# Patient Record
Sex: FEMALE | Race: WHITE | NOT HISPANIC OR LATINO | Employment: OTHER | URBAN - METROPOLITAN AREA
[De-identification: names, ages, dates, MRNs, and addresses within clinical notes are randomized per-mention and may not be internally consistent; named-entity substitution may affect disease eponyms.]

---

## 2023-03-01 ENCOUNTER — HOSPITAL ENCOUNTER (INPATIENT)
Facility: HOSPITAL | Age: 73
LOS: 5 days | Discharge: HOME | End: 2023-03-06
Attending: EMERGENCY MEDICINE | Admitting: INTERNAL MEDICINE

## 2023-03-01 ENCOUNTER — APPOINTMENT (EMERGENCY)
Dept: RADIOLOGY | Facility: HOSPITAL | Age: 73
End: 2023-03-01

## 2023-03-01 ENCOUNTER — APPOINTMENT (INPATIENT)
Dept: RADIOLOGY | Facility: HOSPITAL | Age: 73
End: 2023-03-01

## 2023-03-01 DIAGNOSIS — F41.9 ANXIETY: Chronic | ICD-10-CM

## 2023-03-01 DIAGNOSIS — I20.8 ANGINA AT REST (HCC): ICD-10-CM

## 2023-03-01 DIAGNOSIS — I10 PRIMARY HYPERTENSION: Chronic | ICD-10-CM

## 2023-03-01 DIAGNOSIS — J96.92 HYPERCAPNIC RESPIRATORY FAILURE (HCC): Primary | ICD-10-CM

## 2023-03-01 DIAGNOSIS — J96.02 ACUTE RESPIRATORY FAILURE WITH HYPERCAPNIA (HCC): ICD-10-CM

## 2023-03-01 DIAGNOSIS — F11.21 OPIOID DEPENDENCE IN REMISSION (HCC): Chronic | ICD-10-CM

## 2023-03-01 DIAGNOSIS — Z72.0 NICOTINE ABUSE: ICD-10-CM

## 2023-03-01 DIAGNOSIS — J96.01 ACUTE RESPIRATORY FAILURE WITH HYPOXIA AND HYPERCAPNIA (HCC): ICD-10-CM

## 2023-03-01 DIAGNOSIS — G89.29 CHRONIC PAIN: ICD-10-CM

## 2023-03-01 DIAGNOSIS — R91.8 PULMONARY NODULES: ICD-10-CM

## 2023-03-01 DIAGNOSIS — F19.10 DRUG ABUSE (HCC): ICD-10-CM

## 2023-03-01 DIAGNOSIS — J96.02 ACUTE RESPIRATORY FAILURE WITH HYPOXIA AND HYPERCAPNIA (HCC): ICD-10-CM

## 2023-03-01 DIAGNOSIS — J43.2 CENTRILOBULAR EMPHYSEMA (HCC): Chronic | ICD-10-CM

## 2023-03-01 PROBLEM — E03.9 HYPOTHYROID: Status: ACTIVE | Noted: 2023-03-01

## 2023-03-01 PROBLEM — Z95.1 HX OF CABG: Chronic | Status: ACTIVE | Noted: 2023-03-01

## 2023-03-01 PROBLEM — G93.40 ENCEPHALOPATHY ACUTE: Status: ACTIVE | Noted: 2023-03-01

## 2023-03-01 PROBLEM — F11.20 OPIOID DEPENDENCE (HCC): Status: ACTIVE | Noted: 2023-03-01

## 2023-03-01 PROBLEM — Z95.1 HX OF CABG: Status: ACTIVE | Noted: 2023-03-01

## 2023-03-01 PROBLEM — N17.9 ACUTE KIDNEY INJURY (HCC): Status: ACTIVE | Noted: 2023-03-01

## 2023-03-01 PROBLEM — E03.9 HYPOTHYROID: Chronic | Status: ACTIVE | Noted: 2023-03-01

## 2023-03-01 PROBLEM — F11.20 OPIOID DEPENDENCE (HCC): Chronic | Status: ACTIVE | Noted: 2023-03-01

## 2023-03-01 PROBLEM — I25.10 CAD (CORONARY ARTERY DISEASE): Status: ACTIVE | Noted: 2023-03-01

## 2023-03-01 PROBLEM — J44.1 CHRONIC OBSTRUCTIVE PULMONARY DISEASE WITH ACUTE EXACERBATION (HCC): Status: ACTIVE | Noted: 2023-03-01

## 2023-03-01 PROBLEM — I25.10 CAD (CORONARY ARTERY DISEASE): Chronic | Status: ACTIVE | Noted: 2023-03-01

## 2023-03-01 LAB
2HR DELTA HS TROPONIN: <0 NG/L
4HR DELTA HS TROPONIN: 0 NG/L
ALBUMIN SERPL BCP-MCNC: 4.5 G/DL (ref 3.5–5)
ALP SERPL-CCNC: 39 U/L (ref 34–104)
ALT SERPL W P-5'-P-CCNC: 14 U/L (ref 7–52)
ANION GAP SERPL CALCULATED.3IONS-SCNC: 7 MMOL/L (ref 4–13)
APTT PPP: 45 SECONDS (ref 23–37)
ARTERIAL PATENCY WRIST A: YES
AST SERPL W P-5'-P-CCNC: 23 U/L (ref 13–39)
BASE EXCESS BLDA CALC-SCNC: -1.6 MMOL/L
BASOPHILS # BLD AUTO: 0.05 THOUSANDS/ÂΜL (ref 0–0.1)
BASOPHILS NFR BLD AUTO: 1 % (ref 0–1)
BILIRUB SERPL-MCNC: 0.25 MG/DL (ref 0.2–1)
BNP SERPL-MCNC: 30 PG/ML (ref 0–100)
BODY TEMPERATURE: 97.4 DEGREES FEHRENHEIT
BUN SERPL-MCNC: 25 MG/DL (ref 5–25)
CALCIUM SERPL-MCNC: 9.1 MG/DL (ref 8.4–10.2)
CARDIAC TROPONIN I PNL SERPL HS: 2 NG/L
CARDIAC TROPONIN I PNL SERPL HS: 2 NG/L
CARDIAC TROPONIN I PNL SERPL HS: <2 NG/L
CHLORIDE SERPL-SCNC: 103 MMOL/L (ref 96–108)
CO2 SERPL-SCNC: 27 MMOL/L (ref 21–32)
CREAT SERPL-MCNC: 1.36 MG/DL (ref 0.6–1.3)
EOSINOPHIL # BLD AUTO: 0.47 THOUSAND/ÂΜL (ref 0–0.61)
EOSINOPHIL NFR BLD AUTO: 5 % (ref 0–6)
ERYTHROCYTE [DISTWIDTH] IN BLOOD BY AUTOMATED COUNT: 15.2 % (ref 11.6–15.1)
FLUAV RNA RESP QL NAA+PROBE: NEGATIVE
FLUBV RNA RESP QL NAA+PROBE: NEGATIVE
GFR SERPL CREATININE-BSD FRML MDRD: 39 ML/MIN/1.73SQ M
GLUCOSE SERPL-MCNC: 112 MG/DL (ref 65–140)
HCO3 BLDA-SCNC: 25.8 MMOL/L (ref 22–28)
HCT VFR BLD AUTO: 40.7 % (ref 34.8–46.1)
HGB BLD-MCNC: 12.7 G/DL (ref 11.5–15.4)
IMM GRANULOCYTES # BLD AUTO: 0.1 THOUSAND/UL (ref 0–0.2)
IMM GRANULOCYTES NFR BLD AUTO: 1 % (ref 0–2)
INR PPP: 0.98 (ref 0.84–1.19)
LYMPHOCYTES # BLD AUTO: 2.27 THOUSANDS/ÂΜL (ref 0.6–4.47)
LYMPHOCYTES NFR BLD AUTO: 26 % (ref 14–44)
MAGNESIUM SERPL-MCNC: 2.2 MG/DL (ref 1.9–2.7)
MCH RBC QN AUTO: 30 PG (ref 26.8–34.3)
MCHC RBC AUTO-ENTMCNC: 31.2 G/DL (ref 31.4–37.4)
MCV RBC AUTO: 96 FL (ref 82–98)
MONOCYTES # BLD AUTO: 0.82 THOUSAND/ÂΜL (ref 0.17–1.22)
MONOCYTES NFR BLD AUTO: 10 % (ref 4–12)
NASAL CANNULA: 3
NEUTROPHILS # BLD AUTO: 4.94 THOUSANDS/ÂΜL (ref 1.85–7.62)
NEUTS SEG NFR BLD AUTO: 57 % (ref 43–75)
NRBC BLD AUTO-RTO: 0 /100 WBCS
O2 CT BLDA-SCNC: 14.2 ML/DL (ref 16–23)
OXYHGB MFR BLDA: 79.3 % (ref 94–97)
PCO2 BLDA: 55.3 MM HG (ref 36–44)
PCO2 TEMP ADJ BLDA: 53.6 MM HG (ref 36–44)
PH BLD: 7.3 [PH] (ref 7.35–7.45)
PH BLDA: 7.29 [PH] (ref 7.35–7.45)
PLATELET # BLD AUTO: 281 THOUSANDS/UL (ref 149–390)
PMV BLD AUTO: 9.3 FL (ref 8.9–12.7)
PO2 BLD: 47.9 MM HG (ref 75–129)
PO2 BLDA: 50.3 MM HG (ref 75–129)
POTASSIUM SERPL-SCNC: 4.3 MMOL/L (ref 3.5–5.3)
PROCALCITONIN SERPL-MCNC: <0.05 NG/ML
PROT SERPL-MCNC: 8.4 G/DL (ref 6.4–8.4)
PROTHROMBIN TIME: 13.1 SECONDS (ref 11.6–14.5)
RBC # BLD AUTO: 4.23 MILLION/UL (ref 3.81–5.12)
RSV RNA RESP QL NAA+PROBE: NEGATIVE
SARS-COV-2 RNA RESP QL NAA+PROBE: NEGATIVE
SODIUM SERPL-SCNC: 137 MMOL/L (ref 135–147)
SPECIMEN SOURCE: ABNORMAL
WBC # BLD AUTO: 8.65 THOUSAND/UL (ref 4.31–10.16)

## 2023-03-01 PROCEDURE — 5A09357 ASSISTANCE WITH RESPIRATORY VENTILATION, LESS THAN 24 CONSECUTIVE HOURS, CONTINUOUS POSITIVE AIRWAY PRESSURE: ICD-10-PCS | Performed by: INTERNAL MEDICINE

## 2023-03-01 RX ORDER — TRAZODONE HYDROCHLORIDE 50 MG/1
50 TABLET ORAL
Status: DISCONTINUED | OUTPATIENT
Start: 2023-03-01 | End: 2023-03-06 | Stop reason: HOSPADM

## 2023-03-01 RX ORDER — DULOXETIN HYDROCHLORIDE 60 MG/1
60 CAPSULE, DELAYED RELEASE ORAL DAILY
Status: DISCONTINUED | OUTPATIENT
Start: 2023-03-02 | End: 2023-03-06 | Stop reason: HOSPADM

## 2023-03-01 RX ORDER — ALPRAZOLAM 0.5 MG/1
0.5 TABLET ORAL DAILY PRN
Status: DISCONTINUED | OUTPATIENT
Start: 2023-03-01 | End: 2023-03-02

## 2023-03-01 RX ORDER — ALPRAZOLAM 0.5 MG/1
TABLET ORAL 3 TIMES DAILY PRN
COMMUNITY

## 2023-03-01 RX ORDER — TRAMADOL HYDROCHLORIDE 50 MG/1
50 TABLET ORAL EVERY 6 HOURS PRN
COMMUNITY

## 2023-03-01 RX ORDER — METHYLPREDNISOLONE SODIUM SUCCINATE 125 MG/2ML
125 INJECTION, POWDER, LYOPHILIZED, FOR SOLUTION INTRAMUSCULAR; INTRAVENOUS ONCE
Status: COMPLETED | OUTPATIENT
Start: 2023-03-01 | End: 2023-03-01

## 2023-03-01 RX ORDER — LEVOTHYROXINE SODIUM 0.1 MG/1
100 TABLET ORAL DAILY
Status: DISCONTINUED | OUTPATIENT
Start: 2023-03-02 | End: 2023-03-06 | Stop reason: HOSPADM

## 2023-03-01 RX ORDER — CLOPIDOGREL BISULFATE 75 MG/1
75 TABLET ORAL DAILY
COMMUNITY

## 2023-03-01 RX ORDER — GUAIFENESIN 600 MG/1
600 TABLET, EXTENDED RELEASE ORAL 2 TIMES DAILY
Status: DISCONTINUED | OUTPATIENT
Start: 2023-03-01 | End: 2023-03-06 | Stop reason: HOSPADM

## 2023-03-01 RX ORDER — BUPRENORPHINE AND NALOXONE 8; 2 MG/1; MG/1
8 FILM, SOLUBLE BUCCAL; SUBLINGUAL 2 TIMES DAILY
Status: DISCONTINUED | OUTPATIENT
Start: 2023-03-01 | End: 2023-03-02

## 2023-03-01 RX ORDER — VENLAFAXINE HYDROCHLORIDE 150 MG/1
225 CAPSULE, EXTENDED RELEASE ORAL DAILY
COMMUNITY

## 2023-03-01 RX ORDER — METHYLPREDNISOLONE SODIUM SUCCINATE 40 MG/ML
40 INJECTION, POWDER, LYOPHILIZED, FOR SOLUTION INTRAMUSCULAR; INTRAVENOUS EVERY 8 HOURS
Status: DISCONTINUED | OUTPATIENT
Start: 2023-03-02 | End: 2023-03-03

## 2023-03-01 RX ORDER — CELECOXIB 200 MG/1
200 CAPSULE ORAL DAILY
COMMUNITY

## 2023-03-01 RX ORDER — ENOXAPARIN SODIUM 100 MG/ML
40 INJECTION SUBCUTANEOUS DAILY
Status: DISCONTINUED | OUTPATIENT
Start: 2023-03-02 | End: 2023-03-06 | Stop reason: HOSPADM

## 2023-03-01 RX ORDER — ACETAMINOPHEN 325 MG/1
650 TABLET ORAL EVERY 6 HOURS PRN
Status: DISCONTINUED | OUTPATIENT
Start: 2023-03-01 | End: 2023-03-06 | Stop reason: HOSPADM

## 2023-03-01 RX ORDER — BUPRENORPHINE HYDROCHLORIDE AND NALOXONE HYDROCHLORIDE DIHYDRATE 8; 2 MG/1; MG/1
1 TABLET SUBLINGUAL 2 TIMES DAILY
COMMUNITY

## 2023-03-01 RX ORDER — GABAPENTIN 300 MG/1
300 CAPSULE ORAL 3 TIMES DAILY
Status: DISCONTINUED | OUTPATIENT
Start: 2023-03-01 | End: 2023-03-01

## 2023-03-01 RX ORDER — LOSARTAN POTASSIUM 25 MG/1
25 TABLET ORAL DAILY
COMMUNITY
End: 2023-03-06

## 2023-03-01 RX ORDER — GABAPENTIN 300 MG/1
300 CAPSULE ORAL 3 TIMES DAILY
Status: ON HOLD | COMMUNITY
End: 2023-03-06 | Stop reason: SDUPTHER

## 2023-03-01 RX ORDER — CHLORHEXIDINE GLUCONATE 0.12 MG/ML
15 RINSE ORAL EVERY 12 HOURS SCHEDULED
Status: DISCONTINUED | OUTPATIENT
Start: 2023-03-01 | End: 2023-03-02

## 2023-03-01 RX ORDER — OMEPRAZOLE 40 MG/1
40 CAPSULE, DELAYED RELEASE ORAL DAILY
COMMUNITY

## 2023-03-01 RX ORDER — METOPROLOL SUCCINATE 25 MG/1
25 TABLET, EXTENDED RELEASE ORAL DAILY
Status: DISCONTINUED | OUTPATIENT
Start: 2023-03-02 | End: 2023-03-06 | Stop reason: HOSPADM

## 2023-03-01 RX ORDER — PANTOPRAZOLE SODIUM 40 MG/1
40 TABLET, DELAYED RELEASE ORAL
Status: DISCONTINUED | OUTPATIENT
Start: 2023-03-02 | End: 2023-03-06 | Stop reason: HOSPADM

## 2023-03-01 RX ORDER — DULOXETIN HYDROCHLORIDE 60 MG/1
60 CAPSULE, DELAYED RELEASE ORAL DAILY
COMMUNITY
End: 2023-03-06

## 2023-03-01 RX ORDER — NICOTINE 21 MG/24HR
1 PATCH, TRANSDERMAL 24 HOURS TRANSDERMAL DAILY
Status: DISCONTINUED | OUTPATIENT
Start: 2023-03-02 | End: 2023-03-06 | Stop reason: HOSPADM

## 2023-03-01 RX ORDER — LEVOTHYROXINE SODIUM 0.1 MG/1
100 TABLET ORAL DAILY
COMMUNITY
End: 2023-03-10

## 2023-03-01 RX ORDER — ATORVASTATIN CALCIUM 40 MG/1
40 TABLET, FILM COATED ORAL DAILY
COMMUNITY

## 2023-03-01 RX ORDER — IPRATROPIUM BROMIDE AND ALBUTEROL SULFATE 2.5; .5 MG/3ML; MG/3ML
3 SOLUTION RESPIRATORY (INHALATION)
Status: DISCONTINUED | OUTPATIENT
Start: 2023-03-01 | End: 2023-03-06 | Stop reason: HOSPADM

## 2023-03-01 RX ORDER — LOSARTAN POTASSIUM 25 MG/1
25 TABLET ORAL DAILY
Status: DISCONTINUED | OUTPATIENT
Start: 2023-03-02 | End: 2023-03-05

## 2023-03-01 RX ORDER — ATORVASTATIN CALCIUM 40 MG/1
40 TABLET, FILM COATED ORAL
Status: DISCONTINUED | OUTPATIENT
Start: 2023-03-02 | End: 2023-03-06 | Stop reason: HOSPADM

## 2023-03-01 RX ORDER — METOPROLOL SUCCINATE 25 MG/1
25 TABLET, EXTENDED RELEASE ORAL DAILY
COMMUNITY

## 2023-03-01 RX ORDER — CLOPIDOGREL BISULFATE 75 MG/1
75 TABLET ORAL DAILY
Status: DISCONTINUED | OUTPATIENT
Start: 2023-03-02 | End: 2023-03-06 | Stop reason: HOSPADM

## 2023-03-01 RX ADMIN — GUAIFENESIN 600 MG: 600 TABLET, EXTENDED RELEASE ORAL at 20:21

## 2023-03-01 RX ADMIN — TRAZODONE HYDROCHLORIDE 50 MG: 50 TABLET ORAL at 22:07

## 2023-03-01 RX ADMIN — CHLORHEXIDINE GLUCONATE 15 ML: 1.2 SOLUTION ORAL at 21:45

## 2023-03-01 RX ADMIN — METHYLPREDNISOLONE SODIUM SUCCINATE 125 MG: 125 INJECTION, POWDER, FOR SOLUTION INTRAMUSCULAR; INTRAVENOUS at 16:29

## 2023-03-01 RX ADMIN — IPRATROPIUM BROMIDE 1 MG: 0.5 SOLUTION RESPIRATORY (INHALATION) at 16:07

## 2023-03-01 RX ADMIN — ALBUTEROL SULFATE 10 MG: 2.5 SOLUTION RESPIRATORY (INHALATION) at 16:06

## 2023-03-01 RX ADMIN — METHYLPREDNISOLONE SODIUM SUCCINATE 40 MG: 40 INJECTION, POWDER, FOR SOLUTION INTRAMUSCULAR; INTRAVENOUS at 23:35

## 2023-03-01 RX ADMIN — IPRATROPIUM BROMIDE AND ALBUTEROL SULFATE 3 ML: 2.5; .5 SOLUTION RESPIRATORY (INHALATION) at 22:46

## 2023-03-01 RX ADMIN — ALPRAZOLAM 0.5 MG: 0.5 TABLET ORAL at 22:07

## 2023-03-01 NOTE — ED NOTES
Placed oxygen on pt in wheelchair on 2 liters per cannula   Oxygen up to 96% and breathing easier per pt     Aristides Krause RN  03/01/23 1938

## 2023-03-01 NOTE — ED PROVIDER NOTES
History  Chief Complaint   Patient presents with   • Shortness of Breath     Shortness of breath for some time  States worse over past couple of weeks  No pain hx of copd     On plavix compliant, no leg pain or swelling, no head trauma or focal neurologic complaints  Somnolent  History provided by:  Patient   used: No    Shortness of Breath  Severity:  Moderate  Onset quality:  Gradual  Timing:  Constant  Chronicity:  New  Relieved by:  Nothing  Worsened by:  Nothing  Ineffective treatments:  None tried  Associated symptoms: cough and wheezing    Associated symptoms: no chest pain, no fever, no neck pain, no PND and no vomiting    Risk factors comment:  History of COPD  Prior to Admission Medications   Prescriptions Last Dose Informant Patient Reported? Taking?    ALPRAZolam (XANAX) 0 5 mg tablet   Yes Yes   Sig: Take by mouth 3 (three) times a day as needed for anxiety   DULoxetine (CYMBALTA) 60 mg delayed release capsule   Yes Yes   Sig: Take 60 mg by mouth daily   atorvastatin (LIPITOR) 40 mg tablet   Yes Yes   Sig: Take 40 mg by mouth daily   buprenorphine-naloxone (SUBOXONE) 8-2 mg per SL tablet   Yes Yes   Sig: Place 1 tablet under the tongue 2 (two) times a day   celecoxib (CeleBREX) 200 mg capsule   Yes Yes   Sig: Take 200 mg by mouth daily   clopidogrel (PLAVIX) 75 mg tablet   Yes Yes   Sig: Take 75 mg by mouth daily   gabapentin (NEURONTIN) 300 mg capsule   Yes Yes   Sig: Take 300 mg by mouth 3 (three) times a day   levothyroxine 100 mcg tablet   Yes Yes   Sig: Take 100 mcg by mouth daily   losartan (COZAAR) 25 mg tablet   Yes Yes   Sig: Take 25 mg by mouth daily   metoprolol succinate (TOPROL-XL) 25 mg 24 hr tablet   Yes Yes   Sig: Take 25 mg by mouth daily   omeprazole (PriLOSEC) 40 MG capsule   Yes Yes   Sig: Take 40 mg by mouth daily   traMADol (ULTRAM) 50 mg tablet   Yes Yes   Sig: Take 50 mg by mouth every 6 (six) hours as needed for moderate pain   venlafaxine (EFFEXOR-XR) 150 mg 24 hr capsule   Yes Yes   Sig: Take 225 mg by mouth daily      Facility-Administered Medications: None       Past Medical History:   Diagnosis Date   • COPD (chronic obstructive pulmonary disease) ([facility] [address] )    • Disease of thyroid gland    • Hypertension        Past Surgical History:   Procedure Laterality Date   • CARDIAC SURGERY     • HIP SURGERY     • ORTHOPEDIC SURGERY     • 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)         History reviewed  No pertinent family history  I have reviewed and agree with the history as documented  E-Cigarette/Vaping   • E-Cigarette Use Never User      E-Cigarette/Vaping Substances     Social History     Tobacco Use   • Smoking status: Every Day     Packs/day: 0 50     Types: Cigarettes   • Smokeless tobacco: Never   Vaping Use   • Vaping Use: Never used   Substance Use Topics   • Alcohol use: Not Currently   • Drug use: Yes     Types: Marijuana       Review of Systems   Constitutional: Negative  Negative for fever  HENT: Negative  Eyes: Negative  Respiratory: Positive for cough, shortness of breath and wheezing  Cardiovascular: Negative  Negative for chest pain and PND  Gastrointestinal: Negative  Negative for vomiting  Endocrine: Negative  Genitourinary: Negative  Musculoskeletal: Negative  Negative for neck pain  Skin: Negative  Allergic/Immunologic: Negative  Neurological: Negative  Hematological: Negative  Psychiatric/Behavioral: Positive for decreased concentration  All other systems reviewed and are negative  Physical Exam  Physical Exam  Constitutional:       Appearance: Normal appearance  She is well-developed  HENT:      Head: Normocephalic and atraumatic  Nose: Nose normal       Mouth/Throat:      Mouth: Mucous membranes are moist    Eyes:      Extraocular Movements: Extraocular movements intact  Pupils: Pupils are equal, round, and reactive to light  Cardiovascular:      Rate and Rhythm: Normal rate and regular rhythm  Pulmonary:      Effort: Pulmonary effort is normal  Tachypnea present  Breath sounds: Decreased breath sounds present  No wheezing, rhonchi or rales  Abdominal:      General: Abdomen is flat  Bowel sounds are normal       Palpations: Abdomen is soft  Musculoskeletal:         General: Normal range of motion  Cervical back: Normal range of motion and neck supple  Right lower leg: No tenderness  No edema  Left lower leg: No tenderness  No edema  Skin:     General: Skin is warm  Capillary Refill: Capillary refill takes less than 2 seconds  Neurological:      General: No focal deficit present  Mental Status: She is alert and oriented to person, place, and time  Mental status is at baseline  Cranial Nerves: No cranial nerve deficit  Motor: No weakness  Psychiatric:         Mood and Affect: Mood normal          Thought Content:  Thought content normal          Vital Signs  ED Triage Vitals [03/01/23 1351]   Temperature Pulse Respirations Blood Pressure SpO2   (!) 97 4 °F (36 3 °C) 60 (!) 24 123/72 91 %      Temp Source Heart Rate Source Patient Position - Orthostatic VS BP Location FiO2 (%)   Tympanic Monitor Sitting Right arm --      Pain Score       No Pain           Vitals:    03/01/23 1351 03/01/23 1632 03/01/23 1900   BP: 123/72 163/72    Pulse: 60 61 55   Patient Position - Orthostatic VS: Sitting           Visual Acuity      ED Medications  Medications   atorvastatin (LIPITOR) tablet 40 mg (has no administration in time range)   buprenorphine-naloxone (Suboxone) film 8 mg (has no administration in time range)   clopidogrel (PLAVIX) tablet 75 mg (has no administration in time range)   DULoxetine (CYMBALTA) delayed release capsule 60 mg (has no administration in time range)   gabapentin (NEURONTIN) capsule 300 mg (has no administration in time range)   levothyroxine tablet 100 mcg (has no administration in time range)   losartan (COZAAR) tablet 25 mg (has no administration in time range)   metoprolol succinate (TOPROL-XL) 24 hr tablet 25 mg (has no administration in time range)   pantoprazole (PROTONIX) EC tablet 40 mg (has no administration in time range)   venlafaxine (EFFEXOR-XR) 24 hr capsule 225 mg (has no administration in time range)   chlorhexidine (PERIDEX) 0 12 % oral rinse 15 mL (has no administration in time range)   acetaminophen (TYLENOL) tablet 650 mg (has no administration in time range)   nicotine (NICODERM CQ) 14 mg/24hr TD 24 hr patch 1 patch (has no administration in time range)   guaiFENesin (MUCINEX) 12 hr tablet 600 mg (has no administration in time range)   methylPREDNISolone sodium succinate (Solu-MEDROL) injection 40 mg (has no administration in time range)   enoxaparin (LOVENOX) subcutaneous injection 40 mg (has no administration in time range)   albuterol CONTINUOUS nebulizing solution (canister) 10 mg (10 mg Nebulization Given 3/1/23 1606)   methylPREDNISolone sodium succinate (Solu-MEDROL) injection 125 mg (125 mg Intravenous Given 3/1/23 1629)   ipratropium (ATROVENT) 0 02 % inhalation solution 1 mg (1 mg Nebulization Given 3/1/23 1607)       Diagnostic Studies  Results Reviewed     Procedure Component Value Units Date/Time    Sputum culture and Gram stain [[id number]]     Lab Status: No result Specimen: Expectorated Sputum     Legionella antigen, urine [[id number]]     Lab Status: No result Specimen: Urine, Clean Catch     Strep Pneumoniae, Urine [[id number]]     Lab Status: No result Specimen: Urine, Clean Catch     Procalcitonin [[id number]]     Lab Status: No result Specimen: Blood     HS Troponin I 4hr [[id number]]     Lab Status: No result Specimen: Blood     FLU/RSV/COVID - if FLU/RSV clinically relevant [[id number]]  (Normal) Collected: 03/01/23 1611    Lab Status: Final result Specimen: Nares from Nose Updated: 03/01/23 1701     SARS-CoV-2 Negative     INFLUENZA A PCR Negative     INFLUENZA B PCR Negative     RSV PCR Negative    Narrative:      FOR PEDIATRIC PATIENTS - copy/paste COVID Guidelines URL to browser: [email or web address]    SARS-CoV-2 assay is a Nucleic Acid Amplification assay intended for the  qualitative detection of nucleic acid from SARS-CoV-2 in nasopharyngeal  swabs  Results are for the presumptive identification of SARS-CoV-2 RNA  Positive results are indicative of infection with SARS-CoV-2, the virus  causing COVID-19, but do not rule out bacterial infection or co-infection  with other viruses  Laboratories within the [place] and its  territories are required to report all positive results to the appropriate  public health authorities  Negative results do not preclude SARS-CoV-2  infection and should not be used as the sole basis for treatment or other  patient management decisions  Negative results must be combined with  clinical observations, patient history, and epidemiological information  This test has not been FDA cleared or approved  This test has been authorized by FDA under an Emergency Use Authorization  (EUA)  This test is only authorized for the duration of time the  declaration that circumstances exist justifying the authorization of the  emergency use of an in vitro diagnostic tests for detection of SARS-CoV-2  virus and/or diagnosis of COVID-19 infection under section 564(b)(1) of  the Act, 21 U  S C  058TBN-9(X)(4), unless the authorization is terminated  or revoked sooner  The test has been validated but independent review by FDA  and CLIA is pending  Test performed using [employer] GeneXpert: This RT-PCR assay targets N2,  a region unique to SARS-CoV-2  A conserved region in the E-gene was chosen  for pan-Sarbecovirus detection which includes SARS-CoV-2  According to CMS-2020-01-R, this platform meets the definition of high-[employer] technology      B-Type Natriuretic Peptide(BNP) [[id number]]  (Normal) Collected: 03/01/23 1611    Lab Status: Final result Specimen: Blood from Line, Venous Updated: 03/01/23 1650     BNP 30 pg/mL     HS Troponin I 2hr [[id number]]     Lab Status: No result Specimen: Blood     HS Troponin 0hr (reflex protocol) [[id number]]  (Normal) Collected: 03/01/23 1611    Lab Status: Final result Specimen: Blood from Line, Venous Updated: 03/01/23 1648     hs TnI 0hr 2 ng/L     Comprehensive metabolic panel [[id number]]  (Abnormal) Collected: 03/01/23 1611    Lab Status: Final result Specimen: Blood from Line, Venous Updated: 03/01/23 1640     Sodium 137 mmol/L      Potassium 4 3 mmol/L      Chloride 103 mmol/L      CO2 27 mmol/L      ANION GAP 7 mmol/L      BUN 25 mg/dL      Creatinine 1 36 mg/dL      Glucose 112 mg/dL      Calcium 9 1 mg/dL      AST 23 U/L      ALT 14 U/L      Alkaline Phosphatase 39 U/L      Total Protein 8 4 g/dL      Albumin 4 5 g/dL      Total Bilirubin 0 25 mg/dL      eGFR 39 ml/min/1 73sq m     Narrative:      [place] guidelines for Chronic Kidney Disease (CKD):   •  Stage 1 with normal or high GFR (GFR > 90 mL/min/1 73 square meters)  •  Stage 2 Mild CKD (GFR = 60-89 mL/min/1 73 square meters)  •  Stage 3A Moderate CKD (GFR = 45-59 mL/min/1 73 square meters)  •  Stage 3B Moderate CKD (GFR = 30-44 mL/min/1 73 square meters)  •  Stage 4 Severe CKD (GFR = 15-29 mL/min/1 73 square meters)  •  Stage 5 End Stage CKD (GFR <15 mL/min/1 73 square meters)  Note: GFR calculation is accurate only with a steady state creatinine    Magnesium [[id number]]  (Normal) Collected: 03/01/23 1611    Lab Status: Final result Specimen: Blood from Line, Venous Updated: 03/01/23 1640     Magnesium 2 2 mg/dL     Protime-INR [[id number]]  (Normal) Collected: 03/01/23 1611    Lab Status: Final result Specimen: Blood from Line, Venous Updated: 03/01/23 1638     Protime 13 1 seconds      INR 0 98    APTT [[id number]]  (Abnormal) Collected: 03/01/23 1611    Lab Status: Final result Specimen: Blood from Line, Venous Updated: 03/01/23 1638     PTT 45 seconds     Blood gas, arterial [[id number]]  (Abnormal) Collected: 03/01/23 1619    Lab Status: Final result Specimen: Blood, Arterial from Radial, Left Updated: 03/01/23 1637     pH, Arterial 7 287     PH ART TC 7 297     pCO2, Arterial 55 3 mm Hg      PCO2 (TC) Arterial 53 6 mm Hg      pO2, Arterial 50 3 mm Hg      PO2 (TC) Arterial 47 9 mm Hg      HCO3, Arterial 25 8 mmol/L      Base Excess, Arterial -1 6 mmol/L      O2 Content, Arterial 14 2 mL/dL      O2 HGB,Arterial  79 3 %      SOURCE Radial, Left     CRIS TEST Yes     Temperature 97 4 Degrees Fehrenheit      Nasal Cannula 3    CBC and differential [[id number]]  (Abnormal) Collected: 03/01/23 1611    Lab Status: Final result Specimen: Blood from Line, Venous Updated: 03/01/23 1619     WBC 8 65 Thousand/uL      RBC 4 23 Million/uL      Hemoglobin 12 7 g/dL      Hematocrit 40 7 %      MCV 96 fL      MCH 30 0 pg      MCHC 31 2 g/dL      RDW 15 2 %      MPV 9 3 fL      Platelets 114 Thousands/uL      nRBC 0 /100 WBCs      Neutrophils Relative 57 %      Immat GRANS % 1 %      Lymphocytes Relative 26 %      Monocytes Relative 10 %      Eosinophils Relative 5 %      Basophils Relative 1 %      Neutrophils Absolute 4 94 Thousands/µL      Immature Grans Absolute 0 10 Thousand/uL      Lymphocytes Absolute 2 27 Thousands/µL      Monocytes Absolute 0 82 Thousand/µL      Eosinophils Absolute 0 47 Thousand/µL      Basophils Absolute 0 05 Thousands/µL                  XR chest 1 view portable    (Results Pending)   CT head without contrast    (Results Pending)   CT chest without contrast    (Results Pending)              Procedures  ECG 12 Lead Documentation Only    Date/Time: 3/1/2023 5:19 PM  Performed by: Diana Sheridan DO  Authorized by: Diana Sheridan DO     ECG reviewed by me, the ED Provider: yes    Patient location:  ED  Previous ECG:     Previous ECG: Unavailable    Comparison to cardiac monitor: Yes    Interpretation:     Interpretation: non-specific    Rate:     ECG rate assessment: normal    Rhythm:     Rhythm: sinus rhythm    Ectopy:     Ectopy: none    QRS:     QRS axis:  Normal  Conduction:     Conduction: normal    ST segments:     ST segments:  Non-specific  T waves:     T waves: non-specific      CriticalCare Time  Performed by: Dinora Holbrook DO  Authorized by: Dinora Holbrook DO     Critical care provider statement:     Critical care time (minutes):  45    Critical care was necessary to treat or prevent imminent or life-threatening deterioration of the following conditions:  Respiratory failure    Critical care was time spent personally by me on the following activities:  Blood draw for specimens, obtaining history from patient or surrogate, development of treatment plan with patient or surrogate, discussions with consultants, evaluation of patient's response to treatment, examination of patient, interpretation of cardiac output measurements, ordering and performing treatments and interventions, ordering and review of laboratory studies, ordering and review of radiographic studies, re-evaluation of patient's condition and review of old charts    I assumed direction of critical care for this patient from another provider in my specialty: no               ED Course                                             Medical Decision Making  Obtained ABG labs chest x-ray  Patient is in hypercapnic respiratory failure  She improved with her mental status with BiPAP  ICU consulted admit for further evaluation management  Hypercapnic respiratory failure ([facility] [address] ): acute illness or injury  Amount and/or Complexity of Data Reviewed  Independent Historian: caregiver  Labs: ordered  Decision-making details documented in ED Course  Radiology: ordered  Decision-making details documented in ED Course  ECG/medicine tests: ordered   Decision-making details documented in ED Course  Risk  Prescription drug management  Decision regarding hospitalization  Disposition  Final diagnoses:   Hypercapnic respiratory failure ([address] )     Time reflects when diagnosis was documented in both MDM as applicable and the Disposition within this note     Time User Action Codes Description Comment    3/1/2023  5:16 PM Radha Steele Add [J96 [id number]] Hypercapnic respiratory failure [facility])       ED Disposition     ED Disposition   Admit    Condition   Stable    Date/Time   Wed Mar 1, 2023  5:16 PM    Comment               Follow-up Information    None         Patient's Medications   Discharge Prescriptions    No medications on file       No discharge procedures on file      PDMP Review     None          ED Provider  Electronically Signed by           Stephanie Wolf DO  03/01/23 1932

## 2023-03-02 PROBLEM — F14.10 COCAINE ABUSE (HCC): Status: ACTIVE | Noted: 2023-03-02

## 2023-03-02 PROBLEM — I63.9 CEREBRAL INFARCT (HCC): Status: ACTIVE | Noted: 2023-03-02

## 2023-03-02 PROBLEM — R91.8 PULMONARY NODULES: Status: ACTIVE | Noted: 2023-03-02

## 2023-03-02 LAB
ANION GAP SERPL CALCULATED.3IONS-SCNC: 7 MMOL/L (ref 4–13)
ATRIAL RATE: 61 BPM
BASE EX.OXY STD BLDV CALC-SCNC: 95.8 % (ref 60–80)
BASE EXCESS BLDV CALC-SCNC: -1.4 MMOL/L
BASOPHILS # BLD AUTO: 0.01 THOUSANDS/ÂΜL (ref 0–0.1)
BASOPHILS NFR BLD AUTO: 0 % (ref 0–1)
BODY TEMPERATURE: 97.8 DEGREES FEHRENHEIT
BUN SERPL-MCNC: 26 MG/DL (ref 5–25)
CALCIUM SERPL-MCNC: 9.2 MG/DL (ref 8.4–10.2)
CHLORIDE SERPL-SCNC: 105 MMOL/L (ref 96–108)
CO2 SERPL-SCNC: 27 MMOL/L (ref 21–32)
CREAT SERPL-MCNC: 1.21 MG/DL (ref 0.6–1.3)
EOSINOPHIL # BLD AUTO: 0 THOUSAND/ÂΜL (ref 0–0.61)
EOSINOPHIL NFR BLD AUTO: 0 % (ref 0–6)
ERYTHROCYTE [DISTWIDTH] IN BLOOD BY AUTOMATED COUNT: 14.6 % (ref 11.6–15.1)
GFR SERPL CREATININE-BSD FRML MDRD: 45 ML/MIN/1.73SQ M
GLUCOSE SERPL-MCNC: 159 MG/DL (ref 65–140)
HCO3 BLDV-SCNC: 20.2 MMOL/L (ref 24–30)
HCT VFR BLD AUTO: 41.1 % (ref 34.8–46.1)
HGB BLD-MCNC: 13 G/DL (ref 11.5–15.4)
IMM GRANULOCYTES # BLD AUTO: 0.13 THOUSAND/UL (ref 0–0.2)
IMM GRANULOCYTES NFR BLD AUTO: 1 % (ref 0–2)
IPAP: 12
L PNEUMO1 AG UR QL IA.RAPID: NEGATIVE
LYMPHOCYTES # BLD AUTO: 0.93 THOUSANDS/ÂΜL (ref 0.6–4.47)
LYMPHOCYTES NFR BLD AUTO: 10 % (ref 14–44)
MAGNESIUM SERPL-MCNC: 2.2 MG/DL (ref 1.9–2.7)
MCH RBC QN AUTO: 30 PG (ref 26.8–34.3)
MCHC RBC AUTO-ENTMCNC: 31.6 G/DL (ref 31.4–37.4)
MCV RBC AUTO: 95 FL (ref 82–98)
MONOCYTES # BLD AUTO: 0.21 THOUSAND/ÂΜL (ref 0.17–1.22)
MONOCYTES NFR BLD AUTO: 2 % (ref 4–12)
NEUTROPHILS # BLD AUTO: 8.19 THOUSANDS/ÂΜL (ref 1.85–7.62)
NEUTS SEG NFR BLD AUTO: 87 % (ref 43–75)
NON VENT- BIPAP: ABNORMAL
NRBC BLD AUTO-RTO: 0 /100 WBCS
O2 CT BLDV-SCNC: 19.4 ML/DL
P AXIS: 58 DEGREES
PCO2 BLDV: 26.3 MM HG (ref 42–50)
PEEP MAX SETTING VENT: 5 CM[H2O]
PH BLDV: 7.5 [PH] (ref 7.3–7.4)
PHOSPHATE SERPL-MCNC: 3 MG/DL (ref 2.3–4.1)
PLATELET # BLD AUTO: 298 THOUSANDS/UL (ref 149–390)
PMV BLD AUTO: 9 FL (ref 8.9–12.7)
PO2 BLDV: 198.3 MM HG (ref 35–45)
POTASSIUM SERPL-SCNC: 4.5 MMOL/L (ref 3.5–5.3)
PR INTERVAL: 166 MS
PROCALCITONIN SERPL-MCNC: <0.05 NG/ML
QRS AXIS: 73 DEGREES
QRSD INTERVAL: 92 MS
QT INTERVAL: 448 MS
QTC INTERVAL: 450 MS
RBC # BLD AUTO: 4.34 MILLION/UL (ref 3.81–5.12)
S PNEUM AG UR QL: NEGATIVE
SODIUM SERPL-SCNC: 139 MMOL/L (ref 135–147)
T WAVE AXIS: 69 DEGREES
VENT BIPAP FIO2: 35 %
VENTRICULAR RATE: 61 BPM
WBC # BLD AUTO: 9.47 THOUSAND/UL (ref 4.31–10.16)

## 2023-03-02 RX ORDER — BUPRENORPHINE AND NALOXONE 8; 2 MG/1; MG/1
8 FILM, SOLUBLE BUCCAL; SUBLINGUAL DAILY
Status: DISCONTINUED | OUTPATIENT
Start: 2023-03-03 | End: 2023-03-03

## 2023-03-02 RX ORDER — BUPRENORPHINE AND NALOXONE 2; .5 MG/1; MG/1
4 FILM, SOLUBLE BUCCAL; SUBLINGUAL
Status: DISCONTINUED | OUTPATIENT
Start: 2023-03-02 | End: 2023-03-03

## 2023-03-02 RX ORDER — ALPRAZOLAM 0.5 MG/1
0.5 TABLET ORAL 2 TIMES DAILY PRN
Status: DISCONTINUED | OUTPATIENT
Start: 2023-03-02 | End: 2023-03-06 | Stop reason: HOSPADM

## 2023-03-02 RX ADMIN — ATORVASTATIN CALCIUM 40 MG: 40 TABLET, FILM COATED ORAL at 18:14

## 2023-03-02 RX ADMIN — GUAIFENESIN 600 MG: 600 TABLET, EXTENDED RELEASE ORAL at 18:14

## 2023-03-02 RX ADMIN — GUAIFENESIN 600 MG: 600 TABLET, EXTENDED RELEASE ORAL at 08:21

## 2023-03-02 RX ADMIN — CLOPIDOGREL BISULFATE 75 MG: 75 TABLET ORAL at 08:21

## 2023-03-02 RX ADMIN — ALPRAZOLAM 0.5 MG: 0.5 TABLET ORAL at 15:07

## 2023-03-02 RX ADMIN — IPRATROPIUM BROMIDE AND ALBUTEROL SULFATE 3 ML: 2.5; .5 SOLUTION RESPIRATORY (INHALATION) at 07:39

## 2023-03-02 RX ADMIN — PANTOPRAZOLE SODIUM 40 MG: 40 TABLET, DELAYED RELEASE ORAL at 05:38

## 2023-03-02 RX ADMIN — VENLAFAXINE HYDROCHLORIDE 225 MG: 75 CAPSULE, EXTENDED RELEASE ORAL at 08:30

## 2023-03-02 RX ADMIN — DULOXETINE HYDROCHLORIDE 60 MG: 60 CAPSULE, DELAYED RELEASE ORAL at 08:21

## 2023-03-02 RX ADMIN — ALPRAZOLAM 0.5 MG: 0.5 TABLET ORAL at 21:17

## 2023-03-02 RX ADMIN — ACETAMINOPHEN 650 MG: 325 TABLET, FILM COATED ORAL at 10:45

## 2023-03-02 RX ADMIN — NICOTINE 1 PATCH: 14 PATCH, EXTENDED RELEASE TRANSDERMAL at 08:21

## 2023-03-02 RX ADMIN — LEVOTHYROXINE SODIUM 100 MCG: 100 TABLET ORAL at 08:21

## 2023-03-02 RX ADMIN — TRAZODONE HYDROCHLORIDE 50 MG: 50 TABLET ORAL at 21:17

## 2023-03-02 RX ADMIN — CHLORHEXIDINE GLUCONATE 15 ML: 1.2 SOLUTION ORAL at 08:21

## 2023-03-02 RX ADMIN — IPRATROPIUM BROMIDE AND ALBUTEROL SULFATE 3 ML: 2.5; .5 SOLUTION RESPIRATORY (INHALATION) at 01:04

## 2023-03-02 RX ADMIN — BUPRENORPHINE AND NALOXONE 4 MG: 2; .5 FILM BUCCAL; SUBLINGUAL at 18:43

## 2023-03-02 RX ADMIN — LOSARTAN POTASSIUM 25 MG: 25 TABLET, FILM COATED ORAL at 08:26

## 2023-03-02 RX ADMIN — IPRATROPIUM BROMIDE AND ALBUTEROL SULFATE 3 ML: 2.5; .5 SOLUTION RESPIRATORY (INHALATION) at 19:20

## 2023-03-02 RX ADMIN — ENOXAPARIN SODIUM 40 MG: 40 INJECTION SUBCUTANEOUS at 08:21

## 2023-03-02 RX ADMIN — IPRATROPIUM BROMIDE AND ALBUTEROL SULFATE 3 ML: 2.5; .5 SOLUTION RESPIRATORY (INHALATION) at 13:53

## 2023-03-02 RX ADMIN — METHYLPREDNISOLONE SODIUM SUCCINATE 40 MG: 40 INJECTION, POWDER, FOR SOLUTION INTRAMUSCULAR; INTRAVENOUS at 18:14

## 2023-03-02 RX ADMIN — METOPROLOL SUCCINATE 25 MG: 25 TABLET, EXTENDED RELEASE ORAL at 08:21

## 2023-03-02 RX ADMIN — PNEUMOCOCCAL 20-VALENT CONJUGATE VACCINE 0.5 ML
2.2; 2.2; 2.2; 2.2; 2.2; 2.2; 2.2; 2.2; 2.2; 2.2; 2.2; 2.2; 2.2; 2.2; 2.2; 2.2; 4.4; 2.2; 2.2; 2.2 INJECTION, SUSPENSION INTRAMUSCULAR at 12:41

## 2023-03-02 RX ADMIN — BUPRENORPHINE AND NALOXONE 8 MG: 8; 2 FILM BUCCAL; SUBLINGUAL at 08:21

## 2023-03-02 RX ADMIN — METHYLPREDNISOLONE SODIUM SUCCINATE 40 MG: 40 INJECTION, POWDER, FOR SOLUTION INTRAMUSCULAR; INTRAVENOUS at 08:19

## 2023-03-02 NOTE — ASSESSMENT & PLAN NOTE
· History of centrilobular emphysema, now with worsening SOB over the past few days  · No increase in sputum production, baseline white  · No fevers or WBC  · Suspect COPD exacerbation, low suspicion for infectious etiology- procal neg x1  · Scheduled duonebs and mucinex  · S/p solumedrol 125mg IV x1 in ED   Will continue with 40mg q8h  · Hold antibiotics at this time, remainder of workup as above  · Wean FiO2 for goal SpO2 >90%  · Will need to f/u with Pulmonology on discharge

## 2023-03-02 NOTE — ASSESSMENT & PLAN NOTE
· Increased lethargy and confusion today per daughter  Likely 2/2 hypoxia and hypercapnia  · ABG 7 28/55/50/25  · Placed on BiPap in ED, mentation improved  · CT head pending  · Will continue scheduled Suboxone and PRN xanax as prescribed  Avoid additional narcotics and benzo's     · CAM ICU  · Delirium precautions, sleep hygiene  · Frequent neuro checks  · Fall precautions

## 2023-03-02 NOTE — ASSESSMENT & PLAN NOTE
· CT head 3/1: Age-indeterminate infarct in the left frontal lobe for which further evaluation with MRI can be obtained  · Initially presented with encephalopathy suspected 2/2 hypercarbia and hypoxia   Now A&O with non-focal exam  · Frequent neuro checks

## 2023-03-02 NOTE — ASSESSMENT & PLAN NOTE
· Hx of lumbosacral stenosis and chronic pain  · Previously on Tramadol   PDMP reviewed and will continue Suboxone 8-2 BID as prescribed

## 2023-03-02 NOTE — ASSESSMENT & PLAN NOTE
· CT Chest 3/1: Increased density within the trachea for which further evaluation with pulmonary and bronchoscopy is recommended  Multiple pulmonary nodules measuring up to 2 to 3 mm  Based on current Fleischner Society 2017 Guidelines on incidental pulmonary nodule, no routine follow-up is needed if the patient is low risk  If the patient is high risk, optional follow-up chest  CT at 12 months can be considered      · Per Care Everywhere, CT angio 12/16/21:  Stable 5 mm right upper lobe nodule for which imaging follow-up per Fleischner guidelines is recommended  · F/u OP with serial imaging as appropriate

## 2023-03-02 NOTE — PROGRESS NOTES
Dilip U  67   Progress Note Mehdi Sarah 1950, 71 y o  female MRN: [id number]  Unit/Bed#: ICU 06 Encounter: [id number]  Primary Care Provider: Gary Viveros   Date and time admitted to hospital: 3/1/2023  3:46 PM    * Acute respiratory failure with hypoxia and hypercapnia [facility])  Assessment & Plan  Presented with worsening SOB over past few days, now with confusion/lethargy  ABG in ED: 7 28/55/50/28  Placed on BiPap in ED and received hour long neb treatment plus 125mg solumedrol  Has Hx of COPD/centrilobar emphysema, now likely with acute exacerbation  · Able to wean off BiPap to 3L NC in ED after above measures  Does not wear O2 at home, also without formal GELA diagnosis  · CXR without focal opacification  · Unlikely infectious origin  No fevers, no change in sputum  · Of note, patient is daily 1/2 PPD smoker on average    Plan  · Wean FiO2 for SpO2 >90%  · Continuous pulse oximetry  · CT Chest 3/1: Increased density within the trachea for which further evaluation with pulmonary and bronchoscopy is recommended  Multiple pulmonary nodules measuring up to 2 to 3 mm  Based on current Fleischner Society 2017 Guidelines on incidental pulmonary nodule, no routine follow-up is needed if the patient is low risk  If the patient is high risk, optional follow-up chest  CT at 12 months can be considered  · Procal <0 05  · Urinary antigens pending  · Sputum culture ordered for completeness  · Remainder of treatment as per COPD      Chronic obstructive pulmonary disease with acute exacerbation ([facility] [address] )  Assessment & Plan  · History of centrilobular emphysema, now with worsening SOB over the past few days  · No increase in sputum production, baseline white  · No fevers or WBC  · Suspect COPD exacerbation, low suspicion for infectious etiology- procal neg x1  · Scheduled duonebs and mucinex  · S/p solumedrol 125mg IV x1 in ED   Will continue with 40mg q8h  · Hold antibiotics at this time, remainder of workup as above  · Wean FiO2 for goal SpO2 >90%  · Will need to f/u with Pulmonology on discharge    Acute kidney injury [facility])  Assessment & Plan  · Baseline creat appears 0 8-1 1 with most recent results via Care Everywhere @2021  · Creat 1 36 on admission  · Avoid nephrotoxins  · Close I&O monitoring  · Trend renal indices    Encephalopathy acute  Assessment & Plan  · Increased lethargy and confusion 3/1 per daughter  Likely 2/2 hypoxia and hypercapnia  · ABG 7 28/55/50/25  · Placed on BiPap in ED, mentation improved  · CT head 3/1: "Age-indeterminate infarct in the left frontal lobe for which further evaluation with MRI can be obtained " Exam non-focal, A&O   · Continue scheduled Suboxone and PRN xanax as prescribed  Avoid additional narcotics and benzo's  · Patient now reporting she takes Suboxone 12mg once daily in AM rather than 8mg BID as she is "weaning myself down to a goal of 10"  Will discuss dosing schedule on rounds  · CAM ICU  · Delirium precautions, sleep hygiene  · Frequent neuro checks  · Fall precautions      Cerebral infarct [facility])  Assessment & Plan  · CT head 3/1: Age-indeterminate infarct in the left frontal lobe for which further evaluation with MRI can be obtained  · Initially presented with encephalopathy suspected 2/2 hypercarbia and hypoxia  Now A&O with non-focal exam  · Frequent neuro checks       Centrilobular emphysema ([facility] [address] )  Assessment & Plan  · Hx of Centrilobar emphysema  · OP meds include only PRN albuterol  Does not follow with Pulmonologist  · Plan as outlined  · Smoking cessation  · F/u with Pulmonology on d/c    Opioid dependence ([facility] [address] )  Assessment & Plan  · Hx of lumbosacral stenosis and chronic pain  · Previously on Tramadol  PDMP reviewed and will continue Suboxone 8-2 BID as prescribed  · On further discussion with patient, she's been weaning her suboxone down on her own  Reports taking 12mg daily in AM rather than 8mg BID   Will discuss dosing schedule today  CAD (coronary artery disease)  Assessment & Plan  · Hx of CAD, s/p CABG x1 vessel 2016  · Continue home Plavix  · Admission EKG NSR with non-specific ST changes  · HS troponin 2  No chest pain  · Continuous cardiac monitoring      Hx of CABG x1  Assessment & Plan  · Hx CABG in 2016  · Continue home plavix    Anxiety  Assessment & Plan  · Hx of anxiety  · Continue home xanax, duloxetine, venlafaxine    Hypertension  Assessment & Plan  · Continue home losartan and metoprolol  · VS per unit protocol    Hypothyroid  Assessment & Plan  · Continue home synthroid    Pulmonary nodules  Assessment & Plan  · CT Chest 3/1: Increased density within the trachea for which further evaluation with pulmonary and bronchoscopy is recommended  Multiple pulmonary nodules measuring up to 2 to 3 mm  Based on current Fleischner Society 2017 Guidelines on incidental pulmonary nodule, no routine follow-up is needed if the patient is low risk  If the patient is high risk, optional follow-up chest  CT at 12 months can be considered  · Per Care Everywhere, CT angio 12/16/21:  Stable 5 mm right upper lobe nodule for which imaging follow-up per Fleischner guidelines is recommended  · F/u OP with serial imaging as appropriate        Nicotine abuse  Assessment & Plan  · Currently smoking approximately 1/2PPD since age 13  · Smoking cessation as appropriate  · Nicotine patch QD    Cocaine abuse ([facility] [address] )  Assessment & Plan  · Patient reported to staff overnight that she does use cocaine occasionally  Her  is aware of her use but the patient requests this information be kept confidential and her daughter not be notified  · Monitor for S&S of withdrawal  Remains A&O        ----------------------------------------------------------------------------------------  HPI/24hr events: Admitted for lethargy and SOB yesterday  Required BiPap in ED and mentation improved   Tolerated 3L NC last evening until bedtime when HS BiPap was placed  AM labs pending at time of note  Will check VBG on current BiPap settings  Patient appropriate for transfer out of the ICU today?: Patient does not meet criteria for ICU Follow-up Clinic; referral has not been made  Disposition: Transfer to Med-Surg   Code Status: Level 1 - Full Code  ---------------------------------------------------------------------------------------  SUBJECTIVE  "I feel alright"     Review of Systems   Unable to perform ROS: Other (BiPap mask in place, limiting ROS)     Review of systems was reviewed and negative unless stated above in HPI/24-hour events   ---------------------------------------------------------------------------------------  OBJECTIVE    Vitals   Vitals:    23 2318 23 0027 23 0100 23 0200   BP:  145/78 119/69 111/64   BP Location:       Pulse:  60 58 61   Resp:  19 15 12   Temp: (!) 97 4 °F (36 3 °C)      TempSrc: Temporal      SpO2:  97% 96% 93%   Weight:       Height:         Temp (24hrs), Av 5 °F (36 4 °C), Min:97 4 °F (36 3 °C), Max:97 8 °F (36 6 °C)  Current: Temperature: (!) 97 4 °F (36 3 °C)          Respiratory:  SpO2: SpO2: 93 %, SpO2 Activity: SpO2 Activity: At Rest, SpO2 Device: O2 Device: BiPAP  Nasal Cannula O2 Flow Rate (L/min): 3 L/min    Invasive/non-invasive ventilation settings   Respiratory    Lab Data (Last 4 hours)    None         O2/Vent Data (Last 4 hours)    None                Physical Exam  Vitals and nursing note reviewed  Constitutional:       General: She is not in acute distress  Appearance: She is obese  Interventions: Face mask in place  Comments: BiPap mask in place   HENT:      Head: Normocephalic  Nose: Nose normal       Mouth/Throat:      Mouth: Mucous membranes are dry  Eyes:      General: Lids are normal    Cardiovascular:      Rate and Rhythm: Normal rate and regular rhythm  Pulses:           Radial pulses are 2+ on the right side and 2+ on the left side  Dorsalis pedis pulses are 1+ on the right side and 1+ on the left side  Heart sounds: Normal heart sounds  Pulmonary:      Effort: Pulmonary effort is normal       Breath sounds: No wheezing (No further wheezing)  Abdominal:      General: Bowel sounds are normal  There is no distension  Palpations: Abdomen is soft  Tenderness: There is no abdominal tenderness  Musculoskeletal:      Cervical back: Neck supple  Right lower leg: No edema  Left lower leg: No edema  Skin:     General: Skin is warm and dry  Neurological:      General: No focal deficit present  Mental Status: She is oriented to person, place, and time and easily aroused  GCS: GCS eye subscore is 4  GCS verbal subscore is 5  GCS motor subscore is 6  Motor: Motor function is intact     Psychiatric:         Attention and Perception: Attention normal          Mood and Affect: Mood normal                Laboratory and Diagnostics:  Results from last 7 days   Lab Units 03/01/23  1611   WBC Thousand/uL 8 65   HEMOGLOBIN g/dL 12 7   HEMATOCRIT % 40 7   PLATELETS Thousands/uL 281   NEUTROS PCT % 57   MONOS PCT % 10     Results from last 7 days   Lab Units 03/01/23  1611   SODIUM mmol/L 137   POTASSIUM mmol/L 4 3   CHLORIDE mmol/L 103   CO2 mmol/L 27   ANION GAP mmol/L 7   BUN mg/dL 25   CREATININE mg/dL 1 36*   CALCIUM mg/dL 9 1   GLUCOSE RANDOM mg/dL 112   ALT U/L 14   AST U/L 23   ALK PHOS U/L 39   ALBUMIN g/dL 4 5   TOTAL BILIRUBIN mg/dL 0 25     Results from last 7 days   Lab Units 03/01/23  1611   MAGNESIUM mg/dL 2 2      Results from last 7 days   Lab Units 03/01/23  1611   INR  0 98   PTT seconds 45*              ABG:  Results from last 7 days   Lab Units 03/01/23  1619   PH ART  7 287*   PCO2 ART mm Hg 55 3*   PO2 ART mm Hg 50 3*   HCO3 ART mmol/L 25 8   BASE EXC ART mmol/L -1 6   ABG SOURCE  Radial, Left     VBG:  Results from last 7 days   Lab Units 03/01/23  1619   ABG SOURCE  Radial, Left     Results from last 7 days   Lab Units 03/01/23  1611   PROCALCITONIN ng/ml <0 05       Micro        EKG: NSR on monitor, rate 55-60s  Imaging: I have personally reviewed pertinent reports  and I have personally reviewed pertinent films in PACS    3/1 CT chest: IMPRESSION:     Increased density within the trachea for which further evaluation with pulmonary and bronchoscopy is recommended      Multiple pulmonary nodules measuring up to 2 to 3 mm  Based on current Fleischner Society 2017 Guidelines on incidental pulmonary nodule, no routine follow-up is needed if the patient is low risk  If the patient is high risk, optional follow-up chest   CT at 12 months can be considered  3/1 CT head: IMPRESSION:     Age-indeterminate infarct in the left frontal lobe for which further evaluation with MRI can be obtained  Intake and Output  I/O       02/28 0701 03/01 0700 03/01 0701 03/02 0700    Urine (mL/kg/hr)  300    Total Output  300    Net  -300                Height and Weights   Height: 5' 4" (162 6 cm)  IBW (Ideal Body Weight): 54 7 kg  Body mass index is 33 kg/m²  Weight (last 2 days)     Date/Time Weight    03/01/23 2058 87 2 (192 24)    03/01/23 1904 87 2 (192 24)    03/01/23 1351 87 3 (192 46)            Nutrition       Diet Orders   (From admission, onward)             Start     Ordered    03/01/23 1904  Diet Cardiovascular; Cardiac  Diet effective now        References:    Nutrtion Support Algorithm Enteral vs  Parenteral   Question Answer Comment   Diet Type Cardiovascular    Cardiac Cardiac    RD to adjust diet per protocol?  Yes        03/01/23 1903                  Active Medications  Scheduled Meds:  Current Facility-Administered Medications   Medication Dose Route Frequency Provider Last Rate   • acetaminophen  650 mg Oral Q6H PRN JAZMINE Marie     • ALPRAZolam  0 5 mg Oral Daily PRN JAZMINE Marie     • atorvastatin  40 mg Oral Daily With [address]     • buprenorphine-naloxone  8 mg Sublingual BID Scott Boy, CRNP     • chlorhexidine  15 mL Mouth/Throat Q12H [facility] Scott Vogt, CRNP     • clopidogrel  75 mg Oral Daily Scott Vogt, CRNP     • DULoxetine  60 mg Oral Daily Scott Vogt, CRNP     • enoxaparin  40 mg Subcutaneous Daily Scott Vogt, CRNP     • guaiFENesin  600 mg Oral BID Scott Vogt, CRNP     • ipratropium-albuterol  3 mL Nebulization Q6H Scott Vogt, CRNP     • levothyroxine  100 mcg Oral Daily Scott Vogt, CRNP     • losartan  25 mg Oral Daily Scott Vogt, CRNP     • methylPREDNISolone sodium succinate  40 mg Intravenous Q8H Scott Vogt, CRNP     • metoprolol succinate  25 mg Oral Daily Scott Vogt, CRNP     • nicotine  1 patch Transdermal Daily Scott Vogt, CRNP     • pantoprazole  40 mg Oral Early Morning Marjose Vogt, CRNP     • pneumococcal 20-tsering conj vacc  0 5 mL Intramuscular Once Sirisha Vega, DO     • traZODone  50 mg Oral HS PRN Scott Vogt, CRNP     • venlafaxine  225 mg Oral Daily Scott Vogt, CRNP       Continuous Infusions:     PRN Meds:   acetaminophen, 650 mg, Q6H PRN  ALPRAZolam, 0 5 mg, Daily PRN  traZODone, 50 mg, HS PRN        Invasive Devices Review  Invasive Devices     Peripheral Intravenous Line  Duration           Peripheral IV 03/01/23 Distal;Dorsal (posterior); Right Forearm <1 day    Peripheral IV 03/01/23 Right Wrist <1 day                Rationale for remaining devices: PIVs for med administration  No other invasive devices  ---------------------------------------------------------------------------------------  Advance Directive and Living Will:      Power of :    POLST:    ---------------------------------------------------------------------------------------  Care Time Delivered:   No Critical Care time spent       [facility], CRNP      Portions of the record may have been created with voice recognition software    Occasional wrong word or "sound a like" substitutions may have occurred due to the inherent limitations of voice recognition software    Read the chart carefully and recognize, using context, where substitutions have occurred

## 2023-03-02 NOTE — ASSESSMENT & PLAN NOTE
Presented with worsening SOB over past few days, now with confusion/lethargy  ABG in ED: 7 28/55/50/28  Placed on BiPap in ED and received hour long neb treatment plus 125mg solumedrol  Has Hx of COPD/centrilobar emphysema, now likely with acute exacerbation  · Able to wean off BiPap to 3L NC in ED after above measures  Does not wear O2 at home, also without formal GELA diagnosis  · CXR without focal opacification  · Unlikely infectious origin  No fevers, no change in sputum  · Of note, patient is daily 1/2 PPD smoker on average    Plan  · Wean FiO2 for SpO2 >90%  · Continuous pulse oximetry  · CT Chest 3/1: Increased density within the trachea for which further evaluation with pulmonary and bronchoscopy is recommended  Multiple pulmonary nodules measuring up to 2 to 3 mm  Based on current Fleischner Society 2017 Guidelines on incidental pulmonary nodule, no routine follow-up is needed if the patient is low risk  If the patient is high risk, optional follow-up chest  CT at 12 months can be considered  · Procal <0 05  · Urinary antigens pending  · Sputum culture ordered for completeness    · Remainder of treatment as per COPD

## 2023-03-02 NOTE — ASSESSMENT & PLAN NOTE
· Patient reported to staff overnight that she does use cocaine occasionally   Her  is aware of her use but the patient requests this information be kept confidential and her daughter not be notified  · Monitor for S&S of withdrawal  Remains A&O

## 2023-03-02 NOTE — ASSESSMENT & PLAN NOTE
· History of centrilobular emphysema, now with worsening SOB over the past few days  · No increase in sputum production, baseline white  · No fevers or WOB  · Suspect COPD exacerbation, low suspicion for infectious etiology  · Scheduled duonebs  · S/p solumedrol 125mg IV x1 in ED   Will continue with 40mg q8h  · Hold antibiotics at this time, remainder of workup as above  · Wean FiO2 for goal SpO2 >90%  · Will need to f/u with pulmonology on discharge

## 2023-03-02 NOTE — ASSESSMENT & PLAN NOTE
· Currently smoking approximately 1/2PPD since age 13  · Smoking cessation as appropriate  · Nicotine patch QD

## 2023-03-02 NOTE — UTILIZATION REVIEW
Initial Clinical Review    Admission: Date/Time/Statement:   Admission Orders (From admission, onward)     Ordered        03/01/23 1716  INPATIENT ADMISSION  Once                      Orders Placed This Encounter   Procedures   • INPATIENT ADMISSION     Standing Status:   Standing     Number of Occurrences:   1     Order Specific Question:   Level of Care     Answer:   Level 1 Stepdown [13]     Order Specific Question:   Estimated length of stay     Answer:   More than 2 Midnights     Order Specific Question:   Certification     Answer:   I certify that inpatient services are medically necessary for this patient for a duration of greater than two midnights  See H&P and MD Progress Notes for additional information about the patient's course of treatment  ED Arrival Information     Expected   -    Arrival   3/1/2023 13:02    Acuity   Urgent            Means of arrival   Walk-In    Escorted by   Self    Service   Hospitalist    Admission type   Emergency            Arrival complaint   sob           Chief Complaint   Patient presents with   • Shortness of Breath     Shortness of breath for some time  States worse over past couple of weeks  No pain hx of copd       Initial Presentation: 71 y o  female , presented to the ED @ [address] from home via walk in  Admitted as Inpatient due to  Acute respiratory failure with hypoxia and hypercapnia  PMHx of COPD, centrilobular emphysema, CAD, CABG x1 2016, anxiety, HTN, HLD, and current daily smoker   Date: 03/01/2023    Presents with worsening SOB over the past few days and lethargy, confusion  O2 saturation was 91% on room air in the ED and she was somnolent  ABG showed mild hypercapnea: 7 28/55/50/25  Was placed on BiPap in ED with improvement in mentation  Also received 125mg IV solumedrol and a heart neb  CXR without any focal opacification  Able to wean off BiPap to 3L NC in ED after above measures  Does not wear O2 at home, also without formal GELA diagnosis  Continuous pulse ox  Follow up CT chest   Check procal, urinary antigens, sputum culture for completeness  Day 2: 03/02/2023  Wean O2 as able  Currently O2 @ 3L via NC  BiPAP HS FiO2 35%  Continue scheduled duoneds and mucinex  Continue IV solumedrol  Hold Abx for now  Monitor & trend renal indices  Avoid nephrotoxins  I&O  Neuro checks  Cardio-Pulmonary monitoring        ED Triage Vitals   Temperature Pulse Respirations Blood Pressure SpO2   03/01/23 1351 03/01/23 1351 03/01/23 1351 03/01/23 1351 03/01/23 1351   (!) 97 4 °F (36 3 °C) 60 (!) 24 123/72 91 %      Temp Source Heart Rate Source Patient Position - Orthostatic VS BP Location FiO2 (%)   03/01/23 1351 03/01/23 1351 03/01/23 1351 03/01/23 1351 03/01/23 2252   Tympanic Monitor Sitting Right arm 40      Pain Score       03/01/23 1351       No Pain          Wt Readings from Last 1 Encounters:   03/02/23 87 2 kg (192 lb 3 9 oz)     Additional Vital Signs:   Date/Time Temp Pulse Resp BP MAP (mmHg) SpO2 FiO2 (%) Calculated FIO2 (%) - Nasal Cannula Nasal Cannula O2 Flow Rate (L/min) O2 Device O2 Interface Device Patient Position - Orthostatic VS   03/02/23 1353 -- -- -- -- -- 94 % -- -- -- -- -- --   03/02/23 13:13:12 -- 65 -- 138/79 99 93 % -- -- -- -- -- --   03/02/23 1200 -- 68 17 160/74 106 93 % -- 32 3 L/min -- -- --   03/02/23 1141 98 °F (36 7 °C) -- -- -- -- -- -- -- -- -- -- --   03/02/23 1100 -- 63 12 140/69 99 93 % -- -- -- -- -- --   03/02/23 1000 -- 62 13 135/65 94 93 % -- -- -- -- -- --   03/02/23 0900 -- 66 20 137/65 90 92 % -- -- -- -- -- --   03/02/23 0753 97 7 °F (36 5 °C) 69 21 133/73 96 92 % -- 36 4 L/min -- -- Lying   03/02/23 0742 -- -- -- -- -- -- -- -- -- -- Face mask --   03/02/23 0741 -- -- -- -- -- 94 % -- -- -- -- -- --   03/02/23 0700 -- 62 14 150/76 107 96 % -- -- -- -- -- --   03/02/23 0600 -- 64 16 134/76 100 94 % -- -- -- -- -- --   03/02/23 0500 -- 66 12 114/72 87 95 % -- -- -- -- -- --   03/02/23 0428 -- -- -- -- -- -- 35 -- -- BiPAP Face mask --   03/02/23 0400 97 8 °F (36 6 °C) 66 15 141/80 104 95 % -- -- -- -- -- --   03/02/23 0300 -- 62 13 124/69 91 93 % -- -- -- -- -- --   03/02/23 0200 -- 61 12 111/64 83 93 % -- -- -- -- -- --   03/02/23 0104 -- -- -- -- -- -- 40 -- -- BiPAP -- --   03/02/23 0100 -- 58 15 119/69 89 96 % -- -- -- -- -- --   03/02/23 0027 -- 60 19 145/78 102 97 % -- -- -- -- -- --   03/01/23 2318 97 4 °F (36 3 °C) Abnormal  -- -- -- -- -- -- -- -- -- -- --   03/01/23 2252 -- -- -- -- -- -- 40 -- -- BiPAP Face mask --   03/01/23 2200 -- 63 25 Abnormal  121/74 91 94 % -- -- -- -- -- --   03/01/23 2100 97 8 °F (36 6 °C) 63 14 163/74 106 95 % -- -- -- -- -- --   03/01/23 2058 -- 64 11 Abnormal  163/74 106 95 % -- -- -- -- -- --   03/01/23 1900 -- 55 20 -- -- 99 % -- 32 3 L/min Nasal cannula -- --   03/01/23 1646 -- -- -- -- -- 98 % -- -- -- -- Face mask --   03/01/23 1632 -- 61 21 163/72 -- 98 % -- -- -- -- -- --   03/01/23 1607 -- -- -- -- -- 97 % -- -- -- -- -- --       Pertinent Labs/Diagnostic Test Results:   CT head without contrast   Final Result by Joyce Saba DO (03/01 2143)      Age-indeterminate infarct in the left frontal lobe for which further evaluation with MRI can be obtained  CT chest without contrast   Final Result by Joyce Saba DO (03/01 2158)      Increased density within the trachea for which further evaluation with pulmonary and bronchoscopy is recommended  Multiple pulmonary nodules measuring up to 2 to 3 mm  Based on current Fleischner Society 2017 Guidelines on incidental pulmonary nodule, no routine follow-up is needed if the patient is low risk  If the patient is high risk, optional follow-up chest    CT at 12 months can be considered  XR chest 1 view portable   Final Result by John Johnson MD (03/02 0049)      Small nodular opacity at left lung base, corresponding to an area of likely atelectasis on subsequent chest CT  No other airspace opacities  Results from last 7 days   Lab Units 03/01/23  1611   SARS-COV-2  Negative     Results from last 7 days   Lab Units 03/02/23  0457 03/01/23  1611   WBC Thousand/uL 9 47 8 65   HEMOGLOBIN g/dL 13 0 12 7   HEMATOCRIT % 41 1 40 7   PLATELETS Thousands/uL 298 281   NEUTROS ABS Thousands/µL 8 19* 4 94     Results from last 7 days   Lab Units 03/02/23  0457 03/01/23  1611   SODIUM mmol/L 139 137   POTASSIUM mmol/L 4 5 4 3   CHLORIDE mmol/L 105 103   CO2 mmol/L 27 27   ANION GAP mmol/L 7 7   BUN mg/dL 26* 25   CREATININE mg/dL 1 21 1 36*   EGFR ml/min/1 73sq m 45 39   CALCIUM mg/dL 9 2 9 1   MAGNESIUM mg/dL 2 2 2 2   PHOSPHORUS mg/dL 3 0  --      Results from last 7 days   Lab Units 03/01/23  1611   AST U/L 23   ALT U/L 14   ALK PHOS U/L 39   TOTAL PROTEIN g/dL 8 4   ALBUMIN g/dL 4 5   TOTAL BILIRUBIN mg/dL 0 25     Results from last 7 days   Lab Units 03/02/23  0457 03/01/23  1611   GLUCOSE RANDOM mg/dL 159* 112      Results from last 7 days   Lab Units 03/01/23  1619   PH ART  7 287*   PCO2 ART mm Hg 55 3*   PO2 ART mm Hg 50 3*   HCO3 ART mmol/L 25 8   BASE EXC ART mmol/L -1 6   O2 CONTENT ART mL/dL 14 2*   O2 HGB, ARTERIAL % 79 3*   ABG SOURCE  Radial, Left     Results from last 7 days   Lab Units 03/02/23  0457   PH JAKOB  7 503*   PCO2 JAKOB mm Hg 26 3*   PO2 JAKOB mm Hg 198 3*   HCO3 JAKOB mmol/L 20 2*   BASE EXC JAKOB mmol/L -1 4   O2 CONTENT JAKOB ml/dL 19 4   O2 HGB, VENOUS % 95 8*     Results from last 7 days   Lab Units 03/01/23  2119 03/01/23 2024 03/01/23  1611   HS TNI 0HR ng/L  --   --  2   HS TNI 2HR ng/L  --  <2  --    HSTNI D2 ng/L  --  <0  --    HS TNI 4HR ng/L 2  --   --    HSTNI D4 ng/L 0  --   --      Results from last 7 days   Lab Units 03/01/23  1611   PROTIME seconds 13 1   INR  0 98   PTT seconds 45*     Results from last 7 days   Lab Units 03/02/23  0457 03/01/23  1611   PROCALCITONIN ng/ml <0 05 <0 05     Results from last 7 days   Lab Units 03/01/23  1611   BNP pg/mL 30     Results from last 7 days Lab Units 03/01/23  2204 03/01/23  1611   STREP PNEUMONIAE ANTIGEN, URINE  Negative  --    LEGIONELLA URINARY ANTIGEN  Negative  --    INFLUENZA A PCR   --  Negative   INFLUENZA B PCR   --  Negative   RSV PCR   --  Negative     ED Treatment:   Medication Administration from 03/01/2023 1302 to 03/01/2023 2046       Date/Time Order Dose Route Action     03/01/2023 1606 EST albuterol CONTINUOUS nebulizing solution (canister) 10 mg 10 mg Nebulization Given     03/01/2023 1629 EST methylPREDNISolone sodium succinate (Solu-MEDROL) injection 125 mg 125 mg Intravenous Given     03/01/2023 1607 EST ipratropium (ATROVENT) 0 02 % inhalation solution 1 mg 1 mg Nebulization Given     03/01/2023 2021 EST guaiFENesin (MUCINEX) 12 hr tablet 600 mg 600 mg Oral Given        Past Medical History:   Diagnosis Date   • COPD (chronic obstructive pulmonary disease) ([facility] )    • Disease of thyroid gland    • Hypertension      Present on Admission:  • Chronic obstructive pulmonary disease with acute exacerbation (HCC)  • Acute respiratory failure with hypoxia and hypercapnia ([facility])  • Hypertension  • Hypothyroid  • CAD (coronary artery disease)  • Centrilobular emphysema ([facility])  • Anxiety  • Opioid dependence ([facility] )  • Encephalopathy acute  • Nicotine abuse  • Acute kidney injury ([facility] )  • Pulmonary nodules  • Cerebral infarct ([facility])  • Cocaine abuse ([facility] )      Admitting Diagnosis: Shortness of breath [R06 02]  Hypercapnic respiratory failure (HCC) [J96 92]  Age/Sex: 71 y o  female  Admission Orders:  Dysphagia Assessment > CV Diet   Daily weight / I&O  Neuro checks q4h  Facundo SCDs    Scheduled Medications:  atorvastatin, 40 mg, Oral, Daily With Dinner  buprenorphine-naloxone, 8 mg, Sublingual, BID  clopidogrel, 75 mg, Oral, Daily  DULoxetine, 60 mg, Oral, Daily  enoxaparin, 40 mg, Subcutaneous, Daily  guaiFENesin, 600 mg, Oral, BID  ipratropium-albuterol, 3 mL, Nebulization, Q6H  levothyroxine, 100 mcg, Oral, Daily  losartan, 25 mg, Oral, Daily  methylPREDNISolone sodium succinate, 40 mg, Intravenous, Q8H  metoprolol succinate, 25 mg, Oral, Daily  nicotine, 1 patch, Transdermal, Daily  pantoprazole, 40 mg, Oral, Early Morning  venlafaxine, 225 mg, Oral, Daily      Continuous IV Infusions:     PRN Meds:  acetaminophen, 650 mg, Oral, Q6H PRN  ALPRAZolam, 0 5 mg, Oral, Daily PRN  traZODone, 50 mg, Oral, HS PRN        IP CONSULT TO CASE MANAGEMENT      Network Utilization Review Department  ATTENTION: Please call with any questions or concerns to [phone] and carefully listen to the prompts so that you are directed to the right person  All voicemails are confidential   Emeka Olson all requests for admission clinical reviews, approved or denied determinations and any other requests to dedicated fax number below belonging to the campus where the patient is receiving treatment   List of dedicated fax numbers for the Facilities:  [address] DENIALS (Administrative/Medical Necessity) [phone]   [address] (Maternity/NICU/Pediatrics) [phone]   91 Harleen Rosales [phone]   [place] [address] [phone]   [address] [place] [address] Deepika Ferguson 28 [phone]   [address] Quirino Alvarado [place] [address] 815 [facility] [phone]

## 2023-03-02 NOTE — ASSESSMENT & PLAN NOTE
· Baseline creat appears 0 8-1 1 with most recent results via Care Everywhere @2021     · Creat 1 36 on admission  · Avoid nephrotoxins  · Close I&O monitoring  · Trend renal indices

## 2023-03-02 NOTE — PROGRESS NOTES
Patient transferred with all belongings  Belongings sent with daughter upstairs to room 410  Eleazar Sacks, nurses assistant transported patient via wheelchair  Report given to [facility]

## 2023-03-02 NOTE — PLAN OF CARE
Problem: MOBILITY - ADULT  Goal: Maintain or return to baseline ADL function  Description: INTERVENTIONS:  -  Assess patient's ability to carry out ADLs; assess patient's baseline for ADL function and identify physical deficits which impact ability to perform ADLs (bathing, care of mouth/teeth, toileting, grooming, dressing, etc )  - Assess/evaluate cause of self-care deficits   - Assess range of motion  - Assess patient's mobility; develop plan if impaired  - Assess patient's need for assistive devices and provide as appropriate  - Encourage maximum independence but intervene and supervise when necessary  - Involve family in performance of ADLs  - Assess for home care needs following discharge   - Consider OT consult to assist with ADL evaluation and planning for discharge  - Provide patient education as appropriate  Outcome: Progressing  Goal: Maintains/Returns to pre admission functional level  Description: INTERVENTIONS:  - Perform BMAT or MOVE assessment daily    - Set and communicate daily mobility goal to care team and patient/family/caregiver  - Collaborate with rehabilitation services on mobility goals if consulted  - Perform Range of Motion 2 times a day  - Reposition patient every 2 hours    - Out of bed to chair 1 times a day   - Out of bed for toileting  - Record patient progress and toleration of activity level   Outcome: Progressing

## 2023-03-02 NOTE — ASSESSMENT & PLAN NOTE
Presented with worsening SOB over past few days, now with confusion/lethargy  ABG in ED: 7 28/55/50/28  Placed on BiPap in ED and received hour long neb treatment plus 125mg solumedrol  Has Hx of COPD/centrilobar emphysema, now likely with acute exacerbation  · Able to wean off BiPap to 3L NC in ED after above measures  Does not wear O2 at home, also without formal GELA diagnosis  · CXR without focal opacification  · Unlikely infectious origin  No fevers, no change in sputum  · Of note, patient is daily 1/2 PPD smoker on average    Plan  · Wean FiO2 for SpO2 >90%  · Continuous pulse oximetry  · CT Chest pending  · Check procal, urinary antigens, sputum culture for completeness   Did not meet SIRS on arrival    · Remainder of treatment as per COPD

## 2023-03-02 NOTE — ASSESSMENT & PLAN NOTE
· Hx of CAD, s/p CABG x1 vessel 2016  · Continue home Plavix  · Admission EKG NSR with non-specific ST changes  · HS troponin 2  No chest pain    · Continuous cardiac monitoring

## 2023-03-02 NOTE — ASSESSMENT & PLAN NOTE
· Increased lethargy and confusion 3/1 per daughter  Likely 2/2 hypoxia and hypercapnia  · ABG 7 28/55/50/25  · Placed on BiPap in ED, mentation improved  · CT head 3/1: "Age-indeterminate infarct in the left frontal lobe for which further evaluation with MRI can be obtained " Exam non-focal, A&O   · Continue scheduled Suboxone and PRN xanax as prescribed  Avoid additional narcotics and benzo's  · Patient now reporting she takes Suboxone 12mg once daily in AM rather than 8mg BID as she is "weaning myself down to a goal of 10"   Will discuss dosing schedule on rounds  · CAM ICU  · Delirium precautions, sleep hygiene  · Frequent neuro checks  · Fall precautions

## 2023-03-02 NOTE — ASSESSMENT & PLAN NOTE
· Hx of lumbosacral stenosis and chronic pain  · Previously on Tramadol  PDMP reviewed and will continue Suboxone 8-2 BID as prescribed  · On further discussion with patient, she's been weaning her suboxone down on her own  Reports taking 12mg daily in AM rather than 8mg BID  Will discuss dosing schedule today

## 2023-03-02 NOTE — H&P
Rose Mary 46  H&P- [facility] 1950, 71 y o  female MRN: [id number]  Unit/Bed#: ED 05 Encounter: [id number]  Primary Care Provider: Ronnell Zhao   Date and time admitted to hospital: 3/1/2023  3:46 PM    * Acute respiratory failure with hypoxia and hypercapnia [facility])  Assessment & Plan  Presented with worsening SOB over past few days, now with confusion/lethargy  ABG in ED: 7 28/55/50/28  Placed on BiPap in ED and received hour long neb treatment plus 125mg solumedrol  Has Hx of COPD/centrilobar emphysema, now likely with acute exacerbation  · Able to wean off BiPap to 3L NC in ED after above measures  Does not wear O2 at home, also without formal GELA diagnosis  · CXR without focal opacification  · Unlikely infectious origin  No fevers, no change in sputum  · Of note, patient is daily 1/2 PPD smoker on average    Plan  · Wean FiO2 for SpO2 >90%  · Continuous pulse oximetry  · CT Chest pending  · Check procal, urinary antigens, sputum culture for completeness  Did not meet SIRS on arrival    · Remainder of treatment as per COPD      Chronic obstructive pulmonary disease with acute exacerbation (HCC)  Assessment & Plan  · History of centrilobular emphysema, now with worsening SOB over the past few days  · No increase in sputum production, baseline white  · No fevers or WOB  · Suspect COPD exacerbation, low suspicion for infectious etiology  · Scheduled duonebs  · S/p solumedrol 125mg IV x1 in ED  Will continue with 40mg q8h  · Hold antibiotics at this time, remainder of workup as above  · Wean FiO2 for goal SpO2 >90%  · Will need to f/u with pulmonology on discharge    Acute kidney injury [facility])  Assessment & Plan  · Baseline creat appears 0 8-1 1 with most recent results via Care Everywhere @2021     · Creat 1 36 on admission  · Avoid nephrotoxins  · Close I&O monitoring  · Trend renal indices    Encephalopathy acute  Assessment & Plan  · Increased lethargy and confusion today per daughter  Likely 2/2 hypoxia and hypercapnia  · ABG 7 28/55/50/25  · Placed on BiPap in ED, mentation improved  · CT head pending  · Will continue scheduled Suboxone and PRN xanax as prescribed  Avoid additional narcotics and benzo's  · CAM ICU  · Delirium precautions, sleep hygiene  · Frequent neuro checks  · Fall precautions      Centrilobular emphysema ([facility] [address] )  Assessment & Plan  · Hx of Centrilobar emphysema  · OP meds include only PRN albuterol  Does not follow with Pulmonologist  · Plan as outlined  · Smoking cessation  · F/u with Pulmonology on d/c    Opioid dependence ([facility] [address] )  Assessment & Plan  · Hx of lumbosacral stenosis and chronic pain  · Previously on Tramadol  PDMP reviewed and will continue Suboxone 8-2 BID as prescribed      CAD (coronary artery disease)  Assessment & Plan  · Hx of CAD, s/p CABG x1 vessel 2016  · Continue home Plavix  · Admission EKG NSR with non-specific ST changes  · HS troponin 2  No chest pain  · Continuous cardiac monitoring      Hx of CABG x1  Assessment & Plan  · Hx CABG in 2016  · Continue home plavix    Anxiety  Assessment & Plan  · Hx of anxiety  · Continue home xanax, duloxetine, venlafaxine    Hypertension  Assessment & Plan  · Continue home losartan and metoprolol  · VS per unit protocol    Hypothyroid  Assessment & Plan  · Continue home synthroid    Nicotine abuse  Assessment & Plan  · Currently smoking approximately 1/2PPD since age 13  · Smoking cessation as appropriate  · Nicotine patch QD      -------------------------------------------------------------------------------------------------------------  Chief Complaint: SOB, confusion    History of Present Illness   HX and PE limited by: Scarlett Rangel is a 71 y o  female with PMHx of COPD, centrilobular emphysema, CAD, CABG x1 2016, anxiety, HTN, HLD, and current daily smoker who presents with worsening SOB over the past few days and confusion   O2 saturation was 91% on room air in the ED and she was somnolent  ABG showed mild hypercapnea: 7 28/55/50/25  Was placed on BiPap in ED with improvement in mentation  Also received 125mg IV solumedrol and a heart neb  CXR without any focal opacification  Denies any CP, palpitations, fever, chills, abdominal or urinary complaints  Does endorse a chronic cough of white sputum; unchanged from baseline  Will be admitted to ICU for close monitoring  History obtained from child and the patient   -------------------------------------------------------------------------------------------------------------  Dispo: Admit to Stepdown Level 1    Code Status: Level 1 - Full Code  --------------------------------------------------------------------------------------------------------------  Review of Systems   Constitutional: Positive for fatigue  Negative for chills and fever  HENT: Negative for congestion  Respiratory: Positive for cough (productive for white/clear sputum), shortness of breath and wheezing  Cardiovascular: Negative for chest pain, palpitations and leg swelling  Gastrointestinal: Negative  Negative for abdominal distention  Genitourinary: Negative  Musculoskeletal: Negative  Neurological: Negative  All other systems reviewed and are negative  A 12-point, complete review of systems was reviewed and negative except as stated above     Physical Exam  Vitals and nursing note reviewed  Constitutional:       General: She is not in acute distress  Appearance: She is obese  Interventions: Nasal cannula in place  HENT:      Head: Normocephalic and atraumatic  Nose: Nose normal       Mouth/Throat:      Mouth: Mucous membranes are moist    Eyes:      General: Lids are normal       Pupils: Pupils are equal, round, and reactive to light  Cardiovascular:      Rate and Rhythm: Normal rate and regular rhythm  Pulses:           Radial pulses are 2+ on the right side and 2+ on the left side          Dorsalis pedis pulses are 1+ on the right side and 1+ on the left side  Heart sounds: Normal heart sounds  Pulmonary:      Effort: Pulmonary effort is normal  Tachypnea present  Breath sounds: Wheezing present  Rales: expiratory, diffuse  Abdominal:      General: Bowel sounds are normal  There is no distension  Palpations: Abdomen is soft  Tenderness: There is no abdominal tenderness  Genitourinary:     Comments: No urine visualized    Musculoskeletal:      Cervical back: Neck supple  Right lower leg: No edema  Left lower leg: No edema  Skin:     General: Skin is warm and dry  Capillary Refill: Capillary refill takes less than 2 seconds  Neurological:      General: No focal deficit present  Mental Status: She is alert and oriented to person, place, and time  GCS: GCS eye subscore is 4  GCS verbal subscore is 5  GCS motor subscore is 6  Psychiatric:         Attention and Perception: Attention normal          Mood and Affect: Mood normal          Speech: Speech normal          Behavior: Behavior is cooperative          --------------------------------------------------------------------------------------------------------------  Vitals:   Vitals:    03/01/23 1607 03/01/23 1632 03/01/23 1646 03/01/23 1900   BP:  163/72     BP Location:  Right arm     Pulse:  61  55   Resp:  21  20   Temp:       TempSrc:       SpO2: 97% 98% 98% 99%   Weight:         Temp  Min: 97 4 °F (36 3 °C)  Max: 97 4 °F (36 3 °C)        There is no height or weight on file to calculate BMI      Laboratory and Diagnostics:  Results from last 7 days   Lab Units 03/01/23  1611   WBC Thousand/uL 8 65   HEMOGLOBIN g/dL 12 7   HEMATOCRIT % 40 7   PLATELETS Thousands/uL 281   NEUTROS PCT % 57   MONOS PCT % 10     Results from last 7 days   Lab Units 03/01/23  1611   SODIUM mmol/L 137   POTASSIUM mmol/L 4 3   CHLORIDE mmol/L 103   CO2 mmol/L 27   ANION GAP mmol/L 7   BUN mg/dL 25   CREATININE mg/dL 1 36*   CALCIUM mg/dL 9 1   GLUCOSE RANDOM mg/dL 112   ALT U/L 14   AST U/L 23   ALK PHOS U/L 39   ALBUMIN g/dL 4 5   TOTAL BILIRUBIN mg/dL 0 25     Results from last 7 days   Lab Units 03/01/23  1611   MAGNESIUM mg/dL 2 2      Results from last 7 days   Lab Units 03/01/23  1611   INR  0 98   PTT seconds 45*              ABG:  Results from last 7 days   Lab Units 03/01/23  1619   PH ART  7 287*   PCO2 ART mm Hg 55 3*   PO2 ART mm Hg 50 3*   HCO3 ART mmol/L 25 8   BASE EXC ART mmol/L -1 6   ABG SOURCE  Radial, Left     VBG:  Results from last 7 days   Lab Units 03/01/23  1619   ABG SOURCE  Radial, Left           Micro:        EKG: NSR with non-specific ST changes  Rate 60s  Imaging: I have personally reviewed pertinent films in PACS     3/1: CXR with no focal consolidation on my read  M-DAQ [place] report pending    CT head and chest pending  Historical Information   Past Medical History:   Diagnosis Date   • COPD (chronic obstructive pulmonary disease) ([facility] [address] )    • Disease of thyroid gland    • Hypertension      Past Surgical History:   Procedure Laterality Date   • CARDIAC SURGERY     • HIP SURGERY     • ORTHOPEDIC SURGERY     • 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)       Social History   Social History     Substance and Sexual Activity   Alcohol Use Not Currently     Social History     Substance and Sexual Activity   Drug Use Yes   • Types: Marijuana     Social History     Tobacco Use   Smoking Status Every Day   • Packs/day: 0 50   • Types: Cigarettes   Smokeless Tobacco Never     Exercise History: N/A  Family History:   History reviewed  No pertinent family history    I have reviewed this patient's family history and commented on sigificant items within the HPI      Medications:  Current Facility-Administered Medications   Medication Dose Route Frequency   • acetaminophen (TYLENOL) tablet 650 mg  650 mg Oral Q6H PRN   • [START ON 3/2/2023] atorvastatin (LIPITOR) tablet 40 mg  40 mg Oral Daily   • buprenorphine-naloxone (Suboxone) film 8 mg  8 mg Sublingual BID   • chlorhexidine (PERIDEX) 0 12 % oral rinse 15 mL  15 mL Mouth/Throat Q12H [address]   • [START ON 3/2/2023] clopidogrel (PLAVIX) tablet 75 mg  75 mg Oral Daily   • [START ON 3/2/2023] DULoxetine (CYMBALTA) delayed release capsule 60 mg  60 mg Oral Daily   • [START ON 3/2/2023] enoxaparin (LOVENOX) subcutaneous injection 40 mg  40 mg Subcutaneous Daily   • guaiFENesin (MUCINEX) 12 hr tablet 600 mg  600 mg Oral BID   • [START ON 3/2/2023] levothyroxine tablet 100 mcg  100 mcg Oral Daily   • [START ON 3/2/2023] losartan (COZAAR) tablet 25 mg  25 mg Oral Daily   • [START ON 3/2/2023] methylPREDNISolone sodium succinate (Solu-MEDROL) injection 40 mg  40 mg Intravenous Q8H   • [START ON 3/2/2023] metoprolol succinate (TOPROL-XL) 24 hr tablet 25 mg  25 mg Oral Daily   • [START ON 3/2/2023] nicotine (NICODERM CQ) 14 mg/24hr TD 24 hr patch 1 patch  1 patch Transdermal Daily   • [START ON 3/2/2023] pantoprazole (PROTONIX) EC tablet 40 mg  40 mg Oral Early Morning   • [START ON 3/2/2023] venlafaxine (EFFEXOR-XR) 24 hr capsule 225 mg  225 mg Oral Daily     Home medications:  Prior to Admission Medications   Prescriptions Last Dose Informant Patient Reported? Taking?    ALPRAZolam (XANAX) 0 5 mg tablet   Yes Yes   Sig: Take by mouth 3 (three) times a day as needed for anxiety   DULoxetine (CYMBALTA) 60 mg delayed release capsule   Yes Yes   Sig: Take 60 mg by mouth daily   atorvastatin (LIPITOR) 40 mg tablet   Yes Yes   Sig: Take 40 mg by mouth daily   buprenorphine-naloxone (SUBOXONE) 8-2 mg per SL tablet   Yes Yes   Sig: Place 1 tablet under the tongue 2 (two) times a day   celecoxib (CeleBREX) 200 mg capsule   Yes Yes   Sig: Take 200 mg by mouth daily   clopidogrel (PLAVIX) 75 mg tablet   Yes Yes   Sig: Take 75 mg by mouth daily   gabapentin (NEURONTIN) 300 mg capsule   Yes Yes   Sig: Take 300 mg by mouth 3 (three) times a day   levothyroxine 100 mcg tablet   Yes Yes   Sig: Take 100 mcg by mouth daily   losartan (COZAAR) 25 mg tablet   Yes Yes   Sig: Take 25 mg by mouth daily   metoprolol succinate (TOPROL-XL) 25 mg 24 hr tablet   Yes Yes   Sig: Take 25 mg by mouth daily   omeprazole (PriLOSEC) 40 MG capsule   Yes Yes   Sig: Take 40 mg by mouth daily   traMADol (ULTRAM) 50 mg tablet   Yes Yes   Sig: Take 50 mg by mouth every 6 (six) hours as needed for moderate pain   venlafaxine (EFFEXOR-XR) 150 mg 24 hr capsule   Yes Yes   Sig: Take 225 mg by mouth daily      Facility-Administered Medications: None     Allergies:  No Known Allergies    ------------------------------------------------------------------------------------------------------------  Advance Directive and Living Will:      Power of :    POLST:    ------------------------------------------------------------------------------------------------------------  Anticipated Length of Stay is > 2 midnights    Care Time Delivered:   No Critical Care time spent       [facility] Hospital[facility]INE        Portions of the record may have been created with voice recognition software  Occasional wrong word or "sound a like" substitutions may have occurred due to the inherent limitations of voice recognition software    Read the chart carefully and recognize, using context, where substitutions have occurred

## 2023-03-02 NOTE — ASSESSMENT & PLAN NOTE
· Hx of Centrilobar emphysema  · OP meds include only PRN albuterol   Does not follow with Pulmonologist  · Plan as outlined  · Smoking cessation  · F/u with Pulmonology on d/c

## 2023-03-03 PROBLEM — N17.9 ACUTE KIDNEY INJURY (HCC): Status: RESOLVED | Noted: 2023-03-01 | Resolved: 2023-03-03

## 2023-03-03 PROBLEM — G93.40 ENCEPHALOPATHY ACUTE: Status: RESOLVED | Noted: 2023-03-01 | Resolved: 2023-03-03

## 2023-03-03 LAB
ANION GAP SERPL CALCULATED.3IONS-SCNC: 8 MMOL/L (ref 4–13)
BUN SERPL-MCNC: 23 MG/DL (ref 5–25)
CALCIUM SERPL-MCNC: 9.1 MG/DL (ref 8.4–10.2)
CHLORIDE SERPL-SCNC: 104 MMOL/L (ref 96–108)
CO2 SERPL-SCNC: 24 MMOL/L (ref 21–32)
CREAT SERPL-MCNC: 1.02 MG/DL (ref 0.6–1.3)
ERYTHROCYTE [DISTWIDTH] IN BLOOD BY AUTOMATED COUNT: 15 % (ref 11.6–15.1)
GFR SERPL CREATININE-BSD FRML MDRD: 55 ML/MIN/1.73SQ M
GLUCOSE SERPL-MCNC: 145 MG/DL (ref 65–140)
HCT VFR BLD AUTO: 40.3 % (ref 34.8–46.1)
HGB BLD-MCNC: 12.8 G/DL (ref 11.5–15.4)
MAGNESIUM SERPL-MCNC: 2.3 MG/DL (ref 1.9–2.7)
MCH RBC QN AUTO: 30.2 PG (ref 26.8–34.3)
MCHC RBC AUTO-ENTMCNC: 31.8 G/DL (ref 31.4–37.4)
MCV RBC AUTO: 95 FL (ref 82–98)
MRSA NOSE QL CULT: NORMAL
PHOSPHATE SERPL-MCNC: 3.4 MG/DL (ref 2.3–4.1)
PLATELET # BLD AUTO: 300 THOUSANDS/UL (ref 149–390)
PMV BLD AUTO: 9.3 FL (ref 8.9–12.7)
POTASSIUM SERPL-SCNC: 4.7 MMOL/L (ref 3.5–5.3)
RBC # BLD AUTO: 4.24 MILLION/UL (ref 3.81–5.12)
SODIUM SERPL-SCNC: 136 MMOL/L (ref 135–147)
WBC # BLD AUTO: 15.84 THOUSAND/UL (ref 4.31–10.16)

## 2023-03-03 RX ORDER — POLYETHYLENE GLYCOL 3350 17 G/17G
17 POWDER, FOR SOLUTION ORAL DAILY
Status: DISCONTINUED | OUTPATIENT
Start: 2023-03-03 | End: 2023-03-06 | Stop reason: HOSPADM

## 2023-03-03 RX ORDER — METHYLPREDNISOLONE SODIUM SUCCINATE 40 MG/ML
40 INJECTION, POWDER, LYOPHILIZED, FOR SOLUTION INTRAMUSCULAR; INTRAVENOUS EVERY 12 HOURS SCHEDULED
Status: DISCONTINUED | OUTPATIENT
Start: 2023-03-03 | End: 2023-03-06

## 2023-03-03 RX ORDER — DOCUSATE SODIUM 100 MG/1
100 CAPSULE, LIQUID FILLED ORAL 2 TIMES DAILY
Status: DISCONTINUED | OUTPATIENT
Start: 2023-03-03 | End: 2023-03-06 | Stop reason: HOSPADM

## 2023-03-03 RX ORDER — SENNOSIDES 8.6 MG
2 TABLET ORAL
Status: DISCONTINUED | OUTPATIENT
Start: 2023-03-03 | End: 2023-03-06 | Stop reason: HOSPADM

## 2023-03-03 RX ADMIN — LEVOTHYROXINE SODIUM 100 MCG: 100 TABLET ORAL at 08:02

## 2023-03-03 RX ADMIN — GUAIFENESIN 600 MG: 600 TABLET, EXTENDED RELEASE ORAL at 08:02

## 2023-03-03 RX ADMIN — METOPROLOL SUCCINATE 25 MG: 25 TABLET, EXTENDED RELEASE ORAL at 08:03

## 2023-03-03 RX ADMIN — SENNOSIDES 17.2 MG: 8.6 TABLET, FILM COATED ORAL at 21:35

## 2023-03-03 RX ADMIN — ALPRAZOLAM 0.5 MG: 0.5 TABLET ORAL at 21:10

## 2023-03-03 RX ADMIN — VENLAFAXINE HYDROCHLORIDE 225 MG: 75 CAPSULE, EXTENDED RELEASE ORAL at 08:02

## 2023-03-03 RX ADMIN — CLOPIDOGREL BISULFATE 75 MG: 75 TABLET ORAL at 08:03

## 2023-03-03 RX ADMIN — IPRATROPIUM BROMIDE AND ALBUTEROL SULFATE 3 ML: 2.5; .5 SOLUTION RESPIRATORY (INHALATION) at 02:26

## 2023-03-03 RX ADMIN — DULOXETINE HYDROCHLORIDE 60 MG: 60 CAPSULE, DELAYED RELEASE ORAL at 08:02

## 2023-03-03 RX ADMIN — LOSARTAN POTASSIUM 25 MG: 25 TABLET, FILM COATED ORAL at 08:02

## 2023-03-03 RX ADMIN — IPRATROPIUM BROMIDE AND ALBUTEROL SULFATE 3 ML: 2.5; .5 SOLUTION RESPIRATORY (INHALATION) at 20:22

## 2023-03-03 RX ADMIN — METHYLPREDNISOLONE SODIUM SUCCINATE 40 MG: 40 INJECTION, POWDER, FOR SOLUTION INTRAMUSCULAR; INTRAVENOUS at 09:18

## 2023-03-03 RX ADMIN — ENOXAPARIN SODIUM 40 MG: 40 INJECTION SUBCUTANEOUS at 08:03

## 2023-03-03 RX ADMIN — ATORVASTATIN CALCIUM 40 MG: 40 TABLET, FILM COATED ORAL at 17:14

## 2023-03-03 RX ADMIN — DOCUSATE SODIUM 100 MG: 100 CAPSULE, LIQUID FILLED ORAL at 21:35

## 2023-03-03 RX ADMIN — ALPRAZOLAM 0.5 MG: 0.5 TABLET ORAL at 13:40

## 2023-03-03 RX ADMIN — ACETAMINOPHEN 650 MG: 325 TABLET, FILM COATED ORAL at 13:40

## 2023-03-03 RX ADMIN — TRAZODONE HYDROCHLORIDE 50 MG: 50 TABLET ORAL at 21:10

## 2023-03-03 RX ADMIN — METHYLPREDNISOLONE SODIUM SUCCINATE 40 MG: 40 INJECTION, POWDER, FOR SOLUTION INTRAMUSCULAR; INTRAVENOUS at 21:09

## 2023-03-03 RX ADMIN — BUPRENORPHINE AND NALOXONE 8 MG: 8; 2 FILM BUCCAL; SUBLINGUAL at 08:02

## 2023-03-03 RX ADMIN — GUAIFENESIN 600 MG: 600 TABLET, EXTENDED RELEASE ORAL at 17:14

## 2023-03-03 RX ADMIN — IPRATROPIUM BROMIDE AND ALBUTEROL SULFATE 3 ML: 2.5; .5 SOLUTION RESPIRATORY (INHALATION) at 08:01

## 2023-03-03 RX ADMIN — BUPRENORPHINE AND NALOXONE 4 MG: 2; .5 FILM BUCCAL; SUBLINGUAL at 18:10

## 2023-03-03 RX ADMIN — IPRATROPIUM BROMIDE AND ALBUTEROL SULFATE 3 ML: 2.5; .5 SOLUTION RESPIRATORY (INHALATION) at 13:13

## 2023-03-03 RX ADMIN — METHYLPREDNISOLONE SODIUM SUCCINATE 40 MG: 40 INJECTION, POWDER, FOR SOLUTION INTRAMUSCULAR; INTRAVENOUS at 01:16

## 2023-03-03 RX ADMIN — NICOTINE 1 PATCH: 14 PATCH, EXTENDED RELEASE TRANSDERMAL at 08:05

## 2023-03-03 RX ADMIN — PANTOPRAZOLE SODIUM 40 MG: 40 TABLET, DELAYED RELEASE ORAL at 05:37

## 2023-03-03 NOTE — ASSESSMENT & PLAN NOTE
Hx of lumbosacral stenosis and chronic pain  • Previously on Tramadol  PDMP reviewed, continue Suboxone 8-2 BID as prescribed  • On further discussion with patient, she's been weaning her suboxone down on her own  Reports taking 12mg daily in AM rather than 8mg BID    Confirm dosing with patient

## 2023-03-03 NOTE — ASSESSMENT & PLAN NOTE
Improved,    • Continue to Wean FiO2 for SpO2 >90%  • Continuous pulse oximetry  • CT Chest 3/1: Increased density within the trachea for which further evaluation with pulmonary and bronchoscopy is recommended   Multiple pulmonary nodules measuring up to 2 to 3 mm   Based on current Fleischner Society 2017 Guidelines on incidental pulmonary nodule, no routine follow-up is needed if the patient is low risk   If the patient is high risk, optional follow-up chest  CT at 12 months can be considered     • Procal <0 05  • Sputum/Urine culture pending  • Remainder of treatment as per COPD

## 2023-03-03 NOTE — PROGRESS NOTES
Progress Note - Pulmonary   Dawn George 71 y o  female MRN: [id number]  Unit/Bed#: 06005 [facility] 410-01 Encounter: [id number]    Assessment/Plan:    1  Acute hypoxemic and hypercapnic respiratory failure likely related to below   · Continue to wean oxygen as tolerated  Titrate to maintain oxygen saturations greater than 88%  Upon my exam, patient was on RA with an O2 sat between 88-92%  She does not wear home O2  · Pulmonary toilet: cough and deep breathe, incentive spirometer, OOB to chair as tolerated  Flutter valve added   · Patient may require home O2 eval prior to discharge-- will decide closer to discharge    2  COPD with acute exacerbation  · History of centrilobular emphysema  · Decrease IV solu-medrol to every 12 hours, no wheezing on exam today   · Continue Duo-neb every 6 hours   · Is not on maintenance inhaler-- recommend d/c on LABA/LAMA like Anoro daily and albuterol HFA PRN  · Patient would like to follow up with [facility] pulmonary outpatient-- schedule hospital follow up closer to discharge     3  Pulmonary nodules  · Patient is appropriate for yearly lung cancer screenings   · CT chest from 3/1: Multiple pulmonary nodules measuring up to 2-3 mm      4  Nicotine abuse   · Continue to offer encouragement on smoking cessation   · NRT per primary team     5  Suspected GELA  · Continue BiPAP while inpatient   · Overnight pulse ox ordered for tonight     Chief Complaint:    "My right chest feels tender "    Subjective:    Patient seen and examined in the bed  No overnight events reported  Patient feels as if there is some phlegm stuck in her chest and she cannot get out  She was wearing 2 L of oxygen via nasal cannula however she took this off to see how she would do  During my exam her oxygen saturations ranged from 88% to 92% on room air  She is able to use her incentive spirometer and get up to 1500 mL  She is feeling improved since her admission      Objective:    Vitals: Blood pressure 123/61, pulse 71, temperature 97 9 °F (36 6 °C), resp  rate 18, height 5' 4" (1 626 m), weight 86 2 kg (190 lb), SpO2 90 %  RA,Body mass index is 32 61 kg/m²  Intake/Output Summary (Last 24 hours) at 3/3/2023 1503  Last data filed at 3/2/2023 2000  Gross per 24 hour   Intake --   Output 1 ml   Net -1 ml       Invasive Devices     Peripheral Intravenous Line  Duration           Peripheral IV 03/01/23 Distal;Dorsal (posterior); Right Forearm 1 day    Peripheral IV 03/01/23 Right Wrist 1 day                  Physical Exam  Vitals reviewed  Constitutional:       General: She is not in acute distress  Appearance: Normal appearance  She is not ill-appearing or toxic-appearing  HENT:      Head: Normocephalic and atraumatic  Nose: Nose normal       Mouth/Throat:      Pharynx: Oropharynx is clear  Eyes:      Conjunctiva/sclera: Conjunctivae normal    Cardiovascular:      Rate and Rhythm: Normal rate and regular rhythm  Heart sounds: Normal heart sounds  Pulmonary:      Effort: No tachypnea, accessory muscle usage, respiratory distress or retractions  Breath sounds: Normal air entry  No stridor or decreased air movement  Decreased breath sounds present  No wheezing, rhonchi or rales  Chest:      Chest wall: Tenderness (right sided, feels as if phlegm is stuck ) present  Abdominal:      Palpations: Abdomen is soft  Musculoskeletal:         General: Normal range of motion  Cervical back: Normal range of motion  Right lower leg: No edema  Left lower leg: No edema  Skin:     General: Skin is warm and dry  Neurological:      General: No focal deficit present  Mental Status: She is alert and oriented to person, place, and time  Psychiatric:         Mood and Affect: Mood normal          Behavior: Behavior normal          Labs: I have personally reviewed pertinent lab results  , CBC:   Lab Results   Component Value Date    WBC 15 84 (H) 03/03/2023    HGB 12 8 03/03/2023 HCT 40 3 03/03/2023    MCV 95 03/03/2023     03/03/2023    MCH 30 2 03/03/2023    MCHC 31 8 03/03/2023    RDW 15 0 03/03/2023    MPV 9 3 03/03/2023   , CMP:   Lab Results   Component Value Date    SODIUM 136 03/03/2023    K 4 7 03/03/2023     03/03/2023    CO2 24 03/03/2023    BUN 23 03/03/2023    CREATININE 1 02 03/03/2023    CALCIUM 9 1 03/03/2023    EGFR 55 03/03/2023       VBG 3/2: 7 503, pCO2 26 3, HCO3 20 2    Imaging and other studies: I have personally reviewed pertinent reports     and I have personally reviewed pertinent films in PACS

## 2023-03-03 NOTE — ASSESSMENT & PLAN NOTE
Hx of CAD, s/p CABG x1 vessel 2016    • Continue Plavix  • EKG NSR with non-specific ST changes  • HS troponin

## 2023-03-03 NOTE — UTILIZATION REVIEW
Continued Stay Review    Date: 3/3/23                          Current Patient Class: inpatient  Current Level of Care: med surg  HPI:72 y o  female initially admitted on 3/1/23     Assessment/Plan:   Acute respiratory failure with hypoxia & hypercapnia  Lungs with diminished breath sounds  O2 weaned to RA daytime, BIPAP qhs continues  IV steroids in progress       Vital Signs:   03/03/23 1313 -- -- -- -- -- 94 % -- -- -- None (Room air) -- --   03/03/23 0811 -- -- -- -- -- 96 % -- -- -- -- -- --   03/03/23 0801 -- 70 18 -- -- 90 % -- -- -- None (Room air) -- --   03/03/23 07:27:18 97 7 °F (36 5 °C) 66 18 145/74 98 91 % -- -- -- None (Room air) -- --   03/03/23 03:51:55 -- 59 -- 136/68 91 95 % -- -- -- -- -- --   03/03/23 0227 -- 58 14 -- -- 97 % 35 -- -- BiPAP Face mask --   03/03/23 0000 -- 64 -- -- -- 96 % -- -- -- -- -- --     Pertinent Labs/Diagnostic Results:   Results from last 7 days   Lab Units 03/01/23  1611   SARS-COV-2  Negative     Results from last 7 days   Lab Units 03/03/23  0450 03/02/23  0457 03/01/23  1611   WBC Thousand/uL 15 84* 9 47 8 65   HEMOGLOBIN g/dL 12 8 13 0 12 7   HEMATOCRIT % 40 3 41 1 40 7   PLATELETS Thousands/uL 300 298 281   NEUTROS ABS Thousands/µL  --  8 19* 4 94     Results from last 7 days   Lab Units 03/03/23  0450 03/02/23  0457 03/01/23  1611   SODIUM mmol/L 136 139 137   POTASSIUM mmol/L 4 7 4 5 4 3   CHLORIDE mmol/L 104 105 103   CO2 mmol/L 24 27 27   ANION GAP mmol/L 8 7 7   BUN mg/dL 23 26* 25   CREATININE mg/dL 1 02 1 21 1 36*   EGFR ml/min/1 73sq m 55 45 39   CALCIUM mg/dL 9 1 9 2 9 1   MAGNESIUM mg/dL 2 3 2 2 2 2   PHOSPHORUS mg/dL 3 4 3 0  --      Results from last 7 days   Lab Units 03/01/23  1611   AST U/L 23   ALT U/L 14   ALK PHOS U/L 39   TOTAL PROTEIN g/dL 8 4   ALBUMIN g/dL 4 5   TOTAL BILIRUBIN mg/dL 0 25     Results from last 7 days   Lab Units 03/03/23  0450 03/02/23  0457 03/01/23  1611   GLUCOSE RANDOM mg/dL 145* 159* 112     Results from last 7 days   Lab Units 03/01/23  1619   PH ART  7 287*   PCO2 ART mm Hg 55 3*   PO2 ART mm Hg 50 3*   HCO3 ART mmol/L 25 8   BASE EXC ART mmol/L -1 6   O2 CONTENT ART mL/dL 14 2*   O2 HGB, ARTERIAL % 79 3*   ABG SOURCE  Radial, Left     Results from last 7 days   Lab Units 03/02/23  0457   PH JAKOB  7 503*   PCO2 JAKOB mm Hg 26 3*   PO2 JAKOB mm Hg 198 3*   HCO3 JAKOB mmol/L 20 2*   BASE EXC JAKOB mmol/L -1 4   O2 CONTENT JAKOB ml/dL 19 4   O2 HGB, VENOUS % 95 8*     Results from last 7 days   Lab Units 03/01/23  2119 03/01/23 2024 03/01/23  1611   HS TNI 0HR ng/L  --   --  2   HS TNI 2HR ng/L  --  <2  --    HSTNI D2 ng/L  --  <0  --    HS TNI 4HR ng/L 2  --   --    HSTNI D4 ng/L 0  --   --        Results from last 7 days   Lab Units 03/01/23  1611   PROTIME seconds 13 1   INR  0 98   PTT seconds 45*       Results from last 7 days   Lab Units 03/02/23  0457 03/01/23  1611   PROCALCITONIN ng/ml <0 05 <0 05     Results from last 7 days   Lab Units 03/01/23  1611   BNP pg/mL 30       Results from last 7 days   Lab Units 03/01/23  2204 03/01/23  1611   STREP PNEUMONIAE ANTIGEN, URINE  Negative  --    LEGIONELLA URINARY ANTIGEN  Negative  --    INFLUENZA A PCR   --  Negative   INFLUENZA B PCR   --  Negative   RSV PCR   --  Negative       Medications:   Scheduled Medications:  atorvastatin, 40 mg, Oral, Daily With Dinner  buprenorphine-naloxone, 4 mg, Sublingual, HS  buprenorphine-naloxone, 8 mg, Sublingual, Daily  clopidogrel, 75 mg, Oral, Daily  DULoxetine, 60 mg, Oral, Daily  enoxaparin, 40 mg, Subcutaneous, Daily  guaiFENesin, 600 mg, Oral, BID  ipratropium-albuterol, 3 mL, Nebulization, Q6H  levothyroxine, 100 mcg, Oral, Daily  losartan, 25 mg, Oral, Daily  methylPREDNISolone sodium succinate, 40 mg, Intravenous, Q8H  metoprolol succinate, 25 mg, Oral, Daily  nicotine, 1 patch, Transdermal, Daily  pantoprazole, 40 mg, Oral, Early Morning  venlafaxine, 225 mg, Oral, Daily    PRN Meds:  acetaminophen, 650 mg, Oral, Q6H PRN  ALPRAZolam, 0 5 mg, Oral, BID PRN  traZODone, 50 mg, Oral, HS PRN    Discharge Plan: tbd    Network Utilization Review Department  ATTENTION: Please call with any questions or concerns to [phone] and carefully listen to the prompts so that you are directed to the right person  All voicemails are confidential   Vignesh Byers all requests for admission clinical reviews, approved or denied determinations and any other requests to dedicated fax number below belonging to the campus where the patient is receiving treatment   List of dedicated fax numbers for the Facilities:  [address] DENIALS (Administrative/Medical Necessity) [phone]   [address] (Maternity/NICU/Pediatrics) [phone]   7 Harleen Rosales [phone]   Pina Lyn  [phone]   [address] [place] [address] Deepika Ferguson 28 [phone]   [address] Quirino Alvarado [place] [address] 815 [facility] [phone]

## 2023-03-03 NOTE — ASSESSMENT & PLAN NOTE
• CT Chest 3/1: Increased density within the trachea for which further evaluation with pulmonary and bronchoscopy is recommended   Multiple pulmonary nodules measuring up to 2 to 3 mm   Based on current Fleischner Society 2017 Guidelines on incidental pulmonary nodule, no routine follow-up is needed if the patient is low risk   If the patient is high risk, optional follow-up chest  CT at 12 months can be considered     • Per Care Everywhere, CT angio 12/16/21:  Stable 5 mm right upper lobe nodule for which imaging follow-up per Fleischner guidelines is recommended  • F/u OP with serial imaging as appropriate

## 2023-03-03 NOTE — ASSESSMENT & PLAN NOTE
Back at baseline  •   • CT head 3/1: Age-indeterminate infarct in the left frontal lobe for which further evaluation with MRI can be obtained  • Initially presented with encephalopathy suspected 2/2 hypercarbia and hypoxia    • Frequent neuro checks

## 2023-03-03 NOTE — ASSESSMENT & PLAN NOTE
• Patient reported cocaine occasionally   Her  is aware of her use but the patient requests this information be kept confidential and her daughter not be notified  • Monitor for S&S of withdrawal  Remains A&O

## 2023-03-03 NOTE — CASE MANAGEMENT
Case Management Assessment & Discharge Planning Note    Patient name Tan Clement  Location [address] 410/[address]-* MRN [id number]  : 1950 Date 3/3/2023       Current Admission Date: 3/1/2023  Current Admission Diagnosis:Acute respiratory failure with hypoxia and hypercapnia [facility])   Patient Active Problem List    Diagnosis Date Noted   • Pulmonary nodules 2023   • Cerebral infarct ([facility] [address] ) 2023   • Cocaine abuse ([facility] [address] ) 2023   • Chronic obstructive pulmonary disease with acute exacerbation ([facility] [address] ) 2023   • Acute respiratory failure with hypoxia and hypercapnia ([facility] [address] ) 2023   • Hypertension 2023   • Hypothyroid 2023   • CAD (coronary artery disease) 2023   • Hx of CABG x1 2023   • Centrilobular emphysema ([facility] [address] ) 2023   • Anxiety 2023   • Opioid dependence ([facility] [address] ) 2023   • Encephalopathy acute 2023   • Nicotine abuse 2023   • Acute kidney injury ([facility] [address] ) 2023      LOS (days): 2  Geometric Mean LOS (GMLOS) (days): 3 50  Days to GMLOS:1 5     OBJECTIVE:    Risk of Unplanned Readmission Score: 12 67         Current admission status: Inpatient  Referral Reason: Other (Discharge planning)    Preferred Pharmacy:   PATIENT/FAMILY REPORTS NO PREFERRED PHARMACY  No address on file      Primary Care Provider: Miriam Thacker    Primary Insurance: [id number]  Secondary Insurance:     ASSESSMENT:  [address] 26 Proxies    There are no active Health Care Proxies on file          Readmission Root Cause  30 Day Readmission: No    Patient Information  Admitted from[de-identified] Home  Mental Status: Alert  During Assessment patient was accompanied by: Daughter  Assessment information provided by[de-identified] Patient  Primary Caregiver: Self  Support Systems: Spouse/significant other, Daughter, Family members  [place] of Residence: [address] [place] [address] do you live in?: stays with father in [place], permanent address is in [place] [address] entry access options   Select all that apply : Stairs  Number of steps to enter home : 1  Type of Current Residence: [place]  In the last 12 months, was there a time when you were not able to pay the mortgage or rent on time?: No  In the last 12 months, how many places have you lived?: 1  In the last 12 months, was there a time when you did not have a steady place to sleep or slept in a shelter (including now)?: No  Homeless/housing insecurity resource given?: N/A  Living Arrangements: Lives w/ Spouse/significant other, Lives w/ Parent(s)  Is patient a ?: No    Activities of Daily Living Prior to Admission  Functional Status: Independent  Completes ADLs independently?: Yes  Ambulates independently?: Yes  Does patient use assisted devices?: Yes  Assisted Devices (DME) used: [employer]  Does patient currently own DME?: Yes  What DME does the patient currently own?: Leim [employer]el  Does the patient have a history of Short-Term Rehab?: Yes  Does patient have a history of HHC?: Yes  Does patient currently have [facility]?: No    Patient Information Continued  Income Source: Pension/shelter  Does patient have prescription coverage?: Yes  Within the past 12 months, you worried that your food would run out before you got the money to buy more : Never true  Within the past 12 months, the food you bought just didn't last and you didn't have money to get more : Never true  Food insecurity resource given?: N/A  Does patient receive dialysis treatments?: No    Means of Transportation  Means of Transport to Appts[de-identified] Drives Self  In the past 12 months, has lack of transportation kept you from medical appointments or from getting medications?: No  In the past 12 months, has lack of transportation kept you from meetings, work, or from getting things needed for daily living?: No  Was application for public transport provided?: N/A      DISCHARGE DETAILS:    Discharge planning discussed with[de-identified] Patient  Freedom of Choice: Yes     Comments - Freedom of Choice: JULIO spoke with patient at bedside to introduce role of CM and review FOC  Patient's permanent address is in [address] but she has been staying with her 8 year old father in [place] (address is [address], [address] [address] [facility])  She is his primary caregiver and she expressed concern about her ability to continue this role given her own health issues  Her  and daughter are caring for her father while she is hospitalized  She stated that her father receives 10 hours of caregiving services from [facility] through the [facility] and she asked about having those hours increased  JULIO called the [facility] and was told that the process for increasing hours involves contacting his primary doctor at the [facility] and that family should call the [facility] at [phone] and use Option 2 to leave a message for the primary doctor  JULIO relayed this to patient and her daughter Austin Steele at bedside  JULIO also provided a list of private duty care agencies in case family decides to hire additional care for patient's father  JULIO will continue to follow  CM contacted family/caregiver?: Yes     Contacts  Patient Contacts: Maynor Dash (daughter)  Relationship to Patient[de-identified] Family  Contact Method: In Person  Reason/Outcome: Emergency Contact         IMM Given (Date):: 03/03/23  IMM Given to[de-identified] Patient (IMM reviewed with and signed by patient    Copy given to patient and copy placed in scan bin for chart )

## 2023-03-03 NOTE — ASSESSMENT & PLAN NOTE
Resolved,     • Increased lethargy and confusion 3/1 per daughter  Likely 2/2 hypoxia and hypercapnia  • ABG 7 28/55/50/25  • Placed on BiPap in ED, mentation improved  • CT head 3/1: "Age-indeterminate infarct in the left frontal lobe for which further evaluation with MRI can be obtained " Exam non-focal, A&O   • Continue scheduled Suboxone and PRN xanax as prescribed  Avoid additional narcotics and benzo's  • Patient now reporting she takes Suboxone 12mg once daily in AM rather than 8mg BID as she is "weaning myself down to a goal of 10"   Will discuss dosing schedule on rounds  • Frequent neuro checks/Fall precautions

## 2023-03-03 NOTE — PLAN OF CARE
Problem: MOBILITY - ADULT  Goal: Maintain or return to baseline ADL function  Description: INTERVENTIONS:  -  Assess patient's ability to carry out ADLs; assess patient's baseline for ADL function and identify physical deficits which impact ability to perform ADLs (bathing, care of mouth/teeth, toileting, grooming, dressing, etc )  - Assess/evaluate cause of self-care deficits   - Assess range of motion  - Assess patient's mobility; develop plan if impaired  - Assess patient's need for assistive devices and provide as appropriate  - Encourage maximum independence but intervene and supervise when necessary  - Involve family in performance of ADLs  - Assess for home care needs following discharge   - Consider OT consult to assist with ADL evaluation and planning for discharge  - Provide patient education as appropriate  Outcome: Progressing  Goal: Maintains/Returns to pre admission functional level  Description: INTERVENTIONS:  - Perform BMAT or MOVE assessment daily    - Set and communicate daily mobility goal to care team and patient/family/caregiver  - Collaborate with rehabilitation services on mobility goals if consulted  - Perform Range of Motion 3 times a day  - Reposition patient every 2 hours    - Dangle patient 3 times a day  - Stand patient 3 times a day  - Ambulate patient 3 times a day  - Out of bed to chair 3 times a day   - Out of bed for meals 3 times a day  - Out of bed for toileting  - Record patient progress and toleration of activity level   Outcome: Progressing     Problem: PAIN - ADULT  Goal: Verbalizes/displays adequate comfort level or baseline comfort level  Description: Interventions:  - Encourage patient to monitor pain and request assistance  - Assess pain using appropriate pain scale  - Administer analgesics based on type and severity of pain and evaluate response  - Implement non-pharmacological measures as appropriate and evaluate response  - Consider cultural and social influences on pain and pain management  - Notify physician/advanced practitioner if interventions unsuccessful or patient reports new pain  Outcome: Progressing     Problem: INFECTION - ADULT  Goal: Absence or prevention of progression during hospitalization  Description: INTERVENTIONS:  - Assess and monitor for signs and symptoms of infection  - Monitor lab/diagnostic results  - Monitor all insertion sites, i e  indwelling lines, tubes, and drains  - Monitor endotracheal if appropriate and nasal secretions for changes in amount and color  - [place] appropriate cooling/warming therapies per order  - Administer medications as ordered  - Instruct and encourage patient and family to use good hand hygiene technique  - Identify and instruct in appropriate isolation precautions for identified infection/condition  Outcome: Progressing     Problem: SAFETY ADULT  Goal: Maintain or return to baseline ADL function  Description: INTERVENTIONS:  -  Assess patient's ability to carry out ADLs; assess patient's baseline for ADL function and identify physical deficits which impact ability to perform ADLs (bathing, care of mouth/teeth, toileting, grooming, dressing, etc )  - Assess/evaluate cause of self-care deficits   - Assess range of motion  - Assess patient's mobility; develop plan if impaired  - Assess patient's need for assistive devices and provide as appropriate  - Encourage maximum independence but intervene and supervise when necessary  - Involve family in performance of ADLs  - Assess for home care needs following discharge   - Consider OT consult to assist with ADL evaluation and planning for discharge  - Provide patient education as appropriate  Outcome: Progressing    Goal: Patient will remain free of falls  Description: INTERVENTIONS:  - Educate patient/family on patient safety including physical limitations  - Instruct patient to call for assistance with activity   - Consult OT/PT to assist with strengthening/mobility   - Keep Call bell within reach  - Keep bed low and locked with side rails adjusted as appropriate  - Keep care items and personal belongings within reach  - Initiate and maintain comfort rounds  - Make Fall Risk Sign visible to staff  - Offer Toileting every 2 Hours, in advance of need  - Initiate/Maintain bed alarm  - Obtain necessary fall risk management equipment: yes  - Apply yellow socks and bracelet for high fall risk patients  - Consider moving patient to room near nurses station  Outcome: Progressing     Problem: DISCHARGE PLANNING  Goal: Discharge to home or other facility with appropriate resources  Description: INTERVENTIONS:  - Identify barriers to discharge w/patient and caregiver  - Arrange for needed discharge resources and transportation as appropriate  - Identify discharge learning needs (meds, wound care, etc )  - Arrange for interpretive services to assist at discharge as needed  - Refer to Case Management Department for coordinating discharge planning if the patient needs post-hospital services based on physician/advanced practitioner order or complex needs related to functional status, cognitive ability, or social support system  Outcome: Progressing     Problem: Knowledge Deficit  Goal: Patient/family/caregiver demonstrates understanding of disease process, treatment plan, medications, and discharge instructions  Description: Complete learning assessment and assess knowledge base  Interventions:  - Provide teaching at level of understanding  - Provide teaching via preferred learning methods  Outcome: Progressing     Problem: Nutrition/Hydration-ADULT  Goal: Nutrient/Hydration intake appropriate for improving, restoring or maintaining nutritional needs  Description: Monitor and assess patient's nutrition/hydration status for malnutrition  Collaborate with interdisciplinary team and initiate plan and interventions as ordered  Monitor patient's weight and dietary intake as ordered or per policy  Utilize nutrition screening tool and intervene as necessary  Determine patient's food preferences and provide high-protein, high-caloric foods as appropriate       INTERVENTIONS:  - Monitor oral intake, urinary output, labs, and treatment plans  - Assess nutrition and hydration status and recommend course of action  - Evaluate amount of meals eaten  - Assist patient with eating if necessary   - Allow adequate time for meals  - Recommend/ encourage appropriate diets, oral nutritional supplements, and vitamin/mineral supplements  - Order, calculate, and assess calorie counts as needed  - Recommend, monitor, and adjust tube feedings and TPN/PPN based on assessed needs  - Assess need for intravenous fluids  - Provide specific nutrition/hydration education as appropriate  - Include patient/family/caregiver in decisions related to nutrition  Outcome: Progressing     Problem: RESPIRATORY - ADULT  Goal: Achieves optimal ventilation and oxygenation  Description: INTERVENTIONS:  - Assess for changes in respiratory status  - Assess for changes in mentation and behavior  - Position to facilitate oxygenation and minimize respiratory effort  - Oxygen administered by appropriate delivery if ordered  - Initiate smoking cessation education as indicated  - Encourage broncho-pulmonary hygiene including cough, deep breathe, Incentive Spirometry  - Assess the need for suctioning and aspirate as needed  - Assess and instruct to report SOB or any respiratory difficulty  - Respiratory Therapy support as indicated  Outcome: Progressing

## 2023-03-03 NOTE — PROGRESS NOTES
Rose Mary 46  Progress Note Eliseo Franz 1950, 71 y o  female MRN: [id number]  Unit/Bed#: [address] [address] Encounter: [id number]  Primary Care Provider: Ronnell Zhao   Date and time admitted to hospital: 3/1/2023  3:46 PM    * Acute respiratory failure with hypoxia and hypercapnia (HCC)  Assessment & Plan  Improved,    • Continue to Wean FiO2 for SpO2 >90%  • Continuous pulse oximetry  • CT Chest 3/1: Increased density within the trachea for which further evaluation with pulmonary and bronchoscopy is recommended  Multiple pulmonary nodules measuring up to 2 to 3 mm   Based on current Fleischner Society 2017 Guidelines on incidental pulmonary nodule, no routine follow-up is needed if the patient is low risk   If the patient is high risk, optional follow-up chest  CT at 12 months can be considered     • Procal <0 05  • Sputum/Urine culture pending  • Remainder of treatment as per COPD    Cocaine abuse [facility])  Assessment & Plan  • Patient reported cocaine occasionally  Her  is aware of her use but the patient requests this information be kept confidential and her daughter not be notified  • Monitor for S&S of withdrawal  Remains A&O    Cerebral infarct [facility])  Assessment & Plan  Back at baseline  •   • CT head 3/1: Age-indeterminate infarct in the left frontal lobe for which further evaluation with MRI can be obtained  • Initially presented with encephalopathy suspected 2/2 hypercarbia and hypoxia  • Frequent neuro checks    Pulmonary nodules  Assessment & Plan  • CT Chest 3/1: Increased density within the trachea for which further evaluation with pulmonary and bronchoscopy is recommended  Multiple pulmonary nodules measuring up to 2 to 3 mm   Based on current Fleischner Society 2017 Guidelines on incidental pulmonary nodule, no routine follow-up is needed if the patient is low risk   If the patient is high risk, optional follow-up chest  CT at 12 months can be considered     • Per Care Everywhere, CT angio 12/16/21:  Stable 5 mm right upper lobe nodule for which imaging follow-up per Fleischner guidelines is recommended  • F/u OP with serial imaging as appropriate    Nicotine abuse  Assessment & Plan  Patch in place    Opioid dependence [facility])  Assessment & Plan  Hx of lumbosacral stenosis and chronic pain  • Previously on Tramadol  PDMP reviewed, continue Suboxone 8-2 BID as prescribed  • On further discussion with patient, she's been weaning her suboxone down on her own  Reports taking 12mg daily in AM rather than 8mg BID  Confirm dosing with patient    Anxiety  Assessment & Plan  Hx of anxiety    • Continue home xanax, duloxetine, venlafaxine    Centrilobular emphysema (HCC)  Assessment & Plan  Hx of Centrilobar emphysema    • OP meds include only PRN albuterol  • Solumedrol, Mucinex, Duonebs  • Smoking cessation  • F/u with Pulmonology on d/c    Hx of CABG x1  Assessment & Plan  • Hx CABG in 2016  • Continue home plavix    CAD (coronary artery disease)  Assessment & Plan  Hx of CAD, s/p CABG x1 vessel 2016    • Continue Plavix  • EKG NSR with non-specific ST changes  • HS troponin       Hypothyroid  Assessment & Plan  Continue home levothyroxine 100 mcg    Hypertension  Assessment & Plan  • Continue home losartan 25 mg and metoprolol 25 mg she had needs right now acute       Chronic obstructive pulmonary disease with acute exacerbation (HCC)  Assessment & Plan  • DuoNebs, Solu-Medrol, Mucinex  • Sputum culture pending    Acute kidney injury (HCC)-resolved as of 3/3/2023  Assessment & Plan  Resolved    · Back at Baseline Cr 0 8-1 1      Encephalopathy acute-resolved as of 3/3/2023  Assessment & Plan  Resolved,     • Increased lethargy and confusion 3/1 per daughter   Likely 2/2 hypoxia and hypercapnia  • ABG 7 28/55/50/25  • Placed on BiPap in ED, mentation improved  • CT head 3/1: "Age-indeterminate infarct in the left frontal lobe for which further evaluation with MRI can be obtained " Exam non-focal, A&O   • Continue scheduled Suboxone and PRN xanax as prescribed  Avoid additional narcotics and benzo's  • Patient now reporting she takes Suboxone 12mg once daily in AM rather than 8mg BID as she is "weaning myself down to a goal of 10"  Will discuss dosing schedule on rounds  • Frequent neuro checks/Fall precautions        VTE Pharmacologic Prophylaxis: VTE Score: 4 Moderate Risk (Score 3-4) - Pharmacological DVT Prophylaxis Ordered: enoxaparin (Lovenox)  Patient Centered Rounds: I performed bedside rounds with nursing staff today  Discussions with Specialists or Other Care Team Provider: ccu    Education and Discussions with Family / Patient: Patient declined call to   Total Time Spent on Date of Encounter in care of patient: 45 minutes This time was spent on one or more of the following: performing physical exam; counseling and coordination of care; obtaining or reviewing history; documenting in the medical record; reviewing/ordering tests, medications or procedures; communicating with other healthcare professionals and discussing with patient's family/caregivers  Current Length of Stay: 2 day(s)  Current Patient Status: Inpatient   Certification Statement: The patient will continue to require additional inpatient hospital stay due to Clinical course  Discharge Plan: Anticipate discharge in 24-48 hrs to discharge location to be determined pending rehab evaluations  Code Status: Level 1 - Full Code    Subjective:   Patient seen and examined at bedside, reported that she does get short of breath when she removes her supportive oxygen down to 88%  Patient reported purulent sputum that she continues to cough up  Patient reported that she did not know how bad it was to get COPD and now that she is aware she will stop smoking  Patient reported smoking since he was 13years old and currently smoking 5 to 6 cigarettes a day      Objective:     Vitals:   Temp (24hrs), Av °F (36 7 °C), Min:97 7 °F (36 5 °C), Max:98 3 °F (36 8 °C)    Temp:  [97 7 °F (36 5 °C)-98 3 °F (36 8 °C)] 97 9 °F (36 6 °C)  HR:  [58-71] 71  Resp:  [14-20] 18  BP: (123-145)/(61-86) 123/61  SpO2:  [90 %-97 %] 90 %  Body mass index is 32 61 kg/m²  Input and Output Summary (last 24 hours): Intake/Output Summary (Last 24 hours) at 3/3/2023 1852  Last data filed at 3/2/2023 2000  Gross per 24 hour   Intake --   Output 1 ml   Net -1 ml       Physical Exam:   Physical Exam  Vitals and nursing note reviewed  Constitutional:       General: She is not in acute distress  Appearance: She is well-developed  HENT:      Head: Normocephalic and atraumatic  Eyes:      Conjunctiva/sclera: Conjunctivae normal    Cardiovascular:      Rate and Rhythm: Normal rate and regular rhythm  Heart sounds: No murmur heard  No friction rub  No gallop  Pulmonary:      Effort: Pulmonary effort is normal  No respiratory distress  Breath sounds: No stridor  Examination of the right-lower field reveals decreased breath sounds  Examination of the left-lower field reveals decreased breath sounds  Decreased breath sounds present  No wheezing, rhonchi or rales  Abdominal:      Palpations: Abdomen is soft  Tenderness: There is no abdominal tenderness  There is no guarding or rebound  Musculoskeletal:         General: No swelling or tenderness  Cervical back: Neck supple  Right lower leg: No edema  Left lower leg: No edema  Skin:     General: Skin is warm and dry  Capillary Refill: Capillary refill takes less than 2 seconds  Findings: No bruising  Neurological:      Mental Status: She is alert and oriented to person, place, and time  Motor: No weakness     Psychiatric:         Mood and Affect: Mood normal          Behavior: Behavior normal           Additional Data:     Labs:  Results from last 7 days   Lab Units 23  0450 23  0457   WBC Thousand/uL 15 84* 9 47   HEMOGLOBIN g/dL 12 8 13 0   HEMATOCRIT % 40 3 41 1   PLATELETS Thousands/uL 300 298   NEUTROS PCT %  --  87*   LYMPHS PCT %  --  10*   MONOS PCT %  --  2*   EOS PCT %  --  0     Results from last 7 days   Lab Units 03/03/23  0450 03/02/23  0457 03/01/23  1611   SODIUM mmol/L 136   < > 137   POTASSIUM mmol/L 4 7   < > 4 3   CHLORIDE mmol/L 104   < > 103   CO2 mmol/L 24   < > 27   BUN mg/dL 23   < > 25   CREATININE mg/dL 1 02   < > 1 36*   ANION GAP mmol/L 8   < > 7   CALCIUM mg/dL 9 1   < > 9 1   ALBUMIN g/dL  --   --  4 5   TOTAL BILIRUBIN mg/dL  --   --  0 25   ALK PHOS U/L  --   --  39   ALT U/L  --   --  14   AST U/L  --   --  23   GLUCOSE RANDOM mg/dL 145*   < > 112    < > = values in this interval not displayed  Results from last 7 days   Lab Units 03/01/23  1611   INR  0 98             Results from last 7 days   Lab Units 03/02/23  0457 03/01/23  1611   PROCALCITONIN ng/ml <0 05 <0 05       Lines/Drains:  Invasive Devices     Peripheral Intravenous Line  Duration           Peripheral IV 03/01/23 Right Wrist 2 days    Peripheral IV 03/01/23 Distal;Dorsal (posterior); Right Forearm 1 day                      Imaging: Reviewed radiology reports from this admission including: chest CT scan    Recent Cultures (last 7 days):   Results from last 7 days   Lab Units 03/01/23  2204   LEGIONELLA URINARY ANTIGEN  Negative       Last 24 Hours Medication List:   Current Facility-Administered Medications   Medication Dose Route Frequency Provider Last Rate   • acetaminophen  650 mg Oral Q6H PRN Milas Ganser, CRNP     • ALPRAZolam  0 5 mg Oral BID PRN Coral CourserJAZMINE     • atorvastatin  40 mg Oral Daily With [employer]JAZMINE     • buprenorphine-naloxone  4 mg Sublingual HS Coral CourserJAZMINE     • buprenorphine-naloxone  8 mg Sublingual Daily Coral CourserJAZMINE     • clopidogrel  75 mg Oral Daily [place], [address]     • DULoxetine  60 mg Oral Daily Joie Adame JAZMINE Wilson     • enoxaparin  40 mg Subcutaneous Daily [place], [place]     • guaiFENesin  600 mg Oral BID [place], [place]     • ipratropium-albuterol  3 mL Nebulization Q6H [place], [place]     • levothyroxine  100 mcg Oral Daily [place], [place]     • losartan  25 mg Oral Daily [place], [place]     • methylPREDNISolone sodium succinate  40 mg Intravenous Q12H Via [employer]acey[employer]JAZMINE [place]     • metoprolol succinate  25 mg Oral Daily [place], [place]     • nicotine  1 patch Transdermal Daily [place], [place]     • pantoprazole  40 mg Oral Early Morning [place], [place]     • traZODone  50 mg Oral HS PRN [facility]     • venlafaxine  225 mg Oral Daily [place], [place]          Today, Patient Was Seen By: Kinjal Hope MD    **Please Note: This note may have been constructed using a voice recognition system  **

## 2023-03-03 NOTE — PLAN OF CARE
Problem: MOBILITY - ADULT  Goal: Maintain or return to baseline ADL function  Description: INTERVENTIONS:  -  Assess patient's ability to carry out ADLs; assess patient's baseline for ADL function and identify physical deficits which impact ability to perform ADLs (bathing, care of mouth/teeth, toileting, grooming, dressing, etc )  - Assess/evaluate cause of self-care deficits   - Assess range of motion  - Assess patient's mobility; develop plan if impaired  - Assess patient's need for assistive devices and provide as appropriate  - Encourage maximum independence but intervene and supervise when necessary  - Involve family in performance of ADLs  - Assess for home care needs following discharge   - Consider OT consult to assist with ADL evaluation and planning for discharge  - Provide patient education as appropriate  3/3/2023 1506 by Franc Marquez RN  Outcome: Progressing  3/3/2023 1505 by Franc Marquez RN  Outcome: Progressing  Goal: Maintains/Returns to pre admission functional level  Description: INTERVENTIONS:  - Perform BMAT or MOVE assessment daily    - Set and communicate daily mobility goal to care team and patient/family/caregiver  - Collaborate with rehabilitation services on mobility goals if consulted  - Perform Range of Motion 3 times a day  - Reposition patient every 2 hours    - Dangle patient 3 times a day  - Stand patient 3 times a day  - Ambulate patient 3 times a day  - Out of bed to chair 3 times a day   - Out of bed for meals 3 times a day  - Out of bed for toileting  - Record patient progress and toleration of activity level   3/3/2023 1506 by Franc Marquez RN  Outcome: Progressing  3/3/2023 1505 by Franc Marquez RN  Outcome: Progressing     Problem: PAIN - ADULT  Goal: Verbalizes/displays adequate comfort level or baseline comfort level  Description: Interventions:  - Encourage patient to monitor pain and request assistance  - Assess pain using appropriate pain scale  - Administer analgesics based on type and severity of pain and evaluate response  - Implement non-pharmacological measures as appropriate and evaluate response  - Consider cultural and social influences on pain and pain management  - Notify physician/advanced practitioner if interventions unsuccessful or patient reports new pain  3/3/2023 1506 by Madina Barone RN  Outcome: Progressing  3/3/2023 1505 by Madina Barone RN  Outcome: Progressing     Problem: INFECTION - ADULT  Goal: Absence or prevention of progression during hospitalization  Description: INTERVENTIONS:  - Assess and monitor for signs and symptoms of infection  - Monitor lab/diagnostic results  - Monitor all insertion sites, i e  indwelling lines, tubes, and drains  - Monitor endotracheal if appropriate and nasal secretions for changes in amount and color  - [place] appropriate cooling/warming therapies per order  - Administer medications as ordered  - Instruct and encourage patient and family to use good hand hygiene technique  - Identify and instruct in appropriate isolation precautions for identified infection/condition  3/3/2023 1506 by Madina Barone RN  Outcome: Progressing  3/3/2023 1505 by Madina Barone RN  Outcome: Progressing     Problem: SAFETY ADULT  Goal: Maintain or return to baseline ADL function  Description: INTERVENTIONS:  -  Assess patient's ability to carry out ADLs; assess patient's baseline for ADL function and identify physical deficits which impact ability to perform ADLs (bathing, care of mouth/teeth, toileting, grooming, dressing, etc )  - Assess/evaluate cause of self-care deficits   - Assess range of motion  - Assess patient's mobility; develop plan if impaired  - Assess patient's need for assistive devices and provide as appropriate  - Encourage maximum independence but intervene and supervise when necessary  - Involve family in performance of ADLs  - Assess for home care needs following discharge   - Consider OT consult to assist with ADL evaluation and planning for discharge  - Provide patient education as appropriate  3/3/2023 1506 by Ric Clark RN  Outcome: Progressing  3/3/2023 1505 by Ric Clark RN  Outcome: Progressing  Goal: Maintains/Returns to pre admission functional level  Description: INTERVENTIONS:  - Perform BMAT or MOVE assessment daily    - Set and communicate daily mobility goal to care team and patient/family/caregiver  - Collaborate with rehabilitation services on mobility goals if consulted  - Perform Range of Motion 3 times a day  - Reposition patient every 3 hours    - Dangle patient 3 times a day  - Stand patient 3 times a day  - Ambulate patient 3 times a day  - Out of bed to chair 3 times a day   - Out of bed for meals 3 times a day  - Out of bed for toileting  - Record patient progress and toleration of activity level   3/3/2023 1506 by Ric Clark RN  Outcome: Progressing  3/3/2023 1505 by Ric Clark RN  Outcome: Progressing  Goal: Patient will remain free of falls  Description: INTERVENTIONS:  - Educate patient/family on patient safety including physical limitations  - Instruct patient to call for assistance with activity   - Consult OT/PT to assist with strengthening/mobility   - Keep Call bell within reach  - Keep bed low and locked with side rails adjusted as appropriate  - Keep care items and personal belongings within reach  - Initiate and maintain comfort rounds  - Make Fall Risk Sign visible to staff  - Offer Toileting every 2 Hours, in advance of need  - Initiate/Maintain bed alarm  - Obtain necessary fall risk management equipment: slipper sock  - Apply yellow socks and bracelet for high fall risk patients  - Consider moving patient to room near nurses station  3/3/2023 1506 by Ric Clark RN  Outcome: Progressing  3/3/2023 1505 by Ric Clark RN  Outcome: Progressing     Problem: DISCHARGE PLANNING  Goal: Discharge to home or other facility with appropriate resources  Description: INTERVENTIONS:  - Identify barriers to discharge w/patient and caregiver  - Arrange for needed discharge resources and transportation as appropriate  - Identify discharge learning needs (meds, wound care, etc )  - Arrange for interpretive services to assist at discharge as needed  - Refer to Case Management Department for coordinating discharge planning if the patient needs post-hospital services based on physician/advanced practitioner order or complex needs related to functional status, cognitive ability, or social support system  3/3/2023 1506 by Josi Dean RN  Outcome: Progressing  3/3/2023 1505 by Josi Dean RN  Outcome: Progressing     Problem: Knowledge Deficit  Goal: Patient/family/caregiver demonstrates understanding of disease process, treatment plan, medications, and discharge instructions  Description: Complete learning assessment and assess knowledge base    Interventions:  - Provide teaching at level of understanding  - Provide teaching via preferred learning methods  3/3/2023 1506 by Josi Dean RN  Outcome: Progressing  3/3/2023 1505 by Josi Dean RN  Outcome: Progressing

## 2023-03-03 NOTE — PLAN OF CARE
Problem: PHYSICAL THERAPY ADULT  Goal: Performs mobility at highest level of function for planned discharge setting  See evaluation for individualized goals  Description: Treatment/Interventions: Therapeutic exercise, Endurance training, Elevations, LE strengthening/ROM, Gait training, Patient/family training  Equipment Recommended:  (pt has a walker, pt may need a home02 eval)       See flowsheet documentation for full assessment, interventions and recommendations  Note: Prognosis: Good  Problem List: Decreased range of motion, Decreased mobility, Decreased endurance, Impaired balance, Decreased strength, Pain  Assessment: Patient is an 71y o  year old female seen for Physical Therapy evaluation  Patient admitted with Acute respiratory failure with hypoxia and hypercapnia ([facility] [address] )  Comorbidities affecting patient's physical performance include: COPD, opoid dependence, emphysema, anxiety  Personal factors affecting patient at time of initial evaluation include: ambulating with assistive device and stairs to enter home  Prior to admission, patient was independent with functional mobility with walker and independent with ADLS  Please find objective findings from Physical Therapy assessment regarding body systems outlined above with impairments and limitations including decreased endurance, decreased activity tolerance, decreased functional mobility tolerance and SOB upon exertion  The Barthel Index was used as a functional outcome tool presenting with a score of Barthel Index Score: 75 today indicating moderate limitations of functional mobility and ADLS  Patient's clinical presentation is currently evolving as seen in patient's presentation of new onset of impairment of functional mobility and decreased endurance  Pt would benefit from continued Physical Therapy treatment to address deficits as defined above and maximize level of functional mobility   As demonstrated by objective findings, the assigned level of complexity for this evaluation is moderate  The patient's AM-PAC Basic Mobility Inpatient Short Form Raw Score is 22  A Raw score of greater than 16 suggests the patient may benefit from discharge to home  PT Discharge Recommendation: No rehabilitation needs    See flowsheet documentation for full assessment

## 2023-03-03 NOTE — ASSESSMENT & PLAN NOTE
Hx of Centrilobar emphysema    • OP meds include only PRN albuterol     • Solumedrol, Mucinex, Duonebs  • Smoking cessation  • F/u with Pulmonology on d/c

## 2023-03-04 LAB
ATRIAL RATE: 55 BPM
ATRIAL RATE: 62 BPM
DME PARACHUTE DELIVERY DATE REQUESTED: NORMAL
DME PARACHUTE DELIVERY NOTE: NORMAL
DME PARACHUTE ITEM DESCRIPTION: NORMAL
DME PARACHUTE ORDER STATUS: NORMAL
DME PARACHUTE SUPPLIER NAME: NORMAL
DME PARACHUTE SUPPLIER PHONE: NORMAL
P AXIS: 45 DEGREES
P AXIS: 66 DEGREES
PR INTERVAL: 164 MS
PR INTERVAL: 170 MS
QRS AXIS: 3 DEGREES
QRS AXIS: 82 DEGREES
QRSD INTERVAL: 90 MS
QRSD INTERVAL: 96 MS
QT INTERVAL: 452 MS
QT INTERVAL: 464 MS
QTC INTERVAL: 443 MS
QTC INTERVAL: 458 MS
T WAVE AXIS: 58 DEGREES
T WAVE AXIS: 65 DEGREES
VENTRICULAR RATE: 55 BPM
VENTRICULAR RATE: 62 BPM

## 2023-03-04 RX ORDER — LANOLIN ALCOHOL/MO/W.PET/CERES
3 CREAM (GRAM) TOPICAL
Status: DISCONTINUED | OUTPATIENT
Start: 2023-03-04 | End: 2023-03-06 | Stop reason: HOSPADM

## 2023-03-04 RX ADMIN — IPRATROPIUM BROMIDE AND ALBUTEROL SULFATE 3 ML: 2.5; .5 SOLUTION RESPIRATORY (INHALATION) at 07:30

## 2023-03-04 RX ADMIN — CLOPIDOGREL BISULFATE 75 MG: 75 TABLET ORAL at 08:43

## 2023-03-04 RX ADMIN — METHYLPREDNISOLONE SODIUM SUCCINATE 40 MG: 40 INJECTION, POWDER, FOR SOLUTION INTRAMUSCULAR; INTRAVENOUS at 21:07

## 2023-03-04 RX ADMIN — DOCUSATE SODIUM 100 MG: 100 CAPSULE, LIQUID FILLED ORAL at 18:10

## 2023-03-04 RX ADMIN — IPRATROPIUM BROMIDE AND ALBUTEROL SULFATE 3 ML: 2.5; .5 SOLUTION RESPIRATORY (INHALATION) at 01:27

## 2023-03-04 RX ADMIN — ENOXAPARIN SODIUM 40 MG: 40 INJECTION SUBCUTANEOUS at 08:43

## 2023-03-04 RX ADMIN — METOPROLOL SUCCINATE 25 MG: 25 TABLET, EXTENDED RELEASE ORAL at 08:43

## 2023-03-04 RX ADMIN — ACETAMINOPHEN 650 MG: 325 TABLET, FILM COATED ORAL at 13:22

## 2023-03-04 RX ADMIN — ALPRAZOLAM 0.5 MG: 0.5 TABLET ORAL at 21:12

## 2023-03-04 RX ADMIN — LEVOTHYROXINE SODIUM 100 MCG: 100 TABLET ORAL at 05:01

## 2023-03-04 RX ADMIN — MELATONIN TAB 3 MG 3 MG: 3 TAB at 21:29

## 2023-03-04 RX ADMIN — GUAIFENESIN 600 MG: 600 TABLET, EXTENDED RELEASE ORAL at 18:10

## 2023-03-04 RX ADMIN — METHYLPREDNISOLONE SODIUM SUCCINATE 40 MG: 40 INJECTION, POWDER, FOR SOLUTION INTRAMUSCULAR; INTRAVENOUS at 08:44

## 2023-03-04 RX ADMIN — TRAZODONE HYDROCHLORIDE 50 MG: 50 TABLET ORAL at 21:06

## 2023-03-04 RX ADMIN — ATORVASTATIN CALCIUM 40 MG: 40 TABLET, FILM COATED ORAL at 16:34

## 2023-03-04 RX ADMIN — PANTOPRAZOLE SODIUM 40 MG: 40 TABLET, DELAYED RELEASE ORAL at 05:01

## 2023-03-04 RX ADMIN — NICOTINE 1 PATCH: 14 PATCH, EXTENDED RELEASE TRANSDERMAL at 08:44

## 2023-03-04 RX ADMIN — IPRATROPIUM BROMIDE AND ALBUTEROL SULFATE 3 ML: 2.5; .5 SOLUTION RESPIRATORY (INHALATION) at 19:57

## 2023-03-04 RX ADMIN — IPRATROPIUM BROMIDE AND ALBUTEROL SULFATE 3 ML: 2.5; .5 SOLUTION RESPIRATORY (INHALATION) at 13:13

## 2023-03-04 RX ADMIN — DULOXETINE HYDROCHLORIDE 60 MG: 60 CAPSULE, DELAYED RELEASE ORAL at 08:43

## 2023-03-04 RX ADMIN — SENNOSIDES 17.2 MG: 8.6 TABLET, FILM COATED ORAL at 21:07

## 2023-03-04 RX ADMIN — LOSARTAN POTASSIUM 25 MG: 25 TABLET, FILM COATED ORAL at 08:43

## 2023-03-04 RX ADMIN — POLYETHYLENE GLYCOL 3350 17 G: 17 POWDER, FOR SOLUTION ORAL at 08:43

## 2023-03-04 RX ADMIN — DOCUSATE SODIUM 100 MG: 100 CAPSULE, LIQUID FILLED ORAL at 08:43

## 2023-03-04 RX ADMIN — ALPRAZOLAM 0.5 MG: 0.5 TABLET ORAL at 13:22

## 2023-03-04 RX ADMIN — GUAIFENESIN 600 MG: 600 TABLET, EXTENDED RELEASE ORAL at 08:43

## 2023-03-04 RX ADMIN — BUPRENORPHINE AND NALOXONE 10 MG: 8; 2 FILM BUCCAL; SUBLINGUAL at 08:42

## 2023-03-04 RX ADMIN — VENLAFAXINE HYDROCHLORIDE 225 MG: 75 CAPSULE, EXTENDED RELEASE ORAL at 08:43

## 2023-03-04 NOTE — PROGRESS NOTES
Progress Note - Pulmonary   Joseph Meyer 71 y o  female MRN: [id number]  Unit/Bed#: [address] [place] [address] Encounter: [id number]    Assessment:  Acute hypercapnic hypoxemic respiratory failure secondary to COPD exacerbation prior PFTs done elsewhere showed only mild COPD  Has some mild oxygen desaturation when she is on room air  Acute exacerbation of COPD improved  Multiple small 2 to 3 mm lung nodules bilateral likely benign  GELA  Sleep screen done last night does show indications of mild GELA  She had 7 obstructive apneas, 2 central apneas and 55 hypopneas for overall AHI of 11 7  History of opioid dependence  She does use Suboxone ttwice a day at home  History of ascending thoracic aortic aneurysm repair in September 2016 and had single bypass done of RCA at that time  Hypertension  Tobacco dependence  Sputum specimen collected this afternoon as complaining of some productive cough for small amount of green mucous      Plan:  I did discuss diagnosis and treatment of GELA with her  I recommend she have a sleep study done as outpatient to qualify her for CPAP or BiPAP therapy  She was agreeable to using this  Also recommend she try to lose weight  Continue methylprednisolone 40 mg IV every 12 hours  Nicotine 14 mg patch  Neb treatments with DuoNeb 4 times daily  Needing oxygen therapy 2 L/min nasal cannula  We will check arterial blood gas tomorrow to see with PCO2 level was negative patient has improved    Subjective:   Breathing is better  Complaining of cough productive for small amount of green mucous    Objective:     Vitals: Blood pressure 160/98, pulse 73, temperature 97 9 °F (36 6 °C), resp  rate 20, height 5' 4" (1 626 m), weight 86 1 kg (189 lb 14 8 oz), SpO2 91 %  ,Body mass index is 32 6 kg/m²  No intake or output data in the 24 hours ending 03/04/23 1548    Physical Exam: Physical Exam  Vitals reviewed  Constitutional:       General: She is not in acute distress       Appearance: She is well-developed  Comments: Patient did have oxygen off in room air O2 saturation fluctuated from 88 to 91%   HENT:      Head: Normocephalic  Right Ear: External ear normal       Left Ear: External ear normal       Nose: Nose normal       Mouth/Throat:      Mouth: Mucous membranes are moist       Pharynx: Oropharynx is clear  No oropharyngeal exudate  Eyes:      Conjunctiva/sclera: Conjunctivae normal       Pupils: Pupils are equal, round, and reactive to light  Neck:      Vascular: No JVD  Trachea: No tracheal deviation  Cardiovascular:      Rate and Rhythm: Normal rate and regular rhythm  Heart sounds: Normal heart sounds  Pulmonary:      Effort: Pulmonary effort is normal       Comments: Lung sounds are clear  No wheezes, crackles or rhonchi  Abdominal:      General: There is no distension  Palpations: Abdomen is soft  Tenderness: There is no abdominal tenderness  There is no guarding  Musculoskeletal:      Cervical back: Neck supple  Comments: No edema   Lymphadenopathy:      Cervical: No cervical adenopathy  Skin:     General: Skin is warm and dry  Findings: No rash  Neurological:      General: No focal deficit present  Mental Status: She is alert and oriented to person, place, and time  Psychiatric:         Behavior: Behavior normal          Thought Content: Thought content normal           Labs: I have personally reviewed pertinent lab results  , ABG:   Lab Results   Component Value Date    PHART 7 287 (L) 03/01/2023    CTE8WVI 55 3 (HH) 03/01/2023    PO2ART 50 3 (LL) 03/01/2023    HTR6JGK 25 8 03/01/2023    BEART -1 6 03/01/2023    SOURCE Radial, Left 03/01/2023   , BNP:   Lab Results   Component Value Date    BNP 30 03/01/2023   , CBC:   Lab Results   Component Value Date    WBC 15 84 (H) 03/03/2023    HGB 12 8 03/03/2023    HCT 40 3 03/03/2023    MCV 95 03/03/2023     03/03/2023    MCH 30 2 03/03/2023    MCHC 31 8 03/03/2023    RDW 15 0 03/03/2023    MPV 9 3 03/03/2023    NRBC 0 03/02/2023   , CMP:   Lab Results   Component Value Date    K 4 7 03/03/2023     03/03/2023    CO2 24 03/03/2023    BUN 23 03/03/2023    CREATININE 1 02 03/03/2023    CALCIUM 9 1 03/03/2023    AST 23 03/01/2023    ALT 14 03/01/2023    ALKPHOS 39 03/01/2023    EGFR 55 03/03/2023   , PT/INR:   Lab Results   Component Value Date    INR 0 98 03/01/2023   , Troponin: No results found for: TROPONIN    Imaging and other studies: I have personally reviewed pertinent reports     and I have personally reviewed pertinent films in PACS

## 2023-03-04 NOTE — RESPIRATORY THERAPY NOTE
Home Oxygen Qualifying Test     Patient name: Boston Sierra        : 1950   Date of Test:  2023  Diagnosis:    Home Oxygen Test:    Medicare Guidelines require item(s) 1-5 on all ambulatory patients or 1 and 2 on non-ambulatory patients  1  Baseline SPO2 on Room Air at rest 90 %   a  If <= 88% on Room Air add O2 via NC to obtain SpO2 >=88%  If LPM needed, document LPM  needed to reach =>88%    2  SPO2 during exertion on Room Air 86  %  a  During exertion monitor SPO2  If SPO2 increases >=89%, do not add supplemental oxygen    3  SPO2 on Oxygen at Rest  % at  LPM    4  SPO2 during exertion on Oxygen 92 % at 2 LPM    5  Test performed during exertion activity  [x]  Supplemental Home Oxygen is indicated  []  Client does not qualify for home oxygen      Respiratory Additional Notes- pt needs O2 on exertion    Shayna Allen, RT

## 2023-03-04 NOTE — ASSESSMENT & PLAN NOTE
• CT Chest 3/1: Increased density within the trachea for which further evaluation with pulmonary and bronchoscopy is recommended   Multiple pulmonary nodules measuring up to 2 to 3 mm   Based on current Fleischner Society 2017 Guidelines on incidental pulmonary nodule, no routine follow-up is needed if the patient is low risk   If the patient is high risk, optional follow-up chest  CT at 12 months order placed     • Per Care Everywhere, CT angio 12/16/21:  Stable 5 mm right upper lobe nodule for which imaging follow-up per Fleischner guidelines is recommended  • F/u OP pulmonologist

## 2023-03-04 NOTE — ASSESSMENT & PLAN NOTE
Hx of Centrilobar emphysema    • OP meds include only PRN albuterol     • Smoking cessation reinforced  • Pulmonology recs: Prednisone taper, f/u with OP pulmonology on d/c, smoking cessation anxiety producing

## 2023-03-04 NOTE — ASSESSMENT & PLAN NOTE
Hx of anxiety    • Continue home xanax, duloxetine, venlafaxine  • Follow-up outpatient, with psychiatrist and possible therapist

## 2023-03-04 NOTE — ASSESSMENT & PLAN NOTE
Clinical diagnosis, follow-up outpatient for Gold staging    • DuoNebs, Solu-Medrol, Mucinex  • Sputum culture-pending  • Smoking cessation  · Pulm recs:Solu-Medrol to prednisone 40 mg daily to start tomorrow and taper by 10 mg every 3 days as tolerated  start Spiriva with as needed albuterol MDI for discharge  OP pulmonary follow-up and PFTs

## 2023-03-04 NOTE — CASE MANAGEMENT
Case Management Discharge Planning Note    Patient name Antonio Yen  Location [address]-* MRN [id number]  : 1950 Date 3/4/2023       Current Admission Date: 3/1/2023  Current Admission Diagnosis:Acute respiratory failure with hypoxia and hypercapnia [facility])   Patient Active Problem List    Diagnosis Date Noted   • Pulmonary nodules 2023   • Cerebral infarct ([facility] [address] ) 2023   • Cocaine abuse ([facility] [address] ) 2023   • Chronic obstructive pulmonary disease with acute exacerbation ([facility] [address] ) 2023   • Acute respiratory failure with hypoxia and hypercapnia ([facility] [address] ) 2023   • Hypertension 2023   • Hypothyroid 2023   • CAD (coronary artery disease) 2023   • Hx of CABG x1 2023   • Centrilobular emphysema ([facility] [address] ) 2023   • Anxiety 2023   • Opioid dependence ([facility] [address] ) 2023   • Nicotine abuse 2023      LOS (days): 3  Geometric Mean LOS (GMLOS) (days): 3 50  Days to GMLOS:0 5     OBJECTIVE:  Risk of Unplanned Readmission Score: 13 15       Current admission status: Inpatient   Preferred Pharmacy:   PATIENT/FAMILY REPORTS NO PREFERRED PHARMACY  No address on file    Primary Care Provider: Rufino Arrieta    Primary Insurance: [address] [facility]  Secondary Insurance:     DISCHARGE DETAILS:     DME Referral Provided  Referral made for DME?: Yes  DME referral completed for the following items[de-identified] Portable Oxygen concentrator, Home Oxygen concentrator, Portable Oxygen tanks (Per RT note, patient qualifies for home O2  SW placed order in [facility] for [address] and uploaded qualifying test to Mad[facility] KIM will follow for approval )  DME Supplier Name[de-identified] [facility]    Other Referral/Resources/Interventions Provided:  Interventions: DME       Treatment Team Recommendation: Home  Discharge Destination Plan[de-identified] Home  Transport at Discharge : Family         IMM Given (Date):: 23

## 2023-03-04 NOTE — ASSESSMENT & PLAN NOTE
Patient reported use PTA,    Patient reported cocaine occasionally   Her  is aware of her use but the patient requests this information be kept confidential and her daughter not be notified  • Monitor for S&S of withdrawal    • UDS-positive on this admission  • Counseled extensively on cessation

## 2023-03-04 NOTE — ASSESSMENT & PLAN NOTE
Hx of lumbosacral stenosis and chronic pain  • Previously on Tramadol   PDMP reviewed, continue Suboxone 12 mg, not taking as BID prescribed  • Patient trying to wean down, needs to obtain outpatient provider due to Dr Maksim Kang retiring

## 2023-03-04 NOTE — RESPIRATORY THERAPY NOTE
Patient placed on Overnight Study on 3/3/23 at 2220 on room air  Patient was found with a saturation of 87% at 0140 and placed on 2L NC  Study completed at 0430AM 3/4/23    Apnea Link #[id number] used

## 2023-03-04 NOTE — PROGRESS NOTES
Rose Mary 46  Progress Note Aj Lopes 1950, 71 y o  female MRN: [id number]  Unit/Bed#: [address] [address] Encounter: [id number]  Primary Care Provider: Prasad Kirby   Date and time admitted to hospital: 3/1/2023  3:46 PM    * Acute respiratory failure with hypoxia and hypercapnia (HCC)  Assessment & Plan  Improved,    • Continue to Wean FiO2 for SpO2 >90%  • Continuous pulse oximetry  • CT Chest 3/1: Increased density within the trachea for which further evaluation with pulmonary and bronchoscopy is recommended  Multiple pulmonary nodules measuring up to 2 to 3 mm   Based on current Fleischner Society 2017 Guidelines on incidental pulmonary nodule, no routine follow-up is needed if the patient is low risk   If the patient is high risk, optional follow-up chest  CT at 12 months can be considered     • Procal <0 05  • Sputum/Urine culture pending  • Remainder of treatment as per COPD  • Sleep study overnight congruent with nocturnal hypoxia  • Follow-up outpatient pulmonology    Pulmonary nodules  Assessment & Plan  • CT Chest 3/1: Increased density within the trachea for which further evaluation with pulmonary and bronchoscopy is recommended  Multiple pulmonary nodules measuring up to 2 to 3 mm   Based on current Fleischner Society 2017 Guidelines on incidental pulmonary nodule, no routine follow-up is needed if the patient is low risk   If the patient is high risk, optional follow-up chest  CT at 12 months can be considered     • Per Care Everywhere, CT angio 12/16/21:  Stable 5 mm right upper lobe nodule for which imaging follow-up per Fleischner guidelines is recommended  • F/u OP with serial imaging as appropriate    Opioid dependence ([facility] [address] )  Assessment & Plan  Hx of lumbosacral stenosis and chronic pain  • Previously on Tramadol  PDMP reviewed, continue Suboxone 8-2 BID as prescribed  • Patient request to wean suboxone down on her own    To 12mg daily in AM rather than 8mg BID      Chronic obstructive pulmonary disease with acute exacerbation [facility])  Assessment & Plan  Clinical diagnosis, follow-up outpatient for Gold staging    • DuoNebs, Solu-Medrol, Mucinex  • Sputum culture-pending  • OP pulmonology follow-up  • Smoking cessation    Cocaine abuse [facility])  Assessment & Plan  • Patient reported cocaine occasionally  Her  is aware of her use but the patient requests this information be kept confidential and her daughter not be notified  • Monitor for S&S of withdrawal  Remains A&O    Cerebral infarct [facility])  Assessment & Plan  Back at baseline  •   • CT head 3/1: Age-indeterminate infarct in the left frontal lobe for which further evaluation with MRI can be obtained  • Initially presented with encephalopathy suspected 2/2 hypercarbia and hypoxia  • Frequent neuro checks    Nicotine abuse  Assessment & Plan  Patch in place, encourage cessation    Anxiety  Assessment & Plan  Hx of anxiety    • Continue home xanax, duloxetine, venlafaxine    Centrilobular emphysema ([address] )  Assessment & Plan  Hx of Centrilobar emphysema    • OP meds include only PRN albuterol  • Solumedrol, Mucinex, Duonebs  • Smoking cessation reinforced  • F/u with Pulmonology on d/c    Hx of CABG x1  Assessment & Plan  • Hx CABG in 2016  • Continue home plavix    CAD (coronary artery disease)  Assessment & Plan  Hx of CAD, s/p CABG x1 vessel 2016    • Continue Plavix  • EKG NSR with non-specific ST changes  • HS troponin       Hypothyroid  Assessment & Plan  Continue home levothyroxine 100 mcg    Hypertension  Assessment & Plan  • Continue home losartan 25 mg and metoprolol 25 mg she had needs right now acute           VTE Pharmacologic Prophylaxis: VTE Score: 4 Moderate Risk (Score 3-4) - Pharmacological DVT Prophylaxis Ordered: enoxaparin (Lovenox)  Patient Centered Rounds: I performed bedside rounds with nursing staff today    Discussions with Specialists or Other Care Team Provider: ccu    Education and Discussions with Family / Patient: Patient declined call to   Total Time Spent on Date of Encounter in care of patient: 45 minutes This time was spent on one or more of the following: performing physical exam; counseling and coordination of care; obtaining or reviewing history; documenting in the medical record; reviewing/ordering tests, medications or procedures; communicating with other healthcare professionals and discussing with patient's family/caregivers  Current Length of Stay: 3 day(s)  Current Patient Status: Inpatient   Certification Statement: The patient will continue to require additional inpatient hospital stay due to Clinical course  Discharge Plan: Anticipate discharge in 24-48 hrs to discharge location to be determined pending rehab evaluations  Code Status: Level 1 - Full Code    Subjective:   Patient seen and examined at bedside, patient seated upright in bed on room air 88%, stated she felt better, improvement in oxygenation, that the flutter valve is getting all her mucus out and that she is able to sit in the chair  Patient reported using respiratory support overnight mask but some SORIA  Patient reported a little bit of dizziness when she lay straight flat instead of bracing herself  Patient denied all symptoms and will get a home O2 eval     Objective:     Vitals:   Temp (24hrs), Av 9 °F (36 6 °C), Min:97 5 °F (36 4 °C), Max:98 1 °F (36 7 °C)    Temp:  [97 5 °F (36 4 °C)-98 1 °F (36 7 °C)] 97 8 °F (36 6 °C)  HR:  [64-73] 73  Resp:  [17-20] 20  BP: (123-164)/() 159/99  SpO2:  [87 %-92 %] 90 %  Body mass index is 32 6 kg/m²  Input and Output Summary (last 24 hours):   No intake or output data in the 24 hours ending 23 1350    Physical Exam:   Physical Exam  Vitals and nursing note reviewed  Constitutional:       General: She is not in acute distress  Appearance: She is well-developed  HENT:      Head: Normocephalic and atraumatic     Eyes: Conjunctiva/sclera: Conjunctivae normal    Cardiovascular:      Rate and Rhythm: Normal rate and regular rhythm  Heart sounds: No murmur heard  No friction rub  No gallop  Pulmonary:      Effort: Pulmonary effort is normal  No respiratory distress  Breath sounds: Normal breath sounds  No stridor  No wheezing, rhonchi or rales  Abdominal:      Palpations: Abdomen is soft  Tenderness: There is no abdominal tenderness  There is no guarding or rebound  Musculoskeletal:         General: No swelling or tenderness  Cervical back: Neck supple  Right lower leg: No edema  Left lower leg: No edema  Skin:     General: Skin is warm and dry  Capillary Refill: Capillary refill takes less than 2 seconds  Findings: No bruising  Neurological:      Mental Status: She is alert and oriented to person, place, and time  Motor: No weakness  Psychiatric:         Mood and Affect: Mood normal          Behavior: Behavior normal           Additional Data:     Labs:  Results from last 7 days   Lab Units 03/03/23 0450 03/02/23  0457   WBC Thousand/uL 15 84* 9 47   HEMOGLOBIN g/dL 12 8 13 0   HEMATOCRIT % 40 3 41 1   PLATELETS Thousands/uL 300 298   NEUTROS PCT %  --  87*   LYMPHS PCT %  --  10*   MONOS PCT %  --  2*   EOS PCT %  --  0     Results from last 7 days   Lab Units 03/03/23  0450 03/02/23  0457 03/01/23  1611   SODIUM mmol/L 136   < > 137   POTASSIUM mmol/L 4 7   < > 4 3   CHLORIDE mmol/L 104   < > 103   CO2 mmol/L 24   < > 27   BUN mg/dL 23   < > 25   CREATININE mg/dL 1 02   < > 1 36*   ANION GAP mmol/L 8   < > 7   CALCIUM mg/dL 9 1   < > 9 1   ALBUMIN g/dL  --   --  4 5   TOTAL BILIRUBIN mg/dL  --   --  0 25   ALK PHOS U/L  --   --  39   ALT U/L  --   --  14   AST U/L  --   --  23   GLUCOSE RANDOM mg/dL 145*   < > 112    < > = values in this interval not displayed       Results from last 7 days   Lab Units 03/01/23  1611   INR  0 98             Results from last 7 days   Lab Units 03/02/23  0457 03/01/23  1611   PROCALCITONIN ng/ml <0 05 <0 05       Lines/Drains:  Invasive Devices     Peripheral Intravenous Line  Duration           Peripheral IV 03/01/23 Distal;Dorsal (posterior); Right Forearm 2 days    Peripheral IV 03/01/23 Right Wrist 2 days                      Imaging: Reviewed radiology reports from this admission including: chest CT scan    Recent Cultures (last 7 days):   Results from last 7 days   Lab Units 03/01/23  2204   LEGIONELLA URINARY ANTIGEN  Negative       Last 24 Hours Medication List:   Current Facility-Administered Medications   Medication Dose Route Frequency Provider Last Rate   • acetaminophen  650 mg Oral Q6H PRN Isaac THAO PotterNP     • ALPRAZolam  0 5 mg Oral BID PRN JAZMINE Gaming     • atorvastatin  40 mg Oral Daily With TuniuJAZMINE     • buprenorphine-naloxone  10 mg Sublingual QAM Manoj Pineda MD     • clopidogrel  75 mg Oral Daily Isaac ArgueTHAONP     • docusate sodium  100 mg Oral BID JAZMINE Arias     • DULoxetine  60 mg Oral Daily Isaac JAZMINE Potter     • enoxaparin  40 mg Subcutaneous Daily Isaac Argue, [address]     • guaiFENesin  600 mg Oral BID Isaac JAZMINE Potter     • ipratropium-albuterol  3 mL Nebulization Q6H Isaac ArgueJAZMINE     • levothyroxine  100 mcg Oral Daily Isaac Argue, [address]     • losartan  25 mg Oral Daily Orange Argue, [address]     • methylPREDNISolone sodium succinate  40 mg Intravenous Q12H [address], JAZMINE     • metoprolol succinate  25 mg Oral Daily Orange Argue, [address]     • nicotine  1 patch Transdermal Daily Maribell Melendez [address], [address]     • pantoprazole  40 mg Oral Early Morning Isaac JAZMINE Potter     • polyethylene glycol  17 g Oral Daily JAZMINE Arias     • senna  2 tablet Oral HS JAZMINE Arias     • traZODone  50 mg Oral HS PRN Orange JAZMINE Potter     • venlafaxine  225 mg Oral Daily Isaac Argue, [address]          Today, Patient Was Seen By: Don Dutta MD    **Please Note: This note may have been constructed using a voice recognition system  **

## 2023-03-04 NOTE — ASSESSMENT & PLAN NOTE
Back at baseline  •   • CT head 3/1: Age-indeterminate infarct in the left frontal lobe for which further evaluation with MRI can be obtained  • Initially presented with encephalopathy suspected 2/2 hypercarbia and hypoxia    • MRI as outpatient  • Frequent neuro checks

## 2023-03-04 NOTE — ASSESSMENT & PLAN NOTE
Improved,    • Continue to Wean FiO2 for SpO2 >90%  • Continuous pulse oximetry  • CT Chest 3/1: Increased density within the trachea for which further evaluation with pulmonary and bronchoscopy is recommended   Multiple pulmonary nodules measuring up to 2 to 3 mm   Based on current Fleischner Society 2017 Guidelines on incidental pulmonary nodule, no routine follow-up is needed if the patient is low risk   If the patient is high risk, optional follow-up chest  CT at 12 months can be considered     • Procal <0 05  • Sputum/Urine culture pending  • Remainder of treatment as per COPD  • Sleep study overnight congruent with nocturnal hypoxia  • Follow-up outpatient pulmonology

## 2023-03-04 NOTE — ASSESSMENT & PLAN NOTE
Hx of CAD, s/p CABG x1 vessel 2016    • Continue Plavix and statin  • EKG NSR with non-specific ST changes  • HS troponin

## 2023-03-05 PROBLEM — I20.8 ANGINA AT REST (HCC): Status: ACTIVE | Noted: 2023-03-05

## 2023-03-05 PROBLEM — I20.89 ANGINA AT REST: Status: ACTIVE | Noted: 2023-03-05

## 2023-03-05 LAB
2HR DELTA HS TROPONIN: 0 NG/L
4HR DELTA HS TROPONIN: 0 NG/L
ALBUMIN SERPL BCP-MCNC: 4.2 G/DL (ref 3.5–5)
ALP SERPL-CCNC: 34 U/L (ref 34–104)
ALT SERPL W P-5'-P-CCNC: 19 U/L (ref 7–52)
AMPHETAMINES SERPL QL SCN: NEGATIVE
ANION GAP SERPL CALCULATED.3IONS-SCNC: 7 MMOL/L (ref 4–13)
ARTERIAL PATENCY WRIST A: YES
AST SERPL W P-5'-P-CCNC: 20 U/L (ref 13–39)
BARBITURATES UR QL: NEGATIVE
BASE EXCESS BLDA CALC-SCNC: -1.7 MMOL/L
BASOPHILS # BLD MANUAL: 0 THOUSAND/UL (ref 0–0.1)
BASOPHILS NFR MAR MANUAL: 0 % (ref 0–1)
BENZODIAZ UR QL: POSITIVE
BILIRUB SERPL-MCNC: 0.32 MG/DL (ref 0.2–1)
BODY TEMPERATURE: 98.5 DEGREES FEHRENHEIT
BUN SERPL-MCNC: 23 MG/DL (ref 5–25)
CALCIUM SERPL-MCNC: 9.7 MG/DL (ref 8.4–10.2)
CARDIAC TROPONIN I PNL SERPL HS: 3 NG/L
CHLORIDE SERPL-SCNC: 100 MMOL/L (ref 96–108)
CO2 SERPL-SCNC: 31 MMOL/L (ref 21–32)
COCAINE UR QL: POSITIVE
CREAT SERPL-MCNC: 0.92 MG/DL (ref 0.6–1.3)
EOSINOPHIL # BLD MANUAL: 0 THOUSAND/UL (ref 0–0.4)
EOSINOPHIL NFR BLD MANUAL: 0 % (ref 0–6)
ERYTHROCYTE [DISTWIDTH] IN BLOOD BY AUTOMATED COUNT: 15.3 % (ref 11.6–15.1)
GFR SERPL CREATININE-BSD FRML MDRD: 62 ML/MIN/1.73SQ M
GLUCOSE SERPL-MCNC: 111 MG/DL (ref 65–140)
HCO3 BLDA-SCNC: 23 MMOL/L (ref 22–28)
HCT VFR BLD AUTO: 41.7 % (ref 34.8–46.1)
HGB BLD-MCNC: 13.3 G/DL (ref 11.5–15.4)
LYMPHOCYTES # BLD AUTO: 15 % (ref 14–44)
LYMPHOCYTES # BLD AUTO: 2.21 THOUSAND/UL (ref 0.6–4.47)
MCH RBC QN AUTO: 30.2 PG (ref 26.8–34.3)
MCHC RBC AUTO-ENTMCNC: 31.9 G/DL (ref 31.4–37.4)
MCV RBC AUTO: 95 FL (ref 82–98)
METHADONE UR QL: NEGATIVE
MONOCYTES # BLD AUTO: 0.89 THOUSAND/UL (ref 0–1.22)
MONOCYTES NFR BLD: 6 % (ref 4–12)
MYELOCYTES NFR BLD MANUAL: 2 % (ref 0–1)
NASAL CANNULA: 2
NEUTROPHILS # BLD MANUAL: 10.77 THOUSAND/UL (ref 1.85–7.62)
NEUTS SEG NFR BLD AUTO: 73 % (ref 43–75)
O2 CT BLDA-SCNC: 17.3 ML/DL (ref 16–23)
OPIATES UR QL SCN: NEGATIVE
OXYCODONE+OXYMORPHONE UR QL SCN: NEGATIVE
OXYHGB MFR BLDA: 90.3 % (ref 94–97)
PCO2 BLDA: 39 MM HG (ref 36–44)
PCO2 TEMP ADJ BLDA: 38.8 MM HG (ref 36–44)
PCP UR QL: NEGATIVE
PH BLD: 7.39 [PH] (ref 7.35–7.45)
PH BLDA: 7.39 [PH] (ref 7.35–7.45)
PLATELET # BLD AUTO: 283 THOUSANDS/UL (ref 149–390)
PLATELET BLD QL SMEAR: ADEQUATE
PMV BLD AUTO: 9 FL (ref 8.9–12.7)
PO2 BLD: 64 MM HG (ref 75–129)
PO2 BLDA: 64.4 MM HG (ref 75–129)
POTASSIUM SERPL-SCNC: 4.7 MMOL/L (ref 3.5–5.3)
PROT SERPL-MCNC: 7.9 G/DL (ref 6.4–8.4)
RBC # BLD AUTO: 4.41 MILLION/UL (ref 3.81–5.12)
RBC MORPH BLD: NORMAL
SODIUM SERPL-SCNC: 138 MMOL/L (ref 135–147)
SPECIMEN SOURCE: ABNORMAL
THC UR QL: NEGATIVE
VARIANT LYMPHS # BLD AUTO: 4 %
WBC # BLD AUTO: 14.76 THOUSAND/UL (ref 4.31–10.16)

## 2023-03-05 RX ORDER — AMLODIPINE BESYLATE 5 MG/1
5 TABLET ORAL DAILY
Status: DISCONTINUED | OUTPATIENT
Start: 2023-03-05 | End: 2023-03-06 | Stop reason: HOSPADM

## 2023-03-05 RX ORDER — PANTOPRAZOLE SODIUM 40 MG/10ML
40 INJECTION, POWDER, LYOPHILIZED, FOR SOLUTION INTRAVENOUS ONCE
Status: COMPLETED | OUTPATIENT
Start: 2023-03-05 | End: 2023-03-05

## 2023-03-05 RX ORDER — HYDRALAZINE HYDROCHLORIDE 20 MG/ML
5 INJECTION INTRAMUSCULAR; INTRAVENOUS EVERY 6 HOURS PRN
Status: DISCONTINUED | OUTPATIENT
Start: 2023-03-05 | End: 2023-03-06 | Stop reason: HOSPADM

## 2023-03-05 RX ORDER — ONDANSETRON 2 MG/ML
4 INJECTION INTRAMUSCULAR; INTRAVENOUS EVERY 6 HOURS PRN
Status: DISCONTINUED | OUTPATIENT
Start: 2023-03-05 | End: 2023-03-06 | Stop reason: HOSPADM

## 2023-03-05 RX ORDER — METOPROLOL TARTRATE 5 MG/5ML
5 INJECTION INTRAVENOUS EVERY 6 HOURS PRN
Status: DISCONTINUED | OUTPATIENT
Start: 2023-03-05 | End: 2023-03-05

## 2023-03-05 RX ORDER — LOSARTAN POTASSIUM 50 MG/1
50 TABLET ORAL DAILY
Status: DISCONTINUED | OUTPATIENT
Start: 2023-03-06 | End: 2023-03-06 | Stop reason: HOSPADM

## 2023-03-05 RX ADMIN — METHYLPREDNISOLONE SODIUM SUCCINATE 40 MG: 40 INJECTION, POWDER, FOR SOLUTION INTRAMUSCULAR; INTRAVENOUS at 08:00

## 2023-03-05 RX ADMIN — SENNOSIDES 17.2 MG: 8.6 TABLET, FILM COATED ORAL at 21:03

## 2023-03-05 RX ADMIN — ALPRAZOLAM 0.5 MG: 0.5 TABLET ORAL at 13:06

## 2023-03-05 RX ADMIN — METOPROLOL TARTRATE 5 MG: 5 INJECTION INTRAVENOUS at 09:12

## 2023-03-05 RX ADMIN — ACETAMINOPHEN 650 MG: 325 TABLET, FILM COATED ORAL at 07:55

## 2023-03-05 RX ADMIN — ENOXAPARIN SODIUM 40 MG: 40 INJECTION SUBCUTANEOUS at 08:00

## 2023-03-05 RX ADMIN — ALPRAZOLAM 0.5 MG: 0.5 TABLET ORAL at 21:03

## 2023-03-05 RX ADMIN — DULOXETINE HYDROCHLORIDE 60 MG: 60 CAPSULE, DELAYED RELEASE ORAL at 08:00

## 2023-03-05 RX ADMIN — ACETAMINOPHEN 650 MG: 325 TABLET, FILM COATED ORAL at 18:08

## 2023-03-05 RX ADMIN — DOCUSATE SODIUM 100 MG: 100 CAPSULE, LIQUID FILLED ORAL at 08:00

## 2023-03-05 RX ADMIN — HYDRALAZINE HYDROCHLORIDE 5 MG: 20 INJECTION INTRAMUSCULAR; INTRAVENOUS at 18:09

## 2023-03-05 RX ADMIN — LOSARTAN POTASSIUM 25 MG: 25 TABLET, FILM COATED ORAL at 08:00

## 2023-03-05 RX ADMIN — METHYLPREDNISOLONE SODIUM SUCCINATE 40 MG: 40 INJECTION, POWDER, FOR SOLUTION INTRAMUSCULAR; INTRAVENOUS at 20:25

## 2023-03-05 RX ADMIN — TRAZODONE HYDROCHLORIDE 50 MG: 50 TABLET ORAL at 21:03

## 2023-03-05 RX ADMIN — IPRATROPIUM BROMIDE AND ALBUTEROL SULFATE 3 ML: 2.5; .5 SOLUTION RESPIRATORY (INHALATION) at 01:30

## 2023-03-05 RX ADMIN — NICOTINE 1 PATCH: 14 PATCH, EXTENDED RELEASE TRANSDERMAL at 08:00

## 2023-03-05 RX ADMIN — PANTOPRAZOLE SODIUM 40 MG: 40 TABLET, DELAYED RELEASE ORAL at 06:15

## 2023-03-05 RX ADMIN — POLYETHYLENE GLYCOL 3350 17 G: 17 POWDER, FOR SOLUTION ORAL at 08:01

## 2023-03-05 RX ADMIN — IPRATROPIUM BROMIDE AND ALBUTEROL SULFATE 3 ML: 2.5; .5 SOLUTION RESPIRATORY (INHALATION) at 08:28

## 2023-03-05 RX ADMIN — PANTOPRAZOLE SODIUM 40 MG: 40 INJECTION, POWDER, FOR SOLUTION INTRAVENOUS at 09:12

## 2023-03-05 RX ADMIN — LEVOTHYROXINE SODIUM 100 MCG: 100 TABLET ORAL at 06:15

## 2023-03-05 RX ADMIN — METOPROLOL SUCCINATE 25 MG: 25 TABLET, EXTENDED RELEASE ORAL at 08:00

## 2023-03-05 RX ADMIN — ONDANSETRON 4 MG: 2 INJECTION INTRAMUSCULAR; INTRAVENOUS at 20:25

## 2023-03-05 RX ADMIN — GUAIFENESIN 600 MG: 600 TABLET, EXTENDED RELEASE ORAL at 17:00

## 2023-03-05 RX ADMIN — VENLAFAXINE HYDROCHLORIDE 225 MG: 75 CAPSULE, EXTENDED RELEASE ORAL at 08:00

## 2023-03-05 RX ADMIN — AMLODIPINE BESYLATE 5 MG: 5 TABLET ORAL at 10:22

## 2023-03-05 RX ADMIN — ATORVASTATIN CALCIUM 40 MG: 40 TABLET, FILM COATED ORAL at 16:56

## 2023-03-05 RX ADMIN — CLOPIDOGREL BISULFATE 75 MG: 75 TABLET ORAL at 08:00

## 2023-03-05 RX ADMIN — IPRATROPIUM BROMIDE AND ALBUTEROL SULFATE 3 ML: 2.5; .5 SOLUTION RESPIRATORY (INHALATION) at 19:58

## 2023-03-05 RX ADMIN — DOCUSATE SODIUM 100 MG: 100 CAPSULE, LIQUID FILLED ORAL at 17:00

## 2023-03-05 RX ADMIN — IPRATROPIUM BROMIDE AND ALBUTEROL SULFATE 3 ML: 2.5; .5 SOLUTION RESPIRATORY (INHALATION) at 13:45

## 2023-03-05 RX ADMIN — MELATONIN TAB 3 MG 3 MG: 3 TAB at 21:03

## 2023-03-05 RX ADMIN — BUPRENORPHINE AND NALOXONE 10 MG: 8; 2 FILM BUCCAL; SUBLINGUAL at 08:00

## 2023-03-05 RX ADMIN — GUAIFENESIN 600 MG: 600 TABLET, EXTENDED RELEASE ORAL at 09:12

## 2023-03-05 NOTE — PLAN OF CARE
Problem: MOBILITY - ADULT  Goal: Maintain or return to baseline ADL function  Description: INTERVENTIONS:  -  Assess patient's ability to carry out ADLs; assess patient's baseline for ADL function and identify physical deficits which impact ability to perform ADLs (bathing, care of mouth/teeth, toileting, grooming, dressing, etc )  - Assess/evaluate cause of self-care deficits   - Assess range of motion  - Assess patient's mobility; develop plan if impaired  - Assess patient's need for assistive devices and provide as appropriate  - Encourage maximum independence but intervene and supervise when necessary  - Involve family in performance of ADLs  - Assess for home care needs following discharge   - Consider OT consult to assist with ADL evaluation and planning for discharge  - Provide patient education as appropriate  3/5/2023 1156 by Baldomero Harper RN  Outcome: Progressing  3/5/2023 1156 by Baldomero Harper RN  Outcome: Progressing  Goal: Maintains/Returns to pre admission functional level  Description: INTERVENTIONS:  - Perform BMAT or MOVE assessment daily    - Set and communicate daily mobility goal to care team and patient/family/caregiver  - Collaborate with rehabilitation services on mobility goals if consulted  - Perform Range of Motion 3 times a day  - Reposition patient every 2 hours    - Dangle patient 3 times a day  - Stand patient 3 times a day  - Ambulate patient 3 times a day  - Out of bed to chair 3 times a day   - Out of bed for meals 3 times a day  - Out of bed for toileting  - Record patient progress and toleration of activity level   3/5/2023 1156 by Baldomero Harper RN  Outcome: Progressing  3/5/2023 1156 by Baldomero Harper RN  Outcome: Progressing     Problem: PAIN - ADULT  Goal: Verbalizes/displays adequate comfort level or baseline comfort level  Description: Interventions:  - Encourage patient to monitor pain and request assistance  - Assess pain using appropriate pain scale  - Administer analgesics based on type and severity of pain and evaluate response  - Implement non-pharmacological measures as appropriate and evaluate response  - Consider cultural and social influences on pain and pain management  - Notify physician/advanced practitioner if interventions unsuccessful or patient reports new pain  3/5/2023 1156 by Gabriela Chaudhary RN  Outcome: Progressing  3/5/2023 1156 by Gabriela Chaudhary RN  Outcome: Progressing     Problem: INFECTION - ADULT  Goal: Absence or prevention of progression during hospitalization  Description: INTERVENTIONS:  - Assess and monitor for signs and symptoms of infection  - Monitor lab/diagnostic results  - Monitor all insertion sites, i e  indwelling lines, tubes, and drains  - Monitor endotracheal if appropriate and nasal secretions for changes in amount and color  - [place] appropriate cooling/warming therapies per order  - Administer medications as ordered  - Instruct and encourage patient and family to use good hand hygiene technique  - Identify and instruct in appropriate isolation precautions for identified infection/condition  3/5/2023 1156 by Gabriela Chaudhary RN  Outcome: Progressing  3/5/2023 1156 by Gabriela Chaudhary RN  Outcome: Progressing     Problem: SAFETY ADULT  Goal: Maintain or return to baseline ADL function  Description: INTERVENTIONS:  -  Assess patient's ability to carry out ADLs; assess patient's baseline for ADL function and identify physical deficits which impact ability to perform ADLs (bathing, care of mouth/teeth, toileting, grooming, dressing, etc )  - Assess/evaluate cause of self-care deficits   - Assess range of motion  - Assess patient's mobility; develop plan if impaired  - Assess patient's need for assistive devices and provide as appropriate  - Encourage maximum independence but intervene and supervise when necessary  - Involve family in performance of ADLs  - Assess for home care needs following discharge   - Consider OT consult to assist with ADL evaluation and planning for discharge  - Provide patient education as appropriate  3/5/2023 1156 by Madina Barone RN  Outcome: Progressing  3/5/2023 1156 by Madina Barone RN  Outcome: Progressing  Goal: Maintains/Returns to pre admission functional level  Description: INTERVENTIONS:  - Perform BMAT or MOVE assessment daily    - Set and communicate daily mobility goal to care team and patient/family/caregiver  - Collaborate with rehabilitation services on mobility goals if consulted  - Perform Range of Motion 3 times a day  - Reposition patient every 2 hours    - Dangle patient 3 times a day  - Stand patient 3 times a day  - Ambulate patient 3 times a day  - Out of bed to chair 3 times a day   - Out of bed for meals 3 times a day  - Out of bed for toileting  - Record patient progress and toleration of activity level   3/5/2023 1156 by Madina Barone RN  Outcome: Progressing  3/5/2023 1156 by Madina Barone RN  Outcome: Progressing  Goal: Patient will remain free of falls  Description: INTERVENTIONS:  - Educate patient/family on patient safety including physical limitations  - Instruct patient to call for assistance with activity   - Consult OT/PT to assist with strengthening/mobility   - Keep Call bell within reach  - Keep bed low and locked with side rails adjusted as appropriate  - Keep care items and personal belongings within reach  - Initiate and maintain comfort rounds  - Make Fall Risk Sign visible to staff  - Offer Toileting every 2 Hours, in advance of need  - Initiate/Maintain bed alarm  - Obtain necessary fall risk management equipment: slipper socks  - Apply yellow socks and bracelet for high fall risk patients  - Consider moving patient to room near nurses station  3/5/2023 1156 by Maidna Barone RN  Outcome: Progressing  3/5/2023 1156 by Madina Barone RN  Outcome: Progressing     Problem: DISCHARGE PLANNING  Goal: Discharge to home or other facility with appropriate resources  Description: INTERVENTIONS:  - Identify barriers to discharge w/patient and caregiver  - Arrange for needed discharge resources and transportation as appropriate  - Identify discharge learning needs (meds, wound care, etc )  - Arrange for interpretive services to assist at discharge as needed  - Refer to Case Management Department for coordinating discharge planning if the patient needs post-hospital services based on physician/advanced practitioner order or complex needs related to functional status, cognitive ability, or social support system  3/5/2023 1156 by Gabriela Chaudhary RN  Outcome: Progressing  3/5/2023 1156 by Gabriela Chaudhary RN  Outcome: Progressing     Problem: Knowledge Deficit  Goal: Patient/family/caregiver demonstrates understanding of disease process, treatment plan, medications, and discharge instructions  Description: Complete learning assessment and assess knowledge base  Interventions:  - Provide teaching at level of understanding  - Provide teaching via preferred learning methods  3/5/2023 1156 by Gabriela Chaudhary RN  Outcome: Progressing  3/5/2023 1156 by Gabriela Chaudhary RN  Outcome: Progressing     Problem: Nutrition/Hydration-ADULT  Goal: Nutrient/Hydration intake appropriate for improving, restoring or maintaining nutritional needs  Description: Monitor and assess patient's nutrition/hydration status for malnutrition  Collaborate with interdisciplinary team and initiate plan and interventions as ordered  Monitor patient's weight and dietary intake as ordered or per policy  Utilize nutrition screening tool and intervene as necessary  Determine patient's food preferences and provide high-protein, high-caloric foods as appropriate       INTERVENTIONS:  - Monitor oral intake, urinary output, labs, and treatment plans  - Assess nutrition and hydration status and recommend course of action  - Evaluate amount of meals eaten  - Assist patient with eating if necessary   - Allow adequate time for meals  - Recommend/ encourage appropriate diets, oral nutritional supplements, and vitamin/mineral supplements  - Order, calculate, and assess calorie counts as needed  - Recommend, monitor, and adjust tube feedings and TPN/PPN based on assessed needs  - Assess need for intravenous fluids  - Provide specific nutrition/hydration education as appropriate  - Include patient/family/caregiver in decisions related to nutrition  3/5/2023 1156 by Franc Marquez RN  Outcome: Progressing  3/5/2023 1156 by Franc Marquez RN  Outcome: Progressing     Problem: RESPIRATORY - ADULT  Goal: Achieves optimal ventilation and oxygenation  Description: INTERVENTIONS:  - Assess for changes in respiratory status  - Assess for changes in mentation and behavior  - Position to facilitate oxygenation and minimize respiratory effort  - Oxygen administered by appropriate delivery if ordered  - Initiate smoking cessation education as indicated  - Encourage broncho-pulmonary hygiene including cough, deep breathe, Incentive Spirometry  - Assess the need for suctioning and aspirate as needed  - Assess and instruct to report SOB or any respiratory difficulty  - Respiratory Therapy support as indicated  3/5/2023 1156 by Franc Marquez RN  Outcome: Progressing  3/5/2023 1156 by Franc Marquez RN  Outcome: Progressing

## 2023-03-05 NOTE — CONSULTS
Consultation - Cardiology   Angella Maldonado 71 y o  female MRN: [id number]  Unit/Bed#: [address] Encounter: [id number]    Assessment/Plan     Assessment:  1  Chest pain: Noncardiac  2  Hypertension  3  COPD  4  Coronary artery disease with history of CABG x1, SVG to the RCA  5  History of ascending  thoracic aortic aneurysm status postrepair in 2016  6  Acute kidney injury      Plan:  Patient has been admitted to the hospital service  1  High-sensitivity troponins x2 have been 3 and 3, twelve-lead EKG is negative for any acute changes  2   Patient noted to have extremely high blood pressure today, Norvasc 5 mg added for better blood pressure control  3   Lexiscan nuclear stress test performed last month by her primary cardiologist was a nonischemic study    4  No further cardiac testing    Patient appears to be stable from a cardiac standpoint  We will follow in the periphery during this hospitalization  History of Present Illness   Physician Requesting Consult: Radha Covarrubias MD  Reason for Consult / Principal Problem: Chest pain, uncontrolled hypertension      HPI: Angella Maldonado is a 71y o  year old female who was initially admitted on 3/1/2023 with complaints of shortness of breath, lethargy and confusion  She was admitted to the intensive care unit on BiPAP and after receiving a 1 hour-long nebjet treatment, she had great improvement in her breathing  Allyson Cushing She was downgraded to the floor on 3/2/2023  This morning patient noted midsternal chest heaviness without radiation  At that time blood pressure was 180/103  She states this is unusual for her     Twelve-lead EKG performed at that time demonstrated sinus rhythm without acute changes  First high-sensitivity troponin is 3  She was given as needed Lopressor 5 mg IV with improvement in her blood pressure and also now she notes her chest discomfort is almost resolved      Patient has a history of coronary artery disease and a sending aortic aneurysm patient underwent repair of thoracic aortic aneurysm and CABG x1 with SVG to the RCA in 2016  Fili Linda Other history is for hypertension, COPD, chronic kidney disease, chronic pain disorder for which she is opioid dependent, dyslipidemia and fall  Patient had left femoral fracture for which she has had intramedullary rodding  Patient follows with Dr Ty R[facility]os at [facility]  She did have a Lexiscan nuclear stress test on February 9, 2023 which demonstrated no evidence of myocardial ischemia or scar  Inpatient consult to Cardiology  Consult performed by: JAZMINE Irving  Consult ordered by: Dionicio Boston MD          Review of Systems   Constitutional: Negative  Negative for activity change, appetite change and fatigue  HENT: Negative  Negative for congestion, ear discharge, mouth sores, sinus pressure and tinnitus  Eyes: Negative  Negative for photophobia and visual disturbance  Respiratory: Positive for cough, chest tightness and shortness of breath  Cardiovascular: Positive for chest pain  Gastrointestinal: Negative  Negative for abdominal distention, constipation, diarrhea, nausea and vomiting  Endocrine: Negative  Negative for polydipsia, polyphagia and polyuria  Genitourinary: Negative  Negative for difficulty urinating  Musculoskeletal: Positive for gait problem  Ambulates with walker   Skin: Negative  Neurological: Negative for dizziness, syncope, weakness and light-headedness  Hematological: Negative  Psychiatric/Behavioral: Negative          Historical Information   Past Medical History:   Diagnosis Date   • COPD (chronic obstructive pulmonary disease) ([facility] [address] )    • Disease of thyroid gland    • Hypertension      Past Surgical History:   Procedure Laterality Date   • CARDIAC SURGERY     • HIP SURGERY     • ORTHOPEDIC SURGERY     • THORACIC AORTIC ANEURYSM REPAIR  09/2016    ascending thoracic aortic repair with RCA bypass with SVG x 1 Social History     Substance and Sexual Activity   Alcohol Use Not Currently     Social History     Substance and Sexual Activity   Drug Use Yes   • Types: Marijuana, Cocaine     E-Cigarette/Vaping   • E-Cigarette Use Never User      E-Cigarette/Vaping Substances     Social History     Tobacco Use   Smoking Status Every Day   • Packs/day: 0 50   • Types: Cigarettes   Smokeless Tobacco Never     Family History: History reviewed  No pertinent family history      Meds/Allergies   all current active meds have been reviewed, current meds:   Current Facility-Administered Medications   Medication Dose Route Frequency   • acetaminophen (TYLENOL) tablet 650 mg  650 mg Oral Q6H PRN   • ALPRAZolam (XANAX) tablet 0 5 mg  0 5 mg Oral BID PRN   • amLODIPine (NORVASC) tablet 5 mg  5 mg Oral Daily   • atorvastatin (LIPITOR) tablet 40 mg  40 mg Oral Daily With Dinner   • buprenorphine-naloxone (Suboxone) film 10 mg  10 mg Sublingual QAM   • clopidogrel (PLAVIX) tablet 75 mg  75 mg Oral Daily   • docusate sodium (COLACE) capsule 100 mg  100 mg Oral BID   • DULoxetine (CYMBALTA) delayed release capsule 60 mg  60 mg Oral Daily   • enoxaparin (LOVENOX) subcutaneous injection 40 mg  40 mg Subcutaneous Daily   • guaiFENesin (MUCINEX) 12 hr tablet 600 mg  600 mg Oral BID   • ipratropium-albuterol (DUO-NEB) 0 5-2 5 mg/3 mL inhalation solution 3 mL  3 mL Nebulization Q6H   • levothyroxine tablet 100 mcg  100 mcg Oral Daily   • losartan (COZAAR) tablet 25 mg  25 mg Oral Daily   • melatonin tablet 3 mg  3 mg Oral HS   • methylPREDNISolone sodium succinate (Solu-MEDROL) injection 40 mg  40 mg Intravenous Q12H TESSA   • metoprolol (LOPRESSOR) injection 5 mg  5 mg Intravenous Q6H PRN   • metoprolol succinate (TOPROL-XL) 24 hr tablet 25 mg  25 mg Oral Daily   • nicotine (NICODERM CQ) 14 mg/24hr TD 24 hr patch 1 patch  1 patch Transdermal Daily   • pantoprazole (PROTONIX) EC tablet 40 mg  40 mg Oral Early Morning   • polyethylene glycol (MIRALAX) packet 17 g  17 g Oral Daily   • senna (SENOKOT) tablet 17 2 mg  2 tablet Oral HS   • traZODone (DESYREL) tablet 50 mg  50 mg Oral HS PRN   • venlafaxine (EFFEXOR-XR) 24 hr capsule 225 mg  225 mg Oral Daily    and PTA meds:   Prior to Admission Medications   Prescriptions Last Dose Informant Patient Reported? Taking? ALPRAZolam (XANAX) 0 5 mg tablet 2/28/2023 at 1930  Yes Yes   Sig: Take by mouth 3 (three) times a day as needed for anxiety   DULoxetine (CYMBALTA) 60 mg delayed release capsule 3/1/2023 at 0900  Yes Yes   Sig: Take 60 mg by mouth daily   atorvastatin (LIPITOR) 40 mg tablet 3/1/2023 at 0900  Yes Yes   Sig: Take 40 mg by mouth daily   buprenorphine-naloxone (SUBOXONE) 8-2 mg per SL tablet 2/28/2023 at 1930  Yes Yes   Sig: Place 1 tablet under the tongue 2 (two) times a day   celecoxib (CeleBREX) 200 mg capsule 3/1/2023 at 0900  Yes Yes   Sig: Take 200 mg by mouth daily   clopidogrel (PLAVIX) 75 mg tablet 3/1/2023 at 0900  Yes Yes   Sig: Take 75 mg by mouth daily   gabapentin (NEURONTIN) 300 mg capsule Not Taking  Yes No   Sig: Take 300 mg by mouth 3 (three) times a day   Patient not taking: Reported on 3/1/2023   levothyroxine 100 mcg tablet 3/1/2023 at 0900  Yes Yes   Sig: Take 100 mcg by mouth daily   losartan (COZAAR) 25 mg tablet 3/1/2023 at 0900  Yes Yes   Sig: Take 25 mg by mouth daily   metoprolol succinate (TOPROL-XL) 25 mg 24 hr tablet 3/1/2023 at 0900  Yes Yes   Sig: Take 25 mg by mouth daily   omeprazole (PriLOSEC) 40 MG capsule 3/1/2023  Yes Yes   Sig: Take 40 mg by mouth daily   traMADol (ULTRAM) 50 mg tablet 3/1/2023 at 0900  Yes Yes   Sig: Take 50 mg by mouth every 6 (six) hours as needed for moderate pain   venlafaxine (EFFEXOR-XR) 150 mg 24 hr capsule 2/28/2023 at 1930  Yes Yes   Sig: Take 225 mg by mouth daily      Facility-Administered Medications: None     No Known Allergies    Objective   Vitals: Blood pressure 141/80, pulse 62, temperature 97 8 °F (36 6 °C), resp   rate 20, height 5' 4" (1 626 m), weight 85 1 kg (187 lb 9 6 oz), SpO2 91 %  Orthostatic Blood Pressures    Flowsheet Row Most Recent Value   Blood Pressure 141/80 filed at 03/05/2023 0940   Patient Position - Orthostatic VS Lying filed at 03/04/2023 2134            Intake/Output Summary (Last 24 hours) at 3/5/2023 0951  Last data filed at 3/5/2023 0626  Gross per 24 hour   Intake 1340 ml   Output 500 ml   Net 840 ml       Invasive Devices     Peripheral Intravenous Line  Duration           Peripheral IV 03/01/23 Distal;Dorsal (posterior); Right Forearm 3 days    Peripheral IV 03/01/23 Right Wrist 3 days                Physical Exam  Vitals and nursing note reviewed  Constitutional:       General: She is not in acute distress  Appearance: Normal appearance  She is obese  HENT:      Right Ear: External ear normal       Left Ear: External ear normal    Eyes:      General: No scleral icterus  Right eye: No discharge  Left eye: No discharge  Cardiovascular:      Rate and Rhythm: Normal rate and regular rhythm  Pulses: Normal pulses  Heart sounds: Normal heart sounds  No murmur heard  Pulmonary:      Effort: Pulmonary effort is normal  No accessory muscle usage or respiratory distress  Breath sounds: Examination of the right-lower field reveals decreased breath sounds  Examination of the left-lower field reveals decreased breath sounds  Decreased breath sounds present  Abdominal:      General: Bowel sounds are normal  There is no distension  Palpations: Abdomen is soft  Musculoskeletal:      Right lower leg: No edema  Left lower leg: No edema  Skin:     General: Skin is warm and dry  Capillary Refill: Capillary refill takes less than 2 seconds  Neurological:      General: No focal deficit present  Mental Status: She is alert and oriented to person, place, and time  Mental status is at baseline     Psychiatric:         Mood and Affect: Mood normal          Lab Results: I have personally reviewed pertinent lab results  CBC with diff:   Results from last 7 days   Lab Units 03/05/23  0830   WBC Thousand/uL 14 76*   RBC Million/uL 4 41   HEMOGLOBIN g/dL 13 3   HEMATOCRIT % 41 7   MCV fL 95   MCH pg 30 2   MCHC g/dL 31 9   RDW % 15 3*   MPV fL 9 0   PLATELETS Thousands/uL 283     CMP:   Results from last 7 days   Lab Units 03/05/23  0830   SODIUM mmol/L 138   CHLORIDE mmol/L 100   CO2 mmol/L 31   BUN mg/dL 23   CREATININE mg/dL 0 92   CALCIUM mg/dL 9 7   AST U/L 20   ALT U/L 19   ALK PHOS U/L 34   EGFR ml/min/1 73sq m 62     HS Troponin:   0   Lab Value Date/Time    HSTNI0 3 03/05/2023 0830    HSTNI2 <2 03/01/2023 2024    HSTNI4 2 03/01/2023 2119     BNP:   Results from last 7 days   Lab Units 03/05/23  0830   POTASSIUM mmol/L 4 7   CHLORIDE mmol/L 100   CO2 mmol/L 31   BUN mg/dL 23   CREATININE mg/dL 0 92   CALCIUM mg/dL 9 7   EGFR ml/min/1 73sq m 62     Coags:   Results from last 7 days   Lab Units 03/01/23  1611   PTT seconds 45*   INR  0 98     TSH:     Magnesium:   Results from last 7 days   Lab Units 03/03/23  0450   MAGNESIUM mg/dL 2 3     Lipid Profile:     Imaging: I have personally reviewed pertinent reports      EKG: Lead EKG performed this morning while patient was complaining of chest pain was sinus rhythm without any acute changes  VTE Prophylaxis: Sequential compression device Aldair Velez     Code Status: Level 1 - Full Code  Advance Directive and Living Will:      Power of :    POLST:      Rory  Cardiology

## 2023-03-05 NOTE — ASSESSMENT & PLAN NOTE
Significant cardiac history: CABG x1, CAD, polysubstance abuse    · EKG-NSR,   · trops pending,  · UDS-positive for cocaine  · Cardiology recs: recently nonischemic nuclear done in 2/2023 with EF 69% and was read as low risk scan   BP- Could be better controlled   had ascending aortic aneurysm

## 2023-03-05 NOTE — PLAN OF CARE
Problem: MOBILITY - ADULT  Goal: Maintains/Returns to pre admission functional level  Description: INTERVENTIONS:  - Perform BMAT or MOVE assessment daily    - Set and communicate daily mobility goal to care team and patient/family/caregiver  - Collaborate with rehabilitation services on mobility goals if consulted  - Perform Range of Motion 3 times a day  - Actively turns self     - Dangle patient  3 times a day  - Stand patient 3 times a day  - Ambulate patient 3 times a day  - Out of bed to chair 3 times a day   - Out of bed for meals 3 times a day  - Out of bed for toileting  - Record patient progress and toleration of activity level   Outcome: Progressing     Problem: PAIN - ADULT  Goal: Verbalizes/displays adequate comfort level or baseline comfort level  Description: Interventions:  - Encourage patient to monitor pain and request assistance  - Assess pain using appropriate pain scale  - Administer analgesics based on type and severity of pain and evaluate response  - Implement non-pharmacological measures as appropriate and evaluate response  - Consider cultural and social influences on pain and pain management  - Notify physician/advanced practitioner if interventions unsuccessful or patient reports new pain  Outcome: Progressing     Problem: INFECTION - ADULT  Goal: Absence or prevention of progression during hospitalization  Description: INTERVENTIONS:  - Assess and monitor for signs and symptoms of infection  - Monitor lab/diagnostic results  - Monitor all insertion sites, i e  indwelling lines, tubes, and drains  - Monitor endotracheal if appropriate and nasal secretions for changes in amount and color  - [place] appropriate cooling/warming therapies per order  - Administer medications as ordered  - Instruct and encourage patient and family to use good hand hygiene technique  - Identify and instruct in appropriate isolation precautions for identified infection/condition  Outcome: Progressing Problem: SAFETY ADULT  Goal: Maintains/Returns to pre admission functional level  Description: INTERVENTIONS:  - Perform BMAT or MOVE assessment daily    - Set and communicate daily mobility goal to care team and patient/family/caregiver  - Collaborate with rehabilitation services on mobility goals if consulted  - Perform Range of Motion 3 times a day  - Actively turns self     - Dangle patient 3 times a day  - Stand patient 3 times a day  - Ambulate patient 3 times a day  - Out of bed to chair 3 times a day   - Out of bed for meals 3 times a day  - Out of bed for toileting  - Record patient progress and toleration of activity level   Outcome: Progressing     Problem: SAFETY ADULT  Goal: Patient will remain free of falls  Description: INTERVENTIONS:  -  Assess patient's ability to carry out ADLs; assess patient's baseline for ADL function and identify physical deficits which impact ability to perform ADLs (bathing, care of mouth/teeth, toileting, grooming, dressing, etc )  - Assess/evaluate cause of self-care deficits   - Assess range of motion  - Assess patient's mobility; develop plan if impaired  - Assess patient's need for assistive devices and provide as appropriate  - Encourage maximum independence but intervene and supervise when necessary  - Involve family in performance of ADLs  - Assess for home care needs following discharge   - Consider OT consult to assist with ADL evaluation and planning for discharge  - Provide patient education as appropriate  Outcome: Progressing     Problem: DISCHARGE PLANNING  Goal: Discharge to home or other facility with appropriate resources  Description: INTERVENTIONS:  - Identify barriers to discharge w/patient and caregiver  - Arrange for needed discharge resources and transportation as appropriate  - Identify discharge learning needs (meds, wound care, etc )  - Arrange for interpretive services to assist at discharge as needed  - Refer to Case Management Department for coordinating discharge planning if the patient needs post-hospital services based on physician/advanced practitioner order or complex needs related to functional status, cognitive ability, or social support system  Outcome: Progressing     Problem: Knowledge Deficit  Goal: Patient/family/caregiver demonstrates understanding of disease process, treatment plan, medications, and discharge instructions  Description: Complete learning assessment and assess knowledge base  Interventions:  - Provide teaching at level of understanding  - Provide teaching via preferred learning methods  Outcome: Progressing     Problem: Nutrition/Hydration-ADULT  Goal: Nutrient/Hydration intake appropriate for improving, restoring or maintaining nutritional needs  Description: Monitor and assess patient's nutrition/hydration status for malnutrition  Collaborate with interdisciplinary team and initiate plan and interventions as ordered  Monitor patient's weight and dietary intake as ordered or per policy  Utilize nutrition screening tool and intervene as necessary  Determine patient's food preferences and provide high-protein, high-caloric foods as appropriate       INTERVENTIONS:  - Monitor oral intake, urinary output, labs, and treatment plans  - Assess nutrition and hydration status and recommend course of action  - Evaluate amount of meals eaten  - Assist patient with eating if necessary   - Allow adequate time for meals  - Recommend/ encourage appropriate diets, oral nutritional supplements, and vitamin/mineral supplements  - Order, calculate, and assess calorie counts as needed  - Recommend, monitor, and adjust tube feedings and TPN/PPN based on assessed needs  - Assess need for intravenous fluids  - Provide specific nutrition/hydration education as appropriate  - Include patient/family/caregiver in decisions related to nutrition  Outcome: Progressing     Problem: RESPIRATORY - ADULT  Goal: Achieves optimal ventilation and oxygenation  Description: INTERVENTIONS:  - Assess for changes in respiratory status  - Assess for changes in mentation and behavior  - Position to facilitate oxygenation and minimize respiratory effort  - Oxygen administered by appropriate delivery if ordered  - Initiate smoking cessation education as indicated  - Encourage broncho-pulmonary hygiene including cough, deep breathe, Incentive Spirometry  - Assess the need for suctioning and aspirate as needed  - Assess and instruct to report SOB or any respiratory difficulty  - Respiratory Therapy support as indicated  Outcome: Progressing

## 2023-03-05 NOTE — PROGRESS NOTES
Rose Mary 46  Progress Note Meño Salazar 1950, 71 y o  female MRN: [id number]  Unit/Bed#: [address] [address] Encounter: [id number]  Primary Care Provider: Catherine Ramires   Date and time admitted to hospital: 3/1/2023  3:46 PM    * Acute respiratory failure with hypoxia and hypercapnia (HCC)  Assessment & Plan  Improved,    • Continue to Wean FiO2 for SpO2 >90%  • Continuous pulse oximetry  • CT Chest 3/1: Increased density within the trachea for which further evaluation with pulmonary and bronchoscopy is recommended  Multiple pulmonary nodules measuring up to 2 to 3 mm   Based on current Fleischner Society 2017 Guidelines on incidental pulmonary nodule, no routine follow-up is needed if the patient is low risk   If the patient is high risk, optional follow-up chest  CT at 12 months can be considered     • Procal <0 05  • Sputum/Urine culture pending  • Remainder of treatment as per COPD  • Sleep study overnight congruent with nocturnal hypoxia  • Follow-up outpatient pulmonology    Pulmonary nodules  Assessment & Plan  • CT Chest 3/1: Increased density within the trachea for which further evaluation with pulmonary and bronchoscopy is recommended   Multiple pulmonary nodules measuring up to 2 to 3 mm   Based on current Fleischner Society 2017 Guidelines on incidental pulmonary nodule, no routine follow-up is needed if the patient is low risk   If the patient is high risk, optional follow-up chest  CT at 12 months can be considered     • Per Care Everywhere, CT angio 12/16/21:  Stable 5 mm right upper lobe nodule for which imaging follow-up per Fleischner guidelines is recommended  • F/u OP with serial imaging as appropriate    Angina at rest [facility])  Assessment & Plan  Significant cardiac history: CABG x1, CAD, polysubstance abuse    · EKG-NSR,   · trops pending,  · UDS-positive for cocaine  · cardiology following  · Doubled Protonix      Opioid dependence ([address] )  Assessment & Plan  Hx of lumbosacral stenosis and chronic pain  • Previously on Tramadol  PDMP reviewed, continue Suboxone 8-2 BID as prescribed  • Patient request to wean suboxone down on her own  To 12mg daily in AM rather than 8mg BID  Chronic obstructive pulmonary disease with acute exacerbation [facility])  Assessment & Plan  Clinical diagnosis, follow-up outpatient for Gold staging    • DuoNebs, Solu-Medrol, Mucinex  • Sputum culture-pending  • OP pulmonology follow-up  • Smoking cessation    Cocaine abuse [facility])  Assessment & Plan  Patient reported use PTA,    Patient reported cocaine occasionally  Her  is aware of her use but the patient requests this information be kept confidential and her daughter not be notified  • Monitor for S&S of withdrawal    • UDS-positive on this admission    Cerebral infarct [facility])  Assessment & Plan  Back at baseline  •   • CT head 3/1: Age-indeterminate infarct in the left frontal lobe for which further evaluation with MRI can be obtained  • Initially presented with encephalopathy suspected 2/2 hypercarbia and hypoxia  • Frequent neuro checks    Nicotine abuse  Assessment & Plan  Patch in place, encourage cessation    Anxiety  Assessment & Plan  Hx of anxiety    • Continue home xanax, duloxetine, venlafaxine    Centrilobular emphysema ([address] )  Assessment & Plan  Hx of Centrilobar emphysema    • OP meds include only PRN albuterol     • Solumedrol, Mucinex, Duonebs  • Smoking cessation reinforced  • F/u with Pulmonology on d/c    Hx of CABG x1  Assessment & Plan  • Hx CABG in 2016  • Continue home plavix    CAD (coronary artery disease)  Assessment & Plan  Hx of CAD, s/p CABG x1 vessel 2016    • Continue Plavix  • EKG NSR with non-specific ST changes  • HS troponin       Hypothyroid  Assessment & Plan  Continue home levothyroxine 100 mcg    Hypertension  Assessment & Plan  • Continue home losartan 25 mg and metoprolol 25 mg she had needs right now acute           VTE Pharmacologic Prophylaxis: VTE Score: 4 Moderate Risk (Score 3-4) - Pharmacological DVT Prophylaxis Ordered: enoxaparin (Lovenox)  Patient Centered Rounds: I performed bedside rounds with nursing staff today  Discussions with Specialists or Other Care Team Provider: ccu    Education and Discussions with Family / Patient: Patient declined call to   Total Time Spent on Date of Encounter in care of patient: 45 minutes This time was spent on one or more of the following: performing physical exam; counseling and coordination of care; obtaining or reviewing history; documenting in the medical record; reviewing/ordering tests, medications or procedures; communicating with other healthcare professionals and discussing with patient's family/caregivers  Current Length of Stay: 4 day(s)  Current Patient Status: Inpatient   Certification Statement: The patient will continue to require additional inpatient hospital stay due to Clinical course  Discharge Plan: Anticipate discharge in 24-48 hrs to discharge location to be determined pending rehab evaluations  Code Status: Level 1 - Full Code    Subjective:   Patient seen and examined at bedside with father present, patient reported chest pain unresolved, history of CABG followed by outpatient cardiology, does not take nitro, reported taking 80 mg of Protonix outpatient and only getting 40 mg now  Patient reported history of aneurysm via outpatient cardiologist   Patient confirmed use of cocaine PTA about 5 days ago, informed UDS, patient requested additional benzos and informed that cannot be given due to positive cocaine  Patient counseled extensively about illegal substance use, cocaine use with potential complications of sudden death, worsening arrhythmias and significance of prior aortic aneurysm      Objective:     Vitals:   Temp (24hrs), Av 9 °F (36 6 °C), Min:97 8 °F (36 6 °C), Max:98 2 °F (36 8 °C)    Temp:  [97 8 °F (36 6 °C)-98 2 °F (36 8 °C)] 98 2 °F (36 8 °C)  HR: [62-75] 72  Resp:  [17-22] 18  BP: (141-182)/() 182/105  SpO2:  [87 %-97 %] 91 %  Body mass index is 32 2 kg/m²  Input and Output Summary (last 24 hours): Intake/Output Summary (Last 24 hours) at 3/5/2023 1545  Last data filed at 3/5/2023 1438  Gross per 24 hour   Intake 1100 ml   Output 875 ml   Net 225 ml       Physical Exam:   Physical Exam  Vitals and nursing note reviewed  Constitutional:       General: She is not in acute distress  Appearance: She is well-developed  HENT:      Head: Normocephalic and atraumatic  Eyes:      Conjunctiva/sclera: Conjunctivae normal    Cardiovascular:      Rate and Rhythm: Normal rate and regular rhythm  Heart sounds: No murmur heard  No friction rub  No gallop  Pulmonary:      Effort: Pulmonary effort is normal  No respiratory distress  Breath sounds: Normal breath sounds  No stridor  No wheezing, rhonchi or rales  Abdominal:      Palpations: Abdomen is soft  Tenderness: There is no abdominal tenderness  There is no guarding or rebound  Musculoskeletal:         General: No swelling or tenderness  Cervical back: Neck supple  Right lower leg: No edema  Left lower leg: No edema  Skin:     General: Skin is warm and dry  Capillary Refill: Capillary refill takes less than 2 seconds  Findings: No bruising  Neurological:      Mental Status: She is alert and oriented to person, place, and time  Motor: No weakness     Psychiatric:         Mood and Affect: Mood normal          Behavior: Behavior normal           Additional Data:     Labs:  Results from last 7 days   Lab Units 03/05/23  0830 03/03/23  0450 03/02/23  0457   WBC Thousand/uL 14 76*   < > 9 47   HEMOGLOBIN g/dL 13 3   < > 13 0   HEMATOCRIT % 41 7   < > 41 1   PLATELETS Thousands/uL 283   < > 298   NEUTROS PCT %  --   --  87*   LYMPHS PCT %  --   --  10*   LYMPHO PCT % 15  --   --    MONOS PCT %  --   --  2*   MONO PCT % 6  --   --    EOS PCT % 0  -- 0    < > = values in this interval not displayed  Results from last 7 days   Lab Units 03/05/23  0830   SODIUM mmol/L 138   POTASSIUM mmol/L 4 7   CHLORIDE mmol/L 100   CO2 mmol/L 31   BUN mg/dL 23   CREATININE mg/dL 0 92   ANION GAP mmol/L 7   CALCIUM mg/dL 9 7   ALBUMIN g/dL 4 2   TOTAL BILIRUBIN mg/dL 0 32   ALK PHOS U/L 34   ALT U/L 19   AST U/L 20   GLUCOSE RANDOM mg/dL 111     Results from last 7 days   Lab Units 03/01/23  1611   INR  0 98             Results from last 7 days   Lab Units 03/02/23  0457 03/01/23  1611   PROCALCITONIN ng/ml <0 05 <0 05       Lines/Drains:  Invasive Devices     Peripheral Intravenous Line  Duration           Peripheral IV 03/01/23 Distal;Dorsal (posterior); Right Forearm 3 days    Peripheral IV 03/01/23 Right Wrist 3 days                      Imaging: Reviewed radiology reports from this admission including: chest CT scan    Recent Cultures (last 7 days):   Results from last 7 days   Lab Units 03/04/23  1535 03/01/23  2204   SPUTUM CULTURE  Culture too young- will reincubate  --    GRAM STAIN RESULT  3+ Polys*  1+ Gram positive cocci in pairs*  --    LEGIONELLA URINARY ANTIGEN   --  Negative       Last 24 Hours Medication List:   Current Facility-Administered Medications   Medication Dose Route Frequency Provider Last Rate   • acetaminophen  650 mg Oral Q6H PRN [facility], CRNP     • ALPRAZolam  0 5 mg Oral BID PRN Param Elmore, JAZMINE     • amLODIPine  5 mg Oral Daily JAZMINE Goldman     • atorvastatin  40 mg Oral Daily With LifestreamsJAZMINE     • buprenorphine-naloxone  10 mg Sublingual CORY Ellis MD     • clopidogrel  75 mg Oral Daily [facility], JAZMINE     • docusate sodium  100 mg Oral BID JAZMINE Arias     • DULoxetine  60 mg Oral Daily [facility], JAZMINE     • enoxaparin  40 mg Subcutaneous Daily [facility], [address]     • guaiFENesin  600 mg Oral BID [facility], THAONP     • hydrALAZINE  5 mg Intravenous Q6H PRN JAZMINE Richter     • ipratropium-albuterol  3 mL Nebulization Q6H JAZMINE Elizondo     • levothyroxine  100 mcg Oral Daily Eddi Pennington [place], [place]     • [START ON 3/6/2023] losartan  50 mg Oral Daily Tito Saldana MD     • melatonin  3 mg Oral HS JAZMINE Arias     • methylPREDNISolone sodium succinate  40 mg Intravenous Q12H [address] [place] JAZMINE Reed     • metoprolol succinate  25 mg Oral Daily Eveline Lindsay [place]     • nicotine  1 patch Transdermal Daily Eddi Pennington [place], [place]     • pantoprazole  40 mg Oral Early Morning JAZMINE Escamilla     • polyethylene glycol  17 g Oral Daily JAZMINE Arias     • senna  2 tablet Oral HS JAZMINE Arias     • traZODone  50 mg Oral HS PRN JAZMINE Escamilla     • venlafaxine  225 mg Oral Daily JAZMINE Escamilla          Today, Patient Was Seen By: Idalmis Rivas MD    **Please Note: This note may have been constructed using a voice recognition system  **

## 2023-03-06 VITALS
DIASTOLIC BLOOD PRESSURE: 93 MMHG | WEIGHT: 184.4 LBS | HEIGHT: 64 IN | RESPIRATION RATE: 18 BRPM | SYSTOLIC BLOOD PRESSURE: 147 MMHG | OXYGEN SATURATION: 87 % | BODY MASS INDEX: 31.48 KG/M2 | HEART RATE: 76 BPM | TEMPERATURE: 97.8 F

## 2023-03-06 PROBLEM — J96.02 ACUTE RESPIRATORY FAILURE WITH HYPOXIA AND HYPERCAPNIA (HCC): Status: RESOLVED | Noted: 2023-03-01 | Resolved: 2023-03-06

## 2023-03-06 PROBLEM — G89.4 CHRONIC PAIN SYNDROME: Status: ACTIVE | Noted: 2023-03-06

## 2023-03-06 PROBLEM — G89.29 CHRONIC PAIN: Status: ACTIVE | Noted: 2023-03-06

## 2023-03-06 PROBLEM — J96.01 ACUTE RESPIRATORY FAILURE WITH HYPOXIA AND HYPERCAPNIA (HCC): Status: RESOLVED | Noted: 2023-03-01 | Resolved: 2023-03-06

## 2023-03-06 LAB
ATRIAL RATE: 63 BPM
P AXIS: 65 DEGREES
PR INTERVAL: 150 MS
QRS AXIS: 24 DEGREES
QRSD INTERVAL: 94 MS
QT INTERVAL: 412 MS
QTC INTERVAL: 421 MS
T WAVE AXIS: 65 DEGREES
VENTRICULAR RATE: 63 BPM

## 2023-03-06 RX ORDER — NICOTINE 21 MG/24HR
1 PATCH, TRANSDERMAL 24 HOURS TRANSDERMAL DAILY
Qty: 28 PATCH | Refills: 1 | Status: SHIPPED | OUTPATIENT
Start: 2023-03-06 | End: 2023-03-10

## 2023-03-06 RX ORDER — PREDNISONE 20 MG/1
40 TABLET ORAL DAILY
Status: DISCONTINUED | OUTPATIENT
Start: 2023-03-07 | End: 2023-03-06 | Stop reason: HOSPADM

## 2023-03-06 RX ORDER — GUAIFENESIN 600 MG/1
600 TABLET, EXTENDED RELEASE ORAL 2 TIMES DAILY
Qty: 10 TABLET | Refills: 0 | Status: SHIPPED | OUTPATIENT
Start: 2023-03-06

## 2023-03-06 RX ORDER — AMLODIPINE BESYLATE 5 MG/1
5 TABLET ORAL DAILY
Qty: 30 TABLET | Refills: 1 | Status: SHIPPED | OUTPATIENT
Start: 2023-03-07

## 2023-03-06 RX ORDER — LOSARTAN POTASSIUM 50 MG/1
50 TABLET ORAL DAILY
Qty: 30 TABLET | Refills: 1 | Status: SHIPPED | OUTPATIENT
Start: 2023-03-07

## 2023-03-06 RX ORDER — PREDNISONE 20 MG/1
TABLET ORAL
Qty: 16 TABLET | Refills: 0 | Status: SHIPPED | OUTPATIENT
Start: 2023-03-07 | End: 2023-03-19

## 2023-03-06 RX ORDER — LANOLIN ALCOHOL/MO/W.PET/CERES
3 CREAM (GRAM) TOPICAL
Qty: 30 TABLET | Refills: 0 | Status: SHIPPED | OUTPATIENT
Start: 2023-03-06

## 2023-03-06 RX ORDER — BUPRENORPHINE AND NALOXONE 2; .5 MG/1; MG/1
2 FILM, SOLUBLE BUCCAL; SUBLINGUAL ONCE
Status: COMPLETED | OUTPATIENT
Start: 2023-03-06 | End: 2023-03-06

## 2023-03-06 RX ORDER — GABAPENTIN 300 MG/1
300 CAPSULE ORAL 3 TIMES DAILY
Qty: 90 CAPSULE | Refills: 1 | Status: SHIPPED | OUTPATIENT
Start: 2023-03-06

## 2023-03-06 RX ADMIN — BUPRENORPHINE AND NALOXONE 2 MG: 2; .5 FILM BUCCAL; SUBLINGUAL at 10:38

## 2023-03-06 RX ADMIN — BUPRENORPHINE AND NALOXONE 10 MG: 8; 2 FILM BUCCAL; SUBLINGUAL at 08:41

## 2023-03-06 RX ADMIN — LEVOTHYROXINE SODIUM 100 MCG: 100 TABLET ORAL at 06:19

## 2023-03-06 RX ADMIN — PANTOPRAZOLE SODIUM 40 MG: 40 TABLET, DELAYED RELEASE ORAL at 06:19

## 2023-03-06 RX ADMIN — IPRATROPIUM BROMIDE AND ALBUTEROL SULFATE 3 ML: 2.5; .5 SOLUTION RESPIRATORY (INHALATION) at 13:43

## 2023-03-06 RX ADMIN — NICOTINE 1 PATCH: 14 PATCH, EXTENDED RELEASE TRANSDERMAL at 08:41

## 2023-03-06 RX ADMIN — IPRATROPIUM BROMIDE AND ALBUTEROL SULFATE 3 ML: 2.5; .5 SOLUTION RESPIRATORY (INHALATION) at 07:34

## 2023-03-06 RX ADMIN — DULOXETINE HYDROCHLORIDE 60 MG: 60 CAPSULE, DELAYED RELEASE ORAL at 08:41

## 2023-03-06 RX ADMIN — LOSARTAN POTASSIUM 50 MG: 50 TABLET, FILM COATED ORAL at 08:40

## 2023-03-06 RX ADMIN — ACETAMINOPHEN 650 MG: 325 TABLET, FILM COATED ORAL at 11:19

## 2023-03-06 RX ADMIN — GUAIFENESIN 600 MG: 600 TABLET, EXTENDED RELEASE ORAL at 08:40

## 2023-03-06 RX ADMIN — DOCUSATE SODIUM 100 MG: 100 CAPSULE, LIQUID FILLED ORAL at 08:41

## 2023-03-06 RX ADMIN — ENOXAPARIN SODIUM 40 MG: 40 INJECTION SUBCUTANEOUS at 08:40

## 2023-03-06 RX ADMIN — METHYLPREDNISOLONE SODIUM SUCCINATE 40 MG: 40 INJECTION, POWDER, FOR SOLUTION INTRAMUSCULAR; INTRAVENOUS at 08:41

## 2023-03-06 RX ADMIN — METOPROLOL SUCCINATE 25 MG: 25 TABLET, EXTENDED RELEASE ORAL at 08:40

## 2023-03-06 RX ADMIN — AMLODIPINE BESYLATE 5 MG: 5 TABLET ORAL at 08:41

## 2023-03-06 RX ADMIN — POLYETHYLENE GLYCOL 3350 17 G: 17 POWDER, FOR SOLUTION ORAL at 08:40

## 2023-03-06 RX ADMIN — VENLAFAXINE HYDROCHLORIDE 225 MG: 75 CAPSULE, EXTENDED RELEASE ORAL at 08:40

## 2023-03-06 RX ADMIN — CLOPIDOGREL BISULFATE 75 MG: 75 TABLET ORAL at 08:40

## 2023-03-06 NOTE — PHYSICAL THERAPY NOTE
PHYSICAL THERAPY EVALUATION/TREATMENT     03/03/23 1410   Note Type   Note type Evaluation   Pain Assessment   Pain Assessment Tool 0-10   Pain Score 8  (PA aware)   Pain Location/Orientation Location: Chest   Restrictions/Precautions   Other Precautions Pain;O2   Home Living   Type of [address] One level  (1+1 DAISY)   Home Equipment   (rollator, 3 wheeled walker)   Prior Function   Level of [place] Independent with ADLs; Independent with functional mobility  (ambulates with walker due to chronic back pain)   Lives With Spouse  (80 year old father)   General   Additional Pertinent History Pt admitted with acute on chronic respiratory failure with hypoxia adn hypercapnia  Cognition   Overall Cognitive Status WFL   Orientation Level Oriented X4   Following Commands Follows one step commands without difficulty   Subjective   Subjective "feeling better since being in the hospital"   RLE Assessment   RLE Assessment WFL   LLE Assessment   LLE Assessment WFL   Bed Mobility   Supine to Sit 7  Independent   Sit to Supine 7  Independent   Transfers   Sit to Stand 7  Independent   Stand to Sit 7  Independent   Stand pivot 7  Independent   Ambulation/Elevation   Gait pattern   (flexed posture, steady)   Gait Assistance 5  Supervision   Assistive Device Rolling walker   Distance 100 feet on RA;  Sp02 87% after activity but quickly johanna to greater than 88% with a few minutes rest   Balance   Static Sitting Good   Dynamic Sitting Fair +   Static Standing Fair +   Dynamic Standing Fair +   Ambulatory Fair +   Activity Tolerance   Activity Tolerance Patient tolerated treatment well;Patient limited by fatigue   Assessment   Prognosis Good   Problem List Decreased range of motion;Decreased mobility; Decreased endurance; Impaired balance;Decreased strength;Pain   Assessment Patient is an 71y o  year old female seen for Physical Therapy evaluation   Patient admitted with Acute respiratory failure with hypoxia and hypercapnia ([facility] [address] )  Comorbidities affecting patient's physical performance include: COPD, opoid dependence, emphysema, anxiety  Personal factors affecting patient at time of initial evaluation include: ambulating with assistive device and stairs to enter home  Prior to admission, patient was independent with functional mobility with walker and independent with ADLS  Please find objective findings from Physical Therapy assessment regarding body systems outlined above with impairments and limitations including decreased endurance, decreased activity tolerance, decreased functional mobility tolerance and SOB upon exertion  The Barthel Index was used as a functional outcome tool presenting with a score of Barthel Index Score: 75 today indicating moderate limitations of functional mobility and ADLS  Patient's clinical presentation is currently evolving as seen in patient's presentation of new onset of impairment of functional mobility and decreased endurance  Pt would benefit from continued Physical Therapy treatment to address deficits as defined above and maximize level of functional mobility  As demonstrated by objective findings, the assigned level of complexity for this evaluation is moderate  The patient's AM-PAC Basic Mobility Inpatient Short Form Raw Score is 22  A Raw score of greater than 16 suggests the patient may benefit from discharge to home     Goals   Patient Goals "less SOB"   STG Expiration Date 03/10/23   Short Term Goal #1 Independent ambulation indoor level surfaces 100 feet with a rolling walker with a steady gait with minimal shortness of breath on room air   LTG Expiration Date 03/17/23   Long Term Goal #1 Independent ambulation after level surfaces 150 feet with a rolling walker with minimal shortness of breath, independent up-and-down 2 steps the patient can enter and exit her home, patient will be able to stand 10 minutes so she can prepare simple meal for her and her dad   Plan Treatment/Interventions Therapeutic exercise; Endurance training;Elevations;LE strengthening/ROM; Gait training;Patient/family training   PT Frequency 3-5x/wk   Recommendation   PT Discharge Recommendation No rehabilitation needs   Equipment Recommended   (pt has a walker, pt may need a home02 eval)   AM-PAC Basic Mobility Inpatient   Turning in Flat Bed Without Bedrails 4   Lying on Back to Sitting on Edge of Flat Bed Without Bedrails 4   Moving Bed to Chair 4   Standing Up From Chair Using Arms 4   Walk in Room 3   Climb 3-5 Stairs With Railing 3   Basic Mobility Inpatient Raw Score 22   Basic Mobility Standardized Score 47 4   Highest Level Of Mobility   JH-HLM Goal 7: Walk 25 feet or more   JH-HLM Achieved 7: Walk 25 feet or more   Barthel Index   Feeding 10   Bathing 0   Grooming Score 5   Dressing Score 10   Bladder Score 10   Bowels Score 10   Toilet Use Score 10   Transfers (Bed/Chair) Score 15   Mobility (Level Surface) Score 0   Stairs Score 5   Barthel Index Score 75   Additional Treatment Session   Start Time 1400   End Time 1410   Treatment Assessment S:  "I just want to take a nap" O:  Pt ambulated in her room with 2L02 and a rolling walker x 75 feet  Sp02 steady at 91% with activity  Educated pt in the benefits of wearing 02 to improve endurance and SOB when walking  A:  Pt demonstrates a steady gait with the walker  P:  Encourage pt to ambulate at tiana in her room, PT to follow to improve functional endurance  End of Consult   Patient Position at End of Consult All needs within reach; Supine   Licensure   [place] License Number  Sue Mahoney PT [id number]

## 2023-03-06 NOTE — PLAN OF CARE
Problem: MOBILITY - ADULT  Goal: Maintain or return to baseline ADL function  Description: INTERVENTIONS:  -  Assess patient's ability to carry out ADLs; assess patient's baseline for ADL function and identify physical deficits which impact ability to perform ADLs (bathing, care of mouth/teeth, toileting, grooming, dressing, etc )  - Assess/evaluate cause of self-care deficits   - Assess range of motion  - Assess patient's mobility; develop plan if impaired  - Assess patient's need for assistive devices and provide as appropriate  - Encourage maximum independence but intervene and supervise when necessary  - Involve family in performance of ADLs  - Assess for home care needs following discharge   - Consider OT consult to assist with ADL evaluation and planning for discharge  - Provide patient education as appropriate  Outcome: Progressing     Problem: INFECTION - ADULT  Goal: Absence or prevention of progression during hospitalization  Description: INTERVENTIONS:  - Assess and monitor for signs and symptoms of infection  - Monitor lab/diagnostic results  - Monitor all insertion sites, i e  indwelling lines, tubes, and drains  - Monitor endotracheal if appropriate and nasal secretions for changes in amount and color  - [place] appropriate cooling/warming therapies per order  - Administer medications as ordered  - Instruct and encourage patient and family to use good hand hygiene technique  - Identify and instruct in appropriate isolation precautions for identified infection/condition  Outcome: Progressing

## 2023-03-06 NOTE — CASE MANAGEMENT
Case Management Discharge Planning Note    Patient name Angella Maldonado  Location [address]-* MRN [id number]  : 1950 Date 3/6/2023       Current Admission Date: 3/1/2023  Current Admission Diagnosis:Chronic obstructive pulmonary disease with acute exacerbation [facility])   Patient Active Problem List    Diagnosis Date Noted   • Chronic pain syndrome 2023   • Angina at rest [facility]) 2023   • Pulmonary nodules 2023   • Cerebral infarct ([facility] ) 2023   • Cocaine abuse ([facility] ) 2023   • Chronic obstructive pulmonary disease with acute exacerbation ([facility] ) 2023   • Hypertension 2023   • Hypothyroid 2023   • CAD (coronary artery disease) 2023   • Hx of CABG x1 2023   • Centrilobular emphysema ([facility] ) 2023   • Anxiety 2023   • Opioid dependence ([facility] ) 2023   • Nicotine abuse 2023      LOS (days): 5  Geometric Mean LOS (GMLOS) (days): 4 30  Days to GMLOS:-0 7     OBJECTIVE:  Risk of Unplanned Readmission Score: 13 34       Current admission status: Inpatient   Preferred Pharmacy:   PATIENT/FAMILY REPORTS NO PREFERRED PHARMACY  No address on file      [facility] [address] [place] [address]  Phone: [phone] Fax: [phone]    Primary Care Provider: Antony Keyes    Primary Insurance: [id number]  Secondary Insurance:     DISCHARGE DETAILS:    Discharge planning discussed with[de-identified] Patient      Were Treatment Team discharge recommendations reviewed with patient/caregiver?: Yes  Did patient/caregiver verbalize understanding of patient care needs?: Yes  Were patient/caregiver advised of the risks associated with not following Treatment Team discharge recommendations?: Yes    Contacts  Patient Contacts: Shine Carlos (daughter)  Relationship to Patient[de-identified] Family  Contact Method: Phone  Phone Number: [phone]    [address] [place] [address]         Is the patient interested in [facility] at discharge?: No    DME Referral Provided  Referral made for DME?: Yes  DME referral completed for the following items[de-identified] Home Oxygen concentrator, Portable Oxygen concentrator, Portable Oxygen tanks  DME Supplier Name[de-identified] [employer] (Portable tank delivered to bedside on 3/5/23  SW instructed patient to call [employer] to arrange for home delivery of remaining O2 equipment  SW notified [employer] liaison [place] and [employer] representative via [place] of patient's planned discharge )    Other Referral/Resources/Interventions Provided:  Interventions: DME    Would you like to participate in our [address] [place] [address] service program?  : No - Declined    Treatment Team Recommendation: Home  Discharge Destination Plan[de-identified] Home  Transport at Discharge : Family         IMM Given (Date):: 03/06/23  IMM Given to[de-identified] Patient (IMM reviewed with and signed by patient    Copy given to patient and copy placed in scan bin for chart )

## 2023-03-06 NOTE — PROGRESS NOTES
Progress Note - Pulmonary   Trae Amezcua 71 y o  female MRN: [id number]  Unit/Bed#: [address] [place] [address] Encounter: [id number]    Assessment/Plan:    Acute hypoxic hypercapnic respiratory failure   Hypercapnia is resolved  Titrate FiO2 to maintain saturations greater than or equal to 89%  Activity as tolerated  O2 evaluation prior to discharge  Emphysema by CT/mild COPD with acute exacerbation   Transition Solu-Medrol to prednisone 40 mg daily to start tomorrow and taper by 10 mg every 3 days as tolerated  Would start Spiriva with as needed albuterol MDI for discharge  Would benefit from outpatient pulmonary follow-up and PFTs  Abnormal CT chest with pulmonary nodules and underlying nicotine dependence   Repeat CT of the chest in 1 year: March 2024  Recommend complete tobacco cessation  This is her plan at discharge  Obesity with underlying mild obstructive sleep apnea   Outpatient PSG and follow-up  Outpatient follow-up as per discharge instructions  Discussed with primary team     Chief Complaint:    "I feel the same "    Subjective: Viki Cr is sitting up in bed  She reports she feels the same as yesterday  She has some dyspnea, but overall improved from yesterday  No other specific complaints  Objective:    Vitals: Blood pressure (!) 166/103, pulse 72, temperature (!) 97 1 °F (36 2 °C), temperature source Temporal, resp  rate 16, height 5' 4" (1 626 m), weight 83 6 kg (184 lb 6 4 oz), SpO2 98 %  2L nasal cannula,Body mass index is 31 65 kg/m²  Intake/Output Summary (Last 24 hours) at 3/6/2023 1051  Last data filed at 3/5/2023 2305  Gross per 24 hour   Intake --   Output 1125 ml   Net -1125 ml       Invasive Devices     Peripheral Intravenous Line  Duration           Peripheral IV 03/01/23 Distal;Dorsal (posterior); Right Forearm 4 days    Peripheral IV 03/01/23 Right Wrist 4 days                Physical Exam:     Physical Exam  Vitals reviewed     Constitutional: General: She is not in acute distress  Appearance: She is well-developed  She is obese  She is not toxic-appearing or diaphoretic  Interventions: Nasal cannula in place  HENT:      Head: Normocephalic and atraumatic  Eyes:      General: No scleral icterus  Neck:      Trachea: No tracheal deviation  Cardiovascular:      Rate and Rhythm: Normal rate and regular rhythm  Heart sounds: S1 normal and S2 normal  No murmur heard  No friction rub  No gallop  Pulmonary:      Effort: Pulmonary effort is normal  No tachypnea, accessory muscle usage or respiratory distress  Breath sounds: Normal breath sounds  No stridor  No decreased breath sounds, wheezing, rhonchi or rales  Chest:      Chest wall: No tenderness  Abdominal:      General: Bowel sounds are normal  There is no distension  Palpations: Abdomen is soft  Tenderness: There is no abdominal tenderness  There is no guarding or rebound  Musculoskeletal:      Cervical back: Neck supple  Right lower leg: No edema  Left lower leg: No edema  Skin:     General: Skin is warm and dry  Findings: No rash  Neurological:      Mental Status: She is alert and oriented to person, place, and time  GCS: GCS eye subscore is 4  GCS verbal subscore is 5  GCS motor subscore is 6  Psychiatric:         Speech: Speech normal          Behavior: Behavior normal  Behavior is cooperative         Labs: AB 38/38 8/64    Imaging and other studies: None new

## 2023-03-06 NOTE — SPEECH THERAPY NOTE
Speech-Language Pathology Bedside Swallow Evaluation      Patient Name: Larry Orellana    GLFUT'N Date: 3/6/2023     Problem List  Principal Problem:    Acute respiratory failure with hypoxia and hypercapnia (HCC)  Active Problems:    Chronic obstructive pulmonary disease with acute exacerbation (HCC)    Hypertension    Hypothyroid    CAD (coronary artery disease)    Hx of CABG x1    Centrilobular emphysema (HCC)    Anxiety    Opioid dependence (HCC)    Nicotine abuse    Pulmonary nodules    Cerebral infarct (HCC)    Cocaine abuse ([facility] )    Angina at rest [facility])      Past Medical History  Past Medical History:   Diagnosis Date   • COPD (chronic obstructive pulmonary disease) ([facility] )    • Disease of thyroid gland    • Hypertension        Past Surgical History  Past Surgical History:   Procedure Laterality Date   • CARDIAC SURGERY     • HIP SURGERY     • ORTHOPEDIC SURGERY     • THORACIC AORTIC ANEURYSM REPAIR  09/2016    ascending thoracic aortic repair with RCA bypass with SVG x 1       Summary   Pt presented with c/o globus and sensation of food stasis (especially on the right) with occasions of secondary swallows noted with food but no overt s/s laryngeal penetration or aspiration  She has limited dentition and admits to occasions of reduce dtask attention which may also add to current sxs  CT of chest revealed "increased density within the trachea for which further evaluation with pulmonary and bronchoscopy is recommended "  I reviewed available diet/food texture levels and pt desires to continue with "regular" menu and to carefully select soft cooked and moist foods  Recommended Diet: regular diet and thin liquids (select soft cooked and moist foods )  Recommended Form of Meds: as desired   Aspiration precautions and swallowing strategies: Moisten food, chew well, swallow extra as you feel is needed    Other Recommendations: Continue frequent oral care, consider MBS/VFSS        Current Medical Status  [facility] Marcia Anthony is a 71 y o  female with PMHx of COPD, centrilobular emphysema, CAD, CABG x1 2016, anxiety, HTN, HLD, and current daily smoker who presents with worsening SOB over the past few days and confusion  O2 saturation was 91% on room air in the ED and she was somnolent  ABG showed mild hypercapnea: 7 28/55/50/25  Was placed on BiPap in ED with improvement in mentation  Also received 125mg IV solumedrol and a heart neb  CXR without any focal opacification  Does endorse a chronic cough of white sputum; unchanged from baseline  Was admitted to ICU for close monitoring-->transferred to  3/2  SLP Swallowing Evaluation ordered at this time (reported s/s of dysphagia and globus)    Current Precautions:  Fall, Delirium    Allergies:  No known food allergies    Past medical history:  Please see H&P for details    Special Studies of 3/1:  CXR: Small nodular opacity at left lung base, corresponding to an area of likely atelectasis on subsequent chest CT  No other airspace opacities  CT of chest: Increased density within the trachea for which further evaluation with pulmonary and bronchoscopy is recommended  Multiple pulmonary nodules measuring up to 2 to 3 mm  Based on current Fleischner Society 2017 Guidelines on incidental pulmonary nodule, no routine follow-up is needed if the patient is low risk  If the patient is high risk, optional follow-up chest CT at 12 months can be considered  CT of head: Age-indeterminate infarct in the left frontal lobe for which further evaluation with MRI can be obtained     Social/Education/Vocational Hx:  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 [place] (splitting time between her home, her honme in [facility] and family home in [place])  Dtr resides in [place]  Swallow Information   Current Risks for Dysphagia & Aspiration: h/o s/s of globus and dysphagia ("I had a test years ago and was told my flap (epiglottis) doesn't always work or move correctly  Current Symptoms/Concerns"feels like food and phlegm get stuck especially on the right side", "have a swallow a few times to get food down "    Current Diet: regular diet and thin liquids   Baseline Diet: regular diet and thin liquids      Baseline Assessment   Behavior/Cognition: alert  Speech/Language Status: able to participate in conversation  Patient Positioning: upright in bed  Pain Status/Interventions/Response to Interventions:  No report of or nonverbal indications of pain  Swallow Mechanism Exam  Facial: symmetrical  Labial: WFL  Lingual: WFL  Velum: symmetrical  Mandible: adequate ROM  Dentition: limited dentition  Vocal quality:clear/adequate  Respiratory Status: on RA        Consistencies Assessed and Performance   Materials administered included water, applesauce, blueberry muffin (had already consumed bowl of oatmeal prior to my arrival     Oral Stage:   Mastication was cautious/adequate with the materials administered today (pt with good task attention today but admits to multitasking while eating and may not always be paying such careful attention to chewing/eating process)  Bolus formation and transfer were functional with no significant oral residue noted  No overt s/s reduced oral control  Pharyngeal Stage:   Swallow Mechanics:  Swallowing initiation appeared prompt  Laryngeal rise was palpated and judged to be within functional limits  No coughing, throat clearing, change in vocal quality or respiratory status noted today  Secondary swallows noted with food on 1st 2 bites only  C/O sensation of "feels like something is sticking on the right" (no change in sensation with L and R head turn with intake)    Esophageal Concerns: on medications suggestive of h/o GERD    Strategies and Efficacy: she did chew well and produced 2* swallows with foods on occasion (?transient food stasis ? cleared with 2nd swallows    Summary and Recommendations (see above)    Results Reviewed with: patient and RN     Treatment Recommended: Will speak with MD re: possibility of additional evaluation (noted suggestion for Pulmonary Evaluation and possible bronchoscopy (report of increased density within the trachea)    Patient Stated Goal:  To have "lesser of a bother" re: swallowing    Dysphagia LTG  -Patient will demonstrate safe and effective oral intake (without overt s/s significant oral/pharyngeal dysphagia including s/s penetration or aspiration) for the highest appropriate diet level  Short Term Goals: CONSIDER  -Patient will comply with a Video/Modified Barium Swallow study for more complete assessment of swallowing anatomy/physiology/aspiration risk and to assess efficacy of treatment techniques    Thank you for this referral   Please do not hesitate to contact me with any questions or concerns      Ibis Prasad [facility]   Speech Pathologist  [place] License [id number]  [place] License [id number]  Available via [facility] Text

## 2023-03-06 NOTE — QUICK NOTE
Ready to quit: Not Answered  Counseling given: Not Answered    Smoking cessation counseling note:       The patient’s tobacco use quarter-full pack per day for 50 years    Advised to quit and impact of smoking-yes patient was advised extensively complication and risks of smoking, patient was given handout and family member sister was contacted to encourage support upon patient return post discharge    Assessed willingness to attempt to quit-patient reported being very willing to quit at this time    Providing methods and skills for cessation- nicotine patches refilled on discharge, support number given (The CDC mentions calling -668-QUIT-NOW ([phone])    Medication management of smoking session drugs-nicotine patch with refills    Setting quit date-patient actively quit while inpatient encounter    Follow-up arranged-PCP (no email) to be made aware for continuity follow-up for tobacco cessation counseling    Amount of time spent counseling patient- 10 minutes

## 2023-03-06 NOTE — PROGRESS NOTES
Progress Note - Pulmonary   Horn Phi 71 y o  female MRN: [id number]  Unit/Bed#: [address] [place] [address] Encounter: [id number]    Assessment:  Acute hypercapnic hypoxemic respiratory failure improved  Blood gas done today with patient on 2 L of oxygen showed pH of 7 39 PCO2 of 39 and PO2 64 mmHg  Acute exacerbation of COPD improved  Multiple small 2 to 3 mm lung nodules likely benign  These are bilateral   Mild GELA as evidenced by sleep screen and the other night  Overall AHI 11 7  History of ascending thoracic aortic aneurysm repair September 2016 and single bypass done of RCA at same time  Tobacco dependence  Tree of opioid dependence  Patient is on Suboxone      Plan:  Oximetry testing done yesterday shows patient does require supplemental oxygen with activity at 2 L/min  Patient has been made through [facility] for patient get portable battery-operated concentrator as well as stationary oxygen concentrator  Patient is agreeable to have sleep study done as outpatient to qualify for BiPAP machine  She did try that here and this did help her  Did have PFT done elsewhere a couple years ago which showed only mild airflow obstruction      Subjective:   Still with some shortness of breath  Cough productive for tiny amount of green mucus    Objective:     Vitals: Blood pressure 151/82, pulse 69, temperature 98 2 °F (36 8 °C), resp  rate 18, height 5' 4" (1 626 m), weight 85 1 kg (187 lb 9 6 oz), SpO2 93 %  ,Body mass index is 32 2 kg/m²  Intake/Output Summary (Last 24 hours) at 3/5/2023 2209  Last data filed at 3/5/2023 2101  Gross per 24 hour   Intake 120 ml   Output 1425 ml   Net -1305 ml       Physical Exam: Physical Exam  Vitals reviewed  Constitutional:       General: She is not in acute distress  Appearance: Normal appearance  She is well-developed  Comments: On 2 L/min nasal cannula oxygen O2 saturation 92%   HENT:      Head: Normocephalic        Right Ear: External ear normal       Left Ear: External ear normal       Nose: Nose normal       Mouth/Throat:      Mouth: Mucous membranes are moist       Pharynx: Oropharynx is clear  No oropharyngeal exudate  Eyes:      Conjunctiva/sclera: Conjunctivae normal       Pupils: Pupils are equal, round, and reactive to light  Neck:      Vascular: No JVD  Trachea: No tracheal deviation  Cardiovascular:      Rate and Rhythm: Normal rate and regular rhythm  Heart sounds: Normal heart sounds  Pulmonary:      Effort: Pulmonary effort is normal       Comments: Lung sounds are clear  Abdominal:      General: There is no distension  Palpations: Abdomen is soft  Tenderness: There is no abdominal tenderness  There is no guarding  Musculoskeletal:      Cervical back: Neck supple  Comments: No edema   Lymphadenopathy:      Cervical: No cervical adenopathy  Skin:     General: Skin is warm and dry  Findings: No rash  Neurological:      General: No focal deficit present  Mental Status: She is alert and oriented to person, place, and time  Psychiatric:         Behavior: Behavior normal          Thought Content: Thought content normal           Labs: I have personally reviewed pertinent lab results  , ABG:   Lab Results   Component Value Date    PHART 7 388 03/05/2023    KYC5BHC 39 0 03/05/2023    PO2ART 64 4 (L) 03/05/2023    LIA2PYE 23 0 03/05/2023    BEART -1 7 03/05/2023    SOURCE Radial, Right 03/05/2023   , BNP:   Lab Results   Component Value Date    BNP 30 03/01/2023   , CBC:   Lab Results   Component Value Date    WBC 14 76 (H) 03/05/2023    HGB 13 3 03/05/2023    HCT 41 7 03/05/2023    MCV 95 03/05/2023     03/05/2023    MCH 30 2 03/05/2023    MCHC 31 9 03/05/2023    RDW 15 3 (H) 03/05/2023    MPV 9 0 03/05/2023    NRBC 0 03/02/2023   , CMP:   Lab Results   Component Value Date    K 4 7 03/05/2023     03/05/2023    CO2 31 03/05/2023    BUN 23 03/05/2023    CREATININE 0 92 03/05/2023    CALCIUM 9 7 03/05/2023 AST 20 03/05/2023    ALT 19 03/05/2023    ALKPHOS 34 03/05/2023    EGFR 62 03/05/2023   , PT/INR:   Lab Results   Component Value Date    INR 0 98 03/01/2023   , Troponin: No results found for: TROPONIN    Imaging and other studies: I have personally reviewed pertinent reports     and I have personally reviewed pertinent films in PACS

## 2023-03-06 NOTE — DISCHARGE SUMMARY
Leonelmaría 46  Discharge- Mahendra Chanel 1950, 71 y o  female MRN: [id number]  Unit/Bed#: [address] [place] [address] Encounter: [id number]  Primary Care Provider: Catherine Ramires   Date and time admitted to hospital: 3/1/2023  3:46 PM    * Acute respiratory failure with hypoxia and hypercapnia (HCC)-resolved as of 3/6/2023  Assessment & Plan  Improved,    • Continue to Wean FiO2 for SpO2 >90%  • Continuous pulse oximetry  • CT Chest 3/1: Increased density within the trachea for which further evaluation with pulmonary and bronchoscopy is recommended  Multiple pulmonary nodules measuring up to 2 to 3 mm   Based on current Fleischner Society 2017 Guidelines on incidental pulmonary nodule, no routine follow-up is needed if the patient is low risk   If the patient is high risk, optional follow-up chest  CT at 12 months can be considered     • Procal <0 05  • Sputum/Urine culture pending  • Remainder of treatment as per COPD  • Sleep study overnight congruent with nocturnal hypoxia  • Follow-up outpatient pulmonology    Pulmonary nodules  Assessment & Plan  • CT Chest 3/1: Increased density within the trachea for which further evaluation with pulmonary and bronchoscopy is recommended   Multiple pulmonary nodules measuring up to 2 to 3 mm   Based on current Fleischner Society 2017 Guidelines on incidental pulmonary nodule, no routine follow-up is needed if the patient is low risk   If the patient is high risk, optional follow-up chest  CT at 12 months order placed     • Per Care Everywhere, CT angio 12/16/21:  Stable 5 mm right upper lobe nodule for which imaging follow-up per Fleischner guidelines is recommended  • F/u OP pulmonologist    Angina at rest [facility])  Assessment & Plan    Significant cardiac history: CABG x1, CAD, polysubstance abuse    · EKG-NSR,   · trops pending,  · UDS-positive for cocaine  · Cardiology recs: recently nonischemic nuclear done in 2/2023 with EF 69% and was read as low risk scan   BP- Could be better controlled   had ascending aortic aneurysm  Opioid dependence ([facility] )  Assessment & Plan  Hx of lumbosacral stenosis and chronic pain  • Previously on Tramadol  PDMP reviewed, continue Suboxone 12 mg, not taking as BID prescribed  • Patient trying to wean down, needs to obtain outpatient provider due to Dr Effie Ho retiring    Chronic obstructive pulmonary disease with acute exacerbation [facility])  Assessment & Plan  Clinical diagnosis, follow-up outpatient for Gold staging    • DuoNebs, Solu-Medrol, Mucinex  • Sputum culture-pending  • Smoking cessation  · Pulm recs:Solu-Medrol to prednisone 40 mg daily to start tomorrow and taper by 10 mg every 3 days as tolerated  start Spiriva with as needed albuterol MDI for discharge  OP pulmonary follow-up and PFTs  Cocaine abuse [facility])  Assessment & Plan  Patient reported use PTA,    Patient reported cocaine occasionally  Her  is aware of her use but the patient requests this information be kept confidential and her daughter not be notified  • Monitor for S&S of withdrawal    • UDS-positive on this admission  • Counseled extensively on cessation    Chronic pain syndrome  Assessment & Plan  Discharged on previously prescribed gabapentin low-dose, will hold Cymbalta, follow-up with PCP    Cerebral infarct [facility])  Assessment & Plan  Back at baseline  •   • CT head 3/1: Age-indeterminate infarct in the left frontal lobe for which further evaluation with MRI can be obtained  • Initially presented with encephalopathy suspected 2/2 hypercarbia and hypoxia    • MRI as outpatient  • Frequent neuro checks    Nicotine abuse  Assessment & Plan  Patch in place, significant encourage cessation, will discharge with patch low-dose    Anxiety  Assessment & Plan  Hx of anxiety    • Continue home xanax, duloxetine, venlafaxine  • Follow-up outpatient, with psychiatrist and possible therapist    Centrilobular emphysema ([facility] )  Assessment & Plan  Hx of Centrilobar emphysema    • OP meds include only PRN albuterol  • Smoking cessation reinforced  • Pulmonology recs: Prednisone taper, f/u with OP pulmonology on d/c, smoking cessation    Hx of CABG x1  Assessment & Plan  • Hx CABG in 2016  • Continue home plavix    CAD (coronary artery disease)  Assessment & Plan  Hx of CAD, s/p CABG x1 vessel 2016    • Continue Plavix and statin  • EKG NSR with non-specific ST changes  • HS troponin       Hypothyroid  Assessment & Plan  Continue home levothyroxine 100 mcg    Hypertension  Assessment & Plan  Cardiology recs: Adding amlodipine 5 mg and increase losartan to 50  Continue Toprol-XL 25 mg    Medical Problems     Resolved Problems  Date Reviewed: 3/6/2023          Resolved    * (Principal) Acute respiratory failure with hypoxia and hypercapnia ([address] ) 3/6/2023     Resolved by  Yohan Reardon MD    Encephalopathy acute 3/3/2023     Resolved by  Yohan Reardon MD    Acute kidney injury [facility]) 3/3/2023     Resolved by  Yohan Reardon MD        Discharging Physician / Practitioner: Yohan Reardon MD  PCP: Linda Zavaleta  Admission Date:   Admission Orders (From admission, onward)     Ordered        03/01/23 [address] [place] [address]  Once                      Discharge Date: 03/06/23    Consultations During Hospital Stay:  · Cardiology, pulmonology N/A    Procedures Performed:   · N/A    Significant Findings / Test Results:   · N/A    Incidental Findings:   · Yes  · I reviewed the above mentioned incidental findings with the patient and/or family and they expressed understanding  Test Results Pending at Discharge (will require follow up):   · N/A     Outpatient Tests Requested:  · N/A    Complications: No    Reason for Admission: Acute respiratory failure with hypoxia and hypercapnia    Hospital Course:    Rowan Yao is a 71 y o  female patient who originally presented to the hospital on 3/1/2023 due to acute respiratory failure with hypoxia and hypercapnia and encephalopathy  Patient encephalopathy likely secondary to acute respiratory failure; was evaluated by critical care, initiated on BiPAP and weaned off, currently on supportive O2  Patient has newly diagnosed COPD in exacerbation, follow-up pulmonology for Gold staging, discharged on prednisone taper  Patient has long history of tobacco dependence, cessation counseling completed, discharging with nicotine patch and follow-up with pulmonology  Patient had an episode of chest pain, has a history of CABG x1, CAD, ascending aortic aneurysm and hypertension; patient evaluated by cardiology with recommendations for med changes with the addition of Norvasc 5 mg  Patient to follow-up with prior outpatient cardiology  Patient also noted to have history of substance use and dependence  Patient counseled on cessation of substance abuse contributing to inpatient symptoms with a positive UDS, patient agreed  Patient also long-term opioid dependence  and chronic pain, patient tried to wean off of Suboxone, previous provider Dr Imani Rivas retiring and patient needs to establish care with new provider contacted patient's daughter to help facilitate  Patient has a history of depression and anxiety, will continue current newly prescribed Xanax and Effexor on discharge, follow-up with psychiatrist/therapist   All other chronic medical conditions managed with home meds or inpatient equivalent    Please see above list of diagnoses and related plan for additional information  Condition at Discharge: stable    Discharge Day Visit / Exam:   Subjective: Patient seen and examined at bedside, informed nursing that she wanted to increase her Suboxone, patient given dose adjustment, counseled about finding outpatient provider since Dr Imani Rivas previous providers retiring  Patient counseled about avoiding illegal substances contributing to her admitting problem, lifestyle changes    Patient agreed for compliance discussed discharge plan with patient's daughter and follow-up provider for opioid dependence  Vitals: Blood Pressure: (!) 166/103 (03/06/23 0742)  Pulse: 72 (03/06/23 0742)  Temperature: (!) 97 1 °F (36 2 °C) (03/06/23 0744)  Temp Source: Temporal (03/06/23 0744)  Respirations: 16 (03/06/23 0744)  Height: 5' 4" (162 6 cm) (03/01/23 2606)  Weight - Scale: 83 6 kg (184 lb 6 4 oz) (03/06/23 0600)  SpO2: 98 % (03/06/23 0744)   Repeat vital signs prior to DC    Exam:   Physical Exam  Vitals and nursing note reviewed  Constitutional:       General: She is not in acute distress  Appearance: She is well-developed  She is obese  HENT:      Head: Normocephalic and atraumatic  Eyes:      Conjunctiva/sclera: Conjunctivae normal    Cardiovascular:      Rate and Rhythm: Normal rate and regular rhythm  Heart sounds: No murmur heard  No friction rub  No gallop  Pulmonary:      Effort: Pulmonary effort is normal  No respiratory distress  Breath sounds: Normal breath sounds  No stridor  No wheezing, rhonchi or rales  Abdominal:      Palpations: Abdomen is soft  Tenderness: There is no abdominal tenderness  There is no guarding or rebound  Musculoskeletal:         General: No swelling or tenderness  Cervical back: Neck supple  Right lower leg: No edema  Left lower leg: No edema  Skin:     General: Skin is warm and dry  Capillary Refill: Capillary refill takes less than 2 seconds  Findings: No bruising  Neurological:      Mental Status: She is alert and oriented to person, place, and time  Motor: No weakness  Psychiatric:         Mood and Affect: Mood is anxious  Affect is tearful (Intermittent)  Affect is not labile  Speech: Speech normal          Behavior: Behavior normal  Behavior is not combative  Behavior is cooperative  Thought Content: Thought content does not include suicidal ideation            Discussion with Family: Updated contact person (daughter) via phone  Discharge instructions/Information to patient and family:   See after visit summary for information provided to patient and family  Provisions for Follow-Up Care:  See after visit summary for information related to follow-up care and any pertinent home health orders  Disposition:   Home    Planned Readmission: No     Discharge Statement:  I spent 45 minutes discharging the patient  This time was spent on the day of discharge  I had direct contact with the patient on the day of discharge  Greater than 50% of the total time was spent examining patient, answering all patient questions, arranging and discussing plan of care with patient as well as directly providing post-discharge instructions  Additional time then spent on discharge activities  Discharge Medications:  See after visit summary for reconciled discharge medications provided to patient and/or family        **Please Note: This note may have been constructed using a voice recognition system**

## 2023-03-06 NOTE — INCIDENTAL FINDINGS
The following findings require follow up:  Radiographic finding   Finding: CT head without contrast: Age-indeterminate infarct in the left frontal lobe for which further evaluation with MRI can be obtained       Follow up required: Yes   Follow up should be done within 2 week(s)    Please notify the following clinician to assist with the follow up:   PCP

## 2023-03-06 NOTE — NURSING NOTE
Pt discharged today  IV and Alverto removed  Discharge instructions provided, pt and daughter v/u  Pt left the unit in good health via wheelchair with PCA

## 2023-03-07 LAB
BACTERIA SPT RESP CULT: ABNORMAL
DME PARACHUTE DELIVERY DATE ACTUAL: NORMAL
DME PARACHUTE DELIVERY DATE REQUESTED: NORMAL
DME PARACHUTE DELIVERY NOTE: NORMAL
DME PARACHUTE ITEM DESCRIPTION: NORMAL
DME PARACHUTE ORDER STATUS: NORMAL
DME PARACHUTE SUPPLIER NAME: NORMAL
DME PARACHUTE SUPPLIER PHONE: NORMAL
GRAM STN SPEC: ABNORMAL
GRAM STN SPEC: ABNORMAL

## 2023-03-07 NOTE — UTILIZATION REVIEW
NOTIFICATION OF ADMISSION DISCHARGE   This is a Notification of Discharge from [address]  Please be advised that this patient has been discharge from our facility  Below you will find the admission and discharge date and time including the patient’s disposition  UTILIZATION REVIEW CONTACT:  Valentino Belts  Utilization   Network Utilization Review Department  Phone: [phone] x carefully listen to the prompts  All voicemails are confidential   Email: [email or web address]     ADMISSION INFORMATION  PRESENTATION DATE: 3/1/2023  3:46 PM  OBERVATION ADMISSION DATE:   INPATIENT ADMISSION DATE: 3/1/23  5:16 PM   DISCHARGE DATE: 3/6/2023  5:14 PM   DISPOSITION:Home/Self Care    IMPORTANT INFORMATION:  Send all requests for admission clinical reviews, approved or denied determinations and any other requests to dedicated fax number below belonging to the campus where the patient is receiving treatment   List of dedicated fax numbers:  [address] DENIALS (Administrative/Medical Necessity) [phone]   [address] (Maternity/NICU/Pediatrics) [phone]   [facility] [phone]   [facility] 87 [phone]   [address] [phone]   220 [facility] [phone] 90 [facility] [phone]   [address] [place], [place] [address] [address] [phone]   [facility]  [phone]   4056 [facility] [phone]   412 [facility] 850 [facility] [phone]

## 2023-03-08 DIAGNOSIS — J20.9 ACUTE BRONCHITIS, UNSPECIFIED ORGANISM: Primary | ICD-10-CM

## 2023-03-08 RX ORDER — AMOXICILLIN 875 MG/1
875 TABLET, COATED ORAL 2 TIMES DAILY
Qty: 14 TABLET | Refills: 0 | Status: SHIPPED | OUTPATIENT
Start: 2023-03-08 | End: 2023-03-15

## 2023-03-08 NOTE — PROGRESS NOTES
I spoke to patient  She is starting some cough for discolored mucus  Sputum culture collected when she was in the hospital 3 4 showed 2+ growth of haemophilus influenza which was beta-lactamase negative and 1+ both the beta-hemolytic strep group B  She also has some slight swelling of side of her tongue that she admitted  She does have some tooth problem she states  I will prescribe amoxicillin 875 mg twice daily for 7 days from bronchitis and this may also help with her dental problem  She has been using Trelegy sample only has 2 doses left  Told her she can stop by our office tomorrow and  couple samples of Trelegy and we will arrange for follow-up office visit

## 2023-03-10 ENCOUNTER — OFFICE VISIT (OUTPATIENT)
Dept: PULMONOLOGY | Facility: MEDICAL CENTER | Age: 73
End: 2023-03-10

## 2023-03-10 VITALS
BODY MASS INDEX: 31.76 KG/M2 | HEART RATE: 86 BPM | OXYGEN SATURATION: 95 % | WEIGHT: 186 LBS | RESPIRATION RATE: 16 BRPM | HEIGHT: 64 IN | TEMPERATURE: 97.3 F | SYSTOLIC BLOOD PRESSURE: 140 MMHG | DIASTOLIC BLOOD PRESSURE: 80 MMHG

## 2023-03-10 DIAGNOSIS — J43.2 CENTRILOBULAR EMPHYSEMA (HCC): Chronic | ICD-10-CM

## 2023-03-10 DIAGNOSIS — J20.1 ACUTE BRONCHITIS DUE TO HAEMOPHILUS INFLUENZAE: ICD-10-CM

## 2023-03-10 DIAGNOSIS — Z72.0 NICOTINE ABUSE: ICD-10-CM

## 2023-03-10 DIAGNOSIS — J44.1 CHRONIC OBSTRUCTIVE PULMONARY DISEASE WITH ACUTE EXACERBATION (HCC): Primary | ICD-10-CM

## 2023-03-10 DIAGNOSIS — G47.33 OSA (OBSTRUCTIVE SLEEP APNEA): ICD-10-CM

## 2023-03-10 DIAGNOSIS — R91.8 PULMONARY NODULES: ICD-10-CM

## 2023-03-10 RX ORDER — ALBUTEROL SULFATE 90 UG/1
2 AEROSOL, METERED RESPIRATORY (INHALATION) EVERY 6 HOURS PRN
Qty: 18 G | Refills: 5 | Status: SHIPPED | OUTPATIENT
Start: 2023-03-10

## 2023-03-10 RX ORDER — TRAZODONE HYDROCHLORIDE 50 MG/1
50 TABLET ORAL DAILY
COMMUNITY
Start: 2023-02-23

## 2023-03-10 RX ORDER — IPRATROPIUM BROMIDE AND ALBUTEROL SULFATE 2.5; .5 MG/3ML; MG/3ML
3 SOLUTION RESPIRATORY (INHALATION)
COMMUNITY
End: 2023-03-10

## 2023-03-10 RX ORDER — MONTELUKAST SODIUM 10 MG/1
10 TABLET ORAL
COMMUNITY
End: 2023-03-10

## 2023-03-10 RX ORDER — LOSARTAN POTASSIUM 25 MG/1
25 TABLET ORAL DAILY
COMMUNITY
End: 2023-03-10

## 2023-03-10 RX ORDER — AMLODIPINE BESYLATE 10 MG/1
10 TABLET ORAL DAILY
COMMUNITY
Start: 2023-01-09 | End: 2023-03-10

## 2023-03-10 RX ORDER — EZETIMIBE 10 MG/1
10 TABLET ORAL
COMMUNITY
End: 2023-03-10

## 2023-03-10 RX ORDER — PANTOPRAZOLE SODIUM 40 MG/1
TABLET, DELAYED RELEASE ORAL
COMMUNITY
Start: 2023-01-10

## 2023-03-10 RX ORDER — DEXTROMETHORPHAN HYDROBROMIDE AND PROMETHAZINE HYDROCHLORIDE 15; 6.25 MG/5ML; MG/5ML
SYRUP ORAL
COMMUNITY
Start: 2023-02-28

## 2023-03-10 RX ORDER — ALBUTEROL SULFATE 2.5 MG/3ML
SOLUTION RESPIRATORY (INHALATION)
COMMUNITY
Start: 2023-02-28

## 2023-03-10 RX ORDER — AZITHROMYCIN 250 MG/1
TABLET, FILM COATED ORAL
COMMUNITY
Start: 2023-02-28 | End: 2023-03-10

## 2023-03-10 RX ORDER — NITROFURANTOIN 25; 75 MG/1; MG/1
CAPSULE ORAL
COMMUNITY
Start: 2023-01-04 | End: 2023-03-10

## 2023-03-10 RX ORDER — FLUTICASONE FUROATE, UMECLIDINIUM BROMIDE AND VILANTEROL TRIFENATATE 100; 62.5; 25 UG/1; UG/1; UG/1
1 POWDER RESPIRATORY (INHALATION) DAILY
Qty: 60 BLISTER | Refills: 5 | Status: SHIPPED | OUTPATIENT
Start: 2023-03-10 | End: 2023-04-09

## 2023-03-10 RX ORDER — CYCLOBENZAPRINE HCL 10 MG
10 TABLET ORAL
COMMUNITY
Start: 2023-02-21

## 2023-03-10 RX ORDER — LEVOTHYROXINE SODIUM 0.12 MG/1
125 TABLET ORAL
COMMUNITY

## 2023-03-10 RX ORDER — ALBUTEROL SULFATE 90 UG/1
2 AEROSOL, METERED RESPIRATORY (INHALATION)
COMMUNITY
End: 2023-03-10 | Stop reason: SDUPTHER

## 2023-03-10 NOTE — ASSESSMENT & PLAN NOTE
She had an apnea link overnight pulse ox in the hospital which did show desaturation on room air, as well as predicted AHI of 11  She is agreeable to undergoing formal sleep study to evaluate  In the meantime, she will continue her supplemental oxygen at bedtime

## 2023-03-10 NOTE — PROGRESS NOTES
Pulmonary Follow-Up Note   Madelin Mcconnell 71 y o  female MRN: [id number]  3/10/2023      Assessment/Plan:    Problem List Items Addressed This Visit        Respiratory    Centrilobular emphysema ([facility] [address] ) (Chronic)     Had PFTs in the past, at least 5 years ago  Showed mild COPD reportedly  She has used Breo in the past, but transitioned to [employer] at discharge this week  She will continue with Trelegy and I sent a prescription to her pharmacy to evaluate cost   She will also continue with her albuterol via nebulizer and I gave her new nebulizer supplies  Finally, I have given her an albuterol rescue inhaler  She is aware of proper use and technique of all inhaled therapies  She will undergo complete PFTs to further delineate her underlying lung disease  Relevant Medications    promethazine-dextromethorphan (PHENERGAN-DM) 6 25-15 mg/5 mL oral syrup    albuterol (2 5 mg/3 mL) 0 083 % nebulizer solution    albuterol (PROVENTIL HFA,VENTOLIN HFA) 90 mcg/act inhaler    fluticasone-umeclidinium-vilanterol (Trelegy Ellipta) 100-62 5-25 mcg/actuation inhaler    Other Relevant Orders    Nebulizer Supplies    Chronic obstructive pulmonary disease with acute exacerbation ([facility] [place] [facility] ) - Primary     She continues to wean down on her prednisone taper as previously prescribed  Relevant Medications    promethazine-dextromethorphan (PHENERGAN-DM) 6 25-15 mg/5 mL oral syrup    albuterol (2 5 mg/3 mL) 0 083 % nebulizer solution    albuterol (PROVENTIL HFA,VENTOLIN HFA) 90 mcg/act inhaler    fluticasone-umeclidinium-vilanterol (Trelegy Ellipta) 100-62 5-25 mcg/actuation inhaler    Other Relevant Orders    Nebulizer Supplies    Complete PFT with post Bronchodilator and Six Minute walk    GELA (obstructive sleep apnea)     She had an apnea link overnight pulse ox in the hospital which did show desaturation on room air, as well as predicted AHI of 11  She is agreeable to undergoing formal sleep study to evaluate  In the meantime, she will continue her supplemental oxygen at bedtime  Relevant Orders    Diagnostic Sleep Study    Acute bronchitis due to Haemophilus influenzae     She is completing a course of amoxicillin  Relevant Medications    promethazine-dextromethorphan (PHENERGAN-DM) 6 25-15 mg/5 mL oral syrup    albuterol (2 5 mg/3 mL) 0 083 % nebulizer solution    albuterol (PROVENTIL HFA,VENTOLIN HFA) 90 mcg/act inhaler    fluticasone-umeclidinium-vilanterol (Trelegy Ellipta) 100-62 5-25 mcg/actuation inhaler       Other    Nicotine abuse     She has not smoked since she left the hospital and reports that she will continue with complete cessation due to the known risks of smoking  We discussed risks of continued smoking, as well as benefits of complete cessation  She is not using the nicotine patch and we discussed that she can use nicotine gum/lotions as needed for breakthrough symptoms  She was in agreement  We discussed tobacco cessation for 3 minutes  Pulmonary nodules     She would be due for repeat CT of the chest in 1 year  Order was placed  Relevant Orders    CT chest wo contrast       Return in about 3 months (around 6/10/2023), or if symptoms worsen or fail to improve  All of Montserrat's questions were answered prior to leaving the office today  She will follow-up with Dr Deanie Lennox in 2-3 months or sooner should the need arise  She is aware to call our office with any further questions or concerns  History of Present Illness   Reason for Visit: Hospital follow-up  Chief Complaint: "I am getting better "  HPI: Larry Orellana is a 71 y o  female who presents to the office today for follow-up after recent hospitalization for COPD exacerbation and hypoxia  Overall, she reports she is feeling better  Her sputum culture from her hospitalization ended up growing H  influenzae 1 day later and she was given a course of amoxicillin by Dr Deanie Lennox    She reports that her cough is further improving  She was also given samples of Trelegy Ellipta and is using them as prescribed  She feels they do help with her breathing  She continues on her prednisone taper and is weaning down as prescribed  She denies any chest pain or tightness  She denies any leg pain or swelling  Aside from the above, no other complaints  She is in the process of getting a new PCP  She has not smoked since she left the hospital     Review of Systems   All other systems reviewed and are negative  A full 12-point review of systems was completed and is negative except for those outlined in the HPI  Historical Information   Past Medical History:   Diagnosis Date   • Chronic pain 2023   • COPD (chronic obstructive pulmonary disease) (HCC)    • Disease of thyroid gland    • Hypertension      Past Surgical History:   Procedure Laterality Date   • CARDIAC SURGERY     • HIP SURGERY     • ORTHOPEDIC SURGERY     • THORACIC AORTIC ANEURYSM REPAIR  2016    ascending thoracic aortic repair with RCA bypass with SVG x 1     History reviewed  No pertinent family history    Social History   Social History     Substance and Sexual Activity   Alcohol Use Not Currently     Social History     Substance and Sexual Activity   Drug Use Yes   • Types: Marijuana, Cocaine     Social History     Tobacco Use   Smoking Status Former   • Packs/day: 1 00   • Years: 50 00   • Pack years: 50 00   • Types: Cigarettes   • Quit date: 3/1/2023   • Years since quittin 0   Smokeless Tobacco Never     E-Cigarette/Vaping   • E-Cigarette Use Never User      E-Cigarette/Vaping Substances       Meds/Allergies     Current Outpatient Medications:   •  albuterol (2 5 mg/3 mL) 0 083 % nebulizer solution, INHALE 1 VIAL EVERY 6 HOURS BY NEBULIZER AS NEEDED, Disp: , Rfl:   •  albuterol (PROVENTIL HFA,VENTOLIN HFA) 90 mcg/act inhaler, Inhale 2 puffs every 6 (six) hours as needed for wheezing, Disp: 18 g, Rfl: 5  •  amLODIPine (NORVASC) 5 mg tablet, Take 1 tablet (5 mg total) by mouth daily Do not start before March 7, 2023 , Disp: 30 tablet, Rfl: 1  •  amoxicillin (AMOXIL) 875 mg tablet, Take 1 tablet (875 mg total) by mouth 2 (two) times a day for 7 days, Disp: 14 tablet, Rfl: 0  •  atorvastatin (LIPITOR) 40 mg tablet, Take 40 mg by mouth daily, Disp: , Rfl:   •  buprenorphine-naloxone (SUBOXONE) 8-2 mg per SL tablet, Place 1 tablet under the tongue 2 (two) times a day, Disp: , Rfl:   •  clopidogrel (PLAVIX) 75 mg tablet, Take 75 mg by mouth daily, Disp: , Rfl:   •  cyclobenzaprine (FLEXERIL) 10 mg tablet, Take 10 mg by mouth daily at bedtime, Disp: , Rfl:   •  fluticasone-umeclidinium-vilanterol (Trelegy Ellipta) 100-62 5-25 mcg/actuation inhaler, Inhale 1 puff daily Rinse mouth after use , Disp: 60 blister, Rfl: 5  •  gabapentin (NEURONTIN) 300 mg capsule, Take 1 capsule (300 mg total) by mouth 3 (three) times a day, Disp: 90 capsule, Rfl: 1  •  guaiFENesin (MUCINEX) 600 mg 12 hr tablet, Take 1 tablet (600 mg total) by mouth 2 (two) times a day, Disp: 10 tablet, Rfl: 0  •  levothyroxine 125 mcg tablet, Take 125 mcg by mouth, Disp: , Rfl:   •  losartan (COZAAR) 50 mg tablet, Take 1 tablet (50 mg total) by mouth daily Do not start before March 7, 2023 , Disp: 30 tablet, Rfl: 1  •  melatonin 3 mg, Take 1 tablet (3 mg total) by mouth daily at bedtime, Disp: 30 tablet, Rfl: 0  •  metoprolol succinate (TOPROL-XL) 25 mg 24 hr tablet, Take 25 mg by mouth daily, Disp: , Rfl:   •  omeprazole (PriLOSEC) 40 MG capsule, Take 40 mg by mouth daily, Disp: , Rfl:   •  pantoprazole (PROTONIX) 40 mg tablet, ONE TABLET BY MOUTH EVERY DAY IN THE MORNING, Disp: , Rfl:   •  predniSONE 20 mg tablet, Take 2 tablets (40 mg total) by mouth daily for 3 days, THEN 1 5 tablets (30 mg total) daily for 3 days, THEN 1 tablet (20 mg total) daily for 3 days, THEN 0 5 tablets (10 mg total) daily for 3 days   Do not start before March 7, 2023 , Disp: 16 tablet, Rfl: 0  • promethazine-dextromethorphan (PHENERGAN-DM) 6 25-15 mg/5 mL oral syrup, TAKE 10 MILLILITER EVERY 8 HOURS ORALLY AS NEEDED, Disp: , Rfl:   •  traMADol (ULTRAM) 50 mg tablet, Take 50 mg by mouth every 6 (six) hours as needed for moderate pain, Disp: , Rfl:   •  traZODone (DESYREL) 50 mg tablet, Take 50 mg by mouth daily, Disp: , Rfl:   •  venlafaxine (EFFEXOR-XR) 150 mg 24 hr capsule, Take 225 mg by mouth daily, Disp: , Rfl:   •  ALPRAZolam (XANAX) 0 5 mg tablet, Take by mouth 3 (three) times a day as needed for anxiety (Patient not taking: Reported on 3/10/2023), Disp: , Rfl:   •  celecoxib (CeleBREX) 200 mg capsule, Take 200 mg by mouth daily (Patient not taking: Reported on 3/10/2023), Disp: , Rfl:   No Known Allergies    Vitals: Blood pressure 140/80, pulse 86, temperature (!) 97 3 °F (36 3 °C), temperature source Temporal, resp  rate 16, height 5' 4" (1 626 m), weight 84 4 kg (186 lb), SpO2 95 %  Body mass index is 31 93 kg/m²  Oxygen Therapy  SpO2: 95 %    Physical Exam:  Physical Exam  Vitals reviewed  Constitutional:       General: She is not in acute distress  Appearance: She is well-developed  She is not toxic-appearing or diaphoretic  Interventions: Nasal cannula in place  HENT:      Head: Normocephalic and atraumatic  Eyes:      General: No scleral icterus  Neck:      Trachea: No tracheal deviation  Cardiovascular:      Rate and Rhythm: Normal rate and regular rhythm  Heart sounds: S1 normal and S2 normal  No murmur heard  No friction rub  No gallop  Pulmonary:      Effort: Pulmonary effort is normal  No tachypnea, accessory muscle usage or respiratory distress  Breath sounds: Normal breath sounds  No stridor  No decreased breath sounds, wheezing, rhonchi or rales  Chest:      Chest wall: No tenderness  Abdominal:      General: Bowel sounds are normal  There is no distension  Palpations: Abdomen is soft  Tenderness: There is no abdominal tenderness  Musculoskeletal:      Cervical back: Neck supple  Right lower leg: No edema  Left lower leg: No edema  Skin:     General: Skin is warm and dry  Findings: No rash  Neurological:      Mental Status: She is alert and oriented to person, place, and time  GCS: GCS eye subscore is 4  GCS verbal subscore is 5  GCS motor subscore is 6  Psychiatric:         Speech: Speech normal          Behavior: Behavior normal  Behavior is cooperative  No new labs or diagnostic testing    Today's Testing:     Walk Test: Ben Guerrero ambulated in the office today  She had a resting room air saturation of 92% and ambulated throughout the office maintaining saturations 90% or greater  JAZMINE Villaseñor  [facility]        Portions of the record may have been created with voice recognition software  Occasional wrong word or "sound a like" substitutions may have occurred due to the inherent limitations of voice recognition software  Read the chart carefully and recognize, using context, where substitutions have occurred or contact the dictating provider

## 2023-03-10 NOTE — ASSESSMENT & PLAN NOTE
Had PFTs in the past, at least 5 years ago  Showed mild COPD reportedly  She has used Breo in the past, but transitioned to [employer] at discharge this week  She will continue with Trelegy and I sent a prescription to her pharmacy to evaluate cost   She will also continue with her albuterol via nebulizer and I gave her new nebulizer supplies  Finally, I have given her an albuterol rescue inhaler  She is aware of proper use and technique of all inhaled therapies  She will undergo complete PFTs to further delineate her underlying lung disease

## 2023-03-20 ENCOUNTER — TELEPHONE (OUTPATIENT)
Dept: SLEEP CENTER | Facility: CLINIC | Age: 73
End: 2023-03-20

## 2023-03-20 NOTE — TELEPHONE ENCOUNTER
----- Message from Williams Miner DO sent at 3/20/2023  8:44 AM EDT -----  approved  ----- Message -----  From: Harjeet Treviño  Sent: 3/15/2023   8:11 AM EDT  To: Sleep Medicine [place] Provider    This Diagnostic sleep study needs approval      If approved please sign and return to clerical pool  If denied please include reasons why  Also provide alternative testing if warranted  Please sign and return to clerical pool

## 2023-03-24 ENCOUNTER — HOSPITAL ENCOUNTER (OUTPATIENT)
Dept: PULMONOLOGY | Facility: HOSPITAL | Age: 73
End: 2023-03-24

## 2023-03-24 DIAGNOSIS — J44.1 CHRONIC OBSTRUCTIVE PULMONARY DISEASE WITH ACUTE EXACERBATION (HCC): ICD-10-CM

## 2023-03-24 RX ORDER — ALBUTEROL SULFATE 2.5 MG/3ML
2.5 SOLUTION RESPIRATORY (INHALATION) ONCE
Status: COMPLETED | OUTPATIENT
Start: 2023-03-24 | End: 2023-03-24

## 2023-03-24 RX ADMIN — ALBUTEROL SULFATE 2.5 MG: 2.5 SOLUTION RESPIRATORY (INHALATION) at 13:58

## 2023-03-28 ENCOUNTER — TELEPHONE (OUTPATIENT)
Dept: PULMONOLOGY | Facility: CLINIC | Age: 73
End: 2023-03-28

## 2023-03-28 NOTE — TELEPHONE ENCOUNTER
----- Message from Lift sent at 3/27/2023  7:35 AM EDT -----  Please let Ramonita Lavon know that her PFTs were consistent with moderate COPD  She should continue her inhalers as prescribed and follow-up as planned

## 2023-04-05 ENCOUNTER — OFFICE VISIT (OUTPATIENT)
Dept: PULMONOLOGY | Facility: MEDICAL CENTER | Age: 73
End: 2023-04-05

## 2023-04-05 VITALS
TEMPERATURE: 97.8 F | SYSTOLIC BLOOD PRESSURE: 138 MMHG | WEIGHT: 183 LBS | BODY MASS INDEX: 31.24 KG/M2 | DIASTOLIC BLOOD PRESSURE: 92 MMHG | RESPIRATION RATE: 12 BRPM | OXYGEN SATURATION: 95 % | HEART RATE: 93 BPM | HEIGHT: 64 IN

## 2023-04-05 DIAGNOSIS — J40 BRONCHITIS: Primary | ICD-10-CM

## 2023-04-05 DIAGNOSIS — F17.201 TOBACCO ABUSE, IN REMISSION: ICD-10-CM

## 2023-04-05 DIAGNOSIS — G47.33 OSA (OBSTRUCTIVE SLEEP APNEA): ICD-10-CM

## 2023-04-05 DIAGNOSIS — I20.8 ANGINA AT REST (HCC): ICD-10-CM

## 2023-04-05 DIAGNOSIS — J20.1 ACUTE BRONCHITIS DUE TO HAEMOPHILUS INFLUENZAE: ICD-10-CM

## 2023-04-05 DIAGNOSIS — J43.2 CENTRILOBULAR EMPHYSEMA (HCC): Chronic | ICD-10-CM

## 2023-04-05 RX ORDER — NICOTINE 21 MG/24HR
1 PATCH, TRANSDERMAL 24 HOURS TRANSDERMAL DAILY
COMMUNITY

## 2023-04-05 RX ORDER — DEXTROMETHORPHAN HYDROBROMIDE AND PROMETHAZINE HYDROCHLORIDE 15; 6.25 MG/5ML; MG/5ML
5 SYRUP ORAL 4 TIMES DAILY PRN
Qty: 240 ML | Refills: 0 | Status: SHIPPED | OUTPATIENT
Start: 2023-04-05

## 2023-04-05 RX ORDER — FLUTICASONE FUROATE, UMECLIDINIUM BROMIDE AND VILANTEROL TRIFENATATE 100; 62.5; 25 UG/1; UG/1; UG/1
1 POWDER RESPIRATORY (INHALATION) DAILY
Qty: 60 BLISTER | Refills: 5 | Status: SHIPPED | OUTPATIENT
Start: 2023-04-05 | End: 2023-05-05

## 2023-04-05 NOTE — PROGRESS NOTES
Pulmonary Follow Up Note   Hector Aguilar 71 y o  female MRN: [id number]  4/5/2023      Assessment/Plan:     Tobacco abuse, in remission  The patient has a 50-pack-year smoking history  She states that she quit smoking cigarettes on March 1, 2023 just prior to her admission to the hospital   She uses nicotine patch as needed but not all the time  I congratulated her on her achievement  She is committed to staying smoke-free at this time  Angina at rest [facility])  She has significant cardiac history including a CABG and coronary artery disease  She also has a history of substance abuse and was positive for cocaine on her last admission  Discussed that if she is having chest pain or pressure that she should be seen in the emergency department  I did discuss with patient signs and symptoms of a heart attack and how these can be different for women  She verbalized understanding  Acute bronchitis due to Haemophilus influenzae  The patient continues to have a residual cough however this has improved  She has no wheezes on exam today and her oxygen is 95% on room air  No steroids indicated at this time  Will refill her promethazine-dextromethorphan  GELA (obstructive sleep apnea)  Patient has a sleep study scheduled for July  She currently uses 2L of oxygen via NC at bedtime  Centrilobular emphysema (HCC)  Currently without exacerbation  I did refill her Trelegy at today's visit  Lungs are clear to auscultation with good air movement, no indication for steroids at this time  Visit orders:    Problem List Items Addressed This Visit        Respiratory    Centrilobular emphysema ([address] ) (Chronic)     Currently without exacerbation  I did refill her Trelegy at today's visit  Lungs are clear to auscultation with good air movement, no indication for steroids at this time           Relevant Medications    promethazine-dextromethorphan (PHENERGAN-DM) 6 25-15 mg/5 mL oral syrup fluticasone-umeclidinium-vilanterol (Trelegy Ellipta) 100-62 5-25 mcg/actuation inhaler    GELA (obstructive sleep apnea)     Patient has a sleep study scheduled for July  She currently uses 2L of oxygen via NC at bedtime  Acute bronchitis due to Haemophilus influenzae     The patient continues to have a residual cough however this has improved  She has no wheezes on exam today and her oxygen is 95% on room air  No steroids indicated at this time  Will refill her promethazine-dextromethorphan  Relevant Medications    promethazine-dextromethorphan (PHENERGAN-DM) 6 25-15 mg/5 mL oral syrup    fluticasone-umeclidinium-vilanterol (Trelegy Ellipta) 100-62 5-25 mcg/actuation inhaler       Cardiovascular and Mediastinum    Angina at rest [facility])     She has significant cardiac history including a CABG and coronary artery disease  She also has a history of substance abuse and was positive for cocaine on her last admission  Discussed that if she is having chest pain or pressure that she should be seen in the emergency department  I did discuss with patient signs and symptoms of a heart attack and how these can be different for women  She verbalized understanding  Other    Tobacco abuse, in remission     The patient has a 50-pack-year smoking history  She states that she quit smoking cigarettes on March 1, 2023 just prior to her admission to the hospital   She uses nicotine patch as needed but not all the time  I congratulated her on her achievement  She is committed to staying smoke-free at this time           Relevant Medications    nicotine (NICODERM CQ) 7 mg/24hr TD 24 hr patch    nicotine (NICODERM CQ) 14 mg/24hr TD 24 hr patch   Other Visit Diagnoses     Bronchitis    -  Primary    Relevant Medications    promethazine-dextromethorphan (PHENERGAN-DM) 6 25-15 mg/5 mL oral syrup    fluticasone-umeclidinium-vilanterol (Trelegy Ellipta) 100-62 5-25 mcg/actuation inhaler          Return in "about 4 months (around 8/5/2023), or if symptoms worsen or fail to improve  History of Present Illness   HPI:  Alaina Wilkes is a 71 y o  female who presents today for a sick visit  Patient states that over this past weekend, she developed pressure on her chest that \"felt like a truck hit my chest \"  She states that \"it did not feel heart related,\" she felt like this was all in her lungs  She also states that she would check her pulse ox and occasionally she was in the low 90s  She did put her oxygen on at 2 L and that seemed to help  She has been using her Trelegy as well as her nebulizer 3 times a day  This would cause her to cough up any phlegm  She is starting to feel better at today's visit and she is requesting more cough medication, and prednisone, as well as a refill on her Trelegy Zeyad Da Silva was hospitalized at [address] [place] [address] from 3/1/2023 through 3/6/2023 for acute respiratory failure with hypoxia and hypercapnia secondary to acute bronchitis due to haemophilus influenza  [place] She was also worked up for angina at rest at this time  She was noted to be positive for cocaine on this is much as low as well  She was encouraged to quit smoking and she was sent home on a prednisone taper starting at 40 mg as well as a course of amoxicillin  She had follow-up in the pulmonary office on March 10, 2023 and at that time, promethazine-dextromethorphan was prescribed for her cough  Also at that time, she was smoke-free since she left for the hospital     The patient continues to be smoke-free and uses a nicotine patch only if needed  At her last follow-up visit, it was suggested that she undergo a sleep study and she has a scheduled for July  She wears 2 L of oxygen at bedtime  Review of Systems   Constitutional: Negative for activity change, chills, fatigue and fever  HENT: Positive for congestion and postnasal drip      Respiratory: Positive for cough, chest tightness and " shortness of breath  Negative for wheezing  Cardiovascular: Negative for chest pain, palpitations and leg swelling  All other systems reviewed and are negative  Medical, Family and Social history reviewed and updated as appropriate    Historical Information   Past Medical History:   Diagnosis Date   • Chronic pain 2023   • COPD (chronic obstructive pulmonary disease) ([facility] [address] )    • Disease of thyroid gland    • Hypertension      Past Surgical History:   Procedure Laterality Date   • CARDIAC SURGERY     • HIP SURGERY     • ORTHOPEDIC SURGERY     • THORACIC AORTIC ANEURYSM REPAIR  2016    ascending thoracic aortic repair with RCA bypass with SVG x 1     History reviewed  No pertinent family history      Social History     Tobacco Use   Smoking Status Former   • Packs/day: 1 00   • Years: 50 00   • Pack years: 50 00   • Types: Cigarettes   • Quit date: 3/1/2023   • Years since quittin 0   Smokeless Tobacco Never         Meds/Allergies     Current Outpatient Medications:   •  albuterol (2 5 mg/3 mL) 0 083 % nebulizer solution, INHALE 1 VIAL EVERY 6 HOURS BY NEBULIZER AS NEEDED, Disp: , Rfl:   •  albuterol (PROVENTIL HFA,VENTOLIN HFA) 90 mcg/act inhaler, Inhale 2 puffs every 6 (six) hours as needed for wheezing, Disp: 18 g, Rfl: 5  •  amLODIPine (NORVASC) 5 mg tablet, Take 1 tablet (5 mg total) by mouth daily Do not start before 2023 , Disp: 30 tablet, Rfl: 1  •  atorvastatin (LIPITOR) 40 mg tablet, Take 40 mg by mouth daily, Disp: , Rfl:   •  buprenorphine-naloxone (SUBOXONE) 8-2 mg per SL tablet, Place 1 tablet under the tongue 2 (two) times a day, Disp: , Rfl:   •  clopidogrel (PLAVIX) 75 mg tablet, Take 75 mg by mouth daily, Disp: , Rfl:   •  cyclobenzaprine (FLEXERIL) 10 mg tablet, Take 10 mg by mouth daily at bedtime, Disp: , Rfl:   •  fluticasone-umeclidinium-vilanterol (Trelegy Ellipta) 100-62 5-25 mcg/actuation inhaler, Inhale 1 puff daily Rinse mouth after use , Disp: 60 blister, "Rfl: 5  •  gabapentin (NEURONTIN) 300 mg capsule, Take 1 capsule (300 mg total) by mouth 3 (three) times a day, Disp: 90 capsule, Rfl: 1  •  guaiFENesin (MUCINEX) 600 mg 12 hr tablet, Take 1 tablet (600 mg total) by mouth 2 (two) times a day, Disp: 10 tablet, Rfl: 0  •  levothyroxine 125 mcg tablet, Take 125 mcg by mouth, Disp: , Rfl:   •  losartan (COZAAR) 50 mg tablet, Take 1 tablet (50 mg total) by mouth daily Do not start before March 7, 2023 , Disp: 30 tablet, Rfl: 1  •  melatonin 3 mg, Take 1 tablet (3 mg total) by mouth daily at bedtime, Disp: 30 tablet, Rfl: 0  •  metoprolol succinate (TOPROL-XL) 25 mg 24 hr tablet, Take 25 mg by mouth daily, Disp: , Rfl:   •  nicotine (NICODERM CQ) 14 mg/24hr TD 24 hr patch, Place 1 patch on the skin daily, Disp: , Rfl:   •  nicotine (NICODERM CQ) 7 mg/24hr TD 24 hr patch, APPLY ONE PATCH TRANSDERMAL EVERY DAY, Disp: , Rfl:   •  omeprazole (PriLOSEC) 40 MG capsule, Take 40 mg by mouth daily, Disp: , Rfl:   •  pantoprazole (PROTONIX) 40 mg tablet, ONE TABLET BY MOUTH EVERY DAY IN THE MORNING, Disp: , Rfl:   •  promethazine-dextromethorphan (PHENERGAN-DM) 6 25-15 mg/5 mL oral syrup, Take 5 mL by mouth 4 (four) times a day as needed for cough, Disp: 240 mL, Rfl: 0  •  traMADol (ULTRAM) 50 mg tablet, Take 50 mg by mouth every 6 (six) hours as needed for moderate pain, Disp: , Rfl:   •  traZODone (DESYREL) 50 mg tablet, Take 50 mg by mouth daily, Disp: , Rfl:   •  venlafaxine (EFFEXOR-XR) 150 mg 24 hr capsule, Take 225 mg by mouth daily, Disp: , Rfl:   •  ALPRAZolam (XANAX) 0 5 mg tablet, Take by mouth 3 (three) times a day as needed for anxiety (Patient not taking: Reported on 3/10/2023), Disp: , Rfl:   •  celecoxib (CeleBREX) 200 mg capsule, Take 200 mg by mouth daily (Patient not taking: Reported on 3/10/2023), Disp: , Rfl:   No Known Allergies    Vitals: Blood pressure 138/92, pulse 93, temperature 97 8 °F (36 6 °C), temperature source Temporal, resp   rate 12, height 5' 4\" " (1 626 m), weight 83 kg (183 lb), SpO2 95 %  Body mass index is 31 41 kg/m²  Oxygen Therapy  SpO2: 95 %      Physical Exam  Vitals reviewed  Constitutional:       General: She is not in acute distress  Appearance: She is not ill-appearing or toxic-appearing  HENT:      Head: Normocephalic and atraumatic  Nose: Congestion present  Mouth/Throat:      Pharynx: Oropharynx is clear  Eyes:      Conjunctiva/sclera: Conjunctivae normal    Cardiovascular:      Rate and Rhythm: Normal rate and regular rhythm  Heart sounds: Normal heart sounds  Pulmonary:      Effort: Pulmonary effort is normal  No tachypnea, accessory muscle usage or respiratory distress  Breath sounds: Normal air entry  No stridor or decreased air movement  No decreased breath sounds, wheezing, rhonchi or rales  Chest:      Chest wall: No tenderness  Abdominal:      Palpations: Abdomen is soft  Musculoskeletal:         General: Normal range of motion  Cervical back: Normal range of motion  Skin:     General: Skin is warm and dry  Neurological:      General: No focal deficit present  Mental Status: She is alert and oriented to person, place, and time  Psychiatric:         Mood and Affect: Mood normal          Behavior: Behavior normal          Labs: I have personally reviewed pertinent lab results  Lab Results   Component Value Date    WBC 14 76 (H) 03/05/2023    HGB 13 3 03/05/2023    HCT 41 7 03/05/2023    MCV 95 03/05/2023     03/05/2023     Lab Results   Component Value Date    CALCIUM 9 7 03/05/2023    K 4 7 03/05/2023    CO2 31 03/05/2023     03/05/2023    BUN 23 03/05/2023    CREATININE 0 92 03/05/2023     No results found for: IGE  Lab Results   Component Value Date    ALT 19 03/05/2023    AST 20 03/05/2023    ALKPHOS 34 03/05/2023       Imaging and other studies: I have personally reviewed pertinent reports     and I have personally reviewed pertinent films in PACS     3/1/23 CT Chest: Increased density within the trachea  Multiple pulmonary nodules measuring up to 2 to 3 mm  3/1/23 CXR: Small nodular opacity at left lung base, corresponding to an area of likely atelectasis  Pulmonary function testing:  Performed 3/24/23 and personally interpreted  FEV1/FVC ratio 67%   FEV1 71% predicted  FVC 82% predicted  No significant response to bronchodilators   % predicted   % predicted  DLCO corrected for hemoglobin 79 % predicted  Moderate obstructive airflow defect  Normal lung volumes  Normal diffusion capacity  Other Studies: I have personally reviewed pertinent reports

## 2023-04-05 NOTE — ASSESSMENT & PLAN NOTE
Currently without exacerbation  I did refill her Trelegy at today's visit  Lungs are clear to auscultation with good air movement, no indication for steroids at this time

## 2023-04-05 NOTE — ASSESSMENT & PLAN NOTE
The patient continues to have a residual cough however this has improved  She has no wheezes on exam today and her oxygen is 95% on room air  No steroids indicated at this time  Will refill her promethazine-dextromethorphan

## 2023-04-05 NOTE — ASSESSMENT & PLAN NOTE
The patient has a 50-pack-year smoking history  She states that she quit smoking cigarettes on March 1, 2023 just prior to her admission to the hospital   She uses nicotine patch as needed but not all the time  I congratulated her on her achievement  She is committed to staying smoke-free at this time

## 2023-04-05 NOTE — ASSESSMENT & PLAN NOTE
She has significant cardiac history including a CABG and coronary artery disease  She also has a history of substance abuse and was positive for cocaine on her last admission  Discussed that if she is having chest pain or pressure that she should be seen in the emergency department  I did discuss with patient signs and symptoms of a heart attack and how these can be different for women  She verbalized understanding

## 2023-04-24 ENCOUNTER — HOSPITAL ENCOUNTER (INPATIENT)
Facility: HOSPITAL | Age: 73
LOS: 4 days | Discharge: HOME | End: 2023-04-28
Attending: EMERGENCY MEDICINE | Admitting: INTERNAL MEDICINE

## 2023-04-24 ENCOUNTER — APPOINTMENT (INPATIENT)
Dept: RADIOLOGY | Facility: HOSPITAL | Age: 73
End: 2023-04-24

## 2023-04-24 ENCOUNTER — APPOINTMENT (EMERGENCY)
Dept: RADIOLOGY | Facility: HOSPITAL | Age: 73
End: 2023-04-24

## 2023-04-24 DIAGNOSIS — R41.0 CONFUSION: ICD-10-CM

## 2023-04-24 DIAGNOSIS — J96.02 ACUTE HYPERCAPNIC RESPIRATORY FAILURE (HCC): Primary | ICD-10-CM

## 2023-04-24 DIAGNOSIS — J44.1 CHRONIC OBSTRUCTIVE PULMONARY DISEASE WITH ACUTE EXACERBATION (HCC): ICD-10-CM

## 2023-04-24 PROBLEM — J96.22 ACUTE ON CHRONIC RESPIRATORY FAILURE WITH HYPOXIA AND HYPERCAPNIA (HCC): Status: ACTIVE | Noted: 2023-04-24

## 2023-04-24 PROBLEM — J96.21 ACUTE ON CHRONIC RESPIRATORY FAILURE WITH HYPOXIA AND HYPERCAPNIA (HCC): Status: ACTIVE | Noted: 2023-04-24

## 2023-04-24 PROBLEM — F17.201 TOBACCO ABUSE, IN REMISSION: Chronic | Status: ACTIVE | Noted: 2023-04-05

## 2023-04-24 LAB
ALBUMIN SERPL BCP-MCNC: 3.9 G/DL (ref 3.5–5)
ALP SERPL-CCNC: 42 U/L (ref 34–104)
ALT SERPL W P-5'-P-CCNC: 43 U/L (ref 7–52)
AMMONIA PLAS-SCNC: 16 UMOL/L (ref 18–72)
AMPHETAMINES SERPL QL SCN: NEGATIVE
ANION GAP SERPL CALCULATED.3IONS-SCNC: 4 MMOL/L (ref 4–13)
ARTERIAL PATENCY WRIST A: YES
AST SERPL W P-5'-P-CCNC: 51 U/L (ref 13–39)
BARBITURATES UR QL: NEGATIVE
BASE EX.OXY STD BLDV CALC-SCNC: 49.6 % (ref 60–80)
BASE EX.OXY STD BLDV CALC-SCNC: 71.4 % (ref 60–80)
BASE EXCESS BLDA CALC-SCNC: -2.1 MMOL/L
BASE EXCESS BLDV CALC-SCNC: -0.3 MMOL/L
BASE EXCESS BLDV CALC-SCNC: 1.5 MMOL/L
BASOPHILS # BLD AUTO: 0.03 THOUSANDS/ΜL (ref 0–0.1)
BASOPHILS NFR BLD AUTO: 0 % (ref 0–1)
BENZODIAZ UR QL: POSITIVE
BILIRUB SERPL-MCNC: 0.39 MG/DL (ref 0.2–1)
BILIRUB UR QL STRIP: NEGATIVE
BNP SERPL-MCNC: 29 PG/ML (ref 0–100)
BODY TEMPERATURE: 97.7 DEGREES FEHRENHEIT
BUN SERPL-MCNC: 20 MG/DL (ref 5–25)
CALCIUM SERPL-MCNC: 8.8 MG/DL (ref 8.4–10.2)
CARDIAC TROPONIN I PNL SERPL HS: 3 NG/L
CHLORIDE SERPL-SCNC: 104 MMOL/L (ref 96–108)
CLARITY UR: NORMAL
CO2 SERPL-SCNC: 30 MMOL/L (ref 21–32)
COCAINE UR QL: POSITIVE
COLOR UR: NORMAL
CREAT SERPL-MCNC: 1.21 MG/DL (ref 0.6–1.3)
EOSINOPHIL # BLD AUTO: 0.36 THOUSAND/ΜL (ref 0–0.61)
EOSINOPHIL NFR BLD AUTO: 4 % (ref 0–6)
ERYTHROCYTE [DISTWIDTH] IN BLOOD BY AUTOMATED COUNT: 14.6 % (ref 11.6–15.1)
FLUAV RNA RESP QL NAA+PROBE: NEGATIVE
FLUBV RNA RESP QL NAA+PROBE: NEGATIVE
GFR SERPL CREATININE-BSD FRML MDRD: 45 ML/MIN/1.73SQ M
GLUCOSE SERPL-MCNC: 111 MG/DL (ref 65–140)
GLUCOSE UR STRIP-MCNC: NEGATIVE MG/DL
HCO3 BLDA-SCNC: 24 MMOL/L (ref 22–28)
HCO3 BLDV-SCNC: 27.5 MMOL/L (ref 24–30)
HCO3 BLDV-SCNC: 29.3 MMOL/L (ref 24–30)
HCT VFR BLD AUTO: 37.9 % (ref 34.8–46.1)
HGB BLD-MCNC: 11.7 G/DL (ref 11.5–15.4)
HGB UR QL STRIP.AUTO: NEGATIVE
IMM GRANULOCYTES # BLD AUTO: 0.03 THOUSAND/UL (ref 0–0.2)
IMM GRANULOCYTES NFR BLD AUTO: 0 % (ref 0–2)
IPAP: 15
KETONES UR STRIP-MCNC: NEGATIVE MG/DL
LEUKOCYTE ESTERASE UR QL STRIP: NEGATIVE
LYMPHOCYTES # BLD AUTO: 1.55 THOUSANDS/ΜL (ref 0.6–4.47)
LYMPHOCYTES NFR BLD AUTO: 17 % (ref 14–44)
MCH RBC QN AUTO: 30.8 PG (ref 26.8–34.3)
MCHC RBC AUTO-ENTMCNC: 30.9 G/DL (ref 31.4–37.4)
MCV RBC AUTO: 100 FL (ref 82–98)
METHADONE UR QL: NEGATIVE
MONOCYTES # BLD AUTO: 0.71 THOUSAND/ΜL (ref 0.17–1.22)
MONOCYTES NFR BLD AUTO: 8 % (ref 4–12)
NEUTROPHILS # BLD AUTO: 6.73 THOUSANDS/ΜL (ref 1.85–7.62)
NEUTS SEG NFR BLD AUTO: 71 % (ref 43–75)
NITRITE UR QL STRIP: NEGATIVE
NON VENT- BIPAP: ABNORMAL
NRBC BLD AUTO-RTO: 0 /100 WBCS
O2 CT BLDA-SCNC: 16 ML/DL (ref 16–23)
O2 CT BLDV-SCNC: 12.2 ML/DL
O2 CT BLDV-SCNC: 8.8 ML/DL
OPIATES UR QL SCN: NEGATIVE
OXYCODONE+OXYMORPHONE UR QL SCN: NEGATIVE
OXYHGB MFR BLDA: 95.3 % (ref 94–97)
PCO2 BLDA: 46.5 MM HG (ref 36–44)
PCO2 BLDV: 59.5 MM HG (ref 42–50)
PCO2 BLDV: 61.8 MM HG (ref 42–50)
PCO2 TEMP ADJ BLDA: 45.5 MM HG (ref 36–44)
PCP UR QL: NEGATIVE
PEEP MAX SETTING VENT: 5 CM[H2O]
PH BLD: 7.34 [PH] (ref 7.35–7.45)
PH BLDA: 7.33 [PH] (ref 7.35–7.45)
PH BLDV: 7.28 [PH] (ref 7.3–7.4)
PH BLDV: 7.29 [PH] (ref 7.3–7.4)
PH UR STRIP.AUTO: 6 [PH]
PLATELET # BLD AUTO: 232 THOUSANDS/UL (ref 149–390)
PMV BLD AUTO: 9.1 FL (ref 8.9–12.7)
PO2 BLD: 82.3 MM HG (ref 75–129)
PO2 BLDA: 85 MM HG (ref 75–129)
PO2 BLDV: 29.7 MM HG (ref 35–45)
PO2 BLDV: 40.6 MM HG (ref 35–45)
POTASSIUM SERPL-SCNC: 4.2 MMOL/L (ref 3.5–5.3)
PROCALCITONIN SERPL-MCNC: 0.06 NG/ML
PROT SERPL-MCNC: 7.1 G/DL (ref 6.4–8.4)
PROT UR STRIP-MCNC: NEGATIVE MG/DL
RBC # BLD AUTO: 3.8 MILLION/UL (ref 3.81–5.12)
RSV RNA RESP QL NAA+PROBE: NEGATIVE
SARS-COV-2 RNA RESP QL NAA+PROBE: NEGATIVE
SODIUM SERPL-SCNC: 138 MMOL/L (ref 135–147)
SP GR UR STRIP.AUTO: 1.02 (ref 1–1.03)
SPECIMEN SOURCE: ABNORMAL
THC UR QL: NEGATIVE
UROBILINOGEN UR QL STRIP.AUTO: 0.2 E.U./DL
VENT BIPAP FIO2: 40 %
WBC # BLD AUTO: 9.41 THOUSAND/UL (ref 4.31–10.16)

## 2023-04-24 RX ORDER — FLUTICASONE FUROATE AND VILANTEROL 100; 25 UG/1; UG/1
1 POWDER RESPIRATORY (INHALATION) DAILY
Status: DISCONTINUED | OUTPATIENT
Start: 2023-04-25 | End: 2023-04-28 | Stop reason: HOSPADM

## 2023-04-24 RX ORDER — CLOPIDOGREL BISULFATE 75 MG/1
75 TABLET ORAL DAILY
Status: DISCONTINUED | OUTPATIENT
Start: 2023-04-25 | End: 2023-04-28 | Stop reason: HOSPADM

## 2023-04-24 RX ORDER — BUPRENORPHINE AND NALOXONE 8; 2 MG/1; MG/1
8 FILM, SOLUBLE BUCCAL; SUBLINGUAL DAILY
Status: DISCONTINUED | OUTPATIENT
Start: 2023-04-25 | End: 2023-04-28 | Stop reason: HOSPADM

## 2023-04-24 RX ORDER — GABAPENTIN 300 MG/1
300 CAPSULE ORAL 3 TIMES DAILY
Status: DISCONTINUED | OUTPATIENT
Start: 2023-04-24 | End: 2023-04-24

## 2023-04-24 RX ORDER — LANOLIN ALCOHOL/MO/W.PET/CERES
3 CREAM (GRAM) TOPICAL
Status: DISCONTINUED | OUTPATIENT
Start: 2023-04-24 | End: 2023-04-28 | Stop reason: HOSPADM

## 2023-04-24 RX ORDER — METOPROLOL SUCCINATE 25 MG/1
25 TABLET, EXTENDED RELEASE ORAL DAILY
Status: DISCONTINUED | OUTPATIENT
Start: 2023-04-25 | End: 2023-04-28 | Stop reason: HOSPADM

## 2023-04-24 RX ORDER — SODIUM CHLORIDE FOR INHALATION 0.9 %
3 VIAL, NEBULIZER (ML) INHALATION
Status: DISCONTINUED | OUTPATIENT
Start: 2023-04-24 | End: 2023-04-25

## 2023-04-24 RX ORDER — ENOXAPARIN SODIUM 100 MG/ML
40 INJECTION SUBCUTANEOUS DAILY
Status: DISCONTINUED | OUTPATIENT
Start: 2023-04-25 | End: 2023-04-28 | Stop reason: HOSPADM

## 2023-04-24 RX ORDER — PANTOPRAZOLE SODIUM 40 MG/1
40 TABLET, DELAYED RELEASE ORAL
Status: DISCONTINUED | OUTPATIENT
Start: 2023-04-25 | End: 2023-04-28 | Stop reason: HOSPADM

## 2023-04-24 RX ORDER — GUAIFENESIN 600 MG/1
600 TABLET, EXTENDED RELEASE ORAL 2 TIMES DAILY
Status: DISCONTINUED | OUTPATIENT
Start: 2023-04-24 | End: 2023-04-28 | Stop reason: HOSPADM

## 2023-04-24 RX ORDER — LOSARTAN POTASSIUM 50 MG/1
50 TABLET ORAL DAILY
Status: DISCONTINUED | OUTPATIENT
Start: 2023-04-25 | End: 2023-04-28 | Stop reason: HOSPADM

## 2023-04-24 RX ORDER — AMLODIPINE BESYLATE 5 MG/1
5 TABLET ORAL DAILY
Status: DISCONTINUED | OUTPATIENT
Start: 2023-04-25 | End: 2023-04-28 | Stop reason: HOSPADM

## 2023-04-24 RX ORDER — ATORVASTATIN CALCIUM 40 MG/1
40 TABLET, FILM COATED ORAL
Status: DISCONTINUED | OUTPATIENT
Start: 2023-04-25 | End: 2023-04-28 | Stop reason: HOSPADM

## 2023-04-24 RX ORDER — POLYETHYLENE GLYCOL 3350 17 G/17G
17 POWDER, FOR SOLUTION ORAL DAILY
Status: DISCONTINUED | OUTPATIENT
Start: 2023-04-25 | End: 2023-04-28 | Stop reason: HOSPADM

## 2023-04-24 RX ORDER — AMOXICILLIN 250 MG
2 CAPSULE ORAL 2 TIMES DAILY
Status: DISCONTINUED | OUTPATIENT
Start: 2023-04-24 | End: 2023-04-28 | Stop reason: HOSPADM

## 2023-04-24 RX ORDER — LEVOTHYROXINE SODIUM 0.12 MG/1
125 TABLET ORAL
Status: DISCONTINUED | OUTPATIENT
Start: 2023-04-25 | End: 2023-04-28 | Stop reason: HOSPADM

## 2023-04-24 RX ORDER — LEVALBUTEROL INHALATION SOLUTION 1.25 MG/3ML
1.25 SOLUTION RESPIRATORY (INHALATION)
Status: DISCONTINUED | OUTPATIENT
Start: 2023-04-24 | End: 2023-04-26

## 2023-04-24 RX ORDER — SODIUM CHLORIDE FOR INHALATION 0.9 %
3 VIAL, NEBULIZER (ML) INHALATION ONCE
Status: COMPLETED | OUTPATIENT
Start: 2023-04-24 | End: 2023-04-24

## 2023-04-24 RX ORDER — METHYLPREDNISOLONE SODIUM SUCCINATE 125 MG/2ML
125 INJECTION, POWDER, LYOPHILIZED, FOR SOLUTION INTRAMUSCULAR; INTRAVENOUS ONCE
Status: COMPLETED | OUTPATIENT
Start: 2023-04-24 | End: 2023-04-24

## 2023-04-24 RX ADMIN — ISODIUM CHLORIDE 3 ML: 0.03 SOLUTION RESPIRATORY (INHALATION) at 15:42

## 2023-04-24 RX ADMIN — IPRATROPIUM BROMIDE 1 MG: 0.5 SOLUTION RESPIRATORY (INHALATION) at 15:42

## 2023-04-24 RX ADMIN — LEVALBUTEROL HYDROCHLORIDE 1.25 MG: 1.25 SOLUTION RESPIRATORY (INHALATION) at 20:59

## 2023-04-24 RX ADMIN — METHYLPREDNISOLONE SODIUM SUCCINATE 125 MG: 125 INJECTION, POWDER, FOR SOLUTION INTRAMUSCULAR; INTRAVENOUS at 15:31

## 2023-04-24 RX ADMIN — MELATONIN TAB 3 MG 3 MG: 3 TAB at 22:09

## 2023-04-24 RX ADMIN — SENNOSIDES AND DOCUSATE SODIUM 2 TABLET: 50; 8.6 TABLET ORAL at 22:09

## 2023-04-24 RX ADMIN — ISODIUM CHLORIDE 3 ML: 0.03 SOLUTION RESPIRATORY (INHALATION) at 20:59

## 2023-04-24 RX ADMIN — GUAIFENESIN 600 MG: 600 TABLET, EXTENDED RELEASE ORAL at 22:10

## 2023-04-24 RX ADMIN — ALBUTEROL SULFATE 10 MG: 2.5 SOLUTION RESPIRATORY (INHALATION) at 15:42

## 2023-04-24 NOTE — ED PROVIDER NOTES
History  Chief Complaint   Patient presents with    Shortness of Breath     Increase shortness of breath with low oxygen     HPI  Patient is a 41-year-old female history of COPD, chronic pain on tramadol presenting for evaluation of fatigue, hypoxia, confusion  Per patient's daughter, patient was a lot more active yesterday and was walking around shopping, felt very fatigued after all of this  Patient was difficult to wake up this morning and has been confused throughout the day today  Patient complains of minor shortness of breath, has a chronic cough which has not recently changed, denies fevers, chills, headache, neck pain, focal weakness or numbness  Per patient's daughter, patient is acting similarly to about a month and a half ago when she was admitted for COPD exacerbation with hypercapnia and required treatment with BiPAP  Prior to Admission Medications   Prescriptions Last Dose Informant Patient Reported? Taking? ALPRAZolam (XANAX) 0 5 mg tablet 4/23/2023  Yes Yes   Sig: Take by mouth 3 (three) times a day as needed for anxiety   albuterol (2 5 mg/3 mL) 0 083 % nebulizer solution 4/23/2023  Yes Yes   Sig: INHALE 1 VIAL EVERY 6 HOURS BY NEBULIZER AS NEEDED   albuterol (PROVENTIL HFA,VENTOLIN HFA) 90 mcg/act inhaler 4/23/2023  No Yes   Sig: Inhale 2 puffs every 6 (six) hours as needed for wheezing   amLODIPine (NORVASC) 5 mg tablet 4/24/2023  No Yes   Sig: Take 1 tablet (5 mg total) by mouth daily Do not start before March 7, 2023     atorvastatin (LIPITOR) 40 mg tablet 4/24/2023  Yes Yes   Sig: Take 40 mg by mouth daily   buprenorphine-naloxone (SUBOXONE) 8-2 mg per SL tablet 4/24/2023  Yes Yes   Sig: Place 1 tablet under the tongue 2 (two) times a day   celecoxib (CeleBREX) 200 mg capsule 4/22/2023  Yes Yes   Sig: Take 200 mg by mouth daily   clopidogrel (PLAVIX) 75 mg tablet 4/24/2023  Yes Yes   Sig: Take 75 mg by mouth daily   cyclobenzaprine (FLEXERIL) 10 mg tablet 4/24/2023  Yes Yes   Sig: Take 10 mg by mouth daily at bedtime   fluticasone-umeclidinium-vilanterol (Trelegy Ellipta) 100-62 5-25 mcg/actuation inhaler 4/23/2023  No Yes   Sig: Inhale 1 puff daily Rinse mouth after use    gabapentin (NEURONTIN) 300 mg capsule 4/24/2023  No Yes   Sig: Take 1 capsule (300 mg total) by mouth 3 (three) times a day   guaiFENesin (MUCINEX) 600 mg 12 hr tablet 4/23/2023  No Yes   Sig: Take 1 tablet (600 mg total) by mouth 2 (two) times a day   levothyroxine 125 mcg tablet 4/24/2023  Yes Yes   Sig: Take 125 mcg by mouth   losartan (COZAAR) 50 mg tablet 4/24/2023  No Yes   Sig: Take 1 tablet (50 mg total) by mouth daily Do not start before March 7, 2023     melatonin 3 mg 4/23/2023  No Yes   Sig: Take 1 tablet (3 mg total) by mouth daily at bedtime   metoprolol succinate (TOPROL-XL) 25 mg 24 hr tablet   Yes No   Sig: Take 25 mg by mouth daily   nicotine (NICODERM CQ) 14 mg/24hr TD 24 hr patch   Yes No   Sig: Place 1 patch on the skin daily   nicotine (NICODERM CQ) 7 mg/24hr TD 24 hr patch   Yes No   Sig: APPLY ONE PATCH TRANSDERMAL EVERY DAY   omeprazole (PriLOSEC) 40 MG capsule 4/23/2023  Yes Yes   Sig: Take 40 mg by mouth daily   pantoprazole (PROTONIX) 40 mg tablet 4/24/2023  Yes Yes   Sig: ONE TABLET BY MOUTH EVERY DAY IN THE MORNING   promethazine-dextromethorphan (PHENERGAN-DM) 6 25-15 mg/5 mL oral syrup Unknown  No No   Sig: Take 5 mL by mouth 4 (four) times a day as needed for cough   traMADol (ULTRAM) 50 mg tablet 4/24/2023  Yes Yes   Sig: Take 50 mg by mouth every 6 (six) hours as needed for moderate pain   traZODone (DESYREL) 50 mg tablet 4/23/2023  Yes Yes   Sig: Take 50 mg by mouth daily   venlafaxine (EFFEXOR-XR) 150 mg 24 hr capsule 4/23/2023  Yes Yes   Sig: Take 225 mg by mouth daily      Facility-Administered Medications: None       Past Medical History:   Diagnosis Date    Chronic pain 03/06/2023    COPD (chronic obstructive pulmonary disease) (HCC)     Disease of thyroid gland     Hypertension Past Surgical History:   Procedure Laterality Date    CARDIAC SURGERY      HIP SURGERY      ORTHOPEDIC SURGERY      THORACIC AORTIC ANEURYSM REPAIR  2016    ascending thoracic aortic repair with RCA bypass with SVG x 1       History reviewed  No pertinent family history  I have reviewed and agree with the history as documented  E-Cigarette/Vaping    E-Cigarette Use Never User      E-Cigarette/Vaping Substances     Social History     Tobacco Use    Smoking status: Former     Packs/day: 1 00     Years: 50 00     Pack years: 50 00     Types: Cigarettes     Quit date: 3/1/2023     Years since quittin 1    Smokeless tobacco: Never   Vaping Use    Vaping Use: Never used   Substance Use Topics    Alcohol use: Not Currently    Drug use: Not Currently     Types: Marijuana, Cocaine       Review of Systems   Constitutional: Negative for chills, fatigue and fever  Respiratory: Positive for cough and wheezing  Negative for shortness of breath  Gastrointestinal: Negative for diarrhea, nausea and vomiting  Genitourinary: Negative for dysuria and hematuria  Musculoskeletal: Negative for arthralgias and myalgias  Skin: Negative for color change, pallor, rash and wound  Neurological: Negative for weakness and numbness  Psychiatric/Behavioral: Negative for confusion  All other systems reviewed and are negative  Physical Exam  Physical Exam  Vitals and nursing note reviewed  Constitutional:       General: She is awake  She is not in acute distress  Appearance: Normal appearance  She is not ill-appearing, toxic-appearing or diaphoretic  Comments: Sleepy but arousable  Nondistressed   HENT:      Head: Normocephalic and atraumatic  Comments: Moist mucous membranes  Pupils 2 mm bilaterally, sluggish  No external signs of trauma  Right Ear: External ear normal       Left Ear: External ear normal    Eyes:      General:         Right eye: No discharge           Left eye: No discharge  Cardiovascular:      Comments: Regular rate and rhythm, no murmurs rubs or gallops  Extremities warm and well-perfused without mottling  Pulmonary:      Effort: No respiratory distress  Comments: Mildly increased work of breathing with respiratory rate in the high 20s  Globally diminished breath sounds  Abdominal:      General: There is no distension  Musculoskeletal:         General: No deformity  Cervical back: Normal range of motion  Skin:     Findings: No lesion or rash  Neurological:      Mental Status: She is oriented to person, place, and time  Mental status is at baseline     Psychiatric:         Mood and Affect: Mood and affect normal          Vital Signs  ED Triage Vitals [04/24/23 1500]   Temperature Pulse Respirations Blood Pressure SpO2   97 7 °F (36 5 °C) 77 (!) 28 107/57 (!) 85 %      Temp Source Heart Rate Source Patient Position - Orthostatic VS BP Location FiO2 (%)   Tympanic Monitor Sitting Left arm --      Pain Score       No Pain           Vitals:    04/24/23 2000 04/24/23 2015 04/24/23 2100 04/24/23 2200   BP: 110/59 114/63 107/65 95/54   Pulse: 68 66 69 72   Patient Position - Orthostatic VS:             Visual Acuity  Visual Acuity    Flowsheet Row Most Recent Value   L Pupil Size (mm) 3   R Pupil Size (mm) 3   L Pupil Shape Round   R Pupil Shape Round          ED Medications  Medications   amLODIPine (NORVASC) tablet 5 mg (has no administration in time range)   atorvastatin (LIPITOR) tablet 40 mg (has no administration in time range)   clopidogrel (PLAVIX) tablet 75 mg (has no administration in time range)   Fluticasone Furoate-Vilanterol 100-25 mcg/actuation 1 puff (has no administration in time range)     And   umeclidinium 62 5 mcg/actuation inhaler AEPB 1 puff (has no administration in time range)   guaiFENesin (MUCINEX) 12 hr tablet 600 mg (600 mg Oral Given 4/24/23 2210)   levothyroxine tablet 125 mcg (has no administration in time range)   losartan (COZAAR) tablet 50 mg (has no administration in time range)   melatonin tablet 3 mg (3 mg Oral Given 4/24/23 2209)   metoprolol succinate (TOPROL-XL) 24 hr tablet 25 mg (has no administration in time range)   nicotine (NICODERM CQ) 7 mg/24hr TD 24 hr patch 1 patch (has no administration in time range)   pantoprazole (PROTONIX) EC tablet 40 mg (has no administration in time range)   venlafaxine (EFFEXOR-XR) 24 hr capsule 225 mg (has no administration in time range)   senna-docusate sodium (SENOKOT S) 8 6-50 mg per tablet 2 tablet (2 tablets Oral Given 4/24/23 2209)   polyethylene glycol (MIRALAX) packet 17 g (has no administration in time range)   enoxaparin (LOVENOX) subcutaneous injection 40 mg (has no administration in time range)   levalbuterol (XOPENEX) inhalation solution 1 25 mg (1 25 mg Nebulization Given 4/24/23 2059)     And   sodium chloride 0 9 % inhalation solution 3 mL (3 mL Nebulization Given 4/24/23 2059)   buprenorphine-naloxone (Suboxone) film 8 mg (has no administration in time range)   albuterol inhalation solution 10 mg (10 mg Nebulization Given 4/24/23 1542)     And   ipratropium (ATROVENT) 0 02 % inhalation solution 1 mg (1 mg Nebulization Given 4/24/23 1542)     And   sodium chloride 0 9 % inhalation solution 3 mL (3 mL Nebulization Given 4/24/23 1542)   methylPREDNISolone sodium succinate (Solu-MEDROL) injection 125 mg (125 mg Intravenous Given 4/24/23 1531)       Diagnostic Studies  Results Reviewed     Procedure Component Value Units Date/Time    B-Type Natriuretic Peptide(BNP) [[id number]]  (Normal) Collected: 04/24/23 1523    Lab Status: Final result Specimen: Blood from Arm, Right Updated: 04/24/23 2054     BNP 29 pg/mL     Procalcitonin [[id number]]  (Normal) Collected: 04/24/23 1523    Lab Status: Final result Specimen: Blood from Arm, Right Updated: 04/24/23 2042     Procalcitonin 0 06 ng/ml     HS Troponin 0hr (reflex protocol) [[id number]]  (Normal) Collected: 04/24/23 1902    Lab Status: Final result Specimen: Blood from Arm, Right Updated: 04/24/23 2041     hs TnI 0hr 3 ng/L     Strep Pneumoniae, Urine [[id number]] Collected: 04/24/23 1839    Lab Status: In process Specimen: Urine, Clean Catch Updated: 04/24/23 2031    Blood culture [[id number]] Collected: 04/24/23 1955    Lab Status: In process Specimen: Blood from Arm, Left Updated: 04/24/23 2030    Blood culture [[id number]] Collected: 04/24/23 1950    Lab Status: In process Specimen: Blood from Arm, Right Updated: 04/24/23 1954    Legionella antigen, urine [[id number]] Collected: 04/24/23 1950    Lab Status: In process Specimen: Urine, Catheter Updated: 04/24/23 1954    Sputum culture and Gram stain [[id number]]     Lab Status: No result Specimen: Expectorated Sputum     Ammonia [[id number]]  (Abnormal) Collected: 04/24/23 1902    Lab Status: Final result Specimen: Blood from Arm, Right Updated: 04/24/23 1928     Ammonia 16 umol/L     Rapid drug screen, urine [[id number]]  (Abnormal) Collected: 04/24/23 1839    Lab Status: Final result Specimen: Urine, Catheter Updated: 04/24/23 1908     Amph/Meth UR Negative     Barbiturate Ur Negative     Benzodiazepine Urine Positive     Cocaine Urine Positive     Methadone Urine Negative     Opiate Urine Negative     PCP Ur Negative     THC Urine Negative     Oxycodone Urine Negative    Narrative:      Presumptive report  If requested, specimen will be sent to reference lab for confirmation  FOR MEDICAL PURPOSES ONLY  IF CONFIRMATION NEEDED PLEASE CONTACT THE LAB WITHIN 5 DAYS      Drug Screen Cutoff Levels:  AMPHETAMINE/METHAMPHETAMINES  1000 ng/mL  BARBITURATES     200 ng/mL  BENZODIAZEPINES     200 ng/mL  COCAINE      300 ng/mL  METHADONE      300 ng/mL  OPIATES      300 ng/mL  PHENCYCLIDINE     25 ng/mL  THC       50 ng/mL  OXYCODONE      100 ng/mL    UA (URINE) with reflex to Scope [[id number]] Collected: 04/24/23 1839    Lab Status: Final result Specimen: Urine, Straight Cath Updated: 04/24/23 1847 Color, UA [place]     Clarity, UA Slightly Cloudy     Specific Gravity, UA 1 025     pH, UA 6 0     Leukocytes, UA Negative     Nitrite, UA Negative     Protein, UA Negative mg/dl      Glucose, UA Negative mg/dl      Ketones, UA Negative mg/dl      Urobilinogen, UA 0 2 E U /dl      Bilirubin, UA Negative     Occult Blood, UA Negative    Blood gas, venous [[id number]]  (Abnormal) Collected: 04/24/23 1733    Lab Status: Final result Specimen: Blood from Arm, Right Updated: 04/24/23 1738     pH, Avinash 7 282     pCO2, Avinash 59 5 mm Hg      pO2, Avinash 29 7 mm Hg      HCO3, Avinash 27 5 mmol/L      Base Excess, Avinash -0 3 mmol/L      O2 Content, Avinash 8 8 ml/dL      O2 HGB, VENOUS 49 6 %     Blood gas, venous [[id number]]  (Abnormal) Collected: 04/24/23 1536    Lab Status: Final result Specimen: Blood from Arm, Right Updated: 04/24/23 1545     pH, Avinash 7 294     pCO2, Avinash 61 8 mm Hg      pO2, Avinash 40 6 mm Hg      HCO3, Avinash 29 3 mmol/L      Base Excess, Avinash 1 5 mmol/L      O2 Content, Avinash 12 2 ml/dL      O2 HGB, VENOUS 71 4 %     Comprehensive metabolic panel [[id number]]  (Abnormal) Collected: 04/24/23 1523    Lab Status: Final result Specimen: Blood from Arm, Right Updated: 04/24/23 1544     Sodium 138 mmol/L      Potassium 4 2 mmol/L      Chloride 104 mmol/L      CO2 30 mmol/L      ANION GAP 4 mmol/L      BUN 20 mg/dL      Creatinine 1 21 mg/dL      Glucose 111 mg/dL      Calcium 8 8 mg/dL      AST 51 U/L      ALT 43 U/L      Alkaline Phosphatase 42 U/L      Total Protein 7 1 g/dL      Albumin 3 9 g/dL      Total Bilirubin 0 39 mg/dL      eGFR 45 ml/min/1 73sq m     Narrative:      [facility] for Chronic Kid[facility] (CKD):     Stage 1 with normal or high GFR (GFR > 90 mL/min/1 73 square meters)    Stage 2 Mild CKD (GFR = 60-89 mL/min/1 73 square meters)    Stage 3A Moderate CKD (GFR = 45-59 mL/min/1 73 square meters)    Stage 3B Moderate CKD (GFR = 30-44 mL/min/1 73 square meters)    Stage 4 Severe CKD (GFR = 15-29 mL/min/1 73 square meters)    Stage 5 End Stage CKD (GFR <15 mL/min/1 73 square meters)  Note: GFR calculation is accurate only with a steady state creatinine    CBC and differential [[id number]]  (Abnormal) Collected: 04/24/23 1523    Lab Status: Final result Specimen: Blood from Arm, Right Updated: 04/24/23 1527     WBC 9 41 Thousand/uL      RBC 3 80 Million/uL      Hemoglobin 11 7 g/dL      Hematocrit 37 9 %       fL      MCH 30 8 pg      MCHC 30 9 g/dL      RDW 14 6 %      MPV 9 1 fL      Platelets 691 Thousands/uL      nRBC 0 /100 WBCs      Neutrophils Relative 71 %      Immat GRANS % 0 %      Lymphocytes Relative 17 %      Monocytes Relative 8 %      Eosinophils Relative 4 %      Basophils Relative 0 %      Neutrophils Absolute 6 73 Thousands/µL      Immature Grans Absolute 0 03 Thousand/uL      Lymphocytes Absolute 1 55 Thousands/µL      Monocytes Absolute 0 71 Thousand/µL      Eosinophils Absolute 0 36 Thousand/µL      Basophils Absolute 0 03 Thousands/µL                  CT head without contrast   Final Result by Eliazar Brady MD (04/24 2051)      No acute intracranial abnormality  Stable CT appearance of the brain compared to prior                 Workstation performed: [id number]         XR chest 1 view portable    (Results Pending)              Procedures  CriticalCare Time    Date/Time: 4/24/2023 10:22 PM  Performed by: Chapo Glaser MD  Authorized by: Chapo Glaser MD     Critical care provider statement:     Critical care time (minutes):  32    Critical care was necessary to treat or prevent imminent or life-threatening deterioration of the following conditions:  Respiratory failure and CNS failure or compromise    Critical care was time spent personally by me on the following activities:  Ordering and performing treatments and interventions, obtaining history from patient or surrogate, development of treatment plan with patient or surrogate, ordering and review of laboratory studies, ordering and review of radiographic studies, discussions with consultants, evaluation of patient's response to treatment, examination of patient and review of old charts             ED Course                               SBIRT 20yo+    Flowsheet Row Most Recent Value   Initial Alcohol Screen: US AUDIT-C     1  How often do you have a drink containing alcohol? 0 Filed at: 04/24/2023 1530   2  How many drinks containing alcohol do you have on a typical day you are drinking? 0 Filed at: 04/24/2023 1530   3b  FEMALE Any Age, or MALE 65+: How often do you have 4 or more drinks on one occassion? 0 Filed at: 04/24/2023 1530   Audit-C Score 0 Filed at: 04/24/2023 1530   ARNOLDO: How many times in the past year have you    Used an illegal drug or used a prescription medication for non-medical reasons? Never Filed at: 04/24/2023 1530                    Medical Decision Making  I obtained history from the patient and from the patient's daughter  I reviewed external medical documentation and note that patient was admitted in March 2023 for similar complaint, admitted to cocaine use and was noted to be improperly using Suboxone at that time  Differential diagnosis includes but is not limited to COPD exacerbation, hypercapnia, electrolyte derangement, polypharmacy, illicit drug use, intracranial hemorrhage  I ordered and reviewed labs including CBC, CMP, VBG  Patient with a CO2 of 61 8 on initial VBG  Patient placed on BiPAP, treated with heart neb however symptoms grossly unchanged and without significant improvement of CO2 on recheck  Rapid drug screen performed and positive for cocaine and benzodiazepines  I ordered and reviewed a CT head which was unremarkable  I discussed patient with the critical care team who accepted the patient  Amount and/or Complexity of Data Reviewed  Labs: ordered  Radiology: ordered  Risk  Prescription drug management    Decision regarding hospitalization  Disposition  Final diagnoses:   Confusion   Acute hypercapnic respiratory failure ([place] [address] )     Time reflects when diagnosis was documented in both MDM as applicable and the Disposition within this note     Time User Action Codes Description Comment    4/24/2023  6:57 PM Ree Glee Add [R41 0] Confusion     4/24/2023  6:57 PM Ree Glee Add [J44 9] COPD (chronic obstructive pulmonary disease) ([place] [address] )     4/24/2023  6:57 PM Ree Glee Modify [R41 0] Confusion     4/24/2023  6:57 PM Ree Glee Modify [J44 9] COPD (chronic obstructive pulmonary disease) ([place] [address] )     4/24/2023  6:57 PM Ree Glee Remove [J44 9] COPD (chronic obstructive pulmonary disease) ([place] [address] )     4/24/2023  6:57 PM Ree Glee Add [J96 02] Acute hypercapnic respiratory failure ([place] [address] )     4/24/2023  6:57 PM Ree Glee Modify [R41 0] Confusion     4/24/2023  6:57 PM Ree Glee Modify [J96 02] Acute hypercapnic respiratory failure [facility])       ED Disposition     ED Disposition   Admit    Condition   Stable    Date/Time   Mon Apr 24, 2023  6:57 PM    Comment   Case was discussed with critical care and the patient's admission status was agreed to be Admission Status: inpatient status to the service of Dr Indio Mi   Follow-up Information    None         Current Discharge Medication List      CONTINUE these medications which have NOT CHANGED    Details   albuterol (2 5 mg/3 mL) 0 083 % nebulizer solution INHALE 1 VIAL EVERY 6 HOURS BY NEBULIZER AS NEEDED      albuterol (PROVENTIL HFA,VENTOLIN HFA) 90 mcg/act inhaler Inhale 2 puffs every 6 (six) hours as needed for wheezing  Qty: 18 g, Refills: 5    Comments: Substitution to a formulary equivalent within the same pharmaceutical class is authorized  Associated Diagnoses: Chronic obstructive pulmonary disease with acute exacerbation ([place] [address] );  Centrilobular emphysema (HCC)      ALPRAZolam (XANAX) 0 5 mg tablet Take by mouth 3 (three) times a day as needed for anxiety      amLODIPine (NORVASC) 5 mg tablet Take 1 tablet (5 mg total) by mouth daily Do not start before March 7, 2023  Qty: 30 tablet, Refills: 1    Associated Diagnoses: Primary hypertension      atorvastatin (LIPITOR) 40 mg tablet Take 40 mg by mouth daily      buprenorphine-naloxone (SUBOXONE) 8-2 mg per SL tablet Place 1 tablet under the tongue 2 (two) times a day      celecoxib (CeleBREX) 200 mg capsule Take 200 mg by mouth daily      clopidogrel (PLAVIX) 75 mg tablet Take 75 mg by mouth daily      cyclobenzaprine (FLEXERIL) 10 mg tablet Take 10 mg by mouth daily at bedtime      fluticasone-umeclidinium-vilanterol (Trelegy Ellipta) 100-62 5-25 mcg/actuation inhaler Inhale 1 puff daily Rinse mouth after use  Qty: 60 blister, Refills: 5    Associated Diagnoses: Centrilobular emphysema (HCC)      gabapentin (NEURONTIN) 300 mg capsule Take 1 capsule (300 mg total) by mouth 3 (three) times a day  Qty: 90 capsule, Refills: 1    Associated Diagnoses: Chronic pain      guaiFENesin (MUCINEX) 600 mg 12 hr tablet Take 1 tablet (600 mg total) by mouth 2 (two) times a day  Qty: 10 tablet, Refills: 0    Associated Diagnoses: Acute respiratory failure with hypoxia and hypercapnia (HCC)      levothyroxine 125 mcg tablet Take 125 mcg by mouth      losartan (COZAAR) 50 mg tablet Take 1 tablet (50 mg total) by mouth daily Do not start before March 7, 2023    Qty: 30 tablet, Refills: 1    Associated Diagnoses: Primary hypertension      melatonin 3 mg Take 1 tablet (3 mg total) by mouth daily at bedtime  Qty: 30 tablet, Refills: 0    Associated Diagnoses: Anxiety      omeprazole (PriLOSEC) 40 MG capsule Take 40 mg by mouth daily      pantoprazole (PROTONIX) 40 mg tablet ONE TABLET BY MOUTH EVERY DAY IN THE MORNING      traMADol (ULTRAM) 50 mg tablet Take 50 mg by mouth every 6 (six) hours as needed for moderate pain      traZODone (DESYREL) 50 mg tablet Take 50 mg by mouth daily      venlafaxine (EFFEXOR-XR) 150 mg 24 hr capsule Take 225 mg by mouth daily      metoprolol succinate (TOPROL-XL) 25 mg 24 hr tablet Take 25 mg by mouth daily      nicotine (NICODERM CQ) 14 mg/24hr TD 24 hr patch Place 1 patch on the skin daily      nicotine (NICODERM CQ) 7 mg/24hr TD 24 hr patch APPLY ONE PATCH TRANSDERMAL EVERY DAY      promethazine-dextromethorphan (PHENERGAN-DM) 6 25-15 mg/5 mL oral syrup Take 5 mL by mouth 4 (four) times a day as needed for cough  Qty: 240 mL, Refills: 0    Associated Diagnoses: Bronchitis             No discharge procedures on file      PDMP Review       Value Time User    PDMP Reviewed  Yes 4/24/2023  7:31 PM Yue Marques          ED Provider  Electronically Signed by           Virgilio Granados MD  04/24/23 [phone]

## 2023-04-25 ENCOUNTER — APPOINTMENT (INPATIENT)
Dept: NON INVASIVE DIAGNOSTICS | Facility: HOSPITAL | Age: 73
End: 2023-04-25

## 2023-04-25 LAB
ALBUMIN SERPL BCP-MCNC: 3.7 G/DL (ref 3.5–5)
ALP SERPL-CCNC: 38 U/L (ref 34–104)
ALT SERPL W P-5'-P-CCNC: 33 U/L (ref 7–52)
ANION GAP SERPL CALCULATED.3IONS-SCNC: 6 MMOL/L (ref 4–13)
AORTIC ROOT: 3.7 CM
APICAL FOUR CHAMBER EJECTION FRACTION: 67 %
AST SERPL W P-5'-P-CCNC: 33 U/L (ref 13–39)
AV LVOT PEAK GRADIENT: 5 MMHG
AV PEAK GRADIENT: 15 MMHG
BASOPHILS # BLD AUTO: 0 THOUSANDS/ΜL (ref 0–0.1)
BASOPHILS NFR BLD AUTO: 0 % (ref 0–1)
BILIRUB SERPL-MCNC: 0.36 MG/DL (ref 0.2–1)
BUN SERPL-MCNC: 23 MG/DL (ref 5–25)
CALCIUM SERPL-MCNC: 8.7 MG/DL (ref 8.4–10.2)
CHLORIDE SERPL-SCNC: 106 MMOL/L (ref 96–108)
CO2 SERPL-SCNC: 26 MMOL/L (ref 21–32)
CREAT SERPL-MCNC: 1.13 MG/DL (ref 0.6–1.3)
E WAVE DECELERATION TIME: 333 MS
EOSINOPHIL # BLD AUTO: 0 THOUSAND/ΜL (ref 0–0.61)
EOSINOPHIL NFR BLD AUTO: 0 % (ref 0–6)
ERYTHROCYTE [DISTWIDTH] IN BLOOD BY AUTOMATED COUNT: 14.5 % (ref 11.6–15.1)
FRACTIONAL SHORTENING: 31 (ref 28–44)
GFR SERPL CREATININE-BSD FRML MDRD: 49 ML/MIN/1.73SQ M
GLUCOSE SERPL-MCNC: 203 MG/DL (ref 65–140)
HCT VFR BLD AUTO: 35.3 % (ref 34.8–46.1)
HGB BLD-MCNC: 10.9 G/DL (ref 11.5–15.4)
IMM GRANULOCYTES # BLD AUTO: 0.04 THOUSAND/UL (ref 0–0.2)
IMM GRANULOCYTES NFR BLD AUTO: 0 % (ref 0–2)
INTERVENTRICULAR SEPTUM IN DIASTOLE (PARASTERNAL SHORT AXIS VIEW): 1.1 CM
INTERVENTRICULAR SEPTUM: 1.1 CM (ref 0.6–1.1)
L PNEUMO1 AG UR QL IA.RAPID: NEGATIVE
LAAS-AP2: 25.4 CM2
LAAS-AP4: 22.8 CM2
LEFT ATRIUM SIZE: 4.2 CM
LEFT INTERNAL DIMENSION IN SYSTOLE: 3.1 CM (ref 2.1–4)
LEFT VENTRICULAR INTERNAL DIMENSION IN DIASTOLE: 4.5 CM (ref 3.5–6)
LEFT VENTRICULAR POSTERIOR WALL IN END DIASTOLE: 1.1 CM
LEFT VENTRICULAR STROKE VOLUME: 54 ML
LVSV (TEICH): 54 ML
LYMPHOCYTES # BLD AUTO: 0.74 THOUSANDS/ΜL (ref 0.6–4.47)
LYMPHOCYTES NFR BLD AUTO: 8 % (ref 14–44)
MAGNESIUM SERPL-MCNC: 2 MG/DL (ref 1.9–2.7)
MCH RBC QN AUTO: 30.8 PG (ref 26.8–34.3)
MCHC RBC AUTO-ENTMCNC: 30.9 G/DL (ref 31.4–37.4)
MCV RBC AUTO: 100 FL (ref 82–98)
MONOCYTES # BLD AUTO: 0.27 THOUSAND/ΜL (ref 0.17–1.22)
MONOCYTES NFR BLD AUTO: 3 % (ref 4–12)
MV E'TISSUE VEL-SEP: 5 CM/S
MV PEAK A VEL: 1.33 M/S
MV PEAK E VEL: 86 CM/S
MV STENOSIS PRESSURE HALF TIME: 97 MS
MV VALVE AREA P 1/2 METHOD: 2.27
NEUTROPHILS # BLD AUTO: 8.88 THOUSANDS/ΜL (ref 1.85–7.62)
NEUTS SEG NFR BLD AUTO: 89 % (ref 43–75)
NRBC BLD AUTO-RTO: 0 /100 WBCS
PHOSPHATE SERPL-MCNC: 2.8 MG/DL (ref 2.3–4.1)
PLATELET # BLD AUTO: 227 THOUSANDS/UL (ref 149–390)
PMV BLD AUTO: 9.6 FL (ref 8.9–12.7)
POTASSIUM SERPL-SCNC: 4.9 MMOL/L (ref 3.5–5.3)
PROCALCITONIN SERPL-MCNC: <0.05 NG/ML
PROT SERPL-MCNC: 6.6 G/DL (ref 6.4–8.4)
RBC # BLD AUTO: 3.54 MILLION/UL (ref 3.81–5.12)
RIGHT ATRIUM AREA SYSTOLE A4C: 10.5 CM2
RIGHT VENTRICLE ID DIMENSION: 3.3 CM
S PNEUM AG UR QL: NEGATIVE
SL CV LEFT ATRIUM LENGTH A2C: 6.3 CM
SL CV LV EF: 65
SL CV PED ECHO LEFT VENTRICLE DIASTOLIC VOLUME (MOD BIPLANE) 2D: 93 ML
SL CV PED ECHO LEFT VENTRICLE SYSTOLIC VOLUME (MOD BIPLANE) 2D: 39 ML
SODIUM SERPL-SCNC: 138 MMOL/L (ref 135–147)
TR MAX PG: 28 MMHG
TR PEAK VELOCITY: 2.6 M/S
TRICUSPID ANNULAR PLANE SYSTOLIC EXCURSION: 2.1 CM
TRICUSPID VALVE PEAK REGURGITATION VELOCITY: 2.64 M/S
WBC # BLD AUTO: 9.93 THOUSAND/UL (ref 4.31–10.16)

## 2023-04-25 RX ORDER — ALPRAZOLAM 0.5 MG/1
0.5 TABLET ORAL 3 TIMES DAILY PRN
Status: DISCONTINUED | OUTPATIENT
Start: 2023-04-25 | End: 2023-04-25

## 2023-04-25 RX ORDER — METHYLPREDNISOLONE SODIUM SUCCINATE 40 MG/ML
40 INJECTION, POWDER, LYOPHILIZED, FOR SOLUTION INTRAMUSCULAR; INTRAVENOUS DAILY
Status: DISCONTINUED | OUTPATIENT
Start: 2023-04-26 | End: 2023-04-26

## 2023-04-25 RX ORDER — ACETAMINOPHEN 325 MG/1
650 TABLET ORAL EVERY 6 HOURS PRN
Status: DISCONTINUED | OUTPATIENT
Start: 2023-04-25 | End: 2023-04-28 | Stop reason: HOSPADM

## 2023-04-25 RX ORDER — TRAMADOL HYDROCHLORIDE 50 MG/1
50 TABLET ORAL EVERY 6 HOURS PRN
Status: DISCONTINUED | OUTPATIENT
Start: 2023-04-25 | End: 2023-04-25

## 2023-04-25 RX ORDER — ALPRAZOLAM 0.5 MG/1
0.5 TABLET ORAL 2 TIMES DAILY PRN
Status: DISCONTINUED | OUTPATIENT
Start: 2023-04-25 | End: 2023-04-28 | Stop reason: HOSPADM

## 2023-04-25 RX ORDER — TRAZODONE HYDROCHLORIDE 50 MG/1
75 TABLET ORAL
Status: DISCONTINUED | OUTPATIENT
Start: 2023-04-25 | End: 2023-04-28 | Stop reason: HOSPADM

## 2023-04-25 RX ORDER — CYCLOBENZAPRINE HCL 10 MG
10 TABLET ORAL
Status: DISCONTINUED | OUTPATIENT
Start: 2023-04-25 | End: 2023-04-28 | Stop reason: HOSPADM

## 2023-04-25 RX ORDER — TRAMADOL HYDROCHLORIDE 50 MG/1
50 TABLET ORAL EVERY 8 HOURS PRN
Status: DISCONTINUED | OUTPATIENT
Start: 2023-04-25 | End: 2023-04-28 | Stop reason: HOSPADM

## 2023-04-25 RX ADMIN — METOPROLOL SUCCINATE 25 MG: 25 TABLET, EXTENDED RELEASE ORAL at 08:49

## 2023-04-25 RX ADMIN — ATORVASTATIN CALCIUM 40 MG: 40 TABLET, FILM COATED ORAL at 15:52

## 2023-04-25 RX ADMIN — ALPRAZOLAM 0.5 MG: 0.5 TABLET ORAL at 21:10

## 2023-04-25 RX ADMIN — AMLODIPINE BESYLATE 5 MG: 5 TABLET ORAL at 08:49

## 2023-04-25 RX ADMIN — ISODIUM CHLORIDE 3 ML: 0.03 SOLUTION RESPIRATORY (INHALATION) at 13:00

## 2023-04-25 RX ADMIN — ISODIUM CHLORIDE 3 ML: 0.03 SOLUTION RESPIRATORY (INHALATION) at 07:27

## 2023-04-25 RX ADMIN — ENOXAPARIN SODIUM 40 MG: 40 INJECTION SUBCUTANEOUS at 08:48

## 2023-04-25 RX ADMIN — MELATONIN TAB 3 MG 3 MG: 3 TAB at 21:06

## 2023-04-25 RX ADMIN — UMECLIDINIUM 1 PUFF: 62.5 AEROSOL, POWDER ORAL at 08:51

## 2023-04-25 RX ADMIN — VENLAFAXINE HYDROCHLORIDE 225 MG: 150 CAPSULE, EXTENDED RELEASE ORAL at 08:51

## 2023-04-25 RX ADMIN — ACETAMINOPHEN 650 MG: 325 TABLET, FILM COATED ORAL at 11:31

## 2023-04-25 RX ADMIN — FLUTICASONE FUROATE AND VILANTEROL TRIFENATATE 1 PUFF: 100; 25 POWDER RESPIRATORY (INHALATION) at 08:50

## 2023-04-25 RX ADMIN — LEVOTHYROXINE SODIUM 125 MCG: 125 TABLET ORAL at 06:09

## 2023-04-25 RX ADMIN — TRAZODONE HYDROCHLORIDE 75 MG: 50 TABLET ORAL at 21:06

## 2023-04-25 RX ADMIN — GUAIFENESIN 600 MG: 600 TABLET, EXTENDED RELEASE ORAL at 16:47

## 2023-04-25 RX ADMIN — PANTOPRAZOLE SODIUM 40 MG: 40 TABLET, DELAYED RELEASE ORAL at 06:09

## 2023-04-25 RX ADMIN — SENNOSIDES AND DOCUSATE SODIUM 2 TABLET: 50; 8.6 TABLET ORAL at 08:49

## 2023-04-25 RX ADMIN — LOSARTAN POTASSIUM 50 MG: 50 TABLET, FILM COATED ORAL at 08:49

## 2023-04-25 RX ADMIN — LEVALBUTEROL HYDROCHLORIDE 1.25 MG: 1.25 SOLUTION RESPIRATORY (INHALATION) at 07:27

## 2023-04-25 RX ADMIN — BUPRENORPHINE AND NALOXONE 8 MG: 8; 2 FILM BUCCAL; SUBLINGUAL at 08:48

## 2023-04-25 RX ADMIN — LEVALBUTEROL HYDROCHLORIDE 1.25 MG: 1.25 SOLUTION RESPIRATORY (INHALATION) at 13:00

## 2023-04-25 RX ADMIN — GUAIFENESIN 600 MG: 600 TABLET, EXTENDED RELEASE ORAL at 08:49

## 2023-04-25 RX ADMIN — SENNOSIDES AND DOCUSATE SODIUM 2 TABLET: 50; 8.6 TABLET ORAL at 16:45

## 2023-04-25 RX ADMIN — NICOTINE 1 PATCH: 7 PATCH, EXTENDED RELEASE TRANSDERMAL at 08:49

## 2023-04-25 RX ADMIN — CYCLOBENZAPRINE HYDROCHLORIDE 10 MG: 10 TABLET, FILM COATED ORAL at 21:06

## 2023-04-25 RX ADMIN — POLYETHYLENE GLYCOL 3350 17 G: 17 POWDER, FOR SOLUTION ORAL at 08:48

## 2023-04-25 RX ADMIN — LEVALBUTEROL HYDROCHLORIDE 1.25 MG: 1.25 SOLUTION RESPIRATORY (INHALATION) at 20:31

## 2023-04-25 RX ADMIN — CLOPIDOGREL BISULFATE 75 MG: 75 TABLET ORAL at 08:49

## 2023-04-25 NOTE — ASSESSMENT & PLAN NOTE
"· 50 pack year smoking history, centrilobular emphysema   Recent PFT on 3/24: \"FEV1/FVC Ratio: 67 %  Forced Vital Capacity: 2 33 L, 82 % predicted  FEV1: 1 56 L, 71 % predicted- Moderate obstructive airflow defect  No significant response to the administration to bronchodilator per ATS Standards  Normal Lung volumes  Normal diffusion capacity\"   Home regimen consists of Trelegy Ellipta and PRN albuterol  Also recently started on mucinex due to ongoing sputum production since having bronchitis in early March   No significant wheezing on exam  Did receive 1 dose of solumedrol 125mg in ED   Low suspicion for LRT infection- hold off on antibiotics  Additional workup as outlined above   Continue BiPap while still lethargic   ABG pending   Wean FiO2 for goal SpO2 >90%   "

## 2023-04-25 NOTE — QUICK NOTE
Patient seen and evaluated by Critical Care today and deemed to be appropriate for transfer to [facility]   Spoke to Dr Wild Brownlee from [address] [place] [address] to accept patient transfer  Major changes to treatment plan from the day of transfer include :   · Patient weaned to 2L NC     Critical care can be contacted via [employer] with any questions or concerns

## 2023-04-25 NOTE — ASSESSMENT & PLAN NOTE
· 50 pack year smoking history   Quit smoking as of her last admission in March, but does continue to use nicotine patches  · Continue 7mg nicotine patch in AM

## 2023-04-25 NOTE — ASSESSMENT & PLAN NOTE
· Hx of CAD s/p CABG x1  · Continue plavix, atorvastatin, Toprol XL  · EKG NSR without ST changes  No CP   Initial troponin 3

## 2023-04-25 NOTE — ASSESSMENT & PLAN NOTE
· Hx of opioid dependence and chronic pain     · UDS showing + for benzodiazepines and cocaine, negative for opiates  · On suboxone 8-2 daily per PDMP review- will continue in AM

## 2023-04-25 NOTE — ASSESSMENT & PLAN NOTE
· Hx of anxiety  Self-reported use of xanax 0 5mg nightly for sleep  · Holding all sedating medications at this time  Can consider re-introducing once mental status has fully returned to baseline

## 2023-04-25 NOTE — ASSESSMENT & PLAN NOTE
"· 50 pack year smoking history, centrilobular emphysema   Recent PFT on 3/24: \"FEV1/FVC Ratio: 67 %  Forced Vital Capacity: 2 33 L, 82 % predicted  FEV1: 1 56 L, 71 % predicted- Moderate obstructive airflow defect  No significant response to the administration to bronchodilator per ATS Standards  Normal Lung volumes  Normal diffusion capacity\"   Home regimen consists of Trelegy Ellipta and PRN albuterol  Also recently started on mucinex due to ongoing sputum production since having bronchitis in early March   No significant wheezing on exam  Did receive 1 dose of solumedrol 125mg in ED  Will continue at 40mg BID starting this AM    Low suspicion for LRT infection- hold off on antibiotics  Additional workup as outlined above   Continue BiPap HS   ABG improved last evening   Wean FiO2 for goal SpO2 >90%   "

## 2023-04-25 NOTE — ED NOTES
Pt transported to ICU 01 via stretcher  Verbal report given to Rodrigo Ramirez at bedside  NO belongings noted at this time        Vasile Dixon RN  04/24/23 2191

## 2023-04-25 NOTE — ASSESSMENT & PLAN NOTE
· Hx of opioid dependence and chronic pain     · UDS showing + for benzodiazepines and cocaine, negative for opiates  · On suboxone 8-2 daily per PDMP review- will continue this AM

## 2023-04-25 NOTE — ASSESSMENT & PLAN NOTE
In setting of hypercarbia, hypoxia, and possibly GELA  Poly-pharmacy also likely contributing  · OP medications include Trazodone, Xanax, flexeril, suboxone  Also self reports Tramadol (last prescribed 2022), however, per daughter, she is likely no longer taking this  · UDS + for benzodiazepines, cocaine  · Initial VB /- daughter states presentation is similar to last month when she also required bipap for hypercarbia  · Ammonia 16  · CT head without intracranial abnormality    Plan:  · Hold sedative medications  · Continue BiPap / 30%   Obtain ABG on transfer to ICU, then wean as appropriate  · Frequent neuro checks- mentation improving on BiPap and with improvement in oxygenation  · Avoid administration of mid altering substances  · Fall precautions, CAM ICU

## 2023-04-25 NOTE — CASE MANAGEMENT
Case Management Assessment & Discharge Planning Note    Patient name Nanci Randhawa  Location ICU ICU  MRN [id number]  : 1950 Date 2023       Current Admission Date: 2023  Current Admission Diagnosis:Acute on chronic respiratory failure with hypoxia and hypercapnia [facility])   Patient Active Problem List    Diagnosis Date Noted    Acute on chronic respiratory failure with hypoxia and hypercapnia ([facility] ) 2023    Tobacco abuse, in remission 2023    GELA (obstructive sleep apnea) 03/10/2023    Acute bronchitis due to Haemophilus influenzae 03/10/2023    Chronic pain syndrome 2023    Angina at rest [facility]) 2023    Pulmonary nodules 2023    Cerebral infarct ([facility] ) 2023    Cocaine abuse ([facility] ) 2023    Chronic obstructive pulmonary disease with acute exacerbation ([facility] ) 2023    Hypertension 2023    Hypothyroid 2023    CAD (coronary artery disease) 2023    Hx of CABG x1 2023    Centrilobular emphysema ([facility] ) 2023    Anxiety 2023    Opioid dependence ([facility] ) 2023    Acute encephalopathy 2023    Nicotine abuse 2023      LOS (days): 1  Geometric Mean LOS (GMLOS) (days): 3 50  Days to GMLOS:2 6     OBJECTIVE:    Risk of Unplanned Readmission Score: 16 72      Current admission status: Inpatient     Preferred Pharmacy:   [id number]  [address]  [address] [address]  Phone: [phone] Fax: [phone]    Primary Care Provider: Landon Riley    Primary Insurance: [address] [facility]  Secondary Insurance:     ASSESSMENT:  Candi Orosco Proxies    There are no active Health Care Proxies on file      Readmission Root Cause  30 Day Readmission: No    Patient Information  Admitted from[de-identified] Home  Mental Status: Alert  During Assessment patient was accompanied by: Not accompanied during assessment  Assessment information provided by[de-identified] Patient  Primary Caregiver: Self  Support Systems: Self, Daughter, Spouse/significant other  [place] of Residence: [address] [place], [place] [address]melissa Win do you live in?: [place], [place] (Pt states she has a residence in [place], but mostly lives with her father at his home in [place], [place])  Home entry access options   Select all that apply : Stairs  Number of steps to enter home : 1  Type of Current Residence: [place]  In the last 12 months, was there a time when you were not able to pay the mortgage or rent on time?: No  In the last 12 months, how many places have you lived?: 2  In the last 12 months, was there a time when you did not have a steady place to sleep or slept in a shelter (including now)?: No  Homeless/housing insecurity resource given?: N/A  Living Arrangements: Lives w/ Parent(s) (Living with father in [place], [place])    Activities of Daily Living Prior to Admission  Functional Status: Independent  Completes ADLs independently?: Yes  Ambulates independently?: Yes  Does patient use assisted devices?: Yes  Assisted Devices (DME) used: Home Oxygen concentrator, Portable Oxygen tanks, [address] [place] Name (respiratory supplies): [employer]  O2 Rate(s): 2L  Does patient currently own DME?: Yes  What DME does the patient currently own?: Portable Oxygen tanks, Home Oxygen concentrator, Nebulizer, Walker  Does the patient have a history of Short-Term Rehab?: Yes  Does patient have a history of HHC?: Yes  Does patient currently have [facility]?: No     Patient Information Continued  Income Source: Pension/[facility]  Does patient have prescription coverage?: Yes (Pt uses [employer] in [place], [place]; No reported issues with OOP costs/coverage)  Within the past 12 months, you worried that your food would run out before you got the money to buy more : Never true  Within the past 12 months, the food you bought just didn't last and you didn't have money to get more : Never true  Food insecurity resource given?: N/A  Does patient receive dialysis treatments?: No  Does patient have a history of substance abuse?: Yes  Historical substance use preference: Cocaine (Pt reports approx 1x monthly)  Is patient currently in treatment for substance abuse?: No  Patient declined treatment information  Means of Transportation  Means of Transport to Appts[de-identified] Drives Self  In the past 12 months, has lack of transportation kept you from medical appointments or from getting medications?: No  In the past 12 months, has lack of transportation kept you from meetings, work, or from getting things needed for daily living?: No  Was application for public transport provided?: N/A    DISCHARGE DETAILS:    Discharge planning discussed with[de-identified] Patient  Freedom of Choice: Yes  Comments - Freedom of Choice: SW met bedside with patient to introduce role, complete assessment, and discuss discharge planning needs  Patient reports that her plan for discharge is to go to her father's home in [place], [place]  Pt notes her  Desean Lomeli is currently caring for her father until her return  Patient states that she has no questions, concerns, or anticipated discharge needs that SW can assist with at this time   will transport her home and bring in portable oxygen concentrator to use       Were Treatment Team discharge recommendations reviewed with patient/caregiver?: Yes  Did patient/caregiver verbalize understanding of patient care needs?: Yes  Were patient/caregiver advised of the risks associated with not following Treatment Team discharge recommendations?: Yes    Contacts  Patient Contacts: Toya Tariq (daughter)  Relationship to Patient[de-identified] Family  Contact Method: Phone  Phone Number: [phone]  Reason/Outcome: Emergency [address]         Is the patient interested in [facility] at discharge?: No    DME Referral Provided  Referral made for DME?: No    Treatment Team Recommendation: Home  Discharge Destination Plan[de-identified] Home  Transport at Discharge : Family ( to bring POC from home for discharge)

## 2023-04-25 NOTE — PROGRESS NOTES
"[facility]  Progress Note  Name: Maribel Keen  MRN: [id number]  Unit/Bed#: ICU 01 I Date of Admission: 2023   Date of Service: 2023 I Hospital Day: 1    Assessment/Plan   * Acute on chronic respiratory failure with hypoxia and hypercapnia (HCC)  Assessment & Plan  Hx of COPD, centrilobular emphysema, and recently admitted for COPD exacerbation and H  Influenza infection 3/1-3/6  Presented to ED  due to worsening lethargy since prior evening, SOB, and hypoxia  SpO2 85% on RA  No c/o fever, chills, or increased sputum production  Suspect /2 multiple sedative medications vs  COPD exacerbation vs  Suspected GELA with sleep study pending in July    · Initial VB/- placed on BiPap with repeat VBG not much improved  · Poor inspiratory effort, decreased air movement in setting of somnolence  · Received hour long nebulizer and solumedrol 125mg in ED  · CXR without focal opacification on my read- low suspicion for lower respiratory infection     Plan:  · Urine antigens, sputum culture, and blood cultures pending  · Initial procalcitonin negative at 0 06  · Continuous pulse oximetry  · Monitor off antibiotics  · Hold home trazodone, flexeril, xanax  See encephalopathy as outlined  · Consideration for COPD exacerbation- see additional plan  · ABG last evening improved: 7 /  · Tolerated 2L NC then placed back on BiPap for sleep  · Would benefit from sleep study sooner than her scheduled July apt  · Wean FiO2 for goal SpO2 >90%        Chronic obstructive pulmonary disease with acute exacerbation (HCC)  Assessment & Plan  · 50 pack year smoking history, centrilobular emphysema   Recent PFT on 3/24: \"FEV1/FVC Ratio: 67 %  Forced Vital Capacity: 2 33 L, 82 % predicted  FEV1: 1 56 L, 71 % predicted- Moderate obstructive airflow defect  No significant response to the administration to bronchodilator per ATS Standards  Normal Lung volumes   Normal diffusion " "capacity\"   Home regimen consists of Trelegy Ellipta and PRN albuterol  Also recently started on mucinex due to ongoing sputum production since having bronchitis in early March   No significant wheezing on exam  Did receive 1 dose of solumedrol 125mg in ED  Will continue at 40mg BID starting this AM    Low suspicion for LRT infection- hold off on antibiotics  Additional workup as outlined above   Continue BiPap HS  ABG improved last evening   Wean FiO2 for goal SpO2 >90%     Acute encephalopathy  Assessment & Plan  In setting of hypercarbia, hypoxia, and possibly GELA  Poly-pharmacy also likely contributing  · OP medications include Trazodone, Xanax, flexeril, suboxone  Also self reports Tramadol (last prescribed 2022), however, per daughter, she is likely no longer taking this  · UDS + for benzodiazepines, cocaine  · Initial VB //- daughter states presentation is similar to last month when she also required bipap for hypercarbia  · Ammonia 16  · CT head without intracranial abnormality    Plan:  · Hold sedating medications  · Continue BiPap 12/5 30% HS  · Frequent neuro checks- mentation improved on BiPap  · Fall precautions, CAM ICU      Opioid dependence ([facility] )  Assessment & Plan  · Hx of opioid dependence and chronic pain  · UDS showing + for benzodiazepines and cocaine, negative for opiates  · On suboxone 8-2 daily per PDMP review- will continue this AM      CAD (coronary artery disease)  Assessment & Plan  · Hx of CAD s/p CABG x1  · Continue plavix, atorvastatin, Toprol XL  · EKG NSR without ST changes  No CP  Initial troponin 3    Hx of CABG x1  Assessment & Plan  · See CAD as outlined    Anxiety  Assessment & Plan  · Hx of anxiety  Self-reported use of xanax 0 5mg nightly for sleep  · Holding all sedating medications at this time  Can consider re-introducing today as mental status has returned to baseline      Hypertension  Assessment & Plan  · Continue home Amlodipine, Cozaar, Toprol " "XL  · VS per unit protocol    Tobacco abuse, in remission  Assessment & Plan  · 50 pack year smoking history  Quit smoking as of her last admission in March, but does continue to use nicotine patches  · 7mg nicotine patch ordered    Hypothyroid  Assessment & Plan  · Continue home levothyroxine    Cocaine abuse ([facility] )  Assessment & Plan  · Reports using cocaine \"about every month\"  Last used 4/21  · Not motivated to quit           ICU Core Measures     A: Assess, Prevent, and Manage Pain · Has pain been assessed? Yes  · Need for changes to pain regimen? No   B: Both SAT/SAT  · N/A   C: Choice of Sedation · RASS Goal: 0 Alert and Calm or N/A patient not on sedation  · Need for changes to sedation or analgesia regimen? NA   D: Delirium · CAM-ICU: Negative   E: Early Mobility  · Plan for early mobility? Yes   F: Family Engagement · Plan for family engagement today? Yes         Prophylaxis:  VTE VTE covered by:  enoxaparin, Subcutaneous       Stress Ulcer  covered bypantoprazole (PROTONIX) EC tablet 40 mg [[id number]]     Subjective    \"I feel better now  Why does this keep happening? \"    HPI/24hr events: Admitted last evening in setting of hypercarbic respiratory failure  Hypercarbia improved on BiPap and she tolerated nasal cannula last evening before resuming HS BiPap  No other acute changes overnight  Review of Systems   Constitutional: Positive for fatigue  Negative for chills and fever  HENT: Negative  Respiratory: Positive for cough (chronic)  Negative for shortness of breath and wheezing  Cardiovascular: Negative  Gastrointestinal: Positive for constipation  Genitourinary: Negative  Skin: Negative  Neurological: Negative for dizziness  Psychiatric/Behavioral: Negative             Objective                            Vitals I/O      Most Recent Min/Max in 24hrs   Temp (!) 97 3 °F (36 3 °C) Temp  Min: 97 3 °F (36 3 °C)  Max: 97 9 °F (36 6 °C)   Pulse 63 Pulse  Min: 63  Max: 80   Resp 17 Resp  " Min: 17  Max: 29   /56 BP  Min: 95/54  Max: 128/53   O2 Sat 97 % SpO2  Min: 85 %  Max: 98 %      Intake/Output Summary (Last 24 hours) at 4/25/2023 0553  Last data filed at 4/24/2023 1838  Gross per 24 hour   Intake --   Output 400 ml   Net -400 ml         Diet Cardiovascular; Cardiac     Invasive Monitoring Physical exam   N/A Physical Exam  Vitals and nursing note reviewed  Constitutional:       General: She is not in acute distress  Appearance: She is obese  She is ill-appearing  She is not toxic-appearing  Interventions: Face mask in place  HENT:      Head: Normocephalic and atraumatic  Nose: Nose normal       Mouth/Throat:      Mouth: Mucous membranes are moist    Eyes:      General: Lids are normal    Cardiovascular:      Rate and Rhythm: Normal rate and regular rhythm  Pulses:           Radial pulses are 2+ on the right side and 2+ on the left side  Dorsalis pedis pulses are 1+ on the right side and 1+ on the left side  Heart sounds: Normal heart sounds  Pulmonary:      Effort: Pulmonary effort is normal  No respiratory distress  Breath sounds: Decreased breath sounds present  No wheezing  Abdominal:      General: There is no distension  Palpations: Abdomen is soft  Tenderness: There is no abdominal tenderness  Musculoskeletal:      Right lower leg: No edema  Left lower leg: No edema  Skin:     General: Skin is warm and dry  Capillary Refill: Capillary refill takes less than 2 seconds  Neurological:      General: No focal deficit present  Mental Status: She is alert  Psychiatric:         Mood and Affect: Mood normal          Behavior: Behavior is cooperative  Diagnostic Studies    EKG: NSR on monitor, rate 60-70s  Imaging: No new imaging I have personally reviewed pertinent reports         Medications:  Scheduled PRN   amLODIPine, 5 mg, Daily  atorvastatin, 40 mg, Daily With Dinner  buprenorphine-naloxone, 8 mg, Daily  clopidogrel, 75 mg, Daily  enoxaparin, 40 mg, Daily  Fluticasone Furoate-Vilanterol, 1 puff, Daily   And  umeclidinium, 1 puff, Daily  guaiFENesin, 600 mg, BID  levalbuterol, 1 25 mg, TID   And  sodium chloride, 3 mL, TID  levothyroxine, 125 mcg, Early Morning  losartan, 50 mg, Daily  melatonin, 3 mg, HS  metoprolol succinate, 25 mg, Daily  nicotine, 1 patch, Daily  pantoprazole, 40 mg, Early Morning  polyethylene glycol, 17 g, Daily  senna-docusate sodium, 2 tablet, BID  venlafaxine, 225 mg, Daily          Continuous          Labs:    CBC    Recent Labs     04/24/23  1523 04/25/23  0516   WBC 9 41 9 93   HGB 11 7 10 9*   HCT 37 9 35 3    227     BMP    Recent Labs     04/24/23  1523 04/25/23  0516   SODIUM 138 138   K 4 2 4 9    106   CO2 30 26   AGAP 4 6   BUN 20 23   CREATININE 1 21 1 13   CALCIUM 8 8 8 7       Coags    No recent results     Additional Electrolytes  Recent Labs     04/25/23  0516   MG 2 0   PHOS 2 8          Blood Gas    Recent Labs     04/24/23  2112   PHART 7 331*   AGV1ULU 46 5*   PO2ART 85 0   QFM2PHQ 24 0   BEART -2 1   SOURCE Radial, Right     Recent Labs     04/24/23  1733 04/24/23  2112   PHVEN 7 282*  --    HFE6KTC 59 5*  --    PO2VEN 29 7*  --    WEY5ELY 27 5  --    BEVEN -0 3  --    SOURCE  --  Radial, Right    LFTs  Recent Labs     04/24/23  1523 04/25/23  0516   ALT 43 33   AST 51* 33   ALKPHOS 42 38   ALB 3 9 3 7   TBILI 0 39 0 36       Infectious  Recent Labs     04/24/23  1523   PROCALCITONI 0 06     Glucose  Recent Labs     04/24/23  1523 04/25/23  0516   GLUC 111 203*                 JAZMINE Thapa

## 2023-04-25 NOTE — UTILIZATION REVIEW
Initial Clinical Review    Admission: Date/Time/Statement:   Admission Orders (From admission, onward)     Ordered        04/24/23 1858  INPATIENT ADMISSION  Once                      Orders Placed This Encounter   Procedures    INPATIENT ADMISSION     Standing Status:   Standing     Number of Occurrences:   1     Order Specific Question:   Level of Care     Answer:   Level 1 Stepdown [13]     Order Specific Question:   Estimated length of stay     Answer:   More than 2 Midnights     Order Specific Question:   Certification     Answer:   I certify that inpatient services are medically necessary for this patient for a duration of greater than two midnights  See H&P and MD Progress Notes for additional information about the patient's course of treatment  ED Arrival Information     Expected   -    Arrival   4/24/2023 14:52    Acuity   Emergent            Means of arrival   Wheelchair    Escorted by   Family Member    Service   Critical Care/ICU    Admission type   Emergency            Arrival complaint   oxygen levels low            Chief Complaint   Patient presents with    Shortness of Breath     Increase shortness of breath with low oxygen       Initial Presentation: 71 y o  female presents to ED from home with altered mental status and lethargy since the evening prior to admission  Was  Out  With daughter all day, became  Fatigued    The morning of admission,  Difficult to arouse, sats 80  % despite  Home  O2  2-3  L NC  Tachypneic in ED  With sats  85  % RA, improved to  94  % on  FM  Labs  Show  Ammonia  16, initially hypercarbic   To  62  On VBG and placed on  BiPap  CXR  Unremarkable  Not much improvement   In VBG  After 2  Hours on  BiPap, continued to drift to sleep during exam  Has dizziness and shortness of breath with walking  Has confusion and fatigue for past day  Occasional cocaine use, last use  4/21  CT scan  Head  Unremarkable    PMH  Is  COPD, emphysema  On  2 L  NC, chronic pain, opiate use on suboxone, CAD, S/P  CABG, HTN and tobacco use  UDS  + cocaine and benzos  Admit  Ip,  ICU LOC,  with  Acute/chronic respiratory failure with hypoxia and hypercapnia,  COPD  With Acute Exacerbation,  Acute  Encephalopathy and plan is fall precautions, BiPap while still lethargic, monitor labs, blood/sputum  cultures,  hold antibiotics for now, S/P  Hour long nebulizer in ED and   IV  S/medrol X 1 in ED, neuro checks,   Continue home meds  Date:    4/25   Day 2:   Placed  On  BiPap for  Sleep  Continue to hold antibiotics  Continue neuro checks  Feels improved, still with fatigue  Does appear ill  Follow  Cultures  Continue current meds/treatment plan        ED Triage Vitals [04/24/23 1500]   Temperature Pulse Respirations Blood Pressure SpO2   97 7 °F (36 5 °C) 77 (!) 28 107/57 (!) 85 %      Temp Source Heart Rate Source Patient Position - Orthostatic VS BP Location FiO2 (%)   Tympanic Monitor Sitting Left arm --      Pain Score       No Pain          Wt Readings from Last 1 Encounters:   04/25/23 84 8 kg (187 lb)     Additional Vital Signs:   04/25/23 0900 -- 82 22 108/55 66 97 % -- -- -- -- --   04/25/23 0835 -- 75 -- 132/63 -- -- -- -- -- -- --   04/25/23 0830 -- 79 21 -- -- 96 % 28 2 L/min -- -- --   04/25/23 0800 97 8 °F (36 6 °C) 73 20 132/63 91 95 % 32 3 L/min Nasal cannula -- Lying   04/25/23 0748 97 7 °F (36 5 °C) -- -- -- -- -- -- -- -- -- --   04/25/23 0727 -- -- -- -- -- 97 % -- -- Nasal cannula --  --   O2 Interface Device: off of bipap at 04/25/23 0727   04/25/23 0600 -- 62 21 117/56 80 96 % -- -- -- -- --   04/25/23 0423 -- -- -- -- -- 97 % -- -- -- Face mask --   04/25/23 0400 97 5 °F (36 4 °C) 63 17 107/56 78 96 % -- -- -- -- --   04/25/23 0200 -- 68 22 99/56 75 95 % -- -- -- -- --   04/25/23 0115 -- -- -- -- -- 96 % -- -- -- Face mask --   04/25/23 0041 -- -- -- -- -- 92 % 36 4 L/min Nasal cannula -- --   04/25/23 0000 97 3 °F (36 3 °C) Abnormal  73 25 Abnormal  106/57 75 92 % -- -- -- -- --   04/24/23 2200 -- 72 29 Abnormal  95/54 69 93 % -- -- -- -- --   04/24/23 2102 -- -- -- -- -- 98 % -- -- -- -- --   04/24/23 2100 97 9 °F (36 6 °C) 69 25 Abnormal  107/65 81 98 % -- -- BiPAP Face mask --   04/24/23 2015 -- 66 -- 114/63 83 94 % -- -- -- -- --   04/24/23 2000 -- 68 -- 110/59 79 95 % -- -- -- -- --   04/24/23 1945 -- 66 -- 103/55 74 94 % -- -- BiPAP -- --   04/24/23 1915 -- 70 -- 106/63 78 98 % -- -- -- -- --   04/24/23 1815 -- 74 22 111/58 78 97 % -- -- BiPAP -- Lying   04/24/23 1745 -- 76 24 Abnormal  99/52 73 95 % -- -- BiPAP -- Lying   04/24/23 1730 -- 78 24 Abnormal  128/53 76 90 % -- -- BiPAP -- Lying   04/24/23 1645 -- 80 26 Abnormal  116/56 77 95 % -- -- BiPAP -- Lying   04/24/23 1543 -- -- -- -- -- 94 % -- -- -- Face mask --   04/24/23 1539 -- -- -- -- -- -- -- -- Other (comment)  -- --   O2 Device: 3 L/min at 04/24/23 1539   04/24/23 1500 97 7 °F (36 5 °C) 77 28 Abnormal  107/57 -- 85 % Abnormal  -- -- None (Room air)         Pertinent Labs/Diagnostic Test Results:   EKG  NSR     No  ST changes  CT head without contrast   Final Result by Yovany Dang MD (04/24 2051)      No acute intracranial abnormality  Stable CT appearance of the brain compared to prior  Workstation performed: [id number]         XR chest 1 view portable   Final Result by Leander Dewitt MD (04/25 1887)      Worsening atelectasis at the left lung base                    Workstation performed: [id number]           Results from last 7 days   Lab Units 04/24/23  2117   SARS-COV-2  Negative     Results from last 7 days   Lab Units 04/25/23  0516 04/24/23  1523   WBC Thousand/uL 9 93 9 41   HEMOGLOBIN g/dL 10 9* 11 7   HEMATOCRIT % 35 3 37 9   PLATELETS Thousands/uL 227 232   NEUTROS ABS Thousands/µL 8 88* 6 73         Results from last 7 days   Lab Units 04/25/23  0516 04/24/23  1523   SODIUM mmol/L 138 138   POTASSIUM mmol/L 4 9 4 2   CHLORIDE mmol/L 106 104   CO2 mmol/L 26 30   ANION GAP mmol/L 6 4   BUN mg/dL 23 20   CREATININE mg/dL 1 13 1 21   EGFR ml/min/1 73sq m 49 45   CALCIUM mg/dL 8 7 8 8   MAGNESIUM mg/dL 2 0  --    PHOSPHORUS mg/dL 2 8  --      Results from last 7 days   Lab Units 04/25/23  0516 04/24/23  1902 04/24/23  1523   AST U/L 33  --  51*   ALT U/L 33  --  43   ALK PHOS U/L 38  --  42   TOTAL PROTEIN g/dL 6 6  --  7 1   ALBUMIN g/dL 3 7  --  3 9   TOTAL BILIRUBIN mg/dL 0 36  --  0 39   AMMONIA umol/L  --  16*  --          Results from last 7 days   Lab Units 04/25/23  0516 04/24/23  1523   GLUCOSE RANDOM mg/dL 203* 111          Results from last 7 days   Lab Units 04/24/23  2112   PH ART  7 331*   PCO2 ART mm Hg 46 5*   PO2 ART mm Hg 85 0   HCO3 ART mmol/L 24 0   BASE EXC ART mmol/L -2 1   O2 CONTENT ART mL/dL 16 0   O2 HGB, ARTERIAL % 95 3   ABG SOURCE  Radial, Right     Results from last 7 days   Lab Units 04/24/23  1733 04/24/23  1536   PH JAKOB  7 282* 7 294*   PCO2 JAKOB mm Hg 59 5* 61 8*   PO2 JAKOB mm Hg 29 7* 40 6   HCO3 JAKOB mmol/L 27 5 29 3   BASE EXC JAKOB mmol/L -0 3 1 5   O2 CONTENT JAKOB ml/dL 8 8 12 2   O2 HGB, VENOUS % 49 6* 71 4             Results from last 7 days   Lab Units 04/24/23  1902   HS TNI 0HR ng/L 3                 Results from last 7 days   Lab Units 04/25/23  0516 04/24/23  1523   PROCALCITONIN ng/ml <0 05 0 06                 Results from last 7 days   Lab Units 04/24/23  1523   BNP pg/mL 29                             Results from last 7 days   Lab Units 04/24/23  1839   CLARITY UA  Slightly Cloudy   COLOR UA  [place]   SPEC GRAV UA  1 025   PH UA  6 0   GLUCOSE UA mg/dl Negative   KETONES UA mg/dl Negative   BLOOD UA  Negative   PROTEIN UA mg/dl Negative   NITRITE UA  Negative   BILIRUBIN UA  Negative   UROBILINOGEN UA E U /dl 0 2   LEUKOCYTES UA  Negative     Results from last 7 days   Lab Units 04/24/23  2117   INFLUENZA A PCR  Negative   INFLUENZA B PCR  Negative   RSV PCR  Negative         Results from last 7 days   Lab Units 04/24/23  9949   AMPH/METH  Negative BARBITURATE UR  Negative   BENZODIAZEPINE UR  Positive*   COCAINE UR  Positive*   METHADONE URINE  Negative   OPIATE UR  Negative   PCP UR  Negative   THC UR  Negative                 ED Treatment:   Medication Administration from 04/24/2023 1452 to 04/24/2023 2043       Date/Time Order Dose Route Action Comments     04/24/2023 1542 EDT albuterol inhalation solution 10 mg 10 mg Nebulization Given --     04/24/2023 1542 EDT ipratropium (ATROVENT) 0 02 % inhalation solution 1 mg 1 mg Nebulization Given --     04/24/2023 1542 EDT sodium chloride 0 9 % inhalation solution 3 mL 3 mL Nebulization Given --     04/24/2023 1531 EDT methylPREDNISolone sodium succinate (Solu-MEDROL) injection 125 mg 125 mg Intravenous Given --        Present on Admission:   Chronic obstructive pulmonary disease with acute exacerbation (HCC)   Acute on chronic respiratory failure with hypoxia and hypercapnia (HCC)   Acute encephalopathy   Anxiety   Opioid dependence ([facility] )   CAD (coronary artery disease)   Hypertension   Hypothyroid   Cocaine abuse ([facility] )   Tobacco abuse, in remission      Admitting Diagnosis: Confusion [R41 0]  SOB (shortness of breath) [R06 02]  Acute hypercapnic respiratory failure (HCC) [J96 02]  Age/Sex: 71 y o  female  Admission Orders:  Scheduled Medications:  amLODIPine, 5 mg, Oral, Daily  atorvastatin, 40 mg, Oral, Daily With Dinner  buprenorphine-naloxone, 8 mg, Sublingual, Daily  clopidogrel, 75 mg, Oral, Daily  enoxaparin, 40 mg, Subcutaneous, Daily  Fluticasone Furoate-Vilanterol, 1 puff, Inhalation, Daily   And  umeclidinium, 1 puff, Inhalation, Daily  guaiFENesin, 600 mg, Oral, BID  levalbuterol, 1 25 mg, Nebulization, TID   And  sodium chloride, 3 mL, Nebulization, TID  levothyroxine, 125 mcg, Oral, Early Morning  losartan, 50 mg, Oral, Daily  melatonin, 3 mg, Oral, HS  metoprolol succinate, 25 mg, Oral, Daily  nicotine, 1 patch, Transdermal, Daily  pantoprazole, 40 mg, Oral, Early Morning  polyethylene glycol, 17 g, Oral, Daily  senna-docusate sodium, 2 tablet, Oral, BID  venlafaxine, 225 mg, Oral, Daily      Continuous IV Infusions:     PRN Meds:       IP CONSULT TO CASE MANAGEMENT    Network Utilization Review Department  ATTENTION: Please call with any questions or concerns to [phone] and carefully listen to the prompts so that you are directed to the right person  All voicemails are confidential   Vladimir Johnson all requests for admission clinical reviews, approved or denied determinations and any other requests to dedicated fax number below belonging to the campus where the patient is receiving treatment   List of dedicated fax numbers for the Facilities:  [address] DENIALS (Administrative/Medical Necessity) [phone]   [address] (Maternity/NICU/Pediatrics) [phone]   [address] [place] [address]  [phone]   Mateusz Lisa [address] [facility] [place] [address] Geoff SinghSt[facility] 28 Jenn Arceo Alethea 1481 [address] [place] [address] [phone]

## 2023-04-25 NOTE — H&P
"[facility]  67   H&P  Name: Laurita Castorena 71 y o  female I MRN: [id number]  Unit/Bed#: ICU 01 I Date of Admission: 2023   Date of Service: 2023 I Hospital Day: 0      Assessment/Plan   * Acute on chronic respiratory failure with hypoxia and hypercapnia (HCC)  Assessment & Plan  Hx of COPD, centrilobular emphysema, and recently admitted for COPD exacerbation and H  Influenza infection 3/1-3/6  Presented to ED today due to worsening lethargy since yesterday evening, SOB, and hypoxia  SpO2 85% on RA  No c/o fever, chills, or increased sputum production  Suspect 2/2 multiple sedative medications vs  COPD exacerbation vs  GELA with outstanding sleep study    · Initial VB/- placed on BiPap with repeat VBG not much improved  · Poor inspiratory effort, decreased air movement in setting of somnolence  · Received hour long nebulizer and solumedrol 125mg in ED  · CXR without focal opacification on my read- low suspicion for lower respiratory infection     Plan:  · Check urine antigens, sputum culture for completeness  · Check blood cultures  · Check procalcitonin  · Continuous pulse oximetry  · Hold off on antibiotics at this time as patient does not appear to have acute infectious process  · Hold sedating medications, see encephalopathy as outlined  · Consideration for COPD exacerbation- see additional plan  · Continue BiPap until mentation improves  · Obtain ABG on transfer to ICU  · Wean FiO2 for goal SpO2 >90%        Chronic obstructive pulmonary disease with acute exacerbation (HCC)  Assessment & Plan  · 50 pack year smoking history, centrilobular emphysema   Recent PFT on 3/24: \"FEV1/FVC Ratio: 67 %  Forced Vital Capacity: 2 33 L, 82 % predicted  FEV1: 1 56 L, 71 % predicted- Moderate obstructive airflow defect  No significant response to the administration to bronchodilator per ATS Standards  Normal Lung volumes   Normal diffusion capacity\"   Home regimen consists of " Trelegy Ellipta and PRN albuterol  Also recently started on mucinex due to ongoing sputum production since having bronchitis in early March   No significant wheezing on exam  Did receive 1 dose of solumedrol 125mg in ED   Low suspicion for LRT infection- hold off on antibiotics  Additional workup as outlined above   Continue BiPap while still lethargic  ABG pending   Wean FiO2 for goal SpO2 >90%     Acute encephalopathy  Assessment & Plan  In setting of hypercarbia, hypoxia, and possibly GELA  Poly-pharmacy also likely contributing  · OP medications include Trazodone, Xanax, flexeril, suboxone  Also self reports Tramadol (last prescribed 2022), however, per daughter, she is likely no longer taking this  · UDS + for benzodiazepines, cocaine  · Initial VB //- daughter states presentation is similar to last month when she also required bipap for hypercarbia  · Ammonia 16  · CT head without intracranial abnormality    Plan:  · Hold sedative medications  · Continue BiPap 12/5 30%  Obtain ABG on transfer to ICU, then wean as appropriate  · Frequent neuro checks- mentation improving on BiPap and with improvement in oxygenation  · Avoid administration of mid altering substances  · Fall precautions, [facility] ICU      Opioid dependence ([facility] [address] )  Assessment & Plan  · Hx of opioid dependence and chronic pain  · UDS showing + for benzodiazepines and cocaine, negative for opiates  · On suboxone 8-2 daily per PDMP review- will continue in AM      CAD (coronary artery disease)  Assessment & Plan  · Hx of CAD s/p CABG x1  · Continue plavix, atorvastatin, Toprol XL  · EKG NSR without ST changes  No CP  Initial troponin 3    Hx of CABG x1  Assessment & Plan  · See CAD as outlined    Anxiety  Assessment & Plan  · Hx of anxiety  Self-reported use of xanax 0 5mg nightly for sleep  · Holding all sedating medications at this time   Can consider re-introducing once mental status has fully returned to "baseline  Hypertension  Assessment & Plan  · Continue home Amlodipine, Cozaar, Toprol XL  · VS per unit protocol    Tobacco abuse, in remission  Assessment & Plan  · 50 pack year smoking history  Quit smoking as of her last admission in March, but does continue to use nicotine patches  · Continue 7mg nicotine patch in AM    Hypothyroid  Assessment & Plan  · Continue home levothyroxine    Cocaine abuse ([address] )  Assessment & Plan  · Reports using cocaine \"about every month\"  Last used 4/21  · Not motivated to quit           History of Present Illness     HPI: Demario Avina is a 71 y o  with PMHx of COPD, centrilobular emphysema on 2L at bedtime, chronic pain and opiate use on suboxone, CAD s/p CABG x1, HTN, tobacco abuse in remission, and hypothyroid, who presents with lethargy, altered mental status which began last evening  Per her daughter, they were out shopping all day and the patient had become more fatigued last evening  This morning when she was checked on, she was difficult to arouse and had an O2 saturation of 80% despite use of her home 2-3L NC  She was brought to the ED by family and was tachypneic with an O2 saturation of 85% on room air  SpO2 quickly improved to 94% on face mask  Family denied recent illness, fever, or any additional complaints  CBC and BMP were unremarkable  AST and ALT were mildly elevated at 51 and 43 respectively  Ammonia was 16  Initially hypercarbic to 62 on VBG; was placed on BiPap  CXR did not show any significant focal consolidation on my read  Repeat VBG was not much improved after 2 hours on BiPap and she continued to drift to sleep during my examination  Limited ROS obtained and negative for recent illness, cough, chills, CP, palpitations, syncope  She endorses some dizziness and associated SOB when she gets up to walk  Also reports some confusion and fatigue over the past day   She denies taking any extra doses of her xanax, trazodone, or flexeril above what is " prescribed but does admit to occasionally using cocaine (monthly- last used 4/21)  CT head was obtained and was negative for intracranial abnormality  She will be admitted to [address] [place] [address] ICU for continued management  History obtained from child, chart review and unobtainable from patient due to mental status  Review of Systems   Constitutional: Positive for fatigue  Negative for chills and fever  HENT: Negative  Respiratory: Positive for cough and shortness of breath  Negative for chest tightness and wheezing  Cardiovascular: Negative for chest pain, palpitations and leg swelling  Gastrointestinal: Positive for constipation  Negative for abdominal distention, abdominal pain, nausea and vomiting  Genitourinary: Negative  Skin: Negative  Neurological: Positive for dizziness  Negative for syncope, speech difficulty, weakness, light-headedness, numbness and headaches  Psychiatric/Behavioral: Positive for confusion  The patient is nervous/anxious  All other systems reviewed and are negative        Historical Information   Past Medical History:  03/06/2023: Chronic pain  No date: COPD (chronic obstructive pulmonary disease) (HCC)  No date: Disease of thyroid gland  No date: Hypertension Past Surgical History:  No date: CARDIAC SURGERY  No date: HIP SURGERY  No date: ORTHOPEDIC SURGERY  09/2016: THORACIC AORTIC ANEURYSM REPAIR      Comment:  ascending thoracic aortic repair with RCA bypass with                SVG x 1   Current Outpatient Medications   Medication Instructions    albuterol (2 5 mg/3 mL) 0 083 % nebulizer solution INHALE 1 VIAL EVERY 6 HOURS BY NEBULIZER AS NEEDED    albuterol (PROVENTIL HFA,VENTOLIN HFA) 90 mcg/act inhaler 2 puffs, Inhalation, Every 6 hours PRN    ALPRAZolam (XANAX) 0 5 mg tablet Oral, 3 times daily PRN    amLODIPine (NORVASC) 5 mg, Oral, Daily    atorvastatin (LIPITOR) 40 mg, Oral, Daily    buprenorphine-naloxone (SUBOXONE) 8-2 mg per SL tablet 1 tablet, Sublingual, 2 times daily    celecoxib (CELEBREX) 200 mg, Oral, Daily    clopidogrel (PLAVIX) 75 mg, Oral, Daily    cyclobenzaprine (FLEXERIL) 10 mg, Oral, Daily at bedtime    fluticasone-umeclidinium-vilanterol (Trelegy Ellipta) 100-62 5-25 mcg/actuation inhaler 1 puff, Inhalation, Daily, Rinse mouth after use   gabapentin (NEURONTIN) 300 mg, Oral, 3 times daily    guaiFENesin (MUCINEX) 600 mg, Oral, 2 times daily    levothyroxine 125 mcg, Oral    losartan (COZAAR) 50 mg, Oral, Daily    melatonin 3 mg, Oral, Daily at bedtime    metoprolol succinate (TOPROL-XL) 25 mg, Oral, Daily    nicotine (NICODERM CQ) 14 mg/24hr TD 24 hr patch 1 patch, Transdermal, Daily    nicotine (NICODERM CQ) 7 mg/24hr TD 24 hr patch APPLY ONE PATCH TRANSDERMAL EVERY DAY    omeprazole (PRILOSEC) 40 mg, Oral, Daily    pantoprazole (PROTONIX) 40 mg tablet ONE TABLET BY MOUTH EVERY DAY IN THE MORNING    promethazine-dextromethorphan (PHENERGAN-DM) 6 25-15 mg/5 mL oral syrup 5 mL, Oral, 4 times daily PRN    traMADol (ULTRAM) 50 mg, Oral, Every 6 hours PRN    traZODone (DESYREL) 50 mg, Oral, Daily    venlafaxine (EFFEXOR-XR) 225 mg, Oral, Daily    No Known Allergies   Social History     Tobacco Use    Smoking status: Former     Packs/day: 1 00     Years: 50 00     Pack years: 50 00     Types: Cigarettes     Quit date: 3/1/2023     Years since quittin 1    Smokeless tobacco: Never   Vaping Use    Vaping Use: Never used   Substance Use Topics    Alcohol use: Not Currently    Drug use: Not Currently     Types: Marijuana, Cocaine    History reviewed  No pertinent family history         Objective                            Vitals I/O      Most Recent Min/Max in 24hrs   Temp 97 7 °F (36 5 °C) Temp  Min: 97 7 °F (36 5 °C)  Max: 97 7 °F (36 5 °C)   Pulse 69 Pulse  Min: 66  Max: 80   Resp (!) 25 Resp  Min: 22  Max: 28   /63 BP  Min: 99/52  Max: 128/53   O2 Sat 98 % SpO2  Min: 85 %  Max: 98 %      Intake/Output Summary (Last 24 hours) at 4/24/2023 2134  Last data filed at 4/24/2023 0273  Gross per 24 hour   Intake --   Output 400 ml   Net -400 ml         Diet Cardiovascular; Cardiac     Invasive Monitoring Physical exam   N/A Physical Exam  Vitals and nursing note reviewed  Constitutional:       General: She is not in acute distress  Appearance: She is ill-appearing  She is not toxic-appearing  Interventions: Face mask in place  Comments: Awakens for exam and maintains wakefulness for a few minutes, then drifts to sleep   HENT:      Head: Normocephalic and atraumatic  Nose: Nose normal       Mouth/Throat:      Mouth: Mucous membranes are moist    Eyes:      General: Lids are normal    Cardiovascular:      Rate and Rhythm: Normal rate and regular rhythm  Pulses:           Radial pulses are 1+ on the right side and 1+ on the left side  Dorsalis pedis pulses are 1+ on the right side and 1+ on the left side  Heart sounds: Normal heart sounds  Pulmonary:      Effort: Pulmonary effort is normal  No respiratory distress  Breath sounds: Decreased breath sounds present  No wheezing or rhonchi  Abdominal:      General: Bowel sounds are decreased  There is no distension  Palpations: Abdomen is soft  Tenderness: There is no abdominal tenderness  Genitourinary:     Comments: No urine visualized  Musculoskeletal:         General: Normal range of motion  Right lower leg: No edema  Left lower leg: No edema  Skin:     General: Skin is warm and dry  Neurological:      General: No focal deficit present  Mental Status: She is disoriented  GCS: GCS eye subscore is 3  GCS verbal subscore is 4  GCS motor subscore is 6  Motor: Motor function is intact  No weakness  Comments: Oriented to place, situation, year, and self  Disoriented to date/time  Psychiatric:         Attention and Perception: She is inattentive           Speech: Speech normal          Behavior: Behavior is slowed  Behavior is cooperative  Cognition and Memory: Cognition is impaired  Diagnostic Studies    EKG: NSR with no ST changes, rate 75  Imaging:    I have personally reviewed pertinent reports  and I have personally reviewed pertinent films in PACS     4/24 CXR: No focal consolidation on my read  Formal report pending    4/24 CT head: No intracranial abnormality     Medications:  Scheduled PRN   [START ON 4/25/2023] amLODIPine, 5 mg, Daily  [START ON 4/25/2023] atorvastatin, 40 mg, Daily With Dinner  [START ON 4/25/2023] buprenorphine-naloxone, 8 mg, Daily  [START ON 4/25/2023] clopidogrel, 75 mg, Daily  [START ON 4/25/2023] enoxaparin, 40 mg, Daily  [START ON 4/25/2023] Fluticasone Furoate-Vilanterol, 1 puff, Daily   And  [START ON 4/25/2023] umeclidinium, 1 puff, Daily  guaiFENesin, 600 mg, BID  levalbuterol, 1 25 mg, TID   And  sodium chloride, 3 mL, TID  [START ON 4/25/2023] levothyroxine, 125 mcg, Early Morning  [START ON 4/25/2023] losartan, 50 mg, Daily  melatonin, 3 mg, HS  [START ON 4/25/2023] metoprolol succinate, 25 mg, Daily  [START ON 4/25/2023] nicotine, 1 patch, Daily  [START ON 4/25/2023] pantoprazole, 40 mg, Early Morning  [START ON 4/25/2023] polyethylene glycol, 17 g, Daily  senna-docusate sodium, 2 tablet, BID  [START ON 4/25/2023] venlafaxine, 225 mg, Daily          Continuous          Labs:    CBC    Recent Labs     04/24/23  1523   WBC 9 41   HGB 11 7   HCT 37 9        BMP    Recent Labs     04/24/23  1523   SODIUM 138   K 4 2      CO2 30   AGAP 4   BUN 20   CREATININE 1 21   CALCIUM 8 8       Coags    No recent results     Additional Electrolytes  No recent results       Blood Gas    Recent Labs     04/24/23 2112   PHART 7 331*   ZQK2XGJ 46 5*   PO2ART 85 0   QUV2OLV 24 0   BEART -2 1   SOURCE Radial, Right     Recent Labs     04/24/23  1733 04/24/23 2112   PHVEN 7 282*  --    VNE4OSI 59 5*  --    PO2VEN 29 7*  --    LAE7BDH 27 5  --    BEVEN -0 3  -- SOURCE  --  Radial, Right    LFTs  Recent Labs     04/24/23  1523   ALT 43   AST 51*   ALKPHOS 42   ALB 3 9   TBILI 0 39       Infectious  Recent Labs     04/24/23  1523   PROCALCITONI 0 06     Glucose  Recent Labs     04/24/23  1523   GLUC 111             Critical Care Time Delivered: Upon my evaluation, this patient had a high probability of imminent or life-threatening deterioration due to acute respiratory failure with hypoxia and hypercarbia, acute encephalopathy, which required my direct attention, intervention, and personal management  I have personally provided 35 minutes of critical care time, exclusive of procedures, teaching, family meetings, and any prior time recorded by providers other than myself     Anticipated Length of Stay is > 2 midnights  Polo Pouch

## 2023-04-25 NOTE — ASSESSMENT & PLAN NOTE
Hx of COPD, centrilobular emphysema, and recently admitted for COPD exacerbation and H  Influenza infection 3/1-3/6  Presented to ED  due to worsening lethargy since prior evening, SOB, and hypoxia  SpO2 85% on RA  No c/o fever, chills, or increased sputum production  Suspect 2/2 multiple sedative medications vs  COPD exacerbation vs  Suspected GELA with sleep study pending in July    · Initial VB ///29- placed on BiPap with repeat VBG not much improved  · Poor inspiratory effort, decreased air movement in setting of somnolence  · Received hour long nebulizer and solumedrol 125mg in ED  · CXR without focal opacification on my read- low suspicion for lower respiratory infection     Plan:  · Urine antigens, sputum culture, and blood cultures pending  · Initial procalcitonin negative at 0 06  · Continuous pulse oximetry  · Monitor off antibiotics  · Hold home trazodone, flexeril, xanax  See encephalopathy as outlined  · Consideration for COPD exacerbation- see additional plan  · ABG last evening improved: 7 //24  · Tolerated 2L NC then placed back on BiPap for sleep  · Would benefit from sleep study sooner than her scheduled July apt     · Wean FiO2 for goal SpO2 >90%

## 2023-04-25 NOTE — ASSESSMENT & PLAN NOTE
In setting of hypercarbia, hypoxia, and possibly GELA  Poly-pharmacy also likely contributing  · OP medications include Trazodone, Xanax, flexeril, suboxone  Also self reports Tramadol (last prescribed 2022), however, per daughter, she is likely no longer taking this  · UDS + for benzodiazepines, cocaine    · Initial VB /- daughter states presentation is similar to last month when she also required bipap for hypercarbia  · Ammonia 16  · CT head without intracranial abnormality    Plan:  · Hold sedating medications  · Continue BiPap 12/5 30% HS  · Frequent neuro checks- mentation improved on BiPap  · Fall precautions, [facility] ICU

## 2023-04-25 NOTE — ASSESSMENT & PLAN NOTE
· Hx of anxiety  Self-reported use of xanax 0 5mg nightly for sleep  · Holding all sedating medications at this time  Can consider re-introducing today as mental status has returned to baseline

## 2023-04-25 NOTE — ASSESSMENT & PLAN NOTE
Hx of COPD, centrilobular emphysema, and recently admitted for COPD exacerbation and H  Influenza infection 3/1-3/6  Presented to ED today due to worsening lethargy since yesterday evening, SOB, and hypoxia  SpO2 85% on RA  No c/o fever, chills, or increased sputum production  Suspect 2/2 multiple sedative medications vs  COPD exacerbation vs  GELA with outstanding sleep study    · Initial VB /- placed on BiPap with repeat VBG not much improved  · Poor inspiratory effort, decreased air movement in setting of somnolence    · Received hour long nebulizer and solumedrol 125mg in ED  · CXR without focal opacification on my read- low suspicion for lower respiratory infection     Plan:  · Check urine antigens, sputum culture for completeness  · Check blood cultures  · Check procalcitonin  · Continuous pulse oximetry  · Hold off on antibiotics at this time as patient does not appear to have acute infectious process  · Hold sedating medications, see encephalopathy as outlined  · Consideration for COPD exacerbation- see additional plan  · Continue BiPap until mentation improves  · Obtain ABG on transfer to ICU  · Wean FiO2 for goal SpO2 >90%

## 2023-04-25 NOTE — ASSESSMENT & PLAN NOTE
· 50 pack year smoking history   Quit smoking as of her last admission in March, but does continue to use nicotine patches  · 7mg nicotine patch ordered

## 2023-04-26 LAB
ANION GAP SERPL CALCULATED.3IONS-SCNC: 7 MMOL/L (ref 4–13)
ATRIAL RATE: 75 BPM
BASOPHILS # BLD AUTO: 0.03 THOUSANDS/ΜL (ref 0–0.1)
BASOPHILS NFR BLD AUTO: 0 % (ref 0–1)
BUN SERPL-MCNC: 21 MG/DL (ref 5–25)
CA-I BLD-SCNC: 1.15 MMOL/L (ref 1.12–1.32)
CALCIUM SERPL-MCNC: 8.9 MG/DL (ref 8.4–10.2)
CHLORIDE SERPL-SCNC: 106 MMOL/L (ref 96–108)
CO2 SERPL-SCNC: 29 MMOL/L (ref 21–32)
CREAT SERPL-MCNC: 0.9 MG/DL (ref 0.6–1.3)
EOSINOPHIL # BLD AUTO: 0.25 THOUSAND/ΜL (ref 0–0.61)
EOSINOPHIL NFR BLD AUTO: 3 % (ref 0–6)
ERYTHROCYTE [DISTWIDTH] IN BLOOD BY AUTOMATED COUNT: 14.7 % (ref 11.6–15.1)
GFR SERPL CREATININE-BSD FRML MDRD: 64 ML/MIN/1.73SQ M
GLUCOSE SERPL-MCNC: 94 MG/DL (ref 65–140)
HCT VFR BLD AUTO: 36.7 % (ref 34.8–46.1)
HGB BLD-MCNC: 11.5 G/DL (ref 11.5–15.4)
IMM GRANULOCYTES # BLD AUTO: 0.02 THOUSAND/UL (ref 0–0.2)
IMM GRANULOCYTES NFR BLD AUTO: 0 % (ref 0–2)
LYMPHOCYTES # BLD AUTO: 2.02 THOUSANDS/ΜL (ref 0.6–4.47)
LYMPHOCYTES NFR BLD AUTO: 21 % (ref 14–44)
MAGNESIUM SERPL-MCNC: 1.9 MG/DL (ref 1.9–2.7)
MCH RBC QN AUTO: 30.9 PG (ref 26.8–34.3)
MCHC RBC AUTO-ENTMCNC: 31.3 G/DL (ref 31.4–37.4)
MCV RBC AUTO: 99 FL (ref 82–98)
MONOCYTES # BLD AUTO: 0.75 THOUSAND/ΜL (ref 0.17–1.22)
MONOCYTES NFR BLD AUTO: 8 % (ref 4–12)
NEUTROPHILS # BLD AUTO: 6.7 THOUSANDS/ΜL (ref 1.85–7.62)
NEUTS SEG NFR BLD AUTO: 68 % (ref 43–75)
NRBC BLD AUTO-RTO: 0 /100 WBCS
P AXIS: 39 DEGREES
PLATELET # BLD AUTO: 243 THOUSANDS/UL (ref 149–390)
PMV BLD AUTO: 9.5 FL (ref 8.9–12.7)
POTASSIUM SERPL-SCNC: 4.4 MMOL/L (ref 3.5–5.3)
PR INTERVAL: 158 MS
QRS AXIS: -3 DEGREES
QRSD INTERVAL: 92 MS
QT INTERVAL: 388 MS
QTC INTERVAL: 433 MS
RBC # BLD AUTO: 3.72 MILLION/UL (ref 3.81–5.12)
SODIUM SERPL-SCNC: 142 MMOL/L (ref 135–147)
T WAVE AXIS: 68 DEGREES
VENTRICULAR RATE: 75 BPM
WBC # BLD AUTO: 9.77 THOUSAND/UL (ref 4.31–10.16)

## 2023-04-26 RX ORDER — METHYLPREDNISOLONE SODIUM SUCCINATE 40 MG/ML
40 INJECTION, POWDER, LYOPHILIZED, FOR SOLUTION INTRAMUSCULAR; INTRAVENOUS EVERY 12 HOURS SCHEDULED
Status: DISCONTINUED | OUTPATIENT
Start: 2023-04-26 | End: 2023-04-28 | Stop reason: HOSPADM

## 2023-04-26 RX ORDER — LEVALBUTEROL INHALATION SOLUTION 1.25 MG/3ML
1.25 SOLUTION RESPIRATORY (INHALATION)
Status: DISCONTINUED | OUTPATIENT
Start: 2023-04-26 | End: 2023-04-28 | Stop reason: HOSPADM

## 2023-04-26 RX ADMIN — MELATONIN TAB 3 MG 3 MG: 3 TAB at 21:06

## 2023-04-26 RX ADMIN — ALPRAZOLAM 0.5 MG: 0.5 TABLET ORAL at 12:38

## 2023-04-26 RX ADMIN — ALPRAZOLAM 0.5 MG: 0.5 TABLET ORAL at 21:05

## 2023-04-26 RX ADMIN — SENNOSIDES AND DOCUSATE SODIUM 2 TABLET: 50; 8.6 TABLET ORAL at 08:59

## 2023-04-26 RX ADMIN — ATORVASTATIN CALCIUM 40 MG: 40 TABLET, FILM COATED ORAL at 17:26

## 2023-04-26 RX ADMIN — AMLODIPINE BESYLATE 5 MG: 5 TABLET ORAL at 08:59

## 2023-04-26 RX ADMIN — UMECLIDINIUM 1 PUFF: 62.5 AEROSOL, POWDER ORAL at 09:01

## 2023-04-26 RX ADMIN — LEVALBUTEROL HYDROCHLORIDE 1.25 MG: 1.25 SOLUTION RESPIRATORY (INHALATION) at 13:03

## 2023-04-26 RX ADMIN — CYCLOBENZAPRINE HYDROCHLORIDE 10 MG: 10 TABLET, FILM COATED ORAL at 21:06

## 2023-04-26 RX ADMIN — SENNOSIDES AND DOCUSATE SODIUM 2 TABLET: 50; 8.6 TABLET ORAL at 17:26

## 2023-04-26 RX ADMIN — METHYLPREDNISOLONE SODIUM SUCCINATE 40 MG: 40 INJECTION, POWDER, FOR SOLUTION INTRAMUSCULAR; INTRAVENOUS at 21:07

## 2023-04-26 RX ADMIN — POLYETHYLENE GLYCOL 3350 17 G: 17 POWDER, FOR SOLUTION ORAL at 08:58

## 2023-04-26 RX ADMIN — TRAZODONE HYDROCHLORIDE 75 MG: 50 TABLET ORAL at 21:06

## 2023-04-26 RX ADMIN — METHYLPREDNISOLONE SODIUM SUCCINATE 40 MG: 40 INJECTION, POWDER, FOR SOLUTION INTRAMUSCULAR; INTRAVENOUS at 08:57

## 2023-04-26 RX ADMIN — LEVALBUTEROL HYDROCHLORIDE 1.25 MG: 1.25 SOLUTION RESPIRATORY (INHALATION) at 07:09

## 2023-04-26 RX ADMIN — NICOTINE 1 PATCH: 7 PATCH, EXTENDED RELEASE TRANSDERMAL at 08:58

## 2023-04-26 RX ADMIN — METOPROLOL SUCCINATE 25 MG: 25 TABLET, EXTENDED RELEASE ORAL at 08:59

## 2023-04-26 RX ADMIN — BUPRENORPHINE AND NALOXONE 8 MG: 8; 2 FILM BUCCAL; SUBLINGUAL at 09:00

## 2023-04-26 RX ADMIN — FLUTICASONE FUROATE AND VILANTEROL TRIFENATATE 1 PUFF: 100; 25 POWDER RESPIRATORY (INHALATION) at 09:01

## 2023-04-26 RX ADMIN — VENLAFAXINE HYDROCHLORIDE 225 MG: 150 CAPSULE, EXTENDED RELEASE ORAL at 09:00

## 2023-04-26 RX ADMIN — LOSARTAN POTASSIUM 50 MG: 50 TABLET, FILM COATED ORAL at 08:59

## 2023-04-26 RX ADMIN — PANTOPRAZOLE SODIUM 40 MG: 40 TABLET, DELAYED RELEASE ORAL at 06:10

## 2023-04-26 RX ADMIN — ENOXAPARIN SODIUM 40 MG: 40 INJECTION SUBCUTANEOUS at 08:59

## 2023-04-26 RX ADMIN — GUAIFENESIN 600 MG: 600 TABLET, EXTENDED RELEASE ORAL at 09:00

## 2023-04-26 RX ADMIN — LEVOTHYROXINE SODIUM 125 MCG: 125 TABLET ORAL at 06:10

## 2023-04-26 RX ADMIN — GUAIFENESIN 600 MG: 600 TABLET, EXTENDED RELEASE ORAL at 17:26

## 2023-04-26 RX ADMIN — LEVALBUTEROL HYDROCHLORIDE 1.25 MG: 1.25 SOLUTION RESPIRATORY (INHALATION) at 20:02

## 2023-04-26 RX ADMIN — CLOPIDOGREL BISULFATE 75 MG: 75 TABLET ORAL at 08:59

## 2023-04-26 NOTE — ASSESSMENT & PLAN NOTE
Presented with lethargy, shortness of breath, and hypoxia found to be 85% on room air  Patient has a history of COPD and emphysema recently admitted for COPD exacerbation and H influenza  Patient takes multiple sedative medications  · Initially required BiPAP and management in the critical care apartment  · Chest x-ray revealed atelectasis but no pneumonia  · Urine antigens negative  · Procalcitonin negative  · No indication for antibiotics at this time  · Suspecting COPD exacerbation, continue Solu-Medrol 40 mg IV daily  · Recommend sleep study    Currently has one scheduled for July  · Continue supplemental oxygen for SPO2 sats greater than 90%

## 2023-04-26 NOTE — UTILIZATION REVIEW
"Continued Stay Review    Date: 04/26/2023                     Current Patient Class: Inpatient  Current Level of Care: Med/Surg    HPI:72 y o  female initially admitted on 04/24/2023     Assessment/Plan:  Acute on Chronic Respiratory failure with Hypoxia and Hypercapnia  Continue IV solumedrol  Current O2 @ 2L via NC, wean as able  Continue nebulizer QID  CV Diet  Up with Assistance  Daily weight / I&O  Neuro checks q8h    PT   DVT PPX:  Lovenox / Facundo SCDs    Vital Signs: /65 (BP Location: Left arm)   Pulse 82   Temp 98 °F (36 7 °C) (Temporal)   Resp 22   Ht 5' 3\" (1 6 m)   Wt 84 3 kg (185 lb 13 6 oz)   LMP  (LMP Unknown)   SpO2 90%   BMI 32 92 kg/m²     Pertinent Labs/Diagnostic Results:   Results from last 7 days   Lab Units 04/24/23 2117   SARS-COV-2  Negative     Results from last 7 days   Lab Units 04/26/23  0609 04/25/23  0516 04/24/23  1523   WBC Thousand/uL 9 77 9 93 9 41   HEMOGLOBIN g/dL 11 5 10 9* 11 7   HEMATOCRIT % 36 7 35 3 37 9   PLATELETS Thousands/uL 243 227 232   NEUTROS ABS Thousands/µL 6 70 8 88* 6 73     Results from last 7 days   Lab Units 04/26/23  0609 04/25/23  0516 04/24/23  1523   SODIUM mmol/L 142 138 138   POTASSIUM mmol/L 4 4 4 9 4 2   CHLORIDE mmol/L 106 106 104   CO2 mmol/L 29 26 30   ANION GAP mmol/L 7 6 4   BUN mg/dL 21 23 20   CREATININE mg/dL 0 90 1 13 1 21   EGFR ml/min/1 73sq m 64 49 45   CALCIUM mg/dL 8 9 8 7 8 8   CALCIUM, IONIZED mmol/L 1 15  --   --    MAGNESIUM mg/dL 1 9 2 0  --    PHOSPHORUS mg/dL  --  2 8  --      Results from last 7 days   Lab Units 04/25/23  0516 04/24/23  1902 04/24/23  1523   AST U/L 33  --  51*   ALT U/L 33  --  43   ALK PHOS U/L 38  --  42   TOTAL PROTEIN g/dL 6 6  --  7 1   ALBUMIN g/dL 3 7  --  3 9   TOTAL BILIRUBIN mg/dL 0 36  --  0 39   AMMONIA umol/L  --  16*  --      Results from last 7 days   Lab Units 04/26/23  0609 04/25/23  0516 04/24/23  1523   GLUCOSE RANDOM mg/dL 94 203* 111      Results from last 7 days   Lab Units " 04/24/23  2112   [place] [address] ART  7 331*   PCO2 ART mm Hg 46 5*   PO2 ART mm Hg 85 0   HCO3 ART mmol/L 24 0   BASE EXC ART mmol/L -2 1   O2 CONTENT ART mL/dL 16 0   O2 HGB, ARTERIAL % 95 3   ABG SOURCE  Radial, Right     Results from last 7 days   Lab Units 04/24/23  1733 04/24/23  1536   PH JAKOB  7 282* 7 294*   PCO2 JAKOB mm Hg 59 5* 61 8*   PO2 JAKOB mm Hg 29 7* 40 6   HCO3 JAKOB mmol/L 27 5 29 3   BASE EXC JAKOB mmol/L -0 3 1 5   O2 CONTENT JAKOB ml/dL 8 8 12 2   O2 HGB, VENOUS % 49 6* 71 4     Results from last 7 days   Lab Units 04/24/23  1902   HS TNI 0HR ng/L 3     Results from last 7 days   Lab Units 04/25/23  0516 04/24/23  1523   PROCALCITONIN ng/ml <0 05 0 06     Results from last 7 days   Lab Units 04/24/23  1523   BNP pg/mL 29     Results from last 7 days   Lab Units 04/24/23  1839   CLARITY UA  Slightly Cloudy   COLOR UA  [place]   SPEC GRAV UA  1 025   PH UA  6 0   GLUCOSE UA mg/dl Negative   KETONES UA mg/dl Negative   BLOOD UA  Negative   PROTEIN UA mg/dl Negative   NITRITE UA  Negative   BILIRUBIN UA  Negative   UROBILINOGEN UA E U /dl 0 2   LEUKOCYTES UA  Negative     Results from last 7 days   Lab Units 04/24/23 2117 04/24/23  1950 04/24/23  1839   STREP PNEUMONIAE ANTIGEN, URINE   --   --  Negative   LEGIONELLA URINARY ANTIGEN   --  Negative  --    INFLUENZA A PCR  Negative  --   --    INFLUENZA B PCR  Negative  --   --    RSV PCR  Negative  --   --      Results from last 7 days   Lab Units 04/24/23  1839   AMPH/METH  Negative   BARBITURATE UR  Negative   BENZODIAZEPINE UR  Positive*   COCAINE UR  Positive*   METHADONE URINE  Negative   OPIATE UR  Negative   PCP UR  Negative   THC UR  Negative     Results from last 7 days   Lab Units 04/24/23 1955 04/24/23  1950   BLOOD CULTURE  No Growth at 24 hrs  No Growth at 24 hrs       Medications:   Scheduled Medications:  amLODIPine, 5 mg, Oral, Daily  atorvastatin, 40 mg, Oral, Daily With Dinner  buprenorphine-naloxone, 8 mg, Sublingual, Daily  clopidogrel, 75 mg, Oral, Daily  cyclobenzaprine, 10 mg, Oral, HS  enoxaparin, 40 mg, Subcutaneous, Daily  Fluticasone Furoate-Vilanterol, 1 puff, Inhalation, Daily   And  umeclidinium, 1 puff, Inhalation, Daily  guaiFENesin, 600 mg, Oral, BID  levalbuterol, 1 25 mg, Nebulization, TID  levothyroxine, 125 mcg, Oral, Early Morning  losartan, 50 mg, Oral, Daily  melatonin, 3 mg, Oral, HS  methylPREDNISolone sodium succinate, 40 mg, Intravenous, Daily  metoprolol succinate, 25 mg, Oral, Daily  nicotine, 1 patch, Transdermal, Daily  pantoprazole, 40 mg, Oral, Early Morning  polyethylene glycol, 17 g, Oral, Daily  senna-docusate sodium, 2 tablet, Oral, BID  traZODone, 75 mg, Oral, HS  venlafaxine, 225 mg, Oral, Daily      Continuous IV Infusions:     PRN Meds:  acetaminophen, 650 mg, Oral, Q6H PRN  ALPRAZolam, 0 5 mg, Oral, BID PRN  traMADol, 50 mg, Oral, Q8H PRN        Discharge Plan: D    Network Utilization Review Department  ATTENTION: Please call with any questions or concerns to [phone] and carefully listen to the prompts so that you are directed to the right person  All voicemails are confidential   Terry Dong all requests for admission clinical reviews, approved or denied determinations and any other requests to dedicated fax number below belonging to the campus where the patient is receiving treatment   List of dedicated fax numbers for the Facilities:  [address] DENIALS (Administrative/Medical Necessity) [phone]   [address] (Maternity/NICU/Pediatrics) [phone]   [address] [phone]   [address]  [address] [facility] [place] [address] [place] [facility] [phone]     Shital Castillo 37 [address] [place] [id number] [phone]

## 2023-04-26 NOTE — PLAN OF CARE
Problem: RESPIRATORY - ADULT  Goal: Achieves optimal ventilation and oxygenation  Description: INTERVENTIONS:  - Assess for changes in respiratory status  - Assess for changes in mentation and behavior  - Position to facilitate oxygenation and minimize respiratory effort  - Oxygen administered by appropriate delivery if ordered  - Initiate smoking cessation education as indicated  - Encourage broncho-pulmonary hygiene including cough, deep breathe, Incentive Spirometry  - Assess the need for suctioning and aspirate as needed  - Assess and instruct to report SOB or any respiratory difficulty  - Respiratory Therapy support as indicated  Outcome: Progressing     Problem: MOBILITY - ADULT  Goal: Maintain or return to baseline ADL function  Description: INTERVENTIONS:  -  Assess patient's ability to carry out ADLs; assess patient's baseline for ADL function and identify physical deficits which impact ability to perform ADLs (bathing, care of mouth/teeth, toileting, grooming, dressing, etc )  - Assess/evaluate cause of self-care deficits   - Assess range of motion  - Assess patient's mobility; develop plan if impaired  - Assess patient's need for assistive devices and provide as appropriate  - Encourage maximum independence but intervene and supervise when necessary  - Involve family in performance of ADLs  - Assess for home care needs following discharge   - Consider OT consult to assist with ADL evaluation and planning for discharge  - Provide patient education as appropriate  Outcome: Adequate for Discharge  Goal: Maintains/Returns to pre admission functional level  Description: INTERVENTIONS:  - Perform BMAT or MOVE assessment daily    - Set and communicate daily mobility goal to care team and patient/family/caregiver     - Collaborate with rehabilitation services on mobility goals if consulted  - Ambulate patient 4 times a day  - Out of bed to chair 2 times a day   - Out of bed for meals 2 times a day  - Out of bed for toileting  - Record patient progress and toleration of activity level   Outcome: Adequate for Discharge     Problem: Potential for Falls  Goal: Patient will remain free of falls  Description: INTERVENTIONS:  - Educate patient/family on patient safety including physical limitations  - Instruct patient to call for assistance with activity   - Consult OT/PT to assist with strengthening/mobility   - Keep Call bell within reach  - Keep bed low and locked with side rails adjusted as appropriate  - Keep care items and personal belongings within reach  - Initiate and maintain comfort rounds  - Make Fall Risk Sign visible to staff  - Offer Toileting every 2 Hours, in advance of need  - Initiate/Maintain bed alarm  - Obtain necessary fall risk management equipment  - Apply yellow socks and bracelet for high fall risk patients  - Consider moving patient to room near nurses station  Outcome: Adequate for Discharge     Problem: PAIN - ADULT  Goal: Verbalizes/displays adequate comfort level or baseline comfort level  Description: Interventions:  - Encourage patient to monitor pain and request assistance  - Assess pain using appropriate pain scale  - Administer analgesics based on type and severity of pain and evaluate response  - Implement non-pharmacological measures as appropriate and evaluate response  - Consider cultural and social influences on pain and pain management  - Notify physician/advanced practitioner if interventions unsuccessful or patient reports new pain  Outcome: Adequate for Discharge     Problem: DISCHARGE PLANNING  Goal: Discharge to home or other facility with appropriate resources  Description: INTERVENTIONS:  - Identify barriers to discharge w/patient and caregiver  - Arrange for needed discharge resources and transportation as appropriate  - Identify discharge learning needs (meds, wound care, etc )  - Arrange for interpretive services to assist at discharge as needed  - Refer to Case Management Department for coordinating discharge planning if the patient needs post-hospital services based on physician/advanced practitioner order or complex needs related to functional status, cognitive ability, or social support system  Outcome: Adequate for Discharge     Problem: Knowledge Deficit  Goal: Patient/family/caregiver demonstrates understanding of disease process, treatment plan, medications, and discharge instructions  Description: Complete learning assessment and assess knowledge base    Interventions:  - Provide teaching at level of understanding  - Provide teaching via preferred learning methods  Outcome: Adequate for Discharge     Problem: METABOLIC, FLUID AND ELECTROLYTES - ADULT  Goal: Electrolytes maintained within normal limits  Description: INTERVENTIONS:  - Monitor labs and assess patient for signs and symptoms of electrolyte imbalances  - Administer electrolyte replacement as ordered  - Monitor response to electrolyte replacements, including repeat lab results as appropriate  - Instruct patient on fluid and nutrition as appropriate  Outcome: Adequate for Discharge     Problem: SKIN/TISSUE INTEGRITY - ADULT  Goal: Skin Integrity remains intact(Skin Breakdown Prevention)  Description: Assess:  -Perform Yunier assessment every shift  -Clean and moisturize skin prn  -Inspect skin when repositioning, toileting, and assisting with ADLS  -Assess extremities for adequate circulation and sensation     Bed Management:  -Have minimal linens on bed & keep smooth, unwrinkled  -Change linens as needed when moist or perspiring  -Avoid sitting or lying in one position for more than 2 hours while in bed  -Keep HOB at 30degrees     Toileting:  -Offer bedside commode  -Assess for incontinence   -Use incontinent care products after each incontinent episode     Activity:  -Mobilize patient 2 times a day  -Encourage activity and walks on unit  -Encourage or provide ROM exercises   -Turn and reposition patient every 2 Hours  -Use appropriate equipment to lift or move patient in bed  -Instruct/ Assist with weight shifting every 2 when out of bed in chair      Skin Care:  -Avoid use of baby powder, tape, friction and shearing, hot water or constrictive clothing  -Relieve pressure over bony prominences   -Do not massage red bony areas    Next Steps:  -Teach patient strategies to minimize risks    -Consider consults to  interdisciplinary teams   Outcome: Adequate for Discharge

## 2023-04-26 NOTE — ASSESSMENT & PLAN NOTE
Noted on urine drug screen  · Patient educated several times on cessation of recreational drug use  · Patient does not appear motivated to quit

## 2023-04-26 NOTE — PROGRESS NOTES
Dilip U  67   Progress Note  Name: Eusebio Severe  MRN: [id number]  Unit/Bed#: ICU 01 I Date of Admission: 4/24/2023   Date of Service: 4/26/2023 I Hospital Day: 2    Assessment/Plan   * Acute on chronic respiratory failure with hypoxia and hypercapnia (HCC)  Assessment & Plan  Presented with lethargy, shortness of breath, and hypoxia found to be 85% on room air  Patient has a history of COPD and emphysema recently admitted for COPD exacerbation and H influenza  Patient takes multiple sedative medications  · Initially required BiPAP and management in the critical care apartment  · Chest x-ray revealed atelectasis but no pneumonia  · Urine antigens negative  · Procalcitonin negative  · No indication for antibiotics at this time  · Suspecting COPD exacerbation, continue Solu-Medrol 40 mg IV daily  · Recommend sleep study    Currently has one scheduled for July  · Continue supplemental oxygen for SPO2 sats greater than 90%    Chronic obstructive pulmonary disease with acute exacerbation (HCC)  Assessment & Plan  50-pack-year smoking history with a history of emphysema  Recent PFTs revealed moderate obstructive airflow defect  · Continue home trilogy and as needed nebulizers  · Continue Mucinex  · Continue Solu-Medrol with transition to prednisone  · Hold off on antibiotics  · Supplemental oxygen    Acute encephalopathy  Assessment & Plan  In the setting of hypercarbia, hypoxia and polypharmacy  Urine drug screen positive for benzodiazepines and cocaine  Ammonia level 16  CT head unremarkable  · Continue neurochecks, fall precautions  · Educated on the importance of reducing polypharmacy    Tobacco abuse, in remission  Assessment & Plan  · Nicotine patch while hospitalized    Cocaine abuse ([facility] [address] )  Assessment & Plan  Noted on urine drug screen  · Patient educated several times on cessation of recreational drug use  · Patient does not appear motivated to quit    Opioid dependence [facility])  Assessment & Plan  History of opioid dependence and chronic pain  Urine drug screen positive for benzos and cocaine, negative for opiates  · Continue Suboxone    Hypothyroid  Assessment & Plan  · Continue Synthroid    Hypertension  Assessment & Plan  Blood pressure stable  · Continue Norvasc, Toprol XL and losartan         VTE Pharmacologic Prophylaxis: VTE Score: 6 Moderate Risk (Score 3-4) - Pharmacological DVT Prophylaxis Ordered: enoxaparin (Lovenox)  Patient Centered Rounds: I performed bedside rounds with nursing staff today  Discussions with Specialists or Other Care Team Provider: nursing, CM    Education and Discussions with Family / Patient: Patient declined call to   Total Time Spent on Date of Encounter in care of patient: 35 minutes This time was spent on one or more of the following: performing physical exam; counseling and coordination of care; obtaining or reviewing history; documenting in the medical record; reviewing/ordering tests, medications or procedures; communicating with other healthcare professionals and discussing with patient's family/caregivers  Current Length of Stay: 2 day(s)  Current Patient Status: Inpatient   Certification Statement: The patient will continue to require additional inpatient hospital stay due to COPD exacerbation, cough, shortness of breath  Discharge Plan: Anticipate discharge tomorrow to home  Code Status: Level 1 - Full Code    Subjective:   Patient seen and examined at bedside  Overall, patient feels much improved  She continues with some chest tightness, dyspnea, and moist cough  She does not feel at baseline yet  She is concerned for her ability to ambulate at baseline  She would like to be seen by physical therapy prior to discharge  She states she is eating and drinking well      Objective:     Vitals:   Temp (24hrs), Av 8 °F (36 6 °C), Min:97 °F (36 1 °C), Max:98 6 °F (37 °C)    Temp:  [97 °F (36 1 °C)-98 6 °F (37 °C)] 97 8 °F (36 6 °C)  HR:  [64-88] 78  Resp:  [20-22] 20  BP: (105-161)/(53-72) 128/68  SpO2:  [90 %-96 %] 91 %  Body mass index is 32 92 kg/m²  Input and Output Summary (last 24 hours): Intake/Output Summary (Last 24 hours) at 4/26/2023 1724  Last data filed at 4/26/2023 1201  Gross per 24 hour   Intake 490 ml   Output --   Net 490 ml       Physical Exam:   Physical Exam  Vitals and nursing note reviewed  Constitutional:       General: She is not in acute distress  Appearance: She is obese  She is ill-appearing  She is not toxic-appearing or diaphoretic  Comments: Pleasant female resting out of bed in chair on 1 5 L satting 95%   HENT:      Head: Normocephalic  Mouth/Throat:      Mouth: Mucous membranes are moist    Eyes:      Conjunctiva/sclera: Conjunctivae normal    Cardiovascular:      Rate and Rhythm: Normal rate  Pulmonary:      Effort: Pulmonary effort is normal  No tachypnea  Breath sounds: Examination of the right-upper field reveals wheezing  Examination of the left-upper field reveals wheezing  Examination of the right-lower field reveals wheezing  Examination of the left-lower field reveals wheezing  Wheezing present  No rhonchi or rales  Abdominal:      General: Bowel sounds are normal  There is no distension  Palpations: Abdomen is soft  Tenderness: There is no abdominal tenderness  Musculoskeletal:         General: Normal range of motion  Cervical back: Normal range of motion  Right lower leg: No edema  Left lower leg: No edema  Skin:     General: Skin is warm and dry  Capillary Refill: Capillary refill takes less than 2 seconds  Neurological:      Mental Status: She is alert and oriented to person, place, and time  Mental status is at baseline  Motor: Weakness present  Psychiatric:         Mood and Affect: Mood normal          Behavior: Behavior normal          Thought Content:  Thought content normal          Judgment: Judgment normal           Additional Data:     Labs:  Results from last 7 days   Lab Units 04/26/23  0609   WBC Thousand/uL 9 77   HEMOGLOBIN g/dL 11 5   HEMATOCRIT % 36 7   PLATELETS Thousands/uL 243   NEUTROS PCT % 68   LYMPHS PCT % 21   MONOS PCT % 8   EOS PCT % 3     Results from last 7 days   Lab Units 04/26/23  0609 04/25/23  0516   SODIUM mmol/L 142 138   POTASSIUM mmol/L 4 4 4 9   CHLORIDE mmol/L 106 106   CO2 mmol/L 29 26   BUN mg/dL 21 23   CREATININE mg/dL 0 90 1 13   ANION GAP mmol/L 7 6   CALCIUM mg/dL 8 9 8 7   ALBUMIN g/dL  --  3 7   TOTAL BILIRUBIN mg/dL  --  0 36   ALK PHOS U/L  --  38   ALT U/L  --  33   AST U/L  --  33   GLUCOSE RANDOM mg/dL 94 203*                 Results from last 7 days   Lab Units 04/25/23  0516 04/24/23  1523   PROCALCITONIN ng/ml <0 05 0 06       Lines/Drains:  Invasive Devices     Peripheral Intravenous Line  Duration           Peripheral IV 04/24/23 Proximal;Right;Ventral (anterior) Forearm 2 days                      Imaging: Reviewed radiology reports from this admission including: chest xray and CT head    Recent Cultures (last 7 days):   Results from last 7 days   Lab Units 04/24/23  1955 04/24/23  1950   BLOOD CULTURE  No Growth at 24 hrs  No Growth at 24 hrs     LEGIONELLA URINARY ANTIGEN   --  Negative       Last 24 Hours Medication List:   Current Facility-Administered Medications   Medication Dose Route Frequency Provider Last Rate    acetaminophen  650 mg Oral Q6H PRN JAZMINE Ventura      ALPRAZolam  0 5 mg Oral BID PRN JAZMINE Arias      amLODIPine  5 mg Oral Daily JAZMINE Ventura      atorvastatin  40 mg Oral Daily With [id number] JAZMINE Elizabeth      buprenorphine-naloxone  8 mg Sublingual Daily JAZMINE Ventura      clopidogrel  75 mg Oral Daily JAZMINE Ventura      cyclobenzaprine  10 mg Oral HS JAZMINE Arias      enoxaparin  40 mg Subcutaneous Daily Vivien Rivas, [address] Fluticasone Furoate-Vilanterol  1 puff Inhalation Daily Vivien Lynnell Crimes, CRNP      And    umeclidinium  1 puff Inhalation Daily Vivien Lynnell Crimes, CRNP      guaiFENesin  600 mg Oral BID Vivien Lynnell Crimes, CRNP      levalbuterol  1 25 mg Nebulization 4x Daily Venetta Creed, DO      levothyroxine  125 mcg Oral Early Morning Vivien Lynnell Crimes, CRNP      losartan  50 mg Oral Daily Vivien Lynnell Crimes, CRNP      melatonin  3 mg Oral HS Vivien Lynnell Crimes, CRNP      methylPREDNISolone sodium succinate  40 mg Intravenous Q12H [address] [facility], DO      metoprolol succinate  25 mg Oral Daily Vivien Lynnell Crimes, CRNP      nicotine  1 patch Transdermal Daily Tiffani Lynnell Crimes, CRNP      pantoprazole  40 mg Oral Early Morning Vivien Lynnell Crimes, CRNP      polyethylene glycol  17 g Oral Daily Vivien Lynnell Crimes, CRNP      senna-docusate sodium  2 tablet Oral BID Tiffani Lynnell Crimes, CRNP      traMADol  50 mg Oral Q8H PRN Stephen Harvey, CRNP      traZODone  75 mg Oral HS Flipiarti Harvey, CRNP      venlafaxine  225 mg Oral Daily Vivien Lynnell Crimes, CRNP          Today, Patient Was Seen By: JAZMINE Mendez    **Please Note: This note may have been constructed using a voice recognition system  **

## 2023-04-26 NOTE — DISCHARGE SUMMARY
Rose Mary 46  Discharge- [facility] 1950, 71 y o  female MRN: [id number]  Unit/Bed#: [address]01 Encounter: [id number]  Primary Care Provider: Evelin Taveras   Date and time admitted to hospital: 4/24/2023  3:03 PM      * Acute on chronic respiratory failure with hypoxia and hypercapnia [facility])  Assessment & Plan  Presented with lethargy, shortness of breath, and hypoxia found to be 85% on room air  Patient has a history of COPD and emphysema recently admitted for COPD exacerbation and H influenza  Patient takes multiple sedative medications  · Initially required BiPAP and management in the critical care apartment  · Chest x-ray revealed atelectasis but no pneumonia  · Urine antigens negative  · Procalcitonin negative  · No indication for antibiotics at this time  · Suspecting COPD exacerbation  · Increased Solu-Medrol to 40 mg IV every 12 hours on 4/26 for increased wheezing   · Continue trilogy and Xopenex 4 times daily  · Recommend sleep study  Currently has one scheduled for July Courage patient to move up her appointment as she would greatly benefit from CPAP  · Continue supplemental oxygen for SPO2 sats greater than 90%    Chronic obstructive pulmonary disease with acute exacerbation (HCC)  Assessment & Plan  50-pack-year smoking history with a history of emphysema  Recent PFTs revealed moderate obstructive airflow defect  · Continue home trelegy and Xopenex 4 times daily   · Continue Mucinex  · Discontinue Solu-Medrol  40 mg IV and start prednisone 40 mg daily with 10 mg taper every fourth day    · Hold off on antibiotics  · Supplemental oxygen    Acute encephalopathy  Assessment & Plan  In the setting of hypercarbia, hypoxia and polypharmacy  Urine drug screen positive for benzodiazepines and cocaine  Ammonia level 16  CT head unremarkable  · Continue neurochecks, fall precautions  · Educated on the importance of reducing polypharmacy    Tobacco abuse, in remission  Assessment & Plan  · Nicotine patch while hospitalized    Cocaine abuse ([facility] )  Assessment & Plan  Noted on urine drug screen  · Patient educated several times on cessation of recreational drug use  · Patient does not appear motivated to quit    Opioid dependence ([facility] )  Assessment & Plan  History of opioid dependence and chronic pain  Urine drug screen positive for benzos and cocaine, negative for opiates  · Continue Suboxone    Hypothyroid  Assessment & Plan  · Continue Synthroid    Hypertension  Assessment & Plan  Blood pressure stable  · Continue Norvasc, Toprol XL and losartan    Medical Problems     Resolved Problems  Date Reviewed: 4/26/2023   None       Discharging Physician / Practitioner: JAZMINE Chávez  PCP: Katerina Mesa  Admission Date:   Admission Orders (From admission, onward)     Ordered        04/24/23 1858  INPATIENT ADMISSION  Once                      Discharge Date: 4/28/23     Consultations During Hospital Stay:  · Critical care   · Pulmonology    Procedures Performed:   · CXR: Worsening atelectasis at the left lung base  · CT head: No acute intracranial abnormality  Significant Findings / Test Results:   · Hypercapnic hypoxemic respiratory failure due to polysubstance abuse  · COPD exacerbation    Incidental Findings:   · None    Test Results Pending at Discharge (will require follow up): · None     Outpatient Tests Requested:  · Continue outpatient sleep study as planned    Complications: None    Reason for Admission: Worsening lethargy, shortness of breath, hypoxia    Hospital Course: Chilango Castro is a 71 y o  female patient with a past medical history including COPD, emphysema on 2 L nasal cannula at bedtime, chronic pain, opioid use on Suboxone, CAD status post CABG x1, hypertension, tobacco abuse in remission, hypothyroidism who originally presented to the hospital on 4/24/2023 due to lethargy, shortness of breath, and hypoxia    Patient was noted to be "tachypneic with an O2 saturation of 85% on room air  SPO2 quickly improved with supplemental oxygen  She required brief BiPAP  Chest x-ray did not show any pneumonia  Patient was treated for a COPD exacerbation with IV steroids and bronchodilators  Patient was seen by critical care and pulmonology  Please see above list of diagnoses and related plan for additional information  Condition at Discharge: stable    Discharge Day Visit / Exam:     Subjective: Patient seen and examined at bedside  Reported improvement in breathing  Offered no complaints  Vitals: Blood Pressure: 139/82 (04/28/23 0718)  Pulse: 73 (04/28/23 0718)  Temperature: 98 1 °F (36 7 °C) (04/28/23 0718)  Temp Source: Oral (04/28/23 0718)  Respirations: 18 (04/27/23 2204)  Height: 5' 3\" (160 cm) (04/25/23 0835)  Weight - Scale: 85 kg (187 lb 6 3 oz) (04/27/23 0600)  SpO2: 97 % (04/28/23 1106)     Exam:   Physical Exam:   Vitals reviewed  Constitutional:       General: She is not in acute distress  Appearance: Normal appearance  She is well-developed  Comments: Room air O2 saturation 97- 99%  HENT:      Head: Normocephalic  Right Ear: External ear normal       Left Ear: External ear normal       Nose: Nose normal       Mouth/Throat:      Mouth: Mucous membranes are moist       Pharynx: Oropharynx is clear  No oropharyngeal exudate  Eyes:      Conjunctiva/sclera: Conjunctivae normal       Pupils: Pupils are equal, round, and reactive to light  Neck:      Vascular: No JVD  Trachea: No tracheal deviation  Cardiovascular:      Rate and Rhythm: Normal rate and regular rhythm  Heart sounds: Normal heart sounds  Pulmonary:      Effort: Pulmonary effort is normal       Comments: Lung sounds are clear  No wheezes or crackles  Abdominal:      General: There is no distension  Palpations: Abdomen is soft  Tenderness: There is no abdominal tenderness  There is no guarding     Musculoskeletal:      Cervical " back: Neck supple  Comments: No edema   Lymphadenopathy:      Cervical: No cervical adenopathy  Skin:     General: Skin is warm and dry  Findings: No rash  Neurological:      General: No focal deficit present  Mental Status: She is alert and oriented to person, place, and time  Psychiatric:         Behavior: Behavior normal          Thought Content: Thought content normal    Discussion with Family: Patient declined call to   Discharge instructions/Information to patient and family:   See after visit summary for information provided to patient and family  Provisions for Follow-Up Care:  See after visit summary for information related to follow-up care and any pertinent home health orders  Disposition:   Home    Planned Readmission: None     Discharge Statement:  I spent 35 minutes discharging the patient  This time was spent on the day of discharge  I had direct contact with the patient on the day of discharge  Greater than 50% of the total time was spent examining patient, answering all patient questions, arranging and discussing plan of care with patient as well as directly providing post-discharge instructions  Additional time then spent on discharge activities  Discharge Medications:  See after visit summary for reconciled discharge medications provided to patient and/or family        **Please Note: This note may have been constructed using a voice recognition system**

## 2023-04-26 NOTE — PROGRESS NOTES
"Progress Note - Pulmonary   Demario Avina 71 y o  female MRN: [id number]  Unit/Bed#: ICU 01 Encounter: [id number]    Assessment:  Acute hypercapnic hypoxemic respiratory failure suspect possibly due to polysubstance use  Patient may have taken extra pills  She think she may have taken extra dose of one of her medications  Acute exacerbation of COPD  She does have mild to moderate COPD with FEV1 of 1 56 L or 71% of predicted  Does have some increased shortness of breath today and some wheezing  History of acsending thoracic aortic aneurysm repair with single bypass of RCA at same time done September 2016  Chronic pain  Mild GELA suggested by hospital sleep screen done March 2023 showed overall AHI of 11 79  She is scheduled for sleep study in July of this year  Acute metabolic encephalopathy has resolved  I suspect this was due to polypharmacy and patient does think she took 1 or 2 extra pills of her medication but cannot member which one  Obesity    Plan:  As she has increased shortness of breath and wheezing will increase Solu-Medrol to 40 mg IV every 12 hours  Has not needed BiPAP since being in the ER  She is on Breo 100 mcg 1 puff with Incruse 1 puff dailyin  place of her Trelegy 100 micro-ohm 1 puff daily  Continue neb treatment with lev albuterol 1 25 mg but will increase frequency to 4 times a day  At home uses oxygen 2 L/min sometimes at bedtime and sometimes with activity  Subjective: Roxanna Moura states she feels more short of breath today  She admits that she may have got confused on her medication at home and may have taken an extra dose or 2 of one of her medications  Objective:     Vitals: Blood pressure 128/68, pulse 78, temperature 97 8 °F (36 6 °C), temperature source Temporal, resp  rate 20, height 5' 3\" (1 6 m), weight 84 3 kg (185 lb 13 6 oz), SpO2 91 %  ,Body mass index is 32 92 kg/m²        Intake/Output Summary (Last 24 hours) at 4/26/2023 1525  Last data filed at 4/26/2023 " 1201  Gross per 24 hour   Intake 915 ml   Output --   Net 915 ml       Physical Exam: Physical Exam  Vitals reviewed  Constitutional:       General: She is not in acute distress  Appearance: Normal appearance  She is well-developed  Comments: Room air O2 saturation 91%   HENT:      Head: Normocephalic  Right Ear: External ear normal       Left Ear: External ear normal       Nose: Nose normal       Mouth/Throat:      Mouth: Mucous membranes are moist       Pharynx: Oropharynx is clear  No oropharyngeal exudate  Eyes:      Conjunctiva/sclera: Conjunctivae normal       Pupils: Pupils are equal, round, and reactive to light  Neck:      Vascular: No JVD  Trachea: No tracheal deviation  Cardiovascular:      Rate and Rhythm: Normal rate and regular rhythm  Heart sounds: Normal heart sounds  Pulmonary:      Effort: Pulmonary effort is normal       Comments: Lung sounds reveal some expiratory wheezes in lower lobes  Abdominal:      General: There is no distension  Palpations: Abdomen is soft  Tenderness: There is no abdominal tenderness  There is no guarding  Musculoskeletal:      Cervical back: Neck supple  Comments: No edema, cyanosis or clubbing   Lymphadenopathy:      Cervical: No cervical adenopathy  Skin:     General: Skin is warm and dry  Findings: No rash  Neurological:      General: No focal deficit present  Mental Status: She is alert and oriented to person, place, and time  Psychiatric:         Behavior: Behavior normal          Thought Content: Thought content normal           Labs: I have personally reviewed pertinent lab results  , ABG:   Lab Results   Component Value Date    PHART 7 331 (L) 04/24/2023    VVT4YKZ 46 5 (H) 04/24/2023    PO2ART 85 0 04/24/2023    NGC9DFQ 24 0 04/24/2023    BEART -2 1 04/24/2023    SOURCE Radial, Right 04/24/2023   , BNP:   Lab Results   Component Value Date    BNP 29 04/24/2023   , CBC:   Lab Results   Component Value Date    WBC 9 77 04/26/2023    HGB 11 5 04/26/2023    HCT 36 7 04/26/2023    MCV 99 (H) 04/26/2023     04/26/2023    MCH 30 9 04/26/2023    MCHC 31 3 (L) 04/26/2023    RDW 14 7 04/26/2023    MPV 9 5 04/26/2023    NRBC 0 04/26/2023   , CMP:   Lab Results   Component Value Date    K 4 4 04/26/2023     04/26/2023    CO2 29 04/26/2023    BUN 21 04/26/2023    CREATININE 0 90 04/26/2023    CALCIUM 8 9 04/26/2023    AST 33 04/25/2023    ALT 33 04/25/2023    ALKPHOS 38 04/25/2023    EGFR 64 04/26/2023   , PT/INR:   Lab Results   Component Value Date    INR 0 98 03/01/2023   , Troponin: No results found for: TROPONIN    Imaging and other studies: I have personally reviewed pertinent reports     and I have personally reviewed pertinent films in PACS

## 2023-04-26 NOTE — ASSESSMENT & PLAN NOTE
50-pack-year smoking history with a history of emphysema  Recent PFTs revealed moderate obstructive airflow defect  · Continue home trilogy and as needed nebulizers  · Continue Mucinex  · Continue Solu-Medrol with transition to prednisone  · Hold off on antibiotics  · Supplemental oxygen

## 2023-04-26 NOTE — ASSESSMENT & PLAN NOTE
History of opioid dependence and chronic pain    Urine drug screen positive for benzos and cocaine, negative for opiates  · Continue Suboxone

## 2023-04-26 NOTE — ASSESSMENT & PLAN NOTE
In the setting of hypercarbia, hypoxia and polypharmacy  Urine drug screen positive for benzodiazepines and cocaine  Ammonia level 16  CT head unremarkable  · Continue neurochecks, fall precautions  · Educated on the importance of reducing polypharmacy

## 2023-04-27 LAB
BACTERIA SPT RESP CULT: ABNORMAL
GRAM STN SPEC: ABNORMAL

## 2023-04-27 RX ADMIN — METHYLPREDNISOLONE SODIUM SUCCINATE 40 MG: 40 INJECTION, POWDER, FOR SOLUTION INTRAMUSCULAR; INTRAVENOUS at 21:08

## 2023-04-27 RX ADMIN — LEVOTHYROXINE SODIUM 125 MCG: 125 TABLET ORAL at 05:52

## 2023-04-27 RX ADMIN — PANTOPRAZOLE SODIUM 40 MG: 40 TABLET, DELAYED RELEASE ORAL at 05:52

## 2023-04-27 RX ADMIN — METOPROLOL SUCCINATE 25 MG: 25 TABLET, EXTENDED RELEASE ORAL at 08:06

## 2023-04-27 RX ADMIN — ALPRAZOLAM 0.5 MG: 0.5 TABLET ORAL at 21:08

## 2023-04-27 RX ADMIN — FLUTICASONE FUROATE AND VILANTEROL TRIFENATATE 1 PUFF: 100; 25 POWDER RESPIRATORY (INHALATION) at 08:09

## 2023-04-27 RX ADMIN — ALPRAZOLAM 0.5 MG: 0.5 TABLET ORAL at 12:10

## 2023-04-27 RX ADMIN — LEVALBUTEROL HYDROCHLORIDE 1.25 MG: 1.25 SOLUTION RESPIRATORY (INHALATION) at 11:07

## 2023-04-27 RX ADMIN — SENNOSIDES AND DOCUSATE SODIUM 2 TABLET: 50; 8.6 TABLET ORAL at 08:06

## 2023-04-27 RX ADMIN — NICOTINE 1 PATCH: 7 PATCH, EXTENDED RELEASE TRANSDERMAL at 08:07

## 2023-04-27 RX ADMIN — METHYLPREDNISOLONE SODIUM SUCCINATE 40 MG: 40 INJECTION, POWDER, FOR SOLUTION INTRAMUSCULAR; INTRAVENOUS at 08:06

## 2023-04-27 RX ADMIN — CLOPIDOGREL BISULFATE 75 MG: 75 TABLET ORAL at 08:06

## 2023-04-27 RX ADMIN — AMLODIPINE BESYLATE 5 MG: 5 TABLET ORAL at 08:07

## 2023-04-27 RX ADMIN — GUAIFENESIN 600 MG: 600 TABLET, EXTENDED RELEASE ORAL at 16:45

## 2023-04-27 RX ADMIN — VENLAFAXINE HYDROCHLORIDE 225 MG: 150 CAPSULE, EXTENDED RELEASE ORAL at 08:12

## 2023-04-27 RX ADMIN — TRAZODONE HYDROCHLORIDE 75 MG: 50 TABLET ORAL at 21:08

## 2023-04-27 RX ADMIN — MELATONIN TAB 3 MG 3 MG: 3 TAB at 21:08

## 2023-04-27 RX ADMIN — ENOXAPARIN SODIUM 40 MG: 40 INJECTION SUBCUTANEOUS at 08:05

## 2023-04-27 RX ADMIN — LEVALBUTEROL HYDROCHLORIDE 1.25 MG: 1.25 SOLUTION RESPIRATORY (INHALATION) at 20:00

## 2023-04-27 RX ADMIN — SENNOSIDES AND DOCUSATE SODIUM 2 TABLET: 50; 8.6 TABLET ORAL at 16:45

## 2023-04-27 RX ADMIN — ACETAMINOPHEN 650 MG: 325 TABLET, FILM COATED ORAL at 09:07

## 2023-04-27 RX ADMIN — POLYETHYLENE GLYCOL 3350 17 G: 17 POWDER, FOR SOLUTION ORAL at 08:19

## 2023-04-27 RX ADMIN — LEVALBUTEROL HYDROCHLORIDE 1.25 MG: 1.25 SOLUTION RESPIRATORY (INHALATION) at 07:23

## 2023-04-27 RX ADMIN — LEVALBUTEROL HYDROCHLORIDE 1.25 MG: 1.25 SOLUTION RESPIRATORY (INHALATION) at 15:57

## 2023-04-27 RX ADMIN — CYCLOBENZAPRINE HYDROCHLORIDE 10 MG: 10 TABLET, FILM COATED ORAL at 21:08

## 2023-04-27 RX ADMIN — UMECLIDINIUM 1 PUFF: 62.5 AEROSOL, POWDER ORAL at 08:09

## 2023-04-27 RX ADMIN — BUPRENORPHINE AND NALOXONE 8 MG: 8; 2 FILM BUCCAL; SUBLINGUAL at 08:10

## 2023-04-27 RX ADMIN — LOSARTAN POTASSIUM 50 MG: 50 TABLET, FILM COATED ORAL at 08:06

## 2023-04-27 RX ADMIN — GUAIFENESIN 600 MG: 600 TABLET, EXTENDED RELEASE ORAL at 08:06

## 2023-04-27 RX ADMIN — ATORVASTATIN CALCIUM 40 MG: 40 TABLET, FILM COATED ORAL at 16:45

## 2023-04-27 NOTE — ASSESSMENT & PLAN NOTE
Presented with lethargy, shortness of breath, and hypoxia found to be 85% on room air  Patient has a history of COPD and emphysema recently admitted for COPD exacerbation and H influenza  Patient takes multiple sedative medications  · Initially required BiPAP and management in the critical care apartment  · Chest x-ray revealed atelectasis but no pneumonia  · Urine antigens negative  · Procalcitonin negative  · No indication for antibiotics at this time  · Suspecting COPD exacerbation  · Increased Solu-Medrol to 40 mg IV every 12 hours on 4/26 for increased wheezing   · Continue trilogy and Xopenex 4 times daily  · Recommend sleep study  Currently has one scheduled for July Courage patient to move up her appointment as she would greatly benefit from CPAP    · Continue supplemental oxygen for SPO2 sats greater than 90%

## 2023-04-27 NOTE — PROGRESS NOTES
[facility] [address]  Progress Note  Name: Andreina Sue  MRN: [id number]  Unit/Bed#: ICU 01 I Date of Admission: 4/24/2023   Date of Service: 4/27/2023 I Hospital Day: 3    Assessment/Plan   * Acute on chronic respiratory failure with hypoxia and hypercapnia (HCC)  Assessment & Plan  Presented with lethargy, shortness of breath, and hypoxia found to be 85% on room air  Patient has a history of COPD and emphysema recently admitted for COPD exacerbation and H influenza  Patient takes multiple sedative medications  · Initially required BiPAP and management in the critical care apartment  · Chest x-ray revealed atelectasis but no pneumonia  · Urine antigens negative  · Procalcitonin negative  · No indication for antibiotics at this time  · Suspecting COPD exacerbation  · Increased Solu-Medrol to 40 mg IV every 12 hours on 4/26 for increased wheezing   · Continue trilogy and Xopenex 4 times daily  · Recommend sleep study  Currently has one scheduled for July Courage patient to move up her appointment as she would greatly benefit from CPAP    · Continue supplemental oxygen for SPO2 sats greater than 90%    Chronic obstructive pulmonary disease with acute exacerbation (HCC)  Assessment & Plan  50-pack-year smoking history with a history of emphysema  Recent PFTs revealed moderate obstructive airflow defect  · Continue home trelegy and Xopenex 4 times daily   · Continue Mucinex  · Increased Solu-Medrol to 40 mg IV every 12 hours 4/26  · Hold off on antibiotics  · Supplemental oxygen    Acute encephalopathy  Assessment & Plan  In the setting of hypercarbia, hypoxia and polypharmacy  Urine drug screen positive for benzodiazepines and cocaine  Ammonia level 16  CT head unremarkable  · Continue neurochecks, fall precautions  · Educated on the importance of reducing polypharmacy    Tobacco abuse, in remission  Assessment & Plan  · Nicotine patch while hospitalized    Cocaine abuse ([facility] )  Assessment & Plan  Noted on urine drug screen  · Patient educated several times on cessation of recreational drug use  · Patient does not appear motivated to quit    Opioid dependence ([facility] )  Assessment & Plan  History of opioid dependence and chronic pain  Urine drug screen positive for benzos and cocaine, negative for opiates  · Continue Suboxone    Hypothyroid  Assessment & Plan  · Continue Synthroid    Hypertension  Assessment & Plan  Blood pressure stable  · Continue Norvasc, Toprol XL and losartan         VTE Pharmacologic Prophylaxis: VTE Score: 6 Moderate Risk (Score 3-4) - Pharmacological DVT Prophylaxis Ordered: enoxaparin (Lovenox)  Patient Centered Rounds: I performed bedside rounds with nursing staff today  Discussions with Specialists or Other Care Team Provider: nursing, CM    Education and Discussions with Family / Patient: Patient declined call to   Total Time Spent on Date of Encounter in care of patient: 35 minutes This time was spent on one or more of the following: performing physical exam; counseling and coordination of care; obtaining or reviewing history; documenting in the medical record; reviewing/ordering tests, medications or procedures; communicating with other healthcare professionals and discussing with patient's family/caregivers  Current Length of Stay: 3 day(s)  Current Patient Status: Inpatient   Certification Statement: The patient will continue to require additional inpatient hospital stay due to COPD exacerbation, cough, shortness of breath  Discharge Plan: Anticipate discharge tomorrow to home  Code Status: Level 1 - Full Code    Subjective:   Patient seen and examined at bedside  She is resting in bed, appears more fatigued today  States she is tired  Does not feel ready for discharge home today  Continues with some chest pressure, moist cough, dyspnea, and wheezing  She denies any headache, abdominal pain, nausea, vomiting, or diarrhea      Objective: Vitals:   Temp (24hrs), Av 2 °F (36 2 °C), Min:96 2 °F (35 7 °C), Max:97 8 °F (36 6 °C)    Temp:  [96 2 °F (35 7 °C)-97 8 °F (36 6 °C)] 97 8 °F (36 6 °C)  HR:  [65-95] 79  Resp:  [16-22] 20  BP: (122-146)/(69-81) 122/81  SpO2:  [91 %-98 %] 91 %  Body mass index is 33 19 kg/m²  Input and Output Summary (last 24 hours): Intake/Output Summary (Last 24 hours) at 2023 1656  Last data filed at 2023 1601  Gross per 24 hour   Intake 1680 ml   Output --   Net 1680 ml       Physical Exam:   Physical Exam  Vitals and nursing note reviewed  Constitutional:       General: She is not in acute distress  Appearance: She is ill-appearing  She is not toxic-appearing or diaphoretic  Comments: Pleasant female resting in bed on 1 5 L nasal cannula satting mid 90s   HENT:      Head: Normocephalic  Mouth/Throat:      Mouth: Mucous membranes are moist    Eyes:      Conjunctiva/sclera: Conjunctivae normal    Cardiovascular:      Rate and Rhythm: Normal rate  Pulmonary:      Effort: Pulmonary effort is normal       Breath sounds: Wheezing present  No rhonchi or rales  Abdominal:      General: Bowel sounds are normal  There is no distension  Palpations: Abdomen is soft  Tenderness: There is no abdominal tenderness  Musculoskeletal:         General: Normal range of motion  Cervical back: Normal range of motion  Right lower leg: No edema  Left lower leg: No edema  Skin:     General: Skin is warm and dry  Capillary Refill: Capillary refill takes less than 2 seconds  Neurological:      Mental Status: She is alert and oriented to person, place, and time  Mental status is at baseline  Motor: Weakness present  Psychiatric:         Mood and Affect: Mood normal          Behavior: Behavior normal          Thought Content:  Thought content normal          Judgment: Judgment normal           Additional Data:     Labs:  Results from last 7 days   Lab Units 04/26/23  0609   WBC Thousand/uL 9 77   HEMOGLOBIN g/dL 11 5   HEMATOCRIT % 36 7   PLATELETS Thousands/uL 243   NEUTROS PCT % 68   LYMPHS PCT % 21   MONOS PCT % 8   EOS PCT % 3     Results from last 7 days   Lab Units 04/26/23  0609 04/25/23  0516   SODIUM mmol/L 142 138   POTASSIUM mmol/L 4 4 4 9   CHLORIDE mmol/L 106 106   CO2 mmol/L 29 26   BUN mg/dL 21 23   CREATININE mg/dL 0 90 1 13   ANION GAP mmol/L 7 6   CALCIUM mg/dL 8 9 8 7   ALBUMIN g/dL  --  3 7   TOTAL BILIRUBIN mg/dL  --  0 36   ALK PHOS U/L  --  38   ALT U/L  --  33   AST U/L  --  33   GLUCOSE RANDOM mg/dL 94 203*                 Results from last 7 days   Lab Units 04/25/23  0516 04/24/23  1523   PROCALCITONIN ng/ml <0 05 0 06       Lines/Drains:  Invasive Devices     Peripheral Intravenous Line  Duration           Peripheral IV 04/24/23 Proximal;Right;Ventral (anterior) Forearm 3 days                      Imaging: Reviewed radiology reports from this admission including: chest xray and CT head    Recent Cultures (last 7 days):   Results from last 7 days   Lab Units 04/27/23  0337 04/24/23  1955 04/24/23  1950   BLOOD CULTURE   --  No Growth at 48 hrs  No Growth at 48 hrs  SPUTUM CULTURE  Test not performed  Suggest repeat specimen  --   --    GRAM STAIN RESULT  >10 squamous epithelial cells/lpf, indicating orophayngeal contamination  *  4+ Gram positive cocci in pairs and chains*  2+ Gram positive rods*  4+ Polys*  --   --    LEGIONELLA URINARY ANTIGEN   --   --  Negative       Last 24 Hours Medication List:   Current Facility-Administered Medications   Medication Dose Route Frequency Provider Last Rate    acetaminophen  650 mg Oral Q6H PRN JAZMINE Mazariegos      ALPRAZolam  0 5 mg Oral BID PRN JAZMINE Arias      amLODIPine  5 mg Oral Daily JAZMINE Mazariegos      atorvastatin  40 mg Oral Daily With [employer] Seth AtkinsJAZMINE      buprenorphine-naloxone  8 mg Sublingual Daily JAZMINE Mazariegos  clopidogrel  75 mg Oral Daily Vivien Pedraza, CRNP      cyclobenzaprine  10 mg Oral HS Stephen Harvey, CRNP      enoxaparin  40 mg Subcutaneous Daily Vivien Barryr, CRNP      Fluticasone Furoate-Vilanterol  1 puff Inhalation Daily Vivien Barryr, CRNP      And    umeclidinium  1 puff Inhalation Daily Vivien Barryr, CRNP      guaiFENesin  600 mg Oral BID Vivien Barryr, CRNP      levalbuterol  1 25 mg Nebulization 4x Daily [facility], DO      levothyroxine  125 mcg Oral Early Morning Vivien Barryr, CRNP      losartan  50 mg Oral Daily Vivien Barryr, CRNP      melatonin  3 mg Oral HS Vivien Barryr, CRNP      methylPREDNISolone sodium succinate  40 mg Intravenous Q12H [address] [facility], DO      metoprolol succinate  25 mg Oral Daily Vivien Barryr, CRNP      nicotine  1 patch Transdermal Daily Vivien Barryr, CRNP      pantoprazole  40 mg Oral Early Morning Vivien Barryr, CRNP      polyethylene glycol  17 g Oral Daily Vivien Pedraza, CRNP      senna-docusate sodium  2 tablet Oral BID Vivien Pedraza, CRNP      traMADol  50 mg Oral Q8H PRN Stephen Harvey, CRNP      traZODone  75 mg Oral HS Stephen Harvey, CRNP      venlafaxine  225 mg Oral Daily Vivien Pedraza, CRNP          Today, Patient Was Seen By: JAZMINE Valdez    **Please Note: This note may have been constructed using a voice recognition system  **

## 2023-04-27 NOTE — PLAN OF CARE
Problem: RESPIRATORY - ADULT  Goal: Achieves optimal ventilation and oxygenation  Description: INTERVENTIONS:  - Assess for changes in respiratory status  - Assess for changes in mentation and behavior  - Position to facilitate oxygenation and minimize respiratory effort  - Oxygen administered by appropriate delivery if ordered  - Initiate smoking cessation education as indicated  - Encourage broncho-pulmonary hygiene including cough, deep breathe, Incentive Spirometry  - Assess the need for suctioning and aspirate as needed  - Assess and instruct to report SOB or any respiratory difficulty  - Respiratory Therapy support as indicated  Outcome: Progressing     Problem: MOBILITY - ADULT  Goal: Maintain or return to baseline ADL function  Description: INTERVENTIONS:  -  Assess patient's ability to carry out ADLs; assess patient's baseline for ADL function and identify physical deficits which impact ability to perform ADLs (bathing, care of mouth/teeth, toileting, grooming, dressing, etc )  - Assess/evaluate cause of self-care deficits   - Assess range of motion  - Assess patient's mobility; develop plan if impaired  - Assess patient's need for assistive devices and provide as appropriate  - Encourage maximum independence but intervene and supervise when necessary  - Involve family in performance of ADLs  - Assess for home care needs following discharge   - Consider OT consult to assist with ADL evaluation and planning for discharge  - Provide patient education as appropriate  Outcome: Adequate for Discharge  Goal: Maintains/Returns to pre admission functional level  Description: INTERVENTIONS:  - Perform BMAT or MOVE assessment daily    - Set and communicate daily mobility goal to care team and patient/family/caregiver     - Collaborate with rehabilitation services on mobility goals if consulted  - Ambulate patient   - Out of bed to chair   - Out of bed for meals   - Out of bed for toileting  - Record patient progress and toleration of activity level   Outcome: Adequate for Discharge     Problem: Potential for Falls  Goal: Patient will remain free of falls  Description: INTERVENTIONS:  - Educate patient/family on patient safety including physical limitations  - Instruct patient to call for assistance with activity   - Consult OT/PT to assist with strengthening/mobility   - Keep Call bell within reach  - Keep bed low and locked with side rails adjusted as appropriate  - Keep care items and personal belongings within reach  - Initiate and maintain comfort rounds  - Make Fall Risk Sign visible to staff  - Offer Toileting  in advance of need  - Obtain necessary fall risk management equipment  - Apply yellow socks and bracelet for high fall risk patients  - Consider moving patient to room near nurses station  Outcome: Adequate for Discharge     Problem: PAIN - ADULT  Goal: Verbalizes/displays adequate comfort level or baseline comfort level  Description: Interventions:  - Encourage patient to monitor pain and request assistance  - Assess pain using appropriate pain scale  - Administer analgesics based on type and severity of pain and evaluate response  - Implement non-pharmacological measures as appropriate and evaluate response  - Consider cultural and social influences on pain and pain management  - Notify physician/advanced practitioner if interventions unsuccessful or patient reports new pain  Outcome: Adequate for Discharge     Problem: DISCHARGE PLANNING  Goal: Discharge to home or other facility with appropriate resources  Description: INTERVENTIONS:  - Identify barriers to discharge w/patient and caregiver  - Arrange for needed discharge resources and transportation as appropriate  - Identify discharge learning needs (meds, wound care, etc )  - Arrange for interpretive services to assist at discharge as needed  - Refer to Case Management Department for coordinating discharge planning if the patient needs post-hospital services based on physician/advanced practitioner order or complex needs related to functional status, cognitive ability, or social support system  Outcome: Adequate for Discharge     Problem: Knowledge Deficit  Goal: Patient/family/caregiver demonstrates understanding of disease process, treatment plan, medications, and discharge instructions  Description: Complete learning assessment and assess knowledge base    Interventions:  - Provide teaching at level of understanding  - Provide teaching via preferred learning methods  Outcome: Adequate for Discharge     Problem: METABOLIC, FLUID AND ELECTROLYTES - ADULT  Goal: Electrolytes maintained within normal limits  Description: INTERVENTIONS:  - Monitor labs and assess patient for signs and symptoms of electrolyte imbalances  - Administer electrolyte replacement as ordered  - Monitor response to electrolyte replacements, including repeat lab results as appropriate  - Instruct patient on fluid and nutrition as appropriate  Outcome: Adequate for Discharge     Problem: SKIN/TISSUE INTEGRITY - ADULT  Goal: Skin Integrity remains intact(Skin Breakdown Prevention)  Description: Assess:  -Perform Yunier assessment every shift  -Clean and moisturize skin every shift  -Inspect skin when repositioning, toileting, and assisting with ADLS  -Assess under medical devices   -Assess extremities for adequate circulation and sensation     Bed Management:  -Have minimal linens on bed & keep smooth, unwrinkled  -Change linens as needed when moist or perspiring  -Avoid sitting or lying in one position for more than 2 hours while in bed  -Keep HOB at 30 degrees     Toileting:  -Offer bedside commode      Activity:  -Mobilize patient   -Encourage activity and walks on unit  -Encourage or provide ROM exercises   -Use appropriate equipment to lift or move patient in bed  -Instruct/ Assist with weight shifting when out of bed in chair    Skin Care:  -Avoid use of baby powder, tape, friction and shearing, hot water or constrictive clothing  -Relieve pressure over bony prominences   -Do not massage red bony areas    Next Steps:  -Teach patient strategies to minimize risks  -Consider consults to  interdisciplinary teams  Outcome: Adequate for Discharge

## 2023-04-27 NOTE — PLAN OF CARE
Problem: PHYSICAL THERAPY ADULT  Goal: Performs mobility at highest level of function for planned discharge setting  See evaluation for individualized goals  Description: Treatment/Interventions: Therapeutic exercise, Endurance training, LE strengthening/ROM, Spoke to nursing, Elevations  Equipment Recommended:  (home 02 eval?)       See flowsheet documentation for full assessment, interventions and recommendations  Note: Prognosis: Good  Problem List: Decreased strength, Decreased endurance, Impaired balance, Decreased mobility, Obesity  Assessment: Patient is an 71y o  year old female seen for Physical Therapy evaluation  Patient admitted with Acute on chronic respiratory failure with hypoxia and hypercapnia ([facility] [address] )  Comorbidities affecting patient's physical performance include: COPD, anxiety, cocaine abuse, tobacco abuse, opioid dependence, htn, emphysema, CVA, chronic pain  Personal factors affecting patient at time of initial evaluation include: ambulating with assistive device, inability to navigate community distances, inability to perform dynamic tasks in community and inability to perform caregiver support/tasks  Prior to admission, patient was independent with functional mobility with or without a walker or cane and independent with ADLS  Please find objective findings from Physical Therapy assessment regarding body systems outlined above with impairments and limitations including weakness, impaired balance, decreased endurance, decreased activity tolerance, decreased functional mobility tolerance, fall risk and SOB upon exertion  The Barthel Index was used as a functional outcome tool presenting with a score of Barthel Index Score: 65 today indicating moderate limitations of functional mobility and ADLS    Patient's clinical presentation is currently unstable/unpredictable as seen in patient's presentation of vital sign response, increased fall risk, new onset of impairment of functional mobility, decreased endurance and new onset of weakness  Pt would benefit from continued Physical Therapy treatment to address deficits as defined above and maximize level of functional mobility  As demonstrated by objective findings, the assigned level of complexity for this evaluation is high  The patient's AM-PAC Basic Mobility Inpatient Short Form Raw Score is 21  A Raw score of greater than 16 suggests the patient may benefit from discharge to home  PT Discharge Recommendation: Home with outpatient rehabilitation (pulmonary rehab)    See flowsheet documentation for full assessment

## 2023-04-27 NOTE — PHYSICAL THERAPY NOTE
"   PT EVALUATION     04/27/23 0840   Note Type   Note type Evaluation   Pain Assessment   Pain Assessment Tool 0-10   Pain Score No Pain   Restrictions/Precautions   Other Precautions Fall Risk   Home Living   Type of [address] One level  (no DAISY)   Home Equipment Cane;Walker  (80)   Prior Function   Level of [place] Independent with ADLs; Independent with functional mobility   Lives With Spouse  (father)   Marek Murguia Help From [facility]   General   Additional Pertinent History Pt admitted with acute on chronic respiratory failure  Pt also found to have benzodiazepenes and cocaine in her drug screen  Pt was fatigued, confused and SOB upon admission  Pt reports confusion has resolved, but SOB persists  Cognition   Overall Cognitive Status WFL   Following Commands Follows all commands and directions without difficulty   Subjective   Subjective \"I get SOB when I walk\"   RLE Assessment   RLE Assessment WFL   LLE Assessment   LLE Assessment WFL   Bed Mobility   Supine to Sit 5  Supervision   Additional items Bedrails   Sit to Supine 5  Supervision   Transfers   Sit to Stand 5  Supervision   Stand to Sit 5  Supervision   Stand pivot 5  Supervision   Additional items   (with walker)   Ambulation/Elevation   Gait pattern   (flexed posture, wide ANALY)   Gait Assistance 5  Supervision   Assistive Device Rolling walker   Distance 80 feet  (+ SOB with activity, Sp02 95% on 2L02)   Balance   Static Sitting Good   Dynamic Sitting Fair +   Static Standing Fair   Dynamic Standing Fair   Activity Tolerance   Activity Tolerance Patient limited by fatigue   Nurse Made Aware Pt able to walk with a generally steady gait with a walker in room  Assessment   Prognosis Good   Problem List Decreased strength;Decreased endurance; Impaired balance;Decreased mobility;Obesity   Assessment Patient is an 71y o  year old female seen for Physical Therapy evaluation   Patient admitted with Acute on chronic respiratory failure with " "hypoxia and hypercapnia ([facility] [address] )  Comorbidities affecting patient's physical performance include: COPD, anxiety, cocaine abuse, tobacco abuse, opioid dependence, htn, emphysema, CVA, chronic pain  Personal factors affecting patient at time of initial evaluation include: ambulating with assistive device, inability to navigate community distances, inability to perform dynamic tasks in community and inability to perform caregiver support/tasks  Prior to admission, patient was independent with functional mobility with or without a walker or cane and independent with ADLS  Please find objective findings from Physical Therapy assessment regarding body systems outlined above with impairments and limitations including weakness, impaired balance, decreased endurance, decreased activity tolerance, decreased functional mobility tolerance, fall risk and SOB upon exertion  The Barthel Index was used as a functional outcome tool presenting with a score of Barthel Index Score: 65 today indicating moderate limitations of functional mobility and ADLS  Patient's clinical presentation is currently unstable/unpredictable as seen in patient's presentation of vital sign response, increased fall risk, new onset of impairment of functional mobility, decreased endurance and new onset of weakness  Pt would benefit from continued Physical Therapy treatment to address deficits as defined above and maximize level of functional mobility  As demonstrated by objective findings, the assigned level of complexity for this evaluation is high  The patient's AM-PAC Basic Mobility Inpatient Short Form Raw Score is 21  A Raw score of greater than 16 suggests the patient may benefit from discharge to home     Goals   Patient Goals \"walk without getting SOB\"  \" go on my vacation to [place]\"   [facility] Expiration Date 05/04/23   Short Term Goal #1 independent transfers, pt will ambulate with a walker with modified independence 100 feet without SOB   LTG " Expiration Date 05/11/23   Long Term Goal #1 independent ambulation with least restricitve device 250 feet with minimal SOB so pt can negotiate the grocery store   Plan   Treatment/Interventions Therapeutic exercise; Endurance training;LE strengthening/ROM;Spoke to nursing;Elevations   PT Frequency Other (Comment)  (5x/week)   Recommendation   PT Discharge Recommendation Home with outpatient rehabilitation  (pulmonary rehab)   Equipment Recommended   (home 02 eval?)   AM-PAC Basic Mobility Inpatient   Turning in Flat Bed Without Bedrails 3   Lying on Back to Sitting on Edge of Flat Bed Without Bedrails 3   Moving Bed to Chair 4   Standing Up From Chair Using Arms 4   Walk in Room 3   Climb 3-5 Stairs With Railing 4   Basic Mobility Inpatient Raw Score 21   Basic Mobility Standardized Score 45 55   Highest Level Of Mobility   JH-HLM Goal 6: Walk 10 steps or more   JH-HLM Achieved 7: Walk 25 feet or more   Barthel Index   Feeding 10   Bathing 0   Grooming Score 5   Dressing Score 5   Bladder Score 10   Bowels Score 10   Toilet Use Score 5   Transfers (Bed/Chair) Score 15   Mobility (Level Surface) Score 0   Stairs Score 5   Barthel Index Score 65   End of Consult   Patient Position at End of Consult All needs within reach; Bedside chair, 02 Ok to be removed so pt can walk into the bathroom to get washed up     Licensure   [place] License Number  [place] CHIRINOS [id number]

## 2023-04-28 VITALS
BODY MASS INDEX: 33.2 KG/M2 | HEART RATE: 73 BPM | RESPIRATION RATE: 18 BRPM | HEIGHT: 63 IN | DIASTOLIC BLOOD PRESSURE: 82 MMHG | OXYGEN SATURATION: 99 % | TEMPERATURE: 98.1 F | WEIGHT: 187.39 LBS | SYSTOLIC BLOOD PRESSURE: 139 MMHG

## 2023-04-28 LAB
ANION GAP SERPL CALCULATED.3IONS-SCNC: 8 MMOL/L (ref 4–13)
BUN SERPL-MCNC: 22 MG/DL (ref 5–25)
CALCIUM SERPL-MCNC: 9.6 MG/DL (ref 8.4–10.2)
CHLORIDE SERPL-SCNC: 104 MMOL/L (ref 96–108)
CO2 SERPL-SCNC: 27 MMOL/L (ref 21–32)
CREAT SERPL-MCNC: 1.02 MG/DL (ref 0.6–1.3)
ERYTHROCYTE [DISTWIDTH] IN BLOOD BY AUTOMATED COUNT: 14.5 % (ref 11.6–15.1)
GFR SERPL CREATININE-BSD FRML MDRD: 55 ML/MIN/1.73SQ M
GLUCOSE SERPL-MCNC: 150 MG/DL (ref 65–140)
HCT VFR BLD AUTO: 37.8 % (ref 34.8–46.1)
HGB BLD-MCNC: 11.9 G/DL (ref 11.5–15.4)
MAGNESIUM SERPL-MCNC: 2 MG/DL (ref 1.9–2.7)
MCH RBC QN AUTO: 30.9 PG (ref 26.8–34.3)
MCHC RBC AUTO-ENTMCNC: 31.5 G/DL (ref 31.4–37.4)
MCV RBC AUTO: 98 FL (ref 82–98)
MRSA NOSE QL CULT: ABNORMAL
MRSA NOSE QL CULT: ABNORMAL
PHOSPHATE SERPL-MCNC: 3.7 MG/DL (ref 2.3–4.1)
PLATELET # BLD AUTO: 262 THOUSANDS/UL (ref 149–390)
PMV BLD AUTO: 9.6 FL (ref 8.9–12.7)
POTASSIUM SERPL-SCNC: 4.7 MMOL/L (ref 3.5–5.3)
RBC # BLD AUTO: 3.85 MILLION/UL (ref 3.81–5.12)
SODIUM SERPL-SCNC: 139 MMOL/L (ref 135–147)
WBC # BLD AUTO: 12.95 THOUSAND/UL (ref 4.31–10.16)

## 2023-04-28 RX ORDER — PREDNISONE 10 MG/1
TABLET ORAL
Qty: 40 TABLET | Refills: 0 | Status: SHIPPED | OUTPATIENT
Start: 2023-04-28 | End: 2023-05-14

## 2023-04-28 RX ADMIN — UMECLIDINIUM 1 PUFF: 62.5 AEROSOL, POWDER ORAL at 09:22

## 2023-04-28 RX ADMIN — LEVALBUTEROL HYDROCHLORIDE 1.25 MG: 1.25 SOLUTION RESPIRATORY (INHALATION) at 07:27

## 2023-04-28 RX ADMIN — CLOPIDOGREL BISULFATE 75 MG: 75 TABLET ORAL at 08:24

## 2023-04-28 RX ADMIN — LOSARTAN POTASSIUM 50 MG: 50 TABLET, FILM COATED ORAL at 08:24

## 2023-04-28 RX ADMIN — VENLAFAXINE HYDROCHLORIDE 225 MG: 150 CAPSULE, EXTENDED RELEASE ORAL at 08:24

## 2023-04-28 RX ADMIN — GUAIFENESIN 600 MG: 600 TABLET, EXTENDED RELEASE ORAL at 17:00

## 2023-04-28 RX ADMIN — ALPRAZOLAM 0.5 MG: 0.5 TABLET ORAL at 13:21

## 2023-04-28 RX ADMIN — FLUTICASONE FUROATE AND VILANTEROL TRIFENATATE 1 PUFF: 100; 25 POWDER RESPIRATORY (INHALATION) at 09:22

## 2023-04-28 RX ADMIN — LEVALBUTEROL HYDROCHLORIDE 1.25 MG: 1.25 SOLUTION RESPIRATORY (INHALATION) at 11:03

## 2023-04-28 RX ADMIN — LEVOTHYROXINE SODIUM 125 MCG: 125 TABLET ORAL at 05:21

## 2023-04-28 RX ADMIN — AMLODIPINE BESYLATE 5 MG: 5 TABLET ORAL at 08:24

## 2023-04-28 RX ADMIN — METOPROLOL SUCCINATE 25 MG: 25 TABLET, EXTENDED RELEASE ORAL at 08:24

## 2023-04-28 RX ADMIN — ATORVASTATIN CALCIUM 40 MG: 40 TABLET, FILM COATED ORAL at 16:57

## 2023-04-28 RX ADMIN — GUAIFENESIN 600 MG: 600 TABLET, EXTENDED RELEASE ORAL at 08:24

## 2023-04-28 RX ADMIN — LEVALBUTEROL HYDROCHLORIDE 1.25 MG: 1.25 SOLUTION RESPIRATORY (INHALATION) at 15:24

## 2023-04-28 RX ADMIN — ENOXAPARIN SODIUM 40 MG: 40 INJECTION SUBCUTANEOUS at 08:23

## 2023-04-28 RX ADMIN — NICOTINE 1 PATCH: 7 PATCH, EXTENDED RELEASE TRANSDERMAL at 08:25

## 2023-04-28 RX ADMIN — PANTOPRAZOLE SODIUM 40 MG: 40 TABLET, DELAYED RELEASE ORAL at 05:21

## 2023-04-28 RX ADMIN — METHYLPREDNISOLONE SODIUM SUCCINATE 40 MG: 40 INJECTION, POWDER, FOR SOLUTION INTRAMUSCULAR; INTRAVENOUS at 08:23

## 2023-04-28 RX ADMIN — BUPRENORPHINE AND NALOXONE 8 MG: 8; 2 FILM BUCCAL; SUBLINGUAL at 08:25

## 2023-04-28 NOTE — PLAN OF CARE
Problem: MOBILITY - ADULT  Goal: Maintain or return to baseline ADL function  Description: INTERVENTIONS:  -  Assess patient's ability to carry out ADLs; assess patient's baseline for ADL function and identify physical deficits which impact ability to perform ADLs (bathing, care of mouth/teeth, toileting, grooming, dressing, etc )  - Assess/evaluate cause of self-care deficits   - Assess range of motion  - Assess patient's mobility; develop plan if impaired  - Assess patient's need for assistive devices and provide as appropriate  - Encourage maximum independence but intervene and supervise when necessary  - Involve family in performance of ADLs  - Assess for home care needs following discharge   - Consider OT consult to assist with ADL evaluation and planning for discharge  - Provide patient education as appropriate  Outcome: Progressing  Goal: Maintains/Returns to pre admission functional level  Description: INTERVENTIONS:  - Perform BMAT or MOVE assessment daily    - Set and communicate daily mobility goal to care team and patient/family/caregiver  - Collaborate with rehabilitation services on mobility goals if consulted  - Perform Range of Motion 4 times a day  - Reposition patient every 2 hours    - Dangle patient 3 times a day  - Stand patient 3 times a day  - Ambulate patient 3 times a day  - Out of bed to chair 3 times a day   - Out of bed for meals 3 times a day  - Out of bed for toileting  - Record patient progress and toleration of activity level   Outcome: Progressing     Problem: Potential for Falls  Goal: Patient will remain free of falls  Description: INTERVENTIONS:  - Educate patient/family on patient safety including physical limitations  - Instruct patient to call for assistance with activity   - Consult OT/PT to assist with strengthening/mobility   - Keep Call bell within reach  - Keep bed low and locked with side rails adjusted as appropriate  - Keep care items and personal belongings within reach  - Initiate and maintain comfort rounds  - Make Fall Risk Sign visible to staff  - Offer Toileting every 2 Hours, in advance of need  - Initiate/Maintain bed alarm  - Obtain necessary fall risk management equipment: yellow socks  - Apply yellow socks and bracelet for high fall risk patients  - Consider moving patient to room near nurses station  Outcome: Progressing     Problem: PAIN - ADULT  Goal: Verbalizes/displays adequate comfort level or baseline comfort level  Description: Interventions:  - Encourage patient to monitor pain and request assistance  - Assess pain using appropriate pain scale  - Administer analgesics based on type and severity of pain and evaluate response  - Implement non-pharmacological measures as appropriate and evaluate response  - Consider cultural and social influences on pain and pain management  - Notify physician/advanced practitioner if interventions unsuccessful or patient reports new pain  Outcome: Progressing     Problem: DISCHARGE PLANNING  Goal: Discharge to home or other facility with appropriate resources  Description: INTERVENTIONS:  - Identify barriers to discharge w/patient and caregiver  - Arrange for needed discharge resources and transportation as appropriate  - Identify discharge learning needs (meds, wound care, etc )  - Arrange for interpretive services to assist at discharge as needed  - Refer to Case Management Department for coordinating discharge planning if the patient needs post-hospital services based on physician/advanced practitioner order or complex needs related to functional status, cognitive ability, or social support system  Outcome: Progressing     Problem: Knowledge Deficit  Goal: Patient/family/caregiver demonstrates understanding of disease process, treatment plan, medications, and discharge instructions  Description: Complete learning assessment and assess knowledge base    Interventions:  - Provide teaching at level of understanding  - Provide teaching via preferred learning methods  Outcome: Progressing     Problem: RESPIRATORY - ADULT  Goal: Achieves optimal ventilation and oxygenation  Description: INTERVENTIONS:  - Assess for changes in respiratory status  - Assess for changes in mentation and behavior  - Position to facilitate oxygenation and minimize respiratory effort  - Oxygen administered by appropriate delivery if ordered  - Initiate smoking cessation education as indicated  - Encourage broncho-pulmonary hygiene including cough, deep breathe, Incentive Spirometry  - Assess the need for suctioning and aspirate as needed  - Assess and instruct to report SOB or any respiratory difficulty  - Respiratory Therapy support as indicated  Outcome: Progressing     Problem: METABOLIC, FLUID AND ELECTROLYTES - ADULT  Goal: Electrolytes maintained within normal limits  Description: INTERVENTIONS:  - Monitor labs and assess patient for signs and symptoms of electrolyte imbalances  - Administer electrolyte replacement as ordered  - Monitor response to electrolyte replacements, including repeat lab results as appropriate  - Instruct patient on fluid and nutrition as appropriate  Outcome: Progressing     Problem: SKIN/TISSUE INTEGRITY - ADULT  Goal: Skin Integrity remains intact(Skin Breakdown Prevention)  Description: Assess:  -Perform Yunier assessment every shift   -Clean and moisturize skin   -Inspect skin when repositioning, toileting, and assisting with ADLS  -Assess under medical devices  -Assess extremities for adequate circulation and sensation     Bed Management:  -Have minimal linens on bed & keep smooth, unwrinkled  -Change linens as needed when moist or perspiring  -Avoid sitting or lying in one position for more than 2 hours while in bed  -Keep HOB at 45 degrees     Toileting:  -Offer bedside commode  -Assess for incontinence every 2 hours  -Use incontinent care products after each incontinent episode     Activity:  -Mobilize patient 4 times a day  -Encourage activity and walks on unit  -Encourage or provide ROM exercises   -Turn and reposition patient every 2 Hours  -Use appropriate equipment to lift or move patient in bed  -Instruct/ Assist with weight shifting every 30 minutes when out of bed in chair  -Consider limitation of chair time 2 hour intervals    Skin Care:  -Avoid use of baby powder, tape, friction and shearing, hot water or constrictive clothing  -Relieve pressure over bony prominences   -Do not massage red bony areas    Next Steps:  -Teach patient strategies to minimize risks    -Consider consults to  interdisciplinary teams   Outcome: Progressing

## 2023-04-28 NOTE — UTILIZATION REVIEW
Continued Stay Review    Date:     FOR  4/27                          Current Patient Class:   INpatient  Current Level of Care:   ICU    HPI:72 y o  female initially admitted on   4/24/23   With Acute/chronic respiratory failure    Assessment/Plan:   4/27/23   Remains on  IV  S/medrol  Breathing improved  More fatigued  Not feeling ready to discharge  On O2  1 5 L  NC, baseline    2L  NC  PRN  Refused  BiPap at  [facility]  Needs  PT/OT  Encephalopathy resolved  Continue current meds      Vital Signs:   98 7 °F (37 1 °C) 86 18 119/71 87 97 % -- -- None (Room air) -- --    04/27/23 20:45:23 98 5 °F (36 9 °C) 79 18 144/75 98 91 % -- -- -- -- --   04/27/23 2003 -- -- -- -- -- 97 % -- -- None (Room air) -- --   04/27/23 2000 97 4 °F (36 3 °C) Abnormal  77 20 136/77 101 93 % -- -- None (Room air) -- Lying   04/27/23 1651 97 8 °F (36 6 °C) 79 20 122/81 96 91 % -- -- None (Room air) -- Sitting   04/27/23 1557 -- -- -- -- -- 95 % -- -- None (Room air) -- --   04/27/23 1109 -- -- -- -- -- 94 % -- -- -- -- --   04/27/23 0815 -- 95 22 146/70 100 93 % -- -- None (Room air) -- Sitting   04/27/23 0806 -- -- -- 146/70 -- -- -- -- -- -- --   04/27/23 0723 96 2 °F (35 7 °C) Abnormal  -- -- -- -- 97 % 26 1 5 L/min Nasal cannula -- --   04/27/23 0600 97 6 °F (36 4 °C) 65 16 145/75 104 93 % -- -- -- -- --         Pertinent Labs/Diagnostic Results:   Results from last 7 days   Lab Units 04/24/23 2117   SARS-COV-2  Negative     Results from last 7 days   Lab Units 04/28/23  0423 04/26/23  0609 04/25/23  0516 04/24/23  1523   WBC Thousand/uL 12 95* 9 77 9 93 9 41   HEMOGLOBIN g/dL 11 9 11 5 10 9* 11 7   HEMATOCRIT % 37 8 36 7 35 3 37 9   PLATELETS Thousands/uL 262 243 227 232   NEUTROS ABS Thousands/µL  --  6 70 8 88* 6 73         Results from last 7 days   Lab Units 04/28/23  0423 04/26/23  0609 04/25/23  0516 04/24/23  1523   SODIUM mmol/L 139 142 138 138   POTASSIUM mmol/L 4 7 4 4 4 9 4 2   CHLORIDE mmol/L 104 106 106 104   CO2 mmol/L 27 29 26 30   ANION GAP mmol/L 8 7 6 4   BUN mg/dL 22 21 23 20   CREATININE mg/dL 1 02 0 90 1 13 1 21   EGFR ml/min/1 73sq m 55 64 49 45   CALCIUM mg/dL 9 6 8 9 8 7 8 8   CALCIUM, IONIZED mmol/L  --  1 15  --   --    MAGNESIUM mg/dL 2 0 1 9 2 0  --    PHOSPHORUS mg/dL 3 7  --  2 8  --      Results from last 7 days   Lab Units 04/25/23  0516 04/24/23  1902 04/24/23  1523   AST U/L 33  --  51*   ALT U/L 33  --  43   ALK PHOS U/L 38  --  42   TOTAL PROTEIN g/dL 6 6  --  7 1   ALBUMIN g/dL 3 7  --  3 9   TOTAL BILIRUBIN mg/dL 0 36  --  0 39   AMMONIA umol/L  --  16*  --          Results from last 7 days   Lab Units 04/28/23  0423 04/26/23  0609 04/25/23  0516 04/24/23  1523   GLUCOSE RANDOM mg/dL 150* 94 203* 111                                       Results from last 7 days   Lab Units 04/27/23  0337 04/24/23  1955 04/24/23  1950   BLOOD CULTURE   --  No Growth at 48 hrs  No Growth at 48 hrs  SPUTUM CULTURE  Test not performed  Suggest repeat specimen  --   --    GRAM STAIN RESULT  >10 squamous epithelial cells/lpf, indicating orophayngeal contamination  *  4+ Gram positive cocci in pairs and chains*  2+ Gram positive rods*  4+ Polys*  --   --                  Medications:   Scheduled Medications:  amLODIPine, 5 mg, Oral, Daily  atorvastatin, 40 mg, Oral, Daily With Dinner  buprenorphine-naloxone, 8 mg, Sublingual, Daily  clopidogrel, 75 mg, Oral, Daily  cyclobenzaprine, 10 mg, Oral, HS  enoxaparin, 40 mg, Subcutaneous, Daily  Fluticasone Furoate-Vilanterol, 1 puff, Inhalation, Daily   And  umeclidinium, 1 puff, Inhalation, Daily  guaiFENesin, 600 mg, Oral, BID  levalbuterol, 1 25 mg, Nebulization, 4x Daily  levothyroxine, 125 mcg, Oral, Early Morning  losartan, 50 mg, Oral, Daily  melatonin, 3 mg, Oral, HS  methylPREDNISolone sodium succinate, 40 mg, Intravenous, Q12H TESSA  metoprolol succinate, 25 mg, Oral, Daily  nicotine, 1 patch, Transdermal, Daily  pantoprazole, 40 mg, Oral, Early Morning  polyethylene glycol, 17 g, Oral, Daily  senna-docusate sodium, 2 tablet, Oral, BID  traZODone, 75 mg, Oral, HS  venlafaxine, 225 mg, Oral, Daily      Continuous IV Infusions:     PRN Meds:  acetaminophen, 650 mg, Oral, Q6H PRN  ALPRAZolam, 0 5 mg, Oral, BID PRN  traMADol, 50 mg, Oral, Q8H PRN        Discharge Plan:   TBD    Network Utilization Review Department  ATTENTION: Please call with any questions or concerns to [phone] and carefully listen to the prompts so that you are directed to the right person  All voicemails are confidential   [facility] all requests for admission clinical reviews, approved or denied determinations and any other requests to dedicated fax number below belonging to the campus where the patient is receiving treatment   List of dedicated fax numbers for the Facilities:  [address] DENIALS (Administrative/Medical Necessity) [phone]   [address] (Maternity/NICU/Pediatrics) [phone]   [address] [place] [address]  [phone]   [address] [facility] [place] [address] Delroy Haynese [facility] [address] [facility] [facility] [phone]

## 2023-04-28 NOTE — CASE MANAGEMENT
Case Management Discharge Planning Note    Patient name John Solorio  Location [address] MRN [id number]  : 1950 Date 2023       Current Admission Date: 2023  Current Admission Diagnosis:Acute on chronic respiratory failure with hypoxia and hypercapnia [facility])   Patient Active Problem List    Diagnosis Date Noted    Acute on chronic respiratory failure with hypoxia and hypercapnia ([facility] ) 2023    Tobacco abuse, in remission 2023    GELA (obstructive sleep apnea) 03/10/2023    Acute bronchitis due to Haemophilus influenzae 03/10/2023    Chronic pain syndrome 2023    Angina at rest [facility]) 2023    Pulmonary nodules 2023    Cerebral infarct ([facility] ) 2023    Cocaine abuse ([facility] ) 2023    [facility] pulmonary disease with [facility] exacerbation [facility] 2023    Hypertension 2023    Hypothyroid 2023    CAD (coronary artery disease) 2023    Hx of CABG x1 2023    Centrilobular emphysema ([facility] ) 2023    Anxiety 2023    Opioid dependence ([facility] ) 2023    Acute encephalopathy 2023    Nicotine abuse 2023      LOS (days): 4  Geometric Mean LOS (GMLOS) (days): 3 50  Days to GMLOS:-0 1     OBJECTIVE:  Risk of Unplanned Readmission Score: 17 72       Current admission status: Inpatient   Preferred Pharmacy:   [address] [place] [address] [address]  [address] [facility] [address]  Phone: [phone] Fax: [phone]    Primary Care Provider: Joel Yoon    Primary Insurance: [facility]  Secondary Insurance:     DISCHARGE DETAILS:     Treatment Team Recommendation: Home  Discharge Destination Plan[de-identified] Home  Transport at Discharge : Family         IMM Given (Date):: 23  IMM Given to[de-identified] Patient (IMM reviewed with and signed by patient    Patient refused copy (has previous copy) and copy was placed in scan bin for chart )

## 2023-04-28 NOTE — PLAN OF CARE
Problem: MOBILITY - ADULT  Goal: Maintains/Returns to pre admission functional level  Description: INTERVENTIONS:  - Perform BMAT or MOVE assessment daily    - Set and communicate daily mobility goal to care team and patient/family/caregiver     - Collaborate with rehabilitation services on mobility goals if consulted  - Out of bed for toileting  - Record patient progress and toleration of activity level   Outcome: Progressing     Problem: Potential for Falls  Goal: Patient will remain free of falls  Description: INTERVENTIONS:  - Educate patient/family on patient safety including physical limitations  - Instruct patient to call for assistance with activity   - Consult OT/PT to assist with strengthening/mobility   - Keep Call bell within reach  - Keep bed low and locked with side rails adjusted as appropriate  - Keep care items and personal belongings within reach  - Initiate and maintain comfort rounds  Problem: PAIN - ADULT  Goal: Verbalizes/displays adequate comfort level or baseline comfort level  Description: Interventions:  - Encourage patient to monitor pain and request assistance  - Assess pain using appropriate pain scale  - Administer analgesics based on type and severity of pain and evaluate response  - Implement non-pharmacological measures as appropriate and evaluate response  - Consider cultural and social influences on pain and pain management  - Notify physician/advanced practitioner if interventions unsuccessful or patient reports new pain  Outcome: Progressing     Problem: DISCHARGE PLANNING  Goal: Discharge to home or other facility with appropriate resources  Description: INTERVENTIONS:  - Identify barriers to discharge w/patient and caregiver  - Arrange for needed discharge resources and transportation as appropriate  - Identify discharge learning needs (meds, wound care, etc )  - Arrange for interpretive services to assist at discharge as needed  - Refer to Case Management Department for coordinating discharge planning if the patient needs post-hospital services based on physician/advanced practitioner order or complex needs related to functional status, cognitive ability, or social support system  Outcome: Progressing     Problem: Knowledge Deficit  Goal: Patient/family/caregiver demonstrates understanding of disease process, treatment plan, medications, and discharge instructions  Description: Complete learning assessment and assess knowledge base    Interventions:  - Provide teaching at level of understanding  - Provide teaching via preferred learning methods  Outcome: Progressing     Problem: RESPIRATORY - ADULT  Goal: Achieves optimal ventilation and oxygenation  Description: INTERVENTIONS:  - Assess for changes in respiratory status  - Assess for changes in mentation and behavior  - Position to facilitate oxygenation and minimize respiratory effort  - Oxygen administered by appropriate delivery if ordered  - Initiate smoking cessation education as indicated  - Encourage broncho-pulmonary hygiene including cough, deep breathe, Incentive Spirometry  - Assess the need for suctioning and aspirate as needed  - Assess and instruct to report SOB or any respiratory difficulty  - Respiratory Therapy support as indicated  Outcome: Progressing     Problem: METABOLIC, FLUID AND ELECTROLYTES - ADULT  Goal: Electrolytes maintained within normal limits  Description: INTERVENTIONS:  - Monitor labs and assess patient for signs and symptoms of electrolyte imbalances  - Administer electrolyte replacement as ordered  - Monitor response to electrolyte replacements, including repeat lab results as appropriate  - Instruct patient on fluid and nutrition as appropriate  Outcome: Progressing

## 2023-04-28 NOTE — PROGRESS NOTES
"Progress Note - Pulmonary   Kielon Tc 71 y o  female MRN: [id number]  Unit/Bed#: [address] [place] [address] Encounter: [id number]    Assessment:  Acute exacerbation of mild to moderate COPD improving  FEV1 is 1 56 L or 71% predicted  Due to hypercapnic hypoxemic respite failure suspect due to polysubstance use  Patient at baseline is not hypercapnic  Does have oxygen at home which he uses 2 L/min as needed  History of acsending thoracic aortic aneurysm repair and single bypass of right coronary artery at same time on September 2016  Chronic pain  Mild GELA as suggested by sleep screen 2023 with overall AHI of 11 8  She is scheduled for outpatient sleep study in July  Metabolic encephalopathy resolved  This is probably due to her inadvertently taking 1 or 2 extra pills of her home medication  Obesity    Plan:  If patient remains stable we can consider changing to prednisone 40 mg daily with 10 mg taper every fourth day  Continue Cymetra 40 mg IV every 12 hours for today  When she goes home she will resume her Trelegy 100 mcg 1 puff daily and oxygen 2 L/min at bedtime and as needed with activity  I did talk to her about being careful with her medication at home    Subjective:   Breathing is better today    Objective:     Vitals: Blood pressure 144/75, pulse 79, temperature 98 5 °F (36 9 °C), resp  rate 18, height 5' 3\" (1 6 m), weight 85 kg (187 lb 6 3 oz), SpO2 91 %  ,Body mass index is 33 19 kg/m²  Intake/Output Summary (Last 24 hours) at 4/27/2023 2101  Last data filed at 4/27/2023 1601  Gross per 24 hour   Intake 1200 ml   Output --   Net 1200 ml       Physical Exam: Physical Exam  Vitals reviewed  Constitutional:       General: She is not in acute distress  Appearance: Normal appearance  She is well-developed  Comments: Room air O2 saturation 93%   HENT:      Head: Normocephalic        Right Ear: External ear normal       Left Ear: External ear normal       Nose: Nose normal       Mouth/Throat: " Mouth: Mucous membranes are moist       Pharynx: Oropharynx is clear  No oropharyngeal exudate  Eyes:      Conjunctiva/sclera: Conjunctivae normal       Pupils: Pupils are equal, round, and reactive to light  Neck:      Vascular: No JVD  Trachea: No tracheal deviation  Cardiovascular:      Rate and Rhythm: Normal rate and regular rhythm  Heart sounds: Normal heart sounds  Pulmonary:      Effort: Pulmonary effort is normal       Comments: Lung sounds are clear  No wheezes or crackles  Abdominal:      General: There is no distension  Palpations: Abdomen is soft  Tenderness: There is no abdominal tenderness  There is no guarding  Musculoskeletal:      Cervical back: Neck supple  Comments: No edema   Lymphadenopathy:      Cervical: No cervical adenopathy  Skin:     General: Skin is warm and dry  Findings: No rash  Neurological:      General: No focal deficit present  Mental Status: She is alert and oriented to person, place, and time  Psychiatric:         Behavior: Behavior normal          Thought Content: Thought content normal           Labs: I have personally reviewed pertinent lab results  , ABG:   Lab Results   Component Value Date    PHART 7 331 (L) 04/24/2023    GLG3HAI 46 5 (H) 04/24/2023    PO2ART 85 0 04/24/2023    VOS1OXO 24 0 04/24/2023    BEART -2 1 04/24/2023    SOURCE Radial, Right 04/24/2023   , BNP:   Lab Results   Component Value Date    BNP 29 04/24/2023   , CBC:   Lab Results   Component Value Date    WBC 9 77 04/26/2023    HGB 11 5 04/26/2023    HCT 36 7 04/26/2023    MCV 99 (H) 04/26/2023     04/26/2023    MCH 30 9 04/26/2023    MCHC 31 3 (L) 04/26/2023    RDW 14 7 04/26/2023    MPV 9 5 04/26/2023    NRBC 0 04/26/2023   , CMP:   Lab Results   Component Value Date    K 4 4 04/26/2023     04/26/2023    CO2 29 04/26/2023    BUN 21 04/26/2023    CREATININE 0 90 04/26/2023    CALCIUM 8 9 04/26/2023    AST 33 04/25/2023    ALT 33 04/25/2023    ALKPHOS 38 04/25/2023    EGFR 64 04/26/2023   , PT/INR:   Lab Results   Component Value Date    INR 0 98 03/01/2023   , Troponin: No results found for: TROPONIN    Imaging and other studies: I have personally reviewed pertinent reports     and I have personally reviewed pertinent films in PACS

## 2023-04-30 LAB
BACTERIA BLD CULT: NORMAL
BACTERIA BLD CULT: NORMAL

## 2023-05-01 LAB
BACTERIA SPT RESP CULT: ABNORMAL
GRAM STN SPEC: ABNORMAL

## 2023-05-09 PROBLEM — J20.1 ACUTE BRONCHITIS DUE TO HAEMOPHILUS INFLUENZAE: Status: RESOLVED | Noted: 2023-03-10 | Resolved: 2023-05-09

## 2023-05-19 ENCOUNTER — OFFICE VISIT (OUTPATIENT)
Dept: PULMONOLOGY | Facility: MEDICAL CENTER | Age: 73
End: 2023-05-19

## 2023-05-19 VITALS
OXYGEN SATURATION: 94 % | SYSTOLIC BLOOD PRESSURE: 128 MMHG | WEIGHT: 182 LBS | DIASTOLIC BLOOD PRESSURE: 84 MMHG | TEMPERATURE: 98.7 F | HEART RATE: 88 BPM | RESPIRATION RATE: 16 BRPM | HEIGHT: 63 IN | BODY MASS INDEX: 32.25 KG/M2

## 2023-05-19 DIAGNOSIS — F17.211 CIGARETTE NICOTINE DEPENDENCE IN REMISSION: ICD-10-CM

## 2023-05-19 DIAGNOSIS — G47.33 OSA (OBSTRUCTIVE SLEEP APNEA): ICD-10-CM

## 2023-05-19 DIAGNOSIS — E66.9 OBESITY (BMI 30-39.9): ICD-10-CM

## 2023-05-19 DIAGNOSIS — R91.8 PULMONARY NODULES: ICD-10-CM

## 2023-05-19 DIAGNOSIS — J96.12 CHRONIC RESPIRATORY FAILURE WITH HYPOXIA AND HYPERCAPNIA (HCC): ICD-10-CM

## 2023-05-19 DIAGNOSIS — J43.2 CENTRILOBULAR EMPHYSEMA (HCC): Primary | Chronic | ICD-10-CM

## 2023-05-19 DIAGNOSIS — J43.2 CENTRILOBULAR EMPHYSEMA (HCC): Chronic | ICD-10-CM

## 2023-05-19 DIAGNOSIS — J96.11 CHRONIC RESPIRATORY FAILURE WITH HYPOXIA AND HYPERCAPNIA (HCC): ICD-10-CM

## 2023-05-19 PROBLEM — F17.201 TOBACCO ABUSE, IN REMISSION: Chronic | Status: RESOLVED | Noted: 2023-04-05 | Resolved: 2023-05-19

## 2023-05-19 PROBLEM — G93.40 ACUTE ENCEPHALOPATHY: Status: RESOLVED | Noted: 2023-03-01 | Resolved: 2023-05-19

## 2023-05-19 PROBLEM — J44.1 CHRONIC OBSTRUCTIVE PULMONARY DISEASE WITH ACUTE EXACERBATION (HCC): Status: RESOLVED | Noted: 2023-03-01 | Resolved: 2023-05-19

## 2023-05-19 RX ORDER — FLUTICASONE FUROATE, UMECLIDINIUM BROMIDE AND VILANTEROL TRIFENATATE 100; 62.5; 25 UG/1; UG/1; UG/1
1 POWDER RESPIRATORY (INHALATION) DAILY
Qty: 60 BLISTER | Refills: 5 | Status: SHIPPED | OUTPATIENT
Start: 2023-05-19

## 2023-05-19 RX ORDER — PREDNISONE 20 MG/1
40 TABLET ORAL DAILY
Qty: 10 TABLET | Refills: 0 | Status: SHIPPED | OUTPATIENT
Start: 2023-05-19

## 2023-05-19 RX ORDER — LEVOTHYROXINE SODIUM 0.1 MG/1
100 TABLET ORAL DAILY
COMMUNITY
Start: 2023-05-11

## 2023-05-19 RX ORDER — LOSARTAN POTASSIUM 25 MG/1
25 TABLET ORAL DAILY
COMMUNITY
Start: 2023-05-11

## 2023-05-19 NOTE — ASSESSMENT & PLAN NOTE
She reports to me that she quit smoking in March  She will continue with complete cessation  She is also abstaining from cocaine  She is aware of the risks of continued smoking and substance use  We discussed tobacco cessation for 3 minutes today  Repeat imaging as listed

## 2023-05-19 NOTE — ASSESSMENT & PLAN NOTE
Continue with Trelegy and albuterol as needed  She is aware of proper use and technique  She still has some activity intolerance and is agreeable to outpatient pulmonary rehab referral   This was placed today  She will continue to increase activity as tolerated  Of note, I did prescribe her prednisone to have on hand for her upcoming vacation

## 2023-05-19 NOTE — ASSESSMENT & PLAN NOTE
She will continue with 2 L/min of supplemental oxygen as needed and at bedtime  Ovidio Altamirano MA is investigating portability for her for her vacation

## 2023-05-19 NOTE — ASSESSMENT & PLAN NOTE
There was concern for atelectasis at her left lung base  Given her continued dyspnea and abnormality on most recent chest x-ray, will do a 3-month CT of the chest next month  Discussed with Dr Kaylin Adams

## 2023-05-19 NOTE — PROGRESS NOTES
Pulmonary Follow-Up Note   Lachelle Garcia 71 y o  female MRN: [id number]  5/19/2023      Assessment/Plan:    Problem List Items Addressed This Visit        Respiratory    Centrilobular emphysema ([address] ) - Primary (Chronic)     Continue with Trelegy and albuterol as needed  She is aware of proper use and technique  She still has some activity intolerance and is agreeable to outpatient pulmonary rehab referral   This was placed today  She will continue to increase activity as tolerated  Of note, I did prescribe her prednisone to have on hand for her upcoming vacation  Relevant Medications    predniSONE 20 mg tablet    Other Relevant Orders    Ambulatory Referral to Pulmonary Rehabilitation    Pulmonary nodules     There was concern for atelectasis at her left lung base  Given her continued dyspnea and abnormality on most recent chest x-ray, will do a 3-month CT of the chest next month  Discussed with Dr Chintan Lorenzana  Relevant Orders    CT chest wo contrast    GELA (obstructive sleep apnea)     Sleep study is pending for July  She will continue with her supplemental oxygen as listed  Chronic respiratory failure with hypoxia and hypercapnia (HCC)     She will continue with 2 L/min of supplemental oxygen as needed and at bedtime  Debbie Castro MA is investigating portability for her for her vacation  Other    Cigarette nicotine dependence in remission     She reports to me that she quit smoking in March  She will continue with complete cessation  She is also abstaining from cocaine  She is aware of the risks of continued smoking and substance use  We discussed tobacco cessation for 3 minutes today  Repeat imaging as listed  Obesity (BMI 30-39  9)     Lifestyle modifications and weight loss as able  She will be undergoing pulmonary rehab to hopefully improve her exercise tolerance  Education provided at this visit:   Need for Vaccination: Up-to-date with PPV 20  "Recommend yearly flu vaccine  Pulmonary Rehab: As above   Smoking Cessation: As above   Lung Cancer Screening: As above   Inhaler Use: Reiterated proper use and technique  Return in about 3 months (around 8/19/2023), or if symptoms worsen or fail to improve  All of Montserrat's questions were answered prior to leaving the office today  She will follow-up with Dr Lidia Ryan in three months or sooner should the need arise  She is aware to call our office with any further questions or concerns  History of Present Illness   Reason for Visit: Follow-up  Chief Complaint: \"I get SOB quick  \"  HPI: Tomasa Soto is a 71 y o  female who presents to the office today for follow-up after recent hospitalization for COPD exacerbation  Overall, she reports she is doing better  She is using her Trelegy 1 puff once daily and uses albuterol up to 4 times daily  She reports the albuterol helps with mucus expectoration  She still gets short of breath fairly easily with minimal exertion  It is remarkably improved from her hospitalization however  She denies any other complaints at present  She is hoping to get more portable oxygen to be able to take on her vacation that is coming up  She would also like to have prednisone on hand  Aside from the above, no other complaints  Review of Systems   All other systems reviewed and are negative  A full 12-point review of systems was completed and is negative except for those outlined in the HPI  Historical Information   Past Medical History:   Diagnosis Date   • Chronic pain 03/06/2023   • COPD (chronic obstructive pulmonary disease) (HCC)    • Disease of thyroid gland    • Hypertension      Past Surgical History:   Procedure Laterality Date   • CARDIAC SURGERY     • HIP SURGERY     • ORTHOPEDIC SURGERY     • THORACIC AORTIC ANEURYSM REPAIR  09/2016    ascending thoracic aortic repair with RCA bypass with SVG x 1     History reviewed   No pertinent family " history    Social History   Social History     Substance and Sexual Activity   Alcohol Use Not Currently     Social History     Substance and Sexual Activity   Drug Use Not Currently   • Types: Marijuana, Cocaine     Social History     Tobacco Use   Smoking Status Former   • Packs/day: 1 00   • Years: 50 00   • Pack years: 50 00   • Types: Cigarettes   • Quit date: 3/1/2023   • Years since quittin 2   Smokeless Tobacco Never     E-Cigarette/Vaping   • E-Cigarette Use Never User      E-Cigarette/Vaping Substances       Meds/Allergies     Current Outpatient Medications:   •  albuterol (2 5 mg/3 mL) 0 083 % nebulizer solution, INHALE 1 VIAL EVERY 6 HOURS BY NEBULIZER AS NEEDED, Disp: , Rfl:   •  albuterol (PROVENTIL HFA,VENTOLIN HFA) 90 mcg/act inhaler, Inhale 2 puffs every 6 (six) hours as needed for wheezing, Disp: 18 g, Rfl: 5  •  ALPRAZolam (XANAX) 0 5 mg tablet, Take by mouth 3 (three) times a day as needed for anxiety, Disp: , Rfl:   •  amLODIPine (NORVASC) 5 mg tablet, Take 1 tablet (5 mg total) by mouth daily Do not start before 2023 , Disp: 30 tablet, Rfl: 1  •  atorvastatin (LIPITOR) 40 mg tablet, Take 40 mg by mouth daily, Disp: , Rfl:   •  buprenorphine-naloxone (SUBOXONE) 8-2 mg per SL tablet, Place 1 tablet under the tongue 2 (two) times a day, Disp: , Rfl:   •  celecoxib (CeleBREX) 200 mg capsule, Take 200 mg by mouth daily, Disp: , Rfl:   •  clopidogrel (PLAVIX) 75 mg tablet, Take 75 mg by mouth daily, Disp: , Rfl:   •  cyclobenzaprine (FLEXERIL) 10 mg tablet, Take 10 mg by mouth daily at bedtime, Disp: , Rfl:   •  gabapentin (NEURONTIN) 300 mg capsule, Take 1 capsule (300 mg total) by mouth 3 (three) times a day, Disp: 90 capsule, Rfl: 1  •  guaiFENesin (MUCINEX) 600 mg 12 hr tablet, Take 1 tablet (600 mg total) by mouth 2 (two) times a day, Disp: 10 tablet, Rfl: 0  •  levothyroxine 100 mcg tablet, Take 100 mcg by mouth daily, Disp: , Rfl:   •  levothyroxine 125 mcg tablet, Take 125 mcg by "mouth, Disp: , Rfl:   •  losartan (COZAAR) 25 mg tablet, Take 25 mg by mouth daily, Disp: , Rfl:   •  losartan (COZAAR) 50 mg tablet, Take 1 tablet (50 mg total) by mouth daily Do not start before March 7, 2023 , Disp: 30 tablet, Rfl: 1  •  melatonin 3 mg, Take 1 tablet (3 mg total) by mouth daily at bedtime, Disp: 30 tablet, Rfl: 0  •  metFORMIN (GLUCOPHAGE) 500 mg tablet, TAKE 1 TABLET 2 TIMES A DAY ORALLY, Disp: , Rfl:   •  metoprolol succinate (TOPROL-XL) 25 mg 24 hr tablet, Take 25 mg by mouth daily, Disp: , Rfl:   •  nicotine (NICODERM CQ) 14 mg/24hr TD 24 hr patch, Place 1 patch on the skin daily, Disp: , Rfl:   •  nicotine (NICODERM CQ) 7 mg/24hr TD 24 hr patch, APPLY ONE PATCH TRANSDERMAL EVERY DAY, Disp: , Rfl:   •  omeprazole (PriLOSEC) 40 MG capsule, Take 40 mg by mouth daily, Disp: , Rfl:   •  pantoprazole (PROTONIX) 40 mg tablet, ONE TABLET BY MOUTH EVERY DAY IN THE MORNING, Disp: , Rfl:   •  predniSONE 20 mg tablet, Take 2 tablets (40 mg total) by mouth daily, Disp: 10 tablet, Rfl: 0  •  promethazine-dextromethorphan (PHENERGAN-DM) 6 25-15 mg/5 mL oral syrup, Take 5 mL by mouth 4 (four) times a day as needed for cough, Disp: 240 mL, Rfl: 0  •  traMADol (ULTRAM) 50 mg tablet, Take 50 mg by mouth every 6 (six) hours as needed for moderate pain, Disp: , Rfl:   •  traZODone (DESYREL) 50 mg tablet, Take 50 mg by mouth daily, Disp: , Rfl:   •  venlafaxine (EFFEXOR-XR) 150 mg 24 hr capsule, Take 225 mg by mouth daily, Disp: , Rfl:   •  fluticasone-umeclidinium-vilanterol (Trelegy Ellipta) 100-62 5-25 mcg/actuation inhaler, Inhale 1 puff daily Rinse mouth after use , Disp: 60 blister, Rfl: 5  No Known Allergies    Vitals: Blood pressure 128/84, pulse 88, temperature 98 7 °F (37 1 °C), temperature source Temporal, resp  rate 16, height 5' 3\" (1 6 m), weight 82 6 kg (182 lb), SpO2 94 %  Body mass index is 32 24 kg/m²  Oxygen Therapy  SpO2: 94 %    Physical Exam:  Physical Exam  Vitals reviewed   " "  Constitutional:       General: She is not in acute distress  Appearance: She is well-developed  She is obese  She is not toxic-appearing or diaphoretic  HENT:      Head: Normocephalic and atraumatic  Eyes:      General: No scleral icterus  Neck:      Trachea: No tracheal deviation  Cardiovascular:      Rate and Rhythm: Normal rate and regular rhythm  Heart sounds: S1 normal and S2 normal  No murmur heard  No friction rub  No gallop  Pulmonary:      Effort: Pulmonary effort is normal  No tachypnea, accessory muscle usage or respiratory distress  Breath sounds: Normal breath sounds  No stridor  No decreased breath sounds, wheezing, rhonchi or rales  Chest:      Chest wall: No tenderness  Abdominal:      General: Bowel sounds are normal  There is no distension  Palpations: Abdomen is soft  Tenderness: There is no abdominal tenderness  Musculoskeletal:      Cervical back: Neck supple  Right lower leg: No edema  Left lower leg: No edema  Skin:     General: Skin is warm and dry  Findings: No rash  Neurological:      Mental Status: She is alert and oriented to person, place, and time  GCS: GCS eye subscore is 4  GCS verbal subscore is 5  GCS motor subscore is 6  Psychiatric:         Speech: Speech normal          Behavior: Behavior normal  Behavior is cooperative  Imaging and other studies: I have personally reviewed pertinent reports   , I have personally reviewed pertinent films in PACS and Most recent chest x-ray shows possible increased left basilar opacity  There was atelectasis on a CT of the chest in March  Pulmonary Results (PFTs, PSG): I have personally reviewed pertinent reports  PFTs last month consistent with moderate obstruction  JAZMINE Varela  [facility]        Portions of the record may have been created with voice recognition software    Occasional wrong word or \"sound a like\" " substitutions may have occurred due to the inherent limitations of voice recognition software  Read the chart carefully and recognize, using context, where substitutions have occurred or contact the dictating provider

## 2023-05-19 NOTE — TELEPHONE ENCOUNTER
Patient is calling, she asked the nurse when she was in the office is she could get a refill on her Trelegy but she said the pharmacy didn't have it when she left the appointment  She is hoping we can send a script to her [address] [facility]   Please advise

## 2023-05-19 NOTE — ASSESSMENT & PLAN NOTE
Lifestyle modifications and weight loss as able  She will be undergoing pulmonary rehab to hopefully improve her exercise tolerance
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
## 2023-06-14 ENCOUNTER — TELEPHONE (OUTPATIENT)
Dept: CARDIAC REHAB | Facility: CLINIC | Age: 73
End: 2023-06-14

## 2023-06-16 ENCOUNTER — TELEPHONE (OUTPATIENT)
Dept: PULMONOLOGY | Facility: CLINIC | Age: 73
End: 2023-06-16

## 2023-06-16 NOTE — TELEPHONE ENCOUNTER
Patient calling saying her nasal canula keeps falling out at night while she is sleeping and it is making her O2 levels drop to 89-90%  She is asking what she should do or if there is anything else she can be using to keep the O2 in her nose  Please advise

## 2023-06-19 ENCOUNTER — HOSPITAL ENCOUNTER (OUTPATIENT)
Dept: RADIOLOGY | Facility: HOSPITAL | Age: 73
Discharge: HOME | End: 2023-06-19
Payer: COMMERCIAL

## 2023-06-19 DIAGNOSIS — R91.8 PULMONARY NODULES: ICD-10-CM

## 2023-06-19 PROCEDURE — 71250 CT THORAX DX C-: CPT

## 2023-06-19 PROCEDURE — G1004 CDSM NDSC: HCPCS

## 2023-06-21 NOTE — TELEPHONE ENCOUNTER
Called and s/w pt  She stated her nasal cannula falls off even when she tightens it  She is now using tape to hold in her nostrils  Called [employer] and they do not have anything else she can use as she already has padding for behind ears   Pt states she will figure it out

## 2023-06-23 ENCOUNTER — TELEPHONE (OUTPATIENT)
Dept: PULMONOLOGY | Facility: MEDICAL CENTER | Age: 73
End: 2023-06-23

## 2023-06-23 DIAGNOSIS — J44.1 COPD EXACERBATION (HCC): Primary | ICD-10-CM

## 2023-06-23 DIAGNOSIS — J43.2 CENTRILOBULAR EMPHYSEMA (HCC): Chronic | ICD-10-CM

## 2023-06-23 RX ORDER — FLUTICASONE FUROATE, UMECLIDINIUM BROMIDE AND VILANTEROL TRIFENATATE 100; 62.5; 25 UG/1; UG/1; UG/1
1 POWDER RESPIRATORY (INHALATION) DAILY
Qty: 60 BLISTER | Refills: 5 | Status: SHIPPED | OUTPATIENT
Start: 2023-06-23

## 2023-06-23 RX ORDER — PREDNISONE 10 MG/1
TABLET ORAL
Qty: 50 TABLET | Refills: 0 | Status: SHIPPED | OUTPATIENT
Start: 2023-06-23

## 2023-06-23 NOTE — TELEPHONE ENCOUNTER
I spoke with patient  She does feel like she has some chest tightness and wheezing  O2 saturation even at rest or around 88 to 90%  Sometimes 87%  Slightly more short of breath  She did reschedule her first pulmonary rehab visit until July  She is taking Trelegy 100 micro-ohm 1 puff daily  I told she use her rescue inhaler albuterol as needed and I will also prescribe a tapering dose of prednisone  She states prednisone does help her breathing when she is on it  We will start with 40 mg dose with 10 mg taper every fifth day  She does not improve she will call our office

## 2023-06-23 NOTE — TELEPHONE ENCOUNTER
Patient state's just talked to Dr Layla Diez, he prescribed her a prednisone taper and refilled her Trelegy  She will start pulmonary rehab in July

## 2023-06-23 NOTE — TELEPHONE ENCOUNTER
Pt has called in stating her O2 levels are reading at about 88-89 while sitting  Pt states she is scheduled for her pulm rehab today and is wondering if she should still go in   Please advise

## 2023-06-26 ENCOUNTER — TELEPHONE (OUTPATIENT)
Age: 73
End: 2023-06-26

## 2023-06-26 NOTE — TELEPHONE ENCOUNTER
Called patient to reschedule appointment  Patient stating she was given results from CT Scan earlier  and  that she has some questions  Please call patient back    [phone]

## 2023-06-26 NOTE — TELEPHONE ENCOUNTER
----- Message from Iagn[employer] sent at 6/26/2023  7:28 AM EDT -----  Please let Riki nunez know that her nodule on her CT chest is stable  She should follow-up in August as previously planned  I do not see that an appointment is scheduled

## 2023-06-27 NOTE — TELEPHONE ENCOUNTER
I called and spoke to Riki pasdon regarding her concerns  I reviewed her CT scan with her and that she has a stable nodule, as well as mild emphysema  She continue to be smoke free, although this has been hard for her  She also wanted to know if her aneurysm was back, which per radiology report, there is no aneurysm  She was happy with the information I provided and verbalized understanding

## 2023-06-27 NOTE — TELEPHONE ENCOUNTER
Called pt back, she has additional questions in regards to her results and copd   She states she never even knew there was a nodule and would like to speak to provider directly regarding results

## 2023-07-14 ENCOUNTER — HOSPITAL ENCOUNTER (OUTPATIENT)
Facility: HOSPITAL | Age: 73
Setting detail: OBSERVATION
Discharge: HOME | End: 2023-07-15
Attending: EMERGENCY MEDICINE | Admitting: FAMILY MEDICINE
Payer: COMMERCIAL

## 2023-07-14 ENCOUNTER — APPOINTMENT (EMERGENCY)
Dept: RADIOLOGY | Facility: HOSPITAL | Age: 73
End: 2023-07-14
Payer: COMMERCIAL

## 2023-07-14 DIAGNOSIS — R74.8 ELEVATED LIPASE: ICD-10-CM

## 2023-07-14 DIAGNOSIS — K83.8 COMMON BILE DUCT DILATATION: ICD-10-CM

## 2023-07-14 DIAGNOSIS — N39.0 UTI (URINARY TRACT INFECTION): ICD-10-CM

## 2023-07-14 DIAGNOSIS — R10.9 INTRACTABLE ABDOMINAL PAIN: Primary | ICD-10-CM

## 2023-07-14 PROBLEM — E11.9 TYPE 2 DIABETES MELLITUS, WITHOUT LONG-TERM CURRENT USE OF INSULIN (HCC): Status: ACTIVE | Noted: 2023-07-14

## 2023-07-14 LAB
2HR DELTA HS TROPONIN: 1 NG/L
ALBUMIN SERPL BCP-MCNC: 4.5 G/DL (ref 3.5–5)
ALP SERPL-CCNC: 33 U/L (ref 34–104)
ALT SERPL W P-5'-P-CCNC: 17 U/L (ref 7–52)
ANION GAP SERPL CALCULATED.3IONS-SCNC: 11 MMOL/L
AST SERPL W P-5'-P-CCNC: 22 U/L (ref 13–39)
ATRIAL RATE: 81 BPM
BACTERIA UR QL AUTO: ABNORMAL /HPF
BASOPHILS # BLD AUTO: 0.03 THOUSANDS/ÂΜL (ref 0–0.1)
BASOPHILS NFR BLD AUTO: 0 % (ref 0–1)
BILIRUB SERPL-MCNC: 0.49 MG/DL (ref 0.2–1)
BILIRUB UR QL STRIP: NEGATIVE
BUN SERPL-MCNC: 11 MG/DL (ref 5–25)
CALCIUM SERPL-MCNC: 9.9 MG/DL (ref 8.4–10.2)
CARDIAC TROPONIN I PNL SERPL HS: 4 NG/L
CARDIAC TROPONIN I PNL SERPL HS: 5 NG/L
CHLORIDE SERPL-SCNC: 100 MMOL/L (ref 96–108)
CLARITY UR: CLEAR
CO2 SERPL-SCNC: 25 MMOL/L (ref 21–32)
COLOR UR: YELLOW
CREAT SERPL-MCNC: 0.8 MG/DL (ref 0.6–1.3)
EOSINOPHIL # BLD AUTO: 0.12 THOUSAND/ÂΜL (ref 0–0.61)
EOSINOPHIL NFR BLD AUTO: 1 % (ref 0–6)
ERYTHROCYTE [DISTWIDTH] IN BLOOD BY AUTOMATED COUNT: 13.5 % (ref 11.6–15.1)
GFR SERPL CREATININE-BSD FRML MDRD: 74 ML/MIN/1.73SQ M
GLUCOSE SERPL-MCNC: 108 MG/DL (ref 65–140)
GLUCOSE SERPL-MCNC: 116 MG/DL (ref 65–140)
GLUCOSE UR STRIP-MCNC: NEGATIVE MG/DL
HCT VFR BLD AUTO: 45.6 % (ref 34.8–46.1)
HGB BLD-MCNC: 14.5 G/DL (ref 11.5–15.4)
HGB UR QL STRIP.AUTO: NEGATIVE
HYALINE CASTS #/AREA URNS LPF: ABNORMAL /LPF
IMM GRANULOCYTES # BLD AUTO: 0.04 THOUSAND/UL (ref 0–0.2)
IMM GRANULOCYTES NFR BLD AUTO: 0 % (ref 0–2)
KETONES UR STRIP-MCNC: ABNORMAL MG/DL
LEUKOCYTE ESTERASE UR QL STRIP: ABNORMAL
LIPASE SERPL-CCNC: 166 U/L (ref 11–82)
LYMPHOCYTES # BLD AUTO: 1.73 THOUSANDS/ÂΜL (ref 0.6–4.47)
LYMPHOCYTES NFR BLD AUTO: 15 % (ref 14–44)
MAGNESIUM SERPL-MCNC: 1.9 MG/DL (ref 1.9–2.7)
MCH RBC QN AUTO: 29.7 PG (ref 26.8–34.3)
MCHC RBC AUTO-ENTMCNC: 31.8 G/DL (ref 31.4–37.4)
MCV RBC AUTO: 93 FL (ref 82–98)
MONOCYTES # BLD AUTO: 0.72 THOUSAND/ÂΜL (ref 0.17–1.22)
MONOCYTES NFR BLD AUTO: 6 % (ref 4–12)
NEUTROPHILS # BLD AUTO: 9.13 THOUSANDS/ÂΜL (ref 1.85–7.62)
NEUTS SEG NFR BLD AUTO: 78 % (ref 43–75)
NITRITE UR QL STRIP: POSITIVE
NON-SQ EPI CELLS URNS QL MICRO: ABNORMAL /HPF
NRBC BLD AUTO-RTO: 0 /100 WBCS
P AXIS: 64 DEGREES
PH UR STRIP.AUTO: 5.5 [PH]
PLATELET # BLD AUTO: 267 THOUSANDS/UL (ref 149–390)
PMV BLD AUTO: 9.6 FL (ref 8.9–12.7)
POTASSIUM SERPL-SCNC: 4.2 MMOL/L (ref 3.5–5.3)
PR INTERVAL: 144 MS
PROT SERPL-MCNC: 7.8 G/DL (ref 6.4–8.4)
PROT UR STRIP-MCNC: ABNORMAL MG/DL
QRS AXIS: 2 DEGREES
QRSD INTERVAL: 86 MS
QT INTERVAL: 390 MS
QTC INTERVAL: 453 MS
RBC # BLD AUTO: 4.88 MILLION/UL (ref 3.81–5.12)
RBC #/AREA URNS AUTO: ABNORMAL /HPF
SODIUM SERPL-SCNC: 136 MMOL/L (ref 135–147)
SP GR UR STRIP.AUTO: 1.02 (ref 1–1.03)
T WAVE AXIS: 83 DEGREES
UROBILINOGEN UR QL STRIP.AUTO: 0.2 E.U./DL
VENTRICULAR RATE: 81 BPM
WBC # BLD AUTO: 11.77 THOUSAND/UL (ref 4.31–10.16)
WBC #/AREA URNS AUTO: ABNORMAL /HPF

## 2023-07-14 PROCEDURE — 87086 URINE CULTURE/COLONY COUNT: CPT | Performed by: EMERGENCY MEDICINE

## 2023-07-14 PROCEDURE — 99285 EMERGENCY DEPT VISIT HI MDM: CPT

## 2023-07-14 PROCEDURE — 84484 ASSAY OF TROPONIN QUANT: CPT | Performed by: INTERNAL MEDICINE

## 2023-07-14 PROCEDURE — 96375 TX/PRO/DX INJ NEW DRUG ADDON: CPT

## 2023-07-14 PROCEDURE — 99244 OFF/OP CNSLTJ NEW/EST MOD 40: CPT | Performed by: INTERNAL MEDICINE

## 2023-07-14 PROCEDURE — 87186 SC STD MICRODIL/AGAR DIL: CPT | Performed by: EMERGENCY MEDICINE

## 2023-07-14 PROCEDURE — C9113 INJ PANTOPRAZOLE SODIUM, VIA: HCPCS | Performed by: INTERNAL MEDICINE

## 2023-07-14 PROCEDURE — 81001 URINALYSIS AUTO W/SCOPE: CPT | Performed by: EMERGENCY MEDICINE

## 2023-07-14 PROCEDURE — 96365 THER/PROPH/DIAG IV INF INIT: CPT

## 2023-07-14 PROCEDURE — 93005 ELECTROCARDIOGRAM TRACING: CPT

## 2023-07-14 PROCEDURE — 99204 OFFICE O/P NEW MOD 45 MIN: CPT | Performed by: INTERNAL MEDICINE

## 2023-07-14 PROCEDURE — 85025 COMPLETE CBC W/AUTO DIFF WBC: CPT | Performed by: EMERGENCY MEDICINE

## 2023-07-14 PROCEDURE — 84484 ASSAY OF TROPONIN QUANT: CPT | Performed by: EMERGENCY MEDICINE

## 2023-07-14 PROCEDURE — 83690 ASSAY OF LIPASE: CPT | Performed by: EMERGENCY MEDICINE

## 2023-07-14 PROCEDURE — 36415 COLL VENOUS BLD VENIPUNCTURE: CPT | Performed by: EMERGENCY MEDICINE

## 2023-07-14 PROCEDURE — 83735 ASSAY OF MAGNESIUM: CPT | Performed by: EMERGENCY MEDICINE

## 2023-07-14 PROCEDURE — 80053 COMPREHEN METABOLIC PANEL: CPT | Performed by: EMERGENCY MEDICINE

## 2023-07-14 PROCEDURE — 82948 REAGENT STRIP/BLOOD GLUCOSE: CPT

## 2023-07-14 PROCEDURE — 96361 HYDRATE IV INFUSION ADD-ON: CPT

## 2023-07-14 PROCEDURE — 74177 CT ABD & PELVIS W/CONTRAST: CPT

## 2023-07-14 PROCEDURE — 96376 TX/PRO/DX INJ SAME DRUG ADON: CPT

## 2023-07-14 PROCEDURE — 87077 CULTURE AEROBIC IDENTIFY: CPT | Performed by: EMERGENCY MEDICINE

## 2023-07-14 PROCEDURE — 99223 1ST HOSP IP/OBS HIGH 75: CPT | Performed by: INTERNAL MEDICINE

## 2023-07-14 PROCEDURE — G1004 CDSM NDSC: HCPCS

## 2023-07-14 PROCEDURE — 93010 ELECTROCARDIOGRAM REPORT: CPT | Performed by: INTERNAL MEDICINE

## 2023-07-14 RX ORDER — LIDOCAINE 50 MG/G
1 PATCH TOPICAL DAILY
Status: DISCONTINUED | OUTPATIENT
Start: 2023-07-15 | End: 2023-07-15 | Stop reason: HOSPADM

## 2023-07-14 RX ORDER — ENOXAPARIN SODIUM 100 MG/ML
40 INJECTION SUBCUTANEOUS DAILY
Status: DISCONTINUED | OUTPATIENT
Start: 2023-07-15 | End: 2023-07-15 | Stop reason: HOSPADM

## 2023-07-14 RX ORDER — HYDROMORPHONE HCL/PF 1 MG/ML
0.5 SYRINGE (ML) INJECTION ONCE
Status: COMPLETED | OUTPATIENT
Start: 2023-07-14 | End: 2023-07-14

## 2023-07-14 RX ORDER — ALBUTEROL SULFATE 2.5 MG/3ML
2.5 SOLUTION RESPIRATORY (INHALATION)
Status: DISCONTINUED | OUTPATIENT
Start: 2023-07-14 | End: 2023-07-15

## 2023-07-14 RX ORDER — LOSARTAN POTASSIUM 50 MG/1
50 TABLET ORAL DAILY
Status: DISCONTINUED | OUTPATIENT
Start: 2023-07-15 | End: 2023-07-15 | Stop reason: HOSPADM

## 2023-07-14 RX ORDER — ONDANSETRON 2 MG/ML
4 INJECTION INTRAMUSCULAR; INTRAVENOUS ONCE
Status: COMPLETED | OUTPATIENT
Start: 2023-07-14 | End: 2023-07-14

## 2023-07-14 RX ORDER — ACETAMINOPHEN 325 MG/1
650 TABLET ORAL EVERY 6 HOURS PRN
Status: DISCONTINUED | OUTPATIENT
Start: 2023-07-14 | End: 2023-07-15 | Stop reason: HOSPADM

## 2023-07-14 RX ORDER — METOCLOPRAMIDE HYDROCHLORIDE 5 MG/ML
10 INJECTION INTRAMUSCULAR; INTRAVENOUS ONCE
Status: COMPLETED | OUTPATIENT
Start: 2023-07-14 | End: 2023-07-14

## 2023-07-14 RX ORDER — TRAZODONE HYDROCHLORIDE 50 MG/1
75 TABLET ORAL
Status: DISCONTINUED | OUTPATIENT
Start: 2023-07-14 | End: 2023-07-15 | Stop reason: HOSPADM

## 2023-07-14 RX ORDER — TRAZODONE HYDROCHLORIDE 50 MG/1
75 TABLET ORAL
Status: DISCONTINUED | OUTPATIENT
Start: 2023-07-14 | End: 2023-07-14

## 2023-07-14 RX ORDER — SODIUM CHLORIDE 9 MG/ML
75 INJECTION, SOLUTION INTRAVENOUS CONTINUOUS
Status: DISCONTINUED | OUTPATIENT
Start: 2023-07-14 | End: 2023-07-15 | Stop reason: HOSPADM

## 2023-07-14 RX ORDER — METOPROLOL SUCCINATE 25 MG/1
25 TABLET, EXTENDED RELEASE ORAL DAILY
Status: DISCONTINUED | OUTPATIENT
Start: 2023-07-15 | End: 2023-07-15 | Stop reason: HOSPADM

## 2023-07-14 RX ORDER — ALPRAZOLAM 0.5 MG/1
0.5 TABLET ORAL ONCE
Status: COMPLETED | OUTPATIENT
Start: 2023-07-14 | End: 2023-07-14

## 2023-07-14 RX ORDER — INSULIN LISPRO 100 [IU]/ML
1-5 INJECTION, SOLUTION INTRAVENOUS; SUBCUTANEOUS
Status: DISCONTINUED | OUTPATIENT
Start: 2023-07-14 | End: 2023-07-15 | Stop reason: HOSPADM

## 2023-07-14 RX ORDER — HYDROMORPHONE HCL IN WATER/PF 6 MG/30 ML
0.2 PATIENT CONTROLLED ANALGESIA SYRINGE INTRAVENOUS EVERY 4 HOURS PRN
Status: DISCONTINUED | OUTPATIENT
Start: 2023-07-14 | End: 2023-07-15 | Stop reason: HOSPADM

## 2023-07-14 RX ORDER — CEFTRIAXONE 1 G/50ML
1000 INJECTION, SOLUTION INTRAVENOUS ONCE
Status: COMPLETED | OUTPATIENT
Start: 2023-07-14 | End: 2023-07-14

## 2023-07-14 RX ORDER — GUAIFENESIN 600 MG/1
600 TABLET, EXTENDED RELEASE ORAL 2 TIMES DAILY
Status: DISCONTINUED | OUTPATIENT
Start: 2023-07-14 | End: 2023-07-15 | Stop reason: HOSPADM

## 2023-07-14 RX ORDER — HYDROMORPHONE HCL/PF 1 MG/ML
1 SYRINGE (ML) INJECTION ONCE
Status: COMPLETED | OUTPATIENT
Start: 2023-07-14 | End: 2023-07-14

## 2023-07-14 RX ORDER — ATORVASTATIN CALCIUM 40 MG/1
40 TABLET, FILM COATED ORAL
Status: DISCONTINUED | OUTPATIENT
Start: 2023-07-15 | End: 2023-07-15 | Stop reason: HOSPADM

## 2023-07-14 RX ORDER — LEVOTHYROXINE SODIUM 0.12 MG/1
125 TABLET ORAL
Status: DISCONTINUED | OUTPATIENT
Start: 2023-07-15 | End: 2023-07-15 | Stop reason: HOSPADM

## 2023-07-14 RX ORDER — CYCLOBENZAPRINE HCL 10 MG
10 TABLET ORAL
Status: DISCONTINUED | OUTPATIENT
Start: 2023-07-14 | End: 2023-07-15 | Stop reason: HOSPADM

## 2023-07-14 RX ORDER — FLUTICASONE FUROATE AND VILANTEROL 100; 25 UG/1; UG/1
1 POWDER RESPIRATORY (INHALATION) DAILY
Status: DISCONTINUED | OUTPATIENT
Start: 2023-07-15 | End: 2023-07-15 | Stop reason: HOSPADM

## 2023-07-14 RX ORDER — TRAZODONE HYDROCHLORIDE 50 MG/1
75 TABLET ORAL
COMMUNITY

## 2023-07-14 RX ORDER — PANTOPRAZOLE SODIUM 40 MG/10ML
40 INJECTION, POWDER, LYOPHILIZED, FOR SOLUTION INTRAVENOUS EVERY 12 HOURS SCHEDULED
Status: DISCONTINUED | OUTPATIENT
Start: 2023-07-14 | End: 2023-07-15 | Stop reason: HOSPADM

## 2023-07-14 RX ORDER — ALPRAZOLAM 0.5 MG/1
0.5 TABLET ORAL 2 TIMES DAILY PRN
Status: DISCONTINUED | OUTPATIENT
Start: 2023-07-14 | End: 2023-07-14

## 2023-07-14 RX ORDER — CEFTRIAXONE 1 G/50ML
1000 INJECTION, SOLUTION INTRAVENOUS EVERY 24 HOURS
Status: DISCONTINUED | OUTPATIENT
Start: 2023-07-15 | End: 2023-07-15 | Stop reason: HOSPADM

## 2023-07-14 RX ORDER — CLOPIDOGREL BISULFATE 75 MG/1
75 TABLET ORAL DAILY
Status: DISCONTINUED | OUTPATIENT
Start: 2023-07-15 | End: 2023-07-15 | Stop reason: HOSPADM

## 2023-07-14 RX ORDER — ONDANSETRON 2 MG/ML
4 INJECTION INTRAMUSCULAR; INTRAVENOUS EVERY 6 HOURS PRN
Status: DISCONTINUED | OUTPATIENT
Start: 2023-07-14 | End: 2023-07-15 | Stop reason: HOSPADM

## 2023-07-14 RX ORDER — AMLODIPINE BESYLATE 5 MG/1
5 TABLET ORAL DAILY
Status: DISCONTINUED | OUTPATIENT
Start: 2023-07-15 | End: 2023-07-15 | Stop reason: HOSPADM

## 2023-07-14 RX ORDER — INSULIN LISPRO 100 [IU]/ML
1-5 INJECTION, SOLUTION INTRAVENOUS; SUBCUTANEOUS
Status: DISCONTINUED | OUTPATIENT
Start: 2023-07-15 | End: 2023-07-15 | Stop reason: HOSPADM

## 2023-07-14 RX ADMIN — ALPRAZOLAM 0.5 MG: 0.5 TABLET ORAL at 22:13

## 2023-07-14 RX ADMIN — METOCLOPRAMIDE 10 MG: 5 INJECTION, SOLUTION INTRAMUSCULAR; INTRAVENOUS at 10:39

## 2023-07-14 RX ADMIN — CYCLOBENZAPRINE HYDROCHLORIDE 10 MG: 10 TABLET, FILM COATED ORAL at 22:13

## 2023-07-14 RX ADMIN — PANTOPRAZOLE SODIUM 40 MG: 40 INJECTION, POWDER, FOR SOLUTION INTRAVENOUS at 20:12

## 2023-07-14 RX ADMIN — HYDROMORPHONE HYDROCHLORIDE 1 MG: 1 INJECTION, SOLUTION INTRAMUSCULAR; INTRAVENOUS; SUBCUTANEOUS at 15:48

## 2023-07-14 RX ADMIN — HYDROMORPHONE HYDROCHLORIDE 0.5 MG: 1 INJECTION, SOLUTION INTRAMUSCULAR; INTRAVENOUS; SUBCUTANEOUS at 12:35

## 2023-07-14 RX ADMIN — CEFTRIAXONE 1000 MG: 1 INJECTION, SOLUTION INTRAVENOUS at 15:50

## 2023-07-14 RX ADMIN — SODIUM CHLORIDE 75 ML/HR: 0.9 INJECTION, SOLUTION INTRAVENOUS at 20:12

## 2023-07-14 RX ADMIN — GUAIFENESIN 600 MG: 600 TABLET, EXTENDED RELEASE ORAL at 20:12

## 2023-07-14 RX ADMIN — TRAZODONE HYDROCHLORIDE 75 MG: 50 TABLET ORAL at 22:13

## 2023-07-14 RX ADMIN — ONDANSETRON 4 MG: 2 INJECTION INTRAMUSCULAR; INTRAVENOUS at 12:25

## 2023-07-14 RX ADMIN — ONDANSETRON 4 MG: 2 INJECTION INTRAMUSCULAR; INTRAVENOUS at 18:25

## 2023-07-14 RX ADMIN — SODIUM CHLORIDE 1000 ML: 0.9 INJECTION, SOLUTION INTRAVENOUS at 10:34

## 2023-07-14 RX ADMIN — IOHEXOL 100 ML: 350 INJECTION, SOLUTION INTRAVENOUS at 13:30

## 2023-07-14 RX ADMIN — ONDANSETRON 4 MG: 2 INJECTION INTRAMUSCULAR; INTRAVENOUS at 15:46

## 2023-07-14 NOTE — CONSULTS
Consultation - [facility]   Nga Lund 71 y.o. female MRN: [id number]  Unit/Bed#: ED 05 Encounter: [id number]        Inpatient consult to gastroenterology  Consult performed by: JAZMINE Weiner  Consult ordered by: Soo Fernando DO          Reason for Consult / Principal Problem:     Abdominal pain, nausea/vomiting, ductal dilation      ASSESSMENT AND PLAN:      #1 Abdominal pain, nausea/vomiting: Likely secondary to metformin as symptoms started Monday after patient accidentally started doubling her metformin dose on Sunday. She does have a history of GERD but reports symptoms are generally well controlled on pantoprazole 40 in the morning and Prilosec 40mg at night. Lipase is minimally elevated and nonspecific. No evidence of pancreatitis on CT. CT noted intra/extrahepatic biliary dilation however LFTs are normal.  She is status post cholecystectomy. She also has underlying UTI which could contribute to nausea/vomiting abdominal pain as well.    -Hold metformin and consider alternative due to patient intolerance    -Start clear liquid diet and advance as tolerated    -Continue supportive care with IV hydration, antiemetics, pain medication as needed    -If symptoms improve, then can follow-up with GI outpatient for EGD. If symptoms persist then can consider EGD early next week. #2 Dilated CBD, intrahepatic ducts pancreatic duct  Incidentally seen on CT with no evidence of choledocholithiasis or mass. LFTs are normal.  CBD, intrahepatic ductal dilatation can be seen following cholecystectomy. Will need further imaging with MRI to exclude any underlying masses. Patient denies any unintended weight loss or family history of pancreatic cancer.    -Trend LFTs  -Recommend outpatient MRI abdomen with and without contrast and MRCP for further evaluation.   Patient is claustrophobic so would require premed  -May need outpatient EUS pending course if unable to tolerate MRI    #3 history of colon polyps  Patient reports last colonoscopy was 5 years ago and she had polyps removed at that time. She is now due for surveillance    -Outpatient colonoscopy    Thank you for the consultation. Patient seen and examined with Dr. Viviane Aleman  ______________________________________________________________________    HPI:  Julián Umaña is a 71 y.o. female with history of COPD, hypothyroidism, HTN, diabetes who presented to the ER due to nausea/vomiting and epigastric pain which started on Monday after she was accidentally doubling her metformin dose on Sunday. She denies any fever/chills, sick contacts. She did have diarrhea but that resolved with the use of Imodium. In the ER she was afebrile, hypertensive. Labs notable for mildly elevated lipase 166, normal LFTs, WBC 11.77.  UA positive for nitrates and leukocytes. CT abdomen pelvis showed no acute abnormality in the abdomen or pelvis. Dilated common bile duct, intrahepatic ducts, and pancreatic duct without calcified choledocholithiasis or mass seen on single phase study. Patient reports she still has stabbing twisting epigastric pain despite pain medication given in the ER. She has a history of GERD maintained on pantoprazole in the morning and Prilosec at night which controls her symptoms. She reports history of a  "bad stomach" and belching. She had an EGD several years ago but does not recall what was found. Her last colonoscopy was approximately 5 years ago and polyps were removed. REVIEW OF SYSTEMS:    CONSTITUTIONAL: Denies any fever, chills, rigors, and weight loss. HEENT: No earache or tinnitus. Denies hearing loss or visual disturbances. CARDIOVASCULAR: No chest pain or palpitations. RESPIRATORY: Denies any cough, hemoptysis, shortness of breath or dyspnea on exertion. GASTROINTESTINAL: As noted in the History of Present Illness. GENITOURINARY: No problems with urination.  Denies any hematuria or dysuria. NEUROLOGIC: No dizziness or vertigo, denies headaches. MUSCULOSKELETAL: Denies any muscle or joint pain. SKIN: Denies skin rashes or itching. ENDOCRINE: Denies excessive thirst. Denies intolerance to heat or cold. PSYCHOSOCIAL: Denies depression or anxiety. Denies any recent memory loss. Historical Information   Past Medical History:   Diagnosis Date   • Chronic pain 2023   • COPD (chronic obstructive pulmonary disease) (HCC)    • Disease of thyroid gland    • Hypertension      Past Surgical History:   Procedure Laterality Date   • CARDIAC SURGERY     • HIP SURGERY     • ORTHOPEDIC SURGERY     • THORACIC AORTIC ANEURYSM REPAIR  2016    ascending thoracic aortic repair with RCA bypass with SVG x 1     Social History   Social History     Substance and Sexual Activity   Alcohol Use Not Currently     Social History     Substance and Sexual Activity   Drug Use Not Currently   • Types: Marijuana, Cocaine     Social History     Tobacco Use   Smoking Status Former   • Packs/day: 1.00   • Years: 50.00   • Total pack years: 50.00   • Types: Cigarettes   • Quit date: 3/1/2023   • Years since quittin.3   Smokeless Tobacco Never     History reviewed. No pertinent family history. Meds/Allergies     (Not in a hospital admission)    No current facility-administered medications for this encounter. No Known Allergies        Objective     Blood pressure 147/69, pulse 80, temperature (!) 96.9 °F (36.1 °C), resp. rate 20, height 5' 3" (1.6 m), weight 82.6 kg (182 lb), SpO2 98 %. Body mass index is 32.24 kg/m². No intake or output data in the 24 hours ending 23 1715      PHYSICAL EXAM:      General Appearance:   Alert, cooperative, no distress   HEENT:   Normocephalic, atraumatic, anicteric.      Neck:  Supple, symmetrical, trachea midline   Lungs:   Clear to auscultation bilaterally; no rales, rhonchi or wheezing; respirations unlabored    Heart[de-identified]   Regular rate and rhythm; no murmur, rub, or gallop.    Abdomen:   Soft, epigastric area tender, non-distended; normal bowel sounds; no masses, no organomegaly    Genitalia:   Deferred    Rectal:   Deferred    Extremities:  No cyanosis, clubbing or edema    Pulses:  2+ and symmetric all extremities    Skin:  No jaundice, rashes, or lesions    Lymph nodes:  No palpable cervical lymphadenopathy        Lab Results:   Admission on 07/14/2023   Component Date Value   • WBC 07/14/2023 11.77 (H)    • RBC 07/14/2023 4.88    • Hemoglobin 07/14/2023 14.5    • Hematocrit 07/14/2023 45.6    • MCV 07/14/2023 93    • MCH 07/14/2023 29.7    • MCHC 07/14/2023 31.8    • RDW 07/14/2023 13.5    • MPV 07/14/2023 9.6    • Platelets 39/49/0774 267    • nRBC 07/14/2023 0    • Neutrophils Relative 07/14/2023 78 (H)    • Immat GRANS % 07/14/2023 0    • Lymphocytes Relative 07/14/2023 15    • Monocytes Relative 07/14/2023 6    • Eosinophils Relative 07/14/2023 1    • Basophils Relative 07/14/2023 0    • Neutrophils Absolute 07/14/2023 9.13 (H)    • Immature Grans Absolute 07/14/2023 0.04    • Lymphocytes Absolute 07/14/2023 1.73    • Monocytes Absolute 07/14/2023 0.72    • Eosinophils Absolute 07/14/2023 0.12    • Basophils Absolute 07/14/2023 0.03    • Sodium 07/14/2023 136    • Potassium 07/14/2023 4.2    • Chloride 07/14/2023 100    • CO2 07/14/2023 25    • ANION GAP 07/14/2023 11    • BUN 07/14/2023 11    • Creatinine 07/14/2023 0.80    • Glucose 07/14/2023 116    • Calcium 07/14/2023 9.9    • AST 07/14/2023 22    • ALT 07/14/2023 17    • Alkaline Phosphatase 07/14/2023 33 (L)    • Total Protein 07/14/2023 7.8    • Albumin 07/14/2023 4.5    • Total Bilirubin 07/14/2023 0.49    • eGFR 07/14/2023 74    • Magnesium 07/14/2023 1.9    • hs TnI 0hr 07/14/2023 4    • Color, UA 07/14/2023 Yellow    • Clarity, UA 07/14/2023 Clear    • Specific Gravity, UA 07/14/2023 1.025    • pH, UA 07/14/2023 5.5    • Leukocytes, UA 07/14/2023 Trace (A)    • Nitrite, UA 07/14/2023 Positive (A)    • Protein, UA 07/14/2023 100 (2+) (A)    • Glucose, UA 07/14/2023 Negative    • Ketones, UA 07/14/2023 15 (1+) (A)    • Urobilinogen, UA 07/14/2023 0.2    • Bilirubin, UA 07/14/2023 Negative    • Occult Blood, UA 07/14/2023 Negative    • Lipase 07/14/2023 166 (H)    • RBC, UA 07/14/2023 None Seen    • WBC, UA 07/14/2023 20-30 (A)    • Epithelial Cells 07/14/2023 Occasional    • Bacteria, UA 07/14/2023 Moderate (A)    • Hyaline Casts, UA 07/14/2023 0-1 (A)    • Ventricular Rate 07/14/2023 81    • Atrial Rate 07/14/2023 81    • NJ Interval 07/14/2023 144    • QRSD Interval 07/14/2023 86    • QT Interval 07/14/2023 390    • QTC Interval 07/14/2023 453    • P Axis 07/14/2023 64    • QRS Axis 07/14/2023 2    • T Wave Axis 07/14/2023 83        Imaging Studies: I have personally reviewed pertinent imaging studies.

## 2023-07-14 NOTE — H&P
History and Physical - [facility]    Patient Information: Jeanette Hamman 71 y.o. female MRN: [id number]  Unit/Bed#: [facility] Encounter: [id number]  Admitting Physician: Dilan Silva MD  PCP: Celsa Sims  Date of Admission:  07/14/23        Hospital Problem List:     Principal Problem:    Intractable abdominal pain  Active Problems:    Hypertension    Hx of CABG x1    Centrilobular emphysema (HCC)    Chronic pain syndrome    Chronic respiratory failure with hypoxia and hypercapnia (HCC)    Abnormal CT scan    UTI (urinary tract infection)    Type 2 diabetes mellitus, without long-term current use of insulin (HCC)    Hypothyroid    Cigarette nicotine dependence in remission    Obesity (BMI 30-39. 9)      Assessment/Plan:    * Intractable abdominal pain  Assessment & Plan  Presented with intractable epigastric abdominal pain associated with nausea and bilious vomiting  Symptoms started after patient patient inadvertently doubled up on her metformin  History of chronic GI symptoms taking PPI twice daily  CT abdomen without any acute abnormality noted to have dilated CBD, intrahepatic duct and pancreatic duct dilatation  LFT unremarkable, lipase minimally elevated at 166. EKG cardiac markers negative  Abdominal exam is benign  Possible gastritis, gastroenteritis, likely metformin intolerance. Unlikely pancreatic or biliary etiology  · IV fluids  · Monitor abdominal exam  · IV PPI twice daily  · Clear liquid diet, advance as tolerated  · IV antiemetics  · Follow-up LFT  · GI evaluation noted, Input appreciated        Type 2 diabetes mellitus, without long-term current use of insulin (HCC)  Assessment & Plan  No results found for: "HGBA1C"    No results for input(s): "POCGLU" in the last 72 hours.     Blood Sugar Average: Last 72 hrs:    Hold metformin given intolerance  Monitor on sliding scale  Hemoglobin A1c    UTI (urinary tract infection)  Assessment & Plan  UA in ED reveals suggestive of UTI, patient without any overt urinary symptoms but may be contributing to presentation  Afebrile, mild leukocytosis with denies any flank pain  CT abdomen without any evidence of pyelonephritis  · IV ceftriaxone  · Follow-up urine culture    Abnormal CT scan  Assessment & Plan  CT of the abdomen revealed noted to have dilated common bile duct, intrahepatic ducts and pancreatic duct without calcified cholelithiasis or mass follow-up MRI was recommended. · GI follow-up after discharge    Chronic respiratory failure with hypoxia and hypercapnia (HCC)  Assessment & Plan  On 2-3 supplemental obstruction mainly using at night    Chronic pain syndrome  Assessment & Plan  History of chronic back pain, opiate dependence previously on Suboxone, recently stopped    Centrilobular emphysema (HCC)  Assessment & Plan  At baseline  Continue Trelegy, Mucinex, albuterol as needed    Hx of CABG x1  Assessment & Plan  History of CAD s/p CABG x1, thoracic aneurysm repair  Continue Plavix, metoprolol, statin    Hypertension  Assessment & Plan  Stable  Continue amlodipine, metoprolol, losartan    Hypothyroid  Assessment & Plan  On Synthroid  Check TSH    Obesity (BMI 30-39. 9)  Assessment & Plan  With BMI of 32.26 kg per metered square          VTE Prophylaxis: Enoxaparin (Lovenox)  / sequential compression device   Code Status: Level 1 - Full Code    Anticipated Length of Stay:  Patient will be admitted on an Observation basis with an anticipated length of stay of  < 2 midnights. Justification for Hospital Stay: Abdominal pain, nausea and vomiting    Total Time for Visit, including Counseling / Coordination of Care: 45 minutes. Greater than 50% of this total time spent on direct patient counseling and coordination of care. Chief Complaint:     Vomiting (Started on metformin last week and took double dosage for last 4 days (unknowingly) and been throwing up since monday)    History of Present Illness:     Westley Garza is a 71 y.o. female with history of COPD/centrilobular emphysema, chronic respiratory failure on 2-3 L supplemental oxygen at bedtime, CAD s/p CABG x1, thoracic aneurysm repair, hypothyroidism, hypertension, diabetes mellitus type 2, chronic pain and opiate dependence on Suboxone-recently stopped, ex-smoker, GERD who presents with persistent nausea vomiting associated with epigastric pain since Monday. Patient reported that she started having squeezing epigastric pain associated with nausea and bilious vomiting since Monday. She also reported having nonbloody loose bowel movement around the same time. She contacted PCP who recommended symptomatic treatment including Imodium. Patient subsequently realized that she was using metformin 1000 mg twice daily since last Sunday which was double her usual dose. Patient reported chronic GI symptoms and reported taking PPI twice a day. She did report that after starting metformin recently she did have GI intolerance but she did not realize that she was taking double dose of metformin prior to presenting symptoms. Patient denied any fever, chills, worsening of breathing, cough, chest pain, upper back pain, hematemesis, melena, flank pain. Patient also did not report any overt dysuric symptoms. Patient reported that her nausea and vomiting progressively got worse and over the last 2 days she has not been able to eat or take any of her pills    In ER patient was afebrile blood pressure was elevated. Labs noted mild leukocytosis and mildly elevated lipase at 166. UA noted to have evidence of UTI. CT abdomen pelvis with contrast did not reveal any acute abnormality and incidentally noted to have dilated common bile duct, intrahepatic ducts and pancreatic duct without calcified cholelithiasis or mass follow-up MRI was recommended. Patient required multiple doses of antiemetics, pain medications, IV fluids and IV ceftriaxone and was subsequently admitted.   Patient reports that her symptoms have somewhat improved currently and she has been able to take few sips of clear liquids so far without any recurrence of nausea vomiting. She also reports that her epigastric pain appears to be subsiding. Review of Systems:    Review of Systems   Constitutional: Negative for chills and fever. HENT: Negative for congestion. Respiratory: Positive for cough (Chronic) and shortness of breath (Chronic unchanged). Cardiovascular: Negative for chest pain. Gastrointestinal: Positive for abdominal pain, nausea and vomiting. Negative for blood in stool. Genitourinary: Negative for difficulty urinating and dysuria. Musculoskeletal: Positive for back pain (Chronic lower unchanged). Neurological: Negative for dizziness and light-headedness. Psychiatric/Behavioral: Negative for confusion. Past Medical and Surgical History:     Past Medical History:   Diagnosis Date   • Chronic pain 03/06/2023   • COPD (chronic obstructive pulmonary disease) (HCC)    • Disease of thyroid gland    • Hypertension        Past Surgical History:   Procedure Laterality Date   • CARDIAC SURGERY     • HIP SURGERY     • ORTHOPEDIC SURGERY     • THORACIC AORTIC ANEURYSM REPAIR  09/2016    ascending thoracic aortic repair with RCA bypass with SVG x 1       Meds/Allergies:    PTA meds:   Prior to Admission Medications   Prescriptions Last Dose Informant Patient Reported? Taking? ALPRAZolam (XANAX) 0.5 mg tablet  Self Yes No   Sig: Take by mouth 3 (three) times a day as needed for anxiety   albuterol (2.5 mg/3 mL) 0.083 % nebulizer solution  Self Yes No   Sig: INHALE 1 VIAL EVERY 6 HOURS BY NEBULIZER AS NEEDED   albuterol (PROVENTIL HFA,VENTOLIN HFA) 90 mcg/act inhaler   No No   Sig: Inhale 2 puffs every 6 (six) hours as needed for wheezing   amLODIPine (NORVASC) 5 mg tablet  Self No No   Sig: Take 1 tablet (5 mg total) by mouth daily Do not start before March 7, 2023.    atorvastatin (LIPITOR) 40 mg tablet  Self Yes No   Sig: Take 40 mg by mouth daily   buprenorphine-naloxone (SUBOXONE) 8-2 mg per SL tablet  Self Yes No   Sig: Place 1 tablet under the tongue 2 (two) times a day   celecoxib (CeleBREX) 200 mg capsule  Self Yes No   Sig: Take 200 mg by mouth daily   clopidogrel (PLAVIX) 75 mg tablet  Self Yes No   Sig: Take 75 mg by mouth daily   cyclobenzaprine (FLEXERIL) 10 mg tablet  Self Yes No   Sig: Take 10 mg by mouth daily at bedtime   fluticasone-umeclidinium-vilanterol (Trelegy Ellipta) 100-62.5-25 mcg/actuation inhaler   No No   Sig: Inhale 1 puff daily Rinse mouth after use.   gabapentin (NEURONTIN) 300 mg capsule  Self No No   Sig: Take 1 capsule (300 mg total) by mouth 3 (three) times a day   guaiFENesin (MUCINEX) 600 mg 12 hr tablet  Self No No   Sig: Take 1 tablet (600 mg total) by mouth 2 (two) times a day   levothyroxine 100 mcg tablet   Yes No   Sig: Take 100 mcg by mouth daily   levothyroxine 125 mcg tablet  Self Yes No   Sig: Take 125 mcg by mouth   losartan (COZAAR) 25 mg tablet   Yes No   Sig: Take 25 mg by mouth daily   losartan (COZAAR) 50 mg tablet  Self No No   Sig: Take 1 tablet (50 mg total) by mouth daily Do not start before March 7, 2023.    melatonin 3 mg  Self No No   Sig: Take 1 tablet (3 mg total) by mouth daily at bedtime   metFORMIN (GLUCOPHAGE) 500 mg tablet   Yes No   Sig: TAKE 1 TABLET 2 TIMES A DAY ORALLY   metoprolol succinate (TOPROL-XL) 25 mg 24 hr tablet  Self Yes No   Sig: Take 25 mg by mouth daily   nicotine (NICODERM CQ) 14 mg/24hr TD 24 hr patch   Yes No   Sig: Place 1 patch on the skin daily   nicotine (NICODERM CQ) 7 mg/24hr TD 24 hr patch   Yes No   Sig: APPLY ONE PATCH TRANSDERMAL EVERY DAY   omeprazole (PriLOSEC) 40 MG capsule  Self Yes No   Sig: Take 40 mg by mouth daily   pantoprazole (PROTONIX) 40 mg tablet  Self Yes No   Sig: ONE TABLET BY MOUTH EVERY DAY IN THE MORNING   predniSONE 10 mg tablet   No No   Sig: Take 4 tablets x4 days, 3 tablets x4 days, 2 tablets x4 days, 1 tablet x4 days   predniSONE 20 mg tablet   No No   Sig: Take 2 tablets (40 mg total) by mouth daily   promethazine-dextromethorphan (PHENERGAN-DM) 6.25-15 mg/5 mL oral syrup   No No   Sig: Take 5 mL by mouth 4 (four) times a day as needed for cough   traMADol (ULTRAM) 50 mg tablet  Self Yes No   Sig: Take 50 mg by mouth every 6 (six) hours as needed for moderate pain   traZODone (DESYREL) 50 mg tablet  Self Yes No   Sig: Take 50 mg by mouth daily   venlafaxine (EFFEXOR-XR) 150 mg 24 hr capsule  Self Yes No   Sig: Take 225 mg by mouth daily      Facility-Administered Medications: None       Allergies: No Known Allergies  History:     Marital Status: /Civil Union     Substance Use History:   Social History     Substance and Sexual Activity   Alcohol Use Not Currently     Social History     Tobacco Use   Smoking Status Former   • Packs/day: 1.00   • Years: 50.00   • Total pack years: 50.00   • Types: Cigarettes   • Quit date: 3/1/2023   • Years since quittin.3   Smokeless Tobacco Never     Social History     Substance and Sexual Activity   Drug Use Not Currently   • Types: Marijuana, Cocaine       Family History:    History reviewed. No pertinent family history. Physical Exam:     Vitals:   Blood Pressure: 147/69 (23 1530)  Pulse: 80 (23 1530)  Temperature: (!) 96.9 °F (36.1 °C) (23 1013)  Respirations: 20 (23 1530)  Height: 5' 3" (160 cm) (23 1013)  Weight - Scale: 82.6 kg (182 lb) (23 1013)  SpO2: 98 % (23 1530)    Physical Exam  Constitutional:       General: She is not in acute distress. Comments: Comfortably sitting in bed, pleasant,   HENT:      Head: Normocephalic and atraumatic. Mouth/Throat:      Mouth: Mucous membranes are dry. Eyes:      Pupils: Pupils are equal, round, and reactive to light. Cardiovascular:      Rate and Rhythm: Normal rate. Pulmonary:      Effort: Pulmonary effort is normal. No respiratory distress.       Breath sounds: Normal breath sounds. No wheezing or rales. Comments: Diminished bilaterally  Abdominal:      General: Bowel sounds are normal. There is no distension. Palpations: Abdomen is soft. Tenderness: There is abdominal tenderness (Mild epigastric). There is no guarding or rebound. Musculoskeletal:      Cervical back: Neck supple. Right lower leg: No edema. Left lower leg: No edema. Skin:     General: Skin is warm and dry. Findings: No rash. Neurological:      General: No focal deficit present. Mental Status: She is alert and oriented to person, place, and time. Mental status is at baseline. Psychiatric:         Mood and Affect: Mood normal.                 Lab Results: I have personally reviewed pertinent reports. Results from last 7 days   Lab Units 07/14/23  1032   WBC Thousand/uL 11.77*   HEMOGLOBIN g/dL 14.5   HEMATOCRIT % 45.6   PLATELETS Thousands/uL 267   NEUTROS PCT % 78*   LYMPHS PCT % 15   MONOS PCT % 6   EOS PCT % 1     Results from last 7 days   Lab Units 07/14/23  1032   POTASSIUM mmol/L 4.2   CHLORIDE mmol/L 100   CO2 mmol/L 25   BUN mg/dL 11   CREATININE mg/dL 0.80   CALCIUM mg/dL 9.9   ALK PHOS U/L 33*   ALT U/L 17   AST U/L 22           Imaging: I have personally reviewed pertinent reports. CT abdomen pelvis with contrast    Result Date: 7/14/2023  Narrative: CT ABDOMEN AND PELVIS WITH IV CONTRAST INDICATION:   epitastric abd pain, n/v, elevated lipase. COMPARISON:  None. TECHNIQUE:  CT examination of the abdomen and pelvis was performed. Multiplanar 2D reformatted images were created from the source data. This examination, like all CT scans performed in the [facility], was performed utilizing techniques to minimize radiation dose exposure, including the use of iterative reconstruction and automated exposure control.  Radiation dose length product (DLP) for this visit:  690 mGy-cm IV Contrast:  100 mL of iohexol (OMNIPAQUE) Enteric Contrast:  Enteric contrast was not administered. FINDINGS: ABDOMEN LOWER CHEST:  No clinically significant abnormality identified in the visualized lower chest. LIVER/BILIARY TREE: Dilated common bile duct measuring up to 1.7 cm. Mild intrahepatic biliary ductal dilatation. GALLBLADDER: Gallbladder is surgically absent. SPLEEN:  Unremarkable. PANCREAS: Diffuse pancreatic duct dilatation measuring up to 7 mm with possible side branch dilatation. No definite mass on this single phase study. No peripancreatic inflammatory changes to suggest acute pancreatitis. ADRENAL GLANDS:  Unremarkable. KIDNEYS/URETERS: No hydronephrosis or urinary tract calculus. One or more sharply circumscribed subcentimeter renal hypodensities are present, too small to accurately characterize, and statistically most likely benign findings. According to recent literature (Radiology 2019) no further workup of these findings is recommended. STOMACH AND BOWEL: High density material scattered throughout the bowel, likely ingested. Otherwise, within normal limits. APPENDIX:  No findings to suggest appendicitis. ABDOMINOPELVIC CAVITY:  No ascites. No pneumoperitoneum. No lymphadenopathy. VESSELS: Atherosclerotic changes are present. No evidence of aneurysm. PELVIS REPRODUCTIVE ORGANS: Surgical changes of prior hysterectomy. URINARY BLADDER:  Unremarkable. ABDOMINAL WALL/INGUINAL REGIONS:  Unremarkable. OSSEOUS STRUCTURES: No acute fracture or destructive osseous lesion. Spinal degenerative changes are noted. Posterior fusion at L4-L5. Left TFN. Old healed left-sided rib fractures. Impression: 1. No acute abnormality in the abdomen or pelvis. 2. Dilated common bile duct, intrahepatic ducts, and pancreatic duct without calcified choledocholithiasis or mass seen on this single phase study. Nonemergent MRI abdomen with and without contrast is recommended for further evaluation. The study was marked in [facility] for immediate notification. Workstation performed: [id number]         CT abdomen pelvis with contrast   Final Result      1. No acute abnormality in the abdomen or pelvis. 2. Dilated common bile duct, intrahepatic ducts, and pancreatic duct without calcified choledocholithiasis or mass seen on this single phase study. Nonemergent MRI abdomen with and without contrast is recommended for further evaluation. The study was marked in [facility] for immediate notification. Workstation performed: [id number]             EKG, Pathology, and Other Studies Reviewed on Admission:   · EKG- normal sinus rhythm at 81 bpm.  QTc 453    Allscripts/EPIC Records Reviewed: No     ** Please Note: "This note has been constructed using a voice recognition system. Therefore there may be syntax, spelling, and/or grammatical errors.  Please call if you have any questions. "**

## 2023-07-14 NOTE — ED PROVIDER NOTES
History  Chief Complaint   Patient presents with   • Vomiting     Started on metformin last week and took double dosage for last 4 days (unknowingly) and been throwing up since monday     67-year-old female with past history of type 2 diabetes, COPD, hypertension, hypothyroidism, presents to the ED for evaluation of acute onset nausea and vomiting over the past 4 days. Patient was recently started on metformin 500 twice daily for her diabetes. Patient accidentally doubled her dose over the past 4 days. During this time patient also had moderate to severe epigastric abdominal pain, nausea, vomiting. Patient also had some watery diarrhea over the past few days. Symptoms started to get worse subsequently patient came to the ED for further evaluation. Patient denies any blood in her diarrhea. Patient denies any fevers or chills. History provided by:  Patient   used: No    Vomiting  Associated symptoms: abdominal pain and diarrhea    Associated symptoms: no arthralgias, no chills, no cough, no fever and no sore throat        Prior to Admission Medications   Prescriptions Last Dose Informant Patient Reported? Taking? ALPRAZolam (XANAX) 0.5 mg tablet  Self Yes No   Sig: Take by mouth 3 (three) times a day as needed for anxiety   albuterol (2.5 mg/3 mL) 0.083 % nebulizer solution  Self Yes No   Sig: INHALE 1 VIAL EVERY 6 HOURS BY NEBULIZER AS NEEDED   albuterol (PROVENTIL HFA,VENTOLIN HFA) 90 mcg/act inhaler   No No   Sig: Inhale 2 puffs every 6 (six) hours as needed for wheezing   amLODIPine (NORVASC) 5 mg tablet  Self No No   Sig: Take 1 tablet (5 mg total) by mouth daily Do not start before March 7, 2023.    atorvastatin (LIPITOR) 40 mg tablet  Self Yes No   Sig: Take 40 mg by mouth daily   buprenorphine-naloxone (SUBOXONE) 8-2 mg per SL tablet  Self Yes No   Sig: Place 1 tablet under the tongue 2 (two) times a day   celecoxib (CeleBREX) 200 mg capsule  Self Yes No   Sig: Take 200 mg by mouth daily   clopidogrel (PLAVIX) 75 mg tablet  Self Yes No   Sig: Take 75 mg by mouth daily   cyclobenzaprine (FLEXERIL) 10 mg tablet  Self Yes No   Sig: Take 10 mg by mouth daily at bedtime   fluticasone-umeclidinium-vilanterol (Trelegy Ellipta) 100-62.5-25 mcg/actuation inhaler   No No   Sig: Inhale 1 puff daily Rinse mouth after use.   gabapentin (NEURONTIN) 300 mg capsule  Self No No   Sig: Take 1 capsule (300 mg total) by mouth 3 (three) times a day   guaiFENesin (MUCINEX) 600 mg 12 hr tablet  Self No No   Sig: Take 1 tablet (600 mg total) by mouth 2 (two) times a day   levothyroxine 100 mcg tablet   Yes No   Sig: Take 100 mcg by mouth daily   levothyroxine 125 mcg tablet  Self Yes No   Sig: Take 125 mcg by mouth   losartan (COZAAR) 25 mg tablet   Yes No   Sig: Take 25 mg by mouth daily   losartan (COZAAR) 50 mg tablet  Self No No   Sig: Take 1 tablet (50 mg total) by mouth daily Do not start before March 7, 2023.    melatonin 3 mg  Self No No   Sig: Take 1 tablet (3 mg total) by mouth daily at bedtime   metFORMIN (GLUCOPHAGE) 500 mg tablet   Yes No   Sig: TAKE 1 TABLET 2 TIMES A DAY ORALLY   metoprolol succinate (TOPROL-XL) 25 mg 24 hr tablet  Self Yes No   Sig: Take 25 mg by mouth daily   nicotine (NICODERM CQ) 14 mg/24hr TD 24 hr patch   Yes No   Sig: Place 1 patch on the skin daily   nicotine (NICODERM CQ) 7 mg/24hr TD 24 hr patch   Yes No   Sig: APPLY ONE PATCH TRANSDERMAL EVERY DAY   omeprazole (PriLOSEC) 40 MG capsule  Self Yes No   Sig: Take 40 mg by mouth daily   pantoprazole (PROTONIX) 40 mg tablet  Self Yes No   Sig: ONE TABLET BY MOUTH EVERY DAY IN THE MORNING   predniSONE 10 mg tablet   No No   Sig: Take 4 tablets x4 days, 3 tablets x4 days, 2 tablets x4 days, 1 tablet x4 days   predniSONE 20 mg tablet   No No   Sig: Take 2 tablets (40 mg total) by mouth daily   promethazine-dextromethorphan (PHENERGAN-DM) 6.25-15 mg/5 mL oral syrup   No No   Sig: Take 5 mL by mouth 4 (four) times a day as needed for cough   traMADol (ULTRAM) 50 mg tablet  Self Yes No   Sig: Take 50 mg by mouth every 6 (six) hours as needed for moderate pain   traZODone (DESYREL) 50 mg tablet  Self Yes No   Sig: Take 50 mg by mouth daily   venlafaxine (EFFEXOR-XR) 150 mg 24 hr capsule  Self Yes No   Sig: Take 225 mg by mouth daily      Facility-Administered Medications: None       Past Medical History:   Diagnosis Date   • Chronic pain 2023   • COPD (chronic obstructive pulmonary disease) ([address])    • Disease of thyroid gland    • Hypertension        Past Surgical History:   Procedure Laterality Date   • CARDIAC SURGERY     • HIP SURGERY     • ORTHOPEDIC SURGERY     • THORACIC AORTIC ANEURYSM REPAIR  2016    ascending thoracic aortic repair with RCA bypass with SVG x 1       History reviewed. No pertinent family history. I have reviewed and agree with the history as documented. E-Cigarette/Vaping   • E-Cigarette Use Never User      E-Cigarette/Vaping Substances     Social History     Tobacco Use   • Smoking status: Former     Packs/day: 1.00     Years: 50.00     Total pack years: 50.00     Types: Cigarettes     Quit date: 3/1/2023     Years since quittin.3   • Smokeless tobacco: Never   Vaping Use   • Vaping Use: Never used   Substance Use Topics   • Alcohol use: Not Currently   • Drug use: Not Currently     Types: Marijuana, Cocaine       Review of Systems   Constitutional: Negative for chills and fever. HENT: Negative for ear pain and sore throat. Eyes: Negative for pain and visual disturbance. Respiratory: Negative for cough and shortness of breath. Cardiovascular: Negative for chest pain and palpitations. Gastrointestinal: Positive for abdominal pain, diarrhea, nausea and vomiting. Genitourinary: Negative for dysuria and hematuria. Musculoskeletal: Negative for arthralgias and back pain. Skin: Negative for color change and rash. Neurological: Negative for seizures and syncope.    All other systems reviewed and are negative. Physical Exam  Physical Exam  Vitals and nursing note reviewed. Constitutional:       General: She is not in acute distress. Appearance: She is well-developed. HENT:      Head: Normocephalic and atraumatic. Eyes:      Conjunctiva/sclera: Conjunctivae normal.   Cardiovascular:      Rate and Rhythm: Normal rate and regular rhythm. Heart sounds: No murmur heard. Pulmonary:      Effort: Pulmonary effort is normal. No respiratory distress. Breath sounds: Normal breath sounds. Abdominal:      Palpations: Abdomen is soft. Tenderness: There is no abdominal tenderness. Comments: Abdomen is soft, nondistended, with bowel sound present to all 4 quadrants. Tenderness to palpation noted to the epigastric and supraumbilical region. Musculoskeletal:         General: No swelling. Cervical back: Neck supple. Skin:     General: Skin is warm and dry. Capillary Refill: Capillary refill takes less than 2 seconds. Neurological:      Mental Status: She is alert.    Psychiatric:         Mood and Affect: Mood normal.         Vital Signs  ED Triage Vitals [07/14/23 1013]   Temperature Pulse Respirations Blood Pressure SpO2   (!) 96.9 °F (36.1 °C) 86 20 (!) 141/109 98 %      Temp src Heart Rate Source Patient Position - Orthostatic VS BP Location FiO2 (%)   -- -- -- -- --      Pain Score       10 - Worst Possible Pain           Vitals:    07/14/23 1130 07/14/23 1200 07/14/23 1230 07/14/23 1530   BP: 138/81 (!) 177/84 160/86 147/69   Pulse: 74 78 83 80         Visual Acuity      ED Medications  Medications   sodium chloride 0.9 % bolus 1,000 mL (0 mL Intravenous Stopped 7/14/23 1226)   metoclopramide (REGLAN) injection 10 mg (10 mg Intravenous Given 7/14/23 1039)   ondansetron (ZOFRAN) injection 4 mg (4 mg Intravenous Given 7/14/23 1225)   HYDROmorphone (DILAUDID) injection 0.5 mg (0.5 mg Intravenous Given 7/14/23 1235)   iohexol (OMNIPAQUE) 350 MG/ML injection (SINGLE-DOSE) 100 mL (100 mL Intravenous Given 7/14/23 1330)   cefTRIAXone (ROCEPHIN) IVPB (premix in dextrose) 1,000 mg 50 mL (1,000 mg Intravenous New Bag 7/14/23 1550)   HYDROmorphone (DILAUDID) injection 1 mg (1 mg Intravenous Given 7/14/23 1548)   ondansetron (ZOFRAN) injection 4 mg (4 mg Intravenous Given 7/14/23 1546)       Diagnostic Studies  Results Reviewed     Procedure Component Value Units Date/Time    Urine Microscopic [[id number]]  (Abnormal) Collected: 07/14/23 1244    Lab Status: Final result Specimen: Urine, Clean Catch Updated: 07/14/23 1323     RBC, UA None Seen /hpf      WBC, UA 20-30 /hpf      Epithelial Cells Occasional /hpf      Bacteria, UA Moderate /hpf      Hyaline Casts, UA 0-1 /lpf     Urine culture [[id number]] Collected: 07/14/23 1244    Lab Status:  In process Specimen: Urine, Clean Catch Updated: 07/14/23 1323    UA w Reflex to Microscopic w Reflex to Culture [[id number]]  (Abnormal) Collected: 07/14/23 1244    Lab Status: Final result Specimen: Urine, Clean Catch Updated: 07/14/23 1313     Color, UA Yellow     Clarity, UA Clear     Specific Gravity, UA 1.025     pH, UA 5.5     Leukocytes, UA Trace     Nitrite, UA Positive     Protein,  (2+) mg/dl      Glucose, UA Negative mg/dl      Ketones, UA 15 (1+) mg/dl      Urobilinogen, UA 0.2 E.U./dl      Bilirubin, UA Negative     Occult Blood, UA Negative    Lipase [[id number]]  (Abnormal) Collected: 07/14/23 1032    Lab Status: Final result Specimen: Blood from Arm, Left Updated: 07/14/23 1208     Lipase 166 u/L     HS Troponin I 4hr [[id number]]     Lab Status: No result Specimen: Blood     Comprehensive metabolic panel [[id number]]  (Abnormal) Collected: 07/14/23 1032    Lab Status: Final result Specimen: Blood from Arm, Left Updated: 07/14/23 1111     Sodium 136 mmol/L      Potassium 4.2 mmol/L      Chloride 100 mmol/L      CO2 25 mmol/L      ANION GAP 11 mmol/L      BUN 11 mg/dL      Creatinine 0.80 mg/dL      Glucose 116 mg/dL Calcium 9.9 mg/dL      AST 22 U/L      ALT 17 U/L      Alkaline Phosphatase 33 U/L      Total Protein 7.8 g/dL      Albumin 4.5 g/dL      Total Bilirubin 0.49 mg/dL      eGFR 74 ml/min/1.73sq m     Narrative:      [facility] guidelines for Chronic Kidney Disease (CKD):   •  Stage 1 with normal or high GFR (GFR > 90 mL/min/1.73 square meters)  •  Stage 2 Mild CKD (GFR = 60-89 mL/min/1.73 square meters)  •  Stage 3A Moderate CKD (GFR = 45-59 mL/min/1.73 square meters)  •  Stage 3B Moderate CKD (GFR = 30-44 mL/min/1.73 square meters)  •  Stage 4 Severe CKD (GFR = 15-29 mL/min/1.73 square meters)  •  Stage 5 End Stage CKD (GFR <15 mL/min/1.73 square meters)  Note: GFR calculation is accurate only with a steady state creatinine    Magnesium [[id number]]  (Normal) Collected: 07/14/23 1032    Lab Status: Final result Specimen: Blood from Arm, Left Updated: 07/14/23 1111     Magnesium 1.9 mg/dL     HS Troponin 0hr (reflex protocol) [[id number]]  (Normal) Collected: 07/14/23 1032    Lab Status: Final result Specimen: Blood from Arm, Left Updated: 07/14/23 1108     hs TnI 0hr 4 ng/L     HS Troponin I 2hr [[id number]]     Lab Status: No result Specimen: Blood     CBC and differential [[id number]]  (Abnormal) Collected: 07/14/23 1032    Lab Status: Final result Specimen: Blood from Arm, Left Updated: 07/14/23 1043     WBC 11.77 Thousand/uL      RBC 4.88 Million/uL      Hemoglobin 14.5 g/dL      Hematocrit 45.6 %      MCV 93 fL      MCH 29.7 pg      MCHC 31.8 g/dL      RDW 13.5 %      MPV 9.6 fL      Platelets 088 Thousands/uL      nRBC 0 /100 WBCs      Neutrophils Relative 78 %      Immat GRANS % 0 %      Lymphocytes Relative 15 %      Monocytes Relative 6 %      Eosinophils Relative 1 %      Basophils Relative 0 %      Neutrophils Absolute 9.13 Thousands/µL      Immature Grans Absolute 0.04 Thousand/uL      Lymphocytes Absolute 1.73 Thousands/µL      Monocytes Absolute 0.72 Thousand/µL      Eosinophils Absolute 0.12 Thousand/µL      Basophils Absolute 0.03 Thousands/µL                  CT abdomen pelvis with contrast   Final Result by Zahraa Downey MD (07/14 1410)      1. No acute abnormality in the abdomen or pelvis. 2. Dilated common bile duct, intrahepatic ducts, and pancreatic duct without calcified choledocholithiasis or mass seen on this single phase study. Nonemergent MRI abdomen with and without contrast is recommended for further evaluation. The study was marked in [facility] for immediate notification. Workstation performed: [id number]                    Procedures  ECG 12 Lead Documentation Only    Date/Time: 7/14/2023 10:36 AM    Performed by: Devon Rivera DO  Authorized by: Devon Rivera DO    Indications / Diagnosis:  Epigastric abdominal pain  ECG reviewed by me, the ED Provider: yes    Patient location:  ED  Previous ECG:     Previous ECG:  Compared to current    Similarity:  No change    Comparison to cardiac monitor: Yes    Interpretation:     Interpretation: normal    Comments:      Sinus rhythm, rate 81, normal axis, normal intervals, no acute ST/T wave abnormalities noted, otherwise unremarkable EKG, unchanged from previous study. ED Course  ED Course as of 07/14/23 1626   Fri Jul 14, 2023   1226 Patient reexamined at bedside. Patient continues to have 7 out of 10 abdominal pain. We will give some Dilaudid. Patient's lab work showing elevated lipase level. Will obtain CT abdomen/pelvis to rule out acute pancreatitis. 1450 Discussed CT findings with GI team.  Patient requiring multiple doses of antiemetics and pain medication for symptom control. At this time GI team agrees with admission with GI consult. [phone] Patient continues to have nausea and abdominal pain. Will redose antiemetics and pain medication. UA shows concern for UTI. Rocephin will be started.                                SBIRT 22yo+    Flowsheet Row Most Recent Value   Initial Alcohol Screen: US AUDIT-C     1. How often do you have a drink containing alcohol? 0 Filed at: 07/14/2023 1016   2. How many drinks containing alcohol do you have on a typical day you are drinking? 0 Filed at: 07/14/2023 1016   3a. Male UNDER 65: How often do you have five or more drinks on one occasion? 0 Filed at: 07/14/2023 1016   3b. FEMALE Any Age, or MALE 65+: How often do you have 4 or more drinks on one occassion? 0 Filed at: 07/14/2023 1016   Audit-C Score 0 Filed at: 07/14/2023 1016   ARNOLDO: How many times in the past year have you. .. Used an illegal drug or used a prescription medication for non-medical reasons? Never Filed at: 07/14/2023 1016                    Medical Decision Making  Obtain blood work, EKG  Give IV fluids, pain medication, antiemetics and continue to monitor patient for any worsening symptoms. Patient's lipase was elevated in the ED. UA showed concern for UTI. Subsequently empiric IV antibiotic started for UTI. CT scan showed Dilated common bile duct, intrahepatic ducts, and pancreatic duct without calcified choledocholithiasis or mass seen on this single phase study. Further evaluation recommended via nonemergent MRI. Patient required multiple doses of antiemetics and pain medication for symptom control. At this time Case was discussed with GI team who will see patient in consult. Patient will be admitted for her intractable symptoms. Patient agrees with admission plans. Amount and/or Complexity of Data Reviewed  Labs: ordered. Decision-making details documented in ED Course. Radiology: ordered. Decision-making details documented in ED Course. ECG/medicine tests: ordered and independent interpretation performed. Decision-making details documented in ED Course. Risk  Prescription drug management. Decision regarding hospitalization.           Disposition  Final diagnoses:   Intractable abdominal pain   Common bile duct dilatation   Elevated lipase   UTI (urinary tract infection)     Time reflects when diagnosis was documented in both MDM as applicable and the Disposition within this note     Time User Action Codes Description Comment    7/14/2023  3:41 PM Enrico Crum Add [R10.9] Intractable abdominal pain     7/14/2023  3:41 PM Otoniel Espinoza Add [K83.8] Common bile duct dilatation     7/14/2023  3:41 PM Julia Espinozarise Add [R74.8] Elevated lipase     7/14/2023  3:41 PM Otoniel Espinoza Add [N39.0] UTI (urinary tract infection)       ED Disposition     ED Disposition   Admit    Condition   Stable    Date/Time   Fri Jul 14, 2023  4:25 PM    Comment   Case was discussed with Dr. Camilo Ndiaye and the patient's admission status was agreed to be Admission Status: observation status to the service of Dr. Camilo Ndiaye. Follow-up Information    None         Patient's Medications   Discharge Prescriptions    No medications on file       No discharge procedures on file.     PDMP Review       Value Time User    PDMP Reviewed  Yes 4/24/2023  7:31 PM Skip Tomas, [address] [place] [address]          ED Provider  Electronically Signed by           Enrico Crum DO  07/14/23 0580

## 2023-07-14 NOTE — PLAN OF CARE
Problem: DISCHARGE PLANNING  Goal: Discharge to home or other facility with appropriate resources  Description: INTERVENTIONS:  - Identify barriers to discharge w/patient and caregiver  - Arrange for needed discharge resources and transportation as appropriate  - Identify discharge learning needs (meds, wound care, etc.)  - Arrange for interpretive services to assist at discharge as needed  - Refer to Case Management Department for coordinating discharge planning if the patient needs post-hospital services based on physician/advanced practitioner order or complex needs related to functional status, cognitive ability, or social support system  Outcome: Progressing     Problem: Knowledge Deficit  Goal: Patient/family/caregiver demonstrates understanding of disease process, treatment plan, medications, and discharge instructions  Description: Complete learning assessment and assess knowledge base.   Interventions:  - Provide teaching at level of understanding  - Provide teaching via preferred learning methods  Outcome: Progressing     Problem: GASTROINTESTINAL - ADULT  Goal: Minimal or absence of nausea and/or vomiting  Description: INTERVENTIONS:  - Administer IV fluids if ordered to ensure adequate hydration  - Maintain NPO status until nausea and vomiting are resolved  - Nasogastric tube if ordered  - Administer ordered antiemetic medications as needed  - Provide nonpharmacologic comfort measures as appropriate  - Advance diet as tolerated, if ordered  - Consider nutrition services referral to assist patient with adequate nutrition and appropriate food choices  Outcome: Progressing  Goal: Maintains or returns to baseline bowel function  Description: INTERVENTIONS:  - Assess bowel function  - Encourage oral fluids to ensure adequate hydration  - Administer IV fluids if ordered to ensure adequate hydration  - Administer ordered medications as needed  - Encourage mobilization and activity  - Consider nutritional services referral to assist patient with adequate nutrition and appropriate food choices  Outcome: Progressing

## 2023-07-15 VITALS
TEMPERATURE: 98.3 F | HEART RATE: 112 BPM | DIASTOLIC BLOOD PRESSURE: 98 MMHG | BODY MASS INDEX: 32.27 KG/M2 | SYSTOLIC BLOOD PRESSURE: 157 MMHG | RESPIRATION RATE: 19 BRPM | HEIGHT: 63 IN | WEIGHT: 182.1 LBS | OXYGEN SATURATION: 96 %

## 2023-07-15 LAB
ALBUMIN SERPL BCP-MCNC: 4 G/DL (ref 3.5–5)
ALP SERPL-CCNC: 28 U/L (ref 34–104)
ALT SERPL W P-5'-P-CCNC: 12 U/L (ref 7–52)
ANION GAP SERPL CALCULATED.3IONS-SCNC: 5 MMOL/L
AST SERPL W P-5'-P-CCNC: 13 U/L (ref 13–39)
BILIRUB SERPL-MCNC: 0.44 MG/DL (ref 0.2–1)
BUN SERPL-MCNC: 8 MG/DL (ref 5–25)
CALCIUM SERPL-MCNC: 9.2 MG/DL (ref 8.4–10.2)
CHLORIDE SERPL-SCNC: 107 MMOL/L (ref 96–108)
CO2 SERPL-SCNC: 29 MMOL/L (ref 21–32)
CREAT SERPL-MCNC: 0.76 MG/DL (ref 0.6–1.3)
ERYTHROCYTE [DISTWIDTH] IN BLOOD BY AUTOMATED COUNT: 13.4 % (ref 11.6–15.1)
EST. AVERAGE GLUCOSE BLD GHB EST-MCNC: 143 MG/DL
GFR SERPL CREATININE-BSD FRML MDRD: 79 ML/MIN/1.73SQ M
GLUCOSE P FAST SERPL-MCNC: 74 MG/DL (ref 65–99)
GLUCOSE SERPL-MCNC: 113 MG/DL (ref 65–140)
GLUCOSE SERPL-MCNC: 74 MG/DL (ref 65–140)
GLUCOSE SERPL-MCNC: 90 MG/DL (ref 65–140)
HBA1C MFR BLD: 6.6 %
HCT VFR BLD AUTO: 40.2 % (ref 34.8–46.1)
HGB BLD-MCNC: 12.6 G/DL (ref 11.5–15.4)
MAGNESIUM SERPL-MCNC: 1.8 MG/DL (ref 1.9–2.7)
MCH RBC QN AUTO: 29.9 PG (ref 26.8–34.3)
MCHC RBC AUTO-ENTMCNC: 31.3 G/DL (ref 31.4–37.4)
MCV RBC AUTO: 95 FL (ref 82–98)
PLATELET # BLD AUTO: 220 THOUSANDS/UL (ref 149–390)
PMV BLD AUTO: 9.9 FL (ref 8.9–12.7)
POTASSIUM SERPL-SCNC: 3.8 MMOL/L (ref 3.5–5.3)
PROT SERPL-MCNC: 6.8 G/DL (ref 6.4–8.4)
RBC # BLD AUTO: 4.22 MILLION/UL (ref 3.81–5.12)
SODIUM SERPL-SCNC: 141 MMOL/L (ref 135–147)
T4 FREE SERPL-MCNC: 0.9 NG/DL (ref 0.61–1.12)
TSH SERPL DL<=0.05 MIU/L-ACNC: 10.29 UIU/ML (ref 0.45–4.5)
WBC # BLD AUTO: 8.63 THOUSAND/UL (ref 4.31–10.16)

## 2023-07-15 PROCEDURE — 94640 AIRWAY INHALATION TREATMENT: CPT

## 2023-07-15 PROCEDURE — 99239 HOSP IP/OBS DSCHRG MGMT >30: CPT | Performed by: INTERNAL MEDICINE

## 2023-07-15 PROCEDURE — 94760 N-INVAS EAR/PLS OXIMETRY 1: CPT

## 2023-07-15 PROCEDURE — 83036 HEMOGLOBIN GLYCOSYLATED A1C: CPT | Performed by: INTERNAL MEDICINE

## 2023-07-15 PROCEDURE — C9113 INJ PANTOPRAZOLE SODIUM, VIA: HCPCS | Performed by: INTERNAL MEDICINE

## 2023-07-15 PROCEDURE — 84443 ASSAY THYROID STIM HORMONE: CPT | Performed by: INTERNAL MEDICINE

## 2023-07-15 PROCEDURE — 85027 COMPLETE CBC AUTOMATED: CPT | Performed by: INTERNAL MEDICINE

## 2023-07-15 PROCEDURE — 84439 ASSAY OF FREE THYROXINE: CPT | Performed by: INTERNAL MEDICINE

## 2023-07-15 PROCEDURE — 83735 ASSAY OF MAGNESIUM: CPT | Performed by: INTERNAL MEDICINE

## 2023-07-15 PROCEDURE — 94664 DEMO&/EVAL PT USE INHALER: CPT

## 2023-07-15 PROCEDURE — 82948 REAGENT STRIP/BLOOD GLUCOSE: CPT

## 2023-07-15 PROCEDURE — 80053 COMPREHEN METABOLIC PANEL: CPT | Performed by: INTERNAL MEDICINE

## 2023-07-15 RX ORDER — LANOLIN ALCOHOL/MO/W.PET/CERES
400 CREAM (GRAM) TOPICAL 2 TIMES DAILY
Status: DISCONTINUED | OUTPATIENT
Start: 2023-07-15 | End: 2023-07-15 | Stop reason: HOSPADM

## 2023-07-15 RX ORDER — CEPHALEXIN 500 MG/1
500 CAPSULE ORAL EVERY 6 HOURS SCHEDULED
Qty: 8 CAPSULE | Refills: 0 | Status: SHIPPED | OUTPATIENT
Start: 2023-07-15 | End: 2023-07-17

## 2023-07-15 RX ORDER — ONDANSETRON 4 MG/1
4 TABLET, FILM COATED ORAL EVERY 8 HOURS PRN
Qty: 20 TABLET | Refills: 0 | Status: SHIPPED | OUTPATIENT
Start: 2023-07-15

## 2023-07-15 RX ORDER — ALBUTEROL SULFATE 2.5 MG/3ML
2.5 SOLUTION RESPIRATORY (INHALATION) 3 TIMES DAILY PRN
Status: DISCONTINUED | OUTPATIENT
Start: 2023-07-15 | End: 2023-07-15 | Stop reason: HOSPADM

## 2023-07-15 RX ADMIN — LEVOTHYROXINE SODIUM 125 MCG: 125 TABLET ORAL at 05:31

## 2023-07-15 RX ADMIN — PANTOPRAZOLE SODIUM 40 MG: 40 INJECTION, POWDER, FOR SOLUTION INTRAVENOUS at 08:13

## 2023-07-15 RX ADMIN — ONDANSETRON 4 MG: 2 INJECTION INTRAMUSCULAR; INTRAVENOUS at 13:48

## 2023-07-15 RX ADMIN — HYDROMORPHONE HYDROCHLORIDE 0.2 MG: 0.2 INJECTION, SOLUTION INTRAMUSCULAR; INTRAVENOUS; SUBCUTANEOUS at 08:07

## 2023-07-15 RX ADMIN — METOPROLOL SUCCINATE 25 MG: 25 TABLET, EXTENDED RELEASE ORAL at 08:13

## 2023-07-15 RX ADMIN — LOSARTAN POTASSIUM 50 MG: 50 TABLET, FILM COATED ORAL at 08:13

## 2023-07-15 RX ADMIN — VENLAFAXINE HYDROCHLORIDE 225 MG: 150 CAPSULE, EXTENDED RELEASE ORAL at 08:13

## 2023-07-15 RX ADMIN — Medication 400 MG: at 08:13

## 2023-07-15 RX ADMIN — HYDROMORPHONE HYDROCHLORIDE 0.2 MG: 0.2 INJECTION, SOLUTION INTRAMUSCULAR; INTRAVENOUS; SUBCUTANEOUS at 13:48

## 2023-07-15 RX ADMIN — GUAIFENESIN 600 MG: 600 TABLET, EXTENDED RELEASE ORAL at 08:13

## 2023-07-15 RX ADMIN — AMLODIPINE BESYLATE 5 MG: 5 TABLET ORAL at 08:13

## 2023-07-15 RX ADMIN — CLOPIDOGREL BISULFATE 75 MG: 75 TABLET ORAL at 08:13

## 2023-07-15 RX ADMIN — UMECLIDINIUM 1 PUFF: 62.5 AEROSOL, POWDER ORAL at 08:13

## 2023-07-15 RX ADMIN — ENOXAPARIN SODIUM 40 MG: 40 INJECTION SUBCUTANEOUS at 08:13

## 2023-07-15 RX ADMIN — ALBUTEROL SULFATE 2.5 MG: 2.5 SOLUTION RESPIRATORY (INHALATION) at 07:18

## 2023-07-15 RX ADMIN — FLUTICASONE FUROATE AND VILANTEROL TRIFENATATE 1 PUFF: 100; 25 POWDER RESPIRATORY (INHALATION) at 08:14

## 2023-07-15 RX ADMIN — LIDOCAINE 1 PATCH: 50 PATCH CUTANEOUS at 08:14

## 2023-07-15 NOTE — DISCHARGE SUMMARY
[address]  Discharge- Marisel Ocampo 1950, 71 y.o. female MRN: [id number]  Unit/Bed#: [address] [place] [address] Encounter: [id number]  Primary Care Provider: Ahmed Severin   Date and time admitted to hospital: 7/14/2023 10:13 AM    * Intractable abdominal pain  Assessment & Plan  Presented with intractable epigastric abdominal pain associated with nausea and bilious vomiting  Symptoms started after patient patient inadvertently doubled up on her metformin  History of chronic GI symptoms taking PPI twice daily  CT abdomen without any acute abnormality noted to have dilated CBD, intrahepatic duct and pancreatic duct dilatation  LFT unremarkable, lipase minimally elevated at 166. EKG cardiac markers negative  Abdominal exam is benign  Possible gastritis, gastroenteritis, likely metformin intolerance. Unlikely pancreatic or biliary etiology  · Patient received IV fluids in hospitalization  · Abdominal exam benign  · Diet advanced, tolerating low residue, low fiber diet. Patient instructed to continue to follow this on discharge  · GI consulted during stay, indicated that patient can have outpatient follow-up  · Outpatient GI referral made  · Patient will be discharged home today  · Follow-up with outpatient PCP 1 to 2 weeks postdischarge        Abnormal CT scan  Assessment & Plan  CT of the abdomen revealed noted to have dilated common bile duct, intrahepatic ducts and pancreatic duct without calcified cholelithiasis or mass follow-up MRI was recommended.     · GI follow-up after discharge  · Outpatient referral made  · Patient aware  · Patient be discharged home today    UTI (urinary tract infection)  Assessment & Plan  UA in ED reveals suggestive of UTI, patient without any overt urinary symptoms but may be contributing to presentation  Afebrile, mild leukocytosis with denies any flank pain  CT abdomen without any evidence of pyelonephritis  · IV ceftriaxone, transition to Keflex prior to discharge; treat for total 3 days   · Follow-up urine culture; preliminary growing gram-negative rods  · Follow-up outpatient PCP 1 to 2 weeks postdischarge    Centrilobular emphysema (HCC)  Assessment & Plan  At baseline  Continue Trelegy, Mucinex, albuterol as needed  Continue to follow-up outpatient PCP    Cigarette nicotine dependence in remission  Assessment & Plan  · Patient reports that she has not been smoking. · Continue cessation    Chronic respiratory failure with hypoxia and hypercapnia (HCC)  Assessment & Plan  On 2-3 supplemental obstruction mainly using at night  Continue to follow outpatient pulmonary    Type 2 diabetes mellitus, without long-term current use of insulin ([address])  Assessment & Plan  No results found for: "HGBA1C"    No results for input(s): "POCGLU" in the last 72 hours. Blood Sugar Average: Last 72 hrs:    Held metformin given intolerance during hospitalization  Discussed with patient, reports that she inadvertently doubled up on her metformin and feels this most likely caused her to have GI intolerance  Discussed with patient to take only 500 mg twice daily and follow-up with her outpatient PCP postdischarge  Hemoglobin A1c 6.6    Hypertension  Assessment & Plan  Stable  Continue amlodipine, metoprolol, losartan  Follow-up outpatient PCP 1 to 2 weeks postdischarge    Hypothyroid  Assessment & Plan  On Synthroid  TSH noted to be 10.288, however T40.90. Would recommend patient to have follow-up TSH/T4 in 4 weeks with outpatient PCP      Hx of CABG x1  Assessment & Plan  History of CAD s/p CABG x1, thoracic aneurysm repair  Continue Plavix, metoprolol, statin    Chronic pain syndrome  Assessment & Plan  History of chronic back pain, opiate dependence previously on Suboxone, recently stopped    Obesity (BMI 30-39. 9)  Assessment & Plan  With BMI of 32.26 kg per metered square      Medical Problems     Resolved Problems  Date Reviewed: 7/15/2023   None       Discharging Physician / Practitioner: JAZMINE Arias  PCP: Ray William  Admission Date:   Admission Orders (From admission, onward)     Ordered        07/14/23 1625  Place in Observation  Once                      Discharge Date: 07/15/23    Consultations During Hospital Stay:  · GI    Procedures Performed:   · None     Significant Findings / Test Results:   · CT of abdomen pelvis performed showed no acute abnormality in abdomen or pelvis, dilated CBD, intrahepatic ducts and pancreatic duct without calcified choledocholithiasis or mass seen in the single phase study, nonemergent MRI abdomen with and without contrast is recommended for further evaluation as per radiology report    Incidental Findings:   · None      Test Results Pending at Discharge (will require follow up): · None     Outpatient Tests Requested:  · Outpatient MRCP with GI    Complications: None    Reason for Admission: Vomiting    Hospital Course: Jason Smith is a 71 y.o. female patient past medical history COPD, central lobar emphysema, chronic respiratory failure on 2 to 3 L supplemental oxygen at bedtime, CAD status post CABG x1, thoracic aneurysm repair, hypothyroidism, hypertensions, diabetes mellitus type 2, chronic pain and opiate dependence on Suboxone recently stopped, ex-smoker, GERD who originally presented to the hospital on 7/14/2023 due to persistent nausea and vomiting with epigastric pain since Monday prior to admission. Patient reports she started having epigastric pain associated with nausea and bilious vomiting since Monday along with nonbloody loose bowel movements. She contacted her PCP who recommended symptomatic treatment including Imodium which patient did take. She then realized that she had been using metformin 1000 mg twice daily since the Sunday previous which is double her dose.   CT of the abdomen pelvis was performed which showed no acute abnormality in abdomen or pelvis, however dilated CBC, intrahepatic ducts and pancreatic duct without calcified choledocholithiasis or mass seen in the single phase study as per radiology. GI consulted, will do outpatient MRCP as patient clinically has improved and is no longer having abdominal pain. Her diet was advanced to low residue, low fiber which she is tolerating. She is to follow-up with her primary care physician 1 to 2 weeks postdischarge, and outpatient referral to GI also is made. Her TSH was noted to be elevated during hospitalization, would recommend patient to have repeat in 4 weeks with her PCP as her T4 was normal.  This was discussed with the patient at the bedside, she verbalized understanding and is agreeable to the plan. Patient is discharged home today. Please see above list of diagnoses and related plan for additional information. Condition at Discharge: good    Discharge Day Visit / Exam:   Subjective: Patient seen sitting up in bed resting comfortably, tolerating diet. Reports that she is feeling much better than when she first came in, no further vomiting or diarrhea. She has been ambulating in the hallway without any difficulty. Is hopeful that she will be able to go home today. Vitals: Blood Pressure: 157/98 (07/15/23 0812)  Pulse: (!) 112 (07/15/23 0812)  Temperature: 98.3 °F (36.8 °C) (07/15/23 0349)  Temp Source: Oral (07/14/23 1821)  Respirations: 19 (07/15/23 0349)  Height: 5' 3" (160 cm) (07/14/23 1821)  Weight - Scale: 82.6 kg (182 lb 1.6 oz) (07/14/23 1821)  SpO2: 96 % (07/15/23 9205)     Exam:   Physical Exam  Vitals and nursing note reviewed. Constitutional:       Appearance: She is not ill-appearing. HENT:      Head: Normocephalic. Nose: Nose normal.      Mouth/Throat:      Mouth: Mucous membranes are moist.   Eyes:      Extraocular Movements: Extraocular movements intact. Conjunctiva/sclera: Conjunctivae normal.      Pupils: Pupils are equal, round, and reactive to light.    Cardiovascular:      Rate and Rhythm: Normal rate and regular rhythm. Pulses: Normal pulses. Pulmonary:      Effort: Pulmonary effort is normal.      Breath sounds: Normal breath sounds. Abdominal:      General: Bowel sounds are normal.      Palpations: Abdomen is soft. Comments: Abdomen obese    Genitourinary:     Comments: Voiding spontaneously   Musculoskeletal:         General: Normal range of motion. Cervical back: Normal range of motion. Right lower leg: No edema. Left lower leg: No edema. Skin:     General: Skin is warm and dry. Capillary Refill: Capillary refill takes less than 2 seconds. Neurological:      General: No focal deficit present. Mental Status: She is alert and oriented to person, place, and time. Psychiatric:         Mood and Affect: Mood normal.         Behavior: Behavior normal.         Thought Content: Thought content normal.         Judgment: Judgment normal.          Discussion with Family: patient indicated she would update family; did speak with her father at the bedside. Discharge instructions/Information to patient and family:   See after visit summary for information provided to patient and family. Provisions for Follow-Up Care:  See after visit summary for information related to follow-up care and any pertinent home health orders. Disposition:   Home    Planned Readmission: No     Discharge Statement:  I spent greater than 45 minutes discharging the patient. This time was spent on the day of discharge. I had direct contact with the patient on the day of discharge. Greater than 50% of the total time was spent examining patient, answering all patient questions, arranging and discussing plan of care with patient as well as directly providing post-discharge instructions. Additional time then spent on discharge activities. Discharge Medications:  See after visit summary for reconciled discharge medications provided to patient and/or family.       **Please Note: This note may have been constructed using a voice recognition system**

## 2023-07-15 NOTE — PLAN OF CARE
Problem: GASTROINTESTINAL - ADULT  Goal: Minimal or absence of nausea and/or vomiting  Description: INTERVENTIONS:  - Administer IV fluids if ordered to ensure adequate hydration  - Maintain NPO status until nausea and vomiting are resolved  - Nasogastric tube if ordered  - Administer ordered antiemetic medications as needed  - Provide nonpharmacologic comfort measures as appropriate  - Advance diet as tolerated, if ordered  - Consider nutrition services referral to assist patient with adequate nutrition and appropriate food choices  Outcome: Progressing     Problem: GASTROINTESTINAL - ADULT  Goal: Maintains or returns to baseline bowel function  Description: INTERVENTIONS:  - Assess bowel function  - Encourage oral fluids to ensure adequate hydration  - Administer IV fluids if ordered to ensure adequate hydration  - Administer ordered medications as needed  - Encourage mobilization and activity  - Consider nutritional services referral to assist patient with adequate nutrition and appropriate food choices  Outcome: Progressing

## 2023-07-15 NOTE — ASSESSMENT & PLAN NOTE
Presented with intractable epigastric abdominal pain associated with nausea and bilious vomiting  Symptoms started after patient patient inadvertently doubled up on her metformin  History of chronic GI symptoms taking PPI twice daily  CT abdomen without any acute abnormality noted to have dilated CBD, intrahepatic duct and pancreatic duct dilatation  LFT unremarkable, lipase minimally elevated at 166. EKG cardiac markers negative  Abdominal exam is benign  Possible gastritis, gastroenteritis, likely metformin intolerance. Unlikely pancreatic or biliary etiology  · Patient received IV fluids in hospitalization  · Abdominal exam benign  · Diet advanced, tolerating low residue, low fiber diet.   Patient instructed to continue to follow this on discharge  · GI consulted during stay, indicated that patient can have outpatient follow-up  · Outpatient GI referral made  · Patient will be discharged home today  · Follow-up with outpatient PCP 1 to 2 weeks postdischarge

## 2023-07-15 NOTE — ASSESSMENT & PLAN NOTE
On Synthroid  TSH noted to be 10.288, however T40.90.   Would recommend patient to have follow-up TSH/T4 in 4 weeks with outpatient PCP

## 2023-07-15 NOTE — ASSESSMENT & PLAN NOTE
No results found for: "HGBA1C"    No results for input(s): "POCGLU" in the last 72 hours.     Blood Sugar Average: Last 72 hrs:    Held metformin given intolerance during hospitalization  Discussed with patient, reports that she inadvertently doubled up on her metformin and feels this most likely caused her to have GI intolerance  Discussed with patient to take only 500 mg twice daily and follow-up with her outpatient PCP postdischarge  Hemoglobin A1c 6.6

## 2023-07-15 NOTE — ASSESSMENT & PLAN NOTE
UA in ED reveals suggestive of UTI, patient without any overt urinary symptoms but may be contributing to presentation  Afebrile, mild leukocytosis with denies any flank pain  CT abdomen without any evidence of pyelonephritis  · IV ceftriaxone, transition to Keflex prior to discharge; treat for total 3 days   · Follow-up urine culture; preliminary growing gram-negative rods  · Follow-up outpatient PCP 1 to 2 weeks postdischarge

## 2023-07-15 NOTE — ASSESSMENT & PLAN NOTE
CT of the abdomen revealed noted to have dilated common bile duct, intrahepatic ducts and pancreatic duct without calcified cholelithiasis or mass follow-up MRI was recommended.     · GI follow-up after discharge  · Outpatient referral made  · Patient aware  · Patient be discharged home today

## 2023-07-15 NOTE — ASSESSMENT & PLAN NOTE
Stable  Continue amlodipine, metoprolol, losartan  Follow-up outpatient PCP 1 to 2 weeks postdischarge

## 2023-07-16 LAB
BACTERIA UR CULT: ABNORMAL
BACTERIA UR CULT: ABNORMAL

## 2023-08-10 ENCOUNTER — TELEPHONE (OUTPATIENT)
Dept: PULMONOLOGY | Facility: CLINIC | Age: 73
End: 2023-08-10

## 2023-08-10 DIAGNOSIS — J40 BRONCHITIS: Primary | ICD-10-CM

## 2023-08-10 RX ORDER — PREDNISONE 20 MG/1
TABLET ORAL
Qty: 18 TABLET | Refills: 0 | Status: SHIPPED | OUTPATIENT
Start: 2023-08-10

## 2023-08-10 NOTE — TELEPHONE ENCOUNTER
Please let her know that I sent the prednisone  rx, and continue with nebs and other inhalers,   If any any worsening go to ER

## 2023-08-10 NOTE — TELEPHONE ENCOUNTER
Patient called asking if the doctor can send in a script for prednisone. She states that she is having trouble breathing and feels terrible. Please advise.

## 2023-08-10 NOTE — TELEPHONE ENCOUNTER
Spoke with patient she has informed she might have a cold coming on. Feels her lungs are tight as she expressed, Trouble breathing using all her inhaler and neb solutions with no benefit. Also on oxygen but feel it is not enough. This has been going on for the last 3 days. Requesting a prednisone taper but it helped her before. [facility] of choice [facility] on file. Please advise.

## 2023-08-15 DIAGNOSIS — J44.1 CHRONIC OBSTRUCTIVE PULMONARY DISEASE WITH ACUTE EXACERBATION (HCC): ICD-10-CM

## 2023-08-15 DIAGNOSIS — J43.2 CENTRILOBULAR EMPHYSEMA (HCC): Chronic | ICD-10-CM

## 2023-08-15 RX ORDER — ALBUTEROL SULFATE 90 UG/1
2 AEROSOL, METERED RESPIRATORY (INHALATION) EVERY 6 HOURS PRN
Qty: 54 G | Refills: 0 | Status: SHIPPED | OUTPATIENT
Start: 2023-08-15 | End: 2023-08-18 | Stop reason: SDUPTHER

## 2023-08-18 ENCOUNTER — OFFICE VISIT (OUTPATIENT)
Dept: PULMONOLOGY | Facility: MEDICAL CENTER | Age: 73
End: 2023-08-18
Payer: COMMERCIAL

## 2023-08-18 ENCOUNTER — HOSPITAL ENCOUNTER (OUTPATIENT)
Dept: RADIOLOGY | Facility: HOSPITAL | Age: 73
Discharge: HOME | End: 2023-08-18
Payer: COMMERCIAL

## 2023-08-18 VITALS
SYSTOLIC BLOOD PRESSURE: 150 MMHG | WEIGHT: 172 LBS | TEMPERATURE: 97.2 F | OXYGEN SATURATION: 99 % | RESPIRATION RATE: 16 BRPM | BODY MASS INDEX: 30.48 KG/M2 | HEART RATE: 101 BPM | DIASTOLIC BLOOD PRESSURE: 90 MMHG | HEIGHT: 63 IN

## 2023-08-18 DIAGNOSIS — F17.211 CIGARETTE NICOTINE DEPENDENCE IN REMISSION: ICD-10-CM

## 2023-08-18 DIAGNOSIS — J96.12 CHRONIC RESPIRATORY FAILURE WITH HYPOXIA AND HYPERCAPNIA (HCC): ICD-10-CM

## 2023-08-18 DIAGNOSIS — J43.2 CENTRILOBULAR EMPHYSEMA (HCC): Chronic | ICD-10-CM

## 2023-08-18 DIAGNOSIS — J96.11 CHRONIC RESPIRATORY FAILURE WITH HYPOXIA AND HYPERCAPNIA (HCC): ICD-10-CM

## 2023-08-18 DIAGNOSIS — G47.33 OSA (OBSTRUCTIVE SLEEP APNEA): ICD-10-CM

## 2023-08-18 DIAGNOSIS — R07.81 RIB PAIN ON RIGHT SIDE: ICD-10-CM

## 2023-08-18 DIAGNOSIS — J44.1 CHRONIC OBSTRUCTIVE PULMONARY DISEASE WITH ACUTE EXACERBATION (HCC): ICD-10-CM

## 2023-08-18 DIAGNOSIS — R91.8 PULMONARY NODULES: ICD-10-CM

## 2023-08-18 DIAGNOSIS — J44.1 COPD EXACERBATION (HCC): Primary | ICD-10-CM

## 2023-08-18 DIAGNOSIS — E66.9 OBESITY (BMI 30-39.9): ICD-10-CM

## 2023-08-18 PROBLEM — K83.8 COMMON BILE DUCT DILATATION: Status: RESOLVED | Noted: 2023-07-14 | Resolved: 2023-08-18

## 2023-08-18 PROCEDURE — 71101 X-RAY EXAM UNILAT RIBS/CHEST: CPT

## 2023-08-18 PROCEDURE — 99214 OFFICE O/P EST MOD 30 MIN: CPT | Performed by: NURSE PRACTITIONER

## 2023-08-18 RX ORDER — NICOTINE 21 MG/24HR
1 PATCH, TRANSDERMAL 24 HOURS TRANSDERMAL EVERY 24 HOURS
Qty: 28 PATCH | Refills: 0 | Status: SHIPPED | OUTPATIENT
Start: 2023-08-18

## 2023-08-18 RX ORDER — ALBUTEROL SULFATE 90 UG/1
2 AEROSOL, METERED RESPIRATORY (INHALATION) EVERY 6 HOURS PRN
Qty: 54 G | Refills: 0 | Status: SHIPPED | OUTPATIENT
Start: 2023-08-18

## 2023-08-18 RX ORDER — PREDNISONE 10 MG/1
TABLET ORAL
Qty: 30 TABLET | Refills: 0 | Status: SHIPPED | OUTPATIENT
Start: 2023-08-18

## 2023-08-18 RX ORDER — AZITHROMYCIN 250 MG/1
TABLET, FILM COATED ORAL
Qty: 6 TABLET | Refills: 0 | Status: SHIPPED | OUTPATIENT
Start: 2023-08-18 | End: 2023-08-22
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
## 2023-08-18 NOTE — ASSESSMENT & PLAN NOTE
Sleep study is scheduled for December. She will continue with supplemental oxygen at bedtime as prescribed.

## 2023-08-18 NOTE — ASSESSMENT & PLAN NOTE
She has mild tenderness, but no palpable deformity and no ecchymosis. Will order rib x-ray with chest PA view as well.

## 2023-08-18 NOTE — ASSESSMENT & PLAN NOTE
Arya Jasmine unfortunately started smoking again. She is aware of the risks of continued smoking and the need for complete cessation to improve her breathing. She will work toward complete cessation and I did send the nicotine patch to her pharmacy today. Discussed tobacco cessation and limitation of social stressors as able for 5 minutes today.

## 2023-08-18 NOTE — ASSESSMENT & PLAN NOTE
I have written a new prescription for prednisone to taper over the next 10 days given continued wheezing. I have also written for azithromycin for 5 days. If she does not have improvement, she will return call to the office.

## 2023-08-18 NOTE — ASSESSMENT & PLAN NOTE
She will continue with her supplemental oxygen as previously prescribed with goal saturations greater than or equal to 89%. Saturations 99% on room air in the office today.

## 2023-09-08 DIAGNOSIS — J43.2 CENTRILOBULAR EMPHYSEMA (HCC): Chronic | ICD-10-CM

## 2023-09-08 DIAGNOSIS — J44.1 CHRONIC OBSTRUCTIVE PULMONARY DISEASE WITH ACUTE EXACERBATION (HCC): ICD-10-CM

## 2023-09-09 RX ORDER — ALBUTEROL SULFATE 90 UG/1
2 AEROSOL, METERED RESPIRATORY (INHALATION) EVERY 6 HOURS PRN
Qty: 54 G | Refills: 3 | Status: SHIPPED | OUTPATIENT
Start: 2023-09-09

## 2023-09-13 PROBLEM — N39.0 UTI (URINARY TRACT INFECTION): Status: RESOLVED | Noted: 2023-07-14 | Resolved: 2023-09-13

## 2023-09-14 ENCOUNTER — TELEPHONE (OUTPATIENT)
Dept: OTHER | Facility: OTHER | Age: 73
End: 2023-09-14

## 2023-09-15 ENCOUNTER — TELEPHONE (OUTPATIENT)
Dept: PULMONOLOGY | Facility: CLINIC | Age: 73
End: 2023-09-15

## 2023-09-15 NOTE — TELEPHONE ENCOUNTER
Patient left VM asking about her results form her X-Ray of her ribs ordered by Paulina Liang on 8/18/23. Please advise.

## 2023-09-27 ENCOUNTER — OFFICE VISIT (OUTPATIENT)
Dept: PULMONOLOGY | Facility: MEDICAL CENTER | Age: 73
End: 2023-09-27
Payer: COMMERCIAL

## 2023-09-27 VITALS
SYSTOLIC BLOOD PRESSURE: 150 MMHG | HEART RATE: 98 BPM | WEIGHT: 172 LBS | TEMPERATURE: 97.5 F | DIASTOLIC BLOOD PRESSURE: 94 MMHG | HEIGHT: 63 IN | OXYGEN SATURATION: 93 % | BODY MASS INDEX: 30.48 KG/M2 | RESPIRATION RATE: 12 BRPM

## 2023-09-27 DIAGNOSIS — J43.2 CENTRILOBULAR EMPHYSEMA (HCC): Chronic | ICD-10-CM

## 2023-09-27 DIAGNOSIS — J44.1 CHRONIC OBSTRUCTIVE PULMONARY DISEASE WITH ACUTE EXACERBATION (HCC): ICD-10-CM

## 2023-09-27 DIAGNOSIS — R91.1 LUNG NODULE: ICD-10-CM

## 2023-09-27 DIAGNOSIS — G47.34 SLEEP RELATED HYPOXIA: ICD-10-CM

## 2023-09-27 DIAGNOSIS — J44.1 COPD EXACERBATION (HCC): Primary | ICD-10-CM

## 2023-09-27 PROCEDURE — 99214 OFFICE O/P EST MOD 30 MIN: CPT | Performed by: INTERNAL MEDICINE

## 2023-09-27 RX ORDER — VITAMIN B COMPLEX
1 CAPSULE ORAL EVERY MORNING
COMMUNITY

## 2023-09-27 RX ORDER — LANCETS 30 GAUGE
EACH MISCELLANEOUS
COMMUNITY
Start: 2023-07-06

## 2023-09-27 RX ORDER — ALBUTEROL SULFATE 90 UG/1
2 AEROSOL, METERED RESPIRATORY (INHALATION) EVERY 4 HOURS PRN
Qty: 6.7 G | Refills: 7 | Status: SHIPPED | OUTPATIENT
Start: 2023-09-27 | End: 2023-10-27

## 2023-09-27 RX ORDER — BLOOD-GLUCOSE METER
EACH MISCELLANEOUS
COMMUNITY
Start: 2023-07-06

## 2023-09-27 RX ORDER — LACTULOSE 10 G/15ML
SOLUTION ORAL
COMMUNITY
Start: 2023-07-20

## 2023-09-27 RX ORDER — METOCLOPRAMIDE 10 MG/1
TABLET ORAL
COMMUNITY
Start: 2023-08-21

## 2023-09-27 RX ORDER — ISOPRPOPYL ALCOHOL 70 ML/100ML
SWAB TOPICAL
COMMUNITY
Start: 2023-07-06

## 2023-09-27 RX ORDER — PREDNISONE 10 MG/1
TABLET ORAL
Qty: 50 TABLET | Refills: 0 | Status: SHIPPED | OUTPATIENT
Start: 2023-09-27

## 2023-09-27 RX ORDER — ALBUTEROL SULFATE 2.5 MG/3ML
2.5 SOLUTION RESPIRATORY (INHALATION) 4 TIMES DAILY PRN
Qty: 360 ML | Refills: 7 | Status: SHIPPED | OUTPATIENT
Start: 2023-09-27

## 2023-09-27 RX ORDER — BLOOD SUGAR DIAGNOSTIC
STRIP MISCELLANEOUS
COMMUNITY
Start: 2023-07-06

## 2023-09-27 RX ORDER — LEVOTHYROXINE SODIUM 0.1 MG/1
TABLET ORAL
COMMUNITY
Start: 2023-08-21

## 2023-09-27 NOTE — PATIENT INSTRUCTIONS
Start prednisone 10 mg -4 tablets for 4 days then 3 tablets for 4 days then 2 tablet for 4 days then 1 tablet for 4 days.   I gave you 10 extra tablets    Can use nebulizer with 2.5 mg of albuterol up to 4 times a day as needed    Stay on Trelegy 100 mcg 1 puff daily    Call us couple days after your sleep study done on December 12    Schedule CAT scan for June of next year for lung nodule

## 2023-09-27 NOTE — PROGRESS NOTES
Assessment/Plan        Problem List Items Addressed This Visit        Respiratory    Centrilobular emphysema ([address]) (Chronic)     Pulmonary function test done 3/24/2023 showed moderate airflow obstruction with FEV1 of 1.56 L or 71% predicted. She will continue Trelegy 100 micro-ohm 1 puff daily and albuterol inhaler or nebulizer albuterol 2.5 mg 4 times a day as needed. Relevant Medications    albuterol (PROVENTIL HFA,VENTOLIN HFA) 90 mcg/act inhaler    albuterol (2.5 mg/3 mL) 0.083 % nebulizer solution    predniSONE 10 mg tablet    Lung nodule     Lobo Yanez did have CT scan of chest on June 19 which showed stable 4 mm right upper lobe lung nodule compared to CT scan done March 2023. I did order CT of chest without contrast to be done in 1 year for follow-up of this lung nodule. Relevant Medications    albuterol (PROVENTIL HFA,VENTOLIN HFA) 90 mcg/act inhaler    albuterol (2.5 mg/3 mL) 0.083 % nebulizer solution    Other Relevant Orders    CT chest without contrast    COPD exacerbation ([address]) - Primary     Has increased shortness of breath, cough and wheezing. I prescribed prednisone 2 mg x 4 days, 30 mg x 4 days, 20 mg x 4 days, then 10 mg x 4 days         Relevant Medications    albuterol (PROVENTIL HFA,VENTOLIN HFA) 90 mcg/act inhaler    albuterol (2.5 mg/3 mL) 0.083 % nebulizer solution    predniSONE 10 mg tablet    Sleep related hypoxia     Uday Godoy does use 2.5 L of oxygen at bedtime for sleep-related hypoxemia. DME company is [employer]. She will continue with using the oxygen at bedtime. She is scheduled to have a diagnostic sleep study in the sleep lab on December 12. She will contact me after this is completed.         Other Visit Diagnoses     Chronic obstructive pulmonary disease with acute exacerbation (HCC)        Relevant Medications    albuterol (PROVENTIL HFA,VENTOLIN HFA) 90 mcg/act inhaler    albuterol (2.5 mg/3 mL) 0.083 % nebulizer solution    predniSONE 10 mg tablet Follow-up, shortness of breath, wheeze      HPI     Aletha Morel has been having some increased shortness of breath over the past week. She has wheezing and occasional cough. No fever or chills. She is using Trelegy 100 mcg 1 puff daily and has been using her nebulizer albuterol 2.5 mg recently because of shortness of breath. She also has had chronic stomach discomfort since she left the hospital.  She voiced feels nauseous and sometimes gets abdominal pain. She has appointment to see GI on October 4. She had stopped smoking but states in the last 3 to 4 weeks restarted smoking and is smoking about 5 to 6 cigarettes/day. She did have complete PFT done on 3/24/2023 which showed no airflow obstruction with FEV1 of 1.56 L or 71% of predicted. Have some mild air trapping and diffusion capacity was minimally decreased at 79% of predicted. She does have history of thoracic aortic aneurysm repair ascending aorta and RCA bypass with SVG x1. This was done in September 2016. She does have history of smoking 1 pack of cigarettes per day and is now down to smoking 5 to 6 cigarettes/day. She is trying to quit smoking. She is scheduled for her sleep study in December 12, 2023 at the sleep lab. She is using 2.5 L of oxygen at bedtime. Her [employer] company is [employer]. She did have CT of her chest done on June 19 which I reviewed. This showed a stable 4 mm right upper lobe lung nodule with no change compared to CT scan done March 2023. There was some emphysematous changes.     Past Medical History:   Diagnosis Date   • Chronic pain 03/06/2023   • COPD (chronic obstructive pulmonary disease) (HCC)    • Disease of thyroid gland    • Hypertension        Past Surgical History:   Procedure Laterality Date   • CARDIAC SURGERY     • HIP SURGERY     • ORTHOPEDIC SURGERY     • THORACIC AORTIC ANEURYSM REPAIR  09/2016    ascending thoracic aortic repair with RCA bypass with SVG x 1         Current Outpatient Medications: •  albuterol (2.5 mg/3 mL) 0.083 % nebulizer solution, Take 3 mL (2.5 mg total) by nebulization 4 (four) times a day as needed for wheezing or shortness of breath, Disp: 360 mL, Rfl: 7  •  albuterol (PROVENTIL HFA,VENTOLIN HFA) 90 mcg/act inhaler, Inhale 2 puffs every 4 (four) hours as needed for wheezing or shortness of breath, Disp: 6.7 g, Rfl: 7  •  Alcohol Swabs (Ultra-Care Alcohol Prep Pads) 70 % PADS, TEST BLOOD GLUCOSE ONCE A DAY, Disp: , Rfl:   •  ALPRAZolam (XANAX) 0.5 mg tablet, Take by mouth 3 (three) times a day as needed for anxiety, Disp: , Rfl:   •  amLODIPine (NORVASC) 5 mg tablet, Take 1 tablet (5 mg total) by mouth daily Do not start before March 7, 2023., Disp: 30 tablet, Rfl: 1  •  atorvastatin (LIPITOR) 40 mg tablet, Take 40 mg by mouth daily, Disp: , Rfl:   •  b complex vitamins capsule, Take 1 capsule by mouth every morning, Disp: , Rfl:   •  Blood Glucose Monitoring Suppl (ONE TOUCH ULTRA 2) w/Device KIT, TEST BLOOD GLUCOSE ONCE APPLY DAY, Disp: , Rfl:   •  Blood Glucose Monitoring Suppl (ONE TOUCH ULTRA 2) w/Device KIT, TEST BLOOD GLUCOSE ONCE APPLY DAY, Disp: , Rfl:   •  buprenorphine-naloxone (SUBOXONE) 8-2 mg per SL tablet, Place 1 tablet under the tongue 2 (two) times a day, Disp: , Rfl:   •  celecoxib (CeleBREX) 200 mg capsule, Take 200 mg by mouth daily, Disp: , Rfl:   •  clopidogrel (PLAVIX) 75 mg tablet, Take 75 mg by mouth daily, Disp: , Rfl:   •  cyclobenzaprine (FLEXERIL) 10 mg tablet, Take 10 mg by mouth daily at bedtime, Disp: , Rfl:   •  fluticasone-umeclidinium-vilanterol (Trelegy Ellipta) 100-62.5-25 mcg/actuation inhaler, Inhale 1 puff daily Rinse mouth after use., Disp: 60 blister, Rfl: 5  •  gabapentin (NEURONTIN) 300 mg capsule, Take 1 capsule (300 mg total) by mouth 3 (three) times a day, Disp: 90 capsule, Rfl: 1  •  guaiFENesin (MUCINEX) 600 mg 12 hr tablet, Take 1 tablet (600 mg total) by mouth 2 (two) times a day, Disp: 10 tablet, Rfl: 0  •  lactulose (CHRONULAC) 10 g/15 mL solution, 30ML BY MOUTH EVERY 12 HOURS AS NEEDED FOR 10 DAYS, Disp: , Rfl:   •  Lancets (OneTouch Delica Plus [id number]) MISC, TEST BLOOD GLUCOSE ONCE A DAY, Disp: , Rfl:   •  levothyroxine 100 mcg tablet, , Disp: , Rfl:   •  levothyroxine 125 mcg tablet, Take 125 mcg by mouth, Disp: , Rfl:   •  losartan (COZAAR) 25 mg tablet, Take 25 mg by mouth daily, Disp: , Rfl:   •  melatonin 3 mg, Take 1 tablet (3 mg total) by mouth daily at bedtime, Disp: 30 tablet, Rfl: 0  •  metoclopramide (REGLAN) 10 mg tablet, , Disp: , Rfl:   •  metoprolol succinate (TOPROL-XL) 25 mg 24 hr tablet, Take 25 mg by mouth daily, Disp: , Rfl:   •  ondansetron (ZOFRAN) 4 mg tablet, Take 1 tablet (4 mg total) by mouth every 8 (eight) hours as needed for nausea or vomiting, Disp: 20 tablet, Rfl: 0  •  OneTouch Ultra test strip, TEST BLOOD GLUCOSE ONCE A DAY, Disp: , Rfl:   •  pantoprazole (PROTONIX) 40 mg tablet, ONE TABLET BY MOUTH EVERY DAY IN THE MORNING, Disp: , Rfl:   •  predniSONE 10 mg tablet, Take 4 tablets x4 days, 3 tablets x4 days, 2 tablets x4 days, 1 tablet x4 days, Disp: 50 tablet, Rfl: 0  •  traZODone (DESYREL) 50 mg tablet, Take 75 mg by mouth daily at bedtime, Disp: , Rfl:   •  venlafaxine (EFFEXOR-XR) 150 mg 24 hr capsule, Take 225 mg by mouth daily, Disp: , Rfl:   •  metFORMIN (GLUCOPHAGE) 500 mg tablet, TAKE 1 TABLET 2 TIMES A DAY ORALLY (Patient not taking: Reported on 8/18/2023), Disp: , Rfl:   •  nicotine (NICODERM CQ) 21 mg/24 hr TD 24 hr patch, Place 1 patch on the skin over 24 hours every 24 hours (Patient not taking: Reported on 9/27/2023), Disp: 28 patch, Rfl: 0  •  predniSONE 10 mg tablet, Take 40 mg PO daily x 3 days, then 30 mg daily x 3 days, then 20 mg daily x 3 days, then 10 mg daily x 3 days, then stop.  (Patient not taking: Reported on 9/27/2023), Disp: 30 tablet, Rfl: 0  •  predniSONE 20 mg tablet, 2 tablets daily for 5 days, 1 tablet daily for 5 days and half a tablet daily for 5 days (Patient not taking: Reported on 9/27/2023), Disp: 18 tablet, Rfl: 0    No Known Allergies    Social History     Tobacco Use   • Smoking status: Every Day     Packs/day: 1.00     Years: 50.00     Total pack years: 50.00     Types: Cigarettes   • Smokeless tobacco: Never   • Tobacco comments:     Currently smoking 5-6 cigarettes daily   Substance Use Topics   • Alcohol use: Not Currently         History reviewed. No pertinent family history. Review of Systems   Constitutional: Negative for chills, fever and unexpected weight change. HENT: Negative for congestion, rhinorrhea and sore throat. Eyes: Negative for discharge and redness. Respiratory: Positive for cough, shortness of breath and wheezing. Cardiovascular: Negative for chest pain, palpitations and leg swelling. Gastrointestinal: Negative for abdominal distention, abdominal pain and nausea. Endocrine: Negative for polydipsia and polyphagia. Genitourinary: Negative for dysuria. Musculoskeletal: Negative for joint swelling and myalgias. Skin: Negative for rash. Neurological: Negative for light-headedness. Psychiatric/Behavioral: Negative for decreased concentration. Vitals:    09/27/23 1026   BP: 150/94   Pulse: 98   Resp: 12   Temp: 97.5 °F (36.4 °C)   SpO2: 93%     Height: 5' 3" (160 cm)  IBW (Ideal Body Weight): 52.4 kg  Body mass index is 30.47 kg/m². Weight (last 2 days)     Date/Time Weight    09/27/23 1026 78 (172)              Physical Exam  Vitals reviewed. Constitutional:       General: She is not in acute distress. Appearance: Normal appearance. She is well-developed. She is obese. HENT:      Head: Normocephalic. Nose: Nose normal.      Mouth/Throat:      Mouth: Mucous membranes are moist.      Pharynx: Oropharynx is clear. No oropharyngeal exudate. Eyes:      Conjunctiva/sclera: Conjunctivae normal.      Pupils: Pupils are equal, round, and reactive to light.    Cardiovascular:      Rate and Rhythm: Normal rate and regular rhythm. Heart sounds: Normal heart sounds. Pulmonary:      Effort: Pulmonary effort is normal.      Comments: There are some expiratory wheezes bilaterally. No crackles or rhonchi  Abdominal:      General: There is no distension. Palpations: Abdomen is soft. Tenderness: There is no abdominal tenderness. Musculoskeletal:      Cervical back: Neck supple. Comments: No edema, cyanosis or clubbing   Skin:     General: Skin is warm and dry. Neurological:      General: No focal deficit present. Mental Status: She is alert and oriented to person, place, and time. Psychiatric:         Mood and Affect: Mood normal.         Behavior: Behavior normal.         Thought Content:  Thought content normal.

## 2023-09-28 PROBLEM — G47.34 SLEEP RELATED HYPOXIA: Status: ACTIVE | Noted: 2023-09-28

## 2023-09-28 PROBLEM — R91.1 LUNG NODULE: Status: ACTIVE | Noted: 2023-03-02

## 2023-09-29 NOTE — ASSESSMENT & PLAN NOTE
Has increased shortness of breath, cough and wheezing.   I prescribed prednisone 2 mg x 4 days, 30 mg x 4 days, 20 mg x 4 days, then 10 mg x 4 days

## 2023-09-29 NOTE — ASSESSMENT & PLAN NOTE
Adan Moyer did have CT scan of chest on June 19 which showed stable 4 mm right upper lobe lung nodule compared to CT scan done March 2023. I did order CT of chest without contrast to be done in 1 year for follow-up of this lung nodule.

## 2023-09-29 NOTE — ASSESSMENT & PLAN NOTE
Pulmonary function test done 3/24/2023 showed moderate airflow obstruction with FEV1 of 1.56 L or 71% predicted. She will continue Trelegy 100 micro-ohm 1 puff daily and albuterol inhaler or nebulizer albuterol 2.5 mg 4 times a day as needed.

## 2023-09-29 NOTE — ASSESSMENT & PLAN NOTE
Marilyn Abebe does use 2.5 L of oxygen at bedtime for sleep-related hypoxemia. DME company is [employer]. She will continue with using the oxygen at bedtime. She is scheduled to have a diagnostic sleep study in the sleep lab on December 12. She will contact me after this is completed.

## 2023-10-04 ENCOUNTER — OFFICE VISIT (OUTPATIENT)
Dept: GASTROENTEROLOGY | Facility: CLINIC | Age: 73
End: 2023-10-04
Payer: COMMERCIAL

## 2023-10-04 ENCOUNTER — TELEPHONE (OUTPATIENT)
Dept: GASTROENTEROLOGY | Facility: CLINIC | Age: 73
End: 2023-10-04

## 2023-10-04 VITALS
DIASTOLIC BLOOD PRESSURE: 98 MMHG | BODY MASS INDEX: 29.91 KG/M2 | HEIGHT: 63 IN | SYSTOLIC BLOOD PRESSURE: 137 MMHG | HEART RATE: 93 BPM | WEIGHT: 168.8 LBS

## 2023-10-04 DIAGNOSIS — R10.9 INTRACTABLE ABDOMINAL PAIN: Primary | ICD-10-CM

## 2023-10-04 DIAGNOSIS — R13.10 DYSPHAGIA, UNSPECIFIED TYPE: ICD-10-CM

## 2023-10-04 DIAGNOSIS — K21.9 GASTROESOPHAGEAL REFLUX DISEASE, UNSPECIFIED WHETHER ESOPHAGITIS PRESENT: ICD-10-CM

## 2023-10-04 DIAGNOSIS — R63.4 UNINTENTIONAL WEIGHT LOSS: ICD-10-CM

## 2023-10-04 DIAGNOSIS — K83.8 COMMON BILE DUCT DILATATION: ICD-10-CM

## 2023-10-04 DIAGNOSIS — F40.240 CLAUSTROPHOBIA: ICD-10-CM

## 2023-10-04 PROCEDURE — 99214 OFFICE O/P EST MOD 30 MIN: CPT | Performed by: PHYSICIAN ASSISTANT

## 2023-10-04 RX ORDER — ESOMEPRAZOLE MAGNESIUM 40 MG/1
40 CAPSULE, DELAYED RELEASE ORAL
Qty: 90 CAPSULE | Refills: 1 | Status: SHIPPED | OUTPATIENT
Start: 2023-10-04

## 2023-10-04 RX ORDER — LORAZEPAM 0.5 MG/1
1 TABLET ORAL ONCE
Qty: 2 TABLET | Refills: 0 | Status: SHIPPED | OUTPATIENT
Start: 2023-10-04 | End: 2023-10-04

## 2023-10-04 NOTE — ASSESSMENT & PLAN NOTE
Longstanding cluster of GI symptomatology correlating with unintentional weight loss including intermittent abdominal pain, nausea, dysphagia, chronic constipation; patient has not had any endoscopic examinations for many years and underlying GI malignancy needs to be excluded. She also assess for evidence of Rao's, dysplasia, peptic ulcer disease/gastritis with or without H. pylori infection.     Was also noted with biliary ductal dilation on recent CT scan, which may be physiologic postcholecystectomy but also should exclude underlying pancreatic lesion    -Advised patient to schedule the MRI she had ordered for her 3 months ago, she expresses claustrophobia but would be agreeable if she could take an anxiolytic ahead of time, will prescribe a single dose of Ativan to take 1 hour ahead of the study    -We will plan for EGD and colonoscopy, scheduled to be done in the hospital given her cardiopulmonary history    -Procedure was explained in detail to the patient at this time including associated risks and benefits    -I advised patient to obtain clearance from her cardiologist to hold Plavix for 5 to 7 days prior to procedure    -As her current PPI does not appear to be helping much with her symptomatology, will trial different PPI (esomeprazole) for the time being    -May use MiraLAX as needed for constipation    -Consider speech therapy referral if EGD is unremarkable for any causes of dysphagia, may be oropharyngeal given her description of symptoms    -Also advised that she needs to reestablish care for her diabetes as uncontrolled diabetes can exacerbate many GI symptoms and cause others such as gastroparesis and diabetic autonomic neuropathy

## 2023-10-04 NOTE — PATIENT INSTRUCTIONS
Scheduled date of EGD/colonoscopy (as of today):11/27/23  Physician performing EGD/colonoscopy:Siri  Location of EGD/colonoscopy:[facility]  Desired bowel prep reviewed with patient:Miralax/dolculax (2 day)  Instructions reviewed with patient by:Radha ORTIZ  Clearances:   Plavix

## 2023-10-04 NOTE — PROGRESS NOTES
Follow-up Note -  [facility]  Heydi Squires 1950 71 y.o. female         ASSESSMENT & PLAN:    Unintentional weight loss  Longstanding cluster of GI symptomatology correlating with unintentional weight loss including intermittent abdominal pain, nausea, dysphagia, chronic constipation; patient has not had any endoscopic examinations for many years and underlying GI malignancy needs to be excluded. She also assess for evidence of Rao's, dysplasia, peptic ulcer disease/gastritis with or without H. pylori infection.     Was also noted with biliary ductal dilation on recent CT scan, which may be physiologic postcholecystectomy but also should exclude underlying pancreatic lesion    -Advised patient to schedule the MRI she had ordered for her 3 months ago, she expresses claustrophobia but would be agreeable if she could take an anxiolytic ahead of time, will prescribe a single dose of Ativan to take 1 hour ahead of the study    -We will plan for EGD and colonoscopy, scheduled to be done in the hospital given her cardiopulmonary history    -Procedure was explained in detail to the patient at this time including associated risks and benefits    -I advised patient to obtain clearance from her cardiologist to hold Plavix for 5 to 7 days prior to procedure    -As her current PPI does not appear to be helping much with her symptomatology, will trial different PPI (esomeprazole) for the time being    -May use MiraLAX as needed for constipation    -Consider speech therapy referral if EGD is unremarkable for any causes of dysphagia, may be oropharyngeal given her description of symptoms    -Also advised that she needs to reestablish care for her diabetes as uncontrolled diabetes can exacerbate many GI symptoms and cause others such as gastroparesis and diabetic autonomic neuropathy      Reason: Follow-up; abdominal pain, dysphagia, constipation, biliary ductal dilation    HPI: 60-year-old female with history of COPD on 2.5L O2 nightly, tobacco use, hypothyroidism and diabetes, CABG 7 years ago on Plavix who presents for follow-up; our service had seen her during a brief hospitalization 2 months ago when she had presented with nausea vomiting and epigastric pain that started after accidentally mixing up her advance prepared medications and consequently for 2 weeks had been taking twice as much metformin as she was supposed to well omitting Effexor. She does have history of cholecystectomy also but her CT scan showed evidence of significant dilation of CBD at 17 mm and intrahepatic ductal dilation for which MRI was recommended to follow-up, it appears she has not scheduled this yet. She had also reported that she would be due for colonoscopy since her last colonoscopy was about 5 years earlier and she had had polyps removed at that time (she tells me at this time that she is not actually sure she had polyps taken out on her last colonoscopy, though now she tells me her last colonoscopy was about 7 years ago.)  She had also reported longstanding GERD for which she had taken pantoprazole and Prilosec for a long time. She was recommended to follow-up with us and eventually have EGD and colonoscopy concurrently, and again to have the MRI performed to follow-up on the ductal dilation seen on her CT. At this time, the patient tells me that for roughly 6 months she has been having problems where she would wake up and have abdominal pain mostly in her right lower quadrant although it can migrate to the left side of her lower abdomen, along with nausea without vomiting. She says the pain can last for a long time, although she does sometimes notice that it gets better after bowel movements. She says that over the last 2 months she is gone from 185 pounds to 168 pounds.   She says she has not had an EGD in many years, she says that when she tries to swallow it feels like her throat closes up and not issue has been going on for a couple of years, this happens with solids and with liquids. She says she is not capable of swallowing if there is nothing in her mouth. She says that for years she has also generally dealt with constipation and bloating, oftentimes with hard stools. She says she is not taking anything for her diabetes at this point, and she has started smoking again. REVIEW OF SYSTEMS:      CONSTITUTIONAL: Denies any fever, chills, or rigors. Good appetite, and no recent weight loss. HEENT: No earache or tinnitus. Denies hearing loss or visual disturbances. CARDIOVASCULAR: No chest pain or palpitations. RESPIRATORY: Denies any cough, hemoptysis, shortness of breath or dyspnea on exertion. GASTROINTESTINAL: As noted in the History of Present Illness. GENITOURINARY: No problems with urination. Denies any hematuria or dysuria. NEUROLOGIC: No dizziness or vertigo, denies headaches. MUSCULOSKELETAL: Denies any muscle or joint pain. SKIN: Denies skin rashes or itching. ENDOCRINE: Denies excessive thirst. Denies intolerance to heat or cold. PSYCHOSOCIAL: Denies depression or anxiety. Denies any recent memory loss.      Past Medical History:   Diagnosis Date   • Chronic pain 03/06/2023   • COPD (chronic obstructive pulmonary disease) (HCC)    • Disease of thyroid gland    • Hypertension       Past Surgical History:   Procedure Laterality Date   • CARDIAC SURGERY     • COLONOSCOPY     • HIP SURGERY     • ORTHOPEDIC SURGERY     • THORACIC AORTIC ANEURYSM REPAIR  09/2016    ascending thoracic aortic repair with RCA bypass with SVG x 1     Social History     Socioeconomic History   • Marital status: /Civil Union     Spouse name: Not on file   • Number of children: Not on file   • Years of education: Not on file   • Highest education level: Not on file   Occupational History   • Not on file   Tobacco Use   • Smoking status: Every Day     Packs/day: 1.00     Years: 50.00     Total pack years: 50.00 Types: Cigarettes   • Smokeless tobacco: Never   • Tobacco comments:     Currently smoking 5-6 cigarettes daily   Vaping Use   • Vaping Use: Never used   Substance and Sexual Activity   • Alcohol use: Not Currently   • Drug use: Not Currently     Types: Marijuana, Cocaine   • Sexual activity: Not Currently   Other Topics Concern   • Not on file   Social History Narrative   • Not on file     Social Determinants of Health     Financial Resource Strain: Not on file   Food Insecurity: No Food Insecurity (4/25/2023)    Hunger Vital Sign    • Worried About Running Out of Food in the Last Year: Never true    • Ran Out of Food in the Last Year: Never true   Transportation Needs: No Transportation Needs (4/25/2023)    PRAPARE - Transportation    • Lack of Transportation (Medical): No    • Lack of Transportation (Non-Medical): No   Physical Activity: Not on file   Stress: Not on file   Social Connections: Not on file   Intimate Partner Violence: Not on file   Housing Stability: Low Risk  (4/25/2023)    Housing Stability Vital Sign    • Unable to Pay for Housing in the Last Year: No    • Number of Places Lived in the Last Year: 2    • Unstable Housing in the Last Year: No     History reviewed. No pertinent family history. Patient has no known allergies.   Current Outpatient Medications   Medication Sig Dispense Refill   • albuterol (2.5 mg/3 mL) 0.083 % nebulizer solution Take 3 mL (2.5 mg total) by nebulization 4 (four) times a day as needed for wheezing or shortness of breath 360 mL 7   • albuterol (PROVENTIL HFA,VENTOLIN HFA) 90 mcg/act inhaler Inhale 2 puffs every 4 (four) hours as needed for wheezing or shortness of breath 6.7 g 7   • ALPRAZolam (XANAX) 0.5 mg tablet Take by mouth 3 (three) times a day as needed for anxiety     • amLODIPine (NORVASC) 5 mg tablet Take 1 tablet (5 mg total) by mouth daily Do not start before March 7, 2023. 30 tablet 1   • atorvastatin (LIPITOR) 40 mg tablet Take 40 mg by mouth daily • b complex vitamins capsule Take 1 capsule by mouth every morning     • buprenorphine-naloxone (SUBOXONE) 8-2 mg per SL tablet Place 1 tablet under the tongue 2 (two) times a day     • celecoxib (CeleBREX) 200 mg capsule Take 200 mg by mouth daily     • clopidogrel (PLAVIX) 75 mg tablet Take 75 mg by mouth daily     • cyclobenzaprine (FLEXERIL) 10 mg tablet Take 10 mg by mouth daily at bedtime     • esomeprazole (NexIUM) 40 MG capsule Take 1 capsule (40 mg total) by mouth daily before breakfast 90 capsule 1   • fluticasone-umeclidinium-vilanterol (Trelegy Ellipta) 100-62.5-25 mcg/actuation inhaler Inhale 1 puff daily Rinse mouth after use.  60 blister 5   • gabapentin (NEURONTIN) 300 mg capsule Take 1 capsule (300 mg total) by mouth 3 (three) times a day 90 capsule 1   • guaiFENesin (MUCINEX) 600 mg 12 hr tablet Take 1 tablet (600 mg total) by mouth 2 (two) times a day 10 tablet 0   • lactulose (CHRONULAC) 10 g/15 mL solution 30ML BY MOUTH EVERY 12 HOURS AS NEEDED FOR 10 DAYS     • levothyroxine 100 mcg tablet      • levothyroxine 125 mcg tablet Take 125 mcg by mouth     • LORazepam (Ativan) 0.5 mg tablet Take 2 tablets (1 mg total) by mouth 1 (one) time for 1 dose Take 1 hour prior to MRI as needed for claustrophobia 2 tablet 0   • losartan (COZAAR) 25 mg tablet Take 25 mg by mouth daily     • melatonin 3 mg Take 1 tablet (3 mg total) by mouth daily at bedtime 30 tablet 0   • metoclopramide (REGLAN) 10 mg tablet      • metoprolol succinate (TOPROL-XL) 25 mg 24 hr tablet Take 25 mg by mouth daily     • ondansetron (ZOFRAN) 4 mg tablet Take 1 tablet (4 mg total) by mouth every 8 (eight) hours as needed for nausea or vomiting 20 tablet 0   • predniSONE 10 mg tablet Take 4 tablets x4 days, 3 tablets x4 days, 2 tablets x4 days, 1 tablet x4 days 50 tablet 0   • traZODone (DESYREL) 50 mg tablet Take 75 mg by mouth daily at bedtime     • venlafaxine (EFFEXOR-XR) 150 mg 24 hr capsule Take 225 mg by mouth daily     • Alcohol Swabs (Ultra-Care Alcohol Prep Pads) 70 % PADS TEST BLOOD GLUCOSE ONCE A DAY (Patient not taking: Reported on 10/4/2023)     • Blood Glucose Monitoring Suppl (ONE TOUCH ULTRA 2) w/Device KIT TEST BLOOD GLUCOSE ONCE APPLY DAY (Patient not taking: Reported on 10/4/2023)     • Blood Glucose Monitoring Suppl (ONE TOUCH ULTRA 2) w/Device KIT TEST BLOOD GLUCOSE ONCE APPLY DAY (Patient not taking: Reported on 10/4/2023)     • Lancets (OneTouch Delica Plus [id number]) MISC TEST BLOOD GLUCOSE ONCE A DAY (Patient not taking: Reported on 10/4/2023)     • metFORMIN (GLUCOPHAGE) 500 mg tablet TAKE 1 TABLET 2 TIMES A DAY ORALLY (Patient not taking: Reported on 8/18/2023)     • nicotine (NICODERM CQ) 21 mg/24 hr TD 24 hr patch Place 1 patch on the skin over 24 hours every 24 hours (Patient not taking: Reported on 9/27/2023) 28 patch 0   • OneTouch Ultra test strip TEST BLOOD GLUCOSE ONCE A DAY (Patient not taking: Reported on 10/4/2023)     • predniSONE 10 mg tablet Take 40 mg PO daily x 3 days, then 30 mg daily x 3 days, then 20 mg daily x 3 days, then 10 mg daily x 3 days, then stop. (Patient not taking: Reported on 9/27/2023) 30 tablet 0   • predniSONE 20 mg tablet 2 tablets daily for 5 days, 1 tablet daily for 5 days and half a tablet daily for 5 days (Patient not taking: Reported on 9/27/2023) 18 tablet 0     No current facility-administered medications for this visit. Blood pressure 137/98, pulse 93, height 5' 3" (1.6 m), weight 76.6 kg (168 lb 12.8 oz). PHYSICAL EXAM:      General Appearance:   Alert, cooperative, no distress, appears stated age    HEENT:   Normocephalic, atraumatic, anicteric. Neck:  Supple, symmetrical, trachea midline, no adenopathy;    thyroid: no enlargement/tenderness/nodules; no carotid  bruit or JVD    Lungs:   Clear to auscultation bilaterally; no rales, rhonchi or wheezing; respirations unlabored    Heart[de-identified]   S1 and S2 normal; regular rate and rhythm; no murmur, rub, or gallop. Abdomen:   Soft, non-tender, non-distended; normal bowel sounds; no masses, no organomegaly    Extremities: No edema, erythema, wounds, rashes   Rectal:   Deferred                      Lab Results   Component Value Date    WBC 8.63 07/15/2023    HGB 12.6 07/15/2023    HCT 40.2 07/15/2023    MCV 95 07/15/2023     07/15/2023     Lab Results   Component Value Date    CALCIUM 9.2 07/15/2023    K 3.8 07/15/2023    CO2 29 07/15/2023     07/15/2023    BUN 8 07/15/2023    CREATININE 0.76 07/15/2023     Lab Results   Component Value Date    ALT 12 07/15/2023    AST 13 07/15/2023    ALKPHOS 28 (L) 07/15/2023     Lab Results   Component Value Date    INR 0.98 03/01/2023    PROTIME 13.1 03/01/2023       XR ribs right w pa chest min 3 views    Result Date: 8/22/2023  Impression: Acute mildly displaced posterolateral right fourth and fifth rib fractures. No hemothorax or pneumothorax. The study was marked in EPIC for significant notification.  Workstation performed: [id number]

## 2023-10-04 NOTE — TELEPHONE ENCOUNTER
Faxed medication clearance to Dr. Shannon Fortune for procedure EGD/Colonoscopy scheduled for 11/27/23.    Medication Plavix faxed to [phone]

## 2023-10-23 DIAGNOSIS — J43.2 CENTRILOBULAR EMPHYSEMA (HCC): Chronic | ICD-10-CM

## 2023-10-23 RX ORDER — FLUTICASONE FUROATE, UMECLIDINIUM BROMIDE AND VILANTEROL TRIFENATATE 100; 62.5; 25 UG/1; UG/1; UG/1
1 POWDER RESPIRATORY (INHALATION) DAILY
Qty: 60 BLISTER | Refills: 5 | Status: SHIPPED | OUTPATIENT
Start: 2023-10-23 | End: 2024-04-20

## 2023-11-13 ENCOUNTER — ANESTHESIA (OUTPATIENT)
Dept: ANESTHESIOLOGY | Facility: HOSPITAL | Age: 73
End: 2023-11-13

## 2023-11-13 ENCOUNTER — ANESTHESIA EVENT (OUTPATIENT)
Dept: ANESTHESIOLOGY | Facility: HOSPITAL | Age: 73
End: 2023-11-13

## 2023-11-15 ENCOUNTER — TELEPHONE (OUTPATIENT)
Age: 73
End: 2023-11-15

## 2023-11-15 NOTE — TELEPHONE ENCOUNTER
Patients GI provider: LASHAUN Sy    Number to return call: [phone]    Reason for call: Pt returned call in regard to clearance. Pt stated she will be seeing  Cardiologist on 11/20/2023.     Scheduled procedure/appointment date if applicable: 60/97/8368

## 2023-11-21 NOTE — PROGRESS NOTES
Progress Note - Cardiology Office  [facility]    Pool Howard 68 y.o. female MRN: [id number]  : 1950  Encounter: [id number]      ASSESSMENT:   Perioperative cardiac risk assessment for EGD and colonoscopy on 2023    CAD,  S/p CABG, SVG to RCA 2016    Nuclear stress test, 2023 at [facility]  No ischemia, EF 69%    TTE, 2023:  EF 65%, borderline LVH, G1 DD,  Moderate LAE, mild MR, TR and CA, RSVP 30 mmHg  Normal aortic root size    TTE, 2022  Normal LV systolic function, borderline LVH and normal ascending aorta size    S/p ascending aortic aneurysm repair in 2016    COPD  Continued tobacco abuse, now down to 5/day  Followed by pulmonary    Essential hypertension  Patient blood pressure is elevated at 168/100 with heart rate of 98/min. Patient states that her blood pressure is high since she is nervous seeing a new cardiologist    Dyslipidemia  No recent lipid profile  On atorvastatin, 40 mg    Numbness of feet  Has lower back pathology  Managed by other physician    RECOMMENDATIONS:  Patient has intermediate perioperative cardiac risk for EGD and colonoscopy  There is no cardiac contraindication to the above procedures  May hold Plavix for 5 days prior to GI procedures  Lipid profile and LFTs  Low-salt and low-cholesterol diet  Regular cardiovascular exercise as tolerated  Weight loss    CT chest ordered by pulmonary    Patient to bring her blood pressure machine in a week to recheck her blood pressure and heart rate and to be taught how to use her blood pressure machine. Will adjust antihypertensive medications according to the results      Please call [phone] if any questions. HPI :     Pool Howard is a 68y.o. year old female who came for perioperative cardiac risk assessment prior to undergoing EGD and colonoscopy. Patient has a history of one-vessel CABG and repair of ascending aortic aneurysm in 2016.   She has not been following any cardiologist for some time. She is taking Plavix 75 mg on alternate days due to bruising and ecchymosis. I advised her to try low-dose aspirin 81 mg daily instead of Plavix after her EGD and colonoscopy    REVIEW OF SYSTEMS:  Denies any new or acute cardiac symptoms. Denies chest pain, unusual dyspnea, palpitations or syncope  Has a few superficial blotches/ecchymosis on her extremities skin  Complains of numbness of her feet    Historical Information   Past Medical History:   Diagnosis Date    Chronic pain 03/06/2023    COPD (chronic obstructive pulmonary disease) (HCC)     Disease of thyroid gland     Hypertension      Past Surgical History:   Procedure Laterality Date    CARDIAC SURGERY      COLONOSCOPY      HIP SURGERY      ORTHOPEDIC SURGERY      THORACIC AORTIC ANEURYSM REPAIR  09/2016    ascending thoracic aortic repair with RCA bypass with SVG x 1     Social History     Substance and Sexual Activity   Alcohol Use Not Currently     Social History     Substance and Sexual Activity   Drug Use Not Currently    Types: Marijuana, Cocaine     Social History     Tobacco Use   Smoking Status Every Day    Packs/day: 1.00    Years: 50.00    Total pack years: 50.00    Types: Cigarettes   Smokeless Tobacco Never   Tobacco Comments    Currently smoking 5-6 cigarettes daily     Family History: No family history on file.     Meds/Allergies     No Known Allergies    Current Outpatient Medications:     albuterol (2.5 mg/3 mL) 0.083 % nebulizer solution, Take 3 mL (2.5 mg total) by nebulization 4 (four) times a day as needed for wheezing or shortness of breath, Disp: 360 mL, Rfl: 7    ALPRAZolam (XANAX) 0.5 mg tablet, Take by mouth 3 (three) times a day as needed for anxiety, Disp: , Rfl:     amLODIPine (NORVASC) 5 mg tablet, Take 1 tablet (5 mg total) by mouth daily Do not start before March 7, 2023., Disp: 30 tablet, Rfl: 1    atorvastatin (LIPITOR) 40 mg tablet, Take 40 mg by mouth daily, Disp: , Rfl:     b complex vitamins capsule, Take 1 capsule by mouth every morning, Disp: , Rfl:     buprenorphine-naloxone (SUBOXONE) 8-2 mg per SL tablet, Place 1 tablet under the tongue 2 (two) times a day, Disp: , Rfl:     celecoxib (CeleBREX) 200 mg capsule, Take 200 mg by mouth daily, Disp: , Rfl:     clopidogrel (PLAVIX) 75 mg tablet, Take 75 mg by mouth daily, Disp: , Rfl:     cyclobenzaprine (FLEXERIL) 10 mg tablet, Take 10 mg by mouth daily at bedtime, Disp: , Rfl:     esomeprazole (NexIUM) 40 MG capsule, Take 1 capsule (40 mg total) by mouth daily before breakfast, Disp: 90 capsule, Rfl: 1    fluticasone-umeclidinium-vilanterol (Trelegy Ellipta) 100-62.5-25 mcg/actuation inhaler, Inhale 1 puff daily Rinse mouth after use., Disp: 60 blister, Rfl: 5    gabapentin (NEURONTIN) 300 mg capsule, Take 1 capsule (300 mg total) by mouth 3 (three) times a day, Disp: 90 capsule, Rfl: 1    guaiFENesin (MUCINEX) 600 mg 12 hr tablet, Take 1 tablet (600 mg total) by mouth 2 (two) times a day, Disp: 10 tablet, Rfl: 0    lactulose (CHRONULAC) 10 g/15 mL solution, 30ML BY MOUTH EVERY 12 HOURS AS NEEDED FOR 10 DAYS, Disp: , Rfl:     levothyroxine 100 mcg tablet, , Disp: , Rfl:     levothyroxine 125 mcg tablet, Take 125 mcg by mouth, Disp: , Rfl:     losartan (COZAAR) 25 mg tablet, Take 25 mg by mouth daily, Disp: , Rfl:     melatonin 3 mg, Take 1 tablet (3 mg total) by mouth daily at bedtime, Disp: 30 tablet, Rfl: 0    metoclopramide (REGLAN) 10 mg tablet, , Disp: , Rfl:     metoprolol succinate (TOPROL-XL) 25 mg 24 hr tablet, Take 25 mg by mouth daily, Disp: , Rfl:     ondansetron (ZOFRAN) 4 mg tablet, Take 1 tablet (4 mg total) by mouth every 8 (eight) hours as needed for nausea or vomiting, Disp: 20 tablet, Rfl: 0    predniSONE 10 mg tablet, Take 4 tablets x4 days, 3 tablets x4 days, 2 tablets x4 days, 1 tablet x4 days, Disp: 50 tablet, Rfl: 0    traZODone (DESYREL) 50 mg tablet, Take 75 mg by mouth daily at bedtime, Disp: , Rfl:     venlafaxine (EFFEXOR-XR) 150 mg 24 hr capsule, Take 225 mg by mouth daily, Disp: , Rfl:     Alcohol Swabs (Ultra-Care Alcohol Prep Pads) 70 % PADS, TEST BLOOD GLUCOSE ONCE A DAY (Patient not taking: Reported on 10/4/2023), Disp: , Rfl:     Blood Glucose Monitoring Suppl (ONE TOUCH ULTRA 2) w/Device KIT, TEST BLOOD GLUCOSE ONCE APPLY DAY (Patient not taking: Reported on 10/4/2023), Disp: , Rfl:     Blood Glucose Monitoring Suppl (ONE TOUCH ULTRA 2) w/Device KIT, TEST BLOOD GLUCOSE ONCE APPLY DAY (Patient not taking: Reported on 10/4/2023), Disp: , Rfl:     Lancets (OneTouch Delica Plus [id number]) MISC, TEST BLOOD GLUCOSE ONCE A DAY (Patient not taking: Reported on 10/4/2023), Disp: , Rfl:     LORazepam (Ativan) 0.5 mg tablet, Take 2 tablets (1 mg total) by mouth 1 (one) time for 1 dose Take 1 hour prior to MRI as needed for claustrophobia, Disp: 2 tablet, Rfl: 0    metFORMIN (GLUCOPHAGE) 500 mg tablet, TAKE 1 TABLET 2 TIMES A DAY ORALLY (Patient not taking: Reported on 8/18/2023), Disp: , Rfl:     nicotine (NICODERM CQ) 21 mg/24 hr TD 24 hr patch, Place 1 patch on the skin over 24 hours every 24 hours (Patient not taking: Reported on 9/27/2023), Disp: 28 patch, Rfl: 0    OneTouch Ultra test strip, TEST BLOOD GLUCOSE ONCE A DAY (Patient not taking: Reported on 10/4/2023), Disp: , Rfl:     predniSONE 10 mg tablet, Take 40 mg PO daily x 3 days, then 30 mg daily x 3 days, then 20 mg daily x 3 days, then 10 mg daily x 3 days, then stop. (Patient not taking: Reported on 9/27/2023), Disp: 30 tablet, Rfl: 0    predniSONE 20 mg tablet, 2 tablets daily for 5 days, 1 tablet daily for 5 days and half a tablet daily for 5 days (Patient not taking: Reported on 9/27/2023), Disp: 18 tablet, Rfl: 0    Vitals: Blood pressure 168/100, pulse 98, height 5' 3" (1.6 m), weight 78 kg (172 lb), SpO2 97 %. Body mass index is 30.47 kg/m².   Vitals:    11/22/23 0940   Weight: 78 kg (172 lb)     BP Readings from Last 3 Encounters:   11/22/23 168/100 10/04/23 137/98   09/27/23 150/94       Physical Exam:  Physical Exam    Neurologic:  Alert & oriented x 3, no new focal deficits, Not in any acute distress,  Constitutional: Obese  Eyes:  Pupil equal and reacting to light, conjunctiva normal,   HENT:  Atraumatic, oropharynx moist, Neck- normal range of motion, no tenderness,  Neck supple, No JVP, No LNP   Respiratory:  Bilateral air entry, mostly clear to auscultation  Cardiovascular: S1-S2 regular with a I/VI ejection systolic murmur   GI:  Soft, nondistended, normal bowel sounds, nontender, no hepatosplenomegaly appreciated. Musculoskeletal:  no tenderness, no deformities. Skin:  Well hydrated, no rash   Lymphatic:  No lymphadenopathy noted   Extremities:  No edema         Diagnostic Studies Review Cardio:      EKG: Normal sinus rhythm, heart rate 98/min, nonspecific T wave abnormality    Cardiac testing:       Results for orders placed during the hospital encounter of 04/24/23    Echo complete w/ contrast if indicated    Interpretation Summary    Left Ventricle: Left ventricular cavity size is normal. Wall thickness is mildly increased. There is borderline concentric hypertrophy. The left ventricular ejection fraction is 65% by visual estimation. Systolic function is normal. Wall motion is normal. Diastolic function is mildly abnormal, consistent with grade I (abnormal) relaxation. Left atrial filling pressure is elevated. Left Atrium: The atrium is moderately dilated. Aortic Valve: There is aortic valve sclerosis. Mitral Valve: There is mild annular calcification. There is mild regurgitation. Tricuspid Valve: There is mild regurgitation. Pulmonary artery pressure around 30 mmHg. Pulmonic Valve: There is mild regurgitation. Imaging:  Chest X-Ray:   No Chest XR results available for this patient. CT-scan of the chest:     No CTA results available for this patient.   Lab Review   Lab Results   Component Value Date    WBC 8.63 07/15/2023    HGB 12.6 07/15/2023    HCT 40.2 07/15/2023    MCV 95 07/15/2023    RDW 13.4 07/15/2023     07/15/2023     BMP:  Lab Results   Component Value Date    SODIUM 141 07/15/2023    K 3.8 07/15/2023     07/15/2023    CO2 29 07/15/2023    BUN 8 07/15/2023    CREATININE 0.76 07/15/2023    GLUC 74 07/15/2023    GLUF 74 07/15/2023    CALCIUM 9.2 07/15/2023    EGFR 79 07/15/2023    MG 1.8 (L) 07/15/2023     LFT:  Lab Results   Component Value Date    AST 13 07/15/2023    ALT 12 07/15/2023    ALKPHOS 28 (L) 07/15/2023    TP 6.8 07/15/2023    ALB 4.0 07/15/2023      No components found for: "TSH3"  Lab Results   Component Value Date    LZI8CDNINFNS 10.288 (H) 07/15/2023     Lab Results   Component Value Date    HGBA1C 6.6 (H) 07/15/2023     Lipid Profile:   No results found for: "CHOLESTEROL", "HDL", "LDLCALC", "TRIG"  No results found for: "CHOLESTEROL"  No results found for: "CKTOTAL", "CKMB", "CKMBINDEX", "TROPONINI"  No results found for: "NTBNP"   No results found for this or any previous visit (from the past 672 hour(s)). Dr. Donnie Lucas MD, [facility]      "This note has been constructed using a voice recognition system. Therefore there may be syntax, spelling, and/or grammatical errors.  Please call if you have any questions. "

## 2023-11-22 ENCOUNTER — CONSULT (OUTPATIENT)
Dept: CARDIOLOGY CLINIC | Facility: CLINIC | Age: 73
End: 2023-11-22
Payer: COMMERCIAL

## 2023-11-22 VITALS
HEART RATE: 98 BPM | SYSTOLIC BLOOD PRESSURE: 168 MMHG | OXYGEN SATURATION: 97 % | WEIGHT: 172 LBS | BODY MASS INDEX: 30.48 KG/M2 | HEIGHT: 63 IN | DIASTOLIC BLOOD PRESSURE: 100 MMHG

## 2023-11-22 DIAGNOSIS — G47.33 OSA (OBSTRUCTIVE SLEEP APNEA): ICD-10-CM

## 2023-11-22 DIAGNOSIS — I10 HYPERTENSION, UNSPECIFIED TYPE: Chronic | ICD-10-CM

## 2023-11-22 DIAGNOSIS — Z95.1 HX OF CABG: Chronic | ICD-10-CM

## 2023-11-22 DIAGNOSIS — I25.10 CORONARY ARTERY DISEASE, UNSPECIFIED VESSEL OR LESION TYPE, UNSPECIFIED WHETHER ANGINA PRESENT, UNSPECIFIED WHETHER NATIVE OR TRANSPLANTED HEART: Primary | Chronic | ICD-10-CM

## 2023-11-22 DIAGNOSIS — I63.9 CEREBRAL INFARCTION, UNSPECIFIED MECHANISM (HCC): ICD-10-CM

## 2023-11-22 DIAGNOSIS — I20.89 ANGINA AT REST: ICD-10-CM

## 2023-11-22 PROCEDURE — 93000 ELECTROCARDIOGRAM COMPLETE: CPT | Performed by: INTERNAL MEDICINE

## 2023-11-22 PROCEDURE — 99214 OFFICE O/P EST MOD 30 MIN: CPT | Performed by: INTERNAL MEDICINE

## 2023-11-26 RX ORDER — SODIUM CHLORIDE, SODIUM LACTATE, POTASSIUM CHLORIDE, CALCIUM CHLORIDE 600; 310; 30; 20 MG/100ML; MG/100ML; MG/100ML; MG/100ML
75 INJECTION, SOLUTION INTRAVENOUS CONTINUOUS
Status: CANCELLED | OUTPATIENT
Start: 2023-11-26

## 2023-11-27 ENCOUNTER — ANESTHESIA (OUTPATIENT)
Dept: GASTROENTEROLOGY | Facility: AMBULARY SURGERY CENTER | Age: 73
End: 2023-11-27

## 2023-11-27 ENCOUNTER — ANESTHESIA EVENT (OUTPATIENT)
Dept: GASTROENTEROLOGY | Facility: AMBULARY SURGERY CENTER | Age: 73
End: 2023-11-27

## 2023-11-27 ENCOUNTER — HOSPITAL ENCOUNTER (OUTPATIENT)
Dept: GASTROENTEROLOGY | Facility: AMBULARY SURGERY CENTER | Age: 73
Setting detail: OUTPATIENT SURGERY
Discharge: HOME | End: 2023-11-27
Attending: INTERNAL MEDICINE
Payer: COMMERCIAL

## 2023-11-27 VITALS
TEMPERATURE: 97.5 F | DIASTOLIC BLOOD PRESSURE: 79 MMHG | SYSTOLIC BLOOD PRESSURE: 153 MMHG | WEIGHT: 170 LBS | HEART RATE: 75 BPM | OXYGEN SATURATION: 95 % | BODY MASS INDEX: 30.11 KG/M2 | RESPIRATION RATE: 18 BRPM

## 2023-11-27 DIAGNOSIS — K21.9 GASTROESOPHAGEAL REFLUX DISEASE, UNSPECIFIED WHETHER ESOPHAGITIS PRESENT: ICD-10-CM

## 2023-11-27 DIAGNOSIS — R13.10 DYSPHAGIA, UNSPECIFIED TYPE: ICD-10-CM

## 2023-11-27 DIAGNOSIS — R10.9 INTRACTABLE ABDOMINAL PAIN: ICD-10-CM

## 2023-11-27 DIAGNOSIS — R63.4 UNINTENTIONAL WEIGHT LOSS: ICD-10-CM

## 2023-11-27 PROBLEM — E78.5 HYPERLIPIDEMIA: Status: ACTIVE | Noted: 2023-11-27

## 2023-11-27 PROBLEM — Z99.81 OXYGEN DEPENDENT: Status: ACTIVE | Noted: 2023-11-27

## 2023-11-27 PROBLEM — R07.81 RIB PAIN ON RIGHT SIDE: Status: RESOLVED | Noted: 2023-08-18 | Resolved: 2023-11-27

## 2023-11-27 PROBLEM — IMO0001 SMOKING: Status: ACTIVE | Noted: 2023-11-27

## 2023-11-27 PROBLEM — I20.89 ANGINA AT REST: Status: RESOLVED | Noted: 2023-03-05 | Resolved: 2023-11-27

## 2023-11-27 PROBLEM — F17.200 SMOKING: Status: ACTIVE | Noted: 2023-11-27

## 2023-11-27 PROCEDURE — 88112 CYTOPATH CELL ENHANCE TECH: CPT | Performed by: PATHOLOGY

## 2023-11-27 PROCEDURE — 88305 TISSUE EXAM BY PATHOLOGIST: CPT | Performed by: PATHOLOGY

## 2023-11-27 PROCEDURE — 45380 COLONOSCOPY AND BIOPSY: CPT | Performed by: INTERNAL MEDICINE

## 2023-11-27 PROCEDURE — 43239 EGD BIOPSY SINGLE/MULTIPLE: CPT | Performed by: INTERNAL MEDICINE

## 2023-11-27 RX ORDER — PROPOFOL 10 MG/ML
INJECTION, EMULSION INTRAVENOUS AS NEEDED
Status: DISCONTINUED | OUTPATIENT
Start: 2023-11-27 | End: 2023-11-27

## 2023-11-27 RX ORDER — SODIUM CHLORIDE, SODIUM LACTATE, POTASSIUM CHLORIDE, CALCIUM CHLORIDE 600; 310; 30; 20 MG/100ML; MG/100ML; MG/100ML; MG/100ML
75 INJECTION, SOLUTION INTRAVENOUS CONTINUOUS
Status: DISCONTINUED | OUTPATIENT
Start: 2023-11-27 | End: 2023-12-01 | Stop reason: HOSPADM

## 2023-11-27 RX ORDER — ONDANSETRON 2 MG/ML
INJECTION INTRAMUSCULAR; INTRAVENOUS AS NEEDED
Status: DISCONTINUED | OUTPATIENT
Start: 2023-11-27 | End: 2023-11-27

## 2023-11-27 RX ORDER — PROPOFOL 10 MG/ML
INJECTION, EMULSION INTRAVENOUS CONTINUOUS PRN
Status: DISCONTINUED | OUTPATIENT
Start: 2023-11-27 | End: 2023-11-27

## 2023-11-27 RX ORDER — SODIUM CHLORIDE, SODIUM LACTATE, POTASSIUM CHLORIDE, CALCIUM CHLORIDE 600; 310; 30; 20 MG/100ML; MG/100ML; MG/100ML; MG/100ML
INJECTION, SOLUTION INTRAVENOUS CONTINUOUS PRN
Status: DISCONTINUED | OUTPATIENT
Start: 2023-11-27 | End: 2023-11-27

## 2023-11-27 RX ADMIN — PROPOFOL 110 MCG/KG/MIN: 10 INJECTION, EMULSION INTRAVENOUS at 09:02

## 2023-11-27 RX ADMIN — PROPOFOL 40 MG: 10 INJECTION, EMULSION INTRAVENOUS at 09:15

## 2023-11-27 RX ADMIN — SODIUM CHLORIDE, SODIUM LACTATE, POTASSIUM CHLORIDE, AND CALCIUM CHLORIDE: .6; .31; .03; .02 INJECTION, SOLUTION INTRAVENOUS at 08:55

## 2023-11-27 RX ADMIN — PROPOFOL 100 MG: 10 INJECTION, EMULSION INTRAVENOUS at 09:02

## 2023-11-27 RX ADMIN — ONDANSETRON 4 MG: 2 INJECTION INTRAMUSCULAR; INTRAVENOUS at 08:56

## 2023-11-27 NOTE — ANESTHESIA POSTPROCEDURE EVALUATION
Post-Op Assessment Note    CV Status:  Stable  Pain Score: 0    Pain management: adequate       Mental Status:  Arousable and sleepy   Hydration Status:  Stable   PONV Controlled:  None   Airway Patency:  Patent     Post Op Vitals Reviewed: Yes    No anethesia notable event occurred.     Staff: CRNA   Comments: spontaneously breathing, mask to supplemental O2, vss, fully endorsed to recovery w/o AC              BP   109/64   Temp      Pulse  72   Resp   15   SpO2   99

## 2023-11-27 NOTE — H&P
History and Physical -  [facility]  Yolis Hodges 68 y.o. female MRN: [id number]    HPI: Yolis Hodges is a 68y.o. year old female who presents for evaluation of abdominal pain, nausea, vomiting, dysphagia. Review of Systems    Historical Information   Past Medical History:   Diagnosis Date    Chronic pain 03/06/2023    COPD (chronic obstructive pulmonary disease) (HCC)     Disease of thyroid gland     Hypertension      Past Surgical History:   Procedure Laterality Date    CARDIAC SURGERY      COLONOSCOPY      HIP SURGERY      ORTHOPEDIC SURGERY      THORACIC AORTIC ANEURYSM REPAIR  09/2016    ascending thoracic aortic repair with RCA bypass with SVG x 1     Social History   Social History     Substance and Sexual Activity   Alcohol Use Not Currently     Social History     Substance and Sexual Activity   Drug Use Not Currently    Types: Marijuana, Cocaine     Social History     Tobacco Use   Smoking Status Every Day    Packs/day: 1.00    Years: 50.00    Total pack years: 50.00    Types: Cigarettes   Smokeless Tobacco Never   Tobacco Comments    Currently smoking 5-6 cigarettes daily     No family history on file. Meds/Allergies     (Not in a hospital admission)      No Known Allergies    Objective     /74   Pulse 95   Temp 97.5 °F (36.4 °C) (Temporal)   Resp 20   Wt 77.1 kg (170 lb)   LMP  (LMP Unknown)   SpO2 99%   BMI 30.11 kg/m²       PHYSICAL EXAM    Gen: NAD  CV: RRR  CHEST: Clear  ABD: soft, NT/ND  EXT: no edema  Neuro: AAO      ASSESSMENT/PLAN:  This is a 68y.o. year old female here for evaluation of abdominal pain, weight loss, nausea and vomiting. PLAN:   Procedure: EGD and colonoscopy.

## 2023-11-27 NOTE — ANESTHESIA PREPROCEDURE EVALUATION
Procedure:  COLONOSCOPY  EGD    Relevant Problems   CARDIO   (+) Angina at rest (Resolved)   (+) CAD (coronary artery disease)   (+) History of coronary artery bypass graft   (+) Hyperlipidemia   (+) Hypertension   (+) Rib pain on right side (Resolved)      ENDO   (+) Hypothyroid   (+) Type 2 diabetes mellitus, without long-term current use of insulin (HCC)      GI/HEPATIC   (+) Dysphagia   (+) Gastroesophageal reflux disease      MUSCULOSKELETAL  S/p left hip pins      NEURO/PSYCH   (+) Anxiety   (+) Chronic pain syndrome      PULMONARY   (+) COPD exacerbation (HCC)   (+) Centrilobular emphysema (HCC)   (+) Chronic respiratory failure with hypoxia and hypercapnia (HCC)   (+) GELA (obstructive sleep apnea)   (+) Oxygen dependent at night only   (+) Smoking        Physical Exam    Airway    Mallampati score: II  TM Distance: >3 FB  Neck ROM: full     Dental       Cardiovascular  Rhythm: regular, Rate: normal    Pulmonary   Breath sounds clear to auscultation    Other Findings  post-pubertal.      Anesthesia Plan  ASA Score- 3     Anesthesia Type- IV sedation with anesthesia with ASA Monitors. Additional Monitors:     Airway Plan:            Plan Factors-    Chart reviewed. Patient is a current smoker. Patient instructed to abstain from smoking on day of procedure. Patient smoked on day of surgery. Induction- intravenous. Postoperative Plan-     Informed Consent- Anesthetic plan and risks discussed with patient. I personally reviewed this patient with the CRNA. Discussed and agreed on the Anesthesia Plan with the CRNA. Ronnie Hudson

## 2023-11-29 ENCOUNTER — TELEPHONE (OUTPATIENT)
Dept: CARDIOLOGY CLINIC | Facility: CLINIC | Age: 73
End: 2023-11-29

## 2023-11-29 ENCOUNTER — CLINICAL SUPPORT (OUTPATIENT)
Dept: CARDIOLOGY CLINIC | Facility: CLINIC | Age: 73
End: 2023-11-29
Payer: COMMERCIAL

## 2023-11-29 VITALS
HEART RATE: 92 BPM | DIASTOLIC BLOOD PRESSURE: 84 MMHG | OXYGEN SATURATION: 94 % | HEIGHT: 63 IN | SYSTOLIC BLOOD PRESSURE: 152 MMHG | WEIGHT: 171 LBS | BODY MASS INDEX: 30.3 KG/M2

## 2023-11-29 DIAGNOSIS — I10 HYPERTENSION, UNSPECIFIED TYPE: Primary | ICD-10-CM

## 2023-11-29 PROCEDURE — 99211 OFF/OP EST MAY X REQ PHY/QHP: CPT

## 2023-11-29 RX ORDER — METOPROLOL SUCCINATE 50 MG/1
50 TABLET, EXTENDED RELEASE ORAL DAILY
Qty: 90 TABLET | Refills: 1 | Status: SHIPPED | OUTPATIENT
Start: 2023-11-29 | End: 2024-05-27

## 2023-11-29 NOTE — TELEPHONE ENCOUNTER
Pt came in today for HR and BP check. BP today is 152/84 HR 92.   Per your last note, will adjust medications accordingly. We tried to get her machine working, unfortunately I think there is an issue with the machine itself. We looked up the codes and she will need to tall the service center to have them work out the codes for her.

## 2023-11-30 PROCEDURE — 88112 CYTOPATH CELL ENHANCE TECH: CPT | Performed by: PATHOLOGY

## 2023-12-04 DIAGNOSIS — B37.81 CANDIDA ESOPHAGITIS (HCC): Primary | ICD-10-CM

## 2023-12-04 PROCEDURE — 88305 TISSUE EXAM BY PATHOLOGIST: CPT | Performed by: PATHOLOGY

## 2023-12-04 RX ORDER — FLUCONAZOLE 100 MG/1
TABLET ORAL
Qty: 15 TABLET | Refills: 0 | Status: SHIPPED | OUTPATIENT
Start: 2023-12-04 | End: 2023-12-18

## 2023-12-05 ENCOUNTER — TELEPHONE (OUTPATIENT)
Dept: PULMONOLOGY | Facility: CLINIC | Age: 73
End: 2023-12-05

## 2023-12-05 DIAGNOSIS — J44.1 COPD EXACERBATION (HCC): ICD-10-CM

## 2023-12-05 RX ORDER — AZITHROMYCIN 250 MG/1
TABLET, FILM COATED ORAL
Qty: 6 TABLET | Refills: 0 | Status: SHIPPED | OUTPATIENT
Start: 2023-12-05 | End: 2023-12-09

## 2023-12-05 RX ORDER — PREDNISONE 10 MG/1
TABLET ORAL
Qty: 50 TABLET | Refills: 0 | Status: SHIPPED | OUTPATIENT
Start: 2023-12-05

## 2023-12-05 NOTE — TELEPHONE ENCOUNTER
Carly Keen would like a return call regarding     What is the reason for the call/chief complaint? COUGH/CONGESTION    What additional symptoms are present or absent? SOB, yes   Are they on O2? Yes, describe: 2.5 L  Pulse O2 restingN/A When walkingN/A   chest pain/tightness, yes  wheezing, yes  Cough, yes  mucous production,yes. What color is it UNABLE TO BRING UP  fevers/chills, no  weight gain, no  Swelling no  Pain no, does it hurt when breathing or all the time? N/A    When did they start/how long have they been going on? 3 DAYS    Constant or intermittent; if intermittent describe yes? What makes it better or worse? N/A    Have you been exposed to anyone that is sick? No    Have you been tested for COVID recently? no    Check current pulmonary medications INHALER/ NEBS  frequency of use DAILY    Have they had any other treatment or testing for this problem elsewhere? N/A    Recent steroids? No    Recent Antibiotics? No     Last office visit? 9/27/2023    Patient pharmacy?  [facility] - [address]  [address]  [place]  [id number]  Phone: [phone]  Fax: [phone] or 90 day supply

## 2023-12-05 NOTE — PROGRESS NOTES
Kei Han is having some cough and feels that she has some thick mucus and some increased shortness of breath and chest tightness. I told her I would call in a Z-Felix and prednisone taper. Last time she took 40 mg for 4 days with 10 mg taper every fifth day and this seemed to work well for her.   Will send send prescription for prednisone to her pharmacy

## 2023-12-05 NOTE — TELEPHONE ENCOUNTER
Patient lvm asking if the doctor can send in a script for prednisone because she has a lot of mucus that she can't bring up and she is wheezing. Please advise.

## 2023-12-08 RX ORDER — ALBUTEROL SULFATE 90 UG/1
AEROSOL, METERED RESPIRATORY (INHALATION)
COMMUNITY
Start: 2023-11-08 | End: 2023-12-26 | Stop reason: SDUPTHER

## 2023-12-08 RX ORDER — BUPRENORPHINE AND NALOXONE 4; 1 MG/1; MG/1
FILM, SOLUBLE BUCCAL; SUBLINGUAL
COMMUNITY
Start: 2023-12-08

## 2023-12-08 RX ORDER — VENLAFAXINE HYDROCHLORIDE 75 MG/1
75 CAPSULE, EXTENDED RELEASE ORAL DAILY
COMMUNITY
Start: 2023-12-07

## 2023-12-08 RX ORDER — OMEPRAZOLE 40 MG/1
CAPSULE, DELAYED RELEASE ORAL
COMMUNITY
Start: 2023-10-03 | End: 2023-12-21 | Stop reason: ALTCHOICE

## 2023-12-08 RX ORDER — NITROFURANTOIN 25; 75 MG/1; MG/1
CAPSULE ORAL
COMMUNITY
Start: 2023-10-26

## 2023-12-12 ENCOUNTER — HOSPITAL ENCOUNTER (OUTPATIENT)
Dept: SLEEP CENTER | Facility: CLINIC | Age: 73
Discharge: HOME | End: 2023-12-12
Payer: COMMERCIAL

## 2023-12-12 DIAGNOSIS — G47.33 OSA (OBSTRUCTIVE SLEEP APNEA): ICD-10-CM

## 2023-12-12 PROCEDURE — 95810 POLYSOM 6/> YRS 4/> PARAM: CPT | Performed by: INTERNAL MEDICINE

## 2023-12-12 PROCEDURE — 95810 POLYSOM 6/> YRS 4/> PARAM: CPT

## 2023-12-13 ENCOUNTER — NURSE TRIAGE (OUTPATIENT)
Dept: GASTROENTEROLOGY | Facility: CLINIC | Age: 73
End: 2023-12-13

## 2023-12-13 PROBLEM — G47.9 SLEEP DISORDER: Status: ACTIVE | Noted: 2023-12-13

## 2023-12-13 NOTE — TELEPHONE ENCOUNTER
Pt aware all results and recommendations.  Pt will call [facility] for appt when her schedule permits.  Pt states the anti-fungal meds are working very well and has no more stomach pains. She states: Tell Dr. Horner that I say Thank You to her.

## 2023-12-13 NOTE — PROGRESS NOTES
Sleep Study Documentation    Pre-Sleep Study       Sleep testing procedure explained to patient:YES    Patient napped prior to study:NO    Caffeine:Dayshift worker after 12PM.  Caffeine use:YES- coffee  18 ounces or more    Alcohol:Dayshift workers after 5PM: Alcohol use:NO    Typical day for patient:YES       Study Documentation    Sleep Study Indications:     Sleep Study: Diagnostic   Snore: Moderate  Supplemental O2: no    O2 flow rate (L/min) range   O2 flow rate (L/min) final   Minimum SaO2 91%  Baseline SaO2 94%    EKG abnormalities: no     EEG abnormalities: no    Were abnormal behaviors in sleep observed:NO    Is Total Sleep Study Recording Time < 2 hours: N/A    Is Total Sleep Study Recording Time > 2 hours but study is incomplete: N/A    Is Total Sleep Study Recording Time 6 hours or more but sleep was not obtained: NO    Patient classification: retired       Post-Sleep Study    Medication used at bedtime or during sleep study:YES other prescription medications    Patient reports time it took to fall asleep:30 to 60 minutes    Patient reports waking up during study:1 to 2 times. Patient reports returning to sleep in 10 to 30 minutes. Patient reports sleeping 4 to 6 hours without dreaming. Does the Patient feel this is a typical night of sleep:better than usual    Patient rated sleepiness: Somewhat sleepy or tired    PAP treatment:no.

## 2023-12-21 DIAGNOSIS — R13.10 DYSPHAGIA, UNSPECIFIED TYPE: ICD-10-CM

## 2023-12-21 DIAGNOSIS — R10.9 INTRACTABLE ABDOMINAL PAIN: ICD-10-CM

## 2023-12-21 DIAGNOSIS — K21.9 GASTROESOPHAGEAL REFLUX DISEASE, UNSPECIFIED WHETHER ESOPHAGITIS PRESENT: ICD-10-CM

## 2023-12-21 RX ORDER — ESOMEPRAZOLE MAGNESIUM 40 MG/1
40 CAPSULE, DELAYED RELEASE ORAL
Qty: 180 CAPSULE | Refills: 1 | Status: SHIPPED | OUTPATIENT
Start: 2023-12-21

## 2023-12-21 NOTE — TELEPHONE ENCOUNTER
"PMH: Intractable abdominal pain, GERD, Dysphagia  Last OV: 10/4/23  Next OV: 2/12/24  EGD/Colonoscopy: 11/27/23    Pt called to report her symptoms have returned and questioning Rx for Fluconazole. States she did well while on medication and wondering if Candida not eradicated. Reports pain lower abdomen, \"more on R side\". Started 3 days ago. Constant , aching, rates pain an 8/10. Bouts of nausea with pain. Has not tried antiemetic. Denies vomiting, diarrhea, constipation, urine problems.    Per Dr. Horner's results encounter. pt to take Protonix BID. States GI provider switched her to Nexium at last visit as Protonix was ineffective. Pt is taking Esomeprazole 40 mg daily. Pt willing to try BID.     Prefers not to got to UC/ED at this time. However aware to seek evaluation for any worsening.    Ok to leave detailed message at home number.  Reason for Disposition   Abdominal pain is a chronic symptom (recurrent or ongoing AND lasting > 4 weeks)    Answer Assessment - Initial Assessment Questions  1. LOCATION: \"Where does it hurt?\"       Lower abdomen.  2. RADIATION: \"Does the pain shoot anywhere else?\" (e.g., chest, back)      No.  3. ONSET: \"When did the pain begin?\" (e.g., minutes, hours or days ago)       3 days ago.  5. PATTERN \"Does the pain come and go, or is it constant?\"      Constant, aching.  6. SEVERITY: \"How bad is the pain?\"  (e.g., Scale 1-10; mild, moderate, or severe)    - SEVERE (8-10): excruciating pain, doubled over, unable to do any normal activities       8/10 however not interested in UC/ED evaluation.  7. RECURRENT SYMPTOM: \"Have you ever had this type of stomach pain before?\" If Yes, ask: \"When was the last time?\" and \"What happened that time?\"       Yes.  8. CAUSE: \"What do you think is causing the stomach pain?\"      Unsure.  10. OTHER SYMPTOMS: \"Has there been any vomiting, diarrhea, constipation, or urine problems?\"       Nausea with pain. Has not tried antiemetic.    Protocols used: " Abdominal Pain - Female-ADULT-[place]

## 2023-12-26 ENCOUNTER — HOSPITAL ENCOUNTER (OUTPATIENT)
Dept: RADIOLOGY | Facility: HOSPITAL | Age: 73
Discharge: HOME | End: 2023-12-26
Payer: COMMERCIAL

## 2023-12-26 ENCOUNTER — TELEPHONE (OUTPATIENT)
Dept: PULMONOLOGY | Facility: CLINIC | Age: 73
End: 2023-12-26

## 2023-12-26 ENCOUNTER — OFFICE VISIT (OUTPATIENT)
Dept: PULMONOLOGY | Facility: MEDICAL CENTER | Age: 73
End: 2023-12-26
Payer: COMMERCIAL

## 2023-12-26 VITALS
DIASTOLIC BLOOD PRESSURE: 70 MMHG | HEART RATE: 69 BPM | HEIGHT: 63 IN | SYSTOLIC BLOOD PRESSURE: 110 MMHG | BODY MASS INDEX: 30.3 KG/M2 | TEMPERATURE: 97.4 F | WEIGHT: 171 LBS | RESPIRATION RATE: 16 BRPM | OXYGEN SATURATION: 97 %

## 2023-12-26 DIAGNOSIS — J43.2 CENTRILOBULAR EMPHYSEMA (HCC): Chronic | ICD-10-CM

## 2023-12-26 DIAGNOSIS — J44.1 COPD EXACERBATION (HCC): Primary | ICD-10-CM

## 2023-12-26 DIAGNOSIS — J44.1 COPD EXACERBATION (HCC): ICD-10-CM

## 2023-12-26 PROCEDURE — 71046 X-RAY EXAM CHEST 2 VIEWS: CPT

## 2023-12-26 PROCEDURE — 99214 OFFICE O/P EST MOD 30 MIN: CPT | Performed by: STUDENT IN AN ORGANIZED HEALTH CARE EDUCATION/TRAINING PROGRAM

## 2023-12-26 RX ORDER — FLUTICASONE FUROATE, UMECLIDINIUM BROMIDE AND VILANTEROL TRIFENATATE 100; 62.5; 25 UG/1; UG/1; UG/1
1 POWDER RESPIRATORY (INHALATION) DAILY
Qty: 180 BLISTER | Refills: 3 | Status: SHIPPED | OUTPATIENT
Start: 2023-12-26 | End: 2024-12-20

## 2023-12-26 RX ORDER — DOXYCYCLINE 100 MG/1
100 TABLET ORAL 2 TIMES DAILY
Qty: 10 TABLET | Refills: 0 | Status: SHIPPED | OUTPATIENT
Start: 2023-12-26 | End: 2023-12-31

## 2023-12-26 RX ORDER — PREDNISONE 10 MG/1
TABLET ORAL
Qty: 50 TABLET | Refills: 0 | Status: SHIPPED | OUTPATIENT
Start: 2023-12-26

## 2023-12-26 RX ORDER — FLUTICASONE FUROATE, UMECLIDINIUM BROMIDE AND VILANTEROL TRIFENATATE 100; 62.5; 25 UG/1; UG/1; UG/1
1 POWDER RESPIRATORY (INHALATION) DAILY
Qty: 60 BLISTER | Refills: 5 | Status: SHIPPED | OUTPATIENT
Start: 2023-12-26 | End: 2023-12-26 | Stop reason: SDUPTHER

## 2023-12-26 RX ORDER — ALBUTEROL SULFATE 90 UG/1
1 AEROSOL, METERED RESPIRATORY (INHALATION) EVERY 4 HOURS PRN
Qty: 54 G | Refills: 3 | Status: SHIPPED | OUTPATIENT
Start: 2023-12-26

## 2023-12-26 RX ORDER — PREDNISONE 10 MG/1
10 TABLET ORAL DAILY
COMMUNITY

## 2023-12-26 NOTE — PROGRESS NOTES
Consultation - Pulmonary Medicine   Montserrat Sumner 73 y.o. female MRN: [id number]      Reason for Consult: COPD exacerbation     Montserrat Sumner is a 73 y.o. female with a PMH of HTN, CAD, GELA, COPD who presents due to worsening cough, shortness of breath and wheezing.    COPD exacerbation - Symptoms improved with steroids - plan to give steroid burst with antibiotic  - Prednisone taper  - Doxycycline 5 days  - Continue Trelegy - Likely will need to transition inhaler due to insurance   - CXR - PA/Lat      Carlos Joseph MD  [facility]    _____________________________________________________________________    HPI:    Montserrat Sumner is a 73 y.o. female with a PMH of HTN, CAD, GELA, COPD who presents due to worsening cough, shortness of breath and wheezing    Started feeling illl 11/23  Took Leftover steroids/Abx - improved, Breathing improved. Less wheezing and cough  Denies new fevers, N/V/D  Productive - yellow sputum           PFT results:  The most recent pulmonary function tests were reviewed.  3/10/23  FEV1/FVC Ratio: 67 %  Forced Vital Capacity: 2.33 L    82 % predicted  FEV1: 1.56 L     71 % predicted     After administration of bronchodilator   FEV1/FVC Ratio: 66%  FVC: 2.28 L, 80% predicted, -2% change  FEV1: 1.50 L, 69% predicted, -3% change     Lung volumes by body plethysmography:   Total Lung Capacity 110 % predicted   Residual volume 133 % predicted     DLCO corrected for patients hemoglobin level: 79 %    Imaging:  I personally reviewed the images on the PAC system pertinent to today's visit  CXR 12/26  - No infiltrates        Review of Systems:  Aside from what is mentioned in the HPI, the review of systems otherwise negative.    Immunization History   Administered Date(s) Administered    COVID-19 MODERNA VACC 0.5 ML IM 02/24/2021, 03/24/2021, 08/20/2021    INFLUENZA 12/22/2021    Pneumococcal Conjugate Vaccine 20-valent (Pcv20), Polysace 03/02/2023         Current Medications:    Current Outpatient Medications:     albuterol (2.5 mg/3 mL) 0.083 % nebulizer solution, Take 3 mL (2.5 mg total) by nebulization 4 (four) times a day as needed for wheezing or shortness of breath, Disp: 360 mL, Rfl: 7    albuterol (PROVENTIL HFA,VENTOLIN HFA) 90 mcg/act inhaler, , Disp: , Rfl:     Alcohol Swabs (Ultra-Care Alcohol Prep Pads) 70 % PADS, , Disp: , Rfl:     ALPRAZolam (XANAX) 0.5 mg tablet, Take by mouth 3 (three) times a day as needed for anxiety, Disp: , Rfl:     amLODIPine (NORVASC) 5 mg tablet, Take 1 tablet (5 mg total) by mouth daily Do not start before March 7, 2023., Disp: 30 tablet, Rfl: 1    aspirin (ECOTRIN LOW STRENGTH) 81 mg EC tablet, Take 81 mg by mouth daily, Disp: , Rfl:     atorvastatin (LIPITOR) 40 mg tablet, Take 40 mg by mouth daily, Disp: , Rfl:     b complex vitamins capsule, Take 1 capsule by mouth every morning, Disp: , Rfl:     Blood Glucose Monitoring Suppl (ONE TOUCH ULTRA 2) w/Device KIT, , Disp: , Rfl:     Blood Glucose Monitoring Suppl (ONE TOUCH ULTRA 2) w/Device KIT, , Disp: , Rfl:     buprenorphine-naloxone (Suboxone) 4-1 MG, , Disp: , Rfl:     celecoxib (CeleBREX) 200 mg capsule, Take 200 mg by mouth daily, Disp: , Rfl:     esomeprazole (NexIUM) 40 MG capsule, Take 1 capsule (40 mg total) by mouth 2 (two) times a day before meals, Disp: 180 capsule, Rfl: 1    fluticasone-umeclidinium-vilanterol (Trelegy Ellipta) 100-62.5-25 mcg/actuation inhaler, Inhale 1 puff daily Rinse mouth after use., Disp: 60 blister, Rfl: 5    guaiFENesin (MUCINEX) 600 mg 12 hr tablet, Take 1 tablet (600 mg total) by mouth 2 (two) times a day, Disp: 10 tablet, Rfl: 0    Lancets (OneTouch Delica Plus [id number]) MISC, , Disp: , Rfl:     levothyroxine 125 mcg tablet, Take 125 mcg by mouth, Disp: , Rfl:     LORazepam (Ativan) 0.5 mg tablet, Take 2 tablets (1 mg total) by mouth 1 (one) time for 1 dose Take 1 hour prior to MRI as needed for claustrophobia, Disp: 2 tablet,  Rfl: 0    losartan (COZAAR) 25 mg tablet, Take 25 mg by mouth daily, Disp: , Rfl:     metoclopramide (REGLAN) 10 mg tablet, , Disp: , Rfl:     metoprolol succinate (TOPROL-XL) 50 mg 24 hr tablet, Take 1 tablet (50 mg total) by mouth daily, Disp: 90 tablet, Rfl: 1    OneTouch Ultra test strip, , Disp: , Rfl:     predniSONE 10 mg tablet, Take 10 mg by mouth daily, Disp: , Rfl:     traZODone (DESYREL) 50 mg tablet, Take 75 mg by mouth daily at bedtime, Disp: , Rfl:     venlafaxine (EFFEXOR-XR) 150 mg 24 hr capsule, Take 225 mg by mouth daily, Disp: , Rfl:     venlafaxine (EFFEXOR-XR) 75 mg 24 hr capsule, Take 75 mg by mouth daily, Disp: , Rfl:     buprenorphine-naloxone (SUBOXONE) 8-2 mg per SL tablet, Place 1 tablet under the tongue 2 (two) times a day (Patient not taking: Reported on 12/26/2023), Disp: , Rfl:     clopidogrel (PLAVIX) 75 mg tablet, Take 75 mg by mouth daily (Patient not taking: Reported on 12/26/2023), Disp: , Rfl:     cyclobenzaprine (FLEXERIL) 10 mg tablet, Take 10 mg by mouth daily at bedtime (Patient not taking: Reported on 12/26/2023), Disp: , Rfl:     gabapentin (NEURONTIN) 300 mg capsule, Take 1 capsule (300 mg total) by mouth 3 (three) times a day (Patient not taking: Reported on 12/26/2023), Disp: 90 capsule, Rfl: 1    lactulose (CHRONULAC) 10 g/15 mL solution, 30ML BY MOUTH EVERY 12 HOURS AS NEEDED FOR 10 DAYS (Patient not taking: Reported on 12/26/2023), Disp: , Rfl:     levothyroxine 100 mcg tablet, , Disp: , Rfl:     melatonin 3 mg, Take 1 tablet (3 mg total) by mouth daily at bedtime (Patient not taking: Reported on 12/26/2023), Disp: 30 tablet, Rfl: 0    metFORMIN (GLUCOPHAGE) 500 mg tablet, TAKE 1 TABLET 2 TIMES A DAY ORALLY (Patient not taking: Reported on 8/18/2023), Disp: , Rfl:     nicotine (NICODERM CQ) 21 mg/24 hr TD 24 hr patch, Place 1 patch on the skin over 24 hours every 24 hours (Patient not taking: Reported on 9/27/2023), Disp: 28 patch, Rfl: 0    nitrofurantoin (MACROBID)  "100 mg capsule, , Disp: , Rfl:     ondansetron (ZOFRAN) 4 mg tablet, Take 1 tablet (4 mg total) by mouth every 8 (eight) hours as needed for nausea or vomiting (Patient not taking: Reported on 12/26/2023), Disp: 20 tablet, Rfl: 0    predniSONE 10 mg tablet, Take 40 mg PO daily x 3 days, then 30 mg daily x 3 days, then 20 mg daily x 3 days, then 10 mg daily x 3 days, then stop. (Patient not taking: Reported on 12/26/2023), Disp: 30 tablet, Rfl: 0    predniSONE 10 mg tablet, Take 4 tablets x4 days, 3 tablets x4 days, 2 tablets x4 days, 1 tablet x4 days (Patient not taking: Reported on 12/26/2023), Disp: 50 tablet, Rfl: 0    predniSONE 20 mg tablet, 2 tablets daily for 5 days, 1 tablet daily for 5 days and half a tablet daily for 5 days (Patient not taking: Reported on 12/26/2023), Disp: 18 tablet, Rfl: 0    Historical Information   Past Medical History:   Diagnosis Date    Chronic pain 03/06/2023    COPD (chronic obstructive pulmonary disease) (HCC)     Disease of thyroid gland     Hypertension      Past Surgical History:   Procedure Laterality Date    CARDIAC SURGERY      COLONOSCOPY      HIP SURGERY      ORTHOPEDIC SURGERY      THORACIC AORTIC ANEURYSM REPAIR  09/2016    ascending thoracic aortic repair with RCA bypass with SVG x 1     Social History   Social History     Tobacco Use   Smoking Status Every Day    Current packs/day: 1.00    Average packs/day: 1 pack/day for 50.0 years (50.0 ttl pk-yrs)    Types: Cigarettes   Smokeless Tobacco Never   Tobacco Comments    Currently smoking 5-6 cigarettes daily       Family History:   No family history on file.      PhysicalExamination:  Vitals:   /70 (BP Location: Left arm, Patient Position: Sitting, Cuff Size: Large)   Pulse 69   Temp (!) 97.4 °F (36.3 °C) (Temporal)   Resp 16   Ht 5' 3\" (1.6 m)   Wt 77.6 kg (171 lb)   LMP  (LMP Unknown)   SpO2 97%   BMI 30.29 kg/m²     Appearance -- NAD, speaking full sentences  HEENT -- anicteric sclera, clear OP, " "MMM  Neck -- no JVD  Heart -- RRR, no murmurs  Lungs -- Bilateral wheezing  Abdomen -- soft, NTND, +bs  Extremities -- WWP, no LE edema  Skin -- no rash  Neuro -- A&Ox3, wnl  Psych -- no obvious depression or hallucination        Diagnostic Data:  Labs:  I personally reviewed the most recent laboratory data pertinent to today's visit    Lab Results   Component Value Date    WBC 8.63 07/15/2023    HGB 12.6 07/15/2023    HCT 40.2 07/15/2023    MCV 95 07/15/2023     07/15/2023     Lab Results   Component Value Date    CALCIUM 9.2 07/15/2023    K 3.8 07/15/2023    CO2 29 07/15/2023     07/15/2023    BUN 8 07/15/2023    CREATININE 0.76 07/15/2023     No results found for: \"IGE\"  Lab Results   Component Value Date    ALT 12 07/15/2023    AST 13 07/15/2023    ALKPHOS 28 (L) 07/15/2023           I have spent a total time of 30 minutes on 12/26/23 in caring for this patient including Diagnostic results, Prognosis, Risks and benefits of tx options, Instructions for management, Patient and family education, Importance of tx compliance, Risk factor reductions, Impressions, Counseling / Coordination of care, Documenting in the medical record, Reviewing / ordering tests, medicine, procedures  , Obtaining or reviewing history  , and Communicating with other healthcare professionals .   _        "

## 2023-12-26 NOTE — TELEPHONE ENCOUNTER
Patient called, she went to her pharmacy after her appointment and was advised that she could get her Trelegy inhaler for only $4 and her Albuterol inhaler would be very cheap as well. She is asking if the Doctor can please send in as many inhalers as he can in one refill so she can get it at this low cost for the end of the year. Please advise

## 2023-12-28 ENCOUNTER — TELEPHONE (OUTPATIENT)
Dept: PULMONOLOGY | Facility: CLINIC | Age: 73
End: 2023-12-28

## 2023-12-28 DIAGNOSIS — J44.1 COPD EXACERBATION (HCC): Primary | ICD-10-CM

## 2023-12-28 RX ORDER — SODIUM CHLORIDE FOR INHALATION 0.9 %
3 VIAL, NEBULIZER (ML) INHALATION AS NEEDED
Qty: 360 ML | Refills: 1 | Status: SHIPPED | OUTPATIENT
Start: 2023-12-28

## 2023-12-28 NOTE — TELEPHONE ENCOUNTER
I spoke with patient. She feels she still has mucous she cannot clear. She is taking the prednisone and doxy. Using the nebulizer 2-3 times per day. I did let her know the CXR is clear so no need to change antibiotics. I asked her to start taking mucinex and will have her add saline to the nebulizer as well.

## 2023-12-28 NOTE — TELEPHONE ENCOUNTER
Pt calling in stating the antibiotics she was given on 12/26 do not seem to be helping. She is still symptomatic. Still experiencing tightness and a lot of mucus.

## 2023-12-28 NOTE — TELEPHONE ENCOUNTER
Patient was seen on 12/26 for copd exacerbation. Symptoms of cough, sob and wheezing. Was prescribed prednisone taper along with doxycycline and feels it is not helping. Still experiencing same symptoms of tightness in breathing and mucus. Please advise.

## 2024-02-29 ENCOUNTER — TELEPHONE (OUTPATIENT)
Age: 74
End: 2024-02-29

## 2024-03-05 NOTE — TELEPHONE ENCOUNTER
Karen from [facility] calling regarding pt's O2. States pt desires portable O2. Explained that pt is scheduled for recert on 3/12 and can discuss this at the time of visit if she is re-certified for home O2.

## 2024-03-12 ENCOUNTER — OFFICE VISIT (OUTPATIENT)
Dept: PULMONOLOGY | Facility: MEDICAL CENTER | Age: 74
End: 2024-03-12
Payer: COMMERCIAL

## 2024-03-12 VITALS
OXYGEN SATURATION: 96 % | WEIGHT: 176 LBS | SYSTOLIC BLOOD PRESSURE: 142 MMHG | RESPIRATION RATE: 14 BRPM | HEART RATE: 72 BPM | DIASTOLIC BLOOD PRESSURE: 90 MMHG | BODY MASS INDEX: 31.18 KG/M2 | HEIGHT: 63 IN

## 2024-03-12 DIAGNOSIS — J96.11 CHRONIC RESPIRATORY FAILURE WITH HYPOXIA AND HYPERCAPNIA (HCC): ICD-10-CM

## 2024-03-12 DIAGNOSIS — J43.2 CENTRILOBULAR EMPHYSEMA (HCC): Primary | Chronic | ICD-10-CM

## 2024-03-12 DIAGNOSIS — E66.9 OBESITY (BMI 30-39.9): ICD-10-CM

## 2024-03-12 DIAGNOSIS — R91.1 LUNG NODULE: ICD-10-CM

## 2024-03-12 DIAGNOSIS — J96.12 CHRONIC RESPIRATORY FAILURE WITH HYPOXIA AND HYPERCAPNIA (HCC): ICD-10-CM

## 2024-03-12 DIAGNOSIS — F17.211 CIGARETTE NICOTINE DEPENDENCE IN REMISSION: ICD-10-CM

## 2024-03-12 PROBLEM — G47.33 OSA (OBSTRUCTIVE SLEEP APNEA): Status: RESOLVED | Noted: 2023-03-10 | Resolved: 2024-03-12

## 2024-03-12 PROBLEM — G47.34 SLEEP RELATED HYPOXIA: Status: RESOLVED | Noted: 2023-09-28 | Resolved: 2024-03-12

## 2024-03-12 PROBLEM — J44.1 COPD EXACERBATION (HCC): Status: RESOLVED | Noted: 2023-08-18 | Resolved: 2024-03-12

## 2024-03-12 PROBLEM — F17.200 SMOKING: Status: RESOLVED | Noted: 2023-11-27 | Resolved: 2024-03-12

## 2024-03-12 PROBLEM — IMO0001 SMOKING: Status: RESOLVED | Noted: 2023-11-27 | Resolved: 2024-03-12

## 2024-03-12 PROCEDURE — 99406 BEHAV CHNG SMOKING 3-10 MIN: CPT | Performed by: NURSE PRACTITIONER

## 2024-03-12 PROCEDURE — 99214 OFFICE O/P EST MOD 30 MIN: CPT | Performed by: NURSE PRACTITIONER

## 2024-03-12 RX ORDER — AZITHROMYCIN 250 MG/1
250 TABLET, FILM COATED ORAL 3 TIMES WEEKLY
Qty: 12 TABLET | Refills: 5 | Status: SHIPPED | OUTPATIENT
Start: 2024-03-13 | End: 2024-04-12

## 2024-03-12 RX ORDER — PREDNISONE 10 MG/1
TABLET ORAL
Qty: 30 TABLET | Refills: 0 | Status: SHIPPED | OUTPATIENT
Start: 2024-03-12

## 2024-03-12 NOTE — ASSESSMENT & PLAN NOTE
We discussed tobacco cessation for 3 minutes today.  She is aware of the risks of continued smoking, but reports that stressors limit her ability to quit.  She has cut down significantly.  She will continue to work toward complete cessation.

## 2024-03-12 NOTE — ASSESSMENT & PLAN NOTE
Montserrat will continue with Trelegy and albuterol MDI/nebulized as needed.  She is aware of proper use and technique.  I did give her prednisone to have on hand for her upcoming trip.  She also feels better when she takes a course of azithromycin.  I have sent through 250 mg 3 times weekly to use for prophylaxis.  I educated her on this and she is in agreement.

## 2024-03-12 NOTE — ASSESSMENT & PLAN NOTE
Sleep study done in December 2023 did not reveal any sleep apnea.  Ambulatory oxygen testing was done in the office today and she requires 2 L of supplemental oxygen with exertion and at bedtime.  A new prescription was written for this and order for POC as well.

## 2024-03-12 NOTE — PROGRESS NOTES
Pulmonary Follow-Up Note   Montserrat Sumner 73 y.o. female MRN: [id number]  3/12/2024      Assessment/Plan:    Problem List Items Addressed This Visit          Respiratory    Centrilobular emphysema (HCC) - Primary (Chronic)     Montserrat will continue with Trelegy and albuterol MDI/nebulized as needed.  She is aware of proper use and technique.  I did give her prednisone to have on hand for her upcoming trip.  She also feels better when she takes a course of azithromycin.  I have sent through 250 mg 3 times weekly to use for prophylaxis.  I educated her on this and she is in agreement.         Relevant Medications    azithromycin (ZITHROMAX) 250 mg tablet (Start on 3/13/2024)    predniSONE 10 mg tablet    Other Relevant Orders    POCT 6 minute walk (Completed)    Home Oxygen with Portability    Lung nodule     She has a CT planned for June of this year.         Chronic respiratory failure with hypoxia and hypercapnia (HCC)     Sleep study done in December 2023 did not reveal any sleep apnea.  Ambulatory oxygen testing was done in the office today and she requires 2 L of supplemental oxygen with exertion and at bedtime.  A new prescription was written for this and order for POC as well.         Relevant Orders    POCT 6 minute walk (Completed)       Behavioral Health    Cigarette nicotine dependence in remission     We discussed tobacco cessation for 3 minutes today.  She is aware of the risks of continued smoking, but reports that stressors limit her ability to quit.  She has cut down significantly.  She will continue to work toward complete cessation.            Other    Obesity (BMI 30-39.9)     Lifestyle modifications and weight loss as able.            Return in about 6 months (around 9/12/2024), or if symptoms worsen or fail to improve.    All of Montserrat's questions were answered prior to leaving the office today. She will follow-up in six months or sooner should the need arise. She is aware to call our office  "with any further questions or concerns.    History of Present Illness   Reason for Visit: Follow-up  Chief Complaint: \"I need a portable oxygen concentrator.\"  HPI: Montserrat Sumner is a 73 y.o. female who presents to the office today for follow-up.  She reports that she is looking to get a portable oxygen concentrator because the tanks that she has at home are too big to carry.  She reports that she has had some intermittent wheezing, but has been smoking about half a pack of cigarettes per day.  She is working to cut down, but does have stressors at home.  She is looking forward to a vacation with her friend.  She also notes some intermittent sputum production and felt very well when taking a course of azithromycin this past fall.  In addition to the above, she gets intermittent chest heaviness.  She has been following with cardiology and plans to schedule follow-up.    Review of Systems   All other systems reviewed and are negative.  A full 12-point review of systems was completed and is negative except for those outlined in the HPI.    Historical Information   Past Medical History:   Diagnosis Date    Chronic pain 03/06/2023    COPD (chronic obstructive pulmonary disease) (HCC)     Disease of thyroid gland     Hypertension      Past Surgical History:   Procedure Laterality Date    CARDIAC SURGERY      COLONOSCOPY      HIP SURGERY      ORTHOPEDIC SURGERY      THORACIC AORTIC ANEURYSM REPAIR  09/2016    ascending thoracic aortic repair with RCA bypass with SVG x 1     History reviewed. No pertinent family history.  Social History   Social History     Substance and Sexual Activity   Alcohol Use Not Currently     Social History     Substance and Sexual Activity   Drug Use Not Currently    Types: Marijuana, Cocaine     Social History     Tobacco Use   Smoking Status Every Day    Current packs/day: 1.00    Average packs/day: 1 pack/day for 50.0 years (50.0 ttl pk-yrs)    Types: Cigarettes   Smokeless Tobacco Never "   Tobacco Comments    Currently smoking 5-6 cigarettes daily     E-Cigarette/Vaping    E-Cigarette Use Never User      E-Cigarette/Vaping Substances       Meds/Allergies     Current Outpatient Medications:     [START ON 3/13/2024] azithromycin (ZITHROMAX) 250 mg tablet, Take 1 tablet (250 mg total) by mouth 3 (three) times a week, Disp: 12 tablet, Rfl: 5    predniSONE 10 mg tablet, Take 40 mg PO daily x 3 days, then 30 mg daily x 3 days, then 20 mg daily x 3 days, then 10 mg daily x 3 days, then stop., Disp: 30 tablet, Rfl: 0    albuterol (2.5 mg/3 mL) 0.083 % nebulizer solution, Take 3 mL (2.5 mg total) by nebulization 4 (four) times a day as needed for wheezing or shortness of breath, Disp: 360 mL, Rfl: 7    albuterol (PROVENTIL HFA,VENTOLIN HFA) 90 mcg/act inhaler, Inhale 1 puff every 4 (four) hours as needed for wheezing, Disp: 54 g, Rfl: 3    Alcohol Swabs (Ultra-Care Alcohol Prep Pads) 70 % PADS, , Disp: , Rfl:     ALPRAZolam (XANAX) 0.5 mg tablet, Take by mouth 3 (three) times a day as needed for anxiety, Disp: , Rfl:     amLODIPine (NORVASC) 5 mg tablet, Take 1 tablet (5 mg total) by mouth daily Do not start before March 7, 2023., Disp: 30 tablet, Rfl: 1    aspirin (ECOTRIN LOW STRENGTH) 81 mg EC tablet, Take 81 mg by mouth daily, Disp: , Rfl:     atorvastatin (LIPITOR) 40 mg tablet, Take 40 mg by mouth daily, Disp: , Rfl:     b complex vitamins capsule, Take 1 capsule by mouth every morning, Disp: , Rfl:     Blood Glucose Monitoring Suppl (ONE TOUCH ULTRA 2) w/Device KIT, , Disp: , Rfl:     Blood Glucose Monitoring Suppl (ONE TOUCH ULTRA 2) w/Device KIT, , Disp: , Rfl:     buprenorphine-naloxone (Suboxone) 4-1 MG, , Disp: , Rfl:     buprenorphine-naloxone (SUBOXONE) 8-2 mg per SL tablet, Place 1 tablet under the tongue 2 (two) times a day (Patient not taking: Reported on 12/26/2023), Disp: , Rfl:     celecoxib (CeleBREX) 200 mg capsule, Take 200 mg by mouth daily, Disp: , Rfl:     clopidogrel (PLAVIX) 75 mg  tablet, Take 75 mg by mouth daily (Patient not taking: Reported on 12/26/2023), Disp: , Rfl:     cyclobenzaprine (FLEXERIL) 10 mg tablet, Take 10 mg by mouth daily at bedtime (Patient not taking: Reported on 12/26/2023), Disp: , Rfl:     esomeprazole (NexIUM) 40 MG capsule, Take 1 capsule (40 mg total) by mouth 2 (two) times a day before meals, Disp: 180 capsule, Rfl: 1    fluticasone-umeclidinium-vilanterol (Trelegy Ellipta) 100-62.5-25 mcg/actuation inhaler, Inhale 1 puff daily Rinse mouth after use., Disp: 180 blister, Rfl: 3    gabapentin (NEURONTIN) 300 mg capsule, Take 1 capsule (300 mg total) by mouth 3 (three) times a day (Patient not taking: Reported on 12/26/2023), Disp: 90 capsule, Rfl: 1    guaiFENesin (MUCINEX) 600 mg 12 hr tablet, Take 1 tablet (600 mg total) by mouth 2 (two) times a day, Disp: 10 tablet, Rfl: 0    lactulose (CHRONULAC) 10 g/15 mL solution, 30ML BY MOUTH EVERY 12 HOURS AS NEEDED FOR 10 DAYS (Patient not taking: Reported on 12/26/2023), Disp: , Rfl:     Lancets (OneTouch Delica Plus [id number]) MISC, , Disp: , Rfl:     levothyroxine 100 mcg tablet, , Disp: , Rfl:     levothyroxine 125 mcg tablet, Take 125 mcg by mouth, Disp: , Rfl:     LORazepam (Ativan) 0.5 mg tablet, Take 2 tablets (1 mg total) by mouth 1 (one) time for 1 dose Take 1 hour prior to MRI as needed for claustrophobia, Disp: 2 tablet, Rfl: 0    losartan (COZAAR) 25 mg tablet, Take 25 mg by mouth daily, Disp: , Rfl:     melatonin 3 mg, Take 1 tablet (3 mg total) by mouth daily at bedtime (Patient not taking: Reported on 12/26/2023), Disp: 30 tablet, Rfl: 0    metFORMIN (GLUCOPHAGE) 500 mg tablet, TAKE 1 TABLET 2 TIMES A DAY ORALLY (Patient not taking: Reported on 8/18/2023), Disp: , Rfl:     metoclopramide (REGLAN) 10 mg tablet, , Disp: , Rfl:     metoprolol succinate (TOPROL-XL) 50 mg 24 hr tablet, Take 1 tablet (50 mg total) by mouth daily, Disp: 90 tablet, Rfl: 1    nitrofurantoin (MACROBID) 100 mg capsule, , Disp: , Rfl:  "    ondansetron (ZOFRAN) 4 mg tablet, Take 1 tablet (4 mg total) by mouth every 8 (eight) hours as needed for nausea or vomiting (Patient not taking: Reported on 12/26/2023), Disp: 20 tablet, Rfl: 0    OneTouch Ultra test strip, , Disp: , Rfl:     sodium chloride 0.9 % nebulizer solution, Take 3 mL by nebulization as needed for wheezing, Disp: 360 mL, Rfl: 1    traZODone (DESYREL) 50 mg tablet, Take 75 mg by mouth daily at bedtime, Disp: , Rfl:     venlafaxine (EFFEXOR-XR) 150 mg 24 hr capsule, Take 225 mg by mouth daily, Disp: , Rfl:     venlafaxine (EFFEXOR-XR) 75 mg 24 hr capsule, Take 75 mg by mouth daily, Disp: , Rfl:   No Known Allergies    Vitals: Blood pressure 142/90, pulse 72, resp. rate 14, height 5' 3\" (1.6 m), weight 79.8 kg (176 lb), SpO2 96%. Body mass index is 31.18 kg/m². Oxygen Therapy  SpO2: 96 %    Physical Exam:  Physical Exam  Vitals reviewed.   Constitutional:       General: She is not in acute distress.     Appearance: She is well-developed. She is obese. She is not toxic-appearing or diaphoretic.      Interventions: Nasal cannula in place.   HENT:      Head: Normocephalic and atraumatic.   Eyes:      General: No scleral icterus.     Extraocular Movements: Extraocular movements intact.   Neck:      Trachea: No tracheal deviation.   Cardiovascular:      Rate and Rhythm: Normal rate and regular rhythm.      Heart sounds: S1 normal and S2 normal. No murmur heard.     No friction rub. No gallop.   Pulmonary:      Effort: Pulmonary effort is normal. No tachypnea, accessory muscle usage or respiratory distress.      Breath sounds: No stridor. Wheezing present. No decreased breath sounds, rhonchi or rales.   Chest:      Chest wall: No tenderness.   Abdominal:      General: Bowel sounds are normal. There is no distension.      Palpations: Abdomen is soft.      Tenderness: There is no abdominal tenderness.   Musculoskeletal:      Cervical back: Neck supple.      Right lower leg: No edema.      Left " "lower leg: No edema.   Skin:     General: Skin is warm and dry.      Findings: No rash.   Neurological:      Mental Status: She is alert and oriented to person, place, and time.      GCS: GCS eye subscore is 4. GCS verbal subscore is 5. GCS motor subscore is 6.   Psychiatric:         Speech: Speech normal.         Behavior: Behavior normal. Behavior is cooperative.       6-minute walk test: Walk test was initiated on room air with saturation 91% at rest and heart rate 72 bpm.  She ambulated and saturations dropped to 88% and she was placed on 2 L of supplemental oxygen maintaining saturations 96% or greater with heart rate 77 bpm for the duration of walk.    JAZMINE Enamorado  [facility]        Portions of the record may have been created with voice recognition software.  Occasional wrong word or \"sound a like\" substitutions may have occurred due to the inherent limitations of voice recognition software.  Read the chart carefully and recognize, using context, where substitutions have occurred or contact the dictating provider.  "

## 2024-03-15 LAB
DME PARACHUTE DELIVERY DATE REQUESTED: NORMAL
DME PARACHUTE ITEM DESCRIPTION: NORMAL
DME PARACHUTE ORDER STATUS: NORMAL
DME PARACHUTE SUPPLIER NAME: NORMAL
DME PARACHUTE SUPPLIER PHONE: NORMAL

## 2024-03-19 ENCOUNTER — TELEPHONE (OUTPATIENT)
Age: 74
End: 2024-03-19

## 2024-03-19 NOTE — TELEPHONE ENCOUNTER
Patient was calling because they lost their social security card and needs to renew their license and cannot get a new social security card without a letter from a doctor with the patient's Name  last visit with a doctor's signature and a stamp on letter head with current date. Please advise patient thank you

## 2024-03-21 LAB
DME PARACHUTE DELIVERY DATE ACTUAL: NORMAL
DME PARACHUTE DELIVERY DATE REQUESTED: NORMAL
DME PARACHUTE ITEM DESCRIPTION: NORMAL
DME PARACHUTE ORDER STATUS: NORMAL
DME PARACHUTE SUPPLIER NAME: NORMAL
DME PARACHUTE SUPPLIER PHONE: NORMAL

## 2024-04-26 ENCOUNTER — OFFICE VISIT (OUTPATIENT)
Dept: GASTROENTEROLOGY | Facility: CLINIC | Age: 74
End: 2024-04-26
Payer: COMMERCIAL

## 2024-04-26 VITALS
HEIGHT: 63 IN | WEIGHT: 172 LBS | DIASTOLIC BLOOD PRESSURE: 73 MMHG | SYSTOLIC BLOOD PRESSURE: 102 MMHG | HEART RATE: 73 BPM | BODY MASS INDEX: 30.48 KG/M2

## 2024-04-26 DIAGNOSIS — K83.9 BILE DUCT ABNORMALITY: Primary | ICD-10-CM

## 2024-04-26 DIAGNOSIS — K21.00 GASTROESOPHAGEAL REFLUX DISEASE WITH ESOPHAGITIS WITHOUT HEMORRHAGE: ICD-10-CM

## 2024-04-26 PROCEDURE — 99214 OFFICE O/P EST MOD 30 MIN: CPT | Performed by: PHYSICIAN ASSISTANT

## 2024-04-26 RX ORDER — DULOXETIN HYDROCHLORIDE 60 MG/1
CAPSULE, DELAYED RELEASE ORAL
COMMUNITY
Start: 2024-03-21 | End: 2024-05-06

## 2024-04-26 NOTE — ASSESSMENT & PLAN NOTE
Recently treated successfully for candidal esophagitis, which likely occurred due to reasons including steroid containing inhaler use for COPD, and possibly immunosuppression from diabetes.  Symptomatically well-managed with PPI for now    -Continue twice daily PPI, I advised her that she can use Pepcid as needed in addition to this    -Advised patient regarding dietary and lifestyle modification strategies for the mitigation of GERD

## 2024-04-26 NOTE — PROGRESS NOTES
Follow-up Note -  [facility]  Montserrat Sumner 1950 73 y.o. female         ASSESSMENT & PLAN:    Gastroesophageal reflux disease  Recently treated successfully for candidal esophagitis, which likely occurred due to reasons including steroid containing inhaler use for COPD, and possibly immunosuppression from diabetes.  Symptomatically well-managed with PPI for now    -Continue twice daily PPI, I advised her that she can use Pepcid as needed in addition to this    -Advised patient regarding dietary and lifestyle modification strategies for the mitigation of GERD    Bile duct abnormality  Dilated common bile duct noted on CT scan from last year, patient has not followed up for MRI, she expresses significant claustrophobia and is not amenable to proceeding with MRI even with preprocedural anxiolytic.  She would also be at elevated risk for endoscopic ultrasound in view of her oxygen dependent COPD    -Discussed with patient about diagnostic options, I think a CT scan with multiphase pancreatic protocol would be the most reasonable option other than MRI. If this shows no significant evidence of a pancreatic lesion we can reasonably conclude that her bile duct dilatation is most likely physiologic.  If any questionable pancreatic lesion is elicited we can consider endoscopic ultrasound for further eval      Reason: Follow-up; abdominal pain/reflux, biliary ductal dilation    HPI: 73-year-old female with history of cholecystectomy about 20 years ago, COPD on supplemental oxygen, diabetes, coronary artery disease, tobacco use who presents for follow-up, I had seen her in the office in October for evaluation of longstanding GI symptoms including unintentional weight loss, intermittent abdominal pain, nausea, dysphagia and chronic constipation.  She had also been noted with biliary ductal dilation on recent CT scan so we advised again about MRI with preprocedural anxiolytic due to claustrophobia,  which had previously been ordered for her 3 months earlier.      It appears she still has not had MRI done.  We scheduled for EGD and colonoscopy which were carried out 11/27/2023 with Dr. Horner, showing moderate gastritis, mild candidal esophagitis, small internal hemorrhoids, no colon polyps.  She was started on twice daily PPI therapy for 1 month followed by once daily PPI therapy, and was given 14-day course of Diflucan.    The patient says that a lot of her stomach symptoms have improved since last time, she says her swallowing feels better and has not been having problems with this recently, she says her weight has been stable.  She continues to take Nexium twice daily, occasionally gets acid reflux for which she will take a third dose of Nexium but says this does not happen on most days.  Says she has had dry mouth issues for years, also endorses some numbness in her feet, she says she is establishing care with a PCP soon, does not seem to be aware of diagnosis of diabetes, says she was prediabetic at some point was on metformin in the past but not on any antidiabetic medications currently, she had an A1c of 6.6 last summer.  In any case she denies any disturbances in her bowel habits or stool appearance at this point otherwise.    REVIEW OF SYSTEMS:      CONSTITUTIONAL: Denies any fever, chills, or rigors. Good appetite, and no recent weight loss.  HEENT: No earache or tinnitus. Denies hearing loss or visual disturbances.  CARDIOVASCULAR: No chest pain or palpitations.   RESPIRATORY: Denies any cough, hemoptysis, shortness of breath or dyspnea on exertion.  GASTROINTESTINAL: As noted in the History of Present Illness.   GENITOURINARY: No problems with urination. Denies any hematuria or dysuria.  NEUROLOGIC: No dizziness or vertigo, denies headaches.   MUSCULOSKELETAL: Denies any muscle or joint pain.   SKIN: Denies skin rashes or itching.   ENDOCRINE: Denies excessive thirst. Denies intolerance to heat or  cold.  PSYCHOSOCIAL: Denies depression or anxiety. Denies any recent memory loss.     Past Medical History:   Diagnosis Date    Chronic pain 03/06/2023    COPD (chronic obstructive pulmonary disease) (HCC)     Disease of thyroid gland     Hypertension       Past Surgical History:   Procedure Laterality Date    CARDIAC SURGERY      COLONOSCOPY      HIP SURGERY      ORTHOPEDIC SURGERY      THORACIC AORTIC ANEURYSM REPAIR  09/2016    ascending thoracic aortic repair with RCA bypass with SVG x 1    UPPER GASTROINTESTINAL ENDOSCOPY       Social History     Socioeconomic History    Marital status: /Civil Union     Spouse name: Not on file    Number of children: Not on file    Years of education: Not on file    Highest education level: Not on file   Occupational History    Not on file   Tobacco Use    Smoking status: Every Day     Current packs/day: 1.00     Average packs/day: 1 pack/day for 50.0 years (50.0 ttl pk-yrs)     Types: Cigarettes    Smokeless tobacco: Never    Tobacco comments:     Currently smoking 5-6 cigarettes daily   Vaping Use    Vaping status: Never Used   Substance and Sexual Activity    Alcohol use: Yes     Comment: occasionally    Drug use: Yes     Types: Marijuana, Cocaine     Comment: yes still Marijuana as of 04/26/24    Sexual activity: Not Currently   Other Topics Concern    Not on file   Social History Narrative    Not on file     Social Determinants of Health     Financial Resource Strain: Not on file   Food Insecurity: No Food Insecurity (4/25/2023)    Hunger Vital Sign     Worried About Running Out of Food in the Last Year: Never true     Ran Out of Food in the Last Year: Never true   Transportation Needs: No Transportation Needs (4/25/2023)    PRAPARE - Transportation     Lack of Transportation (Medical): No     Lack of Transportation (Non-Medical): No   Physical Activity: Not on file   Stress: Not on file   Social Connections: Not on file   Intimate Partner Violence: Not on file    Housing Stability: Low Risk  (4/25/2023)    Housing Stability Vital Sign     Unable to Pay for Housing in the Last Year: No     Number of Places Lived in the Last Year: 2     Unstable Housing in the Last Year: No     Family History   Problem Relation Age of Onset    Breast cancer Mother      Patient has no known allergies.  Current Outpatient Medications   Medication Sig Dispense Refill    albuterol (2.5 mg/3 mL) 0.083 % nebulizer solution Take 3 mL (2.5 mg total) by nebulization 4 (four) times a day as needed for wheezing or shortness of breath 360 mL 7    albuterol (PROVENTIL HFA,VENTOLIN HFA) 90 mcg/act inhaler Inhale 1 puff every 4 (four) hours as needed for wheezing 54 g 3    ALPRAZolam (XANAX) 0.5 mg tablet Take by mouth 3 (three) times a day as needed for anxiety      amLODIPine (NORVASC) 5 mg tablet Take 1 tablet (5 mg total) by mouth daily Do not start before March 7, 2023. 30 tablet 1    aspirin (ECOTRIN LOW STRENGTH) 81 mg EC tablet Take 81 mg by mouth daily      atorvastatin (LIPITOR) 40 mg tablet Take 40 mg by mouth daily      b complex vitamins capsule Take 1 capsule by mouth every morning      buprenorphine-naloxone (Suboxone) 4-1 MG       DULoxetine (CYMBALTA) 60 mg delayed release capsule TAKE 1 CAPSULE ONCE A DAY ORALLY      esomeprazole (NexIUM) 40 MG capsule Take 1 capsule (40 mg total) by mouth 2 (two) times a day before meals 180 capsule 1    fluticasone-umeclidinium-vilanterol (Trelegy Ellipta) 100-62.5-25 mcg/actuation inhaler Inhale 1 puff daily Rinse mouth after use. 180 blister 3    gabapentin (NEURONTIN) 300 mg capsule Take 1 capsule (300 mg total) by mouth 3 (three) times a day 90 capsule 1    guaiFENesin (MUCINEX) 600 mg 12 hr tablet Take 1 tablet (600 mg total) by mouth 2 (two) times a day 10 tablet 0    levothyroxine 100 mcg tablet       metoprolol succinate (TOPROL-XL) 50 mg 24 hr tablet Take 1 tablet (50 mg total) by mouth daily 90 tablet 1    predniSONE 10 mg tablet Take 40 mg PO  daily x 3 days, then 30 mg daily x 3 days, then 20 mg daily x 3 days, then 10 mg daily x 3 days, then stop. 30 tablet 0    sodium chloride 0.9 % nebulizer solution Take 3 mL by nebulization as needed for wheezing 360 mL 1    traZODone (DESYREL) 50 mg tablet Take 75 mg by mouth daily at bedtime      venlafaxine (EFFEXOR-XR) 150 mg 24 hr capsule Take 225 mg by mouth daily      venlafaxine (EFFEXOR-XR) 75 mg 24 hr capsule Take 75 mg by mouth daily      Alcohol Swabs (Ultra-Care Alcohol Prep Pads) 70 % PADS  (Patient not taking: Reported on 4/26/2024)      Blood Glucose Monitoring Suppl (ONE TOUCH ULTRA 2) w/Device KIT  (Patient not taking: Reported on 4/26/2024)      Blood Glucose Monitoring Suppl (ONE TOUCH ULTRA 2) w/Device KIT  (Patient not taking: Reported on 4/26/2024)      buprenorphine-naloxone (SUBOXONE) 8-2 mg per SL tablet Place 1 tablet under the tongue 2 (two) times a day (Patient not taking: Reported on 12/26/2023)      celecoxib (CeleBREX) 200 mg capsule Take 200 mg by mouth daily (Patient not taking: Reported on 4/26/2024)      clopidogrel (PLAVIX) 75 mg tablet Take 75 mg by mouth daily (Patient not taking: Reported on 12/26/2023)      cyclobenzaprine (FLEXERIL) 10 mg tablet Take 10 mg by mouth daily at bedtime (Patient not taking: Reported on 12/26/2023)      lactulose (CHRONULAC) 10 g/15 mL solution 30ML BY MOUTH EVERY 12 HOURS AS NEEDED FOR 10 DAYS (Patient not taking: Reported on 12/26/2023)      Lancets (OneTouch Delica Plus [id number]) MISC  (Patient not taking: Reported on 4/26/2024)      levothyroxine 125 mcg tablet Take 125 mcg by mouth (Patient not taking: Reported on 4/26/2024)      LORazepam (Ativan) 0.5 mg tablet Take 2 tablets (1 mg total) by mouth 1 (one) time for 1 dose Take 1 hour prior to MRI as needed for claustrophobia 2 tablet 0    losartan (COZAAR) 25 mg tablet Take 25 mg by mouth daily      melatonin 3 mg Take 1 tablet (3 mg total) by mouth daily at bedtime (Patient not taking:  "Reported on 12/26/2023) 30 tablet 0    metFORMIN (GLUCOPHAGE) 500 mg tablet TAKE 1 TABLET 2 TIMES A DAY ORALLY (Patient not taking: Reported on 8/18/2023)      metoclopramide (REGLAN) 10 mg tablet  (Patient not taking: Reported on 4/26/2024)      nitrofurantoin (MACROBID) 100 mg capsule  (Patient not taking: Reported on 12/26/2023)      ondansetron (ZOFRAN) 4 mg tablet Take 1 tablet (4 mg total) by mouth every 8 (eight) hours as needed for nausea or vomiting (Patient not taking: Reported on 12/26/2023) 20 tablet 0    OneTouch Ultra test strip  (Patient not taking: Reported on 4/26/2024)       No current facility-administered medications for this visit.       Blood pressure 102/73, pulse 73, height 5' 3\" (1.6 m), weight 78 kg (172 lb).    PHYSICAL EXAM:      General Appearance:   Alert, cooperative, no distress, appears stated age    HEENT:   Normocephalic, atraumatic, anicteric.     Neck:  Supple, symmetrical, trachea midline, no adenopathy;    thyroid: no enlargement/tenderness/nodules; no carotid  bruit or JVD    Lungs:   Clear to auscultation bilaterally; no rales, rhonchi or wheezing; respirations unlabored    Heart::   S1 and S2 normal; regular rate and rhythm; no murmur, rub, or gallop.   Abdomen:   Soft, non-tender, non-distended; normal bowel sounds; no masses, no organomegaly    Extremities: No edema, erythema, wounds, rashes   Rectal:   Deferred                      Lab Results   Component Value Date    WBC 8.63 07/15/2023    HGB 12.6 07/15/2023    HCT 40.2 07/15/2023    MCV 95 07/15/2023     07/15/2023     Lab Results   Component Value Date    CALCIUM 9.2 07/15/2023    K 3.8 07/15/2023    CO2 29 07/15/2023     07/15/2023    BUN 8 07/15/2023    CREATININE 0.76 07/15/2023     Lab Results   Component Value Date    ALT 12 07/15/2023    AST 13 07/15/2023    ALKPHOS 28 (L) 07/15/2023     Lab Results   Component Value Date    INR 0.98 03/01/2023    PROTIME 13.1 03/01/2023       XR chest pa & " lateral    Result Date: 12/26/2023  Impression: Atelectasis in the lingula slightly improved in degree from prior. No airspace infiltrates or effusions. Eventration right hemidiaphragm anteriorly. Mild scoliosis. Electronically signed: 12/26/2023 01:17 PM Oscar Kimbrough MD

## 2024-04-26 NOTE — ASSESSMENT & PLAN NOTE
Dilated common bile duct noted on CT scan from last year, patient has not followed up for MRI, she expresses significant claustrophobia and is not amenable to proceeding with MRI even with preprocedural anxiolytic.  She would also be at elevated risk for endoscopic ultrasound in view of her oxygen dependent COPD    -Discussed with patient about diagnostic options, I think a CT scan with multiphase pancreatic protocol would be the most reasonable option other than MRI. If this shows no significant evidence of a pancreatic lesion we can reasonably conclude that her bile duct dilatation is most likely physiologic.  If any questionable pancreatic lesion is elicited we can consider endoscopic ultrasound for further eval

## 2024-04-29 ENCOUNTER — TELEPHONE (OUTPATIENT)
Age: 74
End: 2024-04-29

## 2024-05-01 DIAGNOSIS — K83.9 BILE DUCT ABNORMALITY: Primary | ICD-10-CM

## 2024-05-01 NOTE — TELEPHONE ENCOUNTER
Pt called in - said she sees SHONNA Sy  -- needs a CT scan and , and needs an different blood work order - said she does not have the right one-- the system was down when she called - I was unable to view / look or check anything - advised will send note to office - she said she will CB to speak w/ someone when they can pull her chart up and look at what she needs.

## 2024-05-01 NOTE — TELEPHONE ENCOUNTER
Please let patient know I ordered a BMP which she will need to get done prior to her CT scan.  I also placed liver enzymes to check as well.  The order is in the system.  She can get this done at [facility]'s lab.  Thank you

## 2024-05-01 NOTE — TELEPHONE ENCOUNTER
I tried to reach the patient by phone but was only able to leave a voicemail. Please, relay PA's message if the patient returns phone call. Thank you.

## 2024-05-02 NOTE — TELEPHONE ENCOUNTER
Called and spoke with the patient. Informed her that new lab work was in for her to get completed before her CT scan for a BMP and liver enzymes. Also informed her of fasting instructions. Patient verbalized understanding and said she would go first thing in the morning (5/3/24). Advised patient to call us with any further questions or concerns.

## 2024-05-06 ENCOUNTER — APPOINTMENT (OUTPATIENT)
Dept: LAB | Facility: HOSPITAL | Age: 74
End: 2024-05-06
Payer: COMMERCIAL

## 2024-05-06 ENCOUNTER — OFFICE VISIT (OUTPATIENT)
Dept: FAMILY MEDICINE CLINIC | Facility: CLINIC | Age: 74
End: 2024-05-06
Payer: COMMERCIAL

## 2024-05-06 VITALS
WEIGHT: 177 LBS | OXYGEN SATURATION: 95 % | DIASTOLIC BLOOD PRESSURE: 84 MMHG | BODY MASS INDEX: 31.36 KG/M2 | HEART RATE: 80 BPM | RESPIRATION RATE: 18 BRPM | HEIGHT: 63 IN | SYSTOLIC BLOOD PRESSURE: 128 MMHG

## 2024-05-06 DIAGNOSIS — Z11.59 NEED FOR HEPATITIS C SCREENING TEST: ICD-10-CM

## 2024-05-06 DIAGNOSIS — Z78.0 POSTMENOPAUSAL: ICD-10-CM

## 2024-05-06 DIAGNOSIS — I10 PRIMARY HYPERTENSION: Primary | Chronic | ICD-10-CM

## 2024-05-06 DIAGNOSIS — H25.013 CORTICAL AGE-RELATED CATARACT OF BOTH EYES: ICD-10-CM

## 2024-05-06 DIAGNOSIS — R73.03 PREDIABETES: ICD-10-CM

## 2024-05-06 DIAGNOSIS — G89.4 CHRONIC PAIN SYNDROME: ICD-10-CM

## 2024-05-06 DIAGNOSIS — M47.16 OSTEOARTHRITIS OF LUMBAR SPINE WITH MYELOPATHY: ICD-10-CM

## 2024-05-06 DIAGNOSIS — E78.2 MIXED HYPERLIPIDEMIA: ICD-10-CM

## 2024-05-06 DIAGNOSIS — Z12.31 ENCOUNTER FOR SCREENING MAMMOGRAM FOR BREAST CANCER: ICD-10-CM

## 2024-05-06 DIAGNOSIS — E03.9 HYPOTHYROIDISM, UNSPECIFIED TYPE: Chronic | ICD-10-CM

## 2024-05-06 DIAGNOSIS — F11.20 OPIOID DEPENDENCE, UNCOMPLICATED (HCC): ICD-10-CM

## 2024-05-06 DIAGNOSIS — I71.21 ANEURYSM OF ASCENDING AORTA WITHOUT RUPTURE (HCC): ICD-10-CM

## 2024-05-06 DIAGNOSIS — I63.429 CEREBRAL INFARCTION DUE TO EMBOLISM OF ANTERIOR CEREBRAL ARTERY, UNSPECIFIED BLOOD VESSEL LATERALITY (HCC): ICD-10-CM

## 2024-05-06 DIAGNOSIS — K83.9 BILE DUCT ABNORMALITY: ICD-10-CM

## 2024-05-06 DIAGNOSIS — F11.29 OPIOID DEPENDENCE WITH OPIOID-INDUCED DISORDER (HCC): Chronic | ICD-10-CM

## 2024-05-06 DIAGNOSIS — G47.9 SLEEP DISORDER: ICD-10-CM

## 2024-05-06 DIAGNOSIS — Z95.1 HISTORY OF CORONARY ARTERY BYPASS GRAFT: ICD-10-CM

## 2024-05-06 DIAGNOSIS — F14.10 COCAINE ABUSE (HCC): ICD-10-CM

## 2024-05-06 DIAGNOSIS — I25.118 CORONARY ARTERY DISEASE OF NATIVE HEART WITH STABLE ANGINA PECTORIS, UNSPECIFIED VESSEL OR LESION TYPE (HCC): Chronic | ICD-10-CM

## 2024-05-06 PROBLEM — H26.9 BILATERAL CATARACTS: Status: ACTIVE | Noted: 2024-05-06

## 2024-05-06 LAB
ALBUMIN SERPL BCP-MCNC: 3.9 G/DL (ref 3.5–5)
ALP SERPL-CCNC: 36 U/L (ref 34–104)
ALT SERPL W P-5'-P-CCNC: 8 U/L (ref 7–52)
ANION GAP SERPL CALCULATED.3IONS-SCNC: 5 MMOL/L (ref 4–13)
AST SERPL W P-5'-P-CCNC: 12 U/L (ref 13–39)
BILIRUB DIRECT SERPL-MCNC: 0.03 MG/DL (ref 0–0.2)
BILIRUB SERPL-MCNC: 0.28 MG/DL (ref 0.2–1)
BUN SERPL-MCNC: 13 MG/DL (ref 5–25)
CALCIUM SERPL-MCNC: 9.2 MG/DL (ref 8.4–10.2)
CHLORIDE SERPL-SCNC: 104 MMOL/L (ref 96–108)
CO2 SERPL-SCNC: 29 MMOL/L (ref 21–32)
CREAT SERPL-MCNC: 0.9 MG/DL (ref 0.6–1.3)
GFR SERPL CREATININE-BSD FRML MDRD: 64 ML/MIN/1.73SQ M
GLUCOSE P FAST SERPL-MCNC: 115 MG/DL (ref 65–99)
POTASSIUM SERPL-SCNC: 4.4 MMOL/L (ref 3.5–5.3)
PROT SERPL-MCNC: 7.1 G/DL (ref 6.4–8.4)
SODIUM SERPL-SCNC: 138 MMOL/L (ref 135–147)

## 2024-05-06 PROCEDURE — 36415 COLL VENOUS BLD VENIPUNCTURE: CPT

## 2024-05-06 PROCEDURE — 80048 BASIC METABOLIC PNL TOTAL CA: CPT

## 2024-05-06 PROCEDURE — 99205 OFFICE O/P NEW HI 60 MIN: CPT | Performed by: FAMILY MEDICINE

## 2024-05-06 PROCEDURE — 80076 HEPATIC FUNCTION PANEL: CPT

## 2024-05-06 RX ORDER — CELECOXIB 200 MG/1
200 CAPSULE ORAL DAILY
Qty: 90 CAPSULE | Refills: 1 | Status: SHIPPED | OUTPATIENT
Start: 2024-05-06

## 2024-05-06 RX ORDER — DULOXETIN HYDROCHLORIDE 60 MG/1
60 CAPSULE, DELAYED RELEASE ORAL DAILY
Qty: 90 CAPSULE | Refills: 1 | Status: SHIPPED | OUTPATIENT
Start: 2024-05-06

## 2024-05-06 NOTE — ASSESSMENT & PLAN NOTE
Prior history of aneurysm that was repaired.  Explained to the patient that this likely result of longstanding hypertension, cocaine use and tobacco use.  She had this occur at a young age.  Explained to the patient that if she would like to avoid having cardiothoracic surgery again then she should probably abstain from using cocaine in any form and smoking

## 2024-05-06 NOTE — PROGRESS NOTES
"  Subjective:      Patient ID: Montserrat Sumner is a 73 y.o. female.    73-year-old female presents as a new patient to the office to establish care and address multiple, extensive medical history and problems.  Prior PCP was located in [place].  She does have multiple specialists including pulmonologist, gastroenterologist, cardiologist all located at [facility] in [place].  She is a longtime smoker, has history of cocaine use which was much heavier when she was younger but has since slowed down to may be once every 6 months when the \"gang gets together\", history of coronary artery disease, thoracic aortic aneurysm repair, COPD/emphysema, DJD of the lumbar spine.  Patient had a pain management physician in [place] who provided her with epidural corticosteroid injections as well as RFA of the lumbar spine.  Patient is looking to establish with new pain management physician.  She does have a physician who prescribes her Suboxone which she has reportedly trying to cut down on dosage.  Complains of lower back pain, numbness in both feet.  She was told that she was diabetic however her last hemoglobin A1c in July 2023 was 6.6% and she is on no medications for diabetes.  She does need refill of no medications for diabetes.  She does need refill of Celebrex and Cymbalta.  She does have gabapentin for her back pain but she states that it does not provide any relief.        Past Medical History:   Diagnosis Date   • Chronic pain 03/06/2023   • COPD (chronic obstructive pulmonary disease) (HCC)    • Disease of thyroid gland    • Hypertension        Family History   Problem Relation Age of Onset   • Breast cancer Mother        Past Surgical History:   Procedure Laterality Date   • CARDIAC SURGERY     • COLONOSCOPY     • HIP SURGERY     • ORTHOPEDIC SURGERY     • THORACIC AORTIC ANEURYSM REPAIR  09/2016    ascending thoracic aortic repair with RCA bypass with SVG x 1   • UPPER GASTROINTESTINAL ENDOSCOPY          " reports that she has been smoking cigarettes. She has a 50 pack-year smoking history. She has been exposed to tobacco smoke. She has never used smokeless tobacco. She reports current alcohol use. She reports current drug use. Drugs: Marijuana and Cocaine.      Current Outpatient Medications:   •  albuterol (2.5 mg/3 mL) 0.083 % nebulizer solution, Take 3 mL (2.5 mg total) by nebulization 4 (four) times a day as needed for wheezing or shortness of breath, Disp: 360 mL, Rfl: 7  •  albuterol (PROVENTIL HFA,VENTOLIN HFA) 90 mcg/act inhaler, Inhale 1 puff every 4 (four) hours as needed for wheezing, Disp: 54 g, Rfl: 3  •  ALPRAZolam (XANAX) 0.5 mg tablet, Take by mouth 3 (three) times a day as needed for anxiety, Disp: , Rfl:   •  amLODIPine (NORVASC) 5 mg tablet, Take 1 tablet (5 mg total) by mouth daily Do not start before March 7, 2023., Disp: 30 tablet, Rfl: 1  •  atorvastatin (LIPITOR) 40 mg tablet, Take 40 mg by mouth daily, Disp: , Rfl:   •  b complex vitamins capsule, Take 1 capsule by mouth every morning, Disp: , Rfl:   •  buprenorphine-naloxone (Suboxone) 4-1 MG, , Disp: , Rfl:   •  celecoxib (CeleBREX) 200 mg capsule, Take 1 capsule (200 mg total) by mouth daily, Disp: 90 capsule, Rfl: 1  •  clopidogrel (PLAVIX) 75 mg tablet, Take 75 mg by mouth daily, Disp: , Rfl:   •  DULoxetine (CYMBALTA) 60 mg delayed release capsule, Take 1 capsule (60 mg total) by mouth daily, Disp: 90 capsule, Rfl: 1  •  esomeprazole (NexIUM) 40 MG capsule, Take 1 capsule (40 mg total) by mouth 2 (two) times a day before meals, Disp: 180 capsule, Rfl: 1  •  fluticasone-umeclidinium-vilanterol (Trelegy Ellipta) 100-62.5-25 mcg/actuation inhaler, Inhale 1 puff daily Rinse mouth after use., Disp: 180 blister, Rfl: 3  •  gabapentin (NEURONTIN) 300 mg capsule, Take 1 capsule (300 mg total) by mouth 3 (three) times a day, Disp: 90 capsule, Rfl: 1  •  levothyroxine 100 mcg tablet, , Disp: , Rfl:   •  losartan (COZAAR) 25 mg tablet, Take 25 mg  by mouth daily, Disp: , Rfl:   •  metoprolol succinate (TOPROL-XL) 50 mg 24 hr tablet, Take 1 tablet (50 mg total) by mouth daily, Disp: 90 tablet, Rfl: 1  •  traZODone (DESYREL) 50 mg tablet, Take 75 mg by mouth daily at bedtime, Disp: , Rfl:   •  venlafaxine (EFFEXOR-XR) 150 mg 24 hr capsule, Take 225 mg by mouth daily, Disp: , Rfl:   •  venlafaxine (EFFEXOR-XR) 75 mg 24 hr capsule, Take 75 mg by mouth daily, Disp: , Rfl:   •  metoclopramide (REGLAN) 10 mg tablet, , Disp: , Rfl:   •  predniSONE 10 mg tablet, Take 40 mg PO daily x 3 days, then 30 mg daily x 3 days, then 20 mg daily x 3 days, then 10 mg daily x 3 days, then stop. (Patient not taking: Reported on 5/6/2024), Disp: 30 tablet, Rfl: 0  •  sodium chloride 0.9 % nebulizer solution, Take 3 mL by nebulization as needed for wheezing (Patient not taking: Reported on 5/6/2024), Disp: 360 mL, Rfl: 1    The following portions of the patient's history were reviewed and updated as appropriate: allergies, current medications, past family history, past medical history, past social history, past surgical history and problem list.    Review of Systems   Constitutional: Negative.  Negative for activity change, appetite change and fatigue.   HENT: Negative.  Negative for congestion, ear discharge, mouth sores, sinus pressure and tinnitus.    Eyes: Negative.  Negative for photophobia and visual disturbance.   Respiratory:  Positive for cough, chest tightness, shortness of breath and wheezing.    Cardiovascular:  Negative for chest pain.   Gastrointestinal: Negative.  Negative for abdominal distention, constipation, diarrhea, nausea and vomiting.   Endocrine: Negative.  Negative for polydipsia, polyphagia and polyuria.   Genitourinary: Negative.  Negative for difficulty urinating.   Musculoskeletal:  Positive for back pain and gait problem.        Ambulates with walker   Skin: Negative.    Neurological:  Positive for numbness (Bilateral feet). Negative for dizziness,  "syncope, weakness and light-headedness.   Hematological: Negative.    Psychiatric/Behavioral:  Negative for agitation and dysphoric mood. The patient is nervous/anxious.            Objective:    /84   Pulse 80   Resp 18   Ht 5' 3\" (1.6 m)   Wt 80.3 kg (177 lb)   LMP  (LMP Unknown)   SpO2 95%   BMI 31.35 kg/m²      Physical Exam  Vitals and nursing note reviewed.   Constitutional:       General: She is not in acute distress.     Appearance: She is well-developed. She is ill-appearing (Chronically ill-appearing). She is not diaphoretic.   HENT:      Head: Normocephalic and atraumatic.      Right Ear: Tympanic membrane, ear canal and external ear normal.      Left Ear: Tympanic membrane, ear canal and external ear normal.      Nose: Nose normal.      Mouth/Throat:      Mouth: Mucous membranes are moist.      Pharynx: Oropharynx is clear.   Eyes:      Comments: Bilateral cataracts.   Neck:      Vascular: No carotid bruit.   Cardiovascular:      Rate and Rhythm: Normal rate and regular rhythm.      Pulses: Normal pulses.      Heart sounds: Normal heart sounds. No murmur heard.     Comments: Normal dorsalis pedis pulses  Pulmonary:      Effort: Pulmonary effort is normal.      Breath sounds: Wheezing present. No rales.      Comments: Diminished breath sounds bilaterally with expiratory wheezing  Abdominal:      General: Bowel sounds are normal.      Palpations: Abdomen is soft.   Musculoskeletal:         General: Normal range of motion.      Cervical back: Normal range of motion and neck supple.   Lymphadenopathy:      Cervical: No cervical adenopathy.   Skin:     General: Skin is warm and dry.      Findings: No rash.   Neurological:      Mental Status: She is alert and oriented to person, place, and time.      Sensory: Sensory deficit (Bilateral feet) present.      Deep Tendon Reflexes: Reflexes are normal and symmetric.   Psychiatric:         Behavior: Behavior normal.         Thought Content: Thought " content normal.         Judgment: Judgment normal.           Recent Results (from the past 1008 hour(s))   Basic metabolic panel    Collection Time: 05/06/24 11:53 AM   Result Value Ref Range    Sodium 138 135 - 147 mmol/L    Potassium 4.4 3.5 - 5.3 mmol/L    Chloride 104 96 - 108 mmol/L    CO2 29 21 - 32 mmol/L    ANION GAP 5 4 - 13 mmol/L    BUN 13 5 - 25 mg/dL    Creatinine 0.90 0.60 - 1.30 mg/dL    Glucose, Fasting 115 (H) 65 - 99 mg/dL    Calcium 9.2 8.4 - 10.2 mg/dL    eGFR 64 ml/min/1.73sq m   Hepatic function panel    Collection Time: 05/06/24 11:53 AM   Result Value Ref Range    Total Bilirubin 0.28 0.20 - 1.00 mg/dL    Bilirubin, Direct 0.03 0.00 - 0.20 mg/dL    Alkaline Phosphatase 36 34 - 104 U/L    AST 12 (L) 13 - 39 U/L    ALT 8 7 - 52 U/L    Total Protein 7.1 6.4 - 8.4 g/dL    Albumin 3.9 3.5 - 5.0 g/dL       Assessment/Plan:    Hypertension  Patient remains on amlodipine, metoprolol and losartan.  Continue same.  Follow-up with cardiology    CAD (coronary artery disease)  Patient has history of coronary artery disease.     She remains on statin therapy, Toprol XL, Plavix    -Follow-up with cardiology as scheduled    Needs to stop smoking, absolutely needs to stop using cocaine in any form at all.  Not even every 6 months when the gang gets together    Aneurysm of ascending aorta without rupture (HCC)  Prior history of aneurysm that was repaired.  Explained to the patient that this likely result of longstanding hypertension, cocaine use and tobacco use.  She had this occur at a young age.  Explained to the patient that if she would like to avoid having cardiothoracic surgery again then she should probably abstain from using cocaine in any form and smoking    Hypothyroid  Patient remains on levothyroxine.  Check TSH with reflex free T4    Cerebral infarct (HCC)  Patient remains on Plavix.  Should probably be on aspirin.  On statin therapy.  This is by history.  HCC code.  Not really an active code but  historical code    Opioid dependence (HCC)  Patient is on Suboxone.  She is aware that I do not prescribe Suboxone.  She will need to get that from specialist in [place].  Was checked. PDMP     Cocaine abuse (HCC)  Explained to the patient that use of cocaine leads to heart disease, elevated blood pressure, aneurysms, ischemia, saddlenose deformity.  Overall bad for her health.  Reiterated to her that she should never ever use that ever again    Bilateral cataracts  Referral to ophthalmology    History of coronary artery bypass graft  See coronary artery disease above    Chronic pain syndrome  Longstanding history of chronic back pain.  Was previously opiate dependent.  She is on Suboxone.  Will refer to [facility] pain management.  Had RFA in the past for her back    Postmenopausal  DEXA scan ordered    Sleep disorder  Continue on trazodone    Prediabetes  Check hemoglobin A1c.  She is not diabetic based upon a hemoglobin A1c of 6.6% at age 73.    Need for hepatitis C screening test  Check screening    Hyperlipidemia  Check lipid panel.  Remains on atorvastatin.          Problem List Items Addressed This Visit        Cardiovascular and Mediastinum    CAD (coronary artery disease) (Chronic)     Patient has history of coronary artery disease.     She remains on statin therapy, Toprol XL, Plavix    -Follow-up with cardiology as scheduled    Needs to stop smoking, absolutely needs to stop using cocaine in any form at all.  Not even every 6 months when the gang gets together         Relevant Orders    Lipid panel    TSH, 3rd generation with Free T4 reflex    Hypertension - Primary (Chronic)     Patient remains on amlodipine, metoprolol and losartan.  Continue same.  Follow-up with cardiology         Relevant Orders    CBC and differential    Aneurysm of ascending aorta without rupture (HCC)     Prior history of aneurysm that was repaired.  Explained to the patient that this likely result of longstanding hypertension,  cocaine use and tobacco use.  She had this occur at a young age.  Explained to the patient that if she would like to avoid having cardiothoracic surgery again then she should probably abstain from using cocaine in any form and smoking            Endocrine    Hypothyroid (Chronic)     Patient remains on levothyroxine.  Check TSH with reflex free T4            Nervous and Auditory    Cerebral infarct (HCC)     Patient remains on Plavix.  Should probably be on aspirin.  On statin therapy.  This is by history.  HCC code.  Not really an active code but historical code            Behavioral Health    Opioid dependence (HCC) (Chronic)     Patient is on Suboxone.  She is aware that I do not prescribe Suboxone.  She will need to get that from specialist in [place].  Was checked. PDMP          Cocaine abuse (HCC)     Explained to the patient that use of cocaine leads to heart disease, elevated blood pressure, aneurysms, ischemia, saddlenose deformity.  Overall bad for her health.  Reiterated to her that she should never ever use that ever again            Eye    Bilateral cataracts     Referral to ophthalmology         Relevant Orders    Ambulatory Referral to Ophthalmology       Surgery/Wound/Pain    Chronic pain syndrome     Longstanding history of chronic back pain.  Was previously opiate dependent.  She is on Suboxone.  Will refer to [facility] pain management.  Had RFA in the past for her back         History of coronary artery bypass graft     See coronary artery disease above            Obstetrics/Gynecology    Postmenopausal     DEXA scan ordered         Relevant Orders    DXA bone density spine hip and pelvis       Neurology/Sleep    Sleep disorder     Continue on trazodone         Relevant Orders    TSH, 3rd generation with Free T4 reflex       Other    Hyperlipidemia     Check lipid panel.  Remains on atorvastatin.         Need for hepatitis C screening test     Check screening         Relevant Orders     Hepatitis C antibody    Prediabetes     Check hemoglobin A1c.  She is not diabetic based upon a hemoglobin A1c of 6.6% at age 73.         Relevant Orders    Comprehensive metabolic panel    Hemoglobin A1C   Other Visit Diagnoses     Osteoarthritis of lumbar spine with myelopathy        Relevant Medications    celecoxib (CeleBREX) 200 mg capsule    DULoxetine (CYMBALTA) 60 mg delayed release capsule    Other Relevant Orders    Ambulatory referral to Spine [facility] Pain Management    Encounter for screening mammogram for breast cancer        Relevant Orders    Mammo screening bilateral w 3d & cad    Opioid dependence, uncomplicated (HCC)              I have spent a total time of 55 minutes on 05/06/24 in caring for this patient including Diagnostic results, Prognosis, Risks and benefits of tx options, Instructions for management, Patient and family education, Importance of tx compliance, Risk factor reductions, Impressions, Counseling / Coordination of care, Documenting in the medical record, Reviewing / ordering tests, medicine, procedures  , and Obtaining or reviewing history  .

## 2024-05-06 NOTE — ASSESSMENT & PLAN NOTE
Patient remains on amlodipine, metoprolol and losartan.  Continue same.  Follow-up with cardiology

## 2024-05-06 NOTE — ASSESSMENT & PLAN NOTE
Patient remains on Plavix.  Should probably be on aspirin.  On statin therapy.  This is by history.  HCC code.  Not really an active code but historical code

## 2024-05-06 NOTE — ASSESSMENT & PLAN NOTE
Longstanding history of chronic back pain.  Was previously opiate dependent.  She is on Suboxone.  Will refer to [facility].  Had RFA in the past for her back

## 2024-05-06 NOTE — ASSESSMENT & PLAN NOTE
Patient is on Suboxone.  She is aware that I do not prescribe Suboxone.  She will need to get that from specialist in [place].  Was checked. PDMP

## 2024-05-06 NOTE — ASSESSMENT & PLAN NOTE
Patient has history of coronary artery disease.     She remains on statin therapy, Toprol XL, Plavix    -Follow-up with cardiology as scheduled    Needs to stop smoking, absolutely needs to stop using cocaine in any form at all.  Not even every 6 months when the gang gets together

## 2024-05-06 NOTE — ASSESSMENT & PLAN NOTE
Explained to the patient that use of cocaine leads to heart disease, elevated blood pressure, aneurysms, ischemia, saddlenose deformity.  Overall bad for her health.  Reiterated to her that she should never ever use that ever again

## 2024-05-07 ENCOUNTER — HOSPITAL ENCOUNTER (OUTPATIENT)
Dept: RADIOLOGY | Facility: HOSPITAL | Age: 74
Discharge: HOME | End: 2024-05-07
Payer: COMMERCIAL

## 2024-05-07 DIAGNOSIS — K83.9 BILE DUCT ABNORMALITY: ICD-10-CM

## 2024-05-07 PROCEDURE — 74160 CT ABDOMEN W/CONTRAST: CPT

## 2024-05-07 RX ADMIN — IOHEXOL 100 ML: 350 INJECTION, SOLUTION INTRAVENOUS at 13:22

## 2024-05-10 ENCOUNTER — TELEPHONE (OUTPATIENT)
Age: 74
End: 2024-05-10

## 2024-05-10 NOTE — TELEPHONE ENCOUNTER
Pt called in stating that she missed a call from the office. Unable to find note per chart review. Please advise and callback.

## 2024-05-17 ENCOUNTER — NURSE TRIAGE (OUTPATIENT)
Age: 74
End: 2024-05-17

## 2024-05-17 NOTE — TELEPHONE ENCOUNTER
"Reason for Disposition   Known COPD or other severe lung disease (i.e., bronchiectasis, cystic fibrosis, lung surgery) and worsening symptoms (i.e., increased sputum purulence or amount, increased breathing difficulty)    Answer Assessment - Initial Assessment Questions  1. ONSET: \"When did the cough begin?\"       7-10 days ago  2. SEVERITY: \"How bad is the cough today?\"       Unspecified  3. SPUTUM: \"Describe the color of your sputum\" (none, dry cough; clear, white, yellow, green)      Occasionally productive- yellow sputum  4. HEMOPTYSIS: \"Are you coughing up any blood?\" If so ask: \"How much?\" (flecks, streaks, tablespoons, etc.)      Denies hemoptysis   5. DIFFICULTY BREATHING: \"Are you having difficulty breathing?\" If Yes, ask: \"How bad is it?\" (e.g., mild, moderate, severe)     - MILD: No SOB at rest, mild SOB with walking, speaks normally in sentences, can lay down, no retractions, pulse < 100.     - MODERATE: SOB at rest, SOB with minimal exertion and prefers to sit, cannot lie down flat, speaks in phrases, mild retractions, audible wheezing, pulse 100-120.     - SEVERE: Very SOB at rest, speaks in single words, struggling to breathe, sitting hunched forward, retractions, pulse > 120       Breathing is at baseline  6. FEVER: \"Do you have a fever?\" If Yes, ask: \"What is your temperature, how was it measured, and when did it start?\"      Denies  7. CARDIAC HISTORY: \"Do you have any history of heart disease?\" (e.g., heart attack, congestive heart failure)       See chart  8. LUNG HISTORY: \"Do you have any history of lung disease?\"  (e.g., pulmonary embolus, asthma, emphysema)      COPD  9. PE RISK FACTORS: \"Do you have a history of blood clots?\" (or: recent major surgery, recent prolonged travel, bedridden)      Unspecified  10. OTHER SYMPTOMS: \"Do you have any other symptoms?\" (e.g., runny nose, wheezing, chest pain)      \"Rattling\" in chest      Chest tightness      Notes some chest discomfort from " coughing    Protocols used: Cough-ADULT-[place]

## 2024-05-17 NOTE — TELEPHONE ENCOUNTER
"Pt stated Pulm Provider: Radha/Dr. Smith    Actionable item: Appt scheduled    What is the reason for the call/chief complaint?    Pt calling c/o cough productive of yellow sputum. Symptoms started about 7-10 days ago. She also notices a \"rattling\" in her chest and chest tightness. Does note chest discomfort but she attributes this to her coughing. She denies any increased SOB. Has been compliant with trelegy and has been using her albuterol neb treatments up to 3 times per day. Pt unable to come for appt today. Scheduled for Monday 5/20 with Radha. Provided home care advice including increased fluids, humidifier, neb treatments up to 4 times per day as needed. Pt aware to call back/seek care urgently for severe SOB,chest pain.   "

## 2024-06-04 ENCOUNTER — NURSE TRIAGE (OUTPATIENT)
Age: 74
End: 2024-06-04

## 2024-06-04 DIAGNOSIS — J44.1 COPD EXACERBATION (HCC): Primary | ICD-10-CM

## 2024-06-04 RX ORDER — CEFUROXIME AXETIL 500 MG/1
500 TABLET ORAL EVERY 12 HOURS SCHEDULED
Qty: 14 TABLET | Refills: 0 | Status: SHIPPED | OUTPATIENT
Start: 2024-06-04 | End: 2024-06-11

## 2024-06-04 RX ORDER — PREDNISONE 10 MG/1
TABLET ORAL
Qty: 30 TABLET | Refills: 0 | Status: SHIPPED | OUTPATIENT
Start: 2024-06-04 | End: 2024-06-15

## 2024-06-04 NOTE — TELEPHONE ENCOUNTER
"Patient call:  Pt stated provider: Dr. Smith    Actionable item: Home care- Medication management     What is the reason for the call/chief complaint?    Reports visiting [place] last week and started experiencing green/yellow mucous productive cough + chest tightness. SPO2 93%. Reports feeling better when using nebulizer but concerned about getting sick. Requesting abx and steroids as this \"clears up for her\". Says that she wants something stronger than Azithromycin.  Currently taking Mucinex.    Dispo: Home care. Continue with nebulizer and Mucinex. Encouraged to maintain hydration especially with COPD and while taking Mucinex.   Informed to call [facility] with worsening symptoms.  Agrees with plan.   All questions answered.     Reason for Disposition   Cough with cold symptoms (e.g., runny nose, postnasal drip, throat clearing)    Answer Assessment - Initial Assessment Questions  1. ONSET: \"When did the cough begin?\"       While in [place] last week  2. SEVERITY: \"How bad is the cough today?\"       Persistent. Interfering  3. SPUTUM: \"Describe the color of your sputum\" (none, dry cough; clear, white, yellow, green)      Green/yellow  4. HEMOPTYSIS: \"Are you coughing up any blood?\" If so ask: \"How much?\" (flecks, streaks, tablespoons, etc.)      no  5. DIFFICULTY BREATHING: \"Are you having difficulty breathing?\" If Yes, ask: \"How bad is it?\" (e.g., mild, moderate, severe)     - MILD: No SOB at rest, mild SOB with walking, speaks normally in sentences, can lay down, no retractions, pulse < 100.     - MODERATE: SOB at rest, SOB with minimal exertion and prefers to sit, cannot lie down flat, speaks in phrases, mild retractions, audible wheezing, pulse 100-120.     - SEVERE: Very SOB at rest, speaks in single words, struggling to breathe, sitting hunched forward, retractions, pulse > 120       SPO2 93%. Tight chest  6. FEVER: \"Do you have a fever?\" If Yes, ask: \"What is your temperature, how was it measured, and when " "did it start?\"      denies  7. CARDIAC HISTORY: \"Do you have any history of heart disease?\" (e.g., heart attack, congestive heart failure)       -  8. LUNG HISTORY: \"Do you have any history of lung disease?\"  (e.g., pulmonary embolus, asthma, emphysema)      COPD  9. PE RISK FACTORS: \"Do you have a history of blood clots?\" (or: recent major surgery, recent prolonged travel, bedridden)      -  10. OTHER SYMPTOMS: \"Do you have any other symptoms?\" (e.g., runny nose, wheezing, chest pain)        Fatigue and breathing symptoms  12. TRAVEL: \"Have you traveled out of the country in the last month?\" (e.g., travel history, exposures)        [place] last week    Protocols used: Cough-ADULT-[place]    "

## 2024-06-04 NOTE — PROGRESS NOTES
I spoke with Janki.  She is having some cough and chest tightness and some shortness of breath.  She requested antibiotic therapy.  I also asked if she felt she needed prednisone and she stated yes.  I did prescribe Ceftin and prednisone taper for her.  She will let us know if she has no improvement

## 2024-06-05 PROBLEM — Z11.59 NEED FOR HEPATITIS C SCREENING TEST: Status: RESOLVED | Noted: 2024-05-06 | Resolved: 2024-06-05

## 2024-06-06 DIAGNOSIS — J44.1 COPD EXACERBATION (HCC): ICD-10-CM

## 2024-06-06 RX ORDER — CEFUROXIME AXETIL 500 MG/1
500 TABLET ORAL EVERY 12 HOURS SCHEDULED
Qty: 14 TABLET | Refills: 0 | OUTPATIENT
Start: 2024-06-06 | End: 2024-06-13

## 2024-06-07 DIAGNOSIS — J44.1 COPD EXACERBATION (HCC): ICD-10-CM

## 2024-06-07 NOTE — TELEPHONE ENCOUNTER
Reason for call:   [x] Refill   [] Prior Auth  [x] Other: Pt lost her bottle and she's requesting new refill..    Office:   [] PCP/Provider -   [x] Specialty/Provider - Maik Smith DO     Medication: (CEFTIN) 500 mg / 1 tab every 12 hrs x 7 days / 14 tabs        Pharmacy: [facility] [place] - [address]      Does the patient have enough for 3 days?   [] Yes   [x] No - Send as HP to POD

## 2024-06-10 RX ORDER — CEFUROXIME AXETIL 500 MG/1
500 TABLET ORAL EVERY 12 HOURS SCHEDULED
Qty: 14 TABLET | Refills: 0 | Status: SHIPPED | OUTPATIENT
Start: 2024-06-10 | End: 2024-06-17

## 2024-06-13 DIAGNOSIS — I10 PRIMARY HYPERTENSION: Chronic | ICD-10-CM

## 2024-06-13 RX ORDER — AMLODIPINE BESYLATE 5 MG/1
5 TABLET ORAL DAILY
Qty: 30 TABLET | Refills: 1 | OUTPATIENT
Start: 2024-06-13

## 2024-06-13 NOTE — TELEPHONE ENCOUNTER
Reason for call:   [x] Refill   [] Prior Auth  [] Other:     Office:   [x] PCP/Provider - Blasi   [] Specialty/Provider -     Medication: Amlodipine    Dose/Frequency: 5 mg Daily     Quantity: 30    [facility]: [facility]     Does the patient have enough for 3 days?   [] Yes   [] No - Send as HP to POD   unknown pharmacy called

## 2024-06-13 NOTE — TELEPHONE ENCOUNTER
Refill refused.  Patient was only seen here in the office once and was given labs and imaging studies to perform.  She has done none of those

## 2024-06-14 ENCOUNTER — TELEPHONE (OUTPATIENT)
Dept: FAMILY MEDICINE CLINIC | Facility: CLINIC | Age: 74
End: 2024-06-14

## 2024-06-14 DIAGNOSIS — R13.10 DYSPHAGIA, UNSPECIFIED TYPE: ICD-10-CM

## 2024-06-14 DIAGNOSIS — K21.9 GASTROESOPHAGEAL REFLUX DISEASE, UNSPECIFIED WHETHER ESOPHAGITIS PRESENT: ICD-10-CM

## 2024-06-14 DIAGNOSIS — I10 PRIMARY HYPERTENSION: Chronic | ICD-10-CM

## 2024-06-14 DIAGNOSIS — R10.9 INTRACTABLE ABDOMINAL PAIN: ICD-10-CM

## 2024-06-14 RX ORDER — AMLODIPINE BESYLATE 5 MG/1
5 TABLET ORAL DAILY
Qty: 10 TABLET | Refills: 0 | Status: SHIPPED | OUTPATIENT
Start: 2024-06-14

## 2024-06-14 RX ORDER — ESOMEPRAZOLE MAGNESIUM 40 MG/1
40 CAPSULE, DELAYED RELEASE ORAL
Qty: 180 CAPSULE | Refills: 1 | Status: SHIPPED | OUTPATIENT
Start: 2024-06-14

## 2024-06-14 NOTE — TELEPHONE ENCOUNTER
Patient called asking for refill of Amlodipine stating is out of medication.  Advised patient there was blood work that was ordered from Dr Tobias that was to be done.  Patient stated will go for the blood work.

## 2024-06-14 NOTE — TELEPHONE ENCOUNTER
Reason for call:   [x] Refill   [] Prior Auth  [] Other:     Office:   [] PCP/Provider -   [x] Specialty/Provider - Gastro    Medication:     esomeprazole (NexIUM) 40 MG capsule       Dose/Frequency:     40 mg, Oral, 2 times daily before meals       Quantity: 180    [facility] [place] [facility] [address]     Does the patient have enough for 3 days?   [] Yes   [x] No - Send as HP to POD

## 2024-06-21 ENCOUNTER — TELEPHONE (OUTPATIENT)
Dept: GASTROENTEROLOGY | Facility: CLINIC | Age: 74
End: 2024-06-21

## 2024-06-24 NOTE — TELEPHONE ENCOUNTER
"Will try to make phone calls later, as our \"phones\" are linked to Teams and I have no access to Teams or outlook due to outage.  "

## 2024-06-28 ENCOUNTER — TELEPHONE (OUTPATIENT)
Dept: GASTROENTEROLOGY | Facility: CLINIC | Age: 74
End: 2024-06-28

## 2024-07-25 ENCOUNTER — HOSPITAL ENCOUNTER (EMERGENCY)
Facility: HOSPITAL | Age: 74
Discharge: HOME | End: 2024-07-26
Attending: EMERGENCY MEDICINE
Payer: COMMERCIAL

## 2024-07-25 DIAGNOSIS — T50.901A ACCIDENTAL OVERDOSE, INITIAL ENCOUNTER: ICD-10-CM

## 2024-07-25 DIAGNOSIS — J44.1 COPD WITH ACUTE EXACERBATION (HCC): Primary | ICD-10-CM

## 2024-07-25 PROCEDURE — 99285 EMERGENCY DEPT VISIT HI MDM: CPT | Performed by: EMERGENCY MEDICINE

## 2024-07-25 PROCEDURE — 99285 EMERGENCY DEPT VISIT HI MDM: CPT

## 2024-07-25 RX ORDER — METHYLPREDNISOLONE SODIUM SUCCINATE 125 MG/2ML
125 INJECTION, POWDER, LYOPHILIZED, FOR SOLUTION INTRAMUSCULAR; INTRAVENOUS ONCE
Status: COMPLETED | OUTPATIENT
Start: 2024-07-26 | End: 2024-07-26

## 2024-07-25 RX ORDER — IPRATROPIUM BROMIDE AND ALBUTEROL SULFATE 2.5; .5 MG/3ML; MG/3ML
3 SOLUTION RESPIRATORY (INHALATION)
Status: COMPLETED | OUTPATIENT
Start: 2024-07-26 | End: 2024-07-26

## 2024-07-26 ENCOUNTER — TELEPHONE (OUTPATIENT)
Age: 74
End: 2024-07-26

## 2024-07-26 ENCOUNTER — APPOINTMENT (EMERGENCY)
Dept: RADIOLOGY | Facility: HOSPITAL | Age: 74
End: 2024-07-26
Payer: COMMERCIAL

## 2024-07-26 VITALS
BODY MASS INDEX: 32.22 KG/M2 | DIASTOLIC BLOOD PRESSURE: 62 MMHG | OXYGEN SATURATION: 94 % | TEMPERATURE: 97.6 F | SYSTOLIC BLOOD PRESSURE: 112 MMHG | HEART RATE: 61 BPM | WEIGHT: 181.88 LBS | RESPIRATION RATE: 18 BRPM

## 2024-07-26 LAB
ALBUMIN SERPL BCG-MCNC: 3.8 G/DL (ref 3.5–5)
ALP SERPL-CCNC: 29 U/L (ref 34–104)
ALT SERPL W P-5'-P-CCNC: 6 U/L (ref 7–52)
ANION GAP SERPL CALCULATED.3IONS-SCNC: 8 MMOL/L (ref 4–13)
AST SERPL W P-5'-P-CCNC: 16 U/L (ref 13–39)
BASOPHILS # BLD AUTO: 0.03 THOUSANDS/ÂΜL (ref 0–0.1)
BASOPHILS NFR BLD AUTO: 0 % (ref 0–1)
BILIRUB SERPL-MCNC: 0.27 MG/DL (ref 0.2–1)
BUN SERPL-MCNC: 18 MG/DL (ref 5–25)
CALCIUM SERPL-MCNC: 8.9 MG/DL (ref 8.4–10.2)
CARDIAC TROPONIN I PNL SERPL HS: 4 NG/L
CHLORIDE SERPL-SCNC: 104 MMOL/L (ref 96–108)
CO2 SERPL-SCNC: 25 MMOL/L (ref 21–32)
CREAT SERPL-MCNC: 1.44 MG/DL (ref 0.6–1.3)
EOSINOPHIL # BLD AUTO: 0.18 THOUSAND/ÂΜL (ref 0–0.61)
EOSINOPHIL NFR BLD AUTO: 2 % (ref 0–6)
ERYTHROCYTE [DISTWIDTH] IN BLOOD BY AUTOMATED COUNT: 14.1 % (ref 11.6–15.1)
GFR SERPL CREATININE-BSD FRML MDRD: 36 ML/MIN/1.73SQ M
GLUCOSE SERPL-MCNC: 92 MG/DL (ref 65–140)
GLUCOSE SERPL-MCNC: 99 MG/DL (ref 65–140)
HCT VFR BLD AUTO: 35.4 % (ref 34.8–46.1)
HGB BLD-MCNC: 10.9 G/DL (ref 11.5–15.4)
IMM GRANULOCYTES # BLD AUTO: 0.03 THOUSAND/UL (ref 0–0.2)
IMM GRANULOCYTES NFR BLD AUTO: 0 % (ref 0–2)
LACTATE SERPL-SCNC: 0.9 MMOL/L (ref 0.5–2)
LYMPHOCYTES # BLD AUTO: 2.02 THOUSANDS/ÂΜL (ref 0.6–4.47)
LYMPHOCYTES NFR BLD AUTO: 26 % (ref 14–44)
MCH RBC QN AUTO: 29.2 PG (ref 26.8–34.3)
MCHC RBC AUTO-ENTMCNC: 30.8 G/DL (ref 31.4–37.4)
MCV RBC AUTO: 95 FL (ref 82–98)
MONOCYTES # BLD AUTO: 0.55 THOUSAND/ÂΜL (ref 0.17–1.22)
MONOCYTES NFR BLD AUTO: 7 % (ref 4–12)
NEUTROPHILS # BLD AUTO: 5.04 THOUSANDS/ÂΜL (ref 1.85–7.62)
NEUTS SEG NFR BLD AUTO: 65 % (ref 43–75)
NRBC BLD AUTO-RTO: 0 /100 WBCS
PLATELET # BLD AUTO: 237 THOUSANDS/UL (ref 149–390)
PMV BLD AUTO: 9.7 FL (ref 8.9–12.7)
POTASSIUM SERPL-SCNC: 4.5 MMOL/L (ref 3.5–5.3)
PROT SERPL-MCNC: 6.5 G/DL (ref 6.4–8.4)
RBC # BLD AUTO: 3.73 MILLION/UL (ref 3.81–5.12)
SODIUM SERPL-SCNC: 137 MMOL/L (ref 135–147)
WBC # BLD AUTO: 7.85 THOUSAND/UL (ref 4.31–10.16)

## 2024-07-26 PROCEDURE — 96375 TX/PRO/DX INJ NEW DRUG ADDON: CPT

## 2024-07-26 PROCEDURE — 71045 X-RAY EXAM CHEST 1 VIEW: CPT

## 2024-07-26 PROCEDURE — 84484 ASSAY OF TROPONIN QUANT: CPT

## 2024-07-26 PROCEDURE — 96374 THER/PROPH/DIAG INJ IV PUSH: CPT

## 2024-07-26 PROCEDURE — 82948 REAGENT STRIP/BLOOD GLUCOSE: CPT

## 2024-07-26 PROCEDURE — 96361 HYDRATE IV INFUSION ADD-ON: CPT

## 2024-07-26 PROCEDURE — 94664 DEMO&/EVAL PT USE INHALER: CPT

## 2024-07-26 PROCEDURE — 83605 ASSAY OF LACTIC ACID: CPT

## 2024-07-26 PROCEDURE — 80053 COMPREHEN METABOLIC PANEL: CPT

## 2024-07-26 PROCEDURE — 36415 COLL VENOUS BLD VENIPUNCTURE: CPT

## 2024-07-26 PROCEDURE — 94640 AIRWAY INHALATION TREATMENT: CPT

## 2024-07-26 PROCEDURE — 85025 COMPLETE CBC W/AUTO DIFF WBC: CPT

## 2024-07-26 PROCEDURE — 94760 N-INVAS EAR/PLS OXIMETRY 1: CPT

## 2024-07-26 RX ORDER — PREDNISONE 20 MG/1
40 TABLET ORAL DAILY
Qty: 10 TABLET | Refills: 0 | Status: SHIPPED | OUTPATIENT
Start: 2024-07-27 | End: 2024-08-01

## 2024-07-26 RX ORDER — NALOXONE HYDROCHLORIDE 1 MG/ML
2 INJECTION PARENTERAL ONCE
Status: COMPLETED | OUTPATIENT
Start: 2024-07-26 | End: 2024-07-26

## 2024-07-26 RX ORDER — AZITHROMYCIN 250 MG/1
500 TABLET, FILM COATED ORAL DAILY
Qty: 8 TABLET | Refills: 0 | Status: SHIPPED | OUTPATIENT
Start: 2024-07-27 | End: 2024-07-31

## 2024-07-26 RX ORDER — AZITHROMYCIN 250 MG/1
500 TABLET, FILM COATED ORAL ONCE
Status: COMPLETED | OUTPATIENT
Start: 2024-07-26 | End: 2024-07-26

## 2024-07-26 RX ORDER — ONDANSETRON 2 MG/ML
4 INJECTION INTRAMUSCULAR; INTRAVENOUS ONCE
Status: COMPLETED | OUTPATIENT
Start: 2024-07-26 | End: 2024-07-26

## 2024-07-26 RX ADMIN — IPRATROPIUM BROMIDE AND ALBUTEROL SULFATE 3 ML: 2.5; .5 SOLUTION RESPIRATORY (INHALATION) at 00:18

## 2024-07-26 RX ADMIN — ONDANSETRON 4 MG: 2 INJECTION INTRAMUSCULAR; INTRAVENOUS at 01:12

## 2024-07-26 RX ADMIN — SODIUM CHLORIDE 1000 ML: 0.9 INJECTION, SOLUTION INTRAVENOUS at 00:00

## 2024-07-26 RX ADMIN — AZITHROMYCIN DIHYDRATE 500 MG: 250 TABLET ORAL at 03:26

## 2024-07-26 RX ADMIN — IPRATROPIUM BROMIDE AND ALBUTEROL SULFATE 3 ML: 2.5; .5 SOLUTION RESPIRATORY (INHALATION) at 00:06

## 2024-07-26 RX ADMIN — METHYLPREDNISOLONE SODIUM SUCCINATE 125 MG: 125 INJECTION, POWDER, FOR SOLUTION INTRAMUSCULAR; INTRAVENOUS at 00:17

## 2024-07-26 NOTE — ED ATTENDING ATTESTATION
7/25/2024  IFarhad DO, saw and evaluated the patient. I have discussed the patient with the resident/non-physician practitioner and agree with the resident's/non-physician practitioner's findings, Plan of Care, and MDM as documented in the resident's/non-physician practitioner's note, except where noted. All available labs and Radiology studies were reviewed.  I was present for key portions of any procedure(s) performed by the resident/non-physician practitioner and I was immediately available to provide assistance.       At this point I agree with the current assessment done in the Emergency Department.  I have conducted an independent evaluation of this patient a history and physical is as follows:    ED Course     73-year-old female with history of COPD apparently went outside to smoke weed possibly laced with fentanyl and became unresponsive.  EMS found patient with pinpoint pupils and per dyspneic.  They administered naloxone IV and patient regained consciousness.  She began breathing normal.  Currently patient alert and oriented.  Complains of slight wheezing.  Denies any chest pain or shortness of breath.  No nausea vomiting.  No weakness in the arms or legs.  No palpitations.  States she was not aware that marijuana was possibly laced with fentanyl.  She reports cocaine abuse.  Denies any chest pain right now.    Plan is to obtain labs, chest x-ray, administer DuoNeb for wheezing and observe the patient in the emergency department.        Critical Care Time  Procedures

## 2024-07-26 NOTE — TELEPHONE ENCOUNTER
Patient called the RX Refill Line. Message is being forwarded to the office.     Patient called wanting to let you know she went to the hospital last night and is back home.     Please contact patient at [phone] if any questions

## 2024-07-26 NOTE — ED PROVIDER NOTES
History  Chief Complaint   Patient presents with    Overdose - Accidental     Pt found unresponsive at home per family and EMS, OD on unknown substance     HPI    73-year-old female presents for evaluation of accidental overdose.  She has a history of COPD exacerbation, CAD s/p CABG.  Per EMS she was found unresponsive by family at home, was found to be hypotensive by EMS, but improved and responded after dose of Narcan.  At time of evaluation in ED, she was GCS of 14, although slightly sluggish in response.  She states she smoked some marijuana, possibly mixed with fentanyl, cigarettes and also snorted 2 lines of cocaine.  She reports heaviness in her chest, difficulty breathing, cough with increased sputum production.  Denies abdominal pain, nausea, vomiting, headache.    Prior to Admission Medications   Prescriptions Last Dose Informant Patient Reported? Taking?   ALPRAZolam (XANAX) 0.5 mg tablet  Self Yes No   Sig: Take by mouth 3 (three) times a day as needed for anxiety   DULoxetine (CYMBALTA) 60 mg delayed release capsule   No No   Sig: Take 1 capsule (60 mg total) by mouth daily   albuterol (2.5 mg/3 mL) 0.083 % nebulizer solution  Self No No   Sig: Take 3 mL (2.5 mg total) by nebulization 4 (four) times a day as needed for wheezing or shortness of breath   albuterol (PROVENTIL HFA,VENTOLIN HFA) 90 mcg/act inhaler  Self No No   Sig: Inhale 1 puff every 4 (four) hours as needed for wheezing   amLODIPine (NORVASC) 5 mg tablet   No No   Sig: Take 1 tablet (5 mg total) by mouth daily   atorvastatin (LIPITOR) 40 mg tablet  Self Yes No   Sig: Take 40 mg by mouth daily   b complex vitamins capsule  Self Yes No   Sig: Take 1 capsule by mouth every morning   buprenorphine-naloxone (Suboxone) 4-1 MG  Self Yes No   celecoxib (CeleBREX) 200 mg capsule   No No   Sig: Take 1 capsule (200 mg total) by mouth daily   clopidogrel (PLAVIX) 75 mg tablet  Self Yes No   Sig: Take 75 mg by mouth daily   esomeprazole (NexIUM) 40 MG  capsule   No No   Sig: Take 1 capsule (40 mg total) by mouth 2 (two) times a day before meals   fluticasone-umeclidinium-vilanterol (Trelegy Ellipta) 100-62.5-25 mcg/actuation inhaler  Self No No   Sig: Inhale 1 puff daily Rinse mouth after use.   gabapentin (NEURONTIN) 300 mg capsule  Self No No   Sig: Take 1 capsule (300 mg total) by mouth 3 (three) times a day   levothyroxine 100 mcg tablet  Self Yes No   losartan (COZAAR) 25 mg tablet  Self Yes No   Sig: Take 25 mg by mouth daily   metoclopramide (REGLAN) 10 mg tablet  Self Yes No   Patient not taking: Reported on 4/26/2024   metoprolol succinate (TOPROL-XL) 50 mg 24 hr tablet  Self No No   Sig: Take 1 tablet (50 mg total) by mouth daily   predniSONE 10 mg tablet  Self No No   Sig: Take 40 mg PO daily x 3 days, then 30 mg daily x 3 days, then 20 mg daily x 3 days, then 10 mg daily x 3 days, then stop.   Patient not taking: Reported on 5/6/2024   sodium chloride 0.9 % nebulizer solution  Self No No   Sig: Take 3 mL by nebulization as needed for wheezing   Patient not taking: Reported on 5/6/2024   traZODone (DESYREL) 50 mg tablet  Self Yes No   Sig: Take 75 mg by mouth daily at bedtime   venlafaxine (EFFEXOR-XR) 150 mg 24 hr capsule  Self Yes No   Sig: Take 225 mg by mouth daily   venlafaxine (EFFEXOR-XR) 75 mg 24 hr capsule  Self Yes No   Sig: Take 75 mg by mouth daily      Facility-Administered Medications: None       Past Medical History:   Diagnosis Date    Chronic pain 03/06/2023    COPD (chronic obstructive pulmonary disease) (HCC)     Disease of thyroid gland     Hypertension        Past Surgical History:   Procedure Laterality Date    CARDIAC SURGERY      COLONOSCOPY      HIP SURGERY      ORTHOPEDIC SURGERY      THORACIC AORTIC ANEURYSM REPAIR  09/2016    ascending thoracic aortic repair with RCA bypass with SVG x 1    UPPER GASTROINTESTINAL ENDOSCOPY         Family History   Problem Relation Age of Onset    Breast cancer Mother      I have reviewed and  agree with the history as documented.    E-Cigarette/Vaping    E-Cigarette Use Never User      E-Cigarette/Vaping Substances    Nicotine No     THC No     CBD No     Flavoring No     Other No     Unknown No      Social History     Tobacco Use    Smoking status: Every Day     Current packs/day: 1.00     Average packs/day: 1 pack/day for 50.0 years (50.0 ttl pk-yrs)     Types: Cigarettes     Passive exposure: Past    Smokeless tobacco: Never    Tobacco comments:     Currently smoking 5-6 cigarettes daily   Vaping Use    Vaping status: Never Used   Substance Use Topics    Alcohol use: Yes     Comment: occasionally    Drug use: Yes     Types: Marijuana, Cocaine     Comment: yes still Marijuana as of 04/26/24        Review of Systems   Constitutional:  Negative for chills and fever.   Eyes:  Negative for pain and visual disturbance.   Respiratory:  Positive for cough, chest tightness and shortness of breath.    Cardiovascular:  Positive for chest pain. Negative for palpitations.   Gastrointestinal:  Negative for abdominal pain, nausea and vomiting.   Genitourinary:  Negative for dysuria and hematuria.   Musculoskeletal:  Negative for arthralgias and back pain.   Skin:  Negative for color change and rash.   Neurological:  Positive for syncope. Negative for seizures.   All other systems reviewed and are negative.      Physical Exam  ED Triage Vitals   Temperature Pulse Respirations Blood Pressure SpO2   07/25/24 2357 07/25/24 2348 07/25/24 2352 07/25/24 2348 07/25/24 2348   97.6 °F (36.4 °C) 69 14 107/58 94 %      Temp Source Heart Rate Source Patient Position - Orthostatic VS BP Location FiO2 (%)   07/25/24 2357 07/25/24 2348 07/25/24 2348 07/25/24 2348 --   Oral Monitor Lying Right arm       Pain Score       --                    Orthostatic Vital Signs  Vitals:    07/25/24 2356 07/26/24 0200 07/26/24 0300 07/26/24 0400   BP: 98/56 118/58 109/76 112/62   Pulse:  68 64 61   Patient Position - Orthostatic VS: Lying Lying  Lying Lying       Physical Exam  Vitals and nursing note reviewed.   Constitutional:       General: She is in acute distress.      Appearance: She is well-developed.   HENT:      Head: Normocephalic and atraumatic.      Mouth/Throat:      Mouth: Mucous membranes are dry.   Eyes:      Extraocular Movements: Extraocular movements intact.      Conjunctiva/sclera: Conjunctivae normal.      Pupils: Pupils are equal, round, and reactive to light.   Cardiovascular:      Rate and Rhythm: Normal rate and regular rhythm.      Heart sounds: No murmur heard.  Pulmonary:      Effort: Pulmonary effort is normal. No respiratory distress.      Breath sounds: Wheezing present.   Abdominal:      Palpations: Abdomen is soft.      Tenderness: There is no abdominal tenderness.   Musculoskeletal:         General: No swelling.      Cervical back: Neck supple.   Skin:     General: Skin is warm and dry.      Capillary Refill: Capillary refill takes less than 2 seconds.   Neurological:      General: No focal deficit present.      Mental Status: She is alert and oriented to person, place, and time.   Psychiatric:         Mood and Affect: Mood normal.         ED Medications  Medications   sodium chloride 0.9 % bolus 1,000 mL (0 mL Intravenous Stopped 7/26/24 0253)   ipratropium-albuterol (DUO-NEB) 0.5-2.5 mg/3 mL inhalation solution 3 mL (3 mL Nebulization Given 7/26/24 0018)   methylPREDNISolone sodium succinate (Solu-MEDROL) injection 125 mg (125 mg Intravenous Given 7/26/24 0017)   naloxone (FOR EMS ONLY) (NARCAN) 2 MG/2ML injection 4 mg (0 mg Does not apply Given to EMS 7/26/24 0132)   ondansetron (ZOFRAN) injection 4 mg (4 mg Intravenous Given 7/26/24 0112)   azithromycin (ZITHROMAX) tablet 500 mg (500 mg Oral Given 7/26/24 0326)       Diagnostic Studies  Results Reviewed       Procedure Component Value Units Date/Time    HS Troponin 0hr (reflex protocol) [[id number]]  (Normal) Collected: 07/26/24 0009    Lab Status: Final result Specimen:  Blood from Arm, Left Updated: 07/26/24 0045     hs TnI 0hr 4 ng/L     Lactic acid, plasma (w/reflex if result > 2.0) [[id number]]  (Normal) Collected: 07/26/24 0009    Lab Status: Final result Specimen: Blood from Arm, Left Updated: 07/26/24 0038     LACTIC ACID 0.9 mmol/L     Narrative:      Result may be elevated if tourniquet was used during collection.    Comprehensive metabolic panel [[id number]]  (Abnormal) Collected: 07/26/24 0009    Lab Status: Final result Specimen: Blood from Arm, Left Updated: 07/26/24 0038     Sodium 137 mmol/L      Potassium 4.5 mmol/L      Chloride 104 mmol/L      CO2 25 mmol/L      ANION GAP 8 mmol/L      BUN 18 mg/dL      Creatinine 1.44 mg/dL      Glucose 99 mg/dL      Calcium 8.9 mg/dL      AST 16 U/L      ALT 6 U/L      Alkaline Phosphatase 29 U/L      Total Protein 6.5 g/dL      Albumin 3.8 g/dL      Total Bilirubin 0.27 mg/dL      eGFR 36 ml/min/1.73sq m     Narrative:      [employer] guidelines for Chronic Kidney Disease (CKD):     Stage 1 with normal or high GFR (GFR > 90 mL/min/1.73 square meters)    Stage 2 Mild CKD (GFR = 60-89 mL/min/1.73 square meters)    Stage 3A Moderate CKD (GFR = 45-59 mL/min/1.73 square meters)    Stage 3B Moderate CKD (GFR = 30-44 mL/min/1.73 square meters)    Stage 4 Severe CKD (GFR = 15-29 mL/min/1.73 square meters)    Stage 5 End Stage CKD (GFR <15 mL/min/1.73 square meters)  Note: GFR calculation is accurate only with a steady state creatinine    CBC and differential [[id number]]  (Abnormal) Collected: 07/26/24 0009    Lab Status: Final result Specimen: Blood from Arm, Left Updated: 07/26/24 0019     WBC 7.85 Thousand/uL      RBC 3.73 Million/uL      Hemoglobin 10.9 g/dL      Hematocrit 35.4 %      MCV 95 fL      MCH 29.2 pg      MCHC 30.8 g/dL      RDW 14.1 %      MPV 9.7 fL      Platelets 237 Thousands/uL      nRBC 0 /100 WBCs      Segmented % 65 %      Immature Grans % 0 %      Lymphocytes % 26 %      Monocytes % 7 %       Eosinophils Relative 2 %      Basophils Relative 0 %      Absolute Neutrophils 5.04 Thousands/µL      Absolute Immature Grans 0.03 Thousand/uL      Absolute Lymphocytes 2.02 Thousands/µL      Absolute Monocytes 0.55 Thousand/µL      Eosinophils Absolute 0.18 Thousand/µL      Basophils Absolute 0.03 Thousands/µL     Fingerstick Glucose (POCT) [[id number]]  (Normal) Collected: 07/26/24 0000    Lab Status: Final result Specimen: Blood Updated: 07/26/24 0001     POC Glucose 92 mg/dl                    XR chest 1 view portable   ED Interpretation by Roberto Sharif MD (07/26 0613)   WET READ: No acute cardiopulmonary disease. Findings unchanged from prior CXR.             Procedures  ECG 12 Lead Documentation Only    Date/Time: 7/26/2024 12:01 AM    Performed by: Roberto Sharif MD  Authorized by: Roberto Sharif MD    Patient location:  ED  Rate:     ECG rate:  67    ECG rate assessment: normal    Rhythm:     Rhythm: sinus rhythm    Ectopy:     Ectopy: none    QRS:     QRS axis:  Normal    QRS intervals:  Normal  Comments:      NSR, 67 bpm, normal axis.  MI interval 170 ms, QRS 88 ms, QTc 452 ms.  Low voltage QRS.  No ST elevations or ischemic changes noted.  When compared to EKG of 7/14/2023, no significant changes noted.        ED Course  ED Course as of 07/26/24 0616   Fri Jul 26, 2024   0048 Creatinine(!): 1.44  CORETTA                                 SBIRT 20yo+      Flowsheet Row Most Recent Value   Initial Alcohol Screen: US AUDIT-C     1. How often do you have a drink containing alcohol? 0 Filed at: 07/26/2024 0319   2. How many drinks containing alcohol do you have on a typical day you are drinking?  0 Filed at: 07/26/2024 0319   3a. Male UNDER 65: How often do you have five or more drinks on one occasion? 0 Filed at: 07/26/2024 0319   3b. FEMALE Any Age, or MALE 65+: How often do you have 4 or more drinks on one occassion? 0 Filed at: 07/26/2024 0319   Audit-C Score 0 Filed at: 07/26/2024 0319    ARNOLDO: How many times in the past year have you...    Used an illegal drug or used a prescription medication for non-medical reasons? Never Filed at: 07/26/2024 0319                  Medical Decision Making  73-year-old female presents for evaluation of accidental overdose.    On initial evaluation, patient is alert and oriented, but slightly confused and sluggish in responses which improves during course of evaluation.  PERRL, wheezing heard in all lung fields, otherwise unremarkable exam.  Labs, EKG, CXR ordered.  DuoNeb, Zofran and Solu-Medrol ordered for symptomatic relief.  Suspect patient is having a COPD exacerbation.  Creatinine 1.44, consistent with CORETTA.  Labs otherwise unremarkable.  IV fluids started.  On reevaluation, patient much improved after DuoNeb and steroids.  She was able to ambulate using her, which is her baseline.  She was prescribed azithromycin and prednisone for treatment of COPD exacerbation, with first dose of azithromycin given in ED.  Discussed return precautions with patient, and she is agreeable with this plan.  Stable for dispo.    Amount and/or Complexity of Data Reviewed  Labs: ordered. Decision-making details documented in ED Course.  Radiology: ordered.    Risk  Prescription drug management.          Disposition  Final diagnoses:   COPD with acute exacerbation (HCC)   Accidental overdose, initial encounter     Time reflects when diagnosis was documented in both MDM as applicable and the Disposition within this note       Time User Action Codes Description Comment    7/26/2024  3:17 AM Roberto Sharif Add [J44.1] COPD with acute exacerbation (HCC)     7/26/2024  3:17 AM Roberto Sharif Add [T50.901A] Accidental overdose, initial encounter           ED Disposition       ED Disposition   Discharge    Condition   Stable    Date/Time   Fri Jul 26, 2024 0317    Comment   Montserrat Sumner discharge to home/self care.                   Follow-up Information    None          Discharge Medication List as of 7/26/2024  3:20 AM        START taking these medications    Details   azithromycin (ZITHROMAX) 250 mg tablet Take 2 tablets (500 mg total) by mouth daily for 4 days Do not start before July 27, 2024., Starting Sat 7/27/2024, Until Wed 7/31/2024, Normal      !! predniSONE 20 mg tablet Take 2 tablets (40 mg total) by mouth daily for 5 days Do not start before July 27, 2024., Starting Sat 7/27/2024, Until u 8/1/2024, Normal       !! - Potential duplicate medications found. Please discuss with provider.        CONTINUE these medications which have NOT CHANGED    Details   albuterol (2.5 mg/3 mL) 0.083 % nebulizer solution Take 3 mL (2.5 mg total) by nebulization 4 (four) times a day as needed for wheezing or shortness of breath, Starting Wed 9/27/2023, Normal      albuterol (PROVENTIL HFA,VENTOLIN HFA) 90 mcg/act inhaler Inhale 1 puff every 4 (four) hours as needed for wheezing, Starting Tue 12/26/2023, Normal      ALPRAZolam (XANAX) 0.5 mg tablet Take by mouth 3 (three) times a day as needed for anxiety, Historical Med      amLODIPine (NORVASC) 5 mg tablet Take 1 tablet (5 mg total) by mouth daily, Starting Fri 6/14/2024, Normal      atorvastatin (LIPITOR) 40 mg tablet Take 40 mg by mouth daily, Historical Med      b complex vitamins capsule Take 1 capsule by mouth every morning, Historical Med      buprenorphine-naloxone (Suboxone) 4-1 MG Historical Med      celecoxib (CeleBREX) 200 mg capsule Take 1 capsule (200 mg total) by mouth daily, Starting Mon 5/6/2024, Normal      clopidogrel (PLAVIX) 75 mg tablet Take 75 mg by mouth daily, Historical Med      DULoxetine (CYMBALTA) 60 mg delayed release capsule Take 1 capsule (60 mg total) by mouth daily, Starting Mon 5/6/2024, Normal      esomeprazole (NexIUM) 40 MG capsule Take 1 capsule (40 mg total) by mouth 2 (two) times a day before meals, Starting Fri 6/14/2024, Normal      fluticasone-umeclidinium-vilanterol (Trelegy Ellipta)  100-62.5-25 mcg/actuation inhaler Inhale 1 puff daily Rinse mouth after use., Starting Tue 12/26/2023, Until Fri 12/20/2024, Normal      gabapentin (NEURONTIN) 300 mg capsule Take 1 capsule (300 mg total) by mouth 3 (three) times a day, Starting Mon 3/6/2023, Normal      levothyroxine 100 mcg tablet Starting Mon 8/21/2023, Historical Med      losartan (COZAAR) 25 mg tablet Take 25 mg by mouth daily, Starting Thu 5/11/2023, Historical Med      metoclopramide (REGLAN) 10 mg tablet Starting Mon 8/21/2023, Historical Med      metoprolol succinate (TOPROL-XL) 50 mg 24 hr tablet Take 1 tablet (50 mg total) by mouth daily, Starting Wed 11/29/2023, Until Mon 5/27/2024, Normal      !! predniSONE 10 mg tablet Take 40 mg PO daily x 3 days, then 30 mg daily x 3 days, then 20 mg daily x 3 days, then 10 mg daily x 3 days, then stop., Normal      sodium chloride 0.9 % nebulizer solution Take 3 mL by nebulization as needed for wheezing, Starting Thu 12/28/2023, Normal      traZODone (DESYREL) 50 mg tablet Take 75 mg by mouth daily at bedtime, Historical Med      !! venlafaxine (EFFEXOR-XR) 150 mg 24 hr capsule Take 225 mg by mouth daily, Historical Med      !! venlafaxine (EFFEXOR-XR) 75 mg 24 hr capsule Take 75 mg by mouth daily, Starting Thu 12/7/2023, Historical Med       !! - Potential duplicate medications found. Please discuss with provider.        No discharge procedures on file.    PDMP Review         Value Time User    PDMP Reviewed  Yes 5/6/2024  2:17 PM Julian Tobias,              ED Provider  Attending physically available and evaluated Montserrat Sumner. I managed the patient along with the ED Attending.    Electronically Signed by           Roberto Sharif MD  07/26/24 0669

## 2024-08-08 ENCOUNTER — NURSE TRIAGE (OUTPATIENT)
Age: 74
End: 2024-08-08

## 2024-08-08 NOTE — TELEPHONE ENCOUNTER
"Reason for Disposition   Information only question and nurse able to answer    Answer Assessment - Initial Assessment Questions  1. REASON FOR CALL or QUESTION: \"What is your reason for calling today?\" or \"How can I best help you?\" or \"What question do you have that I can help answer?\"          Pt. Called for CT scan results, reviewed provider recommendations, pt. Agreeable, please add order for EUS so pt. Can schedule, thank you    Protocols used: Information Only Call - No Triage-ADULT-[place]    "

## 2024-08-09 DIAGNOSIS — Q45.3 PANCREATIC ABNORMALITY: Primary | ICD-10-CM

## 2024-08-27 ENCOUNTER — TELEPHONE (OUTPATIENT)
Age: 74
End: 2024-08-27

## 2024-08-27 NOTE — TELEPHONE ENCOUNTER
Patients GI provider:  LASHAUN Stewart    Number to return call: [phone]    Reason for call: Pt called stating that her MRI is not until 12/18/24 and she is wondering if that is ok? Please reach out to pt and let her know.     Scheduled procedure/appointment date if applicable: Apt/procedure 12/18/24

## 2024-08-28 NOTE — TELEPHONE ENCOUNTER
I called and spoke to patient. She will call and try for [place]. No further action needed at this time.

## 2024-09-03 ENCOUNTER — TELEPHONE (OUTPATIENT)
Age: 74
End: 2024-09-03

## 2024-09-03 NOTE — TELEPHONE ENCOUNTER
Pt called stated her pcp want to know what mri form does central scheduling need. Connected pt to central scheduling for further assistance

## 2024-09-05 NOTE — PRE-PROCEDURE INSTRUCTIONS
Pre-Surgery Instructions:   Medication Instructions    albuterol (2.5 mg/3 mL) 0.083 % nebulizer solution Uses PRN- OK to take day of surgery    albuterol (PROVENTIL HFA,VENTOLIN HFA) 90 mcg/act inhaler Uses PRN- OK to take day of surgery    ALPRAZolam (XANAX) 0.5 mg tablet Take day of surgery.    amLODIPine (NORVASC) 5 mg tablet Take day of surgery.    atorvastatin (LIPITOR) 40 mg tablet Take day of surgery.    b complex vitamins capsule Once/week    buprenorphine-naloxone (Suboxone) 4-1 MG Take day of surgery.    celecoxib (CeleBREX) 200 mg capsule Take day of surgery.    clopidogrel (PLAVIX) 75 mg tablet Take day of surgery.If falls on every other day    DULoxetine (CYMBALTA) 60 mg delayed release capsule Take day of surgery.    esomeprazole (NexIUM) 40 MG capsule Take day of surgery.    fluticasone-umeclidinium-vilanterol (Trelegy Ellipta) 100-62.5-25 mcg/actuation inhaler Take night before surgery    levothyroxine 100 mcg tablet Take day of surgery.    losartan (COZAAR) 25 mg tablet Hold day of surgery.    metoprolol succinate (TOPROL-XL) 50 mg 24 hr tablet Take day of surgery.    oxygen gas Take night before surgery    sodium chloride 0.9 % nebulizer solution Uses PRN- OK to take day of surgery    traZODone (DESYREL) 50 mg tablet Take night before surgery    venlafaxine (EFFEXOR-XR) 150 mg 24 hr capsule Take night before surgery    venlafaxine (EFFEXOR-XR) 75 mg 24 hr capsule Take night before surgery    MRI with anesthesia questionnaire    1) Patient BMI?32.22kg/m2    2) Are you able to lie completely flat?yes    3) Do you have a history of sleep apnea or CPAP use?no    4) At which campus is MRI currently scheduled? [facility]  Medication instructions for day of procedure reviewed. Please use only a sip of water to take your instructed morning medications (if any).      You will receive a call one business day prior to procedure with an arrival time and hospital directions. If procedure is scheduled on a Monday,  the hospital will be calling you on the Friday prior to your procedure. If you have not heard from anyone by 8pm, please call the hospital supervisor through the hospital  at [phone]. ([facility] [phone]).     Please do not eat or drink after 11 pm the night before the MRI. Please take your medications with a sip of water at least 2 hours prior to their arrival time.     Please shower either the night prior to the procedure or the morning of the procedure. Dress in clean, comfortable clothes. All patients will be required to change into a hospital gown. Street clothes are not permitted in the MRI. Magnetic nail polish must be removed prior to the arrival.      Keep any valuables, jewelry, piercings at home.     Please bring your insurance cards, a form of photo ID, physician order, and a list of medications with you. Arrive 15 minutes prior to your given arrival time in order to register. Please bring any prior CT or MRI studies of the same area that were not performed at a [facility] along.      Arrange for a responsible person to drive patient to and from the hospital on the day of the procedure. Visitor Guidelines discussed.     Call the prescribing physician's office with any new illnesses, exposures, or additional questions prior to procedure.

## 2024-09-12 ENCOUNTER — ANESTHESIA EVENT (OUTPATIENT)
Dept: RADIOLOGY | Facility: HOSPITAL | Age: 74
End: 2024-09-12
Payer: COMMERCIAL

## 2024-09-17 NOTE — ANESTHESIA PREPROCEDURE EVALUATION
Procedure:  MRI ABDOMEN W WO CONTRAST AND MRCP    Relevant Problems   CARDIO   (+) Aneurysm of ascending aorta without rupture (HCC)   (+) CAD (coronary artery disease)   (+) History of coronary artery bypass graft   (+) Hyperlipidemia   (+) Hypertension      ENDO   (+) Hypothyroid      GI/HEPATIC  Abdominal pain   (+) Dysphagia   (+) Gastroesophageal reflux disease      MUSCULOSKELETAL  S/p left hip pins      NEURO/PSYCH   (+) Anxiety   (+) Chronic pain syndrome      PULMONARY   (+) Centrilobular emphysema (HCC)   (+) Chronic respiratory failure with hypoxia and hypercapnia (HCC)   (+) Oxygen 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      Anesthesia Plan  ASA Score- 3     Anesthesia Type- IV sedation with anesthesia with ASA Monitors.         Additional Monitors:     Airway Plan:     Comment: Given chronic opioid use and history of cocaine, recommend glycopyrrolate pre-treatment, ketamine bolus, then propofol infusion.  Patient rinsing mouth with water on arrival, cautioned to stop due to potential aspiration risk.  Planning for gastric ultrasound to confirm empty stomach.      The ultrasound revealed stomach is empty will proceed with MRI.       Plan Factors-    Chart reviewed.        Patient is a current smoker.  Patient instructed to abstain from smoking on day of procedure.             Induction- intravenous.    Postoperative Plan-     Perioperative Resuscitation Plan - Level 1 - Full Code.       Informed Consent- Anesthetic plan and risks discussed with patient.  I personally reviewed this patient with the CRNA. Discussed and  agreed on the Anesthesia Plan with the CRNA..

## 2024-09-18 ENCOUNTER — ANESTHESIA (OUTPATIENT)
Dept: RADIOLOGY | Facility: HOSPITAL | Age: 74
End: 2024-09-18
Payer: COMMERCIAL

## 2024-09-18 ENCOUNTER — HOSPITAL ENCOUNTER (OUTPATIENT)
Dept: RADIOLOGY | Facility: HOSPITAL | Age: 74
Discharge: HOME | End: 2024-09-18
Payer: COMMERCIAL

## 2024-09-18 VITALS
RESPIRATION RATE: 18 BRPM | DIASTOLIC BLOOD PRESSURE: 69 MMHG | OXYGEN SATURATION: 92 % | HEART RATE: 61 BPM | SYSTOLIC BLOOD PRESSURE: 136 MMHG | TEMPERATURE: 96.5 F

## 2024-09-18 DIAGNOSIS — Q45.3 PANCREATIC ABNORMALITY: ICD-10-CM

## 2024-09-18 PROBLEM — F12.90 MARIJUANA SMOKER: Status: ACTIVE | Noted: 2024-09-18

## 2024-09-18 PROCEDURE — 74183 MRI ABD W/O CNTR FLWD CNTR: CPT

## 2024-09-18 PROCEDURE — A9585 GADOBUTROL INJECTION: HCPCS | Performed by: FAMILY MEDICINE

## 2024-09-18 PROCEDURE — 76376 3D RENDER W/INTRP POSTPROCES: CPT

## 2024-09-18 RX ORDER — PROPOFOL 10 MG/ML
INJECTION, EMULSION INTRAVENOUS CONTINUOUS PRN
Status: DISCONTINUED | OUTPATIENT
Start: 2024-09-18 | End: 2024-09-18

## 2024-09-18 RX ORDER — MIDAZOLAM HYDROCHLORIDE 2 MG/2ML
INJECTION, SOLUTION INTRAMUSCULAR; INTRAVENOUS AS NEEDED
Status: DISCONTINUED | OUTPATIENT
Start: 2024-09-18 | End: 2024-09-18

## 2024-09-18 RX ORDER — SODIUM CHLORIDE, SODIUM LACTATE, POTASSIUM CHLORIDE, CALCIUM CHLORIDE 600; 310; 30; 20 MG/100ML; MG/100ML; MG/100ML; MG/100ML
75 INJECTION, SOLUTION INTRAVENOUS CONTINUOUS
Status: DISCONTINUED | OUTPATIENT
Start: 2024-09-18 | End: 2024-09-19 | Stop reason: HOSPADM

## 2024-09-18 RX ORDER — SODIUM CHLORIDE, SODIUM LACTATE, POTASSIUM CHLORIDE, CALCIUM CHLORIDE 600; 310; 30; 20 MG/100ML; MG/100ML; MG/100ML; MG/100ML
INJECTION, SOLUTION INTRAVENOUS CONTINUOUS PRN
Status: DISCONTINUED | OUTPATIENT
Start: 2024-09-18 | End: 2024-09-18

## 2024-09-18 RX ORDER — GADOBUTROL 604.72 MG/ML
8 INJECTION INTRAVENOUS
Status: COMPLETED | OUTPATIENT
Start: 2024-09-18 | End: 2024-09-18

## 2024-09-18 RX ORDER — ASPIRIN 81 MG/1
81 TABLET, CHEWABLE ORAL DAILY
COMMUNITY

## 2024-09-18 RX ORDER — KETAMINE HCL IN NACL, ISO-OSM 100MG/10ML
SYRINGE (ML) INJECTION AS NEEDED
Status: DISCONTINUED | OUTPATIENT
Start: 2024-09-18 | End: 2024-09-18

## 2024-09-18 RX ADMIN — Medication 10 MG: at 12:02

## 2024-09-18 RX ADMIN — MIDAZOLAM 2 MG: 1 INJECTION INTRAMUSCULAR; INTRAVENOUS at 11:58

## 2024-09-18 RX ADMIN — MIDAZOLAM 2 MG: 1 INJECTION INTRAMUSCULAR; INTRAVENOUS at 11:55

## 2024-09-18 RX ADMIN — PROPOFOL 50 MCG/KG/MIN: 10 INJECTION, EMULSION INTRAVENOUS at 11:56

## 2024-09-18 RX ADMIN — SODIUM CHLORIDE, SODIUM LACTATE, POTASSIUM CHLORIDE, AND CALCIUM CHLORIDE 75 ML/HR: .6; .31; .03; .02 INJECTION, SOLUTION INTRAVENOUS at 11:02

## 2024-09-18 RX ADMIN — GADOBUTROL 8 ML: 604.72 INJECTION INTRAVENOUS at 12:33

## 2024-09-18 RX ADMIN — Medication 10 MG: at 11:56

## 2024-09-18 RX ADMIN — SODIUM CHLORIDE, SODIUM LACTATE, POTASSIUM CHLORIDE, AND CALCIUM CHLORIDE: .6; .31; .03; .02 INJECTION, SOLUTION INTRAVENOUS at 11:52

## 2024-09-18 NOTE — ANESTHESIA POSTPROCEDURE EVALUATION
Post-Op Assessment Note    CV Status:  Stable  Pain Score: 0    Pain management: adequate       Mental Status:  Sleepy   Hydration Status:  Stable   PONV Controlled:  None   Airway Patency:  Patent     Post Op Vitals Reviewed: Yes    No anethesia notable event occurred.    Staff: CRNA               BP   128/66   Temp      Pulse  78   Resp   16   SpO2   98 on FM o2

## 2024-09-18 NOTE — PERIOPERATIVE NURSING NOTE
AVS reviewed with patient  and all concerns were answered. IV line is taken out. Patient tolerated PO fluids well. Denies any pain at this time. All belongings were sent with patient. Patient left floor with firm understanding of discharge instructions. Patient escorted to her ride via wheelchair by this nurse.

## 2024-09-18 NOTE — LETTER
[facility]  [address] [address]      September 25, 2024    MRN: [id number]     Phone: [phone]     Dear Ms. Sumner,    You recently had a(n) MRI performed on 9/18/2024 at  [facility] that was requested by Mariama Sy PA-C. The study was reviewed by a radiologist, which is a physician who specializes in medical imaging. The radiologist issued a report describing his or her findings. In that report there was a finding that the radiologist felt warranted further discussion with your health care provider and that discussion would be beneficial to you.      The results were sent to Mariama Sy PA-C on 09/19/2024  1:24 PM. We recommend that you contact Mariama Sy PA-C at [phone] or set up an appointment to discuss the results of the imaging test. If you have already heard from Mariama Sy PA-C regarding the results of your study, you can disregard this letter.     This letter is not meant to alarm you, but intended to encourage you to follow-up on your results with the provider that sent you for the imaging study. In addition, we have enclosed answers to frequently asked questions by other patients who have also received a letter to review results with their health care provider (see page two).      Thank you for choosing [facility] for your medical imaging needs.                                                                                                                                                        FREQUENTLY ASKED QUESTIONS    Why am I receiving this letter?  [place] State Law requires us to notify patients who have findings on imaging exams that may require more testing or follow-up with a health professional within the next 3 months.        How serious is the finding on the imaging test?  This letter is sent to all patients who may need follow-up or more testing within the  next 3 months.  Receiving this letter does not necessarily mean you have a life-threatening imaging finding or disease.  Recommendations in the radiologist’s imaging report are general in nature and it is up to your healthcare provider to say whether those recommendations make sense for your situation.  You are strongly encouraged to talk to your health care provider about the results and ask whether additional steps need to be taken.    Where can I get a copy of the final report for my recent radiology exam?  To get a full copy of the report you can access your records online at [email or web address] or please contact [facility]’s Medical Records Department at [phone] Monday through Friday between 8 am and 6 pm.         What do I need to do now?           Please contact your health care provider who requested the imaging study to discuss what further actions (if any) are needed.  You may have already heard from (your ordering provider) in regard to this test in which case you can disregard this letter.        NOTICE IN ACCORDANCE WITH THE [place] STATE “PATIENT TEST RESULT INFORMATION ACT OF 2018”    You are receiving this notice as a result of a determination by your diagnostic imaging service that further discussions of your test results are warranted and would be beneficial to you.    The complete results of your test or tests have been or will be sent to the health care practitioner that ordered the test or tests. It is recommended that you contact your health care practitioner to discuss your results as soon as possible.

## 2024-09-18 NOTE — PERIOPERATIVE NURSING NOTE
Patient received from PACU, alert and awake. PO fluids offered. Vitals stable, no c/o pain a this time.  IV line is running.  Call bell within reach. Plan of care in progress.

## 2024-09-19 ENCOUNTER — TELEPHONE (OUTPATIENT)
Age: 74
End: 2024-09-19

## 2024-09-19 NOTE — TELEPHONE ENCOUNTER
Called patient on phone sent to [employer].Left message with phone number for GI office and to leave GI know a time before 4:30 PM that she is available to speak with GI.  When patient returns call please forward message to task provider.  Thank you

## 2024-09-19 NOTE — TELEPHONE ENCOUNTER
Patients GI provider:  LASHAUN Stewart    Number to return call: [phone]    Reason for call: Leia from Radiology called with significant finding on MRI done 09/18/24    Scheduled procedure/appointment date if applicable: Apt/procedure 09/18/24

## 2024-09-20 ENCOUNTER — PREP FOR PROCEDURE (OUTPATIENT)
Dept: GASTROENTEROLOGY | Facility: CLINIC | Age: 74
End: 2024-09-20

## 2024-09-20 DIAGNOSIS — R93.5 ABNORMAL ABDOMINAL MRI: Primary | ICD-10-CM

## 2024-09-20 DIAGNOSIS — K86.89 DILATION OF PANCREATIC DUCT: ICD-10-CM

## 2024-09-20 NOTE — TELEPHONE ENCOUNTER
Message does not need to be forwarded to GI. Message was being sent to Cardiology [facility] concerning appointment from their clinical team. Thank you

## 2024-09-20 NOTE — TELEPHONE ENCOUNTER
Our mutual patient is scheduled for procedure:  ERCP    On: October 3rd, 2024     With: Dr. Harry Rothman MD    He/She is taking the following blood thinner: Plavix (Clopidogrel)    Can this be stopped  5 days prior to the procedure    Physician Approving clearance: Mushtaq Rodriguez MD

## 2024-09-20 NOTE — TELEPHONE ENCOUNTER
Spoke to patient on phone aware of results of MRI and recommendations for ERCP for further evaluation.  Patient is agreeable to procedure.  Message sent to Katerina Elias to schedule procedure.  Thank you

## 2024-09-20 NOTE — TELEPHONE ENCOUNTER
Procedure: ERCP  Scheduled date of procedure (as of today):  10/05/24  Physician performing procedure: Dr Rothman  Location of procedure: [facility]   Instructions reviewed with patient by:  Katerina CARPIO   Clearances: Plavix hold

## 2024-09-23 ENCOUNTER — OFFICE VISIT (OUTPATIENT)
Dept: PULMONOLOGY | Facility: MEDICAL CENTER | Age: 74
End: 2024-09-23
Payer: COMMERCIAL

## 2024-09-23 VITALS
SYSTOLIC BLOOD PRESSURE: 144 MMHG | DIASTOLIC BLOOD PRESSURE: 82 MMHG | HEART RATE: 81 BPM | HEIGHT: 63 IN | OXYGEN SATURATION: 94 % | RESPIRATION RATE: 16 BRPM | BODY MASS INDEX: 32.07 KG/M2 | WEIGHT: 181 LBS | TEMPERATURE: 97.8 F

## 2024-09-23 DIAGNOSIS — R91.1 LUNG NODULE: ICD-10-CM

## 2024-09-23 DIAGNOSIS — J96.12 CHRONIC RESPIRATORY FAILURE WITH HYPOXIA AND HYPERCAPNIA (HCC): ICD-10-CM

## 2024-09-23 DIAGNOSIS — F17.211 CIGARETTE NICOTINE DEPENDENCE IN REMISSION: ICD-10-CM

## 2024-09-23 DIAGNOSIS — J43.2 CENTRILOBULAR EMPHYSEMA (HCC): Primary | Chronic | ICD-10-CM

## 2024-09-23 DIAGNOSIS — E66.9 OBESITY (BMI 30-39.9): ICD-10-CM

## 2024-09-23 DIAGNOSIS — J96.11 CHRONIC RESPIRATORY FAILURE WITH HYPOXIA AND HYPERCAPNIA (HCC): ICD-10-CM

## 2024-09-23 PROCEDURE — G2211 COMPLEX E/M VISIT ADD ON: HCPCS | Performed by: NURSE PRACTITIONER

## 2024-09-23 PROCEDURE — 99214 OFFICE O/P EST MOD 30 MIN: CPT | Performed by: NURSE PRACTITIONER

## 2024-09-23 RX ORDER — FLUTICASONE FUROATE, UMECLIDINIUM BROMIDE AND VILANTEROL TRIFENATATE 200; 62.5; 25 UG/1; UG/1; UG/1
1 POWDER RESPIRATORY (INHALATION) DAILY
Qty: 30 BLISTER | Refills: 0 | Status: SHIPPED | COMMUNITY
Start: 2024-09-23 | End: 2024-10-23

## 2024-09-23 RX ORDER — PREDNISONE 20 MG/1
40 TABLET ORAL DAILY
Qty: 10 TABLET | Refills: 0 | Status: SHIPPED | OUTPATIENT
Start: 2024-09-23

## 2024-09-23 RX ORDER — GLIMEPIRIDE 2 MG/1
TABLET ORAL
COMMUNITY
Start: 2024-09-09

## 2024-09-23 NOTE — ASSESSMENT & PLAN NOTE
She was due for repeat CT of the chest in June.  I reordered this today and asked for it to be scheduled in the next few weeks.  She was agreeable.

## 2024-09-23 NOTE — ASSESSMENT & PLAN NOTE
She continues with 2.5 L of oxygen at bedtime and as needed during the day.  She did have borderline hypercapnia on previous blood gases.  She had a sleep study which did not reveal obstructive sleep apnea.

## 2024-09-23 NOTE — PROGRESS NOTES
Pulmonary Follow-Up Note   Montserrat Sumner 73 y.o. female MRN: [id number]  9/23/2024      Assessment/Plan:    Problem List Items Addressed This Visit          Respiratory    Centrilobular emphysema (HCC) - Primary (Chronic)     I have given her a sample of Trelegy 200 today and she is aware of proper use and technique.  If she feels this is beneficial, she will call our office and we can send a prescription to her pharmacy.  If she does not notice improvement with the increased ICS, we can also trial Breztri, as she may have limited ability to inhale the Trelegy.  She will continue with albuterol MDI/nebulized as needed.  Her last PFTs in March 2023 showed moderate obstruction.  Given her continued smoking and change in symptoms, she will have PFTs in about 2 months prior to her next follow-up to reevaluate.  We can consider pulmonary rehab for her symptoms as well depending on the results.  I have given her prednisone 40 mg daily for 5 days to use if her symptoms should worsen.  I advised her that if her symptoms are worse, she should defer her ERCP until her breathing is at baseline for her.         Relevant Medications    predniSONE 20 mg tablet    fluticasone-umeclidinium-vilanterol (Trelegy Ellipta) 200-62.5-25 mcg/actuation AEPB inhaler    Other Relevant Orders    Complete PFT with post Bronchodilator and Six Minute walk    Lung nodule     She was due for repeat CT of the chest in June.  I reordered this today and asked for it to be scheduled in the next few weeks.  She was agreeable.         Relevant Medications    fluticasone-umeclidinium-vilanterol (Trelegy Ellipta) 200-62.5-25 mcg/actuation AEPB inhaler    Other Relevant Orders    CT chest wo contrast    Chronic respiratory failure with hypoxia and hypercapnia (HCC)     She continues with 2.5 L of oxygen at bedtime and as needed during the day.  She did have borderline hypercapnia on previous blood gases.  She had a sleep study which did not reveal  "obstructive sleep apnea.            Behavioral Health    Cigarette nicotine dependence in remission     We discussed tobacco cessation for 3 minutes today.  It is difficult for her to completely quit smoking but she would like to work on quitting smoking for herself and for her father.  She will continue to cut down as able.  She is aware of the risks of continued smoking and the benefits of complete cessation.            Other    Obesity (BMI 30-39.9)     Lifestyle modifications and weight loss if able.            Return in about 3 months (around 12/23/2024), or if symptoms worsen or fail to improve.    All of Montserrat's questions were answered prior to leaving the office today. She will follow-up in three months or sooner should the need arise. She is aware to call our office with any further questions or concerns.    History of Present Illness   Reason for Visit: Follow-up  Chief Complaint: \"I don't feel good.\"  HPI: Montserrat Sumner is a 73 y.o. female who presents to the office today for follow-up.  Montserrat reports that she has felt tight with some increase in dyspnea on exertion and fatigue with exertion.  She reports that she continues to use Trelegy 1 puff once a day and uses albuterol approximately 4 times daily.  She has not had a significant increase in wheezing or acute symptoms, but a general lack of improvement from her exacerbation in June.  She continues to smoke about 20 cigarettes a week.  Aside from the above, no other complaints.    Review of Systems   All other systems reviewed and are negative.  A full 12-point review of systems was completed and is negative except for those outlined in the HPI.    Historical Information   Past Medical History:   Diagnosis Date    Anxiety     Chronic pain 03/06/2023    lower abdomen and back    COPD (chronic obstructive pulmonary disease) ([facility])     \"passes out\" with O2 levels dropping    Disease of thyroid gland     GERD (gastroesophageal reflux disease)     " History of transfusion     with aneurysm repair-autologous-self and daughter    Hyperlipidemia     Hypertension     Teeth missing     Wears glasses     For reading     Past Surgical History:   Procedure Laterality Date    BACK SURGERY      x2 herniated, crushed disc in the lumbar, ablation    CHOLECYSTECTOMY      COLONOSCOPY      FRACTURE SURGERY Left     hip-pinned    HYSTERECTOMY      salpingo-oophorectomy    THORACIC AORTIC ANEURYSM REPAIR  09/2016    ascending thoracic aortic repair with RCA bypass with SVG x 1    TONSILLECTOMY      UPPER GASTROINTESTINAL ENDOSCOPY       Family History   Problem Relation Age of Onset    Breast cancer Mother      Social History   Social History     Substance and Sexual Activity   Alcohol Use Yes    Comment: occasionally     Social History     Substance and Sexual Activity   Drug Use Yes    Types: Marijuana, Cocaine    Comment: yes still Marijuana as of 9/5/24-2 times weekly     Social History     Tobacco Use   Smoking Status Every Day    Current packs/day: 0.25    Average packs/day: 0.3 packs/day for 56.7 years (14.2 ttl pk-yrs)    Types: Cigarettes    Start date: 1968    Passive exposure: Past   Smokeless Tobacco Never   Tobacco Comments    Currently smoking 5-6 cigarettes daily     E-Cigarette/Vaping    E-Cigarette Use Never User      E-Cigarette/Vaping Substances    Nicotine No     THC No     CBD No     Flavoring No     Other No     Unknown No        Meds/Allergies     Current Outpatient Medications:     albuterol (2.5 mg/3 mL) 0.083 % nebulizer solution, Take 3 mL (2.5 mg total) by nebulization 4 (four) times a day as needed for wheezing or shortness of breath, Disp: 360 mL, Rfl: 7    albuterol (PROVENTIL HFA,VENTOLIN HFA) 90 mcg/act inhaler, Inhale 1 puff every 4 (four) hours as needed for wheezing, Disp: 54 g, Rfl: 3    ALPRAZolam (XANAX) 0.5 mg tablet, Take 0.5 mg by mouth 2 (two) times a day, Disp: , Rfl:     amLODIPine (NORVASC) 5 mg tablet, Take 1 tablet (5 mg total) by  mouth daily (Patient taking differently: Take 5 mg by mouth every morning), Disp: 10 tablet, Rfl: 0    aspirin 81 mg chewable tablet, Chew 81 mg daily, Disp: , Rfl:     atorvastatin (LIPITOR) 40 mg tablet, Take 40 mg by mouth every morning, Disp: , Rfl:     b complex vitamins capsule, Take 1 capsule by mouth once a week, Disp: , Rfl:     buprenorphine-naloxone (Suboxone) 4-1 MG, every morning Wafer, Disp: , Rfl:     celecoxib (CeleBREX) 200 mg capsule, Take 1 capsule (200 mg total) by mouth daily (Patient taking differently: Take 200 mg by mouth every morning), Disp: 90 capsule, Rfl: 1    DULoxetine (CYMBALTA) 60 mg delayed release capsule, Take 1 capsule (60 mg total) by mouth daily (Patient taking differently: Take 60 mg by mouth every morning), Disp: 90 capsule, Rfl: 1    esomeprazole (NexIUM) 40 MG capsule, Take 1 capsule (40 mg total) by mouth 2 (two) times a day before meals, Disp: 180 capsule, Rfl: 1    fluticasone-umeclidinium-vilanterol (Trelegy Ellipta) 100-62.5-25 mcg/actuation inhaler, Inhale 1 puff daily Rinse mouth after use. (Patient taking differently: Inhale 1 puff daily at bedtime Rinse mouth after use.), Disp: 180 blister, Rfl: 3    fluticasone-umeclidinium-vilanterol (Trelegy Ellipta) 200-62.5-25 mcg/actuation AEPB inhaler, Inhale 1 puff daily Rinse mouth after use., Disp: 30 blister, Rfl: 0    levothyroxine 100 mcg tablet, Take 100 mcg by mouth every morning, Disp: , Rfl:     losartan (COZAAR) 25 mg tablet, Take 25 mg by mouth every morning, Disp: , Rfl:     metoclopramide (REGLAN) 10 mg tablet, , Disp: , Rfl:     metoprolol succinate (TOPROL-XL) 50 mg 24 hr tablet, Take 1 tablet (50 mg total) by mouth daily (Patient taking differently: Take 50 mg by mouth every morning), Disp: 90 tablet, Rfl: 1    oxygen gas, Inhale continuous as needed 2.5 L, Disp: , Rfl:     predniSONE 20 mg tablet, Take 2 tablets (40 mg total) by mouth daily, Disp: 10 tablet, Rfl: 0    sodium chloride 0.9 % nebulizer  "solution, Take 3 mL by nebulization as needed for wheezing, Disp: 360 mL, Rfl: 1    traZODone (DESYREL) 50 mg tablet, Take 100 mg by mouth daily at bedtime, Disp: , Rfl:     venlafaxine (EFFEXOR-XR) 150 mg 24 hr capsule, Take 150 mg by mouth daily at bedtime, Disp: , Rfl:     venlafaxine (EFFEXOR-XR) 75 mg 24 hr capsule, Take 75 mg by mouth daily at bedtime, Disp: , Rfl:     clopidogrel (PLAVIX) 75 mg tablet, Take 75 mg by mouth every other day (Patient not taking: Reported on 9/23/2024), Disp: , Rfl:     gabapentin (NEURONTIN) 300 mg capsule, Take 1 capsule (300 mg total) by mouth 3 (three) times a day (Patient not taking: Reported on 9/23/2024), Disp: 90 capsule, Rfl: 1    glimepiride (AMARYL) 2 mg tablet, TAKE 1 TABLET 2 TIMES A DAY ORALLY (Patient not taking: Reported on 9/23/2024), Disp: , Rfl:   No Known Allergies    Vitals: Blood pressure 144/82, pulse 81, temperature 97.8 °F (36.6 °C), temperature source Temporal, resp. rate 16, height 5' 3\" (1.6 m), weight 82.1 kg (181 lb), SpO2 94%. Body mass index is 32.06 kg/m². Oxygen Therapy  SpO2: 94 %    Physical Exam:  Physical Exam  Vitals reviewed.   Constitutional:       General: She is not in acute distress.     Appearance: She is well-developed. She is obese. She is not toxic-appearing or diaphoretic.   HENT:      Head: Normocephalic and atraumatic.   Eyes:      General: No scleral icterus.     Extraocular Movements: Extraocular movements intact.   Neck:      Trachea: No tracheal deviation.   Cardiovascular:      Rate and Rhythm: Normal rate and regular rhythm.      Heart sounds: S1 normal and S2 normal. No murmur heard.     No friction rub. No gallop.   Pulmonary:      Effort: Pulmonary effort is normal. No tachypnea, accessory muscle usage or respiratory distress.      Breath sounds: Normal breath sounds. No stridor. No decreased breath sounds, wheezing, rhonchi or rales.   Chest:      Chest wall: No tenderness.   Abdominal:      General: Bowel sounds are " "normal. There is no distension.      Palpations: Abdomen is soft.      Tenderness: There is no abdominal tenderness.   Musculoskeletal:      Cervical back: Neck supple.      Right lower leg: No edema.      Left lower leg: No edema.   Skin:     General: Skin is warm and dry.      Findings: No rash.   Neurological:      Mental Status: She is alert and oriented to person, place, and time.      GCS: GCS eye subscore is 4. GCS verbal subscore is 5. GCS motor subscore is 6.   Psychiatric:         Speech: Speech normal.         Behavior: Behavior normal. Behavior is cooperative.       Imaging and other studies:    Chest x-ray done in July showed no acute pulmonary infiltrate, effusion, or mass.      JAZMINE Enamorado  [facility]        Portions of the record may have been created with voice recognition software.  Occasional wrong word or \"sound a like\" substitutions may have occurred due to the inherent limitations of voice recognition software.  Read the chart carefully and recognize, using context, where substitutions have occurred or contact the dictating provider.  "

## 2024-09-23 NOTE — ASSESSMENT & PLAN NOTE
I have given her a sample of Trelegy 200 today and she is aware of proper use and technique.  If she feels this is beneficial, she will call our office and we can send a prescription to her pharmacy.  If she does not notice improvement with the increased ICS, we can also trial Breztri, as she may have limited ability to inhale the Trelegy.  She will continue with albuterol MDI/nebulized as needed.  Her last PFTs in March 2023 showed moderate obstruction.  Given her continued smoking and change in symptoms, she will have PFTs in about 2 months prior to her next follow-up to reevaluate.  We can consider pulmonary rehab for her symptoms as well depending on the results.  I have given her prednisone 40 mg daily for 5 days to use if her symptoms should worsen.  I advised her that if her symptoms are worse, she should defer her ERCP until her breathing is at baseline for her.

## 2024-09-23 NOTE — ASSESSMENT & PLAN NOTE
We discussed tobacco cessation for 3 minutes today.  It is difficult for her to completely quit smoking but she would like to work on quitting smoking for herself and for her father.  She will continue to cut down as able.  She is aware of the risks of continued smoking and the benefits of complete cessation.

## 2024-09-26 ENCOUNTER — TELEPHONE (OUTPATIENT)
Dept: PULMONOLOGY | Facility: MEDICAL CENTER | Age: 74
End: 2024-09-26

## 2024-09-26 ENCOUNTER — OFFICE VISIT (OUTPATIENT)
Dept: CARDIOLOGY CLINIC | Facility: CLINIC | Age: 74
End: 2024-09-26
Payer: COMMERCIAL

## 2024-09-26 VITALS
DIASTOLIC BLOOD PRESSURE: 82 MMHG | OXYGEN SATURATION: 95 % | HEART RATE: 73 BPM | HEIGHT: 63 IN | SYSTOLIC BLOOD PRESSURE: 144 MMHG | BODY MASS INDEX: 31.36 KG/M2 | WEIGHT: 177 LBS

## 2024-09-26 DIAGNOSIS — Z01.810 PRE-OPERATIVE CARDIOVASCULAR EXAMINATION: Primary | ICD-10-CM

## 2024-09-26 DIAGNOSIS — J43.2 CENTRILOBULAR EMPHYSEMA (HCC): Chronic | ICD-10-CM

## 2024-09-26 DIAGNOSIS — I10 HYPERTENSION, UNSPECIFIED TYPE: ICD-10-CM

## 2024-09-26 DIAGNOSIS — E78.2 MIXED HYPERLIPIDEMIA: ICD-10-CM

## 2024-09-26 DIAGNOSIS — I25.10 CORONARY ARTERY DISEASE, UNSPECIFIED VESSEL OR LESION TYPE, UNSPECIFIED WHETHER ANGINA PRESENT, UNSPECIFIED WHETHER NATIVE OR TRANSPLANTED HEART: ICD-10-CM

## 2024-09-26 DIAGNOSIS — I20.89 ANGINA AT REST (HCC): ICD-10-CM

## 2024-09-26 DIAGNOSIS — I71.21 ANEURYSM OF ASCENDING AORTA WITHOUT RUPTURE (HCC): ICD-10-CM

## 2024-09-26 PROCEDURE — 99214 OFFICE O/P EST MOD 30 MIN: CPT | Performed by: INTERNAL MEDICINE

## 2024-09-26 PROCEDURE — 93000 ELECTROCARDIOGRAM COMPLETE: CPT | Performed by: INTERNAL MEDICINE

## 2024-09-26 NOTE — TELEPHONE ENCOUNTER
Not a duplicate. Trelegy script was sent 9/23/24, but it was a sample and it was not sent to the pharmacy.    Patient was wondering if she could get another script sent in for the predniSONE 20 mg tablet. She said that she is still wheezing when she is laying down.    Reason for call:   [x] Refill   [] Prior Auth  [] Other:     Office:   [] PCP/Provider -   [x] Specialty/Provider - Pulm    Medication: fluticasone-umeclidinium-vilanterol (Trelegy Ellipta) 200-62.5-25 mcg/actuation AEPB inhaler     Dose/Frequency: Inhale 1 puff daily Rinse mouth after use.     Quantity: 30    Pharmacy: [facility] - [facility]ANSEN - [address]    Does the patient have enough for 3 days?   [x] Yes   [] No - Send as HP to POD

## 2024-09-26 NOTE — TELEPHONE ENCOUNTER
Patient was wondering if she could get another script sent in for the predniSONE 20 mg tablet. She said that she is still wheezing when she is laying down.

## 2024-09-27 RX ORDER — FLUTICASONE FUROATE, UMECLIDINIUM BROMIDE AND VILANTEROL TRIFENATATE 200; 62.5; 25 UG/1; UG/1; UG/1
1 POWDER RESPIRATORY (INHALATION) DAILY
Qty: 30 BLISTER | Refills: 0 | OUTPATIENT
Start: 2024-09-27 | End: 2024-10-27

## 2024-09-27 NOTE — PROGRESS NOTES
[facility]  [address]   [place] [address]  Cardiology Consultation    Montserrat Sumner  [id number]  1950      Consult for: Preoperative assessment  Appreciate consult by: Julian Tobias DO    1. Pre-operative cardiovascular examination  POCT ECG    Lipoprotein NMR      2. Coronary artery disease, unspecified vessel or lesion type, unspecified whether angina present, unspecified whether native or transplanted heart  POCT ECG    Lipoprotein NMR      3. Aneurysm of ascending aorta without rupture (HCC)  POCT ECG      4. Hypertension, unspecified type  POCT ECG      5. Angina at rest  POCT ECG      6. Mixed hyperlipidemia  Lipoprotein NMR         Discussion/Summary:   Preoperative cardiac assessment-denies any new symptoms since last cardiovascular follow-up.  She has a negative nuclear stress test in February 2023.  Her last imaging in April 2023 shows normal ejection fraction and no major change in aortic aneurysm repair.  She is euvolemic on examination.  She is intermediate cardiac risk for procedure.  No cardiac contraindication to proceed.  She should take her metoprolol even the day of her procedure.    Malignant hypertension currently compliant with her antihypertensive medications-she reports having whitecoat hypertension.  She is currently on losartan 25 mg and metoprolol 50 mg.  Amlodipine 5 mg every morning.  She will continue with low-sodium diet    History of single-vessel bypass plus ascending aorta replacement-continue atorvastatin 40 mg.  Aspirin 81 mg.  Metoprolol 50 mg    Active smoking-we have discussed about smoking cessation    Anxiety currently compliant with her venlafaxine    HPI:   She denies having any major change in symptoms.  She denies having chest heaviness.  She has had prior major surgical procedures without having significant post anesthesia complications.    Past Medical History:   Diagnosis Date    Anxiety     Chronic  "pain 03/06/2023    lower abdomen and back    COPD (chronic obstructive pulmonary disease) (HCC)     \"passes out\" with O2 levels dropping    Disease of thyroid gland     GERD (gastroesophageal reflux disease)     History of transfusion     with aneurysm repair-autologous-self and daughter    Hyperlipidemia     Hypertension     Teeth missing     Wears glasses     For reading     Social History     Socioeconomic History    Marital status: /Civil Union     Spouse name: Not on file    Number of children: Not on file    Years of education: Not on file    Highest education level: Not on file   Occupational History    Not on file   Tobacco Use    Smoking status: Every Day     Current packs/day: 0.25     Average packs/day: 0.3 packs/day for 56.7 years (14.2 ttl pk-yrs)     Types: Cigarettes     Start date: 1968     Passive exposure: Past    Smokeless tobacco: Never    Tobacco comments:     Currently smoking 5-6 cigarettes daily   Vaping Use    Vaping status: Never Used   Substance and Sexual Activity    Alcohol use: Yes     Comment: occasionally    Drug use: Yes     Types: Marijuana, Cocaine     Comment: yes still Marijuana as of 9/5/24-2 times weekly    Sexual activity: Not Currently     Partners: Male     Birth control/protection: Post-menopausal   Other Topics Concern    Not on file   Social History Narrative    Not on file     Social Determinants of Health     Financial Resource Strain: Not on file   Food Insecurity: No Food Insecurity (4/25/2023)    Hunger Vital Sign     Worried About Running Out of Food in the Last Year: Never true     Ran Out of Food in the Last Year: Never true   Transportation Needs: No Transportation Needs (4/25/2023)    PRAPARE - Transportation     Lack of Transportation (Medical): No     Lack of Transportation (Non-Medical): No   Physical Activity: Not on file   Stress: Not on file   Social Connections: Not on file   Intimate Partner Violence: Not on file   Housing Stability: Low Risk  " (4/25/2023)    Housing Stability Vital Sign     Unable to Pay for Housing in the Last Year: No     Number of Places Lived in the Last Year: 2     Unstable Housing in the Last Year: No      Family History   Problem Relation Age of Onset    Breast cancer Mother      Past Surgical History:   Procedure Laterality Date    BACK SURGERY      x2 herniated, crushed disc in the lumbar, ablation    CHOLECYSTECTOMY      COLONOSCOPY      FRACTURE SURGERY Left     hip-pinned    HYSTERECTOMY      salpingo-oophorectomy    THORACIC AORTIC ANEURYSM REPAIR  09/2016    ascending thoracic aortic repair with RCA bypass with SVG x 1    TONSILLECTOMY      UPPER GASTROINTESTINAL ENDOSCOPY         Current Outpatient Medications:     albuterol (2.5 mg/3 mL) 0.083 % nebulizer solution, Take 3 mL (2.5 mg total) by nebulization 4 (four) times a day as needed for wheezing or shortness of breath, Disp: 360 mL, Rfl: 7    albuterol (PROVENTIL HFA,VENTOLIN HFA) 90 mcg/act inhaler, Inhale 1 puff every 4 (four) hours as needed for wheezing, Disp: 54 g, Rfl: 3    ALPRAZolam (XANAX) 0.5 mg tablet, Take 0.5 mg by mouth 2 (two) times a day, Disp: , Rfl:     amLODIPine (NORVASC) 5 mg tablet, Take 1 tablet (5 mg total) by mouth daily (Patient taking differently: Take 5 mg by mouth every morning), Disp: 10 tablet, Rfl: 0    aspirin 81 mg chewable tablet, Chew 81 mg daily, Disp: , Rfl:     atorvastatin (LIPITOR) 40 mg tablet, Take 40 mg by mouth every morning, Disp: , Rfl:     b complex vitamins capsule, Take 1 capsule by mouth once a week, Disp: , Rfl:     buprenorphine-naloxone (Suboxone) 4-1 MG, every morning Wafer, Disp: , Rfl:     celecoxib (CeleBREX) 200 mg capsule, Take 1 capsule (200 mg total) by mouth daily (Patient taking differently: Take 200 mg by mouth every morning), Disp: 90 capsule, Rfl: 1    DULoxetine (CYMBALTA) 60 mg delayed release capsule, Take 1 capsule (60 mg total) by mouth daily (Patient taking differently: Take 60 mg by mouth every  morning), Disp: 90 capsule, Rfl: 1    esomeprazole (NexIUM) 40 MG capsule, Take 1 capsule (40 mg total) by mouth 2 (two) times a day before meals, Disp: 180 capsule, Rfl: 1    fluticasone-umeclidinium-vilanterol (Trelegy Ellipta) 100-62.5-25 mcg/actuation inhaler, Inhale 1 puff daily Rinse mouth after use. (Patient taking differently: Inhale 1 puff daily at bedtime Rinse mouth after use.), Disp: 180 blister, Rfl: 3    fluticasone-umeclidinium-vilanterol (Trelegy Ellipta) 200-62.5-25 mcg/actuation AEPB inhaler, Inhale 1 puff daily Rinse mouth after use., Disp: 30 blister, Rfl: 0    levothyroxine 100 mcg tablet, Take 100 mcg by mouth every morning, Disp: , Rfl:     losartan (COZAAR) 25 mg tablet, Take 25 mg by mouth every morning, Disp: , Rfl:     metoclopramide (REGLAN) 10 mg tablet, , Disp: , Rfl:     oxygen gas, Inhale continuous as needed 2.5 L, Disp: , Rfl:     predniSONE 20 mg tablet, Take 2 tablets (40 mg total) by mouth daily, Disp: 10 tablet, Rfl: 0    sodium chloride 0.9 % nebulizer solution, Take 3 mL by nebulization as needed for wheezing, Disp: 360 mL, Rfl: 1    traZODone (DESYREL) 50 mg tablet, Take 100 mg by mouth daily at bedtime, Disp: , Rfl:     venlafaxine (EFFEXOR-XR) 150 mg 24 hr capsule, Take 150 mg by mouth daily at bedtime, Disp: , Rfl:     venlafaxine (EFFEXOR-XR) 75 mg 24 hr capsule, Take 75 mg by mouth daily at bedtime, Disp: , Rfl:     clopidogrel (PLAVIX) 75 mg tablet, Take 75 mg by mouth every other day (Patient not taking: Reported on 9/23/2024), Disp: , Rfl:     gabapentin (NEURONTIN) 300 mg capsule, Take 1 capsule (300 mg total) by mouth 3 (three) times a day (Patient not taking: Reported on 9/23/2024), Disp: 90 capsule, Rfl: 1    glimepiride (AMARYL) 2 mg tablet, TAKE 1 TABLET 2 TIMES A DAY ORALLY (Patient not taking: Reported on 9/23/2024), Disp: , Rfl:     metoprolol succinate (TOPROL-XL) 50 mg 24 hr tablet, Take 1 tablet (50 mg total) by mouth daily (Patient taking differently:  "Take 50 mg by mouth every morning), Disp: 90 tablet, Rfl: 1  No Known Allergies  Vitals:    09/26/24 1533   BP: 144/82   BP Location: Right arm   Patient Position: Sitting   Cuff Size: Standard   Pulse: 73   SpO2: 95%   Weight: 80.3 kg (177 lb)   Height: 5' 3\" (1.6 m)       Review of Systems:   Review of Systems    Vitals:    09/26/24 1533   BP: 144/82   BP Location: Right arm   Patient Position: Sitting   Cuff Size: Standard   Pulse: 73   SpO2: 95%   Weight: 80.3 kg (177 lb)   Height: 5' 3\" (1.6 m)     Physical Examination:   Physical Exam    Labs:     Lab Results   Component Value Date    WBC 7.85 07/26/2024    HGB 10.9 (L) 07/26/2024    HCT 35.4 07/26/2024    MCV 95 07/26/2024    RDW 14.1 07/26/2024     07/26/2024     BMP:  Lab Results   Component Value Date    SODIUM 137 07/26/2024    K 4.5 07/26/2024     07/26/2024    CO2 25 07/26/2024    BUN 18 07/26/2024    CREATININE 1.44 (H) 07/26/2024    GLUC 99 07/26/2024    GLUF 115 (H) 05/06/2024    CALCIUM 8.9 07/26/2024    EGFR 36 07/26/2024    MG 1.8 (L) 07/15/2023     LFT:  Lab Results   Component Value Date    AST 16 07/26/2024    ALT 6 (L) 07/26/2024    ALKPHOS 29 (L) 07/26/2024    TP 6.5 07/26/2024    ALB 3.8 07/26/2024      Lab Results   Component Value Date    YXF4WXDUCYMG 10.288 (H) 07/15/2023     Lab Results   Component Value Date    HGBA1C 6.6 (H) 07/15/2023     Lipid Profile:   No results found for: \"CHOLESTEROL\", \"HDL\", \"LDLCALC\", \"TRIG\"  No results found for: \"CHOLESTEROL\"  No results found for: \"CKTOTAL\", \"CKMB\", \"CKMBINDEX\", \"TROPONINI\"  No results found for: \"NTBNP\"   No results found for this or any previous visit (from the past 672 hour(s)).    Imaging & Testing   I have personally reviewed pertinent reports.      Cardiac Testing         EKG: Personally reviewed.          Juan Jose Wise MD [facility]  [phone]  Please call with any questions or suggestions    Counseling :  A description of the counseling:   Goals and " "Barriers:  Patient's ability to self care:  Medication side effect reviewed with patient in detail and all their questions answered.    \"Portions of the record may have been created with voice recognition software. Occasional wrong word or \"sound a like\" substitutions may have occurred due to the inherent limitations of voice recognition software. Read the chart carefully and recognize, using context, where substitutions have occurred. Please call if you have any questions. \"   "

## 2024-09-30 ENCOUNTER — TELEPHONE (OUTPATIENT)
Age: 74
End: 2024-09-30

## 2024-09-30 ENCOUNTER — HOSPITAL ENCOUNTER (OUTPATIENT)
Dept: PULMONOLOGY | Facility: HOSPITAL | Age: 74
Discharge: HOME | End: 2024-09-30

## 2024-09-30 DIAGNOSIS — J43.2 CENTRILOBULAR EMPHYSEMA (HCC): Chronic | ICD-10-CM

## 2024-09-30 RX ORDER — FLUTICASONE FUROATE, UMECLIDINIUM BROMIDE AND VILANTEROL TRIFENATATE 200; 62.5; 25 UG/1; UG/1; UG/1
1 POWDER RESPIRATORY (INHALATION) DAILY
Qty: 180 BLISTER | Refills: 1 | Status: SHIPPED | OUTPATIENT
Start: 2024-09-30 | End: 2024-10-08 | Stop reason: SDUPTHER

## 2024-09-30 RX ORDER — PREDNISONE 10 MG/1
TABLET ORAL
Qty: 30 TABLET | Refills: 0 | Status: SHIPPED | OUTPATIENT
Start: 2024-09-30

## 2024-09-30 RX ORDER — ALBUTEROL SULFATE 90 UG/1
1 INHALANT RESPIRATORY (INHALATION) EVERY 4 HOURS PRN
Qty: 54 G | Refills: 3 | Status: SHIPPED | OUTPATIENT
Start: 2024-09-30

## 2024-09-30 NOTE — PROGRESS NOTES
Montserrat stopped in the office to report that she feels better with the Trelegy 200.  She also felt well with the prednisone burst, but feels that her symptoms are still lingering.  I have prescribed Trelegy 200 to her pharmacy, as well as a longer prednisone taper.  I also refilled her albuterol.  I advised her to call the office if symptoms do not further improve.

## 2024-09-30 NOTE — TELEPHONE ENCOUNTER
Patients GI provider:       Number to return call: [phone]    Reason for call: Pt calling to reschedule the ERCP due to dad who is currently in the hospital.     Scheduled procedure/appointment date if applicable: Apt/procedure

## 2024-10-04 ENCOUNTER — PATIENT OUTREACH (OUTPATIENT)
Dept: OTHER | Facility: CLINIC | Age: 74
End: 2024-10-04

## 2024-10-08 ENCOUNTER — OFFICE VISIT (OUTPATIENT)
Dept: PULMONOLOGY | Facility: MEDICAL CENTER | Age: 74
End: 2024-10-08
Payer: COMMERCIAL

## 2024-10-08 VITALS
HEART RATE: 76 BPM | RESPIRATION RATE: 18 BRPM | DIASTOLIC BLOOD PRESSURE: 90 MMHG | OXYGEN SATURATION: 95 % | SYSTOLIC BLOOD PRESSURE: 160 MMHG | WEIGHT: 177 LBS | TEMPERATURE: 97.8 F | HEIGHT: 63 IN | BODY MASS INDEX: 31.36 KG/M2

## 2024-10-08 DIAGNOSIS — J43.2 CENTRILOBULAR EMPHYSEMA (HCC): Chronic | ICD-10-CM

## 2024-10-08 DIAGNOSIS — F17.210 CIGARETTE NICOTINE DEPENDENCE WITHOUT COMPLICATION: ICD-10-CM

## 2024-10-08 DIAGNOSIS — R06.02 SHORTNESS OF BREATH: Primary | ICD-10-CM

## 2024-10-08 DIAGNOSIS — M54.50 CHRONIC BILATERAL LOW BACK PAIN WITHOUT SCIATICA: ICD-10-CM

## 2024-10-08 DIAGNOSIS — J20.9 ACUTE BRONCHITIS, UNSPECIFIED ORGANISM: ICD-10-CM

## 2024-10-08 DIAGNOSIS — G89.29 CHRONIC BILATERAL LOW BACK PAIN WITHOUT SCIATICA: ICD-10-CM

## 2024-10-08 DIAGNOSIS — F41.9 ANXIETY: Chronic | ICD-10-CM

## 2024-10-08 PROCEDURE — 99214 OFFICE O/P EST MOD 30 MIN: CPT | Performed by: INTERNAL MEDICINE

## 2024-10-08 RX ORDER — FLUTICASONE FUROATE, UMECLIDINIUM BROMIDE AND VILANTEROL TRIFENATATE 200; 62.5; 25 UG/1; UG/1; UG/1
1 POWDER RESPIRATORY (INHALATION) DAILY
Qty: 60 BLISTER | Refills: 11 | Status: SHIPPED | OUTPATIENT
Start: 2024-10-08 | End: 2025-10-03

## 2024-10-08 RX ORDER — IPRATROPIUM BROMIDE AND ALBUTEROL SULFATE 2.5; .5 MG/3ML; MG/3ML
3 SOLUTION RESPIRATORY (INHALATION) 4 TIMES DAILY PRN
Qty: 360 ML | Refills: 7 | Status: SHIPPED | OUTPATIENT
Start: 2024-10-08

## 2024-10-08 RX ORDER — PREDNISONE 10 MG/1
TABLET ORAL
Qty: 100 TABLET | Refills: 0 | Status: SHIPPED | OUTPATIENT
Start: 2024-10-08

## 2024-10-08 RX ORDER — AZITHROMYCIN 250 MG/1
TABLET, FILM COATED ORAL
Qty: 6 TABLET | Refills: 0 | Status: SHIPPED | OUTPATIENT
Start: 2024-10-08 | End: 2024-10-12

## 2024-10-08 RX ORDER — ALBUTEROL SULFATE 90 UG/1
2 INHALANT RESPIRATORY (INHALATION) EVERY 4 HOURS PRN
Qty: 6.7 G | Refills: 7 | Status: SHIPPED | OUTPATIENT
Start: 2024-10-08

## 2024-10-08 NOTE — ASSESSMENT & PLAN NOTE
Montserrat has moderate COPD.  PFT done March 2023 showed FEV1 to be 1.56 L 71% predicted with obstructive ratio of 67%    She is using Trelegy 200 mcg 1 puff daily which helps.  Does have some cough at times with the for expectorate mucus.  Does have a flutter valve at home.  I did give her samples of ipratropium-albuterol to use in her nebulizer instead of regular albuterol.  Also I placed prescription for this that she can use.  At later time depending on how things go with hospice with early father fo she will schedule her follow-up PFT and CT scan of chest

## 2024-10-08 NOTE — ASSESSMENT & PLAN NOTE
Montserrat does have anxiety this type 0.5 mg of alprazolam twice a day which helps.  She has been more anxious because her father is under 3 years old just entered hospice.  He does live with her.  She sees decline in his health and realizes that his days are becoming more limited.

## 2024-10-08 NOTE — PROGRESS NOTES
Assessment & Plan        Problem List Items Addressed This Visit          Respiratory    Centrilobular emphysema (HCC) (Chronic)     Montserrat has moderate COPD.  PFT done March 2023 showed FEV1 to be 1.56 L 71% predicted with obstructive ratio of 67%    She is using Trelegy 200 mcg 1 puff daily which helps.  Does have some cough at times with the for expectorate mucus.  Does have a flutter valve at home.  I did give her samples of ipratropium-albuterol to use in her nebulizer instead of regular albuterol.  Also I placed prescription for this that she can use.  At later time depending on how things go with hospice with early father fo she will schedule her follow-up PFT and CT scan of chest             Relevant Medications    fluticasone-umeclidinium-vilanterol (Trelegy Ellipta) 200-62.5-25 mcg/actuation AEPB inhaler    ipratropium-albuterol (DUO-NEB) 0.5-2.5 mg/3 mL nebulizer solution    albuterol (Proventil HFA) 90 mcg/act inhaler    predniSONE 10 mg tablet    Acute bronchitis     Some slight increased cough with small amount of mucus production.  Right prescribed a 5-day Z-Felix for acute bronchitis         Relevant Medications    fluticasone-umeclidinium-vilanterol (Trelegy Ellipta) 200-62.5-25 mcg/actuation AEPB inhaler    ipratropium-albuterol (DUO-NEB) 0.5-2.5 mg/3 mL nebulizer solution    albuterol (Proventil HFA) 90 mcg/act inhaler    azithromycin (ZITHROMAX) 250 mg tablet       Behavioral Health    Anxiety (Chronic)     Montserrat does have anxiety this type 0.5 mg of alprazolam twice a day which helps.  She has been more anxious because her father is under 3 years old just entered hospice.  He does live with her.  She sees decline in his health and realizes that his days are becoming more limited.         Cigarette nicotine dependence without complication     She states she is back to smoking 1/2 pack of cigarettes per day because of stress at home and dealing with her elderly father who is 103 years old and  now on hospice.  I did encourage her to try to reduce her smoking more and eventually try to quit            Surgery/Wound/Pain    Chronic bilateral low back pain without sciatica     She does have chronic lower back pain.  She requests referral to orthopedics here at [facility].  She says prednisone usually helps with her back pain.  I did make referral for her to [facility] orthopedics for her chronic lower back pain.         Relevant Orders    Ambulatory Referral to Orthopedic Surgery       Other    Shortness of breath - Primary     Montserrat has been having increased shortness of breath recently and she is part of this is due to stress at home as her father was under 3 years old is not doing well and just entered hospice.  He lives with home and she is very close with her father.  States her breathing and back pain are better when she is on prednisone and she started 30 mg daily.  She requested be on a long slow taper prednisone for next few weeks to help her with her breathing and help her deal with the situation with her father.  Will start her on prednisone 30 mg for 2 weeks then 20 mg for through 2 weeks then 10 mg for 2 weeks then 5 mg for 2 weeks.  She is aware of possible side effects of prednisone but felt that she really needed at this time.  She did have some wheezing in her left lower lobe today         Relevant Medications    predniSONE 10 mg tablet         Cc: shortness of breath      HPI    Montserrat presents for follow-up of her COPD.  She does have moderate COPD and did have a pulmonary function test done March 2023 which showed FEV1 to be 1.56 L or 71% thick with obstructive ratio 67%.  She had mild air trapping and diffusion capacity was slightly decreased at 73% predicted.  6-minute walk test was done at that time and lowest O2 saturation was 92% with walking.  A complete PFT was ordered to be done after last visit in September 2024 however she has not had this done yet.    Last CT of  "chest done June 19, 2023 showed a stable right upper lobe 4 mm lung nodule which was noted to not change compared to March 2023 there are some evidence of some scarring and atelectasis noted as well as some emphysematous changes.    She does have hypertension and is on Losartan 25 mg and metoprolol 50 mg as well as amlodipine 5 mg daily.  Also has history of single-vessel bypass and an ascending aorta replacement.    Montserrat states she has had some increased shortness of breath recently.  She does feel Trelegy 200 mcg working well for her.  She has a cough but sometimes never expectorate mucus.  She is nearly out of nebulizer medication.  She does need refill on her inhalers.  She also states that her daughter is under 3 years old just under hospice and he lives with her.  She is very close to her father and she is finding this difficult but realizes that his time is limited.    Montserrat was put on prednisone after last visit and still on a small dose but increase to 30 mg daily daily day as this was helping her shortness of breath.  She also has chronic lower back pain after which is getting worse and states the prednisone has helped.  She would like to be on extended dose of prednisone for a while as it is helping her breathing.  She is trying to optimize her breathing to help her take care of her father who is now on hospice at her house.  She did cut down cigarette smoking dramaticly but then started smoking half a pack of cigarettes per day because of stress.  Does have some cough intermittently.  No fever or chills.  Did not get PFT and CT scan that was ordered last visit because of situation with her father but states she will do at a later time    Past Medical History:   Diagnosis Date    Anxiety     Chronic pain 03/06/2023    lower abdomen and back    COPD (chronic obstructive pulmonary disease) (HCC)     \"passes out\" with O2 levels dropping    Disease of thyroid gland     GERD (gastroesophageal reflux " disease)     History of transfusion     with aneurysm repair-autologous-self and daughter    Hyperlipidemia     Hypertension     Teeth missing     Wears glasses     For reading       Past Surgical History:   Procedure Laterality Date    BACK SURGERY      x2 herniated, crushed disc in the lumbar, ablation    CHOLECYSTECTOMY      COLONOSCOPY      FRACTURE SURGERY Left     hip-pinned    HYSTERECTOMY      salpingo-oophorectomy    THORACIC AORTIC ANEURYSM REPAIR  09/2016    ascending thoracic aortic repair with RCA bypass with SVG x 1    TONSILLECTOMY      UPPER GASTROINTESTINAL ENDOSCOPY           Current Outpatient Medications:     albuterol (2.5 mg/3 mL) 0.083 % nebulizer solution, Take 3 mL (2.5 mg total) by nebulization 4 (four) times a day as needed for wheezing or shortness of breath, Disp: 360 mL, Rfl: 7    albuterol (Proventil HFA) 90 mcg/act inhaler, Inhale 2 puffs every 4 (four) hours as needed for wheezing, Disp: 6.7 g, Rfl: 7    albuterol (PROVENTIL HFA,VENTOLIN HFA) 90 mcg/act inhaler, Inhale 1 puff every 4 (four) hours as needed for wheezing, Disp: 54 g, Rfl: 3    ALPRAZolam (XANAX) 0.5 mg tablet, Take 0.5 mg by mouth 2 (two) times a day, Disp: , Rfl:     amLODIPine (NORVASC) 5 mg tablet, Take 1 tablet (5 mg total) by mouth daily (Patient taking differently: Take 5 mg by mouth every morning), Disp: 10 tablet, Rfl: 0    aspirin 81 mg chewable tablet, Chew 81 mg daily, Disp: , Rfl:     atorvastatin (LIPITOR) 40 mg tablet, Take 40 mg by mouth every morning, Disp: , Rfl:     azithromycin (ZITHROMAX) 250 mg tablet, Take 2 tablets today then 1 tablet daily x 4 days, Disp: 6 tablet, Rfl: 0    b complex vitamins capsule, Take 1 capsule by mouth once a week, Disp: , Rfl:     buprenorphine-naloxone (Suboxone) 4-1 MG, every morning Wafer, Disp: , Rfl:     celecoxib (CeleBREX) 200 mg capsule, Take 1 capsule (200 mg total) by mouth daily (Patient taking differently: Take 200 mg by mouth every morning), Disp: 90  capsule, Rfl: 1    DULoxetine (CYMBALTA) 60 mg delayed release capsule, Take 1 capsule (60 mg total) by mouth daily (Patient taking differently: Take 60 mg by mouth every morning), Disp: 90 capsule, Rfl: 1    esomeprazole (NexIUM) 40 MG capsule, Take 1 capsule (40 mg total) by mouth 2 (two) times a day before meals, Disp: 180 capsule, Rfl: 1    fluticasone-umeclidinium-vilanterol (Trelegy Ellipta) 200-62.5-25 mcg/actuation AEPB inhaler, Inhale 1 puff daily Rinse mouth after use., Disp: 60 blister, Rfl: 11    ipratropium-albuterol (DUO-NEB) 0.5-2.5 mg/3 mL nebulizer solution, Take 3 mL by nebulization 4 (four) times a day as needed for shortness of breath or wheezing, Disp: 360 mL, Rfl: 7    levothyroxine 100 mcg tablet, Take 100 mcg by mouth every morning, Disp: , Rfl:     losartan (COZAAR) 25 mg tablet, Take 25 mg by mouth every morning, Disp: , Rfl:     metoclopramide (REGLAN) 10 mg tablet, , Disp: , Rfl:     metoprolol succinate (TOPROL-XL) 50 mg 24 hr tablet, Take 1 tablet (50 mg total) by mouth daily (Patient taking differently: Take 50 mg by mouth every morning), Disp: 90 tablet, Rfl: 1    oxygen gas, Inhale continuous as needed 2.5 L, Disp: , Rfl:     predniSONE 10 mg tablet, Take 40 mg PO daily x 3 days, then 30 mg daily x 3 days, then 20 mg daily x 3 days, then 10 mg daily x 3 days, then stop., Disp: 30 tablet, Rfl: 0    predniSONE 10 mg tablet, Take 3 tablets daily for 14 days then 2 tablets daily for 14 days then 1 tablet daily for 14 days then 1/2 tablet daily for 14 days, Disp: 100 tablet, Rfl: 0    sodium chloride 0.9 % nebulizer solution, Take 3 mL by nebulization as needed for wheezing, Disp: 360 mL, Rfl: 1    traZODone (DESYREL) 50 mg tablet, Take 100 mg by mouth daily at bedtime, Disp: , Rfl:     venlafaxine (EFFEXOR-XR) 150 mg 24 hr capsule, Take 150 mg by mouth daily at bedtime, Disp: , Rfl:     venlafaxine (EFFEXOR-XR) 75 mg 24 hr capsule, Take 75 mg by mouth daily at bedtime, Disp: , Rfl:      clopidogrel (PLAVIX) 75 mg tablet, Take 75 mg by mouth every other day (Patient not taking: Reported on 10/8/2024), Disp: , Rfl:     fluticasone-umeclidinium-vilanterol (Trelegy Ellipta) 100-62.5-25 mcg/actuation inhaler, Inhale 1 puff daily Rinse mouth after use. (Patient not taking: Reported on 10/8/2024), Disp: 180 blister, Rfl: 3    gabapentin (NEURONTIN) 300 mg capsule, Take 1 capsule (300 mg total) by mouth 3 (three) times a day (Patient not taking: Reported on 10/8/2024), Disp: 90 capsule, Rfl: 1    glimepiride (AMARYL) 2 mg tablet, TAKE 1 TABLET 2 TIMES A DAY ORALLY (Patient not taking: Reported on 9/23/2024), Disp: , Rfl:     No Known Allergies    Social History     Tobacco Use    Smoking status: Every Day     Current packs/day: 0.25     Average packs/day: 0.3 packs/day for 56.8 years (14.2 ttl pk-yrs)     Types: Cigarettes     Start date: 1968     Passive exposure: Past    Smokeless tobacco: Never    Tobacco comments:     Currently smoking 5-6 cigarettes daily   Substance Use Topics    Alcohol use: Yes     Comment: occasionally         Family History   Problem Relation Age of Onset    Breast cancer Mother        Review of Systems   Constitutional:  Negative for chills, fever and unexpected weight change.   HENT:  Negative for congestion, rhinorrhea and sore throat.    Eyes:  Negative for discharge and redness.   Respiratory:  Positive for cough, shortness of breath and wheezing.    Cardiovascular:  Negative for chest pain, palpitations and leg swelling.   Gastrointestinal:  Negative for abdominal distention, abdominal pain and nausea.   Endocrine: Negative for polydipsia and polyphagia.   Genitourinary:  Negative for dysuria.   Musculoskeletal:  Negative for joint swelling and myalgias.   Skin:  Negative for rash.   Neurological:  Negative for light-headedness.   Psychiatric/Behavioral:  Negative for decreased concentration.            Vitals:    10/08/24 1302   BP: 160/90   Pulse: 76   Resp: 18   Temp:  "97.8 °F (36.6 °C)   SpO2: 95%     Height: 5' 3\" (160 cm)  IBW (Ideal Body Weight): 52.4 kg  Body mass index is 31.35 kg/m².  Weight (last 2 days)       Date/Time Weight    10/08/24 1302 80.3 (177)                Physical Exam  Vitals reviewed.   Constitutional:       General: She is not in acute distress.     Appearance: Normal appearance. She is well-developed. She is obese.   HENT:      Head: Normocephalic.      Right Ear: External ear normal.      Left Ear: External ear normal.      Nose: Nose normal.      Mouth/Throat:      Mouth: Oropharynx is clear and moist. Mucous membranes are moist.      Pharynx: Oropharynx is clear. No oropharyngeal exudate.   Eyes:      Conjunctiva/sclera: Conjunctivae normal.      Pupils: Pupils are equal, round, and reactive to light.   Cardiovascular:      Rate and Rhythm: Normal rate and regular rhythm.      Heart sounds: Normal heart sounds.   Pulmonary:      Effort: Pulmonary effort is normal.      Comments: There are some expiratory wheezes in left lower lobe.  Otherwise lung sounds are clear.  No crackles or rhonchi  Abdominal:      General: There is no distension.      Palpations: Abdomen is soft.      Tenderness: There is no abdominal tenderness.   Musculoskeletal:      Cervical back: Neck supple.      Comments: No edema.  No cyanosis or clubbing   Lymphadenopathy:      Cervical: No cervical adenopathy.   Skin:     General: Skin is warm and dry.   Neurological:      General: No focal deficit present.      Mental Status: She is alert and oriented to person, place, and time.   Psychiatric:         Mood and Affect: Mood and affect and mood normal.         Behavior: Behavior normal.         Thought Content: Thought content normal.                "

## 2024-10-08 NOTE — ASSESSMENT & PLAN NOTE
Montserrat has been having increased shortness of breath recently and she is part of this is due to stress at home as her father was under 3 years old is not doing well and just entered hospice.  He lives with home and she is very close with her father.  States her breathing and back pain are better when she is on prednisone and she started 30 mg daily.  She requested be on a long slow taper prednisone for next few weeks to help her with her breathing and help her deal with the situation with her father.  Will start her on prednisone 30 mg for 2 weeks then 20 mg for through 2 weeks then 10 mg for 2 weeks then 5 mg for 2 weeks.  She is aware of possible side effects of prednisone but felt that she really needed at this time.  She did have some wheezing in her left lower lobe today

## 2024-10-08 NOTE — ASSESSMENT & PLAN NOTE
She does have chronic lower back pain.  She requests referral to orthopedics here at [facility].  She says prednisone usually helps with her back pain.  I did make referral for her to [facility] orthopedics for her chronic lower back pain.

## 2024-10-08 NOTE — PATIENT INSTRUCTIONS
Take prednisone starting at 30 mg daily for 2 weeks and then decrease dose every 2 weeks as instructed.  This is 3 pills daily for 2 weeks then 2 pills for 2 weeks then 1 pill for 2 weeks then a half a pill for 2 weeks    Azithromax antibiotic    Referral to orthopedics at [facility]

## 2024-10-09 NOTE — ASSESSMENT & PLAN NOTE
Some slight increased cough with small amount of mucus production.  Right prescribed a 5-day Z-Felix for acute bronchitis

## 2024-10-09 NOTE — ASSESSMENT & PLAN NOTE
She states she is back to smoking 1/2 pack of cigarettes per day because of stress at home and dealing with her elderly father who is 103 years old and now on hospice.  I did encourage her to try to reduce her smoking more and eventually try to quit

## 2024-10-29 ENCOUNTER — TELEPHONE (OUTPATIENT)
Dept: GASTROENTEROLOGY | Facility: CLINIC | Age: 74
End: 2024-10-29

## 2024-10-29 NOTE — TELEPHONE ENCOUNTER
Per Dr Rothman, pt's procedure on 11/7 needs to be rescheduled to a different date.  I spoke to [facility] and they can do Monday 11/4/24 instead for procedure.  I lmom informing pt of this, apologized for the inconvenience and asked her to please call me back asap as she is on Plavix which hold would have to start tomorrow. Will call again later today if do not hear back from pt. I did reschedule  to 11/4/24 to hold the date for pt.

## 2024-10-29 NOTE — TELEPHONE ENCOUNTER
Pt called to reschedule at she was advised to reschedule the ERCP from 11/7/24 to 11/4/24. As Radha was busy with other pt on call said she will call the pt back soon and advised the pt she will get a call from Radha soon. Also advised pt Radha has rescheduled her to 11/4/24 and to hold on Plavix pt stated ok

## 2024-10-29 NOTE — TELEPHONE ENCOUNTER
I called and spoke to pt whom is agreeable to ERCP on 11/4/24.  She is aware to hold Plavix 5 days prior to the procedure.  Pt is aware to be NPO after midnight and that she will need a .

## 2024-10-30 DIAGNOSIS — R06.02 SHORTNESS OF BREATH: Primary | ICD-10-CM

## 2024-10-30 DIAGNOSIS — J43.2 CENTRILOBULAR EMPHYSEMA (HCC): ICD-10-CM

## 2024-10-30 RX ORDER — PREDNISONE 20 MG/1
TABLET ORAL
Qty: 40 TABLET | Refills: 0 | Status: SHIPPED | OUTPATIENT
Start: 2024-10-30 | End: 2024-11-05

## 2024-10-30 NOTE — PROGRESS NOTES
I spoke to Montserrat today.  Her father did die about a week ago at home and  has been held already.  He was on hospice and she was taking care of him.  She was under a great strain and breathing and back are bothering her she states she took with the prednisone I prescribed for 40 mg daily and will run out of it today.  She went to stay 1 prednisone until I saw her.  Told her prednisone is okay for short-term but would not be a good long-term solution because of side effects.  For now we compromised and she will be on prednisone 40 mg daily for next 2 weeks twice see her on  and will work on how to taper from there.  She felt she still needed to be on prednisone for now for her breathing.

## 2024-11-04 ENCOUNTER — HOSPITAL ENCOUNTER (OUTPATIENT)
Dept: RADIOLOGY | Facility: HOSPITAL | Age: 74
Setting detail: OBSERVATION
Discharge: HOME | End: 2024-11-05
Attending: INTERNAL MEDICINE | Admitting: INTERNAL MEDICINE
Payer: COMMERCIAL

## 2024-11-04 ENCOUNTER — HOSPITAL ENCOUNTER (OUTPATIENT)
Dept: GASTROENTEROLOGY | Facility: HOSPITAL | Age: 74
Setting detail: OUTPATIENT SURGERY
Discharge: HOME | End: 2024-11-04
Payer: COMMERCIAL

## 2024-11-04 ENCOUNTER — TELEPHONE (OUTPATIENT)
Age: 74
End: 2024-11-04

## 2024-11-04 ENCOUNTER — ANESTHESIA (OUTPATIENT)
Dept: GASTROENTEROLOGY | Facility: HOSPITAL | Age: 74
End: 2024-11-04
Payer: COMMERCIAL

## 2024-11-04 ENCOUNTER — ANESTHESIA EVENT (OUTPATIENT)
Dept: GASTROENTEROLOGY | Facility: HOSPITAL | Age: 74
End: 2024-11-04
Payer: COMMERCIAL

## 2024-11-04 VITALS
HEIGHT: 63 IN | BODY MASS INDEX: 30.3 KG/M2 | DIASTOLIC BLOOD PRESSURE: 73 MMHG | OXYGEN SATURATION: 96 % | WEIGHT: 171 LBS | RESPIRATION RATE: 18 BRPM | HEART RATE: 58 BPM | TEMPERATURE: 96.7 F | SYSTOLIC BLOOD PRESSURE: 155 MMHG

## 2024-11-04 DIAGNOSIS — R93.5 ABNORMAL ABDOMINAL MRI: ICD-10-CM

## 2024-11-04 DIAGNOSIS — J96.11 CHRONIC RESPIRATORY FAILURE WITH HYPOXIA AND HYPERCAPNIA (HCC): ICD-10-CM

## 2024-11-04 DIAGNOSIS — R06.02 SHORTNESS OF BREATH: Primary | ICD-10-CM

## 2024-11-04 DIAGNOSIS — K86.89 DILATION OF PANCREATIC DUCT: ICD-10-CM

## 2024-11-04 DIAGNOSIS — Z87.19 H/O GASTROESOPHAGEAL REFLUX (GERD): ICD-10-CM

## 2024-11-04 DIAGNOSIS — J96.12 CHRONIC RESPIRATORY FAILURE WITH HYPOXIA AND HYPERCAPNIA (HCC): ICD-10-CM

## 2024-11-04 PROBLEM — Z98.890 S/P ERCP: Status: ACTIVE | Noted: 2024-11-04

## 2024-11-04 LAB
ANION GAP SERPL CALCULATED.3IONS-SCNC: 5 MMOL/L (ref 4–13)
BUN SERPL-MCNC: 20 MG/DL (ref 5–25)
CALCIUM SERPL-MCNC: 8.4 MG/DL (ref 8.4–10.2)
CHLORIDE SERPL-SCNC: 106 MMOL/L (ref 96–108)
CO2 SERPL-SCNC: 24 MMOL/L (ref 21–32)
CREAT SERPL-MCNC: 0.82 MG/DL (ref 0.6–1.3)
GFR SERPL CREATININE-BSD FRML MDRD: 71 ML/MIN/1.73SQ M
GLUCOSE P FAST SERPL-MCNC: 264 MG/DL (ref 65–99)
GLUCOSE SERPL-MCNC: 23 MG/DL (ref 65–140)
GLUCOSE SERPL-MCNC: 264 MG/DL (ref 65–140)
GLUCOSE SERPL-MCNC: 275 MG/DL (ref 65–140)
GLUCOSE SERPL-MCNC: 406 MG/DL (ref 65–140)
PLATELET # BLD AUTO: 213 THOUSANDS/UL (ref 149–390)
PMV BLD AUTO: 8.9 FL (ref 8.9–12.7)
POTASSIUM SERPL-SCNC: 3.8 MMOL/L (ref 3.5–5.3)
SODIUM SERPL-SCNC: 135 MMOL/L (ref 135–147)
TSH SERPL DL<=0.05 MIU/L-ACNC: 1.88 UIU/ML (ref 0.45–4.5)

## 2024-11-04 PROCEDURE — 80048 BASIC METABOLIC PNL TOTAL CA: CPT

## 2024-11-04 PROCEDURE — 85049 AUTOMATED PLATELET COUNT: CPT

## 2024-11-04 PROCEDURE — 84443 ASSAY THYROID STIM HORMONE: CPT

## 2024-11-04 PROCEDURE — G0379 DIRECT REFER HOSPITAL OBSERV: HCPCS

## 2024-11-04 PROCEDURE — 82948 REAGENT STRIP/BLOOD GLUCOSE: CPT

## 2024-11-04 PROCEDURE — 99223 1ST HOSP IP/OBS HIGH 75: CPT | Performed by: INTERNAL MEDICINE

## 2024-11-04 PROCEDURE — C1769 GUIDE WIRE: HCPCS

## 2024-11-04 PROCEDURE — 43260 ERCP W/SPECIMEN COLLECTION: CPT | Performed by: INTERNAL MEDICINE

## 2024-11-04 PROCEDURE — 74328 X-RAY BILE DUCT ENDOSCOPY: CPT

## 2024-11-04 RX ORDER — FLUTICASONE FUROATE AND VILANTEROL 200; 25 UG/1; UG/1
1 POWDER RESPIRATORY (INHALATION) DAILY
Status: DISCONTINUED | OUTPATIENT
Start: 2024-11-04 | End: 2024-11-04

## 2024-11-04 RX ORDER — VENLAFAXINE HYDROCHLORIDE 150 MG/1
150 CAPSULE, EXTENDED RELEASE ORAL
Status: DISCONTINUED | OUTPATIENT
Start: 2024-11-04 | End: 2024-11-04

## 2024-11-04 RX ORDER — LEVOTHYROXINE SODIUM 100 UG/1
100 TABLET ORAL EVERY MORNING
Status: DISCONTINUED | OUTPATIENT
Start: 2024-11-04 | End: 2024-11-04

## 2024-11-04 RX ORDER — BUPRENORPHINE AND NALOXONE 2; .5 MG/1; MG/1
4 FILM, SOLUBLE BUCCAL; SUBLINGUAL EVERY MORNING
Status: DISCONTINUED | OUTPATIENT
Start: 2024-11-04 | End: 2024-11-04

## 2024-11-04 RX ORDER — DULOXETIN HYDROCHLORIDE 60 MG/1
60 CAPSULE, DELAYED RELEASE ORAL DAILY
Status: DISCONTINUED | OUTPATIENT
Start: 2024-11-05 | End: 2024-11-04

## 2024-11-04 RX ORDER — METOPROLOL SUCCINATE 50 MG/1
50 TABLET, EXTENDED RELEASE ORAL EVERY MORNING
Status: DISCONTINUED | OUTPATIENT
Start: 2024-11-05 | End: 2024-11-04

## 2024-11-04 RX ORDER — IPRATROPIUM BROMIDE AND ALBUTEROL SULFATE 2.5; .5 MG/3ML; MG/3ML
3 SOLUTION RESPIRATORY (INHALATION) 4 TIMES DAILY PRN
Status: DISCONTINUED | OUTPATIENT
Start: 2024-11-04 | End: 2024-11-05 | Stop reason: HOSPADM

## 2024-11-04 RX ORDER — BUPRENORPHINE AND NALOXONE 2; .5 MG/1; MG/1
4 FILM, SOLUBLE BUCCAL; SUBLINGUAL DAILY
Status: DISCONTINUED | OUTPATIENT
Start: 2024-11-05 | End: 2024-11-05 | Stop reason: HOSPADM

## 2024-11-04 RX ORDER — ONDANSETRON 4 MG/1
4 TABLET, ORALLY DISINTEGRATING ORAL EVERY 6 HOURS PRN
Status: DISCONTINUED | OUTPATIENT
Start: 2024-11-04 | End: 2024-11-04

## 2024-11-04 RX ORDER — PREDNISONE 20 MG/1
20 TABLET ORAL 2 TIMES DAILY WITH MEALS
Status: DISCONTINUED | OUTPATIENT
Start: 2024-11-04 | End: 2024-11-04

## 2024-11-04 RX ORDER — PREDNISONE 20 MG/1
20 TABLET ORAL DAILY
Status: DISCONTINUED | OUTPATIENT
Start: 2024-11-04 | End: 2024-11-04

## 2024-11-04 RX ORDER — AMLODIPINE BESYLATE 5 MG/1
5 TABLET ORAL DAILY
Status: DISCONTINUED | OUTPATIENT
Start: 2024-11-05 | End: 2024-11-05 | Stop reason: HOSPADM

## 2024-11-04 RX ORDER — AMLODIPINE BESYLATE 5 MG/1
5 TABLET ORAL DAILY
Status: DISCONTINUED | OUTPATIENT
Start: 2024-11-05 | End: 2024-11-04

## 2024-11-04 RX ORDER — INDOMETHACIN 50 MG/1
SUPPOSITORY RECTAL AS NEEDED
Status: COMPLETED | OUTPATIENT
Start: 2024-11-04 | End: 2024-11-04

## 2024-11-04 RX ORDER — PANTOPRAZOLE SODIUM 40 MG/1
40 TABLET, DELAYED RELEASE ORAL
Status: DISCONTINUED | OUTPATIENT
Start: 2024-11-04 | End: 2024-11-04

## 2024-11-04 RX ORDER — VENLAFAXINE HYDROCHLORIDE 37.5 MG/1
37.5 CAPSULE, EXTENDED RELEASE ORAL DAILY
Status: DISCONTINUED | OUTPATIENT
Start: 2024-11-05 | End: 2024-11-05

## 2024-11-04 RX ORDER — ATORVASTATIN CALCIUM 40 MG/1
40 TABLET, FILM COATED ORAL
Status: DISCONTINUED | OUTPATIENT
Start: 2024-11-04 | End: 2024-11-05 | Stop reason: HOSPADM

## 2024-11-04 RX ORDER — SODIUM CHLORIDE FOR INHALATION 0.9 %
3 VIAL, NEBULIZER (ML) INHALATION EVERY 6 HOURS PRN
Status: DISCONTINUED | OUTPATIENT
Start: 2024-11-04 | End: 2024-11-05 | Stop reason: HOSPADM

## 2024-11-04 RX ORDER — ASPIRIN 81 MG/1
81 TABLET, CHEWABLE ORAL DAILY
Status: DISCONTINUED | OUTPATIENT
Start: 2024-11-05 | End: 2024-11-04

## 2024-11-04 RX ORDER — ONDANSETRON 4 MG/1
4 TABLET, ORALLY DISINTEGRATING ORAL EVERY 6 HOURS PRN
Status: DISCONTINUED | OUTPATIENT
Start: 2024-11-04 | End: 2024-11-05 | Stop reason: HOSPADM

## 2024-11-04 RX ORDER — ONDANSETRON 2 MG/ML
INJECTION INTRAMUSCULAR; INTRAVENOUS AS NEEDED
Status: DISCONTINUED | OUTPATIENT
Start: 2024-11-04 | End: 2024-11-04

## 2024-11-04 RX ORDER — PHENYLEPHRINE HCL IN 0.9% NACL 1 MG/10 ML
SYRINGE (ML) INTRAVENOUS AS NEEDED
Status: DISCONTINUED | OUTPATIENT
Start: 2024-11-04 | End: 2024-11-04

## 2024-11-04 RX ORDER — ASPIRIN 81 MG/1
81 TABLET ORAL DAILY
Status: DISCONTINUED | OUTPATIENT
Start: 2024-11-05 | End: 2024-11-05 | Stop reason: HOSPADM

## 2024-11-04 RX ORDER — PROPOFOL 10 MG/ML
INJECTION, EMULSION INTRAVENOUS AS NEEDED
Status: DISCONTINUED | OUTPATIENT
Start: 2024-11-04 | End: 2024-11-04

## 2024-11-04 RX ORDER — PREDNISONE 10 MG/1
10 TABLET ORAL DAILY
Status: DISCONTINUED | OUTPATIENT
Start: 2024-11-05 | End: 2024-11-05 | Stop reason: HOSPADM

## 2024-11-04 RX ORDER — PREDNISONE 20 MG/1
20 TABLET ORAL DAILY
Status: COMPLETED | OUTPATIENT
Start: 2024-11-04 | End: 2024-11-04

## 2024-11-04 RX ORDER — PREDNISONE 10 MG/1
10 TABLET ORAL DAILY
Status: DISCONTINUED | OUTPATIENT
Start: 2024-11-05 | End: 2024-11-04

## 2024-11-04 RX ORDER — FENTANYL CITRATE 50 UG/ML
INJECTION, SOLUTION INTRAMUSCULAR; INTRAVENOUS AS NEEDED
Status: DISCONTINUED | OUTPATIENT
Start: 2024-11-04 | End: 2024-11-04

## 2024-11-04 RX ORDER — METOCLOPRAMIDE 10 MG/1
10 TABLET ORAL EVERY 12 HOURS PRN
Status: DISCONTINUED | OUTPATIENT
Start: 2024-11-04 | End: 2024-11-04

## 2024-11-04 RX ORDER — FENTANYL CITRATE/PF 50 MCG/ML
25 SYRINGE (ML) INJECTION
Status: COMPLETED | OUTPATIENT
Start: 2024-11-04 | End: 2024-11-04

## 2024-11-04 RX ORDER — LOSARTAN POTASSIUM 25 MG/1
25 TABLET ORAL EVERY MORNING
Status: DISCONTINUED | OUTPATIENT
Start: 2024-11-05 | End: 2024-11-04

## 2024-11-04 RX ORDER — SODIUM CHLORIDE, SODIUM LACTATE, POTASSIUM CHLORIDE, CALCIUM CHLORIDE 600; 310; 30; 20 MG/100ML; MG/100ML; MG/100ML; MG/100ML
125 INJECTION, SOLUTION INTRAVENOUS CONTINUOUS
Status: DISPENSED | OUTPATIENT
Start: 2024-11-04 | End: 2024-11-05

## 2024-11-04 RX ORDER — GLYCOPYRROLATE 0.2 MG/ML
INJECTION INTRAMUSCULAR; INTRAVENOUS AS NEEDED
Status: DISCONTINUED | OUTPATIENT
Start: 2024-11-04 | End: 2024-11-04

## 2024-11-04 RX ORDER — PANTOPRAZOLE SODIUM 40 MG/1
40 TABLET, DELAYED RELEASE ORAL
Status: DISCONTINUED | OUTPATIENT
Start: 2024-11-04 | End: 2024-11-05 | Stop reason: HOSPADM

## 2024-11-04 RX ORDER — SODIUM CHLORIDE, SODIUM LACTATE, POTASSIUM CHLORIDE, CALCIUM CHLORIDE 600; 310; 30; 20 MG/100ML; MG/100ML; MG/100ML; MG/100ML
INJECTION, SOLUTION INTRAVENOUS CONTINUOUS PRN
Status: DISCONTINUED | OUTPATIENT
Start: 2024-11-04 | End: 2024-11-04

## 2024-11-04 RX ORDER — METOPROLOL SUCCINATE 25 MG/1
25 TABLET, EXTENDED RELEASE ORAL DAILY
Status: DISCONTINUED | OUTPATIENT
Start: 2024-11-05 | End: 2024-11-05 | Stop reason: HOSPADM

## 2024-11-04 RX ORDER — ALBUTEROL SULFATE 0.83 MG/ML
2.5 SOLUTION RESPIRATORY (INHALATION) 4 TIMES DAILY PRN
Status: DISCONTINUED | OUTPATIENT
Start: 2024-11-04 | End: 2024-11-05 | Stop reason: HOSPADM

## 2024-11-04 RX ORDER — FLUTICASONE FUROATE AND VILANTEROL 200; 25 UG/1; UG/1
1 POWDER RESPIRATORY (INHALATION) DAILY
Status: DISCONTINUED | OUTPATIENT
Start: 2024-11-05 | End: 2024-11-05 | Stop reason: HOSPADM

## 2024-11-04 RX ORDER — SUCCINYLCHOLINE/SOD CL,ISO/PF 100 MG/5ML
SYRINGE (ML) INTRAVENOUS AS NEEDED
Status: DISCONTINUED | OUTPATIENT
Start: 2024-11-04 | End: 2024-11-04

## 2024-11-04 RX ORDER — ATORVASTATIN CALCIUM 40 MG/1
40 TABLET, FILM COATED ORAL EVERY MORNING
Status: DISCONTINUED | OUTPATIENT
Start: 2024-11-05 | End: 2024-11-04

## 2024-11-04 RX ORDER — ALBUTEROL SULFATE 90 UG/1
2 INHALANT RESPIRATORY (INHALATION) EVERY 4 HOURS PRN
Status: DISCONTINUED | OUTPATIENT
Start: 2024-11-04 | End: 2024-11-05 | Stop reason: HOSPADM

## 2024-11-04 RX ORDER — ACETAMINOPHEN 325 MG/1
650 TABLET ORAL EVERY 6 HOURS PRN
Status: DISCONTINUED | OUTPATIENT
Start: 2024-11-04 | End: 2024-11-05 | Stop reason: HOSPADM

## 2024-11-04 RX ORDER — HEPARIN SODIUM 5000 [USP'U]/ML
5000 INJECTION, SOLUTION INTRAVENOUS; SUBCUTANEOUS EVERY 8 HOURS SCHEDULED
Status: DISCONTINUED | OUTPATIENT
Start: 2024-11-04 | End: 2024-11-04

## 2024-11-04 RX ORDER — LEVOTHYROXINE SODIUM 100 UG/1
100 TABLET ORAL
Status: DISCONTINUED | OUTPATIENT
Start: 2024-11-05 | End: 2024-11-05 | Stop reason: HOSPADM

## 2024-11-04 RX ORDER — DEXTROSE MONOHYDRATE 25 G/50ML
INJECTION, SOLUTION INTRAVENOUS
Status: COMPLETED
Start: 2024-11-04 | End: 2024-11-04

## 2024-11-04 RX ORDER — DULOXETIN HYDROCHLORIDE 60 MG/1
60 CAPSULE, DELAYED RELEASE ORAL DAILY
Status: DISCONTINUED | OUTPATIENT
Start: 2024-11-05 | End: 2024-11-05 | Stop reason: HOSPADM

## 2024-11-04 RX ORDER — TRAZODONE HYDROCHLORIDE 100 MG/1
100 TABLET ORAL
Status: DISCONTINUED | OUTPATIENT
Start: 2024-11-04 | End: 2024-11-05 | Stop reason: HOSPADM

## 2024-11-04 RX ORDER — SODIUM CHLORIDE, SODIUM LACTATE, POTASSIUM CHLORIDE, CALCIUM CHLORIDE 600; 310; 30; 20 MG/100ML; MG/100ML; MG/100ML; MG/100ML
200 INJECTION, SOLUTION INTRAVENOUS CONTINUOUS
Status: DISCONTINUED | OUTPATIENT
Start: 2024-11-04 | End: 2024-11-04

## 2024-11-04 RX ORDER — IPRATROPIUM BROMIDE AND ALBUTEROL SULFATE 2.5; .5 MG/3ML; MG/3ML
3 SOLUTION RESPIRATORY (INHALATION) ONCE AS NEEDED
Status: COMPLETED | OUTPATIENT
Start: 2024-11-04 | End: 2024-11-04

## 2024-11-04 RX ORDER — SODIUM CHLORIDE, SODIUM LACTATE, POTASSIUM CHLORIDE, CALCIUM CHLORIDE 600; 310; 30; 20 MG/100ML; MG/100ML; MG/100ML; MG/100ML
150 INJECTION, SOLUTION INTRAVENOUS CONTINUOUS
Status: DISCONTINUED | OUTPATIENT
Start: 2024-11-04 | End: 2024-11-04

## 2024-11-04 RX ORDER — ALPRAZOLAM 0.5 MG
0.5 TABLET ORAL 2 TIMES DAILY
Status: DISCONTINUED | OUTPATIENT
Start: 2024-11-04 | End: 2024-11-04

## 2024-11-04 RX ORDER — LIDOCAINE HYDROCHLORIDE 20 MG/ML
INJECTION, SOLUTION EPIDURAL; INFILTRATION; INTRACAUDAL; PERINEURAL AS NEEDED
Status: DISCONTINUED | OUTPATIENT
Start: 2024-11-04 | End: 2024-11-04

## 2024-11-04 RX ORDER — ONDANSETRON 2 MG/ML
4 INJECTION INTRAMUSCULAR; INTRAVENOUS ONCE AS NEEDED
Status: COMPLETED | OUTPATIENT
Start: 2024-11-04 | End: 2024-11-04

## 2024-11-04 RX ORDER — LOSARTAN POTASSIUM 25 MG/1
25 TABLET ORAL DAILY
Status: DISCONTINUED | OUTPATIENT
Start: 2024-11-05 | End: 2024-11-05 | Stop reason: HOSPADM

## 2024-11-04 RX ORDER — HEPARIN SODIUM 5000 [USP'U]/ML
5000 INJECTION, SOLUTION INTRAVENOUS; SUBCUTANEOUS EVERY 8 HOURS SCHEDULED
Status: DISCONTINUED | OUTPATIENT
Start: 2024-11-04 | End: 2024-11-05 | Stop reason: HOSPADM

## 2024-11-04 RX ORDER — ROCURONIUM BROMIDE 10 MG/ML
INJECTION, SOLUTION INTRAVENOUS AS NEEDED
Status: DISCONTINUED | OUTPATIENT
Start: 2024-11-04 | End: 2024-11-04

## 2024-11-04 RX ORDER — TRAZODONE HYDROCHLORIDE 100 MG/1
100 TABLET ORAL
Status: DISCONTINUED | OUTPATIENT
Start: 2024-11-04 | End: 2024-11-04

## 2024-11-04 RX ORDER — ALPRAZOLAM 0.5 MG
0.5 TABLET ORAL 2 TIMES DAILY
Status: DISCONTINUED | OUTPATIENT
Start: 2024-11-04 | End: 2024-11-05 | Stop reason: HOSPADM

## 2024-11-04 RX ORDER — ACETAMINOPHEN 325 MG/1
650 TABLET ORAL EVERY 6 HOURS PRN
Status: DISCONTINUED | OUTPATIENT
Start: 2024-11-04 | End: 2024-11-04

## 2024-11-04 RX ADMIN — FENTANYL CITRATE 50 MCG: 50 INJECTION INTRAMUSCULAR; INTRAVENOUS at 11:32

## 2024-11-04 RX ADMIN — DEXTROSE MONOHYDRATE 50 ML: 25 INJECTION, SOLUTION INTRAVENOUS at 22:45

## 2024-11-04 RX ADMIN — TRAZODONE HYDROCHLORIDE 100 MG: 100 TABLET ORAL at 21:09

## 2024-11-04 RX ADMIN — INDOMETHACIN 100 MG: 50 SUPPOSITORY RECTAL at 11:40

## 2024-11-04 RX ADMIN — SODIUM CHLORIDE, SODIUM LACTATE, POTASSIUM CHLORIDE, AND CALCIUM CHLORIDE 1000 ML: .6; .31; .03; .02 INJECTION, SOLUTION INTRAVENOUS at 13:25

## 2024-11-04 RX ADMIN — ONDANSETRON 4 MG: 2 INJECTION INTRAMUSCULAR; INTRAVENOUS at 12:47

## 2024-11-04 RX ADMIN — SODIUM CHLORIDE, SODIUM LACTATE, POTASSIUM CHLORIDE, AND CALCIUM CHLORIDE 150 ML/HR: .6; .31; .03; .02 INJECTION, SOLUTION INTRAVENOUS at 16:23

## 2024-11-04 RX ADMIN — FENTANYL CITRATE 25 MCG: 50 INJECTION INTRAMUSCULAR; INTRAVENOUS at 13:35

## 2024-11-04 RX ADMIN — ALPRAZOLAM 0.5 MG: 0.5 TABLET ORAL at 21:09

## 2024-11-04 RX ADMIN — ATORVASTATIN CALCIUM 40 MG: 40 TABLET, FILM COATED ORAL at 21:15

## 2024-11-04 RX ADMIN — FENTANYL CITRATE 25 MCG: 50 INJECTION INTRAMUSCULAR; INTRAVENOUS at 13:30

## 2024-11-04 RX ADMIN — FENTANYL CITRATE 25 MCG: 50 INJECTION INTRAMUSCULAR; INTRAVENOUS at 13:24

## 2024-11-04 RX ADMIN — SUGAMMADEX 200 MG: 100 INJECTION, SOLUTION INTRAVENOUS at 12:19

## 2024-11-04 RX ADMIN — GLYCOPYRROLATE 0.2 MCG: 0.2 INJECTION, SOLUTION INTRAMUSCULAR; INTRAVENOUS at 11:28

## 2024-11-04 RX ADMIN — PREDNISONE 20 MG: 20 TABLET ORAL at 21:15

## 2024-11-04 RX ADMIN — DEXTROSE MONOHYDRATE 50 ML: 25 INJECTION, SOLUTION INTRAVENOUS at 22:38

## 2024-11-04 RX ADMIN — SODIUM CHLORIDE, SODIUM LACTATE, POTASSIUM CHLORIDE, AND CALCIUM CHLORIDE 150 ML/HR: .6; .31; .03; .02 INJECTION, SOLUTION INTRAVENOUS at 20:59

## 2024-11-04 RX ADMIN — LIDOCAINE HYDROCHLORIDE 80 MG: 20 INJECTION, SOLUTION EPIDURAL; INFILTRATION; INTRACAUDAL at 11:29

## 2024-11-04 RX ADMIN — ONDANSETRON 4 MG: 2 INJECTION INTRAMUSCULAR; INTRAVENOUS at 11:36

## 2024-11-04 RX ADMIN — ROCURONIUM BROMIDE 20 MG: 10 INJECTION INTRAVENOUS at 11:45

## 2024-11-04 RX ADMIN — FENTANYL CITRATE 25 MCG: 50 INJECTION INTRAMUSCULAR; INTRAVENOUS at 13:19

## 2024-11-04 RX ADMIN — SODIUM CHLORIDE, SODIUM LACTATE, POTASSIUM CHLORIDE, AND CALCIUM CHLORIDE: .6; .31; .03; .02 INJECTION, SOLUTION INTRAVENOUS at 11:13

## 2024-11-04 RX ADMIN — PANTOPRAZOLE SODIUM 40 MG: 40 TABLET, DELAYED RELEASE ORAL at 21:15

## 2024-11-04 RX ADMIN — Medication 80 MG: at 11:32

## 2024-11-04 RX ADMIN — Medication 100 MCG: at 11:32

## 2024-11-04 RX ADMIN — HEPARIN SODIUM 5000 UNITS: 5000 INJECTION, SOLUTION INTRAVENOUS; SUBCUTANEOUS at 21:09

## 2024-11-04 RX ADMIN — PROPOFOL 100 MG: 10 INJECTION, EMULSION INTRAVENOUS at 11:32

## 2024-11-04 RX ADMIN — IPRATROPIUM BROMIDE AND ALBUTEROL SULFATE 3 ML: 2.5; .5 SOLUTION RESPIRATORY (INHALATION) at 13:07

## 2024-11-04 RX ADMIN — ONDANSETRON 4 MG: 2 INJECTION INTRAMUSCULAR; INTRAVENOUS at 11:28

## 2024-11-04 RX ADMIN — FENTANYL CITRATE 50 MCG: 50 INJECTION INTRAMUSCULAR; INTRAVENOUS at 11:29

## 2024-11-04 RX ADMIN — IOHEXOL 2 ML: 240 INJECTION, SOLUTION INTRATHECAL; INTRAVASCULAR; INTRAVENOUS; ORAL at 12:16

## 2024-11-04 RX ADMIN — PROPOFOL 100 MG: 10 INJECTION, EMULSION INTRAVENOUS at 11:29

## 2024-11-04 NOTE — TELEPHONE ENCOUNTER
Patient contacted office with procedure questions. All questions answered. Patient expressed understanding.

## 2024-11-04 NOTE — H&P
H&P - Hospitalist   Name: Montserrat Sumner 74 y.o. female I MRN: [id number]  Unit/Bed#:  I Date of Admission: 11/4/2024   Date of Service: 11/4/2024 I Hospital Day: 0     Assessment & Plan  S/P ERCP  Patient tolerated procedure and anesthesia well.  Bradycardia, HR 55, otherwise vital stable.  Patient endorses mild pain of the right flank and nausea.  On exam, mild TTP to right flank.  Prior MRI in 9/18/2024 notes diffuse biliary and pancreatic ductal dilation without presence of obstructing distal calculus or mass lesion.  In PACU, patient was administered 1.6 L lactated Ringer's and fentanyl.    Plan:  Follow-up ERCP read.  Pain regimen -consider increasing dosage of Suboxone or adding nonopioid pain medications such as acetaminophen, TCAs, SSRI/SNRIs/muscle relaxants.  Patient does not take gabapentin and would not like to take gabapentin during this admission.  Clear liquid diet.  Advance as tolerated.  Centrilobular emphysema (HCC)  Patient follows with [facility] [place].  PFT done March 2023 showed FEV1 to be 1.56 L 71% predicted with obstructive ratio of 67%.  Patient uses 2 to 3 L supplemental oxygen at bedtime.    Plan:  Continue home medication Trelegy Ellipta, along with albuterol and DuoNeb as needed.  Titrate oxygen via NC as needed.  Continue prednisone titer per pulmonology.  Chronic bilateral low back pain without sciatica  Patient does have referral to orthopedics for her chronic lower back pain.    Plan:  Per pulmonology on 10/8/2024:  Prednisone 30 mg daily from 10/8/2024 to 10/21/2024.  Prednisone 20 mg daily from 10/22/2024 to 11/4/2024.  Prednisone 10 mg daily from 11/5/2024 to 11/18/2024.  Prednisone 5 mg daily from 11/19/2024 to 12/2/2024.  Hypertension  Continue home medication amlodipine, metoprolol, losartan.  Hypothyroid  Continue home levothyroxine.  CAD (coronary artery disease)  Continue home medication metoprolol, baby aspirin, atorvastatin.  Anxiety  Continue  "home medication alprazolam 0.5 mg twice daily.  Opioid dependence (HCC)  Continue home medication of Suboxone.  Hyperlipidemia  Continue home atorvastatin.      VTE Pharmacologic Prophylaxis:   Moderate Risk (Score 3-4) - Pharmacological DVT Prophylaxis Ordered: heparin.  Code Status: Level 1 - Full Code   Discussion with family: Updated  (daughter) at bedside.    Anticipated Length of Stay: Patient will be admitted on an observation basis with an anticipated length of stay of less than 2 midnights secondary to s/p ERCP.    History of Present Illness   Chief Complaint: s/p ERCP admitted for obs    Montserrat Sumner is a 74 y.o. female with a PMH of COPD/centrilobular emphysema, chronic respiratory failure on 2-3 L supplemental oxygen at bedtime, CAD s/p CABG x1, thoracic aneurysm repair, hypothyroidism, HTN,  T2DM, chronic pain and opiate dependence on Suboxone, GERD, tobacco abuse who presents s/p ERCP for dilated common bile and pancreatic duct. Patient is admitted for observation.    Patient endorses that she began smoking again recently, quarter to half a pack a day.    Patient endorses mild tenderness to right flank and nausea.  Patient does not endorse any headache, lightheadedness/dizziness, vomiting, shortness of breath, chest pain, abdominal pain, numbness or tingling of extremities.    Review of Systems   All other systems reviewed and are negative.      Historical Information   Past Medical History:   Diagnosis Date    Anxiety     Chronic pain 03/06/2023    lower abdomen and back    Colon polyp     COPD (chronic obstructive pulmonary disease) (HCC)     \"passes out\" with O2 levels dropping    Disease of thyroid gland     GERD (gastroesophageal reflux disease)     History of transfusion     with aneurysm repair-autologous-self and daughter    Hyperlipidemia     Hypertension     Teeth missing     Wears glasses     For reading     Past Surgical History:   Procedure Laterality Date    BACK " SURGERY      x2 herniated, crushed disc in the lumbar, ablation    CHOLECYSTECTOMY      COLONOSCOPY      FRACTURE SURGERY Left     hip-pinned    HYSTERECTOMY      salpingo-oophorectomy    THORACIC AORTIC ANEURYSM REPAIR  09/2016    ascending thoracic aortic repair with RCA bypass with SVG x 1    TONSILLECTOMY      UPPER GASTROINTESTINAL ENDOSCOPY       Social History     Tobacco Use    Smoking status: Every Day     Current packs/day: 0.25     Average packs/day: 0.3 packs/day for 56.8 years (14.2 ttl pk-yrs)     Types: Cigarettes     Start date: 1968     Passive exposure: Past    Smokeless tobacco: Never    Tobacco comments:     Currently smoking 5-6 cigarettes daily   Vaping Use    Vaping status: Never Used   Substance and Sexual Activity    Alcohol use: Yes     Comment: occasionally    Drug use: Yes     Types: Marijuana, Cocaine     Comment: yes still Marijuana as of 9/5/24-2 times weekly    Sexual activity: Not Currently     Partners: Male     Birth control/protection: Post-menopausal     E-Cigarette/Vaping    E-Cigarette Use Never User      E-Cigarette/Vaping Substances    Nicotine No     THC No     CBD No     Flavoring No     Other No     Unknown No      Social History:  Marital Status: /Civil Union   Occupation: Unknown  Patient Pre-hospital Living Situation: Home  Patient Pre-hospital Level of Mobility: walks  Patient Pre-hospital Diet Restrictions: None    Meds/Allergies   I have reviewed home medications with patient personally.  Prior to Admission medications    Medication Sig Start Date End Date Taking? Authorizing Provider   albuterol (2.5 mg/3 mL) 0.083 % nebulizer solution Take 3 mL (2.5 mg total) by nebulization 4 (four) times a day as needed for wheezing or shortness of breath 9/27/23  Yes Maik Smith, DO   albuterol (Proventil HFA) 90 mcg/act inhaler Inhale 2 puffs every 4 (four) hours as needed for wheezing 10/8/24  Yes Maik Smith, DO   albuterol (PROVENTIL HFA,VENTOLIN HFA) 90  mcg/act inhaler Inhale 1 puff every 4 (four) hours as needed for wheezing 9/30/24  Yes JAZMINE Enamorado   ALPRAZolam (XANAX) 0.5 mg tablet Take 0.5 mg by mouth 2 (two) times a day   Yes Historical Provider, MD   amLODIPine (NORVASC) 5 mg tablet Take 1 tablet (5 mg total) by mouth daily 6/14/24  Yes Julian Tobias DO   aspirin 81 mg chewable tablet Chew 81 mg daily   Yes Historical Provider, MD   atorvastatin (LIPITOR) 40 mg tablet Take 40 mg by mouth every morning   Yes Historical Provider, MD   b complex vitamins capsule Take 1 capsule by mouth once a week   Yes Historical Provider, MD   buprenorphine-naloxone (Suboxone) 4-1 MG every morning Wafer 12/8/23  Yes Historical Provider, MD   celecoxib (CeleBREX) 200 mg capsule Take 1 capsule (200 mg total) by mouth daily  Patient taking differently: Take 200 mg by mouth every morning 5/6/24  Yes Julian Tobias DO   DULoxetine (CYMBALTA) 60 mg delayed release capsule Take 1 capsule (60 mg total) by mouth daily  Patient taking differently: Take 60 mg by mouth every morning 5/6/24  Yes Julian Tobias DO   esomeprazole (NexIUM) 40 MG capsule Take 1 capsule (40 mg total) by mouth 2 (two) times a day before meals 6/14/24  Yes Mariama Sy PA-C   fluticasone-umeclidinium-vilanterol (Trelegy Ellipta) 200-62.5-25 mcg/actuation AEPB inhaler Inhale 1 puff daily Rinse mouth after use. 10/8/24 10/3/25 Yes Maik Smith,    ipratropium-albuterol (DUO-NEB) 0.5-2.5 mg/3 mL nebulizer solution Take 3 mL by nebulization 4 (four) times a day as needed for shortness of breath or wheezing 10/8/24  Yes Maik Smith DO   levothyroxine 100 mcg tablet Take 100 mcg by mouth every morning 8/21/23  Yes Historical Provider, MD   losartan (COZAAR) 25 mg tablet Take 25 mg by mouth every morning 5/11/23  Yes Historical Provider, MD   metoclopramide (REGLAN) 10 mg tablet  8/21/23  Yes Historical Provider, MD   oxygen gas Inhale continuous as needed 2.5 L   Yes Historical Provider, MD    predniSONE 20 mg tablet Take 2 tablets daily as directed 10/30/24  Yes Maik Smith DO   sodium chloride 0.9 % nebulizer solution Take 3 mL by nebulization as needed for wheezing 12/28/23  Yes Yanick Hassan PA-C   traZODone (DESYREL) 50 mg tablet Take 100 mg by mouth daily at bedtime   Yes Historical Provider, MD   venlafaxine (EFFEXOR-XR) 150 mg 24 hr capsule Take 150 mg by mouth daily at bedtime   Yes Historical Provider, MD   venlafaxine (EFFEXOR-XR) 75 mg 24 hr capsule Take 75 mg by mouth daily at bedtime 12/7/23  Yes Historical Provider, MD   clopidogrel (PLAVIX) 75 mg tablet Take 75 mg by mouth every other day  Patient not taking: Reported on 10/8/2024    Historical Provider, MD   fluticasone-umeclidinium-vilanterol (Trelegy Ellipta) 100-62.5-25 mcg/actuation inhaler Inhale 1 puff daily Rinse mouth after use.  Patient not taking: Reported on 11/4/2024 12/26/23 12/20/24  Carlos Joseph MD   gabapentin (NEURONTIN) 300 mg capsule Take 1 capsule (300 mg total) by mouth 3 (three) times a day  Patient not taking: Reported on 10/8/2024 3/6/23   Shelbie Juarez MD   glimepiride (AMARYL) 2 mg tablet TAKE 1 TABLET 2 TIMES A DAY ORALLY  Patient not taking: Reported on 9/23/2024 9/9/24   Historical Provider, MD   metoprolol succinate (TOPROL-XL) 50 mg 24 hr tablet Take 1 tablet (50 mg total) by mouth daily  Patient taking differently: Take 50 mg by mouth every morning 11/29/23 10/8/24  Mushtaq Rodriguez MD   predniSONE 10 mg tablet Take 40 mg PO daily x 3 days, then 30 mg daily x 3 days, then 20 mg daily x 3 days, then 10 mg daily x 3 days, then stop.  Patient not taking: Reported on 11/4/2024 9/30/24   JAZMINE Enamorado   predniSONE 10 mg tablet Take 3 tablets daily for 14 days then 2 tablets daily for 14 days then 1 tablet daily for 14 days then 1/2 tablet daily for 14 days  Patient not taking: Reported on 11/4/2024 10/8/24   Maik Smith, DO     No Known Allergies    Objective :  Temp:  [97 °F  (36.1 °C)] 97 °F (36.1 °C)  HR:  [56-59] 58  BP: (129-146)/(62-71) 136/62  Resp:  [16-20] 20  SpO2:  [94 %-100 %] 95 %  O2 Device: None (Room air)    Physical Exam  Vitals and nursing note reviewed.   Constitutional:       General: She is not in acute distress.     Appearance: Normal appearance. She is well-developed.   HENT:      Head: Normocephalic and atraumatic.   Eyes:      Conjunctiva/sclera: Conjunctivae normal.   Cardiovascular:      Rate and Rhythm: Normal rate and regular rhythm.      Heart sounds: No murmur heard.  Pulmonary:      Effort: Pulmonary effort is normal. No respiratory distress.      Breath sounds: Normal breath sounds. No wheezing.   Abdominal:      General: Bowel sounds are normal.      Palpations: Abdomen is soft.      Tenderness: There is abdominal tenderness (Mild TTP to right flank).   Musculoskeletal:         General: No swelling.      Cervical back: Neck supple.      Right lower leg: No edema.      Left lower leg: No edema.   Skin:     General: Skin is warm and dry.      Capillary Refill: Capillary refill takes less than 2 seconds.   Neurological:      General: No focal deficit present.      Mental Status: She is alert and oriented to person, place, and time. Mental status is at baseline.   Psychiatric:         Mood and Affect: Mood normal.          Lines/Drains:            Lab Results: I have reviewed the following results:                  Lab Results   Component Value Date    HGBA1C 6.6 (H) 07/15/2023           Imaging Results Review: No pertinent imaging studies reviewed.  Other Study Results Review: No additional pertinent studies reviewed.    Administrative Statements     ** Please Note: This note has been constructed using a voice recognition system. **

## 2024-11-04 NOTE — H&P
"History and Physical -  [facility]  Montserrat Sumner 74 y.o. female MRN: [id number]        HPI: 74-year-old female with history of COPD was noted to have dilated biliary and pancreatic ducts.  Denies any abdominal pain.  Liver enzymes are normal.    Historical Information   Past Medical History:   Diagnosis Date    Anxiety     Chronic pain 03/06/2023    lower abdomen and back    Colon polyp     COPD (chronic obstructive pulmonary disease) (HCC)     \"passes out\" with O2 levels dropping    Disease of thyroid gland     GERD (gastroesophageal reflux disease)     History of transfusion     with aneurysm repair-autologous-self and daughter    Hyperlipidemia     Hypertension     Teeth missing     Wears glasses     For reading     Past Surgical History:   Procedure Laterality Date    BACK SURGERY      x2 herniated, crushed disc in the lumbar, ablation    CHOLECYSTECTOMY      COLONOSCOPY      FRACTURE SURGERY Left     hip-pinned    HYSTERECTOMY      salpingo-oophorectomy    THORACIC AORTIC ANEURYSM REPAIR  09/2016    ascending thoracic aortic repair with RCA bypass with SVG x 1    TONSILLECTOMY      UPPER GASTROINTESTINAL ENDOSCOPY       Social History   Social History     Substance and Sexual Activity   Alcohol Use Yes    Comment: occasionally     Social History     Substance and Sexual Activity   Drug Use Yes    Types: Marijuana, Cocaine    Comment: yes still Marijuana as of 9/5/24-2 times weekly     Social History     Tobacco Use   Smoking Status Every Day    Current packs/day: 0.25    Average packs/day: 0.3 packs/day for 56.8 years (14.2 ttl pk-yrs)    Types: Cigarettes    Start date: 1968    Passive exposure: Past   Smokeless Tobacco Never   Tobacco Comments    Currently smoking 5-6 cigarettes daily     Family History   Problem Relation Age of Onset    Breast cancer Mother        Meds/Allergies     Not in a hospital admission.    No Known Allergies    Objective     Blood pressure 146/71, pulse 59, " "temperature (!) 97 °F (36.1 °C), temperature source Temporal, resp. rate 20, height 5' 3\" (1.6 m), weight 77.6 kg (171 lb), SpO2 94%.    Physical Exam:    Chest- CTA  Heart- RRR  Abdomen- NT/ND  Extremities- No edema    ASSESSMENT:     Dilated biliary and pancreatic ducts    PLAN:    ERCP    Patient was explained about  the risks and benefits of the procedure. Risks including but not limited to severe pancreatitis, bleeding, infection, perforation were explained in detail. Also explained about less than 100% sensitivity with the exam and other alternatives.              "

## 2024-11-04 NOTE — ASSESSMENT & PLAN NOTE
Patient tolerated procedure and anesthesia well.  Bradycardia, HR 55, otherwise vital stable.  Patient endorses mild pain of the right flank and nausea.  On exam, mild TTP to right flank.  Prior MRI in 9/18/2024 notes diffuse biliary and pancreatic ductal dilation without presence of obstructing distal calculus or mass lesion.  In PACU, patient was administered 1.6 L lactated Ringer's and fentanyl.    Plan:  Follow-up ERCP read.  Pain regimen -consider increasing dosage of Suboxone or adding nonopioid pain medications such as acetaminophen, TCAs, SSRI/SNRIs/muscle relaxants.  Patient does not take gabapentin and would not like to take gabapentin during this admission.  Clear liquid diet.  Advance as tolerated.

## 2024-11-04 NOTE — PLAN OF CARE
Problem: RESPIRATORY - ADULT  Goal: Achieves optimal ventilation and oxygenation  Description: INTERVENTIONS:  - Assess for changes in respiratory status  - Assess for changes in mentation and behavior  - Position to facilitate oxygenation and minimize respiratory effort  - Oxygen administered by appropriate delivery if ordered  - Initiate smoking cessation education as indicated  - Encourage broncho-pulmonary hygiene including cough, deep breathe, Incentive Spirometry  - Assess the need for suctioning and aspirate as needed  - Assess and instruct to report SOB or any respiratory difficulty  - Respiratory Therapy support as indicated  Outcome: Progressing     Problem: METABOLIC, FLUID AND ELECTROLYTES - ADULT  Goal: Electrolytes maintained within normal limits  Description: INTERVENTIONS:  - Monitor labs and assess patient for signs and symptoms of electrolyte imbalances  - Administer electrolyte replacement as ordered  - Monitor response to electrolyte replacements, including repeat lab results as appropriate  - Instruct patient on fluid and nutrition as appropriate  Outcome: Progressing  Goal: Fluid balance maintained  Description: INTERVENTIONS:  - Monitor labs   - Monitor I/O and WT  - Instruct patient on fluid and nutrition as appropriate  - Assess for signs & symptoms of volume excess or deficit  Outcome: Progressing

## 2024-11-04 NOTE — ANESTHESIA POSTPROCEDURE EVALUATION
Post-Op Assessment Note    CV Status:  Stable  Pain Score: 0    Pain management: adequate       Mental Status:  Awake and alert   Hydration Status:  Stable   PONV Controlled:  None   Airway Patency:  Patent and adequate     Post Op Vitals Reviewed: Yes    No anethesia notable event occurred.    Staff: CRNA, Anesthesiologist           Last Filed PACU Vitals:  Vitals Value Taken Time   Temp 97    Pulse 55    /68    Resp 16    SpO2 100        Modified Rohit:  Activity: 2 (11/4/2024  9:51 AM)  Respiration: 2 (11/4/2024  9:51 AM)  Circulation: 2 (11/4/2024  9:51 AM)  Consciousness: 2 (11/4/2024  9:51 AM)  Oxygen Saturation: 2 (11/4/2024  9:51 AM)  Modified Rohit Score: 10 (11/4/2024  9:51 AM)

## 2024-11-04 NOTE — ASSESSMENT & PLAN NOTE
Patient does have referral to orthopedics for her chronic lower back pain.    Plan:  Per pulmonology on 10/8/2024:  Prednisone 30 mg daily from 10/8/2024 to 10/21/2024.  Prednisone 20 mg daily from 10/22/2024 to 11/4/2024.  Prednisone 10 mg daily from 11/5/2024 to 11/18/2024.  Prednisone 5 mg daily from 11/19/2024 to 12/2/2024.

## 2024-11-04 NOTE — PERIOPERATIVE NURSING NOTE
Patient transported to room with RN. Primary RN aware of patients arrival. PCA in room with patient.

## 2024-11-04 NOTE — ASSESSMENT & PLAN NOTE
Patient follows with [facility] [place].  PFT done March 2023 showed FEV1 to be 1.56 L 71% predicted with obstructive ratio of 67%.  Patient uses 2 to 3 L supplemental oxygen at bedtime.    Plan:  Continue home medication Trelegy Ellipta, along with albuterol and DuoNeb as needed.  Titrate oxygen via NC as needed.  Continue prednisone titer per pulmonology.

## 2024-11-04 NOTE — ANESTHESIA PREPROCEDURE EVALUATION
Procedure:  ERCP    TTE 2023:    Left Ventricle: Left ventricular cavity size is normal. Wall thickness is mildly increased. There is borderline concentric hypertrophy. The left ventricular ejection fraction is 65% by visual estimation. Systolic function is normal. Wall motion is normal. Diastolic function is mildly abnormal, consistent with grade I (abnormal) relaxation.  Left atrial filling pressure is elevated.    Left Atrium: The atrium is moderately dilated.    Aortic Valve: There is aortic valve sclerosis.    Mitral Valve: There is mild annular calcification. There is mild regurgitation.    Tricuspid Valve: There is mild regurgitation.  Pulmonary artery pressure around 30 mmHg.    Pulmonic Valve: There is mild regurgitation.       Relevant Problems   CARDIO   (+) Aneurysm of ascending aorta without rupture (HCC)   (+) CAD (coronary artery disease)   (+) History of coronary artery bypass graft   (+) Hyperlipidemia   (+) Hypertension      ENDO   (+) Hypothyroid      GI/HEPATIC   (+) Dysphagia   (+) Gastroesophageal reflux disease      MUSCULOSKELETAL   (+) Chronic bilateral low back pain without sciatica      NEURO/PSYCH   (+) Anxiety   (+) Chronic bilateral low back pain without sciatica   (+) Chronic pain syndrome      PULMONARY   (+) Centrilobular emphysema (HCC)   (+) Chronic respiratory failure with hypoxia and hypercapnia (HCC)   (+) Oxygen dependent at night only   (+) Shortness of breath          Meds:    Metoprolol Last taken this morning  BP usually around 120s  Albuterol daily  Physical Exam    Airway    Mallampati score: II         Dental   Comment: Multiple missing       Cardiovascular  Cardiovascular exam normal    Pulmonary  Pulmonary exam normal     Other Findings  post-pubertal.      Anesthesia Plan  ASA Score- 3     Anesthesia Type- general with ASA Monitors.         Additional Monitors:     Airway Plan: ETT.           Plan Factors-Exercise tolerance (METS): >4 METS.Exercise comment: Slowly  with a cane or walker  Limited by back pain    .    Chart reviewed.    Patient summary reviewed.    Patient is a current smoker (ongoing Marijuana).  Patient smoked on day of surgery (cigarette today).            Induction- intravenous.    Postoperative Plan- Plan for postoperative opioid use. Planned trial extubation    Perioperative Resuscitation Plan - Level 1 - Full Code.       Informed Consent- Anesthetic plan and risks discussed with patient.  I personally reviewed this patient with the CRNA. Discussed and agreed on the Anesthesia Plan with the CRNA..         Patient unable to complete

## 2024-11-05 VITALS
HEART RATE: 62 BPM | TEMPERATURE: 97.8 F | DIASTOLIC BLOOD PRESSURE: 82 MMHG | SYSTOLIC BLOOD PRESSURE: 146 MMHG | OXYGEN SATURATION: 94 % | RESPIRATION RATE: 14 BRPM

## 2024-11-05 LAB
ALBUMIN SERPL BCG-MCNC: 3.4 G/DL (ref 3.5–5)
ALP SERPL-CCNC: 61 U/L (ref 34–104)
ALT SERPL W P-5'-P-CCNC: 96 U/L (ref 7–52)
ANION GAP SERPL CALCULATED.3IONS-SCNC: 4 MMOL/L (ref 4–13)
AST SERPL W P-5'-P-CCNC: 78 U/L (ref 13–39)
BASOPHILS # BLD MANUAL: 0 THOUSAND/UL (ref 0–0.1)
BASOPHILS NFR MAR MANUAL: 0 % (ref 0–1)
BILIRUB DIRECT SERPL-MCNC: 0.02 MG/DL (ref 0–0.2)
BILIRUB SERPL-MCNC: 0.61 MG/DL (ref 0.2–1)
BUN SERPL-MCNC: 17 MG/DL (ref 5–25)
CALCIUM SERPL-MCNC: 8.8 MG/DL (ref 8.4–10.2)
CHLORIDE SERPL-SCNC: 107 MMOL/L (ref 96–108)
CO2 SERPL-SCNC: 28 MMOL/L (ref 21–32)
CREAT SERPL-MCNC: 0.69 MG/DL (ref 0.6–1.3)
EOSINOPHIL # BLD MANUAL: 0 THOUSAND/UL (ref 0–0.4)
EOSINOPHIL NFR BLD MANUAL: 0 % (ref 0–6)
ERYTHROCYTE [DISTWIDTH] IN BLOOD BY AUTOMATED COUNT: 16 % (ref 11.6–15.1)
GFR SERPL CREATININE-BSD FRML MDRD: 86 ML/MIN/1.73SQ M
GLUCOSE P FAST SERPL-MCNC: 81 MG/DL (ref 65–99)
GLUCOSE SERPL-MCNC: 101 MG/DL (ref 65–140)
GLUCOSE SERPL-MCNC: 125 MG/DL (ref 65–140)
GLUCOSE SERPL-MCNC: 127 MG/DL (ref 65–140)
GLUCOSE SERPL-MCNC: 156 MG/DL (ref 65–140)
GLUCOSE SERPL-MCNC: 185 MG/DL (ref 65–140)
GLUCOSE SERPL-MCNC: 81 MG/DL (ref 65–140)
GLUCOSE SERPL-MCNC: 83 MG/DL (ref 65–140)
GLUCOSE SERPL-MCNC: 98 MG/DL (ref 65–140)
HCT VFR BLD AUTO: 37.7 % (ref 34.8–46.1)
HGB BLD-MCNC: 11.7 G/DL (ref 11.5–15.4)
LYMPHOCYTES # BLD AUTO: 0.72 THOUSAND/UL (ref 0.6–4.47)
LYMPHOCYTES # BLD AUTO: 8 % (ref 14–44)
MCH RBC QN AUTO: 29.3 PG (ref 26.8–34.3)
MCHC RBC AUTO-ENTMCNC: 31 G/DL (ref 31.4–37.4)
MCV RBC AUTO: 94 FL (ref 82–98)
MONOCYTES # BLD AUTO: 0.18 THOUSAND/UL (ref 0–1.22)
MONOCYTES NFR BLD: 2 % (ref 4–12)
NEUTROPHILS # BLD MANUAL: 8.06 THOUSAND/UL (ref 1.85–7.62)
NEUTS BAND NFR BLD MANUAL: 3 % (ref 0–8)
NEUTS SEG NFR BLD AUTO: 87 % (ref 43–75)
PLATELET # BLD AUTO: 205 THOUSANDS/UL (ref 149–390)
PLATELET BLD QL SMEAR: ADEQUATE
PMV BLD AUTO: 9.6 FL (ref 8.9–12.7)
POTASSIUM SERPL-SCNC: 4.6 MMOL/L (ref 3.5–5.3)
PROT SERPL-MCNC: 5.9 G/DL (ref 6.4–8.4)
RBC # BLD AUTO: 4 MILLION/UL (ref 3.81–5.12)
RBC MORPH BLD: NORMAL
SODIUM SERPL-SCNC: 139 MMOL/L (ref 135–147)
WBC # BLD AUTO: 8.95 THOUSAND/UL (ref 4.31–10.16)

## 2024-11-05 PROCEDURE — 99239 HOSP IP/OBS DSCHRG MGMT >30: CPT | Performed by: INTERNAL MEDICINE

## 2024-11-05 PROCEDURE — 82948 REAGENT STRIP/BLOOD GLUCOSE: CPT

## 2024-11-05 PROCEDURE — G0008 ADMIN INFLUENZA VIRUS VAC: HCPCS | Performed by: INTERNAL MEDICINE

## 2024-11-05 PROCEDURE — 85007 BL SMEAR W/DIFF WBC COUNT: CPT

## 2024-11-05 PROCEDURE — 90662 IIV NO PRSV INCREASED AG IM: CPT | Performed by: INTERNAL MEDICINE

## 2024-11-05 PROCEDURE — 99214 OFFICE O/P EST MOD 30 MIN: CPT | Performed by: INTERNAL MEDICINE

## 2024-11-05 PROCEDURE — 80076 HEPATIC FUNCTION PANEL: CPT | Performed by: PHYSICIAN ASSISTANT

## 2024-11-05 PROCEDURE — 80048 BASIC METABOLIC PNL TOTAL CA: CPT

## 2024-11-05 PROCEDURE — 85027 COMPLETE CBC AUTOMATED: CPT

## 2024-11-05 RX ORDER — PREDNISONE 10 MG/1
10 TABLET ORAL DAILY
Qty: 13 TABLET | Refills: 0 | Status: SHIPPED | OUTPATIENT
Start: 2024-11-06 | End: 2024-11-19

## 2024-11-05 RX ORDER — VENLAFAXINE HYDROCHLORIDE 37.5 MG/1
75 CAPSULE, EXTENDED RELEASE ORAL DAILY
Status: DISCONTINUED | OUTPATIENT
Start: 2024-11-05 | End: 2024-11-05 | Stop reason: HOSPADM

## 2024-11-05 RX ADMIN — BUPRENORPHINE AND NALOXONE 4 MG: 2; .5 FILM BUCCAL; SUBLINGUAL at 11:18

## 2024-11-05 RX ADMIN — PANTOPRAZOLE SODIUM 40 MG: 40 TABLET, DELAYED RELEASE ORAL at 09:54

## 2024-11-05 RX ADMIN — PREDNISONE 10 MG: 10 TABLET ORAL at 09:54

## 2024-11-05 RX ADMIN — ACETAMINOPHEN 650 MG: 325 TABLET ORAL at 05:03

## 2024-11-05 RX ADMIN — FLUTICASONE FUROATE AND VILANTEROL TRIFENATATE 1 PUFF: 200; 25 POWDER RESPIRATORY (INHALATION) at 09:59

## 2024-11-05 RX ADMIN — AMLODIPINE BESYLATE 5 MG: 5 TABLET ORAL at 09:54

## 2024-11-05 RX ADMIN — METOPROLOL SUCCINATE 25 MG: 25 TABLET, EXTENDED RELEASE ORAL at 09:54

## 2024-11-05 RX ADMIN — INFLUENZA A VIRUS A/VICTORIA/4897/2022 IVR-238 (H1N1) ANTIGEN (FORMALDEHYDE INACTIVATED), INFLUENZA A VIRUS A/CALIFORNIA/122/2022 SAN-022 (H3N2) ANTIGEN (FORMALDEHYDE INACTIVATED), AND INFLUENZA B VIRUS B/MICHIGAN/01/2021 ANTIGEN (FORMALDEHYDE INACTIVATED) 0.5 ML: 60; 60; 60 INJECTION, SUSPENSION INTRAMUSCULAR at 14:14

## 2024-11-05 RX ADMIN — HEPARIN SODIUM 5000 UNITS: 5000 INJECTION, SOLUTION INTRAVENOUS; SUBCUTANEOUS at 05:03

## 2024-11-05 RX ADMIN — ASPIRIN 81 MG: 81 TABLET, COATED ORAL at 09:54

## 2024-11-05 RX ADMIN — LOSARTAN POTASSIUM 25 MG: 25 TABLET, FILM COATED ORAL at 09:54

## 2024-11-05 RX ADMIN — LEVOTHYROXINE SODIUM 100 MCG: 100 TABLET ORAL at 05:03

## 2024-11-05 RX ADMIN — VENLAFAXINE HYDROCHLORIDE 75 MG: 37.5 CAPSULE, EXTENDED RELEASE ORAL at 09:54

## 2024-11-05 RX ADMIN — ALPRAZOLAM 0.5 MG: 0.5 TABLET ORAL at 09:54

## 2024-11-05 RX ADMIN — HEPARIN SODIUM 5000 UNITS: 5000 INJECTION, SOLUTION INTRAVENOUS; SUBCUTANEOUS at 14:14

## 2024-11-05 RX ADMIN — DULOXETINE HYDROCHLORIDE 60 MG: 60 CAPSULE, DELAYED RELEASE ORAL at 09:54

## 2024-11-05 RX ADMIN — NICOTINE 7 MG: 7 PATCH, EXTENDED RELEASE TRANSDERMAL at 09:54

## 2024-11-05 NOTE — NURSING NOTE
Patient was NPO and complained of being lethargic and not feeling well. Took vital signs and rectal temperature was found to be 94.9. Provider was notified and prior to starting Julito hugger BG was checked, which was 23. 2 amps of dextrose were given and Julito hugger was initiated per order. Temperature johanna steadily every 30 minutes throughout the night up to a rectal temp of 98.2 at 0500 so the Julito hugger was removed. BG was checked every hour which also stabilized. Dr. Kat is aware.

## 2024-11-05 NOTE — ASSESSMENT & PLAN NOTE
Follows with pulmonology outpatient.  Is currently on a prednisone taper at 10 mg daily starting today for 14 doses.  Continue DuoNeb as needed.  Continue fluticasone vilanterol 200-25 1 puff daily

## 2024-11-05 NOTE — DISCHARGE SUMMARY
Discharge Summary - Hospitalist   Name: Montserrat Sumner 74 y.o. female I MRN: [id number]  Unit/Bed#: S -01 I Date of Admission: 11/4/2024   Date of Service: 11/5/2024 I Hospital Day: 0     Assessment & Plan  S/P ERCP  Patient was evaluated in the ED in July for abdominal pain and discharged with plan for nonemergent MRI.  This was completed in September which showed dilation of the pancreatic duct as well as the common bile duct.  She came in for ERCP as an outpatient however the procedure was not completed due to coiling of the endoscope.  Be reached out to GI to determine whether or not a repeat ERCP would be done during this admission, however they have recommended following up as an outpatient for repeat endoscopic ultrasound.    They also recommended resuming Plavix 75 mg daily.  She was able to tolerate and advance diet this afternoon.  On 11/5 was stable for discharge with recommended outpatient follow-up.  Centrilobular emphysema (HCC)  Follows with pulmonology outpatient.  Is currently on a prednisone taper at 10 mg daily starting today for 14 doses.  Continue DuoNeb as needed.  Continue fluticasone vilanterol 200-25 1 puff daily  Hypertension  Blood Pressure: 149/84    Plan:  Continue amlodipine 5 mg daily and losartan 25 mg daily and metoprolol succinate 25 mg daily  Monitor blood pressure at home  Hypothyroid  Most recent TSH is 1.87.  Continue levothyroxine 100 mcg daily  CAD (coronary artery disease)  Known history of CAD status post CABG x 1.  Continue blood pressure management as well as aspirin 81 mg daily and atorvastatin 40 mg daily.  Anxiety  Seemingly well-controlled.  Continue alprazolam 0.5 mg twice a day  Cymbalta 60 mg daily.  Continue venlafaxine 75 mg daily.  Continue trazodone 100 mg daily  Opioid dependence (HCC)  Currently utilizing Suboxone 4 mg sublingually daily for abdominal pain.  See plan for ERCP     Medical Problems       Resolved Problems  Date Reviewed: 11/5/2024    None       Discharging Physician / Practitioner: Yogesh العراقي MD  PCP: Julian Tobias DO  Admission Date:   Admission Orders (From admission, onward)       Ordered        11/04/24 1329  Place in Observation  Once                          Discharge Date: 11/05/24    Consultations During Hospital Stay:  GI    Procedures Performed:   ERCP    Significant Findings / Test Results:   None    Incidental Findings:   None  I reviewed the above mentioned incidental findings with the patient and/or family and they expressed understanding.    Test Results Pending at Discharge (will require follow up):   None     Outpatient Tests Requested:  ERCP    Complications:  None    Reason for Admission: No    Hospital Course:   Montserrat Sumner is a 74 y.o. female patient who originally presented to the hospital on 11/4/2024 for routine outpatient ERCP.  Per reports the procedure was unable to be completed due to coiling of the endoscope.  We restarted GI regarding possible repeat attempt however they recommended outpatient follow-up.  We were able to advance her diet without complication.  On 11/5 she was stable for discharge.    Condition at Discharge: good    Discharge Day Visit / Exam:   Subjective:    Patient seen and examined at bedside today.  She continues to endorse right upper quadrant abdominal pain particularly with palpation.  She denies nausea vomiting or diarrhea.  She was familiar with the plan for outpatient endoscopic ultrasound.    Vitals: Blood Pressure: 149/84 (11/05/24 0954)  Pulse: 62 (11/05/24 0954)  Temperature: 97.8 °F (36.6 °C) (11/05/24 0738)  Temp Source: Oral (11/05/24 0738)  Respirations: 14 (11/05/24 0738)  SpO2: 92 % (11/05/24 0738)  Physical Exam  Vitals and nursing note reviewed.   Constitutional:       General: She is not in acute distress.     Appearance: She is well-developed.   HENT:      Head: Normocephalic and atraumatic.   Eyes:      Conjunctiva/sclera: Conjunctivae normal.   Cardiovascular:       Rate and Rhythm: Normal rate and regular rhythm.      Heart sounds: No murmur heard.  Pulmonary:      Effort: Pulmonary effort is normal. No respiratory distress.      Breath sounds: Normal breath sounds.   Abdominal:      Palpations: Abdomen is soft.      Tenderness: There is abdominal tenderness (With palpation of the right upper quadrant).   Musculoskeletal:         General: No swelling.   Skin:     General: Skin is warm and dry.   Neurological:      Mental Status: She is alert.   Psychiatric:         Mood and Affect: Mood normal.        Discussion with Family: Updated  () via phone.    Discharge instructions/Information to patient and family:   See after visit summary for information provided to patient and family.      Provisions for Follow-Up Care:  See after visit summary for information related to follow-up care and any pertinent home health orders.      Mobility at time of Discharge:   Basic Mobility Inpatient Raw Score: 20  JH-HLM Goal: 6: Walk 10 steps or more  JH-HLM Achieved: 6: Walk 10 steps or more  HLM Goal achieved. Continue to encourage appropriate mobility.     Disposition:   Home    Planned Readmission: NO    Discharge Medications:  See after visit summary for reconciled discharge medications provided to patient and/or family.      Administrative Statements   Discharge Statement:  I have spent a total time of 30 minutes in caring for this patient on the day of the visit/encounter. .    **Please Note: This note may have been constructed using a voice recognition system**

## 2024-11-05 NOTE — PROGRESS NOTES
Progress Note - Gastroenterology   Name: Montserrat Sumner 74 y.o. female I MRN: [id number]  Unit/Bed#: S -01 I Date of Admission: 11/4/2024   Date of Service: 11/5/2024 I Hospital Day: 0    Assessment & Plan  S/P ERCP  Patient with recent outpatient MRI showing diffuse biliary and pancreatic ductal dilatation without obvious presence of obstructing stone or lesion.  She underwent ERCP yesterday 11/4 for further investigation however was technically difficult procedure and unable to advance guidewire for further review.  CBC/BMP unremarkable    -Patient without any complications overnight.  No abdominal pain nausea or vomiting.  - Trial clear liquids this morning.  -Trial low fat diet for lunch, if tolerating can be discharged  -Okay to restart Plavix  - Patient will need outpatient EUS/ERCP.  This will be at [facility].  Patient is aware. Message sent to staff.  - Also discussed with patient's daughter who was on the phone.        Subjective   Patient reports he is doing well this morning.  She had significant hypoglycemia last night with hypothermia however this is now resolved.  She has been n.p.o. overnight.  Awaiting trial of clear liquids.  She denies any abdominal pain nausea or vomiting.    Objective :  Temp:  [93.3 °F (34.1 °C)-98.2 °F (36.8 °C)] 97.8 °F (36.6 °C)  HR:  [45-66] 62  BP: (104-158)/(47-84) 149/84  Resp:  [14-22] 14  SpO2:  [91 %-100 %] 92 %  O2 Device: None (Room air)    Physical Exam  Vitals reviewed.   Constitutional:       General: She is not in acute distress.     Appearance: Normal appearance. She is not ill-appearing.   HENT:      Head: Normocephalic and atraumatic.   Eyes:      Conjunctiva/sclera: Conjunctivae normal.   Cardiovascular:      Rate and Rhythm: Normal rate and regular rhythm.      Heart sounds: No murmur heard.  Pulmonary:      Effort: Pulmonary effort is normal. No respiratory distress.      Breath sounds: Normal breath sounds.   Abdominal:      General: Abdomen is  flat. There is no distension.      Palpations: Abdomen is soft.      Tenderness: There is no abdominal tenderness. There is no guarding or rebound.   Musculoskeletal:         General: No swelling.      Cervical back: Normal range of motion.      Right lower leg: No edema.      Left lower leg: No edema.   Skin:     General: Skin is warm.      Coloration: Skin is not jaundiced.   Neurological:      General: No focal deficit present.      Mental Status: She is alert and oriented to person, place, and time. Mental status is at baseline.   Psychiatric:         Mood and Affect: Mood normal.         Behavior: Behavior normal.           Lab Results: I have reviewed the following results:

## 2024-11-05 NOTE — ASSESSMENT & PLAN NOTE
Known history of CAD status post CABG x 1.  Continue blood pressure management as well as aspirin 81 mg daily and atorvastatin 40 mg daily.

## 2024-11-05 NOTE — ASSESSMENT & PLAN NOTE
Patient was evaluated in the ED in July for abdominal pain and discharged with plan for nonemergent MRI.  This was completed in September which showed dilation of the pancreatic duct as well as the common bile duct.  She came in for ERCP as an outpatient however the procedure was not completed due to coiling of the endoscope.  Be reached out to GI to determine whether or not a repeat ERCP would be done during this admission, however they have recommended following up as an outpatient for repeat endoscopic ultrasound.    They also recommended resuming Plavix 75 mg daily.  She was able to tolerate and advance diet this afternoon.  On 11/5 was stable for discharge with recommended outpatient follow-up.

## 2024-11-05 NOTE — ASSESSMENT & PLAN NOTE
Seemingly well-controlled.  Continue alprazolam 0.5 mg twice a day  Cymbalta 60 mg daily.  Continue venlafaxine 75 mg daily.  Continue trazodone 100 mg daily

## 2024-11-05 NOTE — ASSESSMENT & PLAN NOTE
Blood Pressure: 149/84    Plan:  Continue amlodipine 5 mg daily and losartan 25 mg daily and metoprolol succinate 25 mg daily  Monitor blood pressure at home

## 2024-11-05 NOTE — UTILIZATION REVIEW
Initial Clinical Review    Admission: Date/Time/Statement:   Admission Orders (From admission, onward)       Ordered        11/04/24 1329  Place in Observation  Once                          Orders Placed This Encounter   Procedures    Place in Observation     Standing Status:   Standing     Number of Occurrences:   1     Order Specific Question:   Level of Care     Answer:   Med Surg [16]     ED Arrival Information       Patient not seen in ED                         Initial Presentation: 74 y.o. female, Direct admit for ERCP for dilated common bile and pancreatic duct. Procedures completed with GI. Pt requiring observation post procedure. Pt c/o mild tenderness to right flank and nausea.   PMH for COPD/centrilobular emphysema, chronic respiratory failure on 2-3 L supplemental oxygen at bedtime, CAD s/p CABG x1, thoracic aneurysm repair, hypothyroidism, HTN,  T2DM, chronic pain and opiate dependence on Suboxone, GERD, tobacco abuse.  Admit to Observation Dx; S/p ERCP  Plan; F/u ERCP read. Clear liquid diet, advance as tolerated. Pain control      Scheduled Medications:  ALPRAZolam, 0.5 mg, Oral, BID  amLODIPine, 5 mg, Oral, Daily  aspirin, 81 mg, Oral, Daily  atorvastatin, 40 mg, Oral, Daily With Dinner  buprenorphine-naloxone, 4 mg, Sublingual, Daily  DULoxetine, 60 mg, Oral, Daily  fluticasone-vilanterol, 1 puff, Inhalation, Daily  heparin (porcine), 5,000 Units, Subcutaneous, Q8H TESSA  levothyroxine, 100 mcg, Oral, Early Morning  losartan, 25 mg, Oral, Daily  metoprolol succinate, 25 mg, Oral, Daily  nicotine, 7 mg, Transdermal, Daily  pantoprazole, 40 mg, Oral, BID AC  predniSONE, 10 mg, Oral, Daily  traZODone, 100 mg, Oral, HS  venlafaxine, 75 mg, Oral, Daily      Continuous IV Infusions:  lactated ringers, 125 mL/hr, Intravenous, Continuous      PRN Meds:  acetaminophen, 650 mg, Oral, Q6H PRN  albuterol, 2 puff, Inhalation, Q4H PRN  albuterol, 2.5 mg, Nebulization, 4x Daily PRN  ipratropium-albuterol, 3 mL,  Nebulization, 4x Daily PRN  ondansetron, 4 mg, Oral, Q6H PRN  sodium chloride, 3 mL, Nebulization, Q6H PRN      ED Triage Vitals   Temperature Pulse Respirations Blood Pressure SpO2 Pain Score   11/04/24 2136 11/04/24 1546 11/04/24 2136 11/04/24 1546 11/04/24 1546 11/04/24 1554   (!) 94.9 °F (34.9 °C) (!) 54 22 145/72 95 % No Pain     Weight (last 2 days)       None            Vital Signs (last 3 days)       Date/Time Temp Pulse Resp BP MAP (mmHg) SpO2 O2 Flow Rate (L/min) O2 Device Patient Position - Orthostatic VS [place] Coma Scale Score Pain    11/05/24 07:38:05 97.8 °F (36.6 °C) 66 14 158/67 97 92 % -- None (Room air) -- -- --    11/05/24 05:58:22 97.9 °F (36.6 °C) 62 -- -- -- 97 % -- -- -- -- --    11/05/24 0503 -- -- -- -- -- -- -- -- -- -- 7    11/05/24 0500 98.2 °F (36.8 °C) -- -- -- -- -- -- -- -- -- --    11/05/24 0430 97.8 °F (36.6 °C) -- -- -- -- -- -- -- -- 15 7    11/05/24 0400 97.6 °F (36.4 °C) -- -- -- -- -- -- -- -- -- --    11/05/24 0330 97.1 °F (36.2 °C) -- -- -- -- -- -- -- -- -- --    11/05/24 03:06:54 -- 50 -- 126/56 79 97 % -- -- -- -- --    11/05/24 0300 96.7 °F (35.9 °C) -- -- -- -- -- -- -- -- -- --    11/05/24 0230 96.3 °F (35.7 °C) -- -- -- -- -- -- -- -- -- --    11/05/24 0200 95.9 °F (35.5 °C) -- -- -- -- -- -- -- -- -- --    11/05/24 0130 95.8 °F (35.4 °C) -- -- -- -- -- -- -- -- -- --    11/05/24 0100 95.3 °F (35.2 °C) -- -- -- -- -- -- -- -- -- --    11/05/24 0030 94.8 °F (34.9 °C) -- -- -- -- -- -- -- -- -- --    11/05/24 0000 94.7 °F (34.8 °C) -- -- -- -- -- -- -- -- -- --    11/04/24 2330 93.3 °F (34.1 °C) 45 -- -- -- -- -- -- -- -- --    11/04/24 22:54:50 -- 45 -- 114/47 69 93 % -- -- -- -- --    11/04/24 2251 93.9 °F (34.4 °C) 55 -- -- -- 91 % 3 L/min -- -- -- --    11/04/24 22:06:59 -- 46 -- 104/51 69 91 % -- -- -- -- --    11/04/24 2136 94.9 °F (34.9 °C) 50 22 130/68 89 93 % 2 L/min Nasal cannula Lying -- --    11/04/24 1554 -- -- -- -- -- -- -- -- -- 15 No Pain    11/04/24  "15:46:13 -- 54 -- 145/72 96 95 % -- -- -- -- --              Pertinent Labs/Diagnostic Test Results:   Radiology:  FL ERCP biliary only    (Results Pending)     Cardiology:  No orders to display     GI:  No orders to display           Results from last 7 days   Lab Units 11/05/24  0449 11/04/24  1733   WBC Thousand/uL 8.95  --    HEMOGLOBIN g/dL 11.7  --    HEMATOCRIT % 37.7  --    PLATELETS Thousands/uL 205 213         Results from last 7 days   Lab Units 11/05/24  0449 11/04/24  2300   SODIUM mmol/L 139 135   POTASSIUM mmol/L 4.6 3.8   CHLORIDE mmol/L 107 106   CO2 mmol/L 28 24   ANION GAP mmol/L 4 5   BUN mg/dL 17 20   CREATININE mg/dL 0.69 0.82   EGFR ml/min/1.73sq m 86 71   CALCIUM mg/dL 8.8 8.4         Results from last 7 days   Lab Units 11/05/24  0604 11/05/24  0440 11/05/24  0333 11/05/24  0232 11/05/24  0138 11/05/24  0040 11/04/24  2306 11/04/24  2248 11/04/24  2231   POC GLUCOSE mg/dl 83 98 101 125 127 185* 275* 406* 23*     Results from last 7 days   Lab Units 11/05/24  0449 11/04/24  2300   GLUCOSE RANDOM mg/dL 81 264*             No results found for: \"BETA-HYDROXYBUTYRATE\"                               Results from last 7 days   Lab Units 11/04/24  2300   TSH 3RD GENERATON uIU/mL 1.878                                                                                                           Past Medical History:   Diagnosis Date    Anxiety     Chronic pain 03/06/2023    lower abdomen and back    Colon polyp     COPD (chronic obstructive pulmonary disease) (HCC)     \"passes out\" with O2 levels dropping    Disease of thyroid gland     GERD (gastroesophageal reflux disease)     History of transfusion     with aneurysm repair-autologous-self and daughter    Hyperlipidemia     Hypertension     Teeth missing     Wears glasses     For reading     Present on Admission:   Hypertension   Hypothyroid   CAD (coronary artery disease)   Centrilobular emphysema (HCC)   Anxiety   Opioid dependence (HCC)   " Hyperlipidemia   Chronic bilateral low back pain without sciatica      Admitting Diagnosis: H/O gastroesophageal reflux (GERD) [Z87.19]  Age/Sex: 74 y.o. female    Network Utilization Review Department  ATTENTION: Please call with any questions or concerns to [phone] and carefully listen to the prompts so that you are directed to the right person. All voicemails are confidential.   For Discharge needs, contact Care Management DC Support Team at [phone] opt. [phone]  Send all requests for admission clinical reviews, approved or denied determinations and any other requests to dedicated fax number below belonging to the campus where the patient is receiving treatment. List of dedicated fax numbers for the Facilities:  FACILITY NAME UR FAX NUMBER   ADMISSION DENIALS (Administrative/Medical Necessity) [phone]   DISCHARGE SUPPORT TEAM (NETWORK) [phone]   PARENT CHILD HEALTH (Maternity/NICU/Pediatrics) [phone]   [facility] [phone]   [facility] [phone]   [facility] [phone]   [facility] [phone]   [facility] [phone]   [facility] [phone]   [facility] [phone]   [facility] [phone]   [facility] [phone]   [facility] [phone]   [facility] [phone]   [facility] [phone]

## 2024-11-05 NOTE — PLAN OF CARE
Problem: RESPIRATORY - ADULT  Goal: Achieves optimal ventilation and oxygenation  Description: INTERVENTIONS:  - Assess for changes in respiratory status  - Assess for changes in mentation and behavior  - Position to facilitate oxygenation and minimize respiratory effort  - Oxygen administered by appropriate delivery if ordered  - Initiate smoking cessation education as indicated  - Encourage broncho-pulmonary hygiene including cough, deep breathe, Incentive Spirometry  - Assess the need for suctioning and aspirate as needed  - Assess and instruct to report SOB or any respiratory difficulty  - Respiratory Therapy support as indicated  Outcome: Progressing     Problem: GASTROINTESTINAL - ADULT  Goal: Minimal or absence of nausea and/or vomiting  Description: INTERVENTIONS:  - Administer IV fluids if ordered to ensure adequate hydration  - Maintain NPO status until nausea and vomiting are resolved  - Nasogastric tube if ordered  - Administer ordered antiemetic medications as needed  - Provide nonpharmacologic comfort measures as appropriate  - Advance diet as tolerated, if ordered  - Consider nutrition services referral to assist patient with adequate nutrition and appropriate food choices  Outcome: Progressing  Goal: Maintains or returns to baseline bowel function  Description: INTERVENTIONS:  - Assess bowel function  - Encourage oral fluids to ensure adequate hydration  - Administer IV fluids if ordered to ensure adequate hydration  - Administer ordered medications as needed  - Encourage mobilization and activity  - Consider nutritional services referral to assist patient with adequate nutrition and appropriate food choices  Outcome: Progressing  Goal: Maintains adequate nutritional intake  Description: INTERVENTIONS:  - Monitor percentage of each meal consumed  - Identify factors contributing to decreased intake, treat as appropriate  - Assist with meals as needed  - Monitor I&O, weight, and lab values if  indicated  - Obtain nutrition services referral as needed  Outcome: Progressing  Goal: Oral mucous membranes remain intact  Description: INTERVENTIONS  - Assess oral mucosa and hygiene practices  - Implement preventative oral hygiene regimen  - Implement oral medicated treatments as ordered  - Initiate Nutrition services referral as needed  Outcome: Progressing     Problem: METABOLIC, FLUID AND ELECTROLYTES - ADULT  Goal: Electrolytes maintained within normal limits  Description: INTERVENTIONS:  - Monitor labs and assess patient for signs and symptoms of electrolyte imbalances  - Administer electrolyte replacement as ordered  - Monitor response to electrolyte replacements, including repeat lab results as appropriate  - Instruct patient on fluid and nutrition as appropriate  Outcome: Progressing  Goal: Fluid balance maintained  Description: INTERVENTIONS:  - Monitor labs   - Monitor I/O and WT  - Instruct patient on fluid and nutrition as appropriate  - Assess for signs & symptoms of volume excess or deficit  Outcome: Progressing

## 2024-11-05 NOTE — CASE MANAGEMENT
Case Management Discharge Planning Note    Patient name Montserrat Sumner  Location S /S -01 MRN [id number]  : 1950 Date 2024       Current Admission Date: 2024  Current Admission Diagnosis:S/P ERCP   Patient Active Problem List    Diagnosis Date Noted Date Diagnosed    S/P ERCP 2024     Acute bronchitis 10/08/2024     Chronic bilateral low back pain without sciatica 10/08/2024     Shortness of breath 10/08/2024     Marijuana smoker 2024     Postmenopausal 2024     Bilateral cataracts 2024     Aneurysm of ascending aorta without rupture (HCC) 2024     Bile duct abnormality 2024     Sleep disorder 2023     Hyperlipidemia 2023     Oxygen dependent at night only 2023     Gastroesophageal reflux disease 10/04/2023     Unintentional weight loss 10/04/2023     Dysphagia 10/04/2023     Abnormal CT scan 2023     Intractable abdominal pain 2023     Prediabetes 2023     Obesity (BMI 30-39.9) 2023     Chronic respiratory failure with hypoxia and hypercapnia (HCC) 2023     Chronic pain syndrome 2023     Lung nodule 2023     Cerebral infarct (HCC) 2023     Cocaine abuse (HCC) 2023     Hypertension 2023     Hypothyroid 2023     CAD (coronary artery disease) 2023     History of coronary artery bypass graft 2023     Centrilobular emphysema (HCC) 2023     Anxiety 2023     Opioid dependence (HCC) 2023     Cigarette nicotine dependence without complication 2023       LOS (days): 0  Geometric Mean LOS (GMLOS) (days):   Days to GMLOS:     OBJECTIVE:            Current admission status: Observation   Preferred Pharmacy:   [facility] [place] - [address]  [address] [address] [facility]  [place] [address]  Phone: [phone] Fax: [phone]    Primary Care Provider: Julian Tobias DO    Primary Insurance: [employer] [facility] REP  Secondary  Insurance:     DISCHARGE DETAILS:    RN notified this CM of patient need for Lyft transport to home. CM met with patient re: same. Lyft waiver provided to patient. Lyft transport requested via [employer] for 1630 p/u to home today - direct RN charge nurse and CM phone numbers provided for confirmed p/u time.

## 2024-11-05 NOTE — DISCHARGE INSTR - AVS FIRST PAGE
Dear Montserrat Sumner,     It was our pleasure to care for you here at [facility].  It is our hope that we were always able to exceed the expected standards for your care during your stay.  You were hospitalized due to ERCP.  You were cared for on the 3rd floor by Yogesh العراقي MD under the service of Humberto Hawkins* with the [facility] who covers for your primary care physician (PCP), Julian Tobias DO, while you were hospitalized.  If you have any questions or concerns related to this hospitalization, you may contact us at .  For follow up as well as any medication refills, we recommend that you follow up with your primary care physician.  A registered nurse will reach out to you by phone within a few days after your discharge to answer any additional questions that you may have after going home.  However, at this time we provide for you here, the most important instructions / recommendations at discharge:       Notable Medication Adjustments -   Please resume Plavix 75 mg daily  Testing Required after Discharge -   Follow up with Gastroenterology regarding endoscopic ultrasound.  ** Please contact your PCP to request testing orders for any of the testing recommended here **  Important follow up information -   Please follow-up with your PCP within a week of discharge  Other Instructions -   Please take all your medications regularly as directed  If symptoms return/persist contact your PCP if unable then visit to nearest ER  Please review this entire after visit summary as additional general instructions including medication list, appointments, activity, diet, any pertinent wound care, and other additional recommendations from your care team that may be provided for you.      Sincerely,   Yogesh العراقي MD

## 2024-11-05 NOTE — ASSESSMENT & PLAN NOTE
Patient with recent outpatient MRI showing diffuse biliary and pancreatic ductal dilatation without obvious presence of obstructing stone or lesion.  She underwent ERCP yesterday 11/4 for further investigation however was technically difficult procedure and unable to advance guidewire for further review.  CBC/BMP unremarkable    -Patient without any complications overnight.  No abdominal pain nausea or vomiting.  - Trial clear liquids this morning.  -Trial low fat diet for lunch, if tolerating can be discharged  -Okay to restart Plavix  - Patient will need outpatient EUS/ERCP.  This will be at [facility].  Patient is aware. Message sent to staff.  - Also discussed with patient's daughter who was on the phone.

## 2024-11-06 ENCOUNTER — TRANSITIONAL CARE MANAGEMENT (OUTPATIENT)
Dept: FAMILY MEDICINE CLINIC | Facility: CLINIC | Age: 74
End: 2024-11-06

## 2024-11-07 ENCOUNTER — TELEPHONE (OUTPATIENT)
Age: 74
End: 2024-11-07

## 2024-11-07 NOTE — TELEPHONE ENCOUNTER
Patients GI provider:  MARRY WILKS    Number to return call: [phone]    Reason for call: Pt calling to schedule an EUS she was in ED for 2 days and advised to call GI to schedule. Please contact pt for the further assistance to schedule.    Scheduled procedure/appointment date if applicable: None

## 2024-11-08 ENCOUNTER — TELEPHONE (OUTPATIENT)
Dept: GASTROENTEROLOGY | Facility: CLINIC | Age: 74
End: 2024-11-08

## 2024-11-08 NOTE — TELEPHONE ENCOUNTER
----- Message from Windy Tate PA-C sent at 11/5/2024 10:27 AM EST -----  Please call patient after discharge for EUS with possible ERCP due to unsuccessful procedure yesterday for abnormal MRI with diffuse biliary and pancreatic duct dilatation without obvious obstructing stone or lesion noted. She is on Plavix. Thank you!

## 2024-11-08 NOTE — TELEPHONE ENCOUNTER
Referring physician : Bebeto     Diagnosis : MRI with diffuse biliary and pancreatic duct dilatation     Discussed with patient : YES/NO: yes    Labs : TB 0.61, AST 78, ALT 96    Imaging : 9/18/24 MRI/MRCP Grossly stable diffuse biliary as well as pancreatic ductal dilation without presence of obstructing distal calculus or periampullary mass lesion. Multiple ectatic sidebranches within the pancreas as well without a definite focal cystic or enhancing   solid lesion. Findings can be secondary to ampullary stenosis and less likely due to presence of a subtle periampullary neoplasm.    Prior endoscopy : ERCP 11/4/24 Cannulated after 1 attempt. Ampulla-normal. Common bile duct was cannulated with sphincterotome catheter without difficulty. Guidewire was passed but it kept looping at about the mid-distal common bile duct. Contrast was injected which showed dilated distal portion up to the ampulla but no evidence of any distal stricture. Since I could not further advance the guidewire we called with radiologist Dr. Edouard and reviewed the images during the case. He reviewed MRI/MRCP images and confirmed that the guidewire was in the bile duct but it could be possible the wire was looping back in the cystic duct remnant. Despite changing the positions we could not pass the wire deep into the proximal common bile duct     Anticoagulation/ Antiplatelet therapy :  Plavix    Assessment :   1. Abnormal imaging with an unsuccessful ERCP 11/4    Plan :   1. EUS with possible ERCP    Order placed : YES/NO: no    Informed scheduling staff : YES/NO: yes

## 2024-11-09 DIAGNOSIS — M47.16 OSTEOARTHRITIS OF LUMBAR SPINE WITH MYELOPATHY: ICD-10-CM

## 2024-11-11 RX ORDER — DULOXETIN HYDROCHLORIDE 60 MG/1
60 CAPSULE, DELAYED RELEASE ORAL DAILY
Qty: 90 CAPSULE | Refills: 0 | Status: SHIPPED | OUTPATIENT
Start: 2024-11-11

## 2024-11-11 NOTE — TELEPHONE ENCOUNTER
Patient calling requesting to be scheduled for repeat EUS stating she was told she would feel better and wants to done asap / She is await a call to schedule / Please advise.

## 2024-11-12 ENCOUNTER — TELEPHONE (OUTPATIENT)
Dept: OBGYN CLINIC | Facility: CLINIC | Age: 74
End: 2024-11-12

## 2024-11-12 ENCOUNTER — PREP FOR PROCEDURE (OUTPATIENT)
Dept: GASTROENTEROLOGY | Facility: CLINIC | Age: 74
End: 2024-11-12

## 2024-11-12 ENCOUNTER — OFFICE VISIT (OUTPATIENT)
Dept: PULMONOLOGY | Facility: MEDICAL CENTER | Age: 74
End: 2024-11-12
Payer: COMMERCIAL

## 2024-11-12 VITALS
DIASTOLIC BLOOD PRESSURE: 68 MMHG | SYSTOLIC BLOOD PRESSURE: 122 MMHG | WEIGHT: 169.8 LBS | OXYGEN SATURATION: 97 % | HEIGHT: 63 IN | HEART RATE: 74 BPM | BODY MASS INDEX: 30.09 KG/M2 | RESPIRATION RATE: 12 BRPM | TEMPERATURE: 97.1 F

## 2024-11-12 DIAGNOSIS — R93.5 ABNORMAL ABDOMINAL MRI: Primary | ICD-10-CM

## 2024-11-12 DIAGNOSIS — R91.1 LUNG NODULE: ICD-10-CM

## 2024-11-12 DIAGNOSIS — R06.02 SHORTNESS OF BREATH: Primary | ICD-10-CM

## 2024-11-12 DIAGNOSIS — J43.2 CENTRILOBULAR EMPHYSEMA (HCC): Chronic | ICD-10-CM

## 2024-11-12 DIAGNOSIS — J20.9 ACUTE BRONCHITIS, UNSPECIFIED ORGANISM: ICD-10-CM

## 2024-11-12 PROCEDURE — 94010 BREATHING CAPACITY TEST: CPT | Performed by: INTERNAL MEDICINE

## 2024-11-12 PROCEDURE — 99214 OFFICE O/P EST MOD 30 MIN: CPT | Performed by: INTERNAL MEDICINE

## 2024-11-12 RX ORDER — PREDNISONE 10 MG/1
TABLET ORAL
Qty: 100 TABLET | Refills: 1 | Status: SHIPPED | OUTPATIENT
Start: 2024-11-12

## 2024-11-12 RX ORDER — SODIUM CHLORIDE, SODIUM LACTATE, POTASSIUM CHLORIDE, CALCIUM CHLORIDE 600; 310; 30; 20 MG/100ML; MG/100ML; MG/100ML; MG/100ML
125 INJECTION, SOLUTION INTRAVENOUS CONTINUOUS
OUTPATIENT
Start: 2024-11-12

## 2024-11-12 RX ORDER — VENLAFAXINE HYDROCHLORIDE 150 MG/1
1 CAPSULE, EXTENDED RELEASE ORAL DAILY
COMMUNITY
Start: 2024-11-06

## 2024-11-12 RX ORDER — CEFUROXIME AXETIL 500 MG/1
500 TABLET ORAL EVERY 12 HOURS SCHEDULED
Qty: 14 TABLET | Refills: 0 | Status: SHIPPED | OUTPATIENT
Start: 2024-11-12 | End: 2024-11-19

## 2024-11-12 NOTE — PATIENT INSTRUCTIONS
This try cutting down prednisone to 30 mg for 2 weeks then 20 mg for 2 weeks then 10 mg for 2 weeks then try going down to 5 mg daily    Continue Trelegy 200 micro 1 puff daily and use your nebulizer with ipratropium-albuterol 4 times a day as needed or use your albuterol inhaler    I prescribed Ceftin 500 mg 1 capsule twice a day for 7 days    Appointment with Dr. Flowers next Thursday.  She will likely order DEXA scan

## 2024-11-12 NOTE — TELEPHONE ENCOUNTER
Procedure: EUS/ERCP  Scheduled date of procedure (as of today): 11/18/24  Physician performing procedure: Dr. Adame   Location of procedure: [facility]   Instructions reviewed with patient by:  Caterina - verbally   Clearances:  n/a

## 2024-11-12 NOTE — PROGRESS NOTES
Assessment & Plan        Problem List Items Addressed This Visit          Respiratory    Centrilobular emphysema (HCC) (Chronic)    She will continue with Trelegy 200 mcg 1 puff daily have nebulizer at home with albuterol 2.5 mL oxygen use 4 times daily as needed.         Relevant Medications    predniSONE 10 mg tablet    Lung nodule    Last CT scan of chest done 2023 showed a persistent 4 mm right upper lobe lung nodule.  She is a smoker.  I did order a follow-up scan of chest.         Relevant Orders    CT chest without contrast    Acute bronchitis    Has been having cough productive for white to yellow mucus.  I did prescribe her Ceftin 500 mg twice daily for 7 days.         Relevant Medications    cefuroxime (CEFTIN) 500 mg tablet       Other    Shortness of breath - Primary    Montserrat does have COPD which appears to be moderate.  Spirometry today showed FEV1 of 1.05 L or 51% of predicted with obstructive index of 64%.  She is been on prednisone for past few weeks and has been reluctant to decrease dose.  She initially she felt it helped her with dealing with her elderly father who was on hospice and finally  last week or so.Has been having on her back pain.  Does have chronic lower back pain.  I did talk about long-term side effects of prednisone and that we should start tapering it and ultimately try to get her off the prednisone therapy.  She will decrease her dose of prednisone 40 mg to 30 mg for 2 weeks then 20 mg for the week 2 weeks then 10 mg for 2 weeks and 1/2 tablet daily to I reevaluate her.         Relevant Medications    predniSONE 10 mg tablet    Other Relevant Orders    POCT spirometry         Cc:  shortness of breath      HPI    Mont[place]errat have overnight admission to [facility] from  to  when she upped after outpatient ERCP.  Unable to complete ERCP due to coiling of the guidewire to evaluate the bile and pancreatic duct.  Was monitored  "overnight.  She has been in problems with her breathing for the past 4 weeks.  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  He was in hospice care at home with her.  She had request to be maintained on higher dose of prednisone as whenever prednisone was decreased she was started have increased shortness of breath.  She does use oxygen 2.5 L/min at bedtime and DME company is [employer].  Presently prednisone 40 mg daily and using Trelegy 200 mcg 1 puff daily.  She is still smoking about 3 to 5 cigarettes/day.  Has been using her nebulizer with albuterol 2.5 mg twice a day she does have moderate COPD spirometry showed FEV1 of 1.56 L or 71% of predicted with obstructive ratio of 67%.  7 shortness of breath with any exertional activity.  Prednisone has helped this.  She is also on Suboxone and she says she is gradually weaning down on this presently on 4 mg dose daily and is plan on decreasing to 2 mg tomorrow    She is expectorating some white to yellow mucus.  No fever or chills.  She does have chronic back pain.  She is appointment to see Dr. Flowers next week on November 21 for her chronic lower back pain.    CT scan of chest without contrast June 19, 2023 persistent 4 mm right upper lobe pulmonary nodule.  Emphysematous changes.      Past Medical History:   Diagnosis Date    Anxiety     Chronic pain 03/06/2023    lower abdomen and back    Colon polyp     COPD (chronic obstructive pulmonary disease) (HCC)     \"passes out\" with O2 levels dropping    Disease of thyroid gland     GERD (gastroesophageal reflux disease)     History of transfusion     with aneurysm repair-autologous-self and daughter    Hyperlipidemia     Hypertension     Teeth missing     Wears glasses     For reading       Past Surgical History:   Procedure Laterality Date    BACK SURGERY      x2 herniated, crushed disc in the lumbar, ablation    CHOLECYSTECTOMY      COLONOSCOPY      FRACTURE SURGERY Left     " hip-pinned    HYSTERECTOMY      salpingo-oophorectomy    THORACIC AORTIC ANEURYSM REPAIR  09/2016    ascending thoracic aortic repair with RCA bypass with SVG x 1    TONSILLECTOMY      UPPER GASTROINTESTINAL ENDOSCOPY           Current Outpatient Medications:     albuterol (2.5 mg/3 mL) 0.083 % nebulizer solution, Take 3 mL (2.5 mg total) by nebulization 4 (four) times a day as needed for wheezing or shortness of breath, Disp: 360 mL, Rfl: 7    albuterol (Proventil HFA) 90 mcg/act inhaler, Inhale 2 puffs every 4 (four) hours as needed for wheezing, Disp: 6.7 g, Rfl: 7    ALPRAZolam (XANAX) 0.5 mg tablet, Take 0.5 mg by mouth 2 (two) times a day, Disp: , Rfl:     amLODIPine (NORVASC) 5 mg tablet, Take 1 tablet (5 mg total) by mouth daily, Disp: 10 tablet, Rfl: 0    aspirin 81 mg chewable tablet, Chew 81 mg daily, Disp: , Rfl:     atorvastatin (LIPITOR) 40 mg tablet, Take 40 mg by mouth every morning, Disp: , Rfl:     b complex vitamins capsule, Take 1 capsule by mouth once a week, Disp: , Rfl:     buprenorphine-naloxone (Suboxone) 4-1 MG, every morning Wafer, Disp: , Rfl:     cefuroxime (CEFTIN) 500 mg tablet, Take 1 tablet (500 mg total) by mouth every 12 (twelve) hours for 7 days, Disp: 14 tablet, Rfl: 0    celecoxib (CeleBREX) 200 mg capsule, Take 1 capsule (200 mg total) by mouth daily, Disp: 90 capsule, Rfl: 1    DULoxetine (CYMBALTA) 60 mg delayed release capsule, Take 1 capsule (60 mg total) by mouth daily, Disp: 90 capsule, Rfl: 0    esomeprazole (NexIUM) 40 MG capsule, Take 1 capsule (40 mg total) by mouth 2 (two) times a day before meals, Disp: 180 capsule, Rfl: 1    fluticasone-umeclidinium-vilanterol (Trelegy Ellipta) 200-62.5-25 mcg/actuation AEPB inhaler, Inhale 1 puff daily Rinse mouth after use., Disp: 60 blister, Rfl: 11    ipratropium-albuterol (DUO-NEB) 0.5-2.5 mg/3 mL nebulizer solution, Take 3 mL by nebulization 4 (four) times a day as needed for shortness of breath or wheezing, Disp: 360 mL,  Rfl: 7    levothyroxine 100 mcg tablet, Take 100 mcg by mouth every morning, Disp: , Rfl:     losartan (COZAAR) 25 mg tablet, Take 25 mg by mouth every morning, Disp: , Rfl:     metoclopramide (REGLAN) 10 mg tablet, , Disp: , Rfl:     oxygen gas, Inhale continuous as needed 2.5 L, Disp: , Rfl:     predniSONE 10 mg tablet, Take 1 tablet (10 mg total) by mouth daily for 13 doses, Disp: 13 tablet, Rfl: 0    predniSONE 10 mg tablet, Take 3 tablets daily for 2 weeks then 2 tablets daily for 2 weeks then 1 tablet daily for 2 weeks then stay on 1/2 tablet daily, Disp: 100 tablet, Rfl: 1    sodium chloride 0.9 % nebulizer solution, Take 3 mL by nebulization as needed for wheezing, Disp: 360 mL, Rfl: 1    traZODone (DESYREL) 50 mg tablet, Take 100 mg by mouth daily at bedtime, Disp: , Rfl:     venlafaxine (EFFEXOR-XR) 150 mg 24 hr capsule, Take 1 capsule by mouth in the morning, Disp: , Rfl:     venlafaxine (EFFEXOR-XR) 75 mg 24 hr capsule, Take 75 mg by mouth daily at bedtime, Disp: , Rfl:     albuterol (PROVENTIL HFA,VENTOLIN HFA) 90 mcg/act inhaler, Inhale 1 puff every 4 (four) hours as needed for wheezing (Patient not taking: Reported on 11/12/2024), Disp: 54 g, Rfl: 3    clopidogrel (PLAVIX) 75 mg tablet, Take 75 mg by mouth every other day (Patient not taking: Reported on 10/8/2024), Disp: , Rfl:     fluticasone-umeclidinium-vilanterol (Trelegy Ellipta) 100-62.5-25 mcg/actuation inhaler, Inhale 1 puff daily Rinse mouth after use., Disp: 180 blister, Rfl: 3    gabapentin (NEURONTIN) 300 mg capsule, Take 1 capsule (300 mg total) by mouth 3 (three) times a day (Patient not taking: Reported on 10/8/2024), Disp: 90 capsule, Rfl: 1    glimepiride (AMARYL) 2 mg tablet, , Disp: , Rfl:     metoprolol succinate (TOPROL-XL) 50 mg 24 hr tablet, Take 1 tablet (50 mg total) by mouth daily (Patient taking differently: Take 50 mg by mouth every morning), Disp: 90 tablet, Rfl: 1    predniSONE 10 mg tablet, Take 40 mg PO daily x 3 days,  then 30 mg daily x 3 days, then 20 mg daily x 3 days, then 10 mg daily x 3 days, then stop., Disp: 30 tablet, Rfl: 0    predniSONE 10 mg tablet, Take 3 tablets daily for 14 days then 2 tablets daily for 14 days then 1 tablet daily for 14 days then 1/2 tablet daily for 14 days (Patient not taking: Reported on 11/4/2024), Disp: 100 tablet, Rfl: 0    No Known Allergies    Social History     Tobacco Use    Smoking status: Every Day     Current packs/day: 0.25     Average packs/day: 0.3 packs/day for 56.9 years (14.2 ttl pk-yrs)     Types: Cigarettes     Start date: 1968     Passive exposure: Past    Smokeless tobacco: Never    Tobacco comments:     Currently smoking 5-6 cigarettes daily   Substance Use Topics    Alcohol use: Yes     Comment: occasionally         Family History   Problem Relation Age of Onset    Breast cancer Mother        Review of Systems   Constitutional:  Negative for activity change, appetite change, chills, fever and unexpected weight change.   HENT:  Negative for congestion, dental problem, postnasal drip, sinus pressure, sinus pain, sore throat and voice change.    Eyes:  Negative for visual disturbance.   Respiratory:  Positive for cough, shortness of breath and wheezing.         See HPI   Cardiovascular:  Negative for chest pain, palpitations and leg swelling.   Gastrointestinal:  Negative for abdominal pain, diarrhea, nausea and vomiting.   Genitourinary:  Negative for difficulty urinating.   Musculoskeletal:  Positive for back pain. Negative for arthralgias.   Skin:  Negative for rash and wound.   Allergic/Immunologic: Negative for environmental allergies and food allergies.   Neurological:  Negative for dizziness, light-headedness and headaches.   Hematological:  Negative for adenopathy.   Psychiatric/Behavioral:  Negative for agitation, behavioral problems and confusion.            Vitals:    11/12/24 1503   BP: 122/68   Pulse: 74   Resp: 12   Temp: (!) 97.1 °F (36.2 °C)   SpO2: 97%  "    Height: 5' 3\" (160 cm)  IBW (Ideal Body Weight): 52.4 kg  Body mass index is 30.08 kg/m².  Weight (last 2 days)       Date/Time Weight    11/12/24 1503 77 (169.8)                Physical Exam  Vitals reviewed.   Constitutional:       General: She is not in acute distress.     Appearance: Normal appearance. She is well-developed.   HENT:      Head: Normocephalic.      Right Ear: External ear normal.      Left Ear: External ear normal.      Nose: Nose normal.      Mouth/Throat:      Mouth: Mucous membranes are moist.      Pharynx: Oropharynx is clear. No oropharyngeal exudate.   Eyes:      Conjunctiva/sclera: Conjunctivae normal.      Pupils: Pupils are equal, round, and reactive to light.   Cardiovascular:      Rate and Rhythm: Normal rate and regular rhythm.      Heart sounds: Normal heart sounds.   Pulmonary:      Effort: Pulmonary effort is normal.      Comments: Lung sounds are decreased but clear.  No wheezes, crackles or rhonchi  Abdominal:      General: There is no distension.      Palpations: Abdomen is soft.      Tenderness: There is no abdominal tenderness.   Musculoskeletal:      Cervical back: Neck supple.      Comments: No edema, cyanosis or clubbing   Lymphadenopathy:      Cervical: No cervical adenopathy.   Skin:     General: Skin is warm and dry.   Neurological:      General: No focal deficit present.      Mental Status: She is alert and oriented to person, place, and time.   Psychiatric:         Mood and Affect: Mood normal.         Behavior: Behavior normal.         Thought Content: Thought content normal.           Office Spirometry Results: done today    FVC   -   1.64 L       60%  FEV1 -    1.05 L       51%  FEV1/FVC% - 64%    Moderate restrictive impairment possibly due to some recent air trapping.  There is moderate airflow obstruction.       "

## 2024-11-12 NOTE — TELEPHONE ENCOUNTER
LM asking her if she would prefer to see a spine and pain Dr. Maddox instead of Dr. Flowers. If not we will see her on Nov. 21st.

## 2024-11-14 ENCOUNTER — OFFICE VISIT (OUTPATIENT)
Dept: FAMILY MEDICINE CLINIC | Facility: CLINIC | Age: 74
End: 2024-11-14
Payer: COMMERCIAL

## 2024-11-14 VITALS
TEMPERATURE: 98 F | DIASTOLIC BLOOD PRESSURE: 84 MMHG | HEIGHT: 63 IN | WEIGHT: 169 LBS | SYSTOLIC BLOOD PRESSURE: 156 MMHG | OXYGEN SATURATION: 96 % | BODY MASS INDEX: 29.95 KG/M2 | HEART RATE: 88 BPM | RESPIRATION RATE: 16 BRPM

## 2024-11-14 DIAGNOSIS — M47.16 OSTEOARTHRITIS OF LUMBAR SPINE WITH MYELOPATHY: ICD-10-CM

## 2024-11-14 DIAGNOSIS — E03.9 HYPOTHYROIDISM, UNSPECIFIED TYPE: Chronic | ICD-10-CM

## 2024-11-14 DIAGNOSIS — E11.9 TYPE 2 DIABETES MELLITUS WITHOUT COMPLICATION, WITHOUT LONG-TERM CURRENT USE OF INSULIN (HCC): ICD-10-CM

## 2024-11-14 DIAGNOSIS — F41.9 ANXIETY: Chronic | ICD-10-CM

## 2024-11-14 DIAGNOSIS — K83.9 BILE DUCT ABNORMALITY: Primary | ICD-10-CM

## 2024-11-14 PROCEDURE — 99495 TRANSJ CARE MGMT MOD F2F 14D: CPT | Performed by: FAMILY MEDICINE

## 2024-11-14 RX ORDER — GLIMEPIRIDE 2 MG/1
2 TABLET ORAL
Qty: 30 TABLET | Status: CANCELLED | OUTPATIENT
Start: 2024-11-14

## 2024-11-14 RX ORDER — CELECOXIB 200 MG/1
200 CAPSULE ORAL DAILY
Qty: 90 CAPSULE | Refills: 1 | Status: CANCELLED | OUTPATIENT
Start: 2024-11-14

## 2024-11-14 RX ORDER — LEVOTHYROXINE SODIUM 100 UG/1
100 TABLET ORAL EVERY MORNING
Qty: 30 TABLET | Status: CANCELLED | OUTPATIENT
Start: 2024-11-14

## 2024-11-14 RX ORDER — ATORVASTATIN CALCIUM 40 MG/1
40 TABLET, FILM COATED ORAL EVERY MORNING
Qty: 30 TABLET | Status: CANCELLED | OUTPATIENT
Start: 2024-11-14

## 2024-11-14 NOTE — PROGRESS NOTES
"  Subjective:      Patient ID: Montserrat Sumner is a 74 y.o. female.    TCM Call     Date and time call was made  11/6/2024 11:45 AM    Hospital care reviewed  Records reviewed    Patient was hospitialized at  [facility]    Date of Admission  11/04/24    Date of discharge  11/05/24    Diagnosis  SOB    Disposition  Home    Were the patients medications reviewed and updated  Yes    Current Symptoms  None      TCM Call     Post hospital issues  None    Should patient be enrolled in anticoag monitoring?  No    Scheduled for follow up?  Yes    Patients specialists  Pulmonlolgist; Cardiologist    Did you obtain your prescribed medications  Yes    Do you need help managing your prescriptions or medications  No    Is transportation to your appointment needed  No    I have advised the patient to call PCP with any new or worsening symptoms    Marie Wilson MA    Living Arrangements  Spouse or Significiant other    Support System  Spouse; Family    The type of support provided  Emotional; Financial; Physical    Do you have social support  Yes, as much as I need    Are you recieving any outpatient services  No    Are you recieving home care services  No    Are you using any community resources  No    Current waiver services  No    Have you fallen in the last 12 months  No    Interperter language line needed  No    Counseling  Patient      TCM visit following hospitalization at [facility].  Below is the discharge summary as per Dr. Chavira:    \"Hospital Course:   Montserrat Sumner is a 74 y.o. female patient who originally presented to the hospital on 11/4/2024 for routine outpatient ERCP.  Per reports the procedure was unable to be completed due to coiling of the endoscope.  We restarted GI regarding possible repeat attempt however they recommended outpatient follow-up.  We were able to advance her diet without complication.  On 11/5 she was stable for discharge.\"    She is being scheduled for endoscopic " "ultrasound and ERCP at [facility] with Dr. Adame which is supposed to happen on Monday.  They change none of her medications.  I actually brought her back into the office for TCM but also because I have only seen her once.  She did not do any of the blood work that I had ordered to follow-up for her diabetes, cholesterol.  Patient states that she was caring for her 103-year-old father who passed away 2 weeks ago so she was not caring for herself.  She asked me about taking over her prescription for trazodone, alprazolam which were being prescribed by addiction medicine specialist in [place] who prescribes her Suboxone.          Past Medical History:   Diagnosis Date    Anxiety     Chronic pain 03/06/2023    lower abdomen and back    Colon polyp     COPD (chronic obstructive pulmonary disease) (HCC)     \"passes out\" with O2 levels dropping    Disease of thyroid gland     GERD (gastroesophageal reflux disease)     History of transfusion     with aneurysm repair-autologous-self and daughter    Hyperlipidemia     Hypertension     Teeth missing     Wears glasses     For reading       Family History   Problem Relation Age of Onset    Breast cancer Mother        Past Surgical History:   Procedure Laterality Date    BACK SURGERY      x2 herniated, crushed disc in the lumbar, ablation    CHOLECYSTECTOMY      COLONOSCOPY      FRACTURE SURGERY Left     hip-pinned    HYSTERECTOMY      salpingo-oophorectomy    THORACIC AORTIC ANEURYSM REPAIR  09/2016    ascending thoracic aortic repair with RCA bypass with SVG x 1    TONSILLECTOMY      UPPER GASTROINTESTINAL ENDOSCOPY          reports that she has been smoking cigarettes. She started smoking about 56 years ago. She has a 14.2 pack-year smoking history. She has been exposed to tobacco smoke. She has never used smokeless tobacco. She reports current alcohol use. She reports current drug use. Drugs: Marijuana and Cocaine.      Current Outpatient Medications:     albuterol (2.5 " mg/3 mL) 0.083 % nebulizer solution, Take 3 mL (2.5 mg total) by nebulization 4 (four) times a day as needed for wheezing or shortness of breath, Disp: 360 mL, Rfl: 7    albuterol (Proventil HFA) 90 mcg/act inhaler, Inhale 2 puffs every 4 (four) hours as needed for wheezing, Disp: 6.7 g, Rfl: 7    ALPRAZolam (XANAX) 0.5 mg tablet, Take 0.5 mg by mouth 2 (two) times a day, Disp: , Rfl:     amLODIPine (NORVASC) 5 mg tablet, Take 1 tablet (5 mg total) by mouth daily, Disp: 10 tablet, Rfl: 0    aspirin 81 mg chewable tablet, Chew 81 mg daily, Disp: , Rfl:     atorvastatin (LIPITOR) 40 mg tablet, Take 40 mg by mouth every morning, Disp: , Rfl:     b complex vitamins capsule, Take 1 capsule by mouth once a week, Disp: , Rfl:     buprenorphine-naloxone (Suboxone) 4-1 MG, every morning Wafer, Disp: , Rfl:     cefuroxime (CEFTIN) 500 mg tablet, Take 1 tablet (500 mg total) by mouth every 12 (twelve) hours for 7 days, Disp: 14 tablet, Rfl: 0    celecoxib (CeleBREX) 200 mg capsule, Take 1 capsule (200 mg total) by mouth daily, Disp: 90 capsule, Rfl: 1    DULoxetine (CYMBALTA) 60 mg delayed release capsule, Take 1 capsule (60 mg total) by mouth daily, Disp: 90 capsule, Rfl: 0    esomeprazole (NexIUM) 40 MG capsule, Take 1 capsule (40 mg total) by mouth 2 (two) times a day before meals, Disp: 180 capsule, Rfl: 1    fluticasone-umeclidinium-vilanterol (Trelegy Ellipta) 100-62.5-25 mcg/actuation inhaler, Inhale 1 puff daily Rinse mouth after use., Disp: 180 blister, Rfl: 3    fluticasone-umeclidinium-vilanterol (Trelegy Ellipta) 200-62.5-25 mcg/actuation AEPB inhaler, Inhale 1 puff daily Rinse mouth after use., Disp: 60 blister, Rfl: 11    glimepiride (AMARYL) 2 mg tablet, , Disp: , Rfl:     ipratropium-albuterol (DUO-NEB) 0.5-2.5 mg/3 mL nebulizer solution, Take 3 mL by nebulization 4 (four) times a day as needed for shortness of breath or wheezing, Disp: 360 mL, Rfl: 7    levothyroxine 100 mcg tablet, Take 100 mcg by mouth every  morning, Disp: , Rfl:     losartan (COZAAR) 25 mg tablet, Take 25 mg by mouth every morning, Disp: , Rfl:     metoclopramide (REGLAN) 10 mg tablet, , Disp: , Rfl:     oxygen gas, Inhale continuous as needed 2.5 L, Disp: , Rfl:     predniSONE 10 mg tablet, Take 40 mg PO daily x 3 days, then 30 mg daily x 3 days, then 20 mg daily x 3 days, then 10 mg daily x 3 days, then stop., Disp: 30 tablet, Rfl: 0    predniSONE 10 mg tablet, Take 1 tablet (10 mg total) by mouth daily for 13 doses, Disp: 13 tablet, Rfl: 0    sodium chloride 0.9 % nebulizer solution, Take 3 mL by nebulization as needed for wheezing, Disp: 360 mL, Rfl: 1    traZODone (DESYREL) 50 mg tablet, Take 100 mg by mouth daily at bedtime, Disp: , Rfl:     venlafaxine (EFFEXOR-XR) 150 mg 24 hr capsule, Take 1 capsule by mouth in the morning, Disp: , Rfl:     venlafaxine (EFFEXOR-XR) 75 mg 24 hr capsule, Take 75 mg by mouth daily at bedtime, Disp: , Rfl:     albuterol (PROVENTIL HFA,VENTOLIN HFA) 90 mcg/act inhaler, Inhale 1 puff every 4 (four) hours as needed for wheezing (Patient not taking: Reported on 11/12/2024), Disp: 54 g, Rfl: 3    clopidogrel (PLAVIX) 75 mg tablet, Take 75 mg by mouth every other day (Patient not taking: Reported on 10/8/2024), Disp: , Rfl:     gabapentin (NEURONTIN) 300 mg capsule, Take 1 capsule (300 mg total) by mouth 3 (three) times a day (Patient not taking: Reported on 10/8/2024), Disp: 90 capsule, Rfl: 1    metoprolol succinate (TOPROL-XL) 50 mg 24 hr tablet, Take 1 tablet (50 mg total) by mouth daily (Patient taking differently: Take 50 mg by mouth every morning), Disp: 90 tablet, Rfl: 1    predniSONE 10 mg tablet, Take 3 tablets daily for 14 days then 2 tablets daily for 14 days then 1 tablet daily for 14 days then 1/2 tablet daily for 14 days (Patient not taking: Reported on 11/4/2024), Disp: 100 tablet, Rfl: 0    predniSONE 10 mg tablet, Take 3 tablets daily for 2 weeks then 2 tablets daily for 2 weeks then 1 tablet daily for  "2 weeks then stay on 1/2 tablet daily, Disp: 100 tablet, Rfl: 1    The following portions of the patient's history were reviewed and updated as appropriate: allergies, current medications, past family history, past medical history, past social history, past surgical history and problem list.    Review of Systems   Constitutional: Negative.  Negative for activity change, appetite change and fatigue.   HENT: Negative.  Negative for congestion, ear discharge, mouth sores, sinus pressure and tinnitus.    Eyes: Negative.  Negative for photophobia and visual disturbance.   Respiratory:  Positive for cough, chest tightness, shortness of breath and wheezing.    Cardiovascular:  Negative for chest pain.   Gastrointestinal:  Positive for abdominal pain. Negative for abdominal distention, constipation, diarrhea, nausea and vomiting.   Endocrine: Negative.  Negative for polydipsia, polyphagia and polyuria.   Genitourinary: Negative.  Negative for difficulty urinating.   Musculoskeletal:  Positive for back pain and gait problem.        Ambulating with a cane   Skin: Negative.    Neurological:  Positive for numbness (Bilateral feet). Negative for dizziness, syncope, weakness and light-headedness.   Hematological: Negative.    Psychiatric/Behavioral:  Negative for agitation and dysphoric mood. The patient is nervous/anxious.            Objective:    /84   Pulse 88   Temp 98 °F (36.7 °C)   Resp 16   Ht 5' 3\" (1.6 m)   Wt 76.7 kg (169 lb)   LMP  (LMP Unknown)   SpO2 96%   BMI 29.94 kg/m²      Physical Exam  Vitals and nursing note reviewed.   Constitutional:       General: She is not in acute distress.     Appearance: She is well-developed. She is ill-appearing (Chronically ill-appearing). She is not diaphoretic.   HENT:      Head: Normocephalic and atraumatic.      Right Ear: Tympanic membrane, ear canal and external ear normal.      Left Ear: Tympanic membrane, ear canal and external ear normal.      Nose: Nose " normal.      Mouth/Throat:      Mouth: Mucous membranes are moist.      Pharynx: Oropharynx is clear.   Eyes:      Comments: Bilateral cataracts.   Neck:      Vascular: No carotid bruit.   Cardiovascular:      Rate and Rhythm: Normal rate and regular rhythm.      Pulses: Normal pulses.      Heart sounds: Normal heart sounds. No murmur heard.     Comments: Normal dorsalis pedis pulses  Pulmonary:      Effort: Pulmonary effort is normal.      Breath sounds: Wheezing present. No rales.      Comments: Diminished breath sounds bilaterally with expiratory wheezing.  Moist mucousy cough  Abdominal:      General: Bowel sounds are normal.      Palpations: Abdomen is soft.   Musculoskeletal:         General: Normal range of motion.      Cervical back: Normal range of motion and neck supple.   Lymphadenopathy:      Cervical: No cervical adenopathy.   Skin:     General: Skin is warm and dry.      Findings: Bruising present. No rash.   Neurological:      Mental Status: She is alert and oriented to person, place, and time.      Sensory: Sensory deficit (Bilateral feet) present.      Deep Tendon Reflexes: Reflexes are normal and symmetric.   Psychiatric:         Behavior: Behavior normal.         Thought Content: Thought content normal.         Judgment: Judgment normal.           Recent Results (from the past 6 weeks)   Platelet count    Collection Time: 11/04/24  5:33 PM   Result Value Ref Range    Platelets 213 149 - 390 Thousands/uL    MPV 8.9 8.9 - 12.7 fL   Fingerstick Glucose (POCT)    Collection Time: 11/04/24 10:31 PM   Result Value Ref Range    POC Glucose 23 (LL) 65 - 140 mg/dl   Fingerstick Glucose (POCT)    Collection Time: 11/04/24 10:48 PM   Result Value Ref Range    POC Glucose 406 (HH) 65 - 140 mg/dl   Basic metabolic panel    Collection Time: 11/04/24 11:00 PM   Result Value Ref Range    Sodium 135 135 - 147 mmol/L    Potassium 3.8 3.5 - 5.3 mmol/L    Chloride 106 96 - 108 mmol/L    CO2 24 21 - 32 mmol/L    ANION  GAP 5 4 - 13 mmol/L    BUN 20 5 - 25 mg/dL    Creatinine 0.82 0.60 - 1.30 mg/dL    Glucose 264 (H) 65 - 140 mg/dL    Glucose, Fasting 264 (H) 65 - 99 mg/dL    Calcium 8.4 8.4 - 10.2 mg/dL    eGFR 71 ml/min/1.73sq m   TSH, 3rd generation with Free T4 reflex    Collection Time: 11/04/24 11:00 PM   Result Value Ref Range    TSH 3RD GENERATON 1.878 0.450 - 4.500 uIU/mL   Fingerstick Glucose (POCT)    Collection Time: 11/04/24 11:06 PM   Result Value Ref Range    POC Glucose 275 (H) 65 - 140 mg/dl   Fingerstick Glucose (POCT)    Collection Time: 11/05/24 12:40 AM   Result Value Ref Range    POC Glucose 185 (H) 65 - 140 mg/dl   Fingerstick Glucose (POCT)    Collection Time: 11/05/24  1:38 AM   Result Value Ref Range    POC Glucose 127 65 - 140 mg/dl   Fingerstick Glucose (POCT)    Collection Time: 11/05/24  2:32 AM   Result Value Ref Range    POC Glucose 125 65 - 140 mg/dl   Fingerstick Glucose (POCT)    Collection Time: 11/05/24  3:33 AM   Result Value Ref Range    POC Glucose 101 65 - 140 mg/dl   Fingerstick Glucose (POCT)    Collection Time: 11/05/24  4:40 AM   Result Value Ref Range    POC Glucose 98 65 - 140 mg/dl   Basic metabolic panel    Collection Time: 11/05/24  4:49 AM   Result Value Ref Range    Sodium 139 135 - 147 mmol/L    Potassium 4.6 3.5 - 5.3 mmol/L    Chloride 107 96 - 108 mmol/L    CO2 28 21 - 32 mmol/L    ANION GAP 4 4 - 13 mmol/L    BUN 17 5 - 25 mg/dL    Creatinine 0.69 0.60 - 1.30 mg/dL    Glucose 81 65 - 140 mg/dL    Glucose, Fasting 81 65 - 99 mg/dL    Calcium 8.8 8.4 - 10.2 mg/dL    eGFR 86 ml/min/1.73sq m   CBC and differential    Collection Time: 11/05/24  4:49 AM   Result Value Ref Range    WBC 8.95 4.31 - 10.16 Thousand/uL    RBC 4.00 3.81 - 5.12 Million/uL    Hemoglobin 11.7 11.5 - 15.4 g/dL    Hematocrit 37.7 34.8 - 46.1 %    MCV 94 82 - 98 fL    MCH 29.3 26.8 - 34.3 pg    MCHC 31.0 (L) 31.4 - 37.4 g/dL    RDW 16.0 (H) 11.6 - 15.1 %    MPV 9.6 8.9 - 12.7 fL    Platelets 205 149 - 390  Thousands/uL   Manual Differential(PHLEBS Do Not Order)    Collection Time: 11/05/24  4:49 AM   Result Value Ref Range    Segmented % 87 (H) 43 - 75 %    Bands % 3 0 - 8 %    Lymphocytes % 8 (L) 14 - 44 %    Monocytes % 2 (L) 4 - 12 %    Eosinophils % 0 0 - 6 %    Basophils % 0 0 - 1 %    Absolute Neutrophils 8.06 (H) 1.85 - 7.62 Thousand/uL    Absolute Lymphocytes 0.72 0.60 - 4.47 Thousand/uL    Absolute Monocytes 0.18 0.00 - 1.22 Thousand/uL    Absolute Eosinophils 0.00 0.00 - 0.40 Thousand/uL    Absolute Basophils 0.00 0.00 - 0.10 Thousand/uL    Total Counted      RBC Morphology Normal     Platelet Estimate Adequate Adequate   Hepatic function panel    Collection Time: 11/05/24  4:49 AM   Result Value Ref Range    Total Bilirubin 0.61 0.20 - 1.00 mg/dL    Bilirubin, Direct 0.02 0.00 - 0.20 mg/dL    Alkaline Phosphatase 61 34 - 104 U/L    AST 78 (H) 13 - 39 U/L    ALT 96 (H) 7 - 52 U/L    Total Protein 5.9 (L) 6.4 - 8.4 g/dL    Albumin 3.4 (L) 3.5 - 5.0 g/dL   Fingerstick Glucose (POCT)    Collection Time: 11/05/24  6:04 AM   Result Value Ref Range    POC Glucose 83 65 - 140 mg/dl   Fingerstick Glucose (POCT)    Collection Time: 11/05/24 11:53 AM   Result Value Ref Range    POC Glucose 156 (H) 65 - 140 mg/dl       Assessment/Plan:    Bile duct abnormality  Patient had unsuccessful ERCP at [place]    -Patient is scheduled for endoscopic ultrasound and ERCP at [facility] on Monday    Hypothyroid  Patient is on levothyroxine 100 mcg once daily.  She needs to complete follow-up labs as I had previously ordered    Anxiety  Cymbalta, Effexor, trazodone, alprazolam.  I am very reluctant about taking over those medications.  She is also on Suboxone.  Those are prescribed by psychiatrist, addiction specialist in [place].  She does not want to drive all the way out to [facility].  I do not prescribe Suboxone    At this time I am not willing to take over any of her psychiatric medications because  she has not done any of the blood work that I had asked her to do nor did she follow-up with me.  I only saw her once in May.  Asked to have much more compliance for any type of consideration for that    Type 2 diabetes mellitus without complication, without long-term current use of insulin ([facility])    Lab Results   Component Value Date    HGBA1C 6.6 (H) 07/15/2023     She is presently on glimepiride 2 mg once daily.  She has enough of that medication to last for a few days so that she can have her blood work done that I had ordered back in May.  That was over 6 months ago.  Will base refill on results of testing          Problem List Items Addressed This Visit          Digestive    Bile duct abnormality - Primary    Patient had unsuccessful ERCP at [place]    -Patient is scheduled for endoscopic ultrasound and ERCP at [facility] on Monday            Endocrine    Hypothyroid (Chronic)    Patient is on levothyroxine 100 mcg once daily.  She needs to complete follow-up labs as I had previously ordered         Type 2 diabetes mellitus without complication, without long-term current use of insulin ([facility])      Lab Results   Component Value Date    HGBA1C 6.6 (H) 07/15/2023     She is presently on glimepiride 2 mg once daily.  She has enough of that medication to last for a few days so that she can have her blood work done that I had ordered back in May.  That was over 6 months ago.  Will base refill on results of testing            Behavioral Health    Anxiety (Chronic)    Cymbalta, Effexor, trazodone, alprazolam.  I am very reluctant about taking over those medications.  She is also on Suboxone.  Those are prescribed by psychiatrist, addiction specialist in [place].  She does not want to drive all the way out to [facility].  I do not prescribe Suboxone    At this time I am not willing to take over any of her psychiatric medications because she has not done any of the blood work that I had asked  her to do nor did she follow-up with me.  I only saw her once in May.  Asked to have much more compliance for any type of consideration for that          Other Visit Diagnoses         Osteoarthritis of lumbar spine with myelopathy

## 2024-11-14 NOTE — ASSESSMENT & PLAN NOTE
Patient is on levothyroxine 100 mcg once daily.  She needs to complete follow-up labs as I had previously ordered

## 2024-11-14 NOTE — ASSESSMENT & PLAN NOTE
Lab Results   Component Value Date    HGBA1C 6.6 (H) 07/15/2023     She is presently on glimepiride 2 mg once daily.  She has enough of that medication to last for a few days so that she can have her blood work done that I had ordered back in May.  That was over 6 months ago.  Will base refill on results of testing

## 2024-11-14 NOTE — ASSESSMENT & PLAN NOTE
Cymbalta, Effexor, trazodone, alprazolam.  I am very reluctant about taking over those medications.  She is also on Suboxone.  Those are prescribed by psychiatrist, addiction specialist in [place].  She does not want to drive all the way out to [facility].  I do not prescribe Suboxone    At this time I am not willing to take over any of her psychiatric medications because she has not done any of the blood work that I had asked her to do nor did she follow-up with me.  I only saw her once in May.  Asked to have much more compliance for any type of consideration for that

## 2024-11-14 NOTE — ASSESSMENT & PLAN NOTE
Patient had unsuccessful ERCP at [place]    -Patient is scheduled for endoscopic ultrasound and ERCP at [facility] on Monday

## 2024-11-15 NOTE — ASSESSMENT & PLAN NOTE
Montserrat does have COPD which appears to be moderate.  Spirometry today showed FEV1 of 1.05 L or 51% of predicted with obstructive index of 64%.  She is been on prednisone for past few weeks and has been reluctant to decrease dose.  She initially she felt it helped her with dealing with her elderly father who was on hospice and finally  last week or so.Has been having on her back pain.  Does have chronic lower back pain.  I did talk about long-term side effects of prednisone and that we should start tapering it and ultimately try to get her off the prednisone therapy.  She will decrease her dose of prednisone 40 mg to 30 mg for 2 weeks then 20 mg for the week 2 weeks then 10 mg for 2 weeks and 1/2 tablet daily to I reevaluate her.

## 2024-11-15 NOTE — ASSESSMENT & PLAN NOTE
Last CT scan of chest done June 19, 2023 showed a persistent 4 mm right upper lobe lung nodule.  She is a smoker.  I did order a follow-up scan of chest.

## 2024-11-15 NOTE — ASSESSMENT & PLAN NOTE
Has been having cough productive for white to yellow mucus.  I did prescribe her Ceftin 500 mg twice daily for 7 days.

## 2024-11-15 NOTE — ASSESSMENT & PLAN NOTE
She will continue with Trelegy 200 mcg 1 puff daily have nebulizer at home with albuterol 2.5 mL oxygen use 4 times daily as needed.

## 2024-11-17 ENCOUNTER — TELEPHONE (OUTPATIENT)
Dept: OTHER | Facility: OTHER | Age: 74
End: 2024-11-17

## 2024-11-17 NOTE — TELEPHONE ENCOUNTER
Patient notes she has a EGD scheduled for 9:30 Monday morning. Questioning what time she should arrive for the procedure    Patient was advised to arrive at least by 8:30am.

## 2024-11-21 ENCOUNTER — TELEPHONE (OUTPATIENT)
Age: 74
End: 2024-11-21

## 2024-11-29 ENCOUNTER — OFFICE VISIT (OUTPATIENT)
Dept: PAIN MEDICINE | Facility: CLINIC | Age: 74
End: 2024-11-29
Payer: COMMERCIAL

## 2024-11-29 ENCOUNTER — APPOINTMENT (OUTPATIENT)
Dept: RADIOLOGY | Facility: CLINIC | Age: 74
End: 2024-11-29
Payer: COMMERCIAL

## 2024-11-29 VITALS
DIASTOLIC BLOOD PRESSURE: 87 MMHG | WEIGHT: 170 LBS | HEIGHT: 63 IN | BODY MASS INDEX: 30.12 KG/M2 | HEART RATE: 87 BPM | SYSTOLIC BLOOD PRESSURE: 167 MMHG

## 2024-11-29 DIAGNOSIS — G89.29 CHRONIC LOW BACK PAIN, UNSPECIFIED BACK PAIN LATERALITY, UNSPECIFIED WHETHER SCIATICA PRESENT: ICD-10-CM

## 2024-11-29 DIAGNOSIS — M47.26 OTHER SPONDYLOSIS WITH RADICULOPATHY, LUMBAR REGION: ICD-10-CM

## 2024-11-29 DIAGNOSIS — M96.1 POST LAMINECTOMY SYNDROME: ICD-10-CM

## 2024-11-29 DIAGNOSIS — M54.50 CHRONIC LOW BACK PAIN, UNSPECIFIED BACK PAIN LATERALITY, UNSPECIFIED WHETHER SCIATICA PRESENT: ICD-10-CM

## 2024-11-29 DIAGNOSIS — M25.562 CHRONIC PAIN OF BOTH KNEES: Primary | ICD-10-CM

## 2024-11-29 DIAGNOSIS — M25.561 CHRONIC PAIN OF BOTH KNEES: ICD-10-CM

## 2024-11-29 DIAGNOSIS — M25.561 CHRONIC PAIN OF BOTH KNEES: Primary | ICD-10-CM

## 2024-11-29 DIAGNOSIS — G89.29 CHRONIC PAIN OF BOTH KNEES: Primary | ICD-10-CM

## 2024-11-29 DIAGNOSIS — G89.29 CHRONIC PAIN OF BOTH KNEES: ICD-10-CM

## 2024-11-29 DIAGNOSIS — M25.562 CHRONIC PAIN OF BOTH KNEES: ICD-10-CM

## 2024-11-29 PROCEDURE — 72100 X-RAY EXAM L-S SPINE 2/3 VWS: CPT

## 2024-11-29 PROCEDURE — 99204 OFFICE O/P NEW MOD 45 MIN: CPT | Performed by: STUDENT IN AN ORGANIZED HEALTH CARE EDUCATION/TRAINING PROGRAM

## 2024-11-29 PROCEDURE — 73562 X-RAY EXAM OF KNEE 3: CPT

## 2024-11-29 PROCEDURE — G2211 COMPLEX E/M VISIT ADD ON: HCPCS | Performed by: STUDENT IN AN ORGANIZED HEALTH CARE EDUCATION/TRAINING PROGRAM

## 2024-11-29 RX ORDER — CYCLOBENZAPRINE HCL 10 MG
10 TABLET ORAL 3 TIMES DAILY PRN
Qty: 90 TABLET | Refills: 1 | Status: SHIPPED | OUTPATIENT
Start: 2024-11-29

## 2024-11-29 NOTE — PROGRESS NOTES
Assessment:  1. Chronic pain of both knees    2. Chronic low back pain, unspecified back pain laterality, unspecified whether sciatica present    3. Other spondylosis with radiculopathy, lumbar region    4. Post laminectomy syndrome        Plan:  Orders Placed This Encounter   Procedures    XR spine lumbar 2 or 3 views injury     Standing Status:   Future     Number of Occurrences:   1     Expected Date:   11/29/2024     Expiration Date:   11/29/2028     Scheduling Instructions:      Bring along any outside films relating to this procedure.          XR knee 3 vw right non injury     Standing Status:   Future     Number of Occurrences:   1     Expected Date:   11/29/2024     Expiration Date:   11/29/2028     Scheduling Instructions:      Bring along any outside films relating to this procedure.          XR knee 3 vw left non injury     Standing Status:   Future     Number of Occurrences:   1     Expected Date:   11/29/2024     Expiration Date:   11/29/2028     Scheduling Instructions:      Bring along any outside films relating to this procedure.          MRI lumbar spine w wo contrast     Standing Status:   Future     Expected Date:   11/29/2024     Expiration Date:   11/29/2028     Scheduling Instructions:      There is no preparation for this test. Please leave your jewelry and valuables at home, wedding rings are the exception. All patients will be required to change into a hospital gown and pants.  Street clothes are not permitted in the MRI.  Magnetic nail polish must be removed prior to arrival for your test. Please bring your insurance cards, a form of photo ID and a list of your medications with you. Arrive 15 minutes prior to your appointment time in order to register. Please bring any prior CT or MRI studies of this area that were not performed at a [facility].            To schedule this appointment, please contact Central Scheduling at [phone].            Prior to your appointment, please  make sure you complete the MRI Screening Form when you e-Check in for your appointment. This will be available starting 7 days before your appointment in [employer]. You may receive an e-mail with an activation code if you do not have a [employer] account. If you do not have access to a device, we will complete your screening at your appointment.     Reason for Exam:   persistent lumbar radiculopathy with hx lumbar fusion, evaluate for canal stenosis for SCS planning     What is the patient's sedation requirement?:   Conscious Sedation     Order with ANESTHESIA if any of the following are TRUE: BMI>45, sleep apnea, cannot lay flat, or MRI Chest/Lung requested:   None of the above apply     Does the patient need medication for Claustrophobia? If yes, order medication at this point.:   No     Does the patient wear a life vest, have an implanted cardiac device, a stimulation device, a sleep apnea stimulator, or a breast tissue expansion device?:   No     Please evaluate if any patient has any of the following risk factors that require renal function labs within 90 days prior to the MRI appointment.:   None of the Above     Release to patient through [employer]:   Immediate    MRI thoracic spine w wo contrast     Standing Status:   Future     Expected Date:   11/29/2024     Expiration Date:   11/29/2028     Scheduling Instructions:      There is no preparation for this test. Please leave your jewelry and valuables at home, wedding rings are the exception. All patients will be required to change into a hospital gown and pants.  Street clothes are not permitted in the MRI.  Magnetic nail polish must be removed prior to arrival for your test. Please bring your insurance cards, a form of photo ID and a list of your medications with you. Arrive 15 minutes prior to your appointment time in order to register. Please bring any prior CT or MRI studies of this area that were not performed at a [facility].            To schedule  this appointment, please contact Central Scheduling at [phone].            Prior to your appointment, please make sure you complete the MRI Screening Form when you e-Check in for your appointment. This will be available starting 7 days before your appointment in [employer]. You may receive an e-mail with an activation code if you do not have a [employer] account. If you do not have access to a device, we will complete your screening at your appointment.     Reason for Exam:   persistent lumbar radiculopathy with hx lumbar fusion, evaluate for canal stenosis for SCS planning     What is the patient's sedation requirement?:   Conscious Sedation     Order with ANESTHESIA if any of the following are TRUE: BMI>45, sleep apnea, cannot lay flat, or MRI Chest/Lung requested:   None of the above apply     Does the patient need medication for Claustrophobia? If yes, order medication at this point.:   No     Does the patient wear a life vest, have an implanted cardiac device, a stimulation device, a sleep apnea stimulator, or a breast tissue expansion device?:   No     Please evaluate if any patient has any of the following risk factors that require renal function labs within 90 days prior to the MRI appointment.:   None of the Above     Release to patient through [employer]:   Immediate       New Medications Ordered This Visit   Medications    cyclobenzaprine (FLEXERIL) 10 mg tablet     Sig: Take 1 tablet (10 mg total) by mouth 3 (three) times a day as needed for muscle spasms     Dispense:  90 tablet     Refill:  1       My impressions and treatment recommendations were discussed in detail with the patient, who verbalized understanding and had no further questions.    Rita is a pleasant 74-year-old female presents today for evaluation management of chronic low back and bilateral knee pain.  I reviewed her lumbar radiographs showing multilevel degenerative changes, known fusion hardware at L5-S1 with adjacent segment  degeneration.  I also reviewed her knee radiographs showing medial compartment osteoarthritis more prominent on the left.  We did discuss management including activity modification, weight reduction, core and lumbar strengthening, medications and injection therapies.    Given that she has failed conservative measures, I do think she would benefit from bilateral knee intra-articular corticosteroid injections with the use of ultrasound guidance for placement accuracy.    Based on her examination, it does appear she has sacroiliac joint involvement.  I do think she would benefit from bilateral sacroiliac joint injections for diagnostic and hopefully therapeutic purposes.     She has had multiple injections in her lumbar spine including epidural steroid injections and radiofrequency ablation with limited benefit.  Therefore I think it would be reasonable to obtain updated imaging of her thoracic and lumbar spine.  We did discuss neuromodulation as a potential therapeutic option for her if failure to improve with sacroiliac joint injections.  Will see how she is progressing after the sacroiliac joint injections and can further discuss spinal cord stimulation therapy.    Complete risks and benefits including bleeding, infection, tissue reaction, nerve injury and allergic reaction were discussed. The approach was demonstrated using models and literature was provided. Verbal and written consent was obtained.    I have also provided her with a prescription for cyclobenzaprine to help with muscle spasms in her lumbosacral area.      Discharge instructions were provided. I personally saw and examined the patient and I agree with the above discussed plan of care.    History of Present Illness:    Montserrat Sumner is a 74 y.o. female who presents to [facility] for initial evaluation of the above stated pain complaints. The patient has a past medical and chronic pain history as outlined in the  "assessment section. She was referred by Referral Self  No address on file .    73-year-old female with past medical history of anxiety, depression, acid reflux, hypertension, thyroid disease, osteoarthritis, COPD presents today for evaluation and management of chronic back and knee pain.  Pain has been present for 5 years.  She denies any inciting event.  Pain is currently severe and rated 10 out of 10.  Pain is constant without a typical pattern associated with sharpness.  Pain is located in the lumbosacral area as well as bilateral knees.  Pain is made worse with activity.  She had previous injections in the past with good relief 2 years ago.  She is currently taking Tylenol and aspirin without relief.  She is also taking Cymbalta.  She presents today for further evaluation.    Review of Systems:    Review of Systems   Constitutional:  Negative for chills and fatigue.   HENT:  Negative for ear pain, mouth sores and sinus pressure.    Eyes:  Negative for pain, redness and visual disturbance.   Respiratory:  Negative for shortness of breath and wheezing.    Cardiovascular:  Negative for chest pain and palpitations.   Gastrointestinal:  Negative for abdominal pain and nausea.   Endocrine: Negative for polyphagia.   Musculoskeletal:  Positive for back pain and gait problem. Negative for arthralgias and neck pain.        Decreased ROM, joint and muscle pain   Skin:  Negative for wound.   Neurological:  Positive for dizziness, weakness, light-headedness and numbness. Negative for seizures.   Psychiatric/Behavioral:  Positive for decreased concentration, dysphoric mood and sleep disturbance. The patient is nervous/anxious.            Past Medical History:   Diagnosis Date    Anxiety     Chronic pain 03/06/2023    lower abdomen and back    Colon polyp     COPD (chronic obstructive pulmonary disease) (HCC)     \"passes out\" with O2 levels dropping    Disease of thyroid gland     GERD (gastroesophageal reflux disease)     " History of transfusion     with aneurysm repair-autologous-self and daughter    Hyperlipidemia     Hypertension     Teeth missing     Wears glasses     For reading       Past Surgical History:   Procedure Laterality Date    BACK SURGERY      x2 herniated, crushed disc in the lumbar, ablation    CHOLECYSTECTOMY      COLONOSCOPY      FRACTURE SURGERY Left     hip-pinned    HYSTERECTOMY      salpingo-oophorectomy    THORACIC AORTIC ANEURYSM REPAIR  09/2016    ascending thoracic aortic repair with RCA bypass with SVG x 1    TONSILLECTOMY      UPPER GASTROINTESTINAL ENDOSCOPY         Family History   Problem Relation Age of Onset    Breast cancer Mother        Social History     Occupational History    Not on file   Tobacco Use    Smoking status: Every Day     Current packs/day: 0.25     Average packs/day: 0.3 packs/day for 56.9 years (14.2 ttl pk-yrs)     Types: Cigarettes     Start date: 1968     Passive exposure: Past    Smokeless tobacco: Never    Tobacco comments:     Currently smoking 5-6 cigarettes daily   Vaping Use    Vaping status: Never Used   Substance and Sexual Activity    Alcohol use: Yes     Comment: occasionally    Drug use: Yes     Types: Marijuana, Cocaine     Comment: yes still Marijuana as of 9/5/24-2 times weekly    Sexual activity: Not Currently     Partners: Male     Birth control/protection: Post-menopausal         Current Outpatient Medications:     albuterol (2.5 mg/3 mL) 0.083 % nebulizer solution, Take 3 mL (2.5 mg total) by nebulization 4 (four) times a day as needed for wheezing or shortness of breath, Disp: 360 mL, Rfl: 7    albuterol (Proventil HFA) 90 mcg/act inhaler, Inhale 2 puffs every 4 (four) hours as needed for wheezing, Disp: 6.7 g, Rfl: 7    ALPRAZolam (XANAX) 0.5 mg tablet, Take 0.5 mg by mouth 2 (two) times a day, Disp: , Rfl:     amLODIPine (NORVASC) 5 mg tablet, Take 1 tablet (5 mg total) by mouth daily, Disp: 10 tablet, Rfl: 0    aspirin 81 mg chewable tablet, Chew 81 mg  daily, Disp: , Rfl:     atorvastatin (LIPITOR) 40 mg tablet, Take 40 mg by mouth every morning, Disp: , Rfl:     b complex vitamins capsule, Take 1 capsule by mouth once a week, Disp: , Rfl:     buprenorphine-naloxone (Suboxone) 4-1 MG, every morning Wafer, Disp: , Rfl:     celecoxib (CeleBREX) 200 mg capsule, Take 1 capsule (200 mg total) by mouth daily, Disp: 90 capsule, Rfl: 1    cyclobenzaprine (FLEXERIL) 10 mg tablet, Take 1 tablet (10 mg total) by mouth 3 (three) times a day as needed for muscle spasms, Disp: 90 tablet, Rfl: 1    DULoxetine (CYMBALTA) 60 mg delayed release capsule, Take 1 capsule (60 mg total) by mouth daily, Disp: 90 capsule, Rfl: 0    esomeprazole (NexIUM) 40 MG capsule, Take 1 capsule (40 mg total) by mouth 2 (two) times a day before meals, Disp: 180 capsule, Rfl: 1    fluticasone-umeclidinium-vilanterol (Trelegy Ellipta) 200-62.5-25 mcg/actuation AEPB inhaler, Inhale 1 puff daily Rinse mouth after use., Disp: 60 blister, Rfl: 11    ipratropium-albuterol (DUO-NEB) 0.5-2.5 mg/3 mL nebulizer solution, Take 3 mL by nebulization 4 (four) times a day as needed for shortness of breath or wheezing, Disp: 360 mL, Rfl: 7    levothyroxine 100 mcg tablet, Take 100 mcg by mouth every morning, Disp: , Rfl:     losartan (COZAAR) 25 mg tablet, Take 25 mg by mouth every morning, Disp: , Rfl:     oxygen gas, Inhale continuous as needed 2.5 L, Disp: , Rfl:     predniSONE 10 mg tablet, Take 40 mg PO daily x 3 days, then 30 mg daily x 3 days, then 20 mg daily x 3 days, then 10 mg daily x 3 days, then stop., Disp: 30 tablet, Rfl: 0    predniSONE 10 mg tablet, Take 3 tablets daily for 2 weeks then 2 tablets daily for 2 weeks then 1 tablet daily for 2 weeks then stay on 1/2 tablet daily, Disp: 100 tablet, Rfl: 1    traZODone (DESYREL) 50 mg tablet, Take 100 mg by mouth daily at bedtime, Disp: , Rfl:     venlafaxine (EFFEXOR-XR) 150 mg 24 hr capsule, Take 1 capsule by mouth in the morning, Disp: , Rfl:      "venlafaxine (EFFEXOR-XR) 75 mg 24 hr capsule, Take 75 mg by mouth daily at bedtime, Disp: , Rfl:     albuterol (PROVENTIL HFA,VENTOLIN HFA) 90 mcg/act inhaler, Inhale 1 puff every 4 (four) hours as needed for wheezing (Patient not taking: Reported on 11/12/2024), Disp: 54 g, Rfl: 3    clopidogrel (PLAVIX) 75 mg tablet, Take 75 mg by mouth every other day (Patient not taking: Reported on 10/8/2024), Disp: , Rfl:     fluticasone-umeclidinium-vilanterol (Trelegy Ellipta) 100-62.5-25 mcg/actuation inhaler, Inhale 1 puff daily Rinse mouth after use. (Patient not taking: Reported on 11/29/2024), Disp: 180 blister, Rfl: 3    gabapentin (NEURONTIN) 300 mg capsule, Take 1 capsule (300 mg total) by mouth 3 (three) times a day (Patient not taking: Reported on 10/8/2024), Disp: 90 capsule, Rfl: 1    glimepiride (AMARYL) 2 mg tablet, , Disp: , Rfl:     metoclopramide (REGLAN) 10 mg tablet, , Disp: , Rfl:     metoprolol succinate (TOPROL-XL) 50 mg 24 hr tablet, Take 1 tablet (50 mg total) by mouth daily (Patient taking differently: Take 50 mg by mouth every morning), Disp: 90 tablet, Rfl: 1    predniSONE 10 mg tablet, Take 3 tablets daily for 14 days then 2 tablets daily for 14 days then 1 tablet daily for 14 days then 1/2 tablet daily for 14 days (Patient not taking: Reported on 11/4/2024), Disp: 100 tablet, Rfl: 0    sodium chloride 0.9 % nebulizer solution, Take 3 mL by nebulization as needed for wheezing (Patient not taking: Reported on 11/29/2024), Disp: 360 mL, Rfl: 1    No Known Allergies    Physical Exam:    /87 (Patient Position: Sitting, Cuff Size: Adult)   Pulse 87   Ht 5' 3\" (1.6 m)   Wt 77.1 kg (170 lb)   LMP  (LMP Unknown)   BMI 30.11 kg/m²     Constitutional: normal, well developed, well nourished, alert, in no distress and non-toxic and no overt pain behavior.  Eyes: anicteric  HEENT: grossly intact  Neck: supple, symmetric, trachea midline and no masses   Pulmonary:even and unlabored  Cardiovascular:No " edema or pitting edema present  Skin:Normal without rashes or lesions and well hydrated  Psychiatric:Mood and affect appropriate  Neurologic:Cranial Nerves II-XII grossly intact  Musculoskeletal: Range of motion of the thoracolumbar spine limited.  Thoracic kyphosis appreciated tenderness throughout the lumbar spine and lumbosacral junction with positive facet loading.  Tenderness over the bilateral SI areas.  Positive ASIS compression and pelvic compression test.  Positive Gaenslen's and Theresa.  Strength, sensation, reflexes in the lower limbs are normal and symmetric.    Imaging  MRI lumbar spine w wo contrast    (Results Pending)   MRI thoracic spine w wo contrast    (Results Pending)       Orders Placed This Encounter   Procedures    XR spine lumbar 2 or 3 views injury    XR knee 3 vw right non injury    XR knee 3 vw left non injury    MRI lumbar spine w wo contrast    MRI thoracic spine w wo contrast

## 2024-12-06 ENCOUNTER — PROCEDURE VISIT (OUTPATIENT)
Dept: PAIN MEDICINE | Facility: CLINIC | Age: 74
End: 2024-12-06
Payer: COMMERCIAL

## 2024-12-06 VITALS — DIASTOLIC BLOOD PRESSURE: 83 MMHG | HEART RATE: 77 BPM | SYSTOLIC BLOOD PRESSURE: 144 MMHG

## 2024-12-06 DIAGNOSIS — M17.0 PRIMARY OSTEOARTHRITIS OF BOTH KNEES: Primary | ICD-10-CM

## 2024-12-06 DIAGNOSIS — M96.1 POSTLAMINECTOMY SYNDROME: ICD-10-CM

## 2024-12-06 DIAGNOSIS — M54.16 LUMBAR RADICULOPATHY: ICD-10-CM

## 2024-12-06 PROCEDURE — 20611 DRAIN/INJ JOINT/BURSA W/US: CPT | Performed by: STUDENT IN AN ORGANIZED HEALTH CARE EDUCATION/TRAINING PROGRAM

## 2024-12-06 NOTE — PROGRESS NOTES
"Large joint arthrocentesis: bilateral knee  Universal Protocol:  procedure performed by consultantConsent: Verbal consent obtained.  Risks and benefits: risks, benefits and alternatives were discussed  Consent given by: patient  Time out: Immediately prior to procedure a \"time out\" was called to verify the correct patient, procedure, equipment, support staff and site/side marked as required.  Patient understanding: patient states understanding of the procedure being performed  Patient consent: the patient's understanding of the procedure matches consent given  Procedure consent: procedure consent matches procedure scheduled  Relevant documents: relevant documents present and verified  Site marked: the operative site was marked  Patient identity confirmed: verbally with patient  Supporting Documentation  Indications: pain   Procedure Details  Location: knee - bilateral knee  Preparation: Patient was prepped and draped in the usual sterile fashion  Needle size: 25 G  Ultrasound guidance: yes  Approach: anterolateral    Patient tolerance: patient tolerated the procedure well with no immediate complications        Assessment:    1. Primary osteoarthritis of both knees        Plan:    Ms. Montserrat Sumner is a 74 y.o. female that presents to the pain clinic to undergo bilateral intra-articular knee corticosteroid injection with ultrasound guidance for improved target accuracy, reduced risk of intratendinous or intramuscular injection, and reduced pain. All risks, benefits, and alternatives were thoroughly explained to Ms. Montserrat Sumner who verbally communicated understanding of the management plan.  Proceed with therapeutic intra-articular knee corticosteroid injection with ultrasound guidance    Follow-up: Patient will follow up for bilateral sacroiliac joint injections.  She has MRI of the thoracic and lumbar spine with and without contrast which we will review in the office 2 weeks after her SI joint " "injections.    Procedure note:   The patient was placed in a supine position. The knees were scanned to select the appropriate target and needle trajectory avoiding local neurovascular structures.  A 25g 1.5\" needle was inserted into the bilateral suprapatellar recess under live ultrasound guidance  There was no paresthesia. Under continuous live ultrasound guidance, each joint was injected with 40 mg Depo-medrol and 2 cc of 0.5% bupivacaine.  The needle was removed.  The patient tolerated the procedure well.  Patient was discharged in stable condition.      Nj Maddox DO   "

## 2024-12-09 ENCOUNTER — TELEPHONE (OUTPATIENT)
Age: 74
End: 2024-12-09

## 2024-12-09 NOTE — TELEPHONE ENCOUNTER
Patients GI provider:  LASHAUN Sy    Number to return call: [phone]    Reason for call: Pt calling wanting to reschedule EUS/ERCP. Please reach out to pt at the above ph #. Thank you    Scheduled procedure/appointment date if applicable: N/A

## 2024-12-13 ENCOUNTER — HOSPITAL ENCOUNTER (OUTPATIENT)
Dept: RADIOLOGY | Facility: HOSPITAL | Age: 74
Discharge: HOME | End: 2024-12-13
Attending: INTERNAL MEDICINE
Payer: COMMERCIAL

## 2024-12-13 DIAGNOSIS — R91.1 LUNG NODULE: ICD-10-CM

## 2024-12-13 PROCEDURE — 71250 CT THORAX DX C-: CPT

## 2024-12-15 ENCOUNTER — APPOINTMENT (EMERGENCY)
Dept: RADIOLOGY | Facility: HOSPITAL | Age: 74
End: 2024-12-15
Payer: COMMERCIAL

## 2024-12-15 ENCOUNTER — HOSPITAL ENCOUNTER (EMERGENCY)
Facility: HOSPITAL | Age: 74
Discharge: HOME | End: 2024-12-15
Attending: EMERGENCY MEDICINE | Admitting: EMERGENCY MEDICINE
Payer: COMMERCIAL

## 2024-12-15 VITALS
WEIGHT: 175 LBS | OXYGEN SATURATION: 93 % | BODY MASS INDEX: 31.01 KG/M2 | SYSTOLIC BLOOD PRESSURE: 186 MMHG | DIASTOLIC BLOOD PRESSURE: 97 MMHG | HEIGHT: 63 IN | RESPIRATION RATE: 16 BRPM | TEMPERATURE: 97.8 F | HEART RATE: 74 BPM

## 2024-12-15 DIAGNOSIS — R10.9 ABDOMINAL PAIN: Primary | ICD-10-CM

## 2024-12-15 LAB
ALBUMIN SERPL BCG-MCNC: 4.1 G/DL (ref 3.5–5)
ALP SERPL-CCNC: 46 U/L (ref 34–104)
ALT SERPL W P-5'-P-CCNC: 17 U/L (ref 7–52)
ANION GAP SERPL CALCULATED.3IONS-SCNC: 10 MMOL/L (ref 4–13)
ANISOCYTOSIS BLD QL SMEAR: PRESENT
AST SERPL W P-5'-P-CCNC: 15 U/L (ref 13–39)
ATRIAL RATE: 73 BPM
BACTERIA UR QL AUTO: NORMAL /HPF
BASOPHILS # BLD MANUAL: 0 THOUSAND/UL (ref 0–0.1)
BASOPHILS NFR MAR MANUAL: 0 % (ref 0–1)
BILIRUB SERPL-MCNC: 0.34 MG/DL (ref 0.2–1)
BILIRUB UR QL STRIP: NEGATIVE
BILIRUB UR QL STRIP: NEGATIVE
BUN SERPL-MCNC: 18 MG/DL (ref 5–25)
CALCIUM SERPL-MCNC: 9.7 MG/DL (ref 8.4–10.2)
CHLORIDE SERPL-SCNC: 104 MMOL/L (ref 96–108)
CLARITY UR: CLEAR
CLARITY UR: CLEAR
CO2 SERPL-SCNC: 29 MMOL/L (ref 21–32)
COLOR UR: ABNORMAL
COLOR UR: YELLOW
CREAT SERPL-MCNC: 0.87 MG/DL (ref 0.6–1.3)
EOSINOPHIL # BLD MANUAL: 0 THOUSAND/UL (ref 0–0.4)
EOSINOPHIL NFR BLD MANUAL: 0 % (ref 0–6)
ERYTHROCYTE [DISTWIDTH] IN BLOOD BY AUTOMATED COUNT: 15.9 % (ref 11.6–15.1)
GFR SERPL CREATININE-BSD FRML MDRD: 66 ML/MIN/1.73SQ M
GLUCOSE SERPL-MCNC: 118 MG/DL (ref 65–140)
GLUCOSE UR STRIP-MCNC: NEGATIVE MG/DL
GLUCOSE UR STRIP-MCNC: NEGATIVE MG/DL
HCT VFR BLD AUTO: 45.5 % (ref 34.8–46.1)
HGB BLD-MCNC: 13.9 G/DL (ref 11.5–15.4)
HGB UR QL STRIP.AUTO: NEGATIVE
HGB UR QL STRIP.AUTO: NEGATIVE
KETONES UR STRIP-MCNC: NEGATIVE MG/DL
KETONES UR STRIP-MCNC: NEGATIVE MG/DL
LEUKOCYTE ESTERASE UR QL STRIP: NEGATIVE
LEUKOCYTE ESTERASE UR QL STRIP: NEGATIVE
LIPASE SERPL-CCNC: 50 U/L (ref 11–82)
LYMPHOCYTES # BLD AUTO: 16 % (ref 14–44)
LYMPHOCYTES # BLD AUTO: 2.16 THOUSAND/UL (ref 0.6–4.47)
MCH RBC QN AUTO: 29 PG (ref 26.8–34.3)
MCHC RBC AUTO-ENTMCNC: 30.5 G/DL (ref 31.4–37.4)
MCV RBC AUTO: 95 FL (ref 82–98)
METAMYELOCYTE ABSOLUTE CT: 0.09 THOUSAND/UL (ref 0–0.1)
METAMYELOCYTES NFR BLD MANUAL: 1 % (ref 0–1)
MONOCYTES # BLD AUTO: 0.56 THOUSAND/UL (ref 0–1.22)
MONOCYTES NFR BLD: 6 % (ref 4–12)
NEUTROPHILS # BLD MANUAL: 6.59 THOUSAND/UL (ref 1.85–7.62)
NEUTS BAND NFR BLD MANUAL: 2 % (ref 0–8)
NEUTS SEG NFR BLD AUTO: 68 % (ref 43–75)
NITRITE UR QL STRIP: NEGATIVE
NITRITE UR QL STRIP: NEGATIVE
NON-SQ EPI CELLS URNS QL MICRO: NORMAL /HPF
P AXIS: 77 DEGREES
PH UR STRIP.AUTO: 5.5 [PH] (ref 4.5–8)
PH UR STRIP.AUTO: 6 [PH]
PLATELET # BLD AUTO: 251 THOUSANDS/UL (ref 149–390)
PLATELET BLD QL SMEAR: ADEQUATE
PMV BLD AUTO: 9.1 FL (ref 8.9–12.7)
POTASSIUM SERPL-SCNC: 4 MMOL/L (ref 3.5–5.3)
PR INTERVAL: 142 MS
PROT SERPL-MCNC: 7.2 G/DL (ref 6.4–8.4)
PROT UR STRIP-MCNC: ABNORMAL MG/DL
PROT UR STRIP-MCNC: ABNORMAL MG/DL
QRS AXIS: 52 DEGREES
QRSD INTERVAL: 80 MS
QT INTERVAL: 372 MS
QTC INTERVAL: 409 MS
RBC # BLD AUTO: 4.8 MILLION/UL (ref 3.81–5.12)
RBC #/AREA URNS AUTO: NORMAL /HPF
RBC MORPH BLD: PRESENT
SODIUM SERPL-SCNC: 143 MMOL/L (ref 135–147)
SP GR UR STRIP.AUTO: 1.01 (ref 1–1.03)
SP GR UR STRIP.AUTO: >=1.05 (ref 1–1.03)
T WAVE AXIS: 78 DEGREES
UROBILINOGEN UR QL STRIP.AUTO: 0.2 E.U./DL
UROBILINOGEN UR STRIP-ACNC: <2 MG/DL
VARIANT LYMPHS # BLD AUTO: 7 %
VENTRICULAR RATE: 73 BPM
WBC # BLD AUTO: 9.41 THOUSAND/UL (ref 4.31–10.16)
WBC #/AREA URNS AUTO: NORMAL /HPF

## 2024-12-15 PROCEDURE — 80053 COMPREHEN METABOLIC PANEL: CPT

## 2024-12-15 PROCEDURE — 96375 TX/PRO/DX INJ NEW DRUG ADDON: CPT

## 2024-12-15 PROCEDURE — 99284 EMERGENCY DEPT VISIT MOD MDM: CPT

## 2024-12-15 PROCEDURE — 96376 TX/PRO/DX INJ SAME DRUG ADON: CPT

## 2024-12-15 PROCEDURE — 36415 COLL VENOUS BLD VENIPUNCTURE: CPT

## 2024-12-15 PROCEDURE — 93010 ELECTROCARDIOGRAM REPORT: CPT | Performed by: INTERNAL MEDICINE

## 2024-12-15 PROCEDURE — 83690 ASSAY OF LIPASE: CPT

## 2024-12-15 PROCEDURE — 99285 EMERGENCY DEPT VISIT HI MDM: CPT | Performed by: EMERGENCY MEDICINE

## 2024-12-15 PROCEDURE — 74177 CT ABD & PELVIS W/CONTRAST: CPT

## 2024-12-15 PROCEDURE — 85027 COMPLETE CBC AUTOMATED: CPT

## 2024-12-15 PROCEDURE — 96374 THER/PROPH/DIAG INJ IV PUSH: CPT

## 2024-12-15 PROCEDURE — 81001 URINALYSIS AUTO W/SCOPE: CPT | Performed by: EMERGENCY MEDICINE

## 2024-12-15 PROCEDURE — 85007 BL SMEAR W/DIFF WBC COUNT: CPT

## 2024-12-15 PROCEDURE — 93005 ELECTROCARDIOGRAM TRACING: CPT

## 2024-12-15 RX ORDER — HYDROMORPHONE HCL/PF 1 MG/ML
0.5 SYRINGE (ML) INJECTION ONCE
Status: COMPLETED | OUTPATIENT
Start: 2024-12-15 | End: 2024-12-15

## 2024-12-15 RX ORDER — KETOROLAC TROMETHAMINE 30 MG/ML
15 INJECTION, SOLUTION INTRAMUSCULAR; INTRAVENOUS ONCE
Status: COMPLETED | OUTPATIENT
Start: 2024-12-15 | End: 2024-12-15

## 2024-12-15 RX ADMIN — HYDROMORPHONE HYDROCHLORIDE 0.5 MG: 1 INJECTION, SOLUTION INTRAMUSCULAR; INTRAVENOUS; SUBCUTANEOUS at 19:44

## 2024-12-15 RX ADMIN — HYDROMORPHONE HYDROCHLORIDE 0.5 MG: 1 INJECTION, SOLUTION INTRAMUSCULAR; INTRAVENOUS; SUBCUTANEOUS at 15:27

## 2024-12-15 RX ADMIN — KETOROLAC TROMETHAMINE 15 MG: 30 INJECTION, SOLUTION INTRAMUSCULAR; INTRAVENOUS at 14:31

## 2024-12-15 RX ADMIN — IOHEXOL 100 ML: 350 INJECTION, SOLUTION INTRAVENOUS at 15:29

## 2024-12-15 NOTE — ED NOTES
Pt reports improvement in pain, denies need to urinate at this time, will notify RN when she does  Pt verbalized understanding of need for urine sample       Lizette Johnson RN  12/15/24 [id number]

## 2024-12-15 NOTE — ED ATTENDING ATTESTATION
12/15/2024  I, Sheyla Borden MD, saw and evaluated the patient. I have discussed the patient with the resident/non-physician practitioner and agree with the resident's/non-physician practitioner's findings, Plan of Care, and MDM as documented in the resident's/non-physician practitioner's note, except where noted. All available labs and Radiology studies were reviewed.  I was present for key portions of any procedure(s) performed by the resident/non-physician practitioner and I was immediately available to provide assistance.       At this point I agree with the current assessment done in the Emergency Department.  I have conducted an independent evaluation of this patient a history and physical is as follows:  Pt has ahd abd pain for a month with regards to pancreas Pt requires astent Pt had acute worsening this am with acute r lower quad pain radiating to flank no nvd no fevers no urinary symptoms PE: alert heart reg lungs clear abd soft tender rlq and midepigastric area MDM: will check labs, ua and CT  ED Course         Critical Care Time  Procedures

## 2024-12-15 NOTE — ED PROVIDER NOTES
Time reflects when diagnosis was documented in both MDM as applicable and the Disposition within this note       Time User Action Codes Description Comment    12/15/2024  8:21 PM Rui Umaña Add [R10.9] Abdominal pain           ED Disposition       ED Disposition   Discharge    Condition   --    Date/Time   Sun Dec 15, 2024  9:29 PM    Comment   Montserrat Sumner discharge to home/self care.                   Assessment & Plan       Medical Decision Making  Pt is a 73 y/o F presenting for abd pain    Ddx: appendicitis vs diverticulitis vs kidney stone vs AAA although less likely    Plan: abd labs ua CT analgesia monitor and reassess    Pt requesting additional pain meds as toradol was ineffective. Labs reviewed no abnormalities pending ua and CT yet    See AAA US below    CT imaging reviewed no acute abnormalities seen pending final read. CT read stable ductal dilations no acute abnormalities, also chronic compression fracture. UA negative. Pt HD stable and clear for d/c with outpt f/u. Return precautions given pt verbalized understanding    Discussed findings with pt not related to their chief complaint or primary disease process and advised outpatient follow up with their PCP for further evaluation and management. Pt is aware of findings and verbalized understanding      Amount and/or Complexity of Data Reviewed  Labs: ordered.  Radiology: ordered.    Risk  Prescription drug management.             Medications   ketorolac (TORADOL) injection 15 mg (15 mg Intravenous Given 12/15/24 1431)   HYDROmorphone (DILAUDID) injection 0.5 mg (0.5 mg Intravenous Given 12/15/24 1527)   iohexol (OMNIPAQUE) 350 MG/ML injection (SINGLE-DOSE) 100 mL (100 mL Intravenous Given 12/15/24 1529)   HYDROmorphone (DILAUDID) injection 0.5 mg (0.5 mg Intravenous Given 12/15/24 1944)       ED Risk Strat Scores                          SBIRT 20yo+      Flowsheet Row Most Recent Value   Initial Alcohol Screen: US AUDIT-C     1. How often  "do you have a drink containing alcohol? 0 Filed at: 12/15/2024 1343   2. How many drinks containing alcohol do you have on a typical day you are drinking?  0 Filed at: 12/15/2024 1343   3a. Male UNDER 65: How often do you have five or more drinks on one occasion? 0 Filed at: 12/15/2024 1343   3b. FEMALE Any Age, or MALE 65+: How often do you have 4 or more drinks on one occassion? 0 Filed at: 12/15/2024 1343   Audit-C Score 0 Filed at: 12/15/2024 1343   ARNOLDO: How many times in the past year have you...    Used an illegal drug or used a prescription medication for non-medical reasons? Never Filed at: 12/15/2024 1343                            History of Present Illness       Chief Complaint   Patient presents with    Abdominal Pain     Pt c/o rt sided abd pain since for 1 week. Pain worse to day. Pt is to have work up for issues with her pancreas       Past Medical History:   Diagnosis Date    Anxiety     Chronic pain 03/06/2023    lower abdomen and back    Colon polyp     COPD (chronic obstructive pulmonary disease) (HCC)     \"passes out\" with O2 levels dropping    Disease of thyroid gland     GERD (gastroesophageal reflux disease)     History of transfusion     with aneurysm repair-autologous-self and daughter    Hyperlipidemia     Hypertension     Teeth missing     Wears glasses     For reading      Past Surgical History:   Procedure Laterality Date    BACK SURGERY      x2 herniated, crushed disc in the lumbar, ablation    CHOLECYSTECTOMY      COLONOSCOPY      FRACTURE SURGERY Left     hip-pinned    HYSTERECTOMY      salpingo-oophorectomy    THORACIC AORTIC ANEURYSM REPAIR  09/2016    ascending thoracic aortic repair with RCA bypass with SVG x 1    TONSILLECTOMY      UPPER GASTROINTESTINAL ENDOSCOPY        Family History   Problem Relation Age of Onset    Breast cancer Mother       Social History     Tobacco Use    Smoking status: Every Day     Current packs/day: 0.25     Average packs/day: 0.3 packs/day for 57.0 " years (14.2 ttl pk-yrs)     Types: Cigarettes     Start date: 1968     Passive exposure: Past    Smokeless tobacco: Never    Tobacco comments:     Currently smoking 5-6 cigarettes daily   Vaping Use    Vaping status: Never Used   Substance Use Topics    Alcohol use: Yes     Comment: occasionally    Drug use: Yes     Types: Marijuana, Cocaine     Comment: yes still Marijuana as of 9/5/24-2 times weekly      E-Cigarette/Vaping    E-Cigarette Use Never User       E-Cigarette/Vaping Substances    Nicotine No     THC No     CBD No     Flavoring No     Other No     Unknown No       I have reviewed and agree with the history as documented.     Pt is a 73 y/o F presenting to ED for eval of abd pain. Pt reports that she has an issue with her pancreas and requires a stent placed at some point. For the last month has had intermittent abd pain, had acute worsening of her pain tonight prompting presentation. Pain in RLQ radiates to R flank. Sharp constant waxes and wanes in intensity. No exacerbating or remitting factors. No fever chills cp sob n/v/d urinary complaints vaginal complaints back pain or any other complaints          Review of Systems   Constitutional:  Negative for chills and fever.   HENT:  Negative for ear pain and sore throat.    Eyes:  Negative for pain and visual disturbance.   Respiratory:  Negative for cough and shortness of breath.    Cardiovascular:  Negative for chest pain and palpitations.   Gastrointestinal:  Positive for abdominal pain. Negative for constipation, diarrhea, nausea and vomiting.   Genitourinary:  Positive for flank pain. Negative for dysuria, hematuria, vaginal bleeding and vaginal discharge.   Musculoskeletal:  Negative for arthralgias and back pain.   Skin:  Negative for color change and rash.   Neurological:  Negative for seizures and syncope.   All other systems reviewed and are negative.          Objective       ED Triage Vitals   Temperature Pulse Blood Pressure Respirations SpO2  Patient Position - Orthostatic VS   12/15/24 1341 12/15/24 1341 12/15/24 1342 12/15/24 1341 12/15/24 1341 12/15/24 2021   97.8 °F (36.6 °C) 87 170/94 20 98 % Lying      Temp src Heart Rate Source BP Location FiO2 (%) Pain Score    -- 12/15/24 1600 12/15/24 2021 -- 12/15/24 1341     Monitor Right arm  10 - Worst Possible Pain      Vitals      Date and Time Temp Pulse SpO2 Resp BP Pain Score FACES Pain Rating User   12/15/24 2021 -- 74 93 % 16 186/97 5 -- MD   12/15/24 1944 -- -- -- -- -- 5 -- CR   12/15/24 1630 -- 70 95 % 16 -- 2 -- AS   12/15/24 1600 -- 68 94 % 18 -- -- -- AS   12/15/24 1527 -- -- -- -- -- 10 - Worst Possible Pain -- MD   12/15/24 1511 -- -- -- -- -- 10 - Worst Possible Pain -- AS   12/15/24 1431 -- -- -- -- -- 10 - Worst Possible Pain -- MD   12/15/24 1342 -- -- -- -- 170/94 -- -- BG   12/15/24 1341 97.8 °F (36.6 °C) 87 98 % 20 -- 10 - Worst Possible Pain -- BG            Physical Exam  Vitals and nursing note reviewed.   Constitutional:       General: She is not in acute distress.     Appearance: She is well-developed. She is not ill-appearing.   HENT:      Head: Normocephalic and atraumatic.      Mouth/Throat:      Mouth: Mucous membranes are moist.   Eyes:      Extraocular Movements: Extraocular movements intact.      Conjunctiva/sclera: Conjunctivae normal.   Cardiovascular:      Rate and Rhythm: Normal rate and regular rhythm.      Heart sounds: No murmur heard.  Pulmonary:      Effort: Pulmonary effort is normal. No respiratory distress.      Breath sounds: Normal breath sounds.   Abdominal:      Palpations: Abdomen is soft.      Tenderness: There is generalized abdominal tenderness. There is no right CVA tenderness, left CVA tenderness, guarding or rebound.   Musculoskeletal:         General: No swelling. Normal range of motion.      Cervical back: Normal range of motion and neck supple.      Right lower leg: No edema.      Left lower leg: No edema.   Skin:     General: Skin is warm and dry.       Capillary Refill: Capillary refill takes less than 2 seconds.   Neurological:      General: No focal deficit present.      Mental Status: She is alert.         Results Reviewed       Procedure Component Value Units Date/Time    Urine Microscopic [[id number]]  (Normal) Collected: 12/15/24 2017    Lab Status: Final result Specimen: Urine, Clean Catch Updated: 12/15/24 2025     RBC, UA None Seen /hpf      WBC, UA None Seen /hpf      Epithelial Cells Occasional /hpf      Bacteria, UA None Seen /hpf     UA (URINE) with reflex to Scope [[id number]]  (Abnormal) Collected: 12/15/24 2017    Lab Status: Final result Specimen: Urine, Clean Catch Updated: 12/15/24 2024     Color, UA Light Yellow     Clarity, UA Clear     Specific Gravity, UA >=1.050     pH, UA 6.0     Leukocytes, UA Negative     Nitrite, UA Negative     Protein, UA 50 (1+) mg/dl      Glucose, UA Negative mg/dl      Ketones, UA Negative mg/dl      Urobilinogen, UA <2.0 mg/dl      Bilirubin, UA Negative     Occult Blood, UA Negative    Urine Macroscopic, POC [[id number]]  (Abnormal) Collected: 12/15/24 2015    Lab Status: Final result Specimen: Urine Updated: 12/15/24 2016     Color, UA Yellow     Clarity, UA Clear     pH, UA 5.5     Leukocytes, UA Negative     Nitrite, UA Negative     Protein, UA 30 (1+) mg/dl      Glucose, UA Negative mg/dl      Ketones, UA Negative mg/dl      Urobilinogen, UA 0.2 E.U./dl      Bilirubin, UA Negative     Occult Blood, UA Negative     Specific [place], UA 1.015    Comprehensive metabolic panel [[id number]] Collected: 12/15/24 1406    Lab Status: Final result Specimen: Blood from Arm, Left Updated: 12/15/24 1512     Sodium 143 mmol/L      Potassium 4.0 mmol/L      Chloride 104 mmol/L      CO2 29 mmol/L      ANION GAP 10 mmol/L      BUN 18 mg/dL      Creatinine 0.87 mg/dL      Glucose 118 mg/dL      Calcium 9.7 mg/dL      AST 15 U/L      ALT 17 U/L      Alkaline Phosphatase 46 U/L      Total Protein 7.2 g/dL      Albumin 4.1 g/dL       Total Bilirubin 0.34 mg/dL      eGFR 66 ml/min/1.73sq m     Narrative:      [employer] guidelines for Chronic Kidney Disease (CKD):     Stage 1 with normal or high GFR (GFR > 90 mL/min/1.73 square meters)    Stage 2 Mild CKD (GFR = 60-89 mL/min/1.73 square meters)    Stage 3A Moderate CKD (GFR = 45-59 mL/min/1.73 square meters)    Stage 3B Moderate CKD (GFR = 30-44 mL/min/1.73 square meters)    Stage 4 Severe CKD (GFR = 15-29 mL/min/1.73 square meters)    Stage 5 End Stage CKD (GFR <15 mL/min/1.73 square meters)  Note: GFR calculation is accurate only with a steady state creatinine    Lipase [[id number]]  (Normal) Collected: 12/15/24 1406    Lab Status: Final result Specimen: Blood from Arm, Left Updated: 12/15/24 1512     Lipase 50 u/L     RBC Morphology Reflex Test [[id number]] Collected: 12/15/24 1406    Lab Status: Final result Specimen: Blood from Arm, Left Updated: 12/15/24 1501    CBC and differential [[id number]]  (Abnormal) Collected: 12/15/24 1406    Lab Status: Final result Specimen: Blood from Arm, Left Updated: 12/15/24 1441     WBC 9.41 Thousand/uL      RBC 4.80 Million/uL      Hemoglobin 13.9 g/dL      Hematocrit 45.5 %      MCV 95 fL      MCH 29.0 pg      MCHC 30.5 g/dL      RDW 15.9 %      MPV 9.1 fL      Platelets 251 Thousands/uL     Narrative:      This is an appended report.  These results have been appended to a previously verified report.    Manual Differential(PHLEBS Do Not Order) [[id number]]  (Abnormal) Collected: 12/15/24 1406    Lab Status: Final result Specimen: Blood from Arm, Left Updated: 12/15/24 1441     Segmented % 68 %      Bands % 2 %      Lymphocytes % 16 %      Monocytes % 6 %      Eosinophils % 0 %      Basophils % 0 %      Metamyelocytes % 1 %      Atypical Lymphocytes % 7 %      Absolute Neutrophils 6.59 Thousand/uL      Absolute Lymphocytes 2.16 Thousand/uL      Absolute Monocytes 0.56 Thousand/uL      Absolute Eosinophils 0.00 Thousand/uL       Absolute Basophils 0.00 Thousand/uL      Absolute Metamyelocytes 0.09 Thousand/uL      Total Counted --     RBC Morphology Present     Platelet Estimate Adequate     Anisocytosis Present            CT abdomen pelvis with contrast   Final Interpretation by Luis M Dixon MD (12/15 1706)      No acute intra-abdominal pathology.      Stable diffuse biliary and main pancreatic ductal dilatation.      Chronic compression fracture deformity of the L1 vertebral body, new from 5/7/2024.         Workstation performed: [id number]             POC AAA US    Date/Time: 12/15/2024 3:43 PM    Performed by: uRi Umaña DO  Authorized by: Rui Umaña DO    Patient location:  ED  Performing Provider:  Resident  Other Assisting Provider: No    Procedure details:     Exam Type:  Diagnostic and educational    Indications: abdominal pain      Views Obtained:  Transverse proximal and sagittal (longitudinal) view    Image quality: non-diagnostic      Image availability:  Images available in PACS  Findings:     Abdominal Aorta Findings: limited visualization of suprarenal aorta and limited visualization of infrarenal aorta    Interpretation:     Aortic ultrasound impression: indeterminate    Comments:      Limited views 2/2 bowel gas and habitus      ED Medication and Procedure Management   Prior to Admission Medications   Prescriptions Last Dose Informant Patient Reported? Taking?   ALPRAZolam (XANAX) 0.5 mg tablet  Self Yes No   Sig: Take 0.5 mg by mouth 2 (two) times a day   DULoxetine (CYMBALTA) 60 mg delayed release capsule  Self No No   Sig: Take 1 capsule (60 mg total) by mouth daily   albuterol (2.5 mg/3 mL) 0.083 % nebulizer solution  Self No No   Sig: Take 3 mL (2.5 mg total) by nebulization 4 (four) times a day as needed for wheezing or shortness of breath   albuterol (PROVENTIL HFA,VENTOLIN HFA) 90 mcg/act inhaler  Self No No   Sig: Inhale 1 puff every 4 (four) hours as needed for wheezing   Patient not taking: Reported  on 11/12/2024   albuterol (Proventil HFA) 90 mcg/act inhaler  Self No No   Sig: Inhale 2 puffs every 4 (four) hours as needed for wheezing   amLODIPine (NORVASC) 5 mg tablet  Self No No   Sig: Take 1 tablet (5 mg total) by mouth daily   aspirin 81 mg chewable tablet  Self Yes No   Sig: Chew 81 mg daily   atorvastatin (LIPITOR) 40 mg tablet  Self Yes No   Sig: Take 40 mg by mouth every morning   b complex vitamins capsule  Self Yes No   Sig: Take 1 capsule by mouth once a week   buprenorphine-naloxone (Suboxone) 4-1 MG  Self Yes No   Sig: every morning Wafer   celecoxib (CeleBREX) 200 mg capsule  Self No No   Sig: Take 1 capsule (200 mg total) by mouth daily   clopidogrel (PLAVIX) 75 mg tablet  Self Yes No   Sig: Take 75 mg by mouth every other day   Patient not taking: Reported on 10/8/2024   cyclobenzaprine (FLEXERIL) 10 mg tablet   No No   Sig: Take 1 tablet (10 mg total) by mouth 3 (three) times a day as needed for muscle spasms   esomeprazole (NexIUM) 40 MG capsule  Self No No   Sig: Take 1 capsule (40 mg total) by mouth 2 (two) times a day before meals   fluticasone-umeclidinium-vilanterol (Trelegy Ellipta) 100-62.5-25 mcg/actuation inhaler  Self No No   Sig: Inhale 1 puff daily Rinse mouth after use.   Patient not taking: Reported on 11/29/2024   fluticasone-umeclidinium-vilanterol (Trelegy Ellipta) 200-62.5-25 mcg/actuation AEPB inhaler  Self No No   Sig: Inhale 1 puff daily Rinse mouth after use.   gabapentin (NEURONTIN) 300 mg capsule  Self No No   Sig: Take 1 capsule (300 mg total) by mouth 3 (three) times a day   Patient not taking: Reported on 10/8/2024   glimepiride (AMARYL) 2 mg tablet  Self Yes No   Patient not taking: Reported on 11/29/2024   ipratropium-albuterol (DUO-NEB) 0.5-2.5 mg/3 mL nebulizer solution  Self No No   Sig: Take 3 mL by nebulization 4 (four) times a day as needed for shortness of breath or wheezing   levothyroxine 100 mcg tablet  Self Yes No   Sig: Take 100 mcg by mouth every  morning   losartan (COZAAR) 25 mg tablet  Self Yes No   Sig: Take 25 mg by mouth every morning   metoclopramide (REGLAN) 10 mg tablet  Self Yes No   metoprolol succinate (TOPROL-XL) 50 mg 24 hr tablet  Self No No   Sig: Take 1 tablet (50 mg total) by mouth daily   Patient taking differently: Take 50 mg by mouth every morning   oxygen gas  Self Yes No   Sig: Inhale continuous as needed 2.5 L   predniSONE 10 mg tablet  Self No No   Sig: Take 40 mg PO daily x 3 days, then 30 mg daily x 3 days, then 20 mg daily x 3 days, then 10 mg daily x 3 days, then stop.   predniSONE 10 mg tablet  Self No No   Sig: Take 3 tablets daily for 14 days then 2 tablets daily for 14 days then 1 tablet daily for 14 days then 1/2 tablet daily for 14 days   Patient not taking: Reported on 11/4/2024   predniSONE 10 mg tablet   No No   Sig: Take 3 tablets daily for 2 weeks then 2 tablets daily for 2 weeks then 1 tablet daily for 2 weeks then stay on 1/2 tablet daily   sodium chloride 0.9 % nebulizer solution  Self No No   Sig: Take 3 mL by nebulization as needed for wheezing   Patient not taking: Reported on 11/29/2024   traZODone (DESYREL) 50 mg tablet  Self Yes No   Sig: Take 100 mg by mouth daily at bedtime   venlafaxine (EFFEXOR-XR) 150 mg 24 hr capsule  Self Yes No   Sig: Take 1 capsule by mouth in the morning   venlafaxine (EFFEXOR-XR) 75 mg 24 hr capsule  Self Yes No   Sig: Take 75 mg by mouth daily at bedtime      Facility-Administered Medications: None     Discharge Medication List as of 12/15/2024  8:21 PM        CONTINUE these medications which have NOT CHANGED    Details   albuterol (2.5 mg/3 mL) 0.083 % nebulizer solution Take 3 mL (2.5 mg total) by nebulization 4 (four) times a day as needed for wheezing or shortness of breath, Starting Wed 9/27/2023, Normal      !! albuterol (Proventil HFA) 90 mcg/act inhaler Inhale 2 puffs every 4 (four) hours as needed for wheezing, Starting Tue 10/8/2024, Normal      !! albuterol (PROVENTIL  HFA,VENTOLIN HFA) 90 mcg/act inhaler Inhale 1 puff every 4 (four) hours as needed for wheezing, Starting Mon 9/30/2024, Normal      ALPRAZolam (XANAX) 0.5 mg tablet Take 0.5 mg by mouth 2 (two) times a day, Historical Med      amLODIPine (NORVASC) 5 mg tablet Take 1 tablet (5 mg total) by mouth daily, Starting Fri 6/14/2024, Normal      aspirin 81 mg chewable tablet Chew 81 mg daily, Historical Med      atorvastatin (LIPITOR) 40 mg tablet Take 40 mg by mouth every morning, Historical Med      b complex vitamins capsule Take 1 capsule by mouth once a week, Historical Med      buprenorphine-naloxone (Suboxone) 4-1 MG every morning Wafer, Starting Fri 12/8/2023, Historical Med      celecoxib (CeleBREX) 200 mg capsule Take 1 capsule (200 mg total) by mouth daily, Starting Mon 5/6/2024, Normal      clopidogrel (PLAVIX) 75 mg tablet Take 75 mg by mouth every other day, Historical Med      cyclobenzaprine (FLEXERIL) 10 mg tablet Take 1 tablet (10 mg total) by mouth 3 (three) times a day as needed for muscle spasms, Starting Fri 11/29/2024, Normal      DULoxetine (CYMBALTA) 60 mg delayed release capsule Take 1 capsule (60 mg total) by mouth daily, Starting Mon 11/11/2024, Normal      esomeprazole (NexIUM) 40 MG capsule Take 1 capsule (40 mg total) by mouth 2 (two) times a day before meals, Starting Fri 6/14/2024, Normal      fluticasone-umeclidinium-vilanterol (Trelegy Ellipta) 100-62.5-25 mcg/actuation inhaler Inhale 1 puff daily Rinse mouth after use., Starting Tue 12/26/2023, Until Fri 12/20/2024, Normal      fluticasone-umeclidinium-vilanterol (Trelegy Ellipta) 200-62.5-25 mcg/actuation AEPB inhaler Inhale 1 puff daily Rinse mouth after use., Starting Tue 10/8/2024, Until Fri 10/3/2025, Normal      gabapentin (NEURONTIN) 300 mg capsule Take 1 capsule (300 mg total) by mouth 3 (three) times a day, Starting Mon 3/6/2023, Normal      glimepiride (AMARYL) 2 mg tablet Historical Med      ipratropium-albuterol (DUO-NEB)  0.5-2.5 mg/3 mL nebulizer solution Take 3 mL by nebulization 4 (four) times a day as needed for shortness of breath or wheezing, Starting Tue 10/8/2024, Normal      levothyroxine 100 mcg tablet Take 100 mcg by mouth every morning, Starting Mon 8/21/2023, Historical Med      losartan (COZAAR) 25 mg tablet Take 25 mg by mouth every morning, Starting Thu 5/11/2023, Historical Med      metoclopramide (REGLAN) 10 mg tablet Starting Mon 8/21/2023, Historical Med      metoprolol succinate (TOPROL-XL) 50 mg 24 hr tablet Take 1 tablet (50 mg total) by mouth daily, Starting Wed 11/29/2023, Until Tue 10/8/2024, Normal      oxygen gas Inhale continuous as needed 2.5 L, Historical Med      !! predniSONE 10 mg tablet Take 40 mg PO daily x 3 days, then 30 mg daily x 3 days, then 20 mg daily x 3 days, then 10 mg daily x 3 days, then stop., Normal      !! predniSONE 10 mg tablet Take 3 tablets daily for 14 days then 2 tablets daily for 14 days then 1 tablet daily for 14 days then 1/2 tablet daily for 14 days, Normal      !! predniSONE 10 mg tablet Take 3 tablets daily for 2 weeks then 2 tablets daily for 2 weeks then 1 tablet daily for 2 weeks then stay on 1/2 tablet daily, Normal      sodium chloride 0.9 % nebulizer solution Take 3 mL by nebulization as needed for wheezing, Starting Thu 12/28/2023, Normal      traZODone (DESYREL) 50 mg tablet Take 100 mg by mouth daily at bedtime, Historical Med      !! venlafaxine (EFFEXOR-XR) 150 mg 24 hr capsule Take 1 capsule by mouth in the morning, Starting Wed 11/6/2024, Historical Med      !! venlafaxine (EFFEXOR-XR) 75 mg 24 hr capsule Take 75 mg by mouth daily at bedtime, Starting Thu 12/7/2023, Historical Med       !! - Potential duplicate medications found. Please discuss with provider.          ED SEPSIS DOCUMENTATION   Time reflects when diagnosis was documented in both MDM as applicable and the Disposition within this note       Time User Action Codes Description Comment     12/15/2024  8:21 PM Rui Umaña Add [R10.9] Abdominal pain                  Rui Umaña, DO  12/17/24 0958

## 2024-12-18 ENCOUNTER — HOSPITAL ENCOUNTER (OUTPATIENT)
Facility: AMBULARY SURGERY CENTER | Age: 74
Setting detail: OUTPATIENT SURGERY
Discharge: HOME | End: 2024-12-18
Attending: STUDENT IN AN ORGANIZED HEALTH CARE EDUCATION/TRAINING PROGRAM | Admitting: STUDENT IN AN ORGANIZED HEALTH CARE EDUCATION/TRAINING PROGRAM
Payer: COMMERCIAL

## 2024-12-18 ENCOUNTER — HOSPITAL ENCOUNTER (INPATIENT)
Facility: HOSPITAL | Age: 74
LOS: 1 days | Discharge: HOME | DRG: 191 | End: 2024-12-20
Attending: EMERGENCY MEDICINE | Admitting: INTERNAL MEDICINE
Payer: COMMERCIAL

## 2024-12-18 ENCOUNTER — APPOINTMENT (EMERGENCY)
Dept: RADIOLOGY | Facility: HOSPITAL | Age: 74
DRG: 191 | End: 2024-12-18
Payer: COMMERCIAL

## 2024-12-18 ENCOUNTER — APPOINTMENT (EMERGENCY)
Dept: CT IMAGING | Facility: HOSPITAL | Age: 74
DRG: 191 | End: 2024-12-18
Payer: COMMERCIAL

## 2024-12-18 ENCOUNTER — APPOINTMENT (OUTPATIENT)
Dept: RADIOLOGY | Facility: HOSPITAL | Age: 74
End: 2024-12-18
Payer: COMMERCIAL

## 2024-12-18 VITALS
HEART RATE: 99 BPM | SYSTOLIC BLOOD PRESSURE: 148 MMHG | OXYGEN SATURATION: 94 % | TEMPERATURE: 97.8 F | RESPIRATION RATE: 18 BRPM | DIASTOLIC BLOOD PRESSURE: 97 MMHG

## 2024-12-18 DIAGNOSIS — J44.1 COPD EXACERBATION (HCC): Primary | ICD-10-CM

## 2024-12-18 DIAGNOSIS — R06.00 DYSPNEA: ICD-10-CM

## 2024-12-18 PROBLEM — M46.1 SACROILIITIS (HCC): Status: ACTIVE | Noted: 2024-12-18

## 2024-12-18 LAB
ALBUMIN SERPL BCG-MCNC: 3.9 G/DL (ref 3.5–5)
ALP SERPL-CCNC: 42 U/L (ref 34–104)
ALT SERPL W P-5'-P-CCNC: 28 U/L (ref 7–52)
ANION GAP SERPL CALCULATED.3IONS-SCNC: 8 MMOL/L (ref 4–13)
APTT PPP: 35 SECONDS (ref 23–34)
AST SERPL W P-5'-P-CCNC: 37 U/L (ref 13–39)
ATRIAL RATE: 102 BPM
ATRIAL RATE: 91 BPM
BASE EX.OXY STD BLDV CALC-SCNC: 94.9 % (ref 60–80)
BASE EXCESS BLDV CALC-SCNC: 0.7 MMOL/L
BASOPHILS # BLD AUTO: 0.02 THOUSANDS/ΜL (ref 0–0.1)
BASOPHILS NFR BLD AUTO: 0 % (ref 0–1)
BILIRUB SERPL-MCNC: 0.33 MG/DL (ref 0.2–1)
BNP SERPL-MCNC: 68 PG/ML (ref 0–100)
BUN SERPL-MCNC: 28 MG/DL (ref 5–25)
CALCIUM SERPL-MCNC: 9.5 MG/DL (ref 8.4–10.2)
CARDIAC TROPONIN I PNL SERPL HS: 12 NG/L (ref ?–50)
CHLORIDE SERPL-SCNC: 103 MMOL/L (ref 96–108)
CO2 SERPL-SCNC: 25 MMOL/L (ref 21–32)
CREAT SERPL-MCNC: 0.94 MG/DL (ref 0.6–1.3)
EOSINOPHIL # BLD AUTO: 0 THOUSAND/ΜL (ref 0–0.61)
EOSINOPHIL NFR BLD AUTO: 0 % (ref 0–6)
ERYTHROCYTE [DISTWIDTH] IN BLOOD BY AUTOMATED COUNT: 15.7 % (ref 11.6–15.1)
FLUAV RNA RESP QL NAA+PROBE: NEGATIVE
FLUBV RNA RESP QL NAA+PROBE: NEGATIVE
GFR SERPL CREATININE-BSD FRML MDRD: 60 ML/MIN/1.73SQ M
GLUCOSE SERPL-MCNC: 150 MG/DL (ref 65–140)
GLUCOSE SERPL-MCNC: 160 MG/DL (ref 65–140)
HCO3 BLDV-SCNC: 24.4 MMOL/L (ref 24–30)
HCT VFR BLD AUTO: 40.3 % (ref 34.8–46.1)
HGB BLD-MCNC: 12.7 G/DL (ref 11.5–15.4)
IMM GRANULOCYTES # BLD AUTO: 0.13 THOUSAND/UL (ref 0–0.2)
IMM GRANULOCYTES NFR BLD AUTO: 2 % (ref 0–2)
INR PPP: 0.91 (ref 0.85–1.19)
LIPASE SERPL-CCNC: 23 U/L (ref 11–82)
LYMPHOCYTES # BLD AUTO: 0.72 THOUSANDS/ΜL (ref 0.6–4.47)
LYMPHOCYTES NFR BLD AUTO: 9 % (ref 14–44)
MCH RBC QN AUTO: 29.7 PG (ref 26.8–34.3)
MCHC RBC AUTO-ENTMCNC: 31.5 G/DL (ref 31.4–37.4)
MCV RBC AUTO: 94 FL (ref 82–98)
MONOCYTES # BLD AUTO: 0.47 THOUSAND/ΜL (ref 0.17–1.22)
MONOCYTES NFR BLD AUTO: 6 % (ref 4–12)
NEUTROPHILS # BLD AUTO: 6.91 THOUSANDS/ΜL (ref 1.85–7.62)
NEUTS SEG NFR BLD AUTO: 83 % (ref 43–75)
NRBC BLD AUTO-RTO: 0 /100 WBCS
O2 CT BLDV-SCNC: 17.6 ML/DL
P AXIS: 63 DEGREES
P AXIS: 65 DEGREES
PCO2 BLDV: 36.2 MM HG (ref 42–50)
PH BLDV: 7.45 [PH] (ref 7.3–7.4)
PLATELET # BLD AUTO: 244 THOUSANDS/UL (ref 149–390)
PMV BLD AUTO: 9.7 FL (ref 8.9–12.7)
PO2 BLDV: 109.3 MM HG (ref 35–45)
POTASSIUM SERPL-SCNC: 4.7 MMOL/L (ref 3.5–5.3)
PR INTERVAL: 142 MS
PR INTERVAL: 150 MS
PROCALCITONIN SERPL-MCNC: 0.07 NG/ML
PROT SERPL-MCNC: 7 G/DL (ref 6.4–8.4)
PROTHROMBIN TIME: 13 SECONDS (ref 12.3–15)
QRS AXIS: 1 DEGREES
QRS AXIS: 17 DEGREES
QRSD INTERVAL: 86 MS
QRSD INTERVAL: 90 MS
QT INTERVAL: 346 MS
QT INTERVAL: 364 MS
QTC INTERVAL: 447 MS
QTC INTERVAL: 450 MS
RBC # BLD AUTO: 4.28 MILLION/UL (ref 3.81–5.12)
RSV RNA RESP QL NAA+PROBE: NEGATIVE
SARS-COV-2 RNA RESP QL NAA+PROBE: NEGATIVE
SODIUM SERPL-SCNC: 136 MMOL/L (ref 135–147)
T WAVE AXIS: 82 DEGREES
T WAVE AXIS: 82 DEGREES
VENTRICULAR RATE: 102 BPM
VENTRICULAR RATE: 91 BPM
WBC # BLD AUTO: 8.25 THOUSAND/UL (ref 4.31–10.16)

## 2024-12-18 PROCEDURE — 85730 THROMBOPLASTIN TIME PARTIAL: CPT | Performed by: PHYSICIAN ASSISTANT

## 2024-12-18 PROCEDURE — 82805 BLOOD GASES W/O2 SATURATION: CPT | Performed by: PHYSICIAN ASSISTANT

## 2024-12-18 PROCEDURE — 94640 AIRWAY INHALATION TREATMENT: CPT

## 2024-12-18 PROCEDURE — 87040 BLOOD CULTURE FOR BACTERIA: CPT | Performed by: PHYSICIAN ASSISTANT

## 2024-12-18 PROCEDURE — 84484 ASSAY OF TROPONIN QUANT: CPT | Performed by: PHYSICIAN ASSISTANT

## 2024-12-18 PROCEDURE — 99285 EMERGENCY DEPT VISIT HI MDM: CPT | Performed by: PHYSICIAN ASSISTANT

## 2024-12-18 PROCEDURE — 82948 REAGENT STRIP/BLOOD GLUCOSE: CPT

## 2024-12-18 PROCEDURE — 96361 HYDRATE IV INFUSION ADD-ON: CPT

## 2024-12-18 PROCEDURE — 36415 COLL VENOUS BLD VENIPUNCTURE: CPT | Performed by: PHYSICIAN ASSISTANT

## 2024-12-18 PROCEDURE — 81001 URINALYSIS AUTO W/SCOPE: CPT | Performed by: PHYSICIAN ASSISTANT

## 2024-12-18 PROCEDURE — 80053 COMPREHEN METABOLIC PANEL: CPT | Performed by: PHYSICIAN ASSISTANT

## 2024-12-18 PROCEDURE — 74177 CT ABD & PELVIS W/CONTRAST: CPT

## 2024-12-18 PROCEDURE — 71275 CT ANGIOGRAPHY CHEST: CPT

## 2024-12-18 PROCEDURE — 85025 COMPLETE CBC W/AUTO DIFF WBC: CPT | Performed by: PHYSICIAN ASSISTANT

## 2024-12-18 PROCEDURE — 93005 ELECTROCARDIOGRAM TRACING: CPT

## 2024-12-18 PROCEDURE — 27096 INJECT SACROILIAC JOINT: CPT | Performed by: STUDENT IN AN ORGANIZED HEALTH CARE EDUCATION/TRAINING PROGRAM

## 2024-12-18 PROCEDURE — 83880 ASSAY OF NATRIURETIC PEPTIDE: CPT | Performed by: PHYSICIAN ASSISTANT

## 2024-12-18 PROCEDURE — 99285 EMERGENCY DEPT VISIT HI MDM: CPT

## 2024-12-18 PROCEDURE — 85610 PROTHROMBIN TIME: CPT | Performed by: PHYSICIAN ASSISTANT

## 2024-12-18 PROCEDURE — 83690 ASSAY OF LIPASE: CPT | Performed by: PHYSICIAN ASSISTANT

## 2024-12-18 PROCEDURE — 84145 PROCALCITONIN (PCT): CPT | Performed by: PHYSICIAN ASSISTANT

## 2024-12-18 PROCEDURE — 0241U HB NFCT DS VIR RESP RNA 4 TRGT: CPT | Performed by: PHYSICIAN ASSISTANT

## 2024-12-18 PROCEDURE — 83036 HEMOGLOBIN GLYCOSYLATED A1C: CPT

## 2024-12-18 PROCEDURE — 96374 THER/PROPH/DIAG INJ IV PUSH: CPT

## 2024-12-18 RX ORDER — IPRATROPIUM BROMIDE AND ALBUTEROL SULFATE 2.5; .5 MG/3ML; MG/3ML
3 SOLUTION RESPIRATORY (INHALATION) ONCE
Status: COMPLETED | OUTPATIENT
Start: 2024-12-18 | End: 2024-12-18

## 2024-12-18 RX ORDER — METHYLPREDNISOLONE SODIUM SUCCINATE 125 MG/2ML
80 INJECTION, POWDER, LYOPHILIZED, FOR SOLUTION INTRAMUSCULAR; INTRAVENOUS ONCE
Status: COMPLETED | OUTPATIENT
Start: 2024-12-18 | End: 2024-12-18

## 2024-12-18 RX ORDER — METHYLPREDNISOLONE ACETATE 40 MG/ML
INJECTION, SUSPENSION INTRA-ARTICULAR; INTRALESIONAL; INTRAMUSCULAR; SOFT TISSUE AS NEEDED
Status: DISCONTINUED | OUTPATIENT
Start: 2024-12-18 | End: 2024-12-18 | Stop reason: HOSPADM

## 2024-12-18 RX ORDER — BUPIVACAINE HYDROCHLORIDE 5 MG/ML
INJECTION, SOLUTION EPIDURAL; INTRACAUDAL AS NEEDED
Status: DISCONTINUED | OUTPATIENT
Start: 2024-12-18 | End: 2024-12-18 | Stop reason: HOSPADM

## 2024-12-18 RX ORDER — GUAIFENESIN 600 MG/1
1200 TABLET, EXTENDED RELEASE ORAL ONCE
Status: COMPLETED | OUTPATIENT
Start: 2024-12-18 | End: 2024-12-18

## 2024-12-18 RX ORDER — LIDOCAINE HYDROCHLORIDE 10 MG/ML
INJECTION, SOLUTION EPIDURAL; INFILTRATION; INTRACAUDAL; PERINEURAL AS NEEDED
Status: DISCONTINUED | OUTPATIENT
Start: 2024-12-18 | End: 2024-12-18 | Stop reason: HOSPADM

## 2024-12-18 RX ADMIN — GUAIFENESIN 1200 MG: 600 TABLET ORAL at 23:32

## 2024-12-18 RX ADMIN — METHYLPREDNISOLONE SODIUM SUCCINATE 80 MG: 125 INJECTION, POWDER, FOR SOLUTION INTRAMUSCULAR; INTRAVENOUS at 22:41

## 2024-12-18 RX ADMIN — SODIUM CHLORIDE 500 ML: 0.9 INJECTION, SOLUTION INTRAVENOUS at 22:18

## 2024-12-18 RX ADMIN — IOHEXOL 100 ML: 350 INJECTION, SOLUTION INTRAVENOUS at 22:29

## 2024-12-18 RX ADMIN — IPRATROPIUM BROMIDE AND ALBUTEROL SULFATE 3 ML: .5; 3 SOLUTION RESPIRATORY (INHALATION) at 21:25

## 2024-12-18 RX ADMIN — IPRATROPIUM BROMIDE AND ALBUTEROL SULFATE 3 ML: .5; 3 SOLUTION RESPIRATORY (INHALATION) at 23:32

## 2024-12-18 RX ADMIN — SODIUM CHLORIDE 500 ML: 0.9 INJECTION, SOLUTION INTRAVENOUS at 23:32

## 2024-12-18 NOTE — INTERVAL H&P NOTE
H&P reviewed. After examining the patient I find no changes in the patients condition since the H&P had been written.    Vitals:    12/18/24 0938   BP: 125/97   Pulse: 84   Resp: 20   Temp: 97.8 °F (36.6 °C)   SpO2: 90%

## 2024-12-18 NOTE — OP NOTE
OPERATIVE REPORT  PATIENT NAME: Montserrat Sumner    :  1950  MRN: [id number]  Pt Location: [facility] MINOR/PAIN ROOM 01    SURGERY DATE: 2024    Surgeons and Role:     * Nj Maddox DO - Primary    Preop Diagnosis:  Sacroiliitis (HCC) [M46.1]    Post-Op Diagnosis Codes:     * Sacroiliitis (HCC) [M46.1]    Procedure(s):  Bilateral - BILATERAL SACROILIAC JOINT INJECTION    Specimen(s):  * No specimens in log *    Estimated Blood Loss:   Minimal    Drains:  * No LDAs found *    Anesthesia Type:   Local    Operative Indications:  Sacroiliitis (HCC) [M46.1]        PROCEDURE:  The patient gave informed written consent to proceed with this procedure following a detailed discussion of the risks and benefits associated with sacroiliac joint injection including but not limited to infection, bleeding, allergic reaction, further exacerbation of current symptoms, neurological injury, and lack of efficacy.    The site was marked prior to the start of the procedure. The patient was then placed in the prone position, the skin over the area was prepped with chlorhexadine, and the site was marked and draped with sterile towels.  Strict sterile technique was maintained throughout the procedure.  A time out was performed with full staff present to identify the patient, verify the procedure being performed, and review allergies.       Fluoroscopy was used to identify and align the corresponding sacroiliac joint.  The skin and subcutaneous tissue over this level was anesthetized by injection of 1% lidocaine. A 22 guage 3.5 inch spinal needle was inserted into the posterior inferior aspect of the joint under fluoroscopic guidance by coaxial technique. Contrast Dye was injected to confirm intra-articular spread without intravascular uptake. After negative aspiration, 40 mg Depo-Medrol and 3 ml 0.5% Bupivacaine were injected for a total of 4 ml on each side     The needle was then flushed and withdrawn.  The patient  tolerated the procedure well, there were no apparent complications, and the patient was discharged in stable condition.  Written and verbal discharge instructions were reviewed with the patient prior to discharge.    Nj Maddox DO, MS          SIGNATURE: Nj Maddox DO  DATE: December 18, 2024  TIME: 9:18 AM

## 2024-12-18 NOTE — DISCHARGE INSTRUCTIONS

## 2024-12-19 ENCOUNTER — APPOINTMENT (INPATIENT)
Dept: RADIOLOGY | Facility: HOSPITAL | Age: 74
DRG: 191 | End: 2024-12-19
Payer: COMMERCIAL

## 2024-12-19 PROBLEM — Z72.0 TOBACCO USE: Status: ACTIVE | Noted: 2024-12-19

## 2024-12-19 LAB
2HR DELTA HS TROPONIN: 0 NG/L
ANION GAP SERPL CALCULATED.3IONS-SCNC: 8 MMOL/L (ref 4–13)
ANISOCYTOSIS BLD QL SMEAR: PRESENT
BACTERIA UR QL AUTO: NORMAL /HPF
BASE EX.OXY STD BLDV CALC-SCNC: 94.6 % (ref 60–80)
BASE EXCESS BLDV CALC-SCNC: -0.8 MMOL/L
BASOPHILS # BLD MANUAL: 0 THOUSAND/UL (ref 0–0.1)
BASOPHILS NFR MAR MANUAL: 0 % (ref 0–1)
BILIRUB UR QL STRIP: NEGATIVE
BUN SERPL-MCNC: 23 MG/DL (ref 5–25)
CALCIUM SERPL-MCNC: 9 MG/DL (ref 8.4–10.2)
CARDIAC TROPONIN I PNL SERPL HS: 12 NG/L (ref ?–50)
CHLORIDE SERPL-SCNC: 105 MMOL/L (ref 96–108)
CLARITY UR: CLEAR
CO2 SERPL-SCNC: 24 MMOL/L (ref 21–32)
COLOR UR: ABNORMAL
CREAT SERPL-MCNC: 0.86 MG/DL (ref 0.6–1.3)
EOSINOPHIL # BLD MANUAL: 0 THOUSAND/UL (ref 0–0.4)
EOSINOPHIL NFR BLD MANUAL: 0 % (ref 0–6)
ERYTHROCYTE [DISTWIDTH] IN BLOOD BY AUTOMATED COUNT: 15.5 % (ref 11.6–15.1)
EST. AVERAGE GLUCOSE BLD GHB EST-MCNC: 166 MG/DL
GFR SERPL CREATININE-BSD FRML MDRD: 67 ML/MIN/1.73SQ M
GLUCOSE SERPL-MCNC: 110 MG/DL (ref 65–140)
GLUCOSE SERPL-MCNC: 165 MG/DL (ref 65–140)
GLUCOSE SERPL-MCNC: 169 MG/DL (ref 65–140)
GLUCOSE SERPL-MCNC: 185 MG/DL (ref 65–140)
GLUCOSE SERPL-MCNC: 191 MG/DL (ref 65–140)
GLUCOSE SERPL-MCNC: 197 MG/DL (ref 65–140)
GLUCOSE UR STRIP-MCNC: NEGATIVE MG/DL
HBA1C MFR BLD: 7.4 %
HCO3 BLDV-SCNC: 24.1 MMOL/L (ref 24–30)
HCT VFR BLD AUTO: 37.5 % (ref 34.8–46.1)
HGB BLD-MCNC: 11.6 G/DL (ref 11.5–15.4)
HGB UR QL STRIP.AUTO: NEGATIVE
KETONES UR STRIP-MCNC: NEGATIVE MG/DL
LEUKOCYTE ESTERASE UR QL STRIP: NEGATIVE
LYMPHOCYTES # BLD AUTO: 0.63 THOUSAND/UL (ref 0.6–4.47)
LYMPHOCYTES # BLD AUTO: 8 % (ref 14–44)
MCH RBC QN AUTO: 29 PG (ref 26.8–34.3)
MCHC RBC AUTO-ENTMCNC: 30.9 G/DL (ref 31.4–37.4)
MCV RBC AUTO: 94 FL (ref 82–98)
MONOCYTES # BLD AUTO: 0.31 THOUSAND/UL (ref 0–1.22)
MONOCYTES NFR BLD: 4 % (ref 4–12)
MYELOCYTE ABSOLUTE CT: 0.08 THOUSAND/UL (ref 0–0.1)
MYELOCYTES NFR BLD MANUAL: 1 % (ref 0–1)
NEUTROPHILS # BLD MANUAL: 6.84 THOUSAND/UL (ref 1.85–7.62)
NEUTS BAND NFR BLD MANUAL: 1 % (ref 0–8)
NEUTS SEG NFR BLD AUTO: 86 % (ref 43–75)
NITRITE UR QL STRIP: NEGATIVE
NON-SQ EPI CELLS URNS QL MICRO: NORMAL /HPF
O2 CT BLDV-SCNC: 15.8 ML/DL
PCO2 BLDV: 40.8 MM HG (ref 42–50)
PH BLDV: 7.39 [PH] (ref 7.3–7.4)
PH UR STRIP.AUTO: 5.5 [PH]
PLATELET # BLD AUTO: 207 THOUSANDS/UL (ref 149–390)
PLATELET BLD QL SMEAR: ADEQUATE
PMV BLD AUTO: 9.6 FL (ref 8.9–12.7)
PO2 BLDV: 104.1 MM HG (ref 35–45)
POTASSIUM SERPL-SCNC: 4.6 MMOL/L (ref 3.5–5.3)
PROT UR STRIP-MCNC: ABNORMAL MG/DL
RBC # BLD AUTO: 4 MILLION/UL (ref 3.81–5.12)
RBC #/AREA URNS AUTO: NORMAL /HPF
RBC MORPH BLD: PRESENT
SODIUM SERPL-SCNC: 137 MMOL/L (ref 135–147)
SP GR UR STRIP.AUTO: 1.03 (ref 1–1.03)
UROBILINOGEN UR STRIP-ACNC: <2 MG/DL
WBC # BLD AUTO: 7.86 THOUSAND/UL (ref 4.31–10.16)
WBC #/AREA URNS AUTO: NORMAL /HPF

## 2024-12-19 PROCEDURE — 82948 REAGENT STRIP/BLOOD GLUCOSE: CPT

## 2024-12-19 PROCEDURE — 85007 BL SMEAR W/DIFF WBC COUNT: CPT

## 2024-12-19 PROCEDURE — 82805 BLOOD GASES W/O2 SATURATION: CPT | Performed by: INTERNAL MEDICINE

## 2024-12-19 PROCEDURE — 73502 X-RAY EXAM HIP UNI 2-3 VIEWS: CPT

## 2024-12-19 PROCEDURE — 99223 1ST HOSP IP/OBS HIGH 75: CPT | Performed by: INTERNAL MEDICINE

## 2024-12-19 PROCEDURE — 80048 BASIC METABOLIC PNL TOTAL CA: CPT

## 2024-12-19 PROCEDURE — 99222 1ST HOSP IP/OBS MODERATE 55: CPT | Performed by: INTERNAL MEDICINE

## 2024-12-19 PROCEDURE — 85027 COMPLETE CBC AUTOMATED: CPT

## 2024-12-19 PROCEDURE — 36415 COLL VENOUS BLD VENIPUNCTURE: CPT

## 2024-12-19 RX ORDER — ATORVASTATIN CALCIUM 40 MG/1
40 TABLET, FILM COATED ORAL EVERY MORNING
Status: DISCONTINUED | OUTPATIENT
Start: 2024-12-19 | End: 2024-12-20 | Stop reason: HOSPADM

## 2024-12-19 RX ORDER — TRAZODONE HYDROCHLORIDE 100 MG/1
100 TABLET ORAL
Status: DISCONTINUED | OUTPATIENT
Start: 2024-12-19 | End: 2024-12-20 | Stop reason: HOSPADM

## 2024-12-19 RX ORDER — VENLAFAXINE HYDROCHLORIDE 37.5 MG/1
75 CAPSULE, EXTENDED RELEASE ORAL
Status: DISCONTINUED | OUTPATIENT
Start: 2024-12-19 | End: 2024-12-20 | Stop reason: HOSPADM

## 2024-12-19 RX ORDER — AMLODIPINE BESYLATE 5 MG/1
5 TABLET ORAL DAILY
Status: DISCONTINUED | OUTPATIENT
Start: 2024-12-19 | End: 2024-12-20 | Stop reason: HOSPADM

## 2024-12-19 RX ORDER — ENOXAPARIN SODIUM 100 MG/ML
40 INJECTION SUBCUTANEOUS 2 TIMES DAILY
Status: DISCONTINUED | OUTPATIENT
Start: 2024-12-19 | End: 2024-12-20 | Stop reason: HOSPADM

## 2024-12-19 RX ORDER — BENZONATATE 100 MG/1
100 CAPSULE ORAL 3 TIMES DAILY PRN
Status: DISCONTINUED | OUTPATIENT
Start: 2024-12-19 | End: 2024-12-20 | Stop reason: HOSPADM

## 2024-12-19 RX ORDER — AZITHROMYCIN 250 MG/1
500 TABLET, FILM COATED ORAL EVERY 24 HOURS
Status: DISCONTINUED | OUTPATIENT
Start: 2024-12-19 | End: 2024-12-20 | Stop reason: HOSPADM

## 2024-12-19 RX ORDER — ALPRAZOLAM 0.5 MG
0.5 TABLET ORAL 2 TIMES DAILY
Status: DISCONTINUED | OUTPATIENT
Start: 2024-12-19 | End: 2024-12-20 | Stop reason: HOSPADM

## 2024-12-19 RX ORDER — CELECOXIB 100 MG/1
200 CAPSULE ORAL DAILY
Status: DISCONTINUED | OUTPATIENT
Start: 2024-12-19 | End: 2024-12-20 | Stop reason: HOSPADM

## 2024-12-19 RX ORDER — PREDNISONE 20 MG/1
40 TABLET ORAL DAILY
Status: DISCONTINUED | OUTPATIENT
Start: 2024-12-20 | End: 2024-12-20 | Stop reason: HOSPADM

## 2024-12-19 RX ORDER — METOPROLOL SUCCINATE 50 MG/1
50 TABLET, EXTENDED RELEASE ORAL EVERY MORNING
Status: DISCONTINUED | OUTPATIENT
Start: 2024-12-19 | End: 2024-12-20 | Stop reason: HOSPADM

## 2024-12-19 RX ORDER — PANTOPRAZOLE SODIUM 40 MG/1
40 TABLET, DELAYED RELEASE ORAL
Status: DISCONTINUED | OUTPATIENT
Start: 2024-12-19 | End: 2024-12-20 | Stop reason: HOSPADM

## 2024-12-19 RX ORDER — LOSARTAN POTASSIUM 25 MG/1
25 TABLET ORAL EVERY MORNING
Status: DISCONTINUED | OUTPATIENT
Start: 2024-12-19 | End: 2024-12-20 | Stop reason: HOSPADM

## 2024-12-19 RX ORDER — BUPRENORPHINE AND NALOXONE 2; .5 MG/1; MG/1
4 FILM, SOLUBLE BUCCAL; SUBLINGUAL EVERY MORNING
Status: DISCONTINUED | OUTPATIENT
Start: 2024-12-19 | End: 2024-12-20 | Stop reason: HOSPADM

## 2024-12-19 RX ORDER — VENLAFAXINE HYDROCHLORIDE 150 MG/1
150 CAPSULE, EXTENDED RELEASE ORAL DAILY
Status: DISCONTINUED | OUTPATIENT
Start: 2024-12-19 | End: 2024-12-20 | Stop reason: HOSPADM

## 2024-12-19 RX ORDER — DULOXETIN HYDROCHLORIDE 60 MG/1
60 CAPSULE, DELAYED RELEASE ORAL DAILY
Status: DISCONTINUED | OUTPATIENT
Start: 2024-12-19 | End: 2024-12-20 | Stop reason: HOSPADM

## 2024-12-19 RX ORDER — INSULIN LISPRO 100 [IU]/ML
1-6 INJECTION, SOLUTION INTRAVENOUS; SUBCUTANEOUS
Status: DISCONTINUED | OUTPATIENT
Start: 2024-12-19 | End: 2024-12-20 | Stop reason: HOSPADM

## 2024-12-19 RX ORDER — CYCLOBENZAPRINE HCL 10 MG
10 TABLET ORAL 3 TIMES DAILY PRN
Status: DISCONTINUED | OUTPATIENT
Start: 2024-12-19 | End: 2024-12-20 | Stop reason: HOSPADM

## 2024-12-19 RX ORDER — SODIUM CHLORIDE FOR INHALATION 3 %
4 VIAL, NEBULIZER (ML) INHALATION
Status: DISCONTINUED | OUTPATIENT
Start: 2024-12-19 | End: 2024-12-20 | Stop reason: HOSPADM

## 2024-12-19 RX ORDER — ALBUTEROL SULFATE 0.83 MG/ML
2.5 SOLUTION RESPIRATORY (INHALATION) 4 TIMES DAILY PRN
Status: DISCONTINUED | OUTPATIENT
Start: 2024-12-19 | End: 2024-12-20 | Stop reason: HOSPADM

## 2024-12-19 RX ORDER — NICOTINE 21 MG/24HR
1 PATCH, TRANSDERMAL 24 HOURS TRANSDERMAL DAILY
Status: DISCONTINUED | OUTPATIENT
Start: 2024-12-19 | End: 2024-12-20 | Stop reason: HOSPADM

## 2024-12-19 RX ORDER — METHYLPREDNISOLONE SODIUM SUCCINATE 40 MG/ML
40 INJECTION, POWDER, LYOPHILIZED, FOR SOLUTION INTRAMUSCULAR; INTRAVENOUS EVERY 12 HOURS SCHEDULED
Status: COMPLETED | OUTPATIENT
Start: 2024-12-19 | End: 2024-12-19

## 2024-12-19 RX ORDER — LEVOTHYROXINE SODIUM 100 UG/1
100 TABLET ORAL
Status: DISCONTINUED | OUTPATIENT
Start: 2024-12-19 | End: 2024-12-20 | Stop reason: HOSPADM

## 2024-12-19 RX ORDER — VITAMIN B COMPLEX
1 CAPSULE ORAL WEEKLY
Status: DISCONTINUED | OUTPATIENT
Start: 2024-12-19 | End: 2024-12-19 | Stop reason: RX

## 2024-12-19 RX ORDER — LEVALBUTEROL INHALATION SOLUTION 1.25 MG/3ML
1.25 SOLUTION RESPIRATORY (INHALATION)
Status: DISCONTINUED | OUTPATIENT
Start: 2024-12-19 | End: 2024-12-20

## 2024-12-19 RX ORDER — ACETAMINOPHEN 325 MG/1
975 TABLET ORAL EVERY 8 HOURS PRN
Status: DISCONTINUED | OUTPATIENT
Start: 2024-12-19 | End: 2024-12-20 | Stop reason: HOSPADM

## 2024-12-19 RX ORDER — METHYLPREDNISOLONE SODIUM SUCCINATE 40 MG/ML
40 INJECTION, POWDER, LYOPHILIZED, FOR SOLUTION INTRAMUSCULAR; INTRAVENOUS EVERY 12 HOURS SCHEDULED
Status: DISCONTINUED | OUTPATIENT
Start: 2024-12-19 | End: 2024-12-19

## 2024-12-19 RX ADMIN — LOSARTAN POTASSIUM 25 MG: 25 TABLET, FILM COATED ORAL at 08:51

## 2024-12-19 RX ADMIN — ATORVASTATIN CALCIUM 40 MG: 40 TABLET, FILM COATED ORAL at 08:51

## 2024-12-19 RX ADMIN — VENLAFAXINE HYDROCHLORIDE 75 MG: 37.5 CAPSULE, EXTENDED RELEASE ORAL at 22:26

## 2024-12-19 RX ADMIN — INSULIN LISPRO 2 UNITS: 100 INJECTION, SOLUTION INTRAVENOUS; SUBCUTANEOUS at 13:27

## 2024-12-19 RX ADMIN — LEVOTHYROXINE SODIUM 100 MCG: 100 TABLET ORAL at 05:19

## 2024-12-19 RX ADMIN — DULOXETINE HYDROCHLORIDE 60 MG: 60 CAPSULE, DELAYED RELEASE ORAL at 08:50

## 2024-12-19 RX ADMIN — METOPROLOL SUCCINATE 50 MG: 50 TABLET, EXTENDED RELEASE ORAL at 08:51

## 2024-12-19 RX ADMIN — ENOXAPARIN SODIUM 40 MG: 40 INJECTION SUBCUTANEOUS at 19:26

## 2024-12-19 RX ADMIN — TRAZODONE HYDROCHLORIDE 100 MG: 100 TABLET ORAL at 02:13

## 2024-12-19 RX ADMIN — INSULIN LISPRO 1 UNITS: 100 INJECTION, SOLUTION INTRAVENOUS; SUBCUTANEOUS at 02:34

## 2024-12-19 RX ADMIN — INSULIN LISPRO 1 UNITS: 100 INJECTION, SOLUTION INTRAVENOUS; SUBCUTANEOUS at 09:59

## 2024-12-19 RX ADMIN — ALPRAZOLAM 0.5 MG: 0.5 TABLET ORAL at 22:26

## 2024-12-19 RX ADMIN — AZITHROMYCIN DIHYDRATE 500 MG: 250 TABLET, FILM COATED ORAL at 02:13

## 2024-12-19 RX ADMIN — IPRATROPIUM BROMIDE 0.5 MG: 0.5 SOLUTION RESPIRATORY (INHALATION) at 08:49

## 2024-12-19 RX ADMIN — SODIUM CHLORIDE SOLN NEBU 3% 4 ML: 3 NEBU SOLN at 02:17

## 2024-12-19 RX ADMIN — ALPRAZOLAM 0.5 MG: 0.5 TABLET ORAL at 02:13

## 2024-12-19 RX ADMIN — ENOXAPARIN SODIUM 40 MG: 40 INJECTION SUBCUTANEOUS at 08:51

## 2024-12-19 RX ADMIN — PANTOPRAZOLE SODIUM 40 MG: 40 TABLET, DELAYED RELEASE ORAL at 05:18

## 2024-12-19 RX ADMIN — CELECOXIB 200 MG: 100 CAPSULE ORAL at 08:50

## 2024-12-19 RX ADMIN — LEVALBUTEROL HYDROCHLORIDE 1.25 MG: 1.25 SOLUTION RESPIRATORY (INHALATION) at 08:49

## 2024-12-19 RX ADMIN — VENLAFAXINE HYDROCHLORIDE 75 MG: 37.5 CAPSULE, EXTENDED RELEASE ORAL at 02:34

## 2024-12-19 RX ADMIN — IPRATROPIUM BROMIDE 0.5 MG: 0.5 SOLUTION RESPIRATORY (INHALATION) at 13:28

## 2024-12-19 RX ADMIN — B-COMPLEX W/ C & FOLIC ACID TAB 1 TABLET: TAB at 08:50

## 2024-12-19 RX ADMIN — TRAZODONE HYDROCHLORIDE 100 MG: 100 TABLET ORAL at 22:26

## 2024-12-19 RX ADMIN — SODIUM CHLORIDE SOLN NEBU 3% 4 ML: 3 NEBU SOLN at 08:49

## 2024-12-19 RX ADMIN — METHYLPREDNISOLONE SODIUM SUCCINATE 40 MG: 40 INJECTION, POWDER, FOR SOLUTION INTRAMUSCULAR; INTRAVENOUS at 08:51

## 2024-12-19 RX ADMIN — AMLODIPINE BESYLATE 5 MG: 5 TABLET ORAL at 08:51

## 2024-12-19 RX ADMIN — Medication 2.5 MG: at 09:58

## 2024-12-19 RX ADMIN — SODIUM CHLORIDE SOLN NEBU 3% 4 ML: 3 NEBU SOLN at 13:28

## 2024-12-19 RX ADMIN — LEVALBUTEROL HYDROCHLORIDE 1.25 MG: 1.25 SOLUTION RESPIRATORY (INHALATION) at 13:28

## 2024-12-19 RX ADMIN — METHYLPREDNISOLONE SODIUM SUCCINATE 40 MG: 40 INJECTION, POWDER, FOR SOLUTION INTRAMUSCULAR; INTRAVENOUS at 22:25

## 2024-12-19 NOTE — H&P
H&P - Hospitalist   Name: Montserrat Sumner 74 y.o. female I MRN: [id number]  Unit/Bed#: ED-41 I Date of Admission: 12/18/2024   Date of Service: 12/19/2024 I Hospital Day: 0     Assessment & Plan  COPD exacerbation (HCC)  Patient presents with increased shortness of breath, productive cough, and increasing O2 requirements currently requiring 3 L nasal cannula. Usually wears 2L just at bedtime having to use now during the day.Has not improved despite inhaler use.  In the ED patient was given DuoNeb and steroids with improvement in symptoms  Procalcitonin, flu, COVID, RSV negative  CT PE/AP: Without acute cardiopulmonary/intra-abdominal process  Patient follows with pulmonary outpatient setting her home regimen is 200 mcg Trelegy and as needed albuterol nebs 4 times daily  Will admit for COPD exacerbation  Plan  Continue Xopenex/Atrovent/ saline nebs 3 times daily  IV Solu-Medrol 40 mg twice daily  Azithromycin x 3 days  Pulm consult  Wean O2 as tolerated  Encouraged smoking cessation  Hypertension  Slightly hypertensive in the ED  Continue PTA amlodipine, metoprolol, and losartan  Hypothyroid  Continue 100 mcg levothyroxine daily  CAD (coronary artery disease)  Known history of CAD s/p CABG x 1  Continue ASA and Lipitor daily  Type 2 diabetes mellitus without complication, without long-term current use of insulin (HCC)  Lab Results   Component Value Date    HGBA1C 6.6 (H) 07/15/2023       Recent Labs     12/18/24 2009   POCGLU 160*       Blood Sugar Average: Last 72 hrs:  (P) 160  PTA regimen glyburide 2 mg daily  Continue 4 times daily Accu-Cheks and sliding scale insulin with meals  Monitor blood sugars closely while on steroids  Update hemoglobin A1c  Opioid dependence (HCC)  On Suboxone 4 mg sublingual daily  PDMP reviewed last prescription filled 12/5/2024  Continue while inpatient  Tobacco use  Reports smoking 3 cigarettes daily for many years  Patient encouraged to quit smoking given her underlying  "COPD  NRT offered      VTE Pharmacologic Prophylaxis:   Moderate Risk (Score 3-4) - Pharmacological DVT Prophylaxis Ordered: enoxaparin (Lovenox).  Code Status: Level 3 - DNAR and DNI   Discussion with family: Patient declined call to .     Anticipated Length of Stay: Patient will be admitted on an observation basis with an anticipated length of stay of less than 2 midnights secondary to COPD exacerbation.    History of Present Illness   Chief Complaint: Shortness of breath    Montserrat Sumner is a 74 y.o. female with a PMH of osteoarthritis, opioid dependence, obesity, GERD, dysphagia, central lobar emphysema, aortic aneurysm, type 2 diabetes, hypertension, hypothyroid, CAD who presents with COPD exacerbation    Patient reports increased shortness of breath at home despite using as needed inhalers and maintenance inhalers.  She typically wears 2.5 L at bedtime now requiring it more frequently throughout the day.  Does note she has a productive cough having difficulty expelling secretions.  She denies any fevers, lightheadedness, dizziness, nausea, vomiting, abdominal, or chest pain.  She continues to smoke 3 cigarettes daily when asked how long she just states many years.  Denies any recent sick contacts.  Reports improvement after steroid and neb treatments however does still feel fatigued.  .    Review of Systems   Respiratory:  Positive for cough, shortness of breath and wheezing.        Historical Information   Past Medical History:   Diagnosis Date    Anxiety     Chronic pain 03/06/2023    lower abdomen and back    Colon polyp     COPD (chronic obstructive pulmonary disease) (HCC)     \"passes out\" with O2 levels dropping    Disease of thyroid gland     GERD (gastroesophageal reflux disease)     History of transfusion     with aneurysm repair-autologous-self and daughter    Hyperlipidemia     Hypertension     Teeth missing     Wears glasses     For reading     Past Surgical History:   Procedure " Laterality Date    BACK SURGERY      x2 herniated, crushed disc in the lumbar, ablation    CHOLECYSTECTOMY      COLONOSCOPY      FRACTURE SURGERY Left     hip-pinned    HYSTERECTOMY      salpingo-oophorectomy    PA INJECT SI JOINT ARTHRGRPHY&/ANES/STEROID W/PATRICIA Bilateral 12/18/2024    Procedure: BILATERAL SACROILIAC JOINT INJECTION;  Surgeon: Nj Maddox DO;  Location: [facility] MAIN OR;  Service: Pain Management     THORACIC AORTIC ANEURYSM REPAIR  09/2016    ascending thoracic aortic repair with RCA bypass with SVG x 1    TONSILLECTOMY      UPPER GASTROINTESTINAL ENDOSCOPY       Social History     Tobacco Use    Smoking status: Every Day     Current packs/day: 0.25     Average packs/day: 0.3 packs/day for 57.0 years (14.2 ttl pk-yrs)     Types: Cigarettes     Start date: 1968     Passive exposure: Past    Smokeless tobacco: Never    Tobacco comments:     Currently smoking 5-6 cigarettes daily   Vaping Use    Vaping status: Never Used   Substance and Sexual Activity    Alcohol use: Yes     Comment: occasionally    Drug use: Yes     Types: Marijuana, Cocaine     Comment: yes still Marijuana as of 9/5/24-2 times weekly    Sexual activity: Not Currently     Partners: Male     Birth control/protection: Post-menopausal     E-Cigarette/Vaping    E-Cigarette Use Never User      E-Cigarette/Vaping Substances    Nicotine No     THC No     CBD No     Flavoring No     Other No     Unknown No      Family History   Problem Relation Age of Onset    Breast cancer Mother      Social History:  Marital Status: /Civil Union   Occupation: N/AA  Patient Pre-hospital Living Situation: Home  Patient Pre-hospital Level of Mobility: walks  Patient Pre-hospital Diet Restrictions: None    Meds/Allergies   I have reviewed home medications with patient personally.  Prior to Admission medications    Medication Sig Start Date End Date Taking? Authorizing Provider   albuterol (2.5 mg/3 mL) 0.083 % nebulizer solution Take 3 mL (2.5 mg  total) by nebulization 4 (four) times a day as needed for wheezing or shortness of breath 9/27/23   Maik Smith, DO   albuterol (Proventil HFA) 90 mcg/act inhaler Inhale 2 puffs every 4 (four) hours as needed for wheezing 10/8/24   Maik Smith, DO   albuterol (PROVENTIL HFA,VENTOLIN HFA) 90 mcg/act inhaler Inhale 1 puff every 4 (four) hours as needed for wheezing  Patient not taking: Reported on 11/12/2024 9/30/24   JAZMINE Enamorado   ALPRAZolam (XANAX) 0.5 mg tablet Take 0.5 mg by mouth 2 (two) times a day    Historical Provider, MD   amLODIPine (NORVASC) 5 mg tablet Take 1 tablet (5 mg total) by mouth daily 6/14/24   Julian Tobias DO   aspirin 81 mg chewable tablet Chew 81 mg daily    Historical Provider, MD   atorvastatin (LIPITOR) 40 mg tablet Take 40 mg by mouth every morning    Historical Provider, MD   b complex vitamins capsule Take 1 capsule by mouth once a week    Historical Provider, MD   buprenorphine-naloxone (Suboxone) 4-1 MG every morning Wafer 12/8/23   Historical Provider, MD   celecoxib (CeleBREX) 200 mg capsule Take 1 capsule (200 mg total) by mouth daily 5/6/24   Julian Tobias DO   clopidogrel (PLAVIX) 75 mg tablet Take 75 mg by mouth every other day  Patient not taking: Reported on 10/8/2024    Historical Provider, MD   cyclobenzaprine (FLEXERIL) 10 mg tablet Take 1 tablet (10 mg total) by mouth 3 (three) times a day as needed for muscle spasms 11/29/24   Nj Maddox,    DULoxetine (CYMBALTA) 60 mg delayed release capsule Take 1 capsule (60 mg total) by mouth daily 11/11/24   Julian Tobias DO   esomeprazole (NexIUM) 40 MG capsule Take 1 capsule (40 mg total) by mouth 2 (two) times a day before meals 6/14/24   Mariama Sy PA-C   fluticasone-umeclidinium-vilanterol (Trelegy Ellipta) 100-62.5-25 mcg/actuation inhaler Inhale 1 puff daily Rinse mouth after use.  Patient not taking: Reported on 11/29/2024 12/26/23 12/20/24  Carlos Joseph MD    fluticasone-umeclidinium-vilanterol (Trelegy Ellipta) 200-62.5-25 mcg/actuation AEPB inhaler Inhale 1 puff daily Rinse mouth after use. 10/8/24 10/3/25  Maik Smith DO   gabapentin (NEURONTIN) 300 mg capsule Take 1 capsule (300 mg total) by mouth 3 (three) times a day  Patient not taking: Reported on 10/8/2024 3/6/23   Shelbie Juarez MD   glimepiride (AMARYL) 2 mg tablet  9/9/24   Historical Provider, MD   ipratropium-albuterol (DUO-NEB) 0.5-2.5 mg/3 mL nebulizer solution Take 3 mL by nebulization 4 (four) times a day as needed for shortness of breath or wheezing 10/8/24   Maik Smith DO   levothyroxine 100 mcg tablet Take 100 mcg by mouth every morning 8/21/23   Historical Provider, MD   losartan (COZAAR) 25 mg tablet Take 25 mg by mouth every morning 5/11/23   Historical Provider, MD   metoclopramide (REGLAN) 10 mg tablet  8/21/23   Historical Provider, MD   metoprolol succinate (TOPROL-XL) 50 mg 24 hr tablet Take 1 tablet (50 mg total) by mouth daily  Patient taking differently: Take 50 mg by mouth every morning 11/29/23 12/18/24  Mushtaq Rodriguez MD   oxygen gas Inhale continuous as needed 2.5 L    Historical Provider, MD   predniSONE 10 mg tablet Take 40 mg PO daily x 3 days, then 30 mg daily x 3 days, then 20 mg daily x 3 days, then 10 mg daily x 3 days, then stop. 9/30/24   JAZMINE Enamorado   predniSONE 10 mg tablet Take 3 tablets daily for 14 days then 2 tablets daily for 14 days then 1 tablet daily for 14 days then 1/2 tablet daily for 14 days  Patient not taking: Reported on 11/4/2024 10/8/24   Maik Smith DO   predniSONE 10 mg tablet Take 3 tablets daily for 2 weeks then 2 tablets daily for 2 weeks then 1 tablet daily for 2 weeks then stay on 1/2 tablet daily 11/12/24   Maik Smith DO   sodium chloride 0.9 % nebulizer solution Take 3 mL by nebulization as needed for wheezing  Patient not taking: Reported on 11/29/2024 12/28/23   Yanick Hassan PA-C   traZOJeye  (DESYREL) 50 mg tablet Take 100 mg by mouth daily at bedtime    Historical Provider, MD   venlafaxine (EFFEXOR-XR) 150 mg 24 hr capsule Take 1 capsule by mouth in the morning 11/6/24   Historical Provider, MD   venlafaxine (EFFEXOR-XR) 75 mg 24 hr capsule Take 75 mg by mouth daily at bedtime 12/7/23   Historical Provider, MD     No Known Allergies    Objective :  Temp:  [97.8 °F (36.6 °C)-98.3 °F (36.8 °C)] 98.3 °F (36.8 °C)  HR:  [] 90  BP: (125-176)/(89-97) 163/89  Resp:  [18-20] 20  SpO2:  [90 %-95 %] 93 %  O2 Device: Nasal cannula  Nasal Cannula O2 Flow Rate (L/min):  [2 L/min] 2 L/min    Physical Exam  Vitals and nursing note reviewed.   Constitutional:       General: She is not in acute distress.     Appearance: She is well-developed. She is obese. She is not ill-appearing.      Comments: Fatige; Opens eyes when stimulated, oriented, and following commands. (Feels tired)   HENT:      Head: Normocephalic and atraumatic.   Eyes:      Conjunctiva/sclera: Conjunctivae normal.   Cardiovascular:      Rate and Rhythm: Normal rate and regular rhythm.      Heart sounds: No murmur heard.  Pulmonary:      Effort: Pulmonary effort is normal. No tachypnea, accessory muscle usage, prolonged expiration, respiratory distress or retractions.      Breath sounds: Decreased air movement present. Rhonchi present.      Comments: No conversational dyspnea on exam. Patient with productive cough difficulty expelling  secretions  Abdominal:      Palpations: Abdomen is soft.      Tenderness: There is no abdominal tenderness.   Musculoskeletal:         General: No swelling.      Cervical back: Neck supple.   Skin:     General: Skin is warm and dry.      Capillary Refill: Capillary refill takes less than 2 seconds.   Neurological:      Mental Status: She is alert.   Psychiatric:         Mood and Affect: Mood normal.          Lines/Drains:            Lab Results: I have reviewed the following results:  Results from last 7 days   Lab  Units 12/18/24  2113 12/15/24  1406   WBC Thousand/uL 8.25 9.41   HEMOGLOBIN g/dL 12.7 13.9   HEMATOCRIT % 40.3 45.5   PLATELETS Thousands/uL 244 251   BANDS PCT %  --  2   SEGS PCT % 83*  --    LYMPHO PCT % 9* 16   MONO PCT % 6 6   EOS PCT % 0 0     Results from last 7 days   Lab Units 12/18/24 2113   SODIUM mmol/L 136   POTASSIUM mmol/L 4.7   CHLORIDE mmol/L 103   CO2 mmol/L 25   BUN mg/dL 28*   CREATININE mg/dL 0.94   ANION GAP mmol/L 8   CALCIUM mg/dL 9.5   ALBUMIN g/dL 3.9   TOTAL BILIRUBIN mg/dL 0.33   ALK PHOS U/L 42   ALT U/L 28   AST U/L 37   GLUCOSE RANDOM mg/dL 150*     Results from last 7 days   Lab Units 12/18/24 2113   INR  0.91     Results from last 7 days   Lab Units 12/18/24 2009   POC GLUCOSE mg/dl 160*     Lab Results   Component Value Date    HGBA1C 6.6 (H) 07/15/2023     Results from last 7 days   Lab Units 12/18/24 2113   PROCALCITONIN ng/ml 0.07       Imaging Results Review: I reviewed radiology reports from this admission including: CT chest and CT abdomen/pelvis.  Other Study Results Review: EKG was reviewed.     Administrative Statements   I have spent a total time of 55 minutes in caring for this patient on the day of the visit/encounter including Diagnostic results, Prognosis, Risks and benefits of tx options, Instructions for management, Patient and family education, Importance of tx compliance, Risk factor reductions, Impressions, Counseling / Coordination of care, Documenting in the medical record, Reviewing / ordering tests, medicine, procedures  , Obtaining or reviewing history  , and Communicating with other healthcare professionals .    ** Please Note: This note has been constructed using a voice recognition system. **

## 2024-12-19 NOTE — ASSESSMENT & PLAN NOTE
Lab Results   Component Value Date    HGBA1C 7.4 (H) 12/18/2024       Recent Labs     12/18/24 2009 12/19/24 0227 12/19/24  0955   POCGLU 160* 169* 185*       Blood Sugar Average: Last 72 hrs:  (P) [id number]  Defer management to primary team

## 2024-12-19 NOTE — ASSESSMENT & PLAN NOTE
Patient presents with increased shortness of breath, productive cough, and increasing O2 requirements currently requiring 3 L nasal cannula. Usually wears 2L just at bedtime having to use now during the day.Has not improved despite inhaler use.  In the ED patient was given DuoNeb and steroids with improvement in symptoms  Procalcitonin, flu, COVID, RSV negative  CT PE/AP: Without acute cardiopulmonary/intra-abdominal process  Patient follows with pulmonary outpatient setting her home regimen is 200 mcg Trelegy and as needed albuterol nebs 4 times daily  Will admit for COPD exacerbation  Plan  Continue Xopenex/Atrovent/ saline nebs 3 times daily  IV Solu-Medrol 40 mg twice daily  Azithromycin x 3 days  Pulm consult  Wean O2 as tolerated  Encouraged smoking cessation

## 2024-12-19 NOTE — ED PROVIDER NOTES
"Time reflects when diagnosis was documented in both MDM as applicable and the Disposition within this note       Time User Action Codes Description Comment    12/18/2024 11:53 PM David Barrow Add [J44.1] COPD exacerbation (HCC)     12/19/2024 12:43 AM David Barrow [R06.00] Dyspnea           ED Disposition       ED Disposition   Admit    Condition   Stable    Date/Time   u Dec 19, 2024 12:42 AM    Comment   Case was discussed with slim and the patient's admission status was agreed to be Admission Status: observation status to the service of Dr. Hardy, internal medicine.               Assessment & Plan       Medical Decision Making  Patient is a 74-year-old female with a history of COPD, hyperlipidemia, hypertension, surgical history of hysterectomy and transthoracic aortic root aneurysm repair that presents to the emerged department with persistent worsening right upper quadrant and right lower quadrant abdominal pain symptoms for approximately 1 week.    Patient hemodynamically stable and afebrile  No sirs  Patient with rhonchi noted all lung fields  Negative COVID/flu/RSV  Blood cultures x 2, procalcitonin 0.07 negative, no leukocytosis  Troponin 12/12 ECGs x initial sinus tachycardia secondary ECG normal sinus rhythm, no hemic changes, no evolving ischemic changes  BNP negative, lower extremities with no pitting edema  CT PE study with abdomen pelvis with contrast impression\" no evidence of acute pulmonary embolus, thoracic aortic aneurysm or dissection.  No acute cardiopulmonary process.  2.  No acute intra-abdominal or pelvic process.\"    Delivered Solu-Medrol, DuoNeb x 2, guaifenesin in the emergency department; patient demonstrates decrease in presenting cough and SOB ED symptomatology status post medication delivery  Ddx likely and not limited to pneumonia, PE, COPD exacerbation, pneumothorax, pleural effusion  Will treat for COPD exacerbation; patient currently on 2 L nasal cannulated oxygen with " SpO2 saturation 92 to 93%; patient does report using her supplemental oxygen only at night, but needed to use it more frequently for the last couple days.  Discussed patient case with internal medicine team and both agreed to place patient in inpatient observational status on the care of Dr. Hardy, internal medicine    Patient demonstrates verbal understanding of all clinical laboratory and imaging findings, admission instructions, and verbally agrees with current treatment plan with teach back    *Due to voice recognition software, sound alike and misspelled words may be contained in the documentation*    Amount and/or Complexity of Data Reviewed  Labs: ordered. Decision-making details documented in ED Course.  Radiology: ordered and independent interpretation performed. Decision-making details documented in ED Course.  ECG/medicine tests: ordered and independent interpretation performed. Decision-making details documented in ED Course.    Risk  OTC drugs.  Prescription drug management.  Decision regarding hospitalization.        ED Course as of 12/19/24 0518   Wed Dec 18, 2024   2320 Patient with verbal understandable clinical laboratory and imaging findings at this time.       Medications   albuterol inhalation solution 2.5 mg (has no administration in time range)   ALPRAZolam (XANAX) tablet 0.5 mg (0.5 mg Oral Given 12/19/24 0213)   amLODIPine (NORVASC) tablet 5 mg (has no administration in time range)   buprenorphine-naloxone (Suboxone) film 4 mg (has no administration in time range)   atorvastatin (LIPITOR) tablet 40 mg (has no administration in time range)   celecoxib (CeleBREX) capsule 200 mg (has no administration in time range)   cyclobenzaprine (FLEXERIL) tablet 10 mg (has no administration in time range)   DULoxetine (CYMBALTA) delayed release capsule 60 mg (has no administration in time range)   pantoprazole (PROTONIX) EC tablet 40 mg (has no administration in time range)   levothyroxine tablet 100  mcg (has no administration in time range)   losartan (COZAAR) tablet 25 mg (has no administration in time range)   metoprolol succinate (TOPROL-XL) 24 hr tablet 50 mg (has no administration in time range)   traZODone (DESYREL) tablet 100 mg (100 mg Oral Given 12/19/24 0213)   venlafaxine (EFFEXOR-XR) 24 hr capsule 150 mg (has no administration in time range)   venlafaxine (EFFEXOR-XR) 24 hr capsule 75 mg (75 mg Oral Given 12/19/24 0234)   acetaminophen (TYLENOL) tablet 975 mg (has no administration in time range)   nicotine (NICODERM CQ) 14 mg/24hr TD 24 hr patch 1 patch (has no administration in time range)   enoxaparin (LOVENOX) subcutaneous injection 40 mg (has no administration in time range)   insulin lispro (HumALOG/ADMELOG) 100 units/mL subcutaneous injection 1-6 Units (1 Units Subcutaneous Given 12/19/24 0234)   methylPREDNISolone sodium succinate (Solu-MEDROL) injection 40 mg (has no administration in time range)   ipratropium (ATROVENT) 0.02 % inhalation solution 0.5 mg (has no administration in time range)   levalbuterol (XOPENEX) inhalation solution 1.25 mg (has no administration in time range)   benzonatate (TESSALON PERLES) capsule 100 mg (has no administration in time range)   azithromycin (ZITHROMAX) tablet 500 mg (500 mg Oral Given 12/19/24 0213)   sodium chloride 3 % inhalation solution 4 mL (4 mL Nebulization Given 12/19/24 0217)   multivitamin stress formula tablet 1 tablet (has no administration in time range)   ipratropium-albuterol (DUO-NEB) 0.5-2.5 mg/3 mL inhalation solution 3 mL (3 mL Nebulization Given 12/18/24 2125)   sodium chloride 0.9 % bolus 500 mL (0 mL Intravenous Stopped 12/18/24 2318)   iohexol (OMNIPAQUE) 350 MG/ML injection (MULTI-DOSE) 100 mL (100 mL Intravenous Given 12/18/24 2229)   methylPREDNISolone sodium succinate (Solu-MEDROL) injection 80 mg (80 mg Intravenous Given 12/18/24 2241)   guaiFENesin (MUCINEX) 12 hr tablet 1,200 mg (1,200 mg Oral Given 12/18/24 9326)    ipratropium-albuterol (DUO-NEB) 0.5-2.5 mg/3 mL inhalation solution 3 mL (3 mL Nebulization Given 12/18/24 2332)   sodium chloride 0.9 % bolus 500 mL (0 mL Intravenous Stopped 12/19/24 0032)       ED Risk Strat Scores   HEART Risk Score      Flowsheet Row Most Recent Value   Heart Score Risk Calculator    History 0 Filed at: 12/19/2024 0517   ECG 0 Filed at: 12/19/2024 0517   Age 2 Filed at: 12/19/2024 0517   Risk Factors 2 Filed at: 12/19/2024 0517   Troponin 0 Filed at: 12/19/2024 0517   HEART Score 4 Filed at: 12/19/2024 0517          HEART Risk Score      Flowsheet Row Most Recent Value   Heart Score Risk Calculator    History 0 Filed at: 12/19/2024 0517   ECG 0 Filed at: 12/19/2024 0517   Age 2 Filed at: 12/19/2024 0517   Risk Factors 2 Filed at: 12/19/2024 0517   Troponin 0 Filed at: 12/19/2024 0517   HEART Score 4 Filed at: 12/19/2024 0517                            SBIRT 22yo+      Flowsheet Row Most Recent Value   Initial Alcohol Screen: US AUDIT-C     1. How often do you have a drink containing alcohol? 0 Filed at: 12/18/2024 2011   2. How many drinks containing alcohol do you have on a typical day you are drinking?  0 Filed at: 12/18/2024 2011   3b. FEMALE Any Age, or MALE 65+: How often do you have 4 or more drinks on one occassion? 0 Filed at: 12/18/2024 2011   Audit-C Score 0 Filed at: 12/18/2024 2011   ARNOLDO: How many times in the past year have you...    Used an illegal drug or used a prescription medication for non-medical reasons? Never Filed at: 12/18/2024 2011                            History of Present Illness       Chief Complaint   Patient presents with    Abdominal Pain     Pt arrives via EMS from home. Pt c/o RUQ pain & back pain. P was seen at [place] yesterday.   Pt also having SOB but is supposed to be wearing 3L o2 and has not been using it.     Patient is a 74-year-old female with a history of COPD, hyperlipidemia, hypertension, surgical history of hysterectomy and transthoracic  "aortic root aneurysm repair that presents to the emergency department with persistent worsening right upper quadrant and right lower quadrant abdominal pain symptoms for approximately 1 week.  Patient also has associated symptomatology of increased oxygen requirement at home over the course the last couple days.  Patient was recently evaluated in the emergency department on 12/15/2024 for abdominal pain, with physician noting pain in right lower quadrant rating to right flank, treated with Toradol, Dilaudid, CT imaging reviewed by physician with no acute abnormalities seen pending final read, CT read stable ductus dilatations no acute abnormalities with chronic compression fracture.  UA negative, with patient HD stable and cleared for discharge with outpatient follow-up with return precautions placed.  Patient returns with her mother this evening that provides part of patient history stating that patient had worsening shortness of breath symptoms over the course of the past couple days, increasing oxygen requirement and coughing.  Patient with recent hospital admission on 11/4/2024 for chronic respiratory failure with hypoxia and hypercapnia.  Patient currently on 2 L nasal cannula oxygen with SpO2 saturation 92% to 93%.  Patient also has an intermittent hacking nonproductive cough beginning a couple days prior to current ED presentation.  Patient daughter who is at bedside states that patient had a URI last week that \"she is getting older.\"  Patient denies palliative factors with provocative factors of pressure to right side of abdomen and exertion.  Patient has not effective treatment.  Patient denies fevers, chills, nausea, vomiting, diarrhea, constipation and urinary symptoms.  Patient denies recent fall recent trauma.  Patient denies recent travel.  Patient affirms possible sick contacts, patient family members.  Patient denies chest pain.      Past Medical History:   Diagnosis Date    Anxiety     Chronic pain " "03/06/2023    lower abdomen and back    Colon polyp     COPD (chronic obstructive pulmonary disease) (HCC)     \"passes out\" with O2 levels dropping    Disease of thyroid gland     GERD (gastroesophageal reflux disease)     History of transfusion     with aneurysm repair-autologous-self and daughter    Hyperlipidemia     Hypertension     Teeth missing     Wears glasses     For reading      Past Surgical History:   Procedure Laterality Date    BACK SURGERY      x2 herniated, crushed disc in the lumbar, ablation    CHOLECYSTECTOMY      COLONOSCOPY      FRACTURE SURGERY Left     hip-pinned    HYSTERECTOMY      salpingo-oophorectomy    [place] INJECT SI JOINT ARTHRGRPHY&/ANES/STEROID W/PATRICIA Bilateral 12/18/2024    Procedure: BILATERAL SACROILIAC JOINT INJECTION;  Surgeon: Nj Maddox DO;  Location: [facility] MAIN OR;  Service: Pain Management     THORACIC AORTIC ANEURYSM REPAIR  09/2016    ascending thoracic aortic repair with RCA bypass with SVG x 1    TONSILLECTOMY      UPPER GASTROINTESTINAL ENDOSCOPY        Family History   Problem Relation Age of Onset    Breast cancer Mother       Social History     Tobacco Use    Smoking status: Every Day     Current packs/day: 0.25     Average packs/day: 0.3 packs/day for 57.0 years (14.2 ttl pk-yrs)     Types: Cigarettes     Start date: 1968     Passive exposure: Past    Smokeless tobacco: Never    Tobacco comments:     Currently smoking 5-6 cigarettes daily   Vaping Use    Vaping status: Never Used   Substance Use Topics    Alcohol use: Yes     Comment: occasionally    Drug use: Yes     Types: Marijuana, Cocaine     Comment: yes still Marijuana as of 9/5/24-2 times weekly      E-Cigarette/Vaping    E-Cigarette Use Never User       E-Cigarette/Vaping Substances    Nicotine No     THC No     CBD No     Flavoring No     Other No     Unknown No       I have reviewed and agree with the history as documented.       History provided by:  Patient   used: No  "   Abdominal Pain  Associated symptoms: shortness of breath    Associated symptoms: no chest pain, no chills, no constipation, no cough, no diarrhea, no dysuria, no fever, no nausea, no sore throat and no vomiting        Review of Systems   Constitutional:  Negative for activity change, appetite change, chills and fever.   HENT:  Negative for congestion, postnasal drip, rhinorrhea, sinus pressure, sinus pain, sore throat and tinnitus.    Eyes:  Negative for photophobia and visual disturbance.   Respiratory:  Positive for shortness of breath. Negative for cough and chest tightness.    Cardiovascular:  Negative for chest pain and palpitations.   Gastrointestinal:  Positive for abdominal pain. Negative for constipation, diarrhea, nausea and vomiting.   Genitourinary:  Negative for difficulty urinating, dysuria, flank pain, frequency and urgency.   Musculoskeletal:  Negative for back pain, gait problem, neck pain and neck stiffness.   Skin:  Negative for pallor and rash.   Allergic/Immunologic: Negative for environmental allergies and food allergies.   Neurological:  Negative for dizziness, weakness, numbness and headaches.   Psychiatric/Behavioral:  Negative for confusion.    All other systems reviewed and are negative.          Objective       ED Triage Vitals   Temperature Pulse Blood Pressure Respirations SpO2 Patient Position - Orthostatic VS   12/18/24 2008 12/18/24 2008 12/18/24 2008 12/18/24 2008 12/18/24 2008 --   98.3 °F (36.8 °C) 101 152/90 18 91 %       Temp Source Heart Rate Source BP Location FiO2 (%) Pain Score    12/19/24 0236 12/18/24 2008 12/18/24 2008 -- 12/18/24 2008    Oral Monitor Right arm  6      Vitals      Date and Time Temp Pulse SpO2 Resp BP Pain Score FACES Pain Rating User   12/19/24 0312 -- -- 91 % -- -- -- -- KN   12/19/24 0236 97 °F (36.1 °C) 89 94 % 22 162/72 -- -- SM   12/19/24 0215 -- 90 92 % 22 162/72 -- -- SM   12/18/24 2338 -- 90 93 % 20 163/89 -- --    12/18/24 2220 -- 98 95 % 18  176/92 -- -- CS   12/18/24 2008 98.3 °F (36.8 °C) 101 91 % 18 152/90 6 -- CS            Physical Exam  Vitals and nursing note reviewed.   Constitutional:       General: She is awake.      Appearance: Normal appearance. She is well-developed and normal weight. She is not ill-appearing, toxic-appearing or diaphoretic.      Comments: /90 (BP Location: Right arm)   Pulse 101   Temp 98.3 °F (36.8 °C)   Resp 18   LMP  (LMP Unknown)   SpO2 91%      HENT:      Head: Normocephalic and atraumatic.      Jaw: There is normal jaw occlusion.      Right Ear: Hearing, tympanic membrane and external ear normal. No decreased hearing noted. No drainage, swelling or tenderness. No mastoid tenderness.      Left Ear: Hearing, tympanic membrane and external ear normal. No decreased hearing noted. No drainage, swelling or tenderness. No mastoid tenderness.      Nose: Nose normal.      Mouth/Throat:      Lips: Pink.      Mouth: Mucous membranes are moist.      Pharynx: Oropharynx is clear. Uvula midline.   Eyes:      General: Lids are normal. Vision grossly intact. Gaze aligned appropriately.         Right eye: No discharge.         Left eye: No discharge.      Extraocular Movements: Extraocular movements intact.      Conjunctiva/sclera: Conjunctivae normal.      Pupils: Pupils are equal, round, and reactive to light.   Neck:      Vascular: No JVD.      Trachea: Trachea and phonation normal. No tracheal tenderness or tracheal deviation.   Cardiovascular:      Rate and Rhythm: Normal rate and regular rhythm.      Pulses: Normal pulses.           Radial pulses are 2+ on the right side and 2+ on the left side.        Posterior tibial pulses are 2+ on the right side and 2+ on the left side.      Heart sounds: Normal heart sounds.   Pulmonary:      Effort: Pulmonary effort is normal.      Breath sounds: Normal air entry. No stridor. Examination of the right-upper field reveals rhonchi. Examination of the left-upper field reveals  rhonchi. Examination of the right-middle field reveals rhonchi. Examination of the left-middle field reveals rhonchi. Examination of the right-lower field reveals rhonchi. Examination of the left-lower field reveals rhonchi. Rhonchi present. No decreased breath sounds, wheezing or rales.   Chest:      Chest wall: No tenderness.   Abdominal:      General: Abdomen is flat. Bowel sounds are normal. There is no distension.      Palpations: Abdomen is soft. Abdomen is not rigid.      Tenderness: There is abdominal tenderness in the right upper quadrant and right lower quadrant. There is no right CVA tenderness, left CVA tenderness, guarding or rebound.   Musculoskeletal:         General: Normal range of motion.      Cervical back: Full passive range of motion without pain, normal range of motion and neck supple. No rigidity. No spinous process tenderness or muscular tenderness. Normal range of motion.   Feet:      Right foot:      Toenail Condition: Right toenails are normal.      Left foot:      Toenail Condition: Left toenails are normal.   Lymphadenopathy:      Head:      Right side of head: No submental, submandibular, tonsillar, preauricular, posterior auricular or occipital adenopathy.      Left side of head: No submental, submandibular, tonsillar, preauricular, posterior auricular or occipital adenopathy.      Cervical: No cervical adenopathy.      Right cervical: No superficial, deep or posterior cervical adenopathy.     Left cervical: No superficial, deep or posterior cervical adenopathy.   Skin:     General: Skin is warm.      Capillary Refill: Capillary refill takes less than 2 seconds.      Findings: No rash.   Neurological:      General: No focal deficit present.      Mental Status: She is alert and oriented to person, place, and time. Mental status is at baseline.      GCS: GCS eye subscore is 4. GCS verbal subscore is 5. GCS motor subscore is 6.      Sensory: No sensory deficit.   Psychiatric:          Attention and Perception: Attention normal.         Mood and Affect: Mood normal.         Speech: Speech normal.         Behavior: Behavior normal. Behavior is cooperative.         Thought Content: Thought content normal.         Judgment: Judgment normal.         Results Reviewed       Procedure Component Value Units Date/Time    Hemoglobin A1c w/EAG Estimation (Prechecked if no A1C within 90 days) [[id number]]  (Abnormal) Collected: 12/18/24 2113    Lab Status: Final result Specimen: Blood from Arm, Right Updated: 12/19/24 0436     Hemoglobin A1C 7.4 %       mg/dl     Basic metabolic panel [[id number]]     Lab Status: No result Specimen: Blood     CBC and differential [[id number]]     Lab Status: No result Specimen: Blood     Fingerstick Glucose (POCT) [[id number]]  (Abnormal) Collected: 12/19/24 0227    Lab Status: Final result Specimen: Blood Updated: 12/19/24 0228     POC Glucose 169 mg/dl     Blood culture #1 [[id number]] Collected: 12/18/24 2113    Lab Status: Preliminary result Specimen: Blood from Arm, Left Updated: 12/19/24 0037     Blood Culture Received in Microbiology Lab. Culture in Progress.    Blood culture #2 [[id number]] Collected: 12/18/24 2113    Lab Status: Preliminary result Specimen: Blood from Arm, Right Updated: 12/19/24 0034     Blood Culture Received in Microbiology Lab. Culture in Progress.    HS Troponin I 2hr [[id number]]  (Normal) Collected: 12/18/24 2338    Lab Status: Final result Specimen: Blood from Arm, Right Updated: 12/19/24 0024     hs TnI 2hr 12 ng/L      Delta 2hr hsTnI 0 ng/L     Urine Microscopic [[id number]]  (Normal) Collected: 12/18/24 2350    Lab Status: Final result Specimen: Urine, Clean Catch Updated: 12/19/24 0008     RBC, UA 1-2 /hpf      WBC, UA None Seen /hpf      Epithelial Cells Occasional /hpf      Bacteria, UA None Seen /hpf     UA w Reflex to Microscopic w Reflex to Culture [[id number]]  (Abnormal) Collected: 12/18/24 2350    Lab Status: Final result Specimen:  Urine, Clean Catch Updated: 12/19/24 0007     Color, UA Light Yellow     Clarity, UA Clear     Specific Gravity, UA 1.031     pH, UA 5.5     Leukocytes, UA Negative     Nitrite, UA Negative     Protein, UA Trace mg/dl      Glucose, UA Negative mg/dl      Ketones, UA Negative mg/dl      Urobilinogen, UA <2.0 mg/dl      Bilirubin, UA Negative     Occult Blood, UA Negative    FLU/RSV/COVID - if FLU/RSV clinically relevant (2hr TAT) [[id number]]  (Normal) Collected: 12/18/24 2113    Lab Status: Final result Specimen: Nares from Nose Updated: 12/18/24 2219     SARS-CoV-2 Negative     INFLUENZA A PCR Negative     INFLUENZA B PCR Negative     RSV PCR Negative    Narrative:      This test has been performed using the CoV-2/Flu/RSV plus assay on the [employer] platform. This test has been validated by the  and verified by the performing laboratory.     This test is designed to amplify and detect the following: nucleocapsid (N), envelope (E), and RNA-dependent RNA polymerase (RdRP) genes of the SARS-CoV-2 genome; matrix (M), basic polymerase (PB2), and acidic protein (PA) segments of the influenza A genome; matrix (M) and non-structural protein (NS) segments of the influenza B genome, and the nucleocapsid genes of RSV A and RSV B.     Positive results are indicative of the presence of Flu A, Flu B, RSV, and/or SARS-CoV-2 RNA. Positive results for SARS-CoV-2 or suspected novel influenza should be reported to state, local, or federal health departments according to local reporting requirements.      All results should be assessed in conjunction with clinical presentation and other laboratory markers for clinical management.     FOR PEDIATRIC PATIENTS - copy/paste COVID Guidelines URL to browser: [email or web address]       Procalcitonin [[id number]]  (Normal) Collected: 12/18/24 2113    Lab Status: Final result Specimen: Blood from Arm, Right Updated:  12/18/24 2150     Procalcitonin 0.07 ng/ml     HS Troponin 0hr (reflex protocol) [[id number]]  (Normal) Collected: 12/18/24 2113    Lab Status: Final result Specimen: Blood from Arm, Right Updated: 12/18/24 2147     hs TnI 0hr 12 ng/L     B-Type Natriuretic Peptide(BNP) [[id number]]  (Normal) Collected: 12/18/24 2113    Lab Status: Final result Specimen: Blood from Arm, Right Updated: 12/18/24 2145     BNP 68 pg/mL     Protime-INR [[id number]]  (Normal) Collected: 12/18/24 2113    Lab Status: Final result Specimen: Blood from Arm, Right Updated: 12/18/24 2140     Protime 13.0 seconds      INR 0.91    Narrative:      INR Therapeutic Range    Indication                                             INR Range      Atrial Fibrillation                                               2.0-3.0  Hypercoagulable State                                    2.0.2.3  Left Ventricular Asist Device                            2.0-3.0  Mechanical Heart Valve                                  -    Aortic(with afib, MI, embolism, HF, LA enlargement,    and/or coagulopathy)                                     2.0-3.0 (2.5-3.5)     Mitral                                                             2.5-3.5  Prosthetic/Bioprosthetic Heart Valve               2.0-3.0  Venous thromboembolism (VTE: VT, PE        2.0-3.0    APTT [[id number]]  (Abnormal) Collected: 12/18/24 2113    Lab Status: Final result Specimen: Blood from Arm, Right Updated: 12/18/24 2140     PTT 35 seconds     Comprehensive metabolic panel [[id number]]  (Abnormal) Collected: 12/18/24 2113    Lab Status: Final result Specimen: Blood from Arm, Right Updated: 12/18/24 2137     Sodium 136 mmol/L      Potassium 4.7 mmol/L      Chloride 103 mmol/L      CO2 25 mmol/L      ANION GAP 8 mmol/L      BUN 28 mg/dL      Creatinine 0.94 mg/dL      Glucose 150 mg/dL      Calcium 9.5 mg/dL      AST 37 U/L      ALT 28 U/L      Alkaline Phosphatase 42 U/L      Total Protein 7.0 g/dL      Albumin 3.9  g/dL      Total Bilirubin 0.33 mg/dL      eGFR 60 ml/min/1.73sq m     Narrative:      National Kid[employer] Foundation guidelines for Chronic Kidney Disease (CKD):     Stage 1 with normal or high GFR (GFR > 90 mL/min/1.73 square meters)    Stage 2 Mild CKD (GFR = 60-89 mL/min/1.73 square meters)    Stage 3A Moderate CKD (GFR = 45-59 mL/min/1.73 square meters)    Stage 3B Moderate CKD (GFR = 30-44 mL/min/1.73 square meters)    Stage 4 Severe CKD (GFR = 15-29 mL/min/1.73 square meters)    Stage 5 End Stage CKD (GFR <15 mL/min/1.73 square meters)  Note: GFR calculation is accurate only with a steady state creatinine    Lipase [[id number]]  (Normal) Collected: 12/18/24 2113    Lab Status: Final result Specimen: Blood from Arm, Right Updated: 12/18/24 2137     Lipase 23 u/L     Blood gas, venous [[id number]]  (Abnormal) Collected: 12/18/24 2113    Lab Status: Final result Specimen: Blood from Arm, Right Updated: 12/18/24 2132     pH, Avinash 7.447     pCO2, Avinash 36.2 mm Hg      pO2, Avinash 109.3 mm Hg      HCO3, Avinash 24.4 mmol/L      Base Excess, Avinash 0.7 mmol/L      O2 Content, Avinash 17.6 ml/dL      O2 HGB, VENOUS 94.9 %     CBC and differential [[id number]]  (Abnormal) Collected: 12/18/24 2113    Lab Status: Final result Specimen: Blood from Arm, Right Updated: 12/18/24 2130     WBC 8.25 Thousand/uL      RBC 4.28 Million/uL      Hemoglobin 12.7 g/dL      Hematocrit 40.3 %      MCV 94 fL      MCH 29.7 pg      MCHC 31.5 g/dL      RDW 15.7 %      MPV 9.7 fL      Platelets 244 Thousands/uL      nRBC 0 /100 WBCs      Segmented % 83 %      Immature Grans % 2 %      Lymphocytes % 9 %      Monocytes % 6 %      Eosinophils Relative 0 %      Basophils Relative 0 %      Absolute Neutrophils 6.91 Thousands/µL      Absolute Immature Grans 0.13 Thousand/uL      Absolute Lymphocytes 0.72 Thousands/µL      Absolute Monocytes 0.47 Thousand/µL      Eosinophils Absolute 0.00 Thousand/µL      Basophils Absolute 0.02 Thousands/µL     Fingerstick  Glucose (POCT) [[id number]]  (Abnormal) Collected: 12/18/24 2009    Lab Status: Final result Specimen: Blood Updated: 12/18/24 2010     POC Glucose 160 mg/dl             CT pe study w abdomen pelvis w contrast   ED Interpretation by David Barrow PA-C (12/18 [id number])   CT PULMONARY ANGIOGRAM OF THE CHEST AND CT ABDOMEN AND PELVIS WITH INTRAVENOUS CONTRAST     INDICATION: Shortness of breath and abdominal pain.     COMPARISON: 12/15/2024.     TECHNIQUE: CT examination of the chest, abdomen and pelvis was performed. Thin section CT angiographic technique was used in the chest in order to evaluate for pulmonary embolus and coronal 3D MIP postprocessing was performed on the acquisition scanner.   Multiplanar 2D reformatted images were created from the source data.     This examination, like all CT scans performed in the [facility], was performed utilizing techniques to minimize radiation dose exposure, including the use of iterative reconstruction and automated exposure control. Radiation dose length   product (DLP) for this visit: 846 mGy-cm     IV Contrast: 100 mL of iohexol (OMNIPAQUE)  Enteric Contrast: Not administered.     FINDINGS:     CHEST     PULMONARY ARTERIAL TREE:  No filling defect in the visualized pulmonary arteries to i   ndicate an acute embolus.     LUNGS: Mild scarring and subsegmental atelectasis in the right upper lobe and lingula. No tracheal or endobronchial lesion.     PLEURA: No effusion.     HEART/AORTA: Normal heart size. No thoracic aortic aneurysm or dissection.     MEDIASTINUM AND MICHELLE: No lymphadenopathy.     CHEST WALL AND LOWER NECK: Median sternotomy.     ABDOMEN     LIVER/BILIARY TREE: Mild intrahepatic and common bile duct dilatation likely secondary to postsurgical change. No choledocholithiasis.     GALLBLADDER: Cholecystectomy.     SPLEEN: Unremarkable.     PANCREAS: Stable irregular dilatation of the main pancreatic duct.     ADRENAL GLANDS: Unremarkable.      KIDNEYS/URETERS: Symmetric nephrographic phase enhancement of the kidneys. Subcentimeter right renal cortical cyst. No obstructive uropathy.     STOMACH AND BOWEL: No bowel obstruction, colitis or diverticulitis.     APPENDIX: No findings to suggest appendicitis.     ABDOMINOPELVIC CAVITY: No ascites. No pneumoperitoneum. N   o lymphadenopathy.     VESSELS: Calcified plaque throughout the abdominal aorta and iliac arteries without evidence of aneurysm or dissection.     PELVIS     REPRODUCTIVE ORGANS: Prior hysterectomy.     URINARY BLADDER: Unremarkable.     ABDOMINAL WALL/INGUINAL REGIONS: Unremarkable.     BONES: Dynamic hip screw secures left femoral neck fracture in normal alignment. L4-5 posterior fusion hardware. Chronic L1 fracture, stable.     IMPRESSION:        1. No evidence of acute pulmonary embolus, thoracic aortic aneurysm or dissection. No acute cardiopulmonary process.  2. No acute intra-abdominal or pelvic process.                    Workstation performed: [employer]           Final Interpretation by Warren Payan MD (12/18 [id number])         1. No evidence of acute pulmonary embolus, thoracic aortic aneurysm or dissection. No acute cardiopulmonary process.   2. No acute intra-abdominal or pelvic process.                     Workstation performed: [id number]             ECG 12 Lead Documentation Only    Date/Time: 12/18/2024 9:11 PM    Performed by: David Barrow PA-C  Authorized by: David Barrow PA-C    Indications / Diagnosis:  Shortness of breath and right sided abdominal pain  ECG reviewed by me, the ED Provider: yes    Patient location:  ED  Previous ECG:     Previous ECG:  Compared to current    Comparison ECG info:  102    Similarity:  No change    Comparison to cardiac monitor: Yes    Interpretation:     Interpretation: normal    Rate:     ECG rate assessment: tachycardic    Rhythm:     Rhythm: sinus tachycardia    Ectopy:     Ectopy: none    QRS:     QRS axis:  Normal    QRS intervals:   Normal  Conduction:     Conduction: normal    ST segments:     ST segments:  Normal  T waves:     T waves: normal    ECG 12 Lead Documentation Only    Date/Time: 12/18/2024 11:36 AM    Performed by: David Barrow PA-C  Authorized by: David Barrow PA-C    Indications / Diagnosis:  Right side abdominal pain symptoms; SOB  ECG reviewed by me, the ED Provider: yes    Patient location:  ED  Previous ECG:     Previous ECG:  Compared to current    Comparison ECG info:  Compared with ECG of December 18, 2024 2008, no significant changes were noted.    Similarity:  No change    Comparison to cardiac monitor: Yes    Interpretation:     Interpretation: normal    Rate:     ECG rate:  91    ECG rate assessment: normal    Rhythm:     Rhythm: sinus rhythm    Ectopy:     Ectopy: none    QRS:     QRS axis:  Normal    QRS intervals:  Normal  Conduction:     Conduction: normal    ST segments:     ST segments:  Normal  T waves:     T waves: normal        ED Medication and Procedure Management   Prior to Admission Medications   Prescriptions Last Dose Informant Patient Reported? Taking?   ALPRAZolam (XANAX) 0.5 mg tablet  Self Yes No   Sig: Take 0.5 mg by mouth 2 (two) times a day   DULoxetine (CYMBALTA) 60 mg delayed release capsule  Self No No   Sig: Take 1 capsule (60 mg total) by mouth daily   albuterol (2.5 mg/3 mL) 0.083 % nebulizer solution  Self No No   Sig: Take 3 mL (2.5 mg total) by nebulization 4 (four) times a day as needed for wheezing or shortness of breath   albuterol (PROVENTIL HFA,VENTOLIN HFA) 90 mcg/act inhaler  Self No No   Sig: Inhale 1 puff every 4 (four) hours as needed for wheezing   Patient not taking: Reported on 11/12/2024   albuterol (Proventil HFA) 90 mcg/act inhaler  Self No No   Sig: Inhale 2 puffs every 4 (four) hours as needed for wheezing   amLODIPine (NORVASC) 5 mg tablet  Self No No   Sig: Take 1 tablet (5 mg total) by mouth daily   aspirin 81 mg chewable tablet  Self Yes No   Sig: Chew 81 mg daily    atorvastatin (LIPITOR) 40 mg tablet  Self Yes No   Sig: Take 40 mg by mouth every morning   b complex vitamins capsule  Self Yes No   Sig: Take 1 capsule by mouth once a week   buprenorphine-naloxone (Suboxone) 4-1 MG  Self Yes No   Sig: every morning Wafer   celecoxib (CeleBREX) 200 mg capsule  Self No No   Sig: Take 1 capsule (200 mg total) by mouth daily   clopidogrel (PLAVIX) 75 mg tablet  Self Yes No   Sig: Take 75 mg by mouth every other day   Patient not taking: Reported on 10/8/2024   cyclobenzaprine (FLEXERIL) 10 mg tablet   No No   Sig: Take 1 tablet (10 mg total) by mouth 3 (three) times a day as needed for muscle spasms   esomeprazole (NexIUM) 40 MG capsule  Self No No   Sig: Take 1 capsule (40 mg total) by mouth 2 (two) times a day before meals   fluticasone-umeclidinium-vilanterol (Trelegy Ellipta) 100-62.5-25 mcg/actuation inhaler  Self No No   Sig: Inhale 1 puff daily Rinse mouth after use.   Patient not taking: Reported on 11/29/2024   fluticasone-umeclidinium-vilanterol (Trelegy Ellipta) 200-62.5-25 mcg/actuation AEPB inhaler  Self No No   Sig: Inhale 1 puff daily Rinse mouth after use.   gabapentin (NEURONTIN) 300 mg capsule  Self No No   Sig: Take 1 capsule (300 mg total) by mouth 3 (three) times a day   Patient not taking: Reported on 10/8/2024   glimepiride (AMARYL) 2 mg tablet  Self Yes No   Patient not taking: Reported on 11/29/2024   ipratropium-albuterol (DUO-NEB) 0.5-2.5 mg/3 mL nebulizer solution  Self No No   Sig: Take 3 mL by nebulization 4 (four) times a day as needed for shortness of breath or wheezing   levothyroxine 100 mcg tablet  Self Yes No   Sig: Take 100 mcg by mouth every morning   losartan (COZAAR) 25 mg tablet  Self Yes No   Sig: Take 25 mg by mouth every morning   metoclopramide (REGLAN) 10 mg tablet  Self Yes No   metoprolol succinate (TOPROL-XL) 50 mg 24 hr tablet  Self No No   Sig: Take 1 tablet (50 mg total) by mouth daily   Patient taking differently: Take 50 mg by  mouth every morning   oxygen gas  Self Yes No   Sig: Inhale continuous as needed 2.5 L   predniSONE 10 mg tablet  Self No No   Sig: Take 40 mg PO daily x 3 days, then 30 mg daily x 3 days, then 20 mg daily x 3 days, then 10 mg daily x 3 days, then stop.   predniSONE 10 mg tablet  Self No No   Sig: Take 3 tablets daily for 14 days then 2 tablets daily for 14 days then 1 tablet daily for 14 days then 1/2 tablet daily for 14 days   Patient not taking: Reported on 11/4/2024   predniSONE 10 mg tablet   No No   Sig: Take 3 tablets daily for 2 weeks then 2 tablets daily for 2 weeks then 1 tablet daily for 2 weeks then stay on 1/2 tablet daily   sodium chloride 0.9 % nebulizer solution  Self No No   Sig: Take 3 mL by nebulization as needed for wheezing   Patient not taking: Reported on 11/29/2024   traZODone (DESYREL) 50 mg tablet  Self Yes No   Sig: Take 100 mg by mouth daily at bedtime   venlafaxine (EFFEXOR-XR) 150 mg 24 hr capsule  Self Yes No   Sig: Take 1 capsule by mouth in the morning   venlafaxine (EFFEXOR-XR) 75 mg 24 hr capsule  Self Yes No   Sig: Take 75 mg by mouth daily at bedtime      Facility-Administered Medications: None     Patient's Medications   Discharge Prescriptions    No medications on file     No discharge procedures on file.  ED SEPSIS DOCUMENTATION   Time reflects when diagnosis was documented in both MDM as applicable and the Disposition within this note       Time User Action Codes Description Comment    12/18/2024 11:53 PM David Barrow [J44.1] COPD exacerbation (HCC)     12/19/2024 12:43 AM David Barrow [R06.00] Dyspnea                  David Barrow PA-C  12/19/24 0518

## 2024-12-19 NOTE — ASSESSMENT & PLAN NOTE
Presents with sob and worsening of chronic cough for the last several days  States that she finished her steroids and felt worst  Denies fever, chills or sick contacts  At baseline on 2.5 l NC at bedtime, states that she was using it during the day time as well  Continue smoking 3-5 cigaret per day  H/o of centrilobular emphysema, following with dr. Smith, last visit on 11/12/24  Spirometry on 11/12 showed moderate obstruction: FEV1 of 1.05 L or 51% of predicted with obstructive index of 64 %  Home regimen Trelegy 200 mg daily, albuterol prn. Reports using albuterol 4 times daily and at night time for the last several days  Pt was borderline tachycardic on admission however didn't meet SIRS (mostlikely due to B-agonist nebs)  Blood work showed no leucocytosis, normal proCal, negative Covid/Flu/RSV. ABG suggestive for hyperventilation  CT PE chest/abdomen/pelvis showed no evidence of PE or acute pulmonary pathology  In ED pt received Solumedrol 80 mg and DouNebs     Today: on 2 L NC, no significant wheezing appreciated on auscultation however poor air movemnt noticed b/l    Plan:  Monitor vitals and fever curve  Respiratory protocol and airway clearing protocol  C/w albuterol as needed  C/w Xopenax and Atrovent TID as needed  C/w 3% Nasal saline nebs  C/w Solumedrol 40 mg BID  Maintain on 2 L NC, wean off as tolerating  Mucinex and Tessalon for cough

## 2024-12-19 NOTE — CONSULTS
Consultation - Pulmonology   Name: Montserrat Sumner 74 y.o. female I MRN: [id number]  Unit/Bed#: ED-41 I Date of Admission: 12/18/2024   Date of Service: 12/19/2024 I Hospital Day: 0   Inpatient consult to Pulmonology  Consult performed by: Taylor Flores MD  Consult ordered by: JAZMINE Garcia        Physician Requesting Evaluation: Tania James MD   Reason for Evaluation / Principal Problem: sob    Assessment & Plan  COPD exacerbation (HCC)  Presents with sob and worsening of chronic cough for the last several days  States that she finished her steroids and felt worst  Denies fever, chills or sick contacts  At baseline on 2.5 l NC at bedtime, states that she was using it during the day time as well  Continue smoking 3-5 cigaret per day  H/o of centrilobular emphysema, following with dr. Smith, last visit on 11/12/24  Spirometry on 11/12 showed moderate obstruction: FEV1 of 1.05 L or 51% of predicted with obstructive index of 64 %  Home regimen Trelegy 200 mg daily, albuterol prn. Reports using albuterol 4 times daily and at night time for the last several days  Pt was borderline tachycardic on admission however didn't meet SIRS (mostlikely due to B-agonist nebs)  Blood work showed no leucocytosis, normal proCal, negative Covid/Flu/RSV. ABG suggestive for hyperventilation  CT PE chest/abdomen/pelvis showed no evidence of PE or acute pulmonary pathology  In ED pt received Solumedrol 80 mg and DouNebs     Today: on 2 L NC, no significant wheezing appreciated on auscultation however poor air movemnt noticed b/l    Plan:  Monitor vitals and fever curve  Respiratory protocol and airway clearing protocol  C/w albuterol as needed  C/w Xopenax and Atrovent TID as needed  C/w 3% Nasal saline nebs  C/w Solumedrol 40 mg BID  Maintain on 2 L NC, wean off as tolerating  Mucinex and Tessalon for cough  Hypertension  Pt is hypertensive on admission  Will defer management to primary  team  Hypothyroid  C/w levothyroxine  CAD (coronary artery disease)  As per primary team  Opioid dependence (HCC)    Type 2 diabetes mellitus without complication, without long-term current use of insulin ([facility])  Lab Results   Component Value Date    HGBA1C 7.4 (H) 12/18/2024       Recent Labs     12/18/24 2009 12/19/24 0227 12/19/24  0955   POCGLU 160* 169* 185*       Blood Sugar Average: Last 72 hrs:  (P) [id number]  Defer management to primary team  Tobacco use  NRT and smoking cessation encouraged.      History of Present Illness   Montserrat Sumner is a 74 y.o. female with PMH of HTN, CAD, s/p CABG, T2DM, COPD, tobacco use who presents with presents with shortness of breath for the last several days. Pt states that recently she noticed worsening of her chronic cough and more sputum production along with worsening of her shortness of breath. She was using Oxygen only at bedtime but now using it even at day time. She repots using albuterol 4 times daily and at bed time. Pt denies sick contacts, fever or chills. Pt admits sore throat. Also, she was tapering her prednisone down and thinks that she got worse because of it. Pt admits that she is smoking  3-5 cigarettes daily for years. Pt is following with dr. Smith with last visit on 11/12. She suppose to taper her prednisone by 10 mg every 2 weeks. Suppose to be on 20 mg daily now. Pt states that she finished taper and feeling worst.      Review of Systems   Constitutional:  Negative for chills and fever.   HENT:  Negative for ear pain and sore throat.    Eyes:  Negative for pain and visual disturbance.   Respiratory:  Positive for cough and shortness of breath.    Cardiovascular:  Negative for chest pain and palpitations.   Gastrointestinal:  Negative for abdominal pain and vomiting.   Genitourinary:  Negative for dysuria and hematuria.   Musculoskeletal:  Negative for arthralgias and back pain.   Skin:  Negative for color change and rash.    Neurological:  Negative for seizures and syncope.   All other systems reviewed and are negative.      Historical Information   I have reviewed the patient's PMH, PSH, Social History, Family History, Meds, and Allergies  Tobacco History: for years, 3-5 cigarrets per day  Family History:non-contributory    Objective :  Temp:  [97 °F (36.1 °C)-98.3 °F (36.8 °C)] 97 °F (36.1 °C)  HR:  [] 91  BP: (152-179)/() 179/100  Resp:  [18-22] 20  SpO2:  [91 %-95 %] 93 %  O2 Device: Nasal cannula  Nasal Cannula O2 Flow Rate (L/min):  [2 L/min] 2 L/min    Physical Exam  Vitals and nursing note reviewed.   Constitutional:       General: She is not in acute distress.     Appearance: She is well-developed. She is obese. She is not ill-appearing.   HENT:      Head: Normocephalic and atraumatic.   Eyes:      Conjunctiva/sclera: Conjunctivae normal.   Cardiovascular:      Rate and Rhythm: Normal rate and regular rhythm.      Heart sounds: No murmur heard.  Pulmonary:      Effort: Pulmonary effort is normal. No respiratory distress.      Breath sounds: Rhonchi present.      Comments: No significant wheezing however mild ronchi at bases and poor air movement  Abdominal:      Palpations: Abdomen is soft.      Tenderness: There is no abdominal tenderness.   Musculoskeletal:         General: No swelling.      Cervical back: Neck supple.   Skin:     General: Skin is warm and dry.      Capillary Refill: Capillary refill takes less than 2 seconds.   Neurological:      Mental Status: She is alert.   Psychiatric:         Mood and Affect: Mood normal.           Lab Results: I have reviewed the following results:  .     12/18/24  2113 12/18/24  2338 12/19/24  0522   WBC 8.25  --  7.86   HGB 12.7  --  11.6   HCT 40.3  --  37.5     --  207   BANDSPCT  --   --  1   SODIUM 136  --  137   K 4.7  --  4.6     --  105   CO2 25  --  24   BUN 28*  --  23   CREATININE 0.94  --  0.86   GLUC 150*  --  197*   AST 37  --   --    ALT 28   --   --    ALB 3.9  --   --    TBILI 0.33  --   --    ALKPHOS 42  --   --    PTT 35*  --   --    INR 0.91  --   --    HSTNI0 12  --   --    HSTNI2  --  12  --    BNP 68  --   --      ABG: No new results in last 24 hours.    Imaging Results Review: I reviewed radiology reports from this admission including: CT chest and CT abdomen/pelvis.  Other Study Results Review: EKG was reviewed.   PFT Results Reviewed: reviewed    VTE Prophylaxis: Enoxaparin (Lovenox)    Administrative Statements   I have spent a total time of 45 minutes in caring for this patient on the day of the visit/encounter including Diagnostic results, Prognosis, Risks and benefits of tx options, Instructions for management, Patient and family education, Importance of tx compliance, Risk factor reductions, Impressions, Counseling / Coordination of care, Documenting in the medical record, Reviewing / ordering tests, medicine, procedures  , and Obtaining or reviewing history  .

## 2024-12-19 NOTE — RESPIRATORY THERAPY NOTE
"RT Protocol Note  Montserrat Sumner 74 y.o. female MRN: [id number]  Unit/Bed#: ED-41 Encounter: [id number]    Assessment    Principal Problem:    COPD exacerbation (HCC)  Active Problems:    Hypertension    Hypothyroid    CAD (coronary artery disease)    Opioid dependence (HCC)    Type 2 diabetes mellitus without complication, without long-term current use of insulin (HCC)    Tobacco use      Home Pulmonary Medications:  Ventolin inhaler       Past Medical History:   Diagnosis Date    Anxiety     Chronic pain 03/06/2023    lower abdomen and back    Colon polyp     COPD (chronic obstructive pulmonary disease) (HCC)     \"passes out\" with O2 levels dropping    Disease of thyroid gland     GERD (gastroesophageal reflux disease)     History of transfusion     with aneurysm repair-autologous-self and daughter    Hyperlipidemia     Hypertension     Teeth missing     Wears glasses     For reading     Social History     Socioeconomic History    Marital status: /Civil Union     Spouse name: None    Number of children: None    Years of education: None    Highest education level: None   Occupational History    None   Tobacco Use    Smoking status: Every Day     Current packs/day: 0.25     Average packs/day: 0.3 packs/day for 57.0 years (14.2 ttl pk-yrs)     Types: Cigarettes     Start date: 1968     Passive exposure: Past    Smokeless tobacco: Never    Tobacco comments:     Currently smoking 5-6 cigarettes daily   Vaping Use    Vaping status: Never Used   Substance and Sexual Activity    Alcohol use: Yes     Comment: occasionally    Drug use: Yes     Types: Marijuana, Cocaine     Comment: yes still Marijuana as of 9/5/24-2 times weekly    Sexual activity: Not Currently     Partners: Male     Birth control/protection: Post-menopausal   Other Topics Concern    None   Social History Narrative    None     Social Drivers of Health     Financial Resource Strain: Not on file   Food Insecurity: No Food Insecurity (4/25/2023)    " "Hunger Vital Sign     Worried About Running Out of Food in the Last Year: Never true     Ran Out of Food in the Last Year: Never true   Transportation Needs: No Transportation Needs (4/25/2023)    PRAPARE - Transportation     Lack of Transportation (Medical): No     Lack of Transportation (Non-Medical): No   Physical Activity: Not on file   Stress: Not on file   Social Connections: Not on file   Intimate Partner Violence: Not on file   Housing Stability: Low Risk  (4/25/2023)    Housing Stability Vital Sign     Unable to Pay for Housing in the Last Year: No     Number of Places Lived in the Last Year: 2     Unstable Housing in the Last Year: No       Subjective         Objective    Physical Exam:   Assessment Type: Assess only  General Appearance: Sleeping  Respiratory Pattern: Dyspnea at rest  Chest Assessment: Chest expansion symmetrical  Bilateral Breath Sounds: Rhonchi  O2 Device: 4L NC    Vitals:  Blood pressure 162/72, pulse 89, temperature (!) 97 °F (36.1 °C), temperature source Oral, resp. rate 22, height 5' 3\" (1.6 m), weight 79.4 kg (175 lb 0.7 oz), SpO2 91%.          Imaging and other studies: Results Review Statement: No pertinent imaging studies reviewed.    O2 Device: 4L NC     Plan    Respiratory Plan: Mild Distress pathway        Resp Comments: pt is in ED with probably pna, pt is on O2 unsure about home use pt is currently on 4L NC to maintain sat above 90%. Pt is rhoncorous on inspiration, pt may need other nebulizers. Pt has hx of copd with tobacco and marijuana usage.   "

## 2024-12-19 NOTE — ASSESSMENT & PLAN NOTE
On Suboxone 4 mg sublingual daily  PDMP reviewed last prescription filled 12/5/2024  Continue while inpatient

## 2024-12-19 NOTE — ASSESSMENT & PLAN NOTE
Lab Results   Component Value Date    HGBA1C 6.6 (H) 07/15/2023       Recent Labs     12/18/24 2009   POCGLU 160*       Blood Sugar Average: Last 72 hrs:  (P) 160  PTA regimen glyburide 2 mg daily  Continue 4 times daily Accu-Cheks and sliding scale insulin with meals  Monitor blood sugars closely while on steroids  Update hemoglobin A1c

## 2024-12-19 NOTE — DISCHARGE INSTRUCTIONS
CT PULMONARY ANGIOGRAM OF THE CHEST AND CT ABDOMEN AND PELVIS WITH INTRAVENOUS CONTRAST     INDICATION: Shortness of breath and abdominal pain.     COMPARISON: 12/15/2024.     TECHNIQUE: CT examination of the chest, abdomen and pelvis was performed. Thin section CT angiographic technique was used in the chest in order to evaluate for pulmonary embolus and coronal 3D MIP postprocessing was performed on the acquisition scanner.   Multiplanar 2D reformatted images were created from the source data.     This examination, like all CT scans performed in the [facility], was performed utilizing techniques to minimize radiation dose exposure, including the use of iterative reconstruction and automated exposure control. Radiation dose length   product (DLP) for this visit: 846 mGy-cm     IV Contrast: 100 mL of iohexol (OMNIPAQUE)  Enteric Contrast: Not administered.     FINDINGS:     CHEST     PULMONARY ARTERIAL TREE:  No filling defect in the visualized pulmonary arteries to indicate an acute embolus.     LUNGS: Mild scarring and subsegmental atelectasis in the right upper lobe and lingula. No tracheal or endobronchial lesion.     PLEURA: No effusion.     HEART/AORTA: Normal heart size. No thoracic aortic aneurysm or dissection.     MEDIASTINUM AND MICHELLE: No lymphadenopathy.     CHEST WALL AND LOWER NECK: Median sternotomy.     ABDOMEN     LIVER/BILIARY TREE: Mild intrahepatic and common bile duct dilatation likely secondary to postsurgical change. No choledocholithiasis.     GALLBLADDER: Cholecystectomy.     SPLEEN: Unremarkable.     PANCREAS: Stable irregular dilatation of the main pancreatic duct.     ADRENAL GLANDS: Unremarkable.     KIDNEYS/URETERS: Symmetric nephrographic phase enhancement of the kidneys. Subcentimeter right renal cortical cyst. No obstructive uropathy.     STOMACH AND BOWEL: No bowel obstruction, colitis or diverticulitis.     APPENDIX: No findings to suggest appendicitis.      ABDOMINOPELVIC CAVITY: No ascites. No pneumoperitoneum. No lymphadenopathy.     VESSELS: Calcified plaque throughout the abdominal aorta and iliac arteries without evidence of aneurysm or dissection.     PELVIS     REPRODUCTIVE ORGANS: Prior hysterectomy.     URINARY BLADDER: Unremarkable.     ABDOMINAL WALL/INGUINAL REGIONS: Unremarkable.     BONES: Dynamic hip screw secures left femoral neck fracture in normal alignment. L4-5 posterior fusion hardware. Chronic L1 fracture, stable.     IMPRESSION:        1. No evidence of acute pulmonary embolus, thoracic aortic aneurysm or dissection. No acute cardiopulmonary process.  2. No acute intra-abdominal or pelvic process.                    Workstation performed: [id number]

## 2024-12-19 NOTE — ASSESSMENT & PLAN NOTE
Reports smoking 3 cigarettes daily for many years  Patient encouraged to quit smoking given her underlying COPD  NRT offered

## 2024-12-20 ENCOUNTER — TELEPHONE (OUTPATIENT)
Dept: PAIN MEDICINE | Facility: CLINIC | Age: 74
End: 2024-12-20

## 2024-12-20 VITALS
BODY MASS INDEX: 31.02 KG/M2 | WEIGHT: 175.04 LBS | DIASTOLIC BLOOD PRESSURE: 76 MMHG | SYSTOLIC BLOOD PRESSURE: 133 MMHG | TEMPERATURE: 97.9 F | HEIGHT: 63 IN | RESPIRATION RATE: 15 BRPM | HEART RATE: 70 BPM | OXYGEN SATURATION: 93 %

## 2024-12-20 LAB
GLUCOSE SERPL-MCNC: 134 MG/DL (ref 65–140)
GLUCOSE SERPL-MCNC: 170 MG/DL (ref 65–140)
GLUCOSE SERPL-MCNC: 260 MG/DL (ref 65–140)

## 2024-12-20 PROCEDURE — 94640 AIRWAY INHALATION TREATMENT: CPT

## 2024-12-20 PROCEDURE — 94760 N-INVAS EAR/PLS OXIMETRY 1: CPT

## 2024-12-20 PROCEDURE — 99232 SBSQ HOSP IP/OBS MODERATE 35: CPT | Performed by: INTERNAL MEDICINE

## 2024-12-20 PROCEDURE — 99239 HOSP IP/OBS DSCHRG MGMT >30: CPT | Performed by: INTERNAL MEDICINE

## 2024-12-20 PROCEDURE — 82948 REAGENT STRIP/BLOOD GLUCOSE: CPT

## 2024-12-20 RX ORDER — PREDNISONE 20 MG/1
TABLET ORAL
Qty: 35 TABLET | Refills: 0 | Status: SHIPPED | OUTPATIENT
Start: 2024-12-21 | End: 2025-01-18

## 2024-12-20 RX ORDER — AZITHROMYCIN 500 MG/1
500 TABLET, FILM COATED ORAL EVERY 24 HOURS
Qty: 1 TABLET | Refills: 0 | Status: SHIPPED | OUTPATIENT
Start: 2024-12-21 | End: 2024-12-22

## 2024-12-20 RX ORDER — BENZONATATE 100 MG/1
100 CAPSULE ORAL 3 TIMES DAILY PRN
Qty: 20 CAPSULE | Refills: 0 | Status: SHIPPED | OUTPATIENT
Start: 2024-12-20

## 2024-12-20 RX ORDER — LEVALBUTEROL INHALATION SOLUTION 1.25 MG/3ML
1.25 SOLUTION RESPIRATORY (INHALATION)
Status: DISCONTINUED | OUTPATIENT
Start: 2024-12-20 | End: 2024-12-20 | Stop reason: HOSPADM

## 2024-12-20 RX ADMIN — SODIUM CHLORIDE SOLN NEBU 3% 4 ML: 3 NEBU SOLN at 02:01

## 2024-12-20 RX ADMIN — BUPRENORPHINE AND NALOXONE 4 MG: 2; .5 FILM BUCCAL; SUBLINGUAL at 10:58

## 2024-12-20 RX ADMIN — BENZONATATE 100 MG: 100 CAPSULE ORAL at 09:21

## 2024-12-20 RX ADMIN — LEVALBUTEROL HYDROCHLORIDE 1.25 MG: 1.25 SOLUTION RESPIRATORY (INHALATION) at 08:18

## 2024-12-20 RX ADMIN — INSULIN LISPRO 3 UNITS: 100 INJECTION, SOLUTION INTRAVENOUS; SUBCUTANEOUS at 13:06

## 2024-12-20 RX ADMIN — DULOXETINE HYDROCHLORIDE 60 MG: 60 CAPSULE, DELAYED RELEASE ORAL at 09:21

## 2024-12-20 RX ADMIN — LEVOTHYROXINE SODIUM 100 MCG: 100 TABLET ORAL at 05:29

## 2024-12-20 RX ADMIN — AMLODIPINE BESYLATE 5 MG: 5 TABLET ORAL at 09:23

## 2024-12-20 RX ADMIN — PREDNISONE 40 MG: 20 TABLET ORAL at 09:21

## 2024-12-20 RX ADMIN — CELECOXIB 200 MG: 100 CAPSULE ORAL at 09:22

## 2024-12-20 RX ADMIN — IPRATROPIUM BROMIDE 0.5 MG: 0.5 SOLUTION RESPIRATORY (INHALATION) at 02:01

## 2024-12-20 RX ADMIN — METOPROLOL SUCCINATE 50 MG: 50 TABLET, EXTENDED RELEASE ORAL at 09:22

## 2024-12-20 RX ADMIN — LOSARTAN POTASSIUM 25 MG: 25 TABLET, FILM COATED ORAL at 09:22

## 2024-12-20 RX ADMIN — ALPRAZOLAM 0.5 MG: 0.5 TABLET ORAL at 09:24

## 2024-12-20 RX ADMIN — ATORVASTATIN CALCIUM 40 MG: 40 TABLET, FILM COATED ORAL at 09:22

## 2024-12-20 RX ADMIN — PANTOPRAZOLE SODIUM 40 MG: 40 TABLET, DELAYED RELEASE ORAL at 05:29

## 2024-12-20 RX ADMIN — AZITHROMYCIN DIHYDRATE 500 MG: 250 TABLET, FILM COATED ORAL at 01:39

## 2024-12-20 RX ADMIN — SODIUM CHLORIDE SOLN NEBU 3% 4 ML: 3 NEBU SOLN at 08:18

## 2024-12-20 RX ADMIN — LEVALBUTEROL HYDROCHLORIDE 1.25 MG: 1.25 SOLUTION RESPIRATORY (INHALATION) at 14:41

## 2024-12-20 RX ADMIN — VENLAFAXINE HYDROCHLORIDE 150 MG: 150 CAPSULE, EXTENDED RELEASE ORAL at 09:21

## 2024-12-20 RX ADMIN — SODIUM CHLORIDE SOLN NEBU 3% 4 ML: 3 NEBU SOLN at 14:41

## 2024-12-20 RX ADMIN — LEVALBUTEROL HYDROCHLORIDE 1.25 MG: 1.25 SOLUTION RESPIRATORY (INHALATION) at 02:01

## 2024-12-20 NOTE — DISCHARGE SUMMARY
Discharge Summary - Hospitalist   Name: Montserrat Sumner 74 y.o. female I MRN: [id number]  Unit/Bed#: S -01 I Date of Admission: 12/18/2024   Date of Service: 12/20/2024 I Hospital Day: 1     Assessment & Plan  COPD exacerbation (HCC)  Patient presents with increased shortness of breath, productive cough, and increasing O2 requirements currently requiring 3 L nasal cannula. Usually wears 2L just at bedtime having to use now during the day.Has not improved despite inhaler use.  In the ED patient was given DuoNeb and steroids with improvement in symptoms  Procalcitonin, flu, COVID, RSV negative  CT PE/AP: Without acute cardiopulmonary/intra-abdominal process  Patient follows with pulmonary outpatient setting her home regimen is 200 mcg Trelegy and as needed albuterol nebs 4 times daily  Will admit for COPD exacerbation  Plan  Azithromycin x 3 days  Encouraged smoking cessation  Transition to p.o. prednisone with slow taper outpatient  Hypertension  Slightly hypertensive in the ED  Continue PTA amlodipine, metoprolol, and losartan  Hypothyroid  Continue 100 mcg levothyroxine daily  CAD (coronary artery disease)  Known history of CAD s/p CABG x 1  Continue ASA and Lipitor daily  Type 2 diabetes mellitus without complication, without long-term current use of insulin (HCC)  Lab Results   Component Value Date    HGBA1C 7.4 (H) 12/18/2024       Recent Labs     12/19/24  1932 12/19/24  2222 12/20/24  0901 12/20/24  1104   POCGLU 165* 110 134 260*       Blood Sugar Average: Last 72 hrs:  (P) 171.75  PTA regimen glyburide 2 mg daily  Continue 4 times daily Accu-Cheks and sliding scale insulin with meals  Monitor blood sugars closely while on steroids  Update hemoglobin A1c  Opioid dependence (HCC)  On Suboxone 4 mg sublingual daily  PDMP reviewed last prescription filled 12/5/2024  Continue while inpatient  Tobacco use  Reports smoking 3 cigarettes daily for many years  Patient encouraged to quit smoking given her  underlying COPD  NRT offered     Medical Problems       Resolved Problems  Date Reviewed: 11/14/2024   None       Discharging Physician / Practitioner: Tania James MD  PCP: Julian Tobias DO  Admission Date:   Admission Orders (From admission, onward)       Ordered        12/19/24 1630  INPATIENT ADMISSION  Once            12/19/24 0044  Place in Observation  Once                          Discharge Date: 12/20/24    Consultations During Hospital Stay:  Pulmonology    Procedures Performed:   Right hip x-ray showed no acute abnormality  CT PE study abdomen pelvis showed no PE or acute findings    Reason for Admission: Shortness of breath    Hospital Course:   Montserrat Sumner is a 74 y.o. female patient with PMHx of COPD, uses oxygen at night only, opioid dependence, obesity and type 2 diabetes who originally presented to the hospital on 12/18/2024 due to dyspnea for several days.  She was recently seen by pulmonology and was prescribed steroid.  Per patient's daughter that patient might have tapered her prednisone too quickly.     She was treated for COPD exacerbation.  Was seen by pulmonology.  On the day of discharge her O2 sat is stable on room air and after ambulation.  She will be discharged home on prednisone with a slow taper    Family had concern about brief confusion that she had.  Question whether she has been managing her meds properly.  We recommend family to assist with pill trays at home.  She is on Suboxone daily managed by a drug rehab in [place] due to remote history of addiction to oxycodone per family report      Please see above list of diagnoses and related plan for additional information.     Condition at Discharge: fair    Discharge Day Visit / Exam:   Subjective: Denies chest pain or dyspnea.  O2 sats 96% on room air.   Vitals: Blood Pressure: 133/76 (12/20/24 1425)  Pulse: 70 (12/20/24 1425)  Temperature: 97.9 °F (36.6 °C) (12/20/24 1425)  Temp Source: Oral (12/19/24  "0236)  Respirations: 15 (12/20/24 1422)  Height: 5' 3\" (160 cm) (12/19/24 0236)  Weight - Scale: 79.4 kg (175 lb 0.7 oz) (12/19/24 0236)  SpO2: 93 % (12/20/24 1442)  Physical Exam  Constitutional:       General: She is not in acute distress.     Appearance: She is not ill-appearing, toxic-appearing or diaphoretic.   Eyes:      General:         Right eye: No discharge.         Left eye: No discharge.   Cardiovascular:      Rate and Rhythm: Normal rate.      Pulses: Normal pulses.   Pulmonary:      Effort: Pulmonary effort is normal. No respiratory distress.      Breath sounds: No wheezing.   Abdominal:      General: Bowel sounds are normal.      Palpations: Abdomen is soft.   Neurological:      Mental Status: She is oriented to person, place, and time.   Psychiatric:         Mood and Affect: Mood normal.         Behavior: Behavior normal.          Discussion with Family: Updated  () at bedside.    Discharge instructions/Information to patient and family:   See after visit summary for information provided to patient and family.      Provisions for Follow-Up Care:  See after visit summary for information related to follow-up care and any pertinent home health orders.      Mobility at time of Discharge:   Basic Mobility Inpatient Raw Score: 18  JH-HLM Goal: 6: Walk 10 steps or more  JH-HLM Achieved: 4: Move to chair/commode       Disposition:   Home    Planned Readmission: No     Discharge Medications:  See after visit summary for reconciled discharge medications provided to patient and/or family.      Administrative Statements   Discharge Statement:  I have spent a total time of 30  minutes in caring for this patient on the day of the visit/encounter. >30 minutes of time was spent on: Instructions for management.    **Please Note: This note may have been constructed using a voice recognition system**  "

## 2024-12-20 NOTE — DISCHARGE INSTR - AVS FIRST PAGE
Dear Montserrat Sumner,     It was our pleasure to care for you here at [facility].  It is our hope that we were always able to exceed the expected standards for your care during your stay.  You were hospitalized due to COPD exacerbation.  You were cared for on the second floor under the service of Tania James MD with the [facility] who covers for your primary care physician (PCP), Julian Tobias DO, while you were hospitalized.  If you have any questions or concerns related to this hospitalization, you may contact us at .  For follow up as well as medication refills, we recommend that you follow up with your primary care physician.  A registered nurse will reach out to you by phone within a few days after your discharge to answer any additional questions that you may have after going home.  However, at this time we provide for you here, the most important instructions / recommendations at discharge:     Notable Medication Adjustments -   Prednisone 40 mg daily then taper by 10 mg every 7 days until off (as instructed)  Azithromycin 500 mg 1 more day tomorrow  Testing Required after Discharge -   ** Please contact your PCP to request testing orders for any of the testing recommended here **  Important follow up information -   PCP  Pulmonary  Other Instructions -   Please review this entire after visit summary as additional general instructions including medication list, appointments, activity, diet, any pertinent wound care, and other additional recommendations from your care team that may be provided for you.      Sincerely,     Tania James MD

## 2024-12-20 NOTE — ASSESSMENT & PLAN NOTE
Patient presents with increased shortness of breath, productive cough, and increasing O2 requirements currently requiring 3 L nasal cannula. Usually wears 2L just at bedtime having to use now during the day.Has not improved despite inhaler use.  In the ED patient was given DuoNeb and steroids with improvement in symptoms  Procalcitonin, flu, COVID, RSV negative  CT PE/AP: Without acute cardiopulmonary/intra-abdominal process  Patient follows with pulmonary outpatient setting her home regimen is 200 mcg Trelegy and as needed albuterol nebs 4 times daily  Will admit for COPD exacerbation  Plan  Azithromycin x 3 days  Encouraged smoking cessation  Transition to p.o. prednisone with slow taper outpatient

## 2024-12-20 NOTE — PROGRESS NOTES
PULMONOLOGY PROGRESS NOTE     Name: Montserrat Sumner   Age & Sex: 74 y.o. female   MRN: [id number]  Unit/Bed#: S -   Encounter: [id number]    PATIENT INFORMATION     Name: Montserrat Sumner   Age & Sex: 74 y.o. female   MRN: [id number]  Hospital Stay Days: 1    ASSESSMENT/PLAN     Assessment:   Acute respiratory insufficiency  Chronic hypoxic resp failure, on 2L at home  Moderate COPD, group E  Nicotine dependence  Hx of opioid use, now on Suboxone  Depression  Chronic prednisone use  hypothyroidism    Plan:  Does not have wheezing. Can continue 3% and xopenex TID, can hold ipratroipium since this might dry out her mucous production. Upon discharge she can resume her home trelegy  Wean O2 as tolerate. Noted at home she is on 2L PRN. Keep SpO2 > 88%  Continue prednisone 40mg QD starting today. I will suggest a slow taper: please keep her 40mg QD for 7 days, then decrease 10mg every 7 days until off.  She should follow up with pulm outpatient. We will arrange      SUBJECTIVE     Patient seen and examined. No acute events overnight. No wheezing and no crackles    OBJECTIVE     Vitals:    24 1951 244 245 24 0327   BP: 161/86 160/95 160/95 123/66   BP Location:       Pulse: 78 68 64 73   Resp:  16  18   Temp: 97.9 °F (36.6 °C)  (!) 97.4 °F (36.3 °C) (!) 97.3 °F (36.3 °C)   TempSrc:       SpO2: 92% 92% 91% 93%   Weight:       Height:          Temperature:   Temp (24hrs), Av.5 °F (36.4 °C), Min:97.3 °F (36.3 °C), Max:97.9 °F (36.6 °C)    Temperature: (!) 97.3 °F (36.3 °C)  Intake & Output:  I/O          0701   0700  07 07    Urine (mL/kg/hr) 600 1100 (0.6)    Total Output 600 1100    Net -600 -1100                Weights:   IBW (Ideal Body Weight): 52.4 kg    Body mass index is 31.01 kg/m².  Weight (last 2 days)       Date/Time Weight    24 0236 79.4 (175.05)          Physical Exam  Constitutional:       Appearance: She is obese. She is not  ill-appearing.   Cardiovascular:      Rate and Rhythm: Normal rate and regular rhythm.   Pulmonary:      Effort: Pulmonary effort is normal.      Breath sounds: No wheezing or rhonchi.   Abdominal:      General: Bowel sounds are normal.      Palpations: Abdomen is soft.      Tenderness: There is no abdominal tenderness.   Neurological:      Mental Status: She is alert.           LABORATORY DATA     Labs: I have personally reviewed pertinent reports.  Results from last 7 days   Lab Units 12/19/24  0522 12/18/24  2113 12/15/24  1406   WBC Thousand/uL 7.86 8.25 9.41   HEMOGLOBIN g/dL 11.6 12.7 13.9   HEMATOCRIT % 37.5 40.3 45.5   PLATELETS Thousands/uL 207 244 251   SEGS PCT %  --  83*  --    MONO PCT % 4 6 6   EOS PCT % 0 0 0      Results from last 7 days   Lab Units 12/19/24  0522 12/18/24  2113 12/15/24  1406   POTASSIUM mmol/L 4.6 4.7 4.0   CHLORIDE mmol/L 105 103 104   CO2 mmol/L 24 25 29   BUN mg/dL 23 28* 18   CREATININE mg/dL 0.86 0.94 0.87   CALCIUM mg/dL 9.0 9.5 9.7   ALK PHOS U/L  --  42 46   ALT U/L  --  28 17   AST U/L  --  37 15              Results from last 7 days   Lab Units 12/18/24 2113   INR  0.91   PTT seconds 35*                 ABG:       Micro:   Results from last 7 days   Lab Units 12/18/24 2113   BLOOD CULTURE  No Growth at 24 hrs.  No Growth at 24 hrs.         IMAGING & DIAGNOSTIC TESTING     Radiology Results: I have personally reviewed pertinent reports.  CT pe study w abdomen pelvis w contrast  Result Date: 12/18/2024  Impression: 1. No evidence of acute pulmonary embolus, thoracic aortic aneurysm or dissection. No acute cardiopulmonary process. 2. No acute intra-abdominal or pelvic process. Workstation performed: [id number]     Other Diagnostic Testing: I have personally reviewed pertinent reports.    ACTIVE MEDICATIONS       Current Facility-Administered Medications:     acetaminophen (TYLENOL) tablet 975 mg, Q8H PRN    albuterol inhalation solution 2.5 mg, 4x Daily PRN    ALPRAZolam  "(XANAX) tablet 0.5 mg, BID    amLODIPine (NORVASC) tablet 5 mg, Daily    atorvastatin (LIPITOR) tablet 40 mg, QAM    azithromycin (ZITHROMAX) tablet 500 mg, Q24H    benzonatate (TESSALON PERLES) capsule 100 mg, TID PRN    buprenorphine-naloxone (Suboxone) film 4 mg, QAM    celecoxib (CeleBREX) capsule 200 mg, Daily    cyclobenzaprine (FLEXERIL) tablet 10 mg, TID PRN    DULoxetine (CYMBALTA) delayed release capsule 60 mg, Daily    enoxaparin (LOVENOX) subcutaneous injection 40 mg, BID    insulin lispro (HumALOG/ADMELOG) 100 units/mL subcutaneous injection 1-6 Units, 4x Daily (PC & HS)    ipratropium (ATROVENT) 0.02 % inhalation solution 0.5 mg, TID    levalbuterol (XOPENEX) inhalation solution 1.25 mg, TID    levothyroxine tablet 100 mcg, Early Morning    losartan (COZAAR) tablet 25 mg, QAM    metoprolol succinate (TOPROL-XL) 24 hr tablet 50 mg, QAM    multivitamin stress formula tablet 1 tablet, Weekly    nicotine (NICODERM CQ) 14 mg/24hr TD 24 hr patch 1 patch, Daily    pantoprazole (PROTONIX) EC tablet 40 mg, Early Morning    predniSONE tablet 40 mg, Daily    sodium chloride 3 % inhalation solution 4 mL, Q6H    traZODone (DESYREL) tablet 100 mg, HS    venlafaxine (EFFEXOR-XR) 24 hr capsule 150 mg, Daily    venlafaxine (EFFEXOR-XR) 24 hr capsule 75 mg, HS      Disclaimer: Portions of the record may have been created with voice recognition software. Occasional wrong word or \"sound a like\" substitutions may have occurred due to the inherent limitations of voice recognition software. Careful consideration should be taken to recognize, using context, where substitutions have occurred.    Chinyere Silva MD   Pulmonary and Critical Care Fellow, PGY-IV  [facility]       "

## 2024-12-20 NOTE — ASSESSMENT & PLAN NOTE
Lab Results   Component Value Date    HGBA1C 7.4 (H) 12/18/2024       Recent Labs     12/19/24  1932 12/19/24  2222 12/20/24  0901 12/20/24  1104   POCGLU 165* 110 134 260*       Blood Sugar Average: Last 72 hrs:  (P) 171.75  PTA regimen glyburide 2 mg daily  Continue 4 times daily Accu-Cheks and sliding scale insulin with meals  Monitor blood sugars closely while on steroids  Update hemoglobin A1c   beer

## 2024-12-20 NOTE — PLAN OF CARE
Problem: Potential for Falls  Goal: Patient will remain free of falls  Description: INTERVENTIONS:  - Educate patient/family on patient safety including physical limitations  - Instruct patient to call for assistance with activity   - Consult OT/PT to assist with strengthening/mobility   - Keep Call bell within reach  - Keep bed low and locked with side rails adjusted as appropriate  - Keep care items and personal belongings within reach  - Initiate and maintain comfort rounds  - Make Fall Risk Sign visible to staff  - Offer Toileting every 2-3 Hours, in advance of need  - Initiate/Maintain bed/chair alarm  - Obtain necessary fall risk management equipment: yellow socks/bracelet  - Apply yellow socks and bracelet for high fall risk patients  - Consider moving patient to room near nurses station  Outcome: Progressing     Problem: Prexisting or High Potential for Compromised Skin Integrity  Goal: Skin integrity is maintained or improved  Description: INTERVENTIONS:  - Identify patients at risk for skin breakdown  - Assess and monitor skin integrity  - Assess and monitor nutrition and hydration status  - Monitor labs   - Assess for incontinence   - Turn and reposition patient  - Assist with mobility/ambulation  - Relieve pressure over bony prominences  - Avoid friction and shearing  - Provide appropriate hygiene as needed including keeping skin clean and dry  - Evaluate need for skin moisturizer/barrier cream  - Collaborate with interdisciplinary team   - Patient/family teaching  - Consider wound care consult   Outcome: Progressing

## 2024-12-20 NOTE — UTILIZATION REVIEW
Initial Clinical Review    Pt initially admitted as Observation on 12/19 @ 0044. Changed to Inpatient on 12/19 @ 1630. Pt requiring continued stay d/t COPD exacerbation.    Admission: Date/Time/Statement:   Admission Orders (From admission, onward)       Ordered        12/19/24 1630  INPATIENT ADMISSION  Once            12/19/24 0044  Place in Observation  Once                          Orders Placed This Encounter   Procedures    INPATIENT ADMISSION     Standing Status:   Standing     Number of Occurrences:   1     Level of Care:   Med Surg [16]     Estimated length of stay:   More than 2 Midnights     Certification:   I certify that inpatient services are medically necessary for this patient for a duration of greater than two midnights. See H&P and MD Progress Notes for additional information about the patient's course of treatment.     ED Arrival Information       Expected   -    Arrival   12/18/2024 19:54    Acuity   Urgent              Means of arrival   Ambulance    Escorted by   [facility]    Service   Hospitalist    Admission type   Emergency              Arrival complaint   ems abd pain             Chief Complaint   Patient presents with    Abdominal Pain     Pt arrives via EMS from home. Pt c/o RUQ pain & back pain. P was seen at [place] yesterday.   Pt also having SOB but is supposed to be wearing 3L o2 and has not been using it.       12/17 Presentation: 74 y.o. female who presented by EMS to [facility] ED. Patient arrived on 12/18/2024  7:57 PM. Care then began on 12/18 @ 2103 when CT ordered. The patient has already surpassed 1 midnight with active ongoing care.     12/18  Changed to Inpatient Status: Admitted as Observation, but later in the afternoon changed to Inpatient for evaluation and treatment of COPD exacerbation. PMHx: chronic pain, COPD, GERD, HLD, HTN. Presented w/ increased SOB despite used PRN inhalers in addition to home maintenance inhalers. Requiring 2.5L O2 qHS, but now  also requiring it more frequently through the day. Productive cough w/ difficulty expelling secretions. EXAM: fatigued, decreased air movement, rhonchi, productive cough (after steroid and neb treatment). PLAN: nebs TID, IV solu-medrol BID, azithromycin, Supplemental O2, weaned as tolerated, continue PTA meds, check HgbA1c, SSI w/ BG checks ACHS. I&O. Pulmonology consulted.    Pulmonology: Baseline 2.5 L O2 NC at night. Continues smoking 3-5 cigarettes daily. Continue nebs, IV solumedrol 40 mg BID. Supplemental O2, weaned as tolerated.    Date: 12/19  Day 3: Has surpassed a 2nd midnight with active treatments and services. Exam: coarse breath sounds, moist cough. Continue 3% and xopenex TID, discontinue ipratroipium -- this will dry out mucous production. Supplemental O2, weaned as tolerated to baseline 2L O2. Slow taper of prednisone from 40 mg, decrease by 10 mg every 7 days.       ED Treatment-Medication Administration from 12/18/2024 1954 to 12/19/2024 1945         Date/Time Order Dose Route Action     12/18/2024 2125 ipratropium-albuterol (DUO-NEB) 0.5-2.5 mg/3 mL inhalation solution 3 mL 3 mL Nebulization Given     12/18/2024 2218 sodium chloride 0.9 % bolus 500 mL 500 mL Intravenous New Bag     12/18/2024 2229 iohexol (OMNIPAQUE) 350 MG/ML injection (MULTI-DOSE) 100 mL 100 mL Intravenous Given     12/18/2024 2241 methylPREDNISolone sodium succinate (Solu-MEDROL) injection 80 mg 80 mg Intravenous Given     12/18/2024 2332 guaiFENesin (MUCINEX) 12 hr tablet 1,200 mg 1,200 mg Oral Given     12/18/2024 2332 ipratropium-albuterol (DUO-NEB) 0.5-2.5 mg/3 mL inhalation solution 3 mL 3 mL Nebulization Given     12/18/2024 2332 sodium chloride 0.9 % bolus 500 mL 500 mL Intravenous New Bag     12/19/2024 0213 ALPRAZolam (XANAX) tablet 0.5 mg 0.5 mg Oral Given     12/19/2024 0851 amLODIPine (NORVASC) tablet 5 mg 5 mg Oral Given     12/19/2024 0851 atorvastatin (LIPITOR) tablet 40 mg 40 mg Oral Given     12/19/2024 0850  celecoxib (CeleBREX) capsule 200 mg 200 mg Oral Given     12/19/2024 0850 DULoxetine (CYMBALTA) delayed release capsule 60 mg 60 mg Oral Given     12/19/2024 0518 pantoprazole (PROTONIX) EC tablet 40 mg 40 mg Oral Given     12/19/2024 0519 levothyroxine tablet 100 mcg 100 mcg Oral Given     12/19/2024 0851 losartan (COZAAR) tablet 25 mg 25 mg Oral Given     12/19/2024 0851 metoprolol succinate (TOPROL-XL) 24 hr tablet 50 mg 50 mg Oral Given     12/19/2024 0213 traZODone (DESYREL) tablet 100 mg 100 mg Oral Given     12/19/2024 0234 venlafaxine (EFFEXOR-XR) 24 hr capsule 75 mg 75 mg Oral Given     12/19/2024 0851 enoxaparin (LOVENOX) subcutaneous injection 40 mg 40 mg Subcutaneous Given     12/19/2024 1926 enoxaparin (LOVENOX) subcutaneous injection 40 mg 40 mg Subcutaneous Given     12/19/2024 0234 insulin lispro (HumALOG/ADMELOG) 100 units/mL subcutaneous injection 1-6 Units 1 Units Subcutaneous Given     12/19/2024 0959 insulin lispro (HumALOG/ADMELOG) 100 units/mL subcutaneous injection 1-6 Units 1 Units Subcutaneous Given     12/19/2024 1327 insulin lispro (HumALOG/ADMELOG) 100 units/mL subcutaneous injection 1-6 Units 2 Units Subcutaneous Given     12/19/2024 0851 methylPREDNISolone sodium succinate (Solu-MEDROL) injection 40 mg 40 mg Intravenous Given     12/19/2024 0849 ipratropium (ATROVENT) 0.02 % inhalation solution 0.5 mg 0.5 mg Nebulization Given     12/19/2024 1328 ipratropium (ATROVENT) 0.02 % inhalation solution 0.5 mg 0.5 mg Nebulization Given     12/19/2024 0849 levalbuterol (XOPENEX) inhalation solution 1.25 mg 1.25 mg Nebulization Given     12/19/2024 1328 levalbuterol (XOPENEX) inhalation solution 1.25 mg 1.25 mg Nebulization Given     12/19/2024 0213 azithromycin (ZITHROMAX) tablet 500 mg 500 mg Oral Given     12/19/2024 0217 sodium chloride 3 % inhalation solution 4 mL 4 mL Nebulization Given     12/19/2024 0849 sodium chloride 3 % inhalation solution 4 mL 4 mL Nebulization Given     12/19/2024  1328 sodium chloride 3 % inhalation solution 4 mL 4 mL Nebulization Given     12/19/2024 0850 multivitamin stress formula tablet 1 tablet 1 tablet Oral Given     12/19/2024 0958 oxyCODONE (ROXICODONE) split tablet 2.5 mg 2.5 mg Oral Given            Scheduled Medications:  ALPRAZolam, 0.5 mg, Oral, BID  amLODIPine, 5 mg, Oral, Daily  atorvastatin, 40 mg, Oral, QAM  azithromycin, 500 mg, Oral, Q24H  buprenorphine-naloxone, 4 mg, Sublingual, QAM  celecoxib, 200 mg, Oral, Daily  DULoxetine, 60 mg, Oral, Daily  enoxaparin, 40 mg, Subcutaneous, BID  insulin lispro, 1-6 Units, Subcutaneous, 4x Daily (PC & HS)  levalbuterol, 1.25 mg, Nebulization, Q6H  levothyroxine, 100 mcg, Oral, Early Morning  losartan, 25 mg, Oral, QAM  metoprolol succinate, 50 mg, Oral, QAM  multivitamin stress formula, 1 tablet, Oral, Weekly  nicotine, 1 patch, Transdermal, Daily  pantoprazole, 40 mg, Oral, Early Morning  predniSONE, 40 mg, Oral, Daily  sodium chloride, 4 mL, Nebulization, Q6H  traZODone, 100 mg, Oral, HS  venlafaxine, 150 mg, Oral, Daily  venlafaxine, 75 mg, Oral, HS    Continuous IV Infusions: none    PRN Meds:  acetaminophen, 975 mg, Oral, Q8H PRN  albuterol, 2.5 mg, Nebulization, 4x Daily PRN  benzonatate, 100 mg, Oral, TID PRN  cyclobenzaprine, 10 mg, Oral, TID PRN    ipratropium (ATROVENT) 0.02 % inhalation solution 0.5 mg  Dose: 0.5 mg  Freq: 3 times daily (RESP) Route: NEBULIZATION  Start: 12/19/24 0800 End: 12/20/24 0457  levalbuterol (XOPENEX) inhalation solution 1.25 mg  Dose: 1.25 mg  Freq: 3 times daily (RESP) Route: NEBULIZATION  Start: 12/19/24 0800 End: 12/20/24 0457  methylPREDNISolone sodium succinate (Solu-MEDROL) injection 40 mg  Dose: 40 mg  Freq: Every 12 hours scheduled Route: IV  Start: 12/19/24 0900 End: 12/19/24 2228    ED Triage Vitals [12/18/24 2008]   Temperature Pulse Respirations Blood Pressure SpO2 Pain Score   98.3 °F (36.8 °C) 101 18 152/90 91 % 6     Weight (last 2 days)       Date/Time Weight     12/19/24 0236 79.4 (175.05)            Vital Signs (last 3 days)       Date/Time Temp Pulse Resp BP MAP (mmHg) SpO2 Calculated FIO2 (%) - Nasal Cannula Nasal Cannula O2 Flow Rate (L/min) O2 Device Patient Position - Orthostatic VS Mignon Coma Scale Score Pain    12/20/24 07:34:36 -- 53 -- 136/59 85 95 % -- -- -- -- -- --    12/20/24 03:27:14 97.3 °F (36.3 °C) 73 18 123/66 85 93 % -- -- -- -- -- --    12/20/24 0202 -- -- -- -- -- -- 28 2 L/min Nasal cannula -- -- --    12/19/24 22:25:30 97.4 °F (36.3 °C) 64 -- 160/95 117 91 % -- -- -- -- -- --    12/19/24 22:24:53 -- 68 16 160/95 117 92 % -- -- -- -- -- --    12/19/24 2000 -- -- -- -- -- -- -- -- -- -- 14 No Pain    12/19/24 19:51:03 97.9 °F (36.6 °C) 78 -- 161/86 111 92 % -- -- -- -- -- --    12/19/24 1929 -- 82 22 170/85 -- 94 % -- -- None (Room air) Sitting -- --    12/19/24 1600 -- 72 20 161/77 110 96 % 28 2 L/min Nasal cannula Sitting -- --    12/19/24 1038 -- -- -- 150/73 -- -- -- -- -- Sitting -- --    12/19/24 0842 -- 91 20 179/100 -- 93 % 28 2 L/min Nasal cannula Sitting -- --    12/19/24 0312 -- -- -- -- -- 91 % -- -- -- -- -- --    12/19/24 0236 97 °F (36.1 °C) 89 22 162/72 104 94 % -- -- -- -- -- --    12/19/24 0222 -- -- -- -- -- -- -- -- -- -- 14 --    12/19/24 0215 -- 90 22 162/72 104 92 % 28 2 L/min Nasal cannula -- -- --    12/18/24 2338 -- 90 20 163/89 119 93 % -- -- Nasal cannula -- -- --    12/18/24 2220 -- 98 18 176/92 -- 95 % 28 2 L/min Nasal cannula -- -- --    12/18/24 2052 -- -- -- -- -- -- -- -- None (Room air) -- -- --    12/18/24 2008 98.3 °F (36.8 °C) 101 18 152/90 -- 91 % -- -- None (Room air) -- -- 6              Pertinent Labs/Diagnostic Test Results:   Radiology:  CT pe study w abdomen pelvis w contrast   ED Interpretation by David Barrow PA-C (12/18 0894)   CT PULMONARY ANGIOGRAM OF THE CHEST AND CT ABDOMEN AND PELVIS WITH INTRAVENOUS CONTRAST     INDICATION: Shortness of breath and abdominal pain.     COMPARISON: 12/15/2024.      TECHNIQUE: CT examination of the chest, abdomen and pelvis was performed. Thin section CT angiographic technique was used in the chest in order to evaluate for pulmonary embolus and coronal 3D MIP postprocessing was performed on the acquisition scanner.   Multiplanar 2D reformatted images were created from the source data.     This examination, like all CT scans performed in the [facility], was performed utilizing techniques to minimize radiation dose exposure, including the use of iterative reconstruction and automated exposure control. Radiation dose length   product (DLP) for this visit: 846 mGy-cm     IV Contrast: 100 mL of iohexol (OMNIPAQUE)  Enteric Contrast: Not administered.     FINDINGS:     CHEST     PULMONARY ARTERIAL TREE:  No filling defect in the visualized pulmonary arteries to i   ndicate an acute embolus.     LUNGS: Mild scarring and subsegmental atelectasis in the right upper lobe and lingula. No tracheal or endobronchial lesion.     PLEURA: No effusion.     HEART/AORTA: Normal heart size. No thoracic aortic aneurysm or dissection.     MEDIASTINUM AND MICHELLE: No lymphadenopathy.     CHEST WALL AND LOWER NECK: Median sternotomy.     ABDOMEN     LIVER/BILIARY TREE: Mild intrahepatic and common bile duct dilatation likely secondary to postsurgical change. No choledocholithiasis.     GALLBLADDER: Cholecystectomy.     SPLEEN: Unremarkable.     PANCREAS: Stable irregular dilatation of the main pancreatic duct.     ADRENAL GLANDS: Unremarkable.     KIDNEYS/URETERS: Symmetric nephrographic phase enhancement of the kidneys. Subcentimeter right renal cortical cyst. No obstructive uropathy.     STOMACH AND BOWEL: No bowel obstruction, colitis or diverticulitis.     APPENDIX: No findings to suggest appendicitis.     ABDOMINOPELVIC CAVITY: No ascites. No pneumoperitoneum. N   o lymphadenopathy.     VESSELS: Calcified plaque throughout the abdominal aorta and iliac arteries without evidence of  aneurysm or dissection.     PELVIS     REPRODUCTIVE ORGANS: Prior hysterectomy.     URINARY BLADDER: Unremarkable.     ABDOMINAL WALL/INGUINAL REGIONS: Unremarkable.     BONES: Dynamic hip screw secures left femoral neck fracture in normal alignment. L4-5 posterior fusion hardware. Chronic L1 fracture, stable.     IMPRESSION:        1. No evidence of acute pulmonary embolus, thoracic aortic aneurysm or dissection. No acute cardiopulmonary process.  2. No acute intra-abdominal or pelvic process.                    Workstation performed: [id number]           Final Interpretation by Warren Payan MD (12/18 2331)         1. No evidence of acute pulmonary embolus, thoracic aortic aneurysm or dissection. No acute cardiopulmonary process.   2. No acute intra-abdominal or pelvic process.                     Workstation performed: [id number]         XR hip/pelv 2-3 vws right if performed    (Results Pending)     Cardiology:  ECG 12 lead    by Interface, Ris Results In (12/18 2337)      ECG 12 lead    by Interface, Ris Results In (12/18 2008)          Results from last 7 days   Lab Units 12/18/24 2113   SARS-COV-2  Negative     Results from last 7 days   Lab Units 12/19/24  0522 12/18/24  2113 12/15/24  1406   WBC Thousand/uL 7.86 8.25 9.41   HEMOGLOBIN g/dL 11.6 12.7 13.9   HEMATOCRIT % 37.5 40.3 45.5   PLATELETS Thousands/uL 207 244 251   TOTAL NEUT ABS Thousands/µL  --  6.91  --    BANDS PCT % 1  --  2         Results from last 7 days   Lab Units 12/19/24  0522 12/18/24  2113 12/15/24  1406   SODIUM mmol/L 137 136 143   POTASSIUM mmol/L 4.6 4.7 4.0   CHLORIDE mmol/L 105 103 104   CO2 mmol/L 24 25 29   ANION GAP mmol/L 8 8 10   BUN mg/dL 23 28* 18   CREATININE mg/dL 0.86 0.94 0.87   EGFR ml/min/1.73sq m 67 60 66   CALCIUM mg/dL 9.0 9.5 9.7     Results from last 7 days   Lab Units 12/18/24  2113 12/15/24  1406   AST U/L 37 15   ALT U/L 28 17   ALK PHOS U/L 42 46   TOTAL PROTEIN g/dL 7.0 7.2   ALBUMIN g/dL 3.9 4.1    TOTAL BILIRUBIN mg/dL 0.33 0.34     Results from last 7 days   Lab Units 12/19/24  2222 12/19/24  1932 12/19/24  1324 12/19/24  0955 12/19/24  0227 12/18/24 2009   POC GLUCOSE mg/dl 110 165* 191* 185* 169* 160*     Results from last 7 days   Lab Units 12/19/24  0522 12/18/24 2113 12/15/24  1406   GLUCOSE RANDOM mg/dL 197* 150* 118         Results from last 7 days   Lab Units 12/18/24 2113   HEMOGLOBIN A1C % 7.4*   EAG mg/dl 166      Results from last 7 days   Lab Units 12/19/24  1037 12/18/24 2113   PH JAKOB  7.389 7.447*   PCO2 JAKOB mm Hg 40.8* 36.2*   PO2 JAKOB mm Hg 104.1* 109.3*   HCO3 JAKOB mmol/L 24.1 24.4   BASE EXC JAKOB mmol/L -0.8 0.7   O2 CONTENT JAKOB ml/dL 15.8 17.6   O2 HGB, VENOUS % 94.6* 94.9*      Results from last 7 days   Lab Units 12/18/24  2338 12/18/24 2113   HS TNI 0HR ng/L  --  12   HS TNI 2HR ng/L 12  --    HSTNI D2 ng/L 0  --          Results from last 7 days   Lab Units 12/18/24 2113   PROTIME seconds 13.0   INR  0.91   PTT seconds 35*         Results from last 7 days   Lab Units 12/18/24 2113   PROCALCITONIN ng/ml 0.07      Results from last 7 days   Lab Units 12/18/24 2113   BNP pg/mL 68      Results from last 7 days   Lab Units 12/18/24  2113 12/15/24  1406   LIPASE u/L 23 50      Results from last 7 days   Lab Units 12/18/24  2350 12/15/24  2017 12/15/24  2015   CLARITY UA  Clear Clear Clear   COLOR UA  Light Yellow Light Yellow Yellow   SPEC GRAV UA  1.031* >=1.050* 1.015   PH UA  5.5 6.0 5.5   GLUCOSE UA mg/dl Negative Negative Negative   KETONES UA mg/dl Negative Negative Negative   BLOOD UA  Negative Negative Negative   PROTEIN UA mg/dl Trace* 50 (1+)* 30 (1+)*   NITRITE UA  Negative Negative Negative   BILIRUBIN UA  Negative Negative Negative   UROBILINOGEN UA E.U./dl  --   --  0.2   UROBILINOGEN UA (BE) mg/dl <2.0 <2.0  --    LEUKOCYTES UA  Negative Negative Negative   WBC UA /hpf None Seen None Seen  --    RBC UA /hpf 1-2 None Seen  --    BACTERIA UA /hpf None Seen None Seen  --   "  EPITHELIAL CELLS WET PREP /hpf Occasional Occasional  --      Results from last 7 days   Lab Units 12/18/24 2113   INFLUENZA A PCR  Negative   INFLUENZA B PCR  Negative   RSV PCR  Negative      Results from last 7 days   Lab Units 12/18/24 2113   BLOOD CULTURE  No Growth at 24 hrs.  No Growth at 24 hrs.       Past Medical History:   Diagnosis Date    Anxiety     Chronic pain 03/06/2023    lower abdomen and back    Colon polyp     COPD (chronic obstructive pulmonary disease) (HCC)     \"passes out\" with O2 levels dropping    Disease of thyroid gland     GERD (gastroesophageal reflux disease)     History of transfusion     with aneurysm repair-autologous-self and daughter    Hyperlipidemia     Hypertension     Teeth missing     Wears glasses     For reading     Present on Admission:   Type 2 diabetes mellitus without complication, without long-term current use of insulin (HCC)   Hypertension   Hypothyroid   CAD (coronary artery disease)   COPD exacerbation (HCC)   Opioid dependence (HCC)      Admitting Diagnosis: Dyspnea [R06.00]  Abdominal pain [R10.9]  COPD exacerbation (HCC) [J44.1]  Age/Sex: 74 y.o. female    Network Utilization Review Department  ATTENTION: Please call with any questions or concerns to [phone] and carefully listen to the prompts so that you are directed to the right person. All voicemails are confidential.   For Discharge needs, contact Care Management DC Support Team at [phone] opt. 2  Send all requests for admission clinical reviews, approved or denied determinations and any other requests to dedicated fax number below belonging to the campus where the patient is receiving treatment. List of dedicated fax numbers for the Facilities:  FACILITY NAME UR FAX NUMBER   ADMISSION DENIALS (Administrative/Medical Necessity) [phone]   DISCHARGE SUPPORT TEAM (NETWORK) [phone]   PARENT CHILD HEALTH (Maternity/NICU/Pediatrics) [phone]   [facility] " [phone]   [facility] [phone]   [facility] [phone]   [facility] [phone]   [facility] [phone]   [facility] [phone]   [facility] [phone]   [facility] [phone]   [facility] [phone]   [facility] [phone]   [facility] [phone]   [facility] [phone]

## 2024-12-21 DIAGNOSIS — M47.16 OSTEOARTHRITIS OF LUMBAR SPINE WITH MYELOPATHY: ICD-10-CM

## 2024-12-22 LAB
BACTERIA BLD CULT: NORMAL
BACTERIA BLD CULT: NORMAL

## 2024-12-23 ENCOUNTER — PATIENT OUTREACH (OUTPATIENT)
Dept: CASE MANAGEMENT | Facility: OTHER | Age: 74
End: 2024-12-23

## 2024-12-23 DIAGNOSIS — J44.9 CHRONIC OBSTRUCTIVE PULMONARY DISEASE, UNSPECIFIED COPD TYPE (HCC): ICD-10-CM

## 2024-12-23 DIAGNOSIS — F17.200 NICOTINE DEPENDENCE, UNCOMPLICATED, UNSPECIFIED NICOTINE PRODUCT TYPE: Primary | ICD-10-CM

## 2024-12-23 LAB
BACTERIA BLD CULT: NORMAL
BACTERIA BLD CULT: NORMAL

## 2024-12-23 RX ORDER — CELECOXIB 200 MG/1
200 CAPSULE ORAL DAILY
Qty: 90 CAPSULE | Refills: 1 | Status: SHIPPED | OUTPATIENT
Start: 2024-12-23

## 2024-12-23 NOTE — PROGRESS NOTES
OP RT CM called and spoke with patient regarding their breathing, medications and O2. OP RT CM referral placed.    Patient reports she is doing well with her breathing. She does have times where she gets SOB. She wants to stop smoking and is interested in smoking cessation. OP RT CM placed referral for smoking cessation and provided patient with contact information.  She has smoked for many years but most recent smokes 1 pack every 2-3 days.     Montserrat reports  taking her prescribed respiratory medications. She uses Abluterol nebulizer 2-3 x per day and has Proventil HFA for rescue.  She also uses Trelegy 1 p QD and rinses with steroid. Currently her medication is covered by her insurance but she is concerned in the new year it may not be.   OP RT CM provided patient with [employer] phone number as patient  will retire in early 2025 and she believes she will qualify. She may also try to get on Medicaid but will call for "[employer] if she can not qualify.     Montserrat is also taking a Prednisone taper that will end in a few weeks. She denies fever  wears home oxygen 2.5 lpm HS and prn. POX ranges from 90-92% typically on room air daytime. At times it does fall 88% or below and she uses oxygen based on POX and her symptoms.     Montserrat reports coughing up secretions that vary in color from light yellow to brown.  Sometimes they are very thick.     Patient has COPD zone tools and COPD booklet . Educations was gone over during the call. Patient able to verbalize teach back.      OP RT CM provided patient with contact number to call with questions. OP RT CM will outreach patient in 1 week for follow up and to check status of her condition.

## 2024-12-23 NOTE — UTILIZATION REVIEW
NOTIFICATION OF ADMISSION DISCHARGE   This is a Notification of Discharge from [facility]. Please be advised that this patient has been discharge from our facility. Below you will find the admission and discharge date and time including the patient’s disposition.   UTILIZATION REVIEW CONTACT:  Caryl López  Utilization   Network Utilization Review Department  Phone: [phone] x carefully listen to the prompts. All voicemails are confidential.  Email: [email or web address]     ADMISSION INFORMATION  PRESENTATION DATE: 12/18/2024  7:57 PM  OBERVATION ADMISSION DATE: 12/19/2024 0044  INPATIENT ADMISSION DATE: 12/19/24  4:30 PM   DISCHARGE DATE: 12/20/2024  6:00 PM   DISPOSITION:Home/Self Care    Network Utilization Review Department  ATTENTION: Please call with any questions or concerns to [phone] and carefully listen to the prompts so that you are directed to the right person. All voicemails are confidential.   For Discharge needs, contact Care Management DC Support Team at [phone] opt. 2  Send all requests for admission clinical reviews, approved or denied determinations and any other requests to dedicated fax number below belonging to the campus where the patient is receiving treatment. List of dedicated fax numbers for the Facilities:  FACILITY NAME UR FAX NUMBER   ADMISSION DENIALS (Administrative/Medical Necessity) [phone]   DISCHARGE SUPPORT TEAM ([facility]) [phone]   PARENT CHILD HEALTH (Maternity/NICU/Pediatrics) [phone]   [facility] [phone]   [facility] [phone]   [facility] [phone]   [facility] [phone]   [facility] [phone]   [facility] [phone]   [facility] [phone]   [facility]  [phone]   [facility] [phone]   [facility] [phone]   [facility] [phone]   [facility] [phone]

## 2024-12-24 NOTE — TELEPHONE ENCOUNTER
Patient Reports :  No improvement .She is in a lot of pain.  Patient believes it is worse. Patient would like a call back from someone on the clinical team.

## 2024-12-24 NOTE — TELEPHONE ENCOUNTER
FYI  Pt reports no relief at this time s/p SIJ on 12/18, RN advised pt it can take two weeks for full benefit of injection, triage team should be contacting pt at 2 weeks for update. Pt verbalized understanding and appreciation.

## 2024-12-26 ENCOUNTER — TELEPHONE (OUTPATIENT)
Age: 74
End: 2024-12-26

## 2024-12-26 NOTE — TELEPHONE ENCOUNTER
Call from patient requesting ER follow up appointment with Dr. Tobias only. Patient requesting appt within the next few days as she does not have clear instructions on what she needs to do for herself moving forward after her ER visit for her pancreas and back pain. Please return patient call as no OVL opening for Dr. Tobias until March. Thank you.

## 2024-12-29 LAB
ATRIAL RATE: 102 BPM
ATRIAL RATE: 91 BPM
P AXIS: 63 DEGREES
P AXIS: 65 DEGREES
PR INTERVAL: 142 MS
PR INTERVAL: 150 MS
QRS AXIS: 1 DEGREES
QRS AXIS: 17 DEGREES
QRSD INTERVAL: 86 MS
QRSD INTERVAL: 90 MS
QT INTERVAL: 346 MS
QT INTERVAL: 364 MS
QTC INTERVAL: 447 MS
QTC INTERVAL: 450 MS
T WAVE AXIS: 82 DEGREES
T WAVE AXIS: 82 DEGREES
VENTRICULAR RATE: 102 BPM
VENTRICULAR RATE: 91 BPM

## 2024-12-29 PROCEDURE — 93010 ELECTROCARDIOGRAM REPORT: CPT | Performed by: INTERNAL MEDICINE

## 2024-12-31 ENCOUNTER — TELEPHONE (OUTPATIENT)
Dept: PAIN MEDICINE | Facility: CLINIC | Age: 74
End: 2024-12-31

## 2024-12-31 DIAGNOSIS — F41.9 ANXIETY DUE TO INVASIVE PROCEDURE: Primary | ICD-10-CM

## 2024-12-31 RX ORDER — ALPRAZOLAM 0.5 MG
0.5 TABLET ORAL ONCE
Qty: 1 TABLET | Refills: 0 | Status: SHIPPED | OUTPATIENT
Start: 2024-12-31 | End: 2024-12-31

## 2024-12-31 NOTE — TELEPHONE ENCOUNTER
Caller: Montserrat    Doctor: Varsha    Reason for call: can you please order something xanax or valium she needs something to relax her     MRI is scheduled for 1/4    Patient hung up on me while typing message if she calls back let her know I sent task     Call back#: [phone]

## 2024-12-31 NOTE — TELEPHONE ENCOUNTER
Nj Maddox DO  (Today,  9:24 AM)   patient is currently being prescribed alprazolam by another provider.  I ordered a one-time dose for MRI anxiolysis however she should be getting this from her other provider.

## 2025-01-02 ENCOUNTER — TELEPHONE (OUTPATIENT)
Dept: OBGYN CLINIC | Facility: HOSPITAL | Age: 75
End: 2025-01-02

## 2025-01-02 NOTE — TELEPHONE ENCOUNTER
Caller: patient    Doctor: Varsha    Reason for call: Patient is anxious regarding her MRI    Patient stated she already takes xanax and would like a higher dosage     Please advise    Call back#:

## 2025-01-02 NOTE — TELEPHONE ENCOUNTER
"S/W pt and advised the same.  Pt requesting the sedation MRI.  However wants it at her current scheduled MRI appointment on 1/4/2025.  Nurse explained to pt that she may need to reschedule MRI if it's changed to sedation. Nurse unsure of how the scheduling works if it's changed to sedation. Pt reports that drs have called the MRI in the past and gotten her in for sedation for the already scheduled MRI.    Nurse also suggested she can go to an outside facility of her choice that has an open MRI.    Pt said she would like to see if she can get the sedated MRI first on her current scheduled date.  If she can not get the sedated MRI at that date, then she will look into the \"Open MRI\".    Please advise  "

## 2025-01-03 NOTE — TELEPHONE ENCOUNTER
Caller: patient    Doctor: RS    Reason for call: Returning RN missed call,  Transferred To RN    Call back#:

## 2025-01-03 NOTE — TELEPHONE ENCOUNTER
Caller: Patient    Doctor/Office: [facility]    Call regarding :  stating she has a missed call from Dr Sánchez office    Call was transferred to: MICHAEL

## 2025-01-03 NOTE — TELEPHONE ENCOUNTER
Caller: Patient    Doctor: Varsha    Reason for call:     Patient called back she does have medication to relax her, she will keep her MRI for Saturday..    Call back#: n/a

## 2025-01-03 NOTE — TELEPHONE ENCOUNTER
S/w pt  she states she is going to take her Xanax and go to tomorrows MRI appointment as she was told previously to have sedation it would be in march and she won't wait that long

## 2025-01-04 ENCOUNTER — HOSPITAL ENCOUNTER (OUTPATIENT)
Dept: RADIOLOGY | Facility: HOSPITAL | Age: 75
Discharge: HOME | End: 2025-01-04
Attending: STUDENT IN AN ORGANIZED HEALTH CARE EDUCATION/TRAINING PROGRAM
Payer: COMMERCIAL

## 2025-01-04 DIAGNOSIS — M54.16 LUMBAR RADICULOPATHY: ICD-10-CM

## 2025-01-04 DIAGNOSIS — M96.1 POSTLAMINECTOMY SYNDROME: ICD-10-CM

## 2025-01-04 PROCEDURE — 72158 MRI LUMBAR SPINE W/O & W/DYE: CPT

## 2025-01-04 PROCEDURE — A9585 GADOBUTROL INJECTION: HCPCS | Performed by: STUDENT IN AN ORGANIZED HEALTH CARE EDUCATION/TRAINING PROGRAM

## 2025-01-04 RX ORDER — GADOBUTROL 604.72 MG/ML
8 INJECTION INTRAVENOUS
Status: COMPLETED | OUTPATIENT
Start: 2025-01-04 | End: 2025-01-04

## 2025-01-04 RX ADMIN — GADOBUTROL 8 ML: 604.72 INJECTION INTRAVENOUS at 13:15

## 2025-01-06 ENCOUNTER — TELEPHONE (OUTPATIENT)
Age: 75
End: 2025-01-06

## 2025-01-06 ENCOUNTER — RESULTS FOLLOW-UP (OUTPATIENT)
Dept: PAIN MEDICINE | Facility: CLINIC | Age: 75
End: 2025-01-06

## 2025-01-06 NOTE — TELEPHONE ENCOUNTER
Called to review the results of Janki's lumbar MRI.  She is scheduled for the thoracic MRI this Saturday.  I will have her follow-up in the office to further review the imaging and discuss next steps.

## 2025-01-06 NOTE — TELEPHONE ENCOUNTER
Caller: Pawan    Doctor: Varsha     Reason for call: Pt states she had a missed call from [facility] this morning. Unable to locate encounter. PT will listen to VM and call back.    Call back#: [phone]

## 2025-01-07 ENCOUNTER — TELEPHONE (OUTPATIENT)
Dept: PAIN MEDICINE | Facility: CLINIC | Age: 75
End: 2025-01-07

## 2025-01-07 NOTE — TELEPHONE ENCOUNTER
Spoke with pts daughter to schedule f/u for mri. Daughter reports pt in pain and is going to chiropractor. Will call back to make f/u appointment.

## 2025-01-09 ENCOUNTER — TELEPHONE (OUTPATIENT)
Age: 75
End: 2025-01-09

## 2025-01-09 ENCOUNTER — OFFICE VISIT (OUTPATIENT)
Dept: FAMILY MEDICINE CLINIC | Facility: CLINIC | Age: 75
End: 2025-01-09
Payer: COMMERCIAL

## 2025-01-09 VITALS
OXYGEN SATURATION: 96 % | SYSTOLIC BLOOD PRESSURE: 134 MMHG | HEART RATE: 108 BPM | DIASTOLIC BLOOD PRESSURE: 88 MMHG | WEIGHT: 171 LBS | HEIGHT: 63 IN | BODY MASS INDEX: 30.3 KG/M2

## 2025-01-09 DIAGNOSIS — J96.11 CHRONIC RESPIRATORY FAILURE WITH HYPOXIA (HCC): ICD-10-CM

## 2025-01-09 DIAGNOSIS — M54.9 INTRACTABLE BACK PAIN: ICD-10-CM

## 2025-01-09 DIAGNOSIS — F14.10 COCAINE ABUSE (HCC): ICD-10-CM

## 2025-01-09 DIAGNOSIS — E78.2 MIXED HYPERLIPIDEMIA: ICD-10-CM

## 2025-01-09 DIAGNOSIS — I25.10 CORONARY ARTERY DISEASE INVOLVING NATIVE CORONARY ARTERY OF NATIVE HEART WITHOUT ANGINA PECTORIS: Chronic | ICD-10-CM

## 2025-01-09 DIAGNOSIS — E11.9 TYPE 2 DIABETES MELLITUS WITHOUT COMPLICATION, WITHOUT LONG-TERM CURRENT USE OF INSULIN (HCC): ICD-10-CM

## 2025-01-09 DIAGNOSIS — Z99.89 USES WALKER: ICD-10-CM

## 2025-01-09 DIAGNOSIS — I71.21 ANEURYSM OF ASCENDING AORTA WITHOUT RUPTURE (HCC): ICD-10-CM

## 2025-01-09 DIAGNOSIS — F11.21 OPIOID DEPENDENCE, IN REMISSION (HCC): ICD-10-CM

## 2025-01-09 DIAGNOSIS — K83.9 BILE DUCT ABNORMALITY: Primary | ICD-10-CM

## 2025-01-09 DIAGNOSIS — E03.9 HYPOTHYROIDISM, UNSPECIFIED TYPE: Chronic | ICD-10-CM

## 2025-01-09 DIAGNOSIS — F11.29 OPIOID DEPENDENCE WITH OPIOID-INDUCED DISORDER (HCC): Chronic | ICD-10-CM

## 2025-01-09 DIAGNOSIS — I10 PRIMARY HYPERTENSION: Chronic | ICD-10-CM

## 2025-01-09 PROBLEM — M47.26 OSTEOARTHRITIS OF SPINE WITH RADICULOPATHY, LUMBAR REGION: Status: ACTIVE | Noted: 2025-01-09

## 2025-01-09 PROBLEM — R63.4 UNINTENTIONAL WEIGHT LOSS: Status: RESOLVED | Noted: 2023-10-04 | Resolved: 2025-01-09

## 2025-01-09 PROCEDURE — 99215 OFFICE O/P EST HI 40 MIN: CPT | Performed by: FAMILY MEDICINE

## 2025-01-09 RX ORDER — OXYCODONE HYDROCHLORIDE 5 MG/1
5 TABLET ORAL EVERY 6 HOURS PRN
Qty: 20 TABLET | Refills: 0 | Status: SHIPPED | OUTPATIENT
Start: 2025-01-09

## 2025-01-09 NOTE — ASSESSMENT & PLAN NOTE
Reviewed.  Supposed to be having ultrasound and ERCP at [facility].  She is now scheduled for January 15

## 2025-01-09 NOTE — ASSESSMENT & PLAN NOTE
Patient was previously on Suboxone 4 mg sublingual daily    -Patient wishes to stop this so that she is able to take some type of narcotic pain medication to alleviate some of her back pain    Prior to prescribing the controlled substance, a patient search was performed on the [place] prescription drug monitoring program web site.  There was no evidence of diversion or misuse.  Last prescription for Suboxone was December 5

## 2025-01-09 NOTE — ASSESSMENT & PLAN NOTE
Patient complains of intractable back pain.  I did advise the patient that she needs to follow-up with pain management who ordered imaging of her lumbar spine and will be getting MRI of her thoracic spine.  Told both her daughter and the patient that she needs to follow through with specialists as advised    Instead of getting narcotic pain medication from friends and neighbors I am providing a limited supply of oxycodone 5 mg.  Patient states that she is well aware of her history.  Daughter states that her stepfather is in charge of giving her medications and setting them out    I did send message to pain management of what I was doing, prescribing but is the patient's responsibility to follow through with physician visits

## 2025-01-09 NOTE — ASSESSMENT & PLAN NOTE
Remains on beta-blocker, aspirin and atorvastatin.  Needs to stop smoking.  Follow-up with cardiology as scheduled

## 2025-01-09 NOTE — PROGRESS NOTES
"  Subjective:      Patient ID: Montserrat Sumner is a 74 y.o. female.    74-year-old female with complicated history including heart disease, COPD, chronic back pain, hypertension, hyperlipidemia, diabetes mellitus type 2, chronic abdominal pain, tobacco abuse, cocaine abuse presents with her daughter in a wheelchair after being seen in ER on December 18 for COPD exacerbation.  Patient is in tears complaining of back pain with pain going into her right leg.  She states that she does not feel that anybody is doing anything to help her with her pain even though she has seen pain management, they ordered MRI of her lumbar spine which she recently had done as well as an MRI of her thoracic spine which she has upcoming.  Pain management had contacted her to schedule follow-up in regards to her MRI results as well as following up for her chronic pain.  I went through her messages and she had canceled an appointment and that her daughter had said that she would be rescheduling the appointment but never contacted their office.  She also has not done any of the blood work that I had requested for follow-up which was initially ordered back in May when I first saw her.    Patient states that her pain is so bad that she had used a friend's Percocet which provided relief.  Patient is also on Suboxone and multiple psychiatric agents which I refused to assume management of.  Patient states that she wants to do anything to make herself healthier.  She continues smoking cigarettes and knows that she should not.  She questions whether or not she should avoid eating sweets, she is diabetic.        Past Medical History:   Diagnosis Date   • Anxiety    • Chronic pain 03/06/2023    lower abdomen and back   • Colon polyp    • COPD (chronic obstructive pulmonary disease) (HCC)     \"passes out\" with O2 levels dropping   • Disease of thyroid gland    • GERD (gastroesophageal reflux disease)    • History of transfusion     with aneurysm " repair-autologous-self and daughter   • Hyperlipidemia    • Hypertension    • Teeth missing    • Wears glasses     For reading       Family History   Problem Relation Age of Onset   • Breast cancer Mother        Past Surgical History:   Procedure Laterality Date   • BACK SURGERY      x2 herniated, crushed disc in the lumbar, ablation   • CHOLECYSTECTOMY     • COLONOSCOPY     • FRACTURE SURGERY Left     hip-pinned   • HYSTERECTOMY      salpingo-oophorectomy   • FL INJECT SI JOINT ARTHRGRPHY&/ANES/STEROID W/PATRICIA Bilateral 12/18/2024    Procedure: BILATERAL SACROILIAC JOINT INJECTION;  Surgeon: Nj Maddox DO;  Location: [facility] MAIN OR;  Service: Pain Management    • THORACIC AORTIC ANEURYSM REPAIR  09/2016    ascending thoracic aortic repair with RCA bypass with SVG x 1   • TONSILLECTOMY     • UPPER GASTROINTESTINAL ENDOSCOPY          reports that she has been smoking cigarettes. She started smoking about 57 years ago. She has a 14.3 pack-year smoking history. She has been exposed to tobacco smoke. She has never used smokeless tobacco. She reports current alcohol use. She reports current drug use. Drugs: Marijuana and Cocaine.      Current Outpatient Medications:   •  oxyCODONE (Roxicodone) 5 immediate release tablet, Take 1 tablet (5 mg total) by mouth every 6 (six) hours as needed for moderate pain Max Daily Amount: 20 mg, Disp: 20 tablet, Rfl: 0  •  albuterol (2.5 mg/3 mL) 0.083 % nebulizer solution, Take 3 mL (2.5 mg total) by nebulization 4 (four) times a day as needed for wheezing or shortness of breath, Disp: 360 mL, Rfl: 7  •  albuterol (Proventil HFA) 90 mcg/act inhaler, Inhale 2 puffs every 4 (four) hours as needed for wheezing, Disp: 6.7 g, Rfl: 7  •  ALPRAZolam (XANAX) 0.5 mg tablet, Take 0.5 mg by mouth 2 (two) times a day, Disp: , Rfl:   •  ALPRAZolam (XANAX) 0.5 mg tablet, Take 1 tablet (0.5 mg total) by mouth 1 (one) time for 1 dose, Disp: 1 tablet, Rfl: 0  •  amLODIPine (NORVASC) 5 mg tablet,  Take 1 tablet (5 mg total) by mouth daily, Disp: 10 tablet, Rfl: 0  •  aspirin 81 mg chewable tablet, Chew 81 mg daily, Disp: , Rfl:   •  atorvastatin (LIPITOR) 40 mg tablet, Take 40 mg by mouth every morning, Disp: , Rfl:   •  b complex vitamins capsule, Take 1 capsule by mouth once a week, Disp: , Rfl:   •  benzonatate (TESSALON PERLES) 100 mg capsule, Take 1 capsule (100 mg total) by mouth 3 (three) times a day as needed for cough, Disp: 20 capsule, Rfl: 0  •  buprenorphine-naloxone (Suboxone) 4-1 MG, every morning Wafer, Disp: , Rfl:   •  celecoxib (CeleBREX) 200 mg capsule, Take 1 capsule (200 mg total) by mouth daily, Disp: 90 capsule, Rfl: 1  •  cyclobenzaprine (FLEXERIL) 10 mg tablet, Take 1 tablet (10 mg total) by mouth 3 (three) times a day as needed for muscle spasms, Disp: 90 tablet, Rfl: 1  •  DULoxetine (CYMBALTA) 60 mg delayed release capsule, Take 1 capsule (60 mg total) by mouth daily, Disp: 90 capsule, Rfl: 0  •  esomeprazole (NexIUM) 40 MG capsule, Take 1 capsule (40 mg total) by mouth 2 (two) times a day before meals, Disp: 180 capsule, Rfl: 1  •  fluticasone-umeclidinium-vilanterol (Trelegy Ellipta) 200-62.5-25 mcg/actuation AEPB inhaler, Inhale 1 puff daily Rinse mouth after use., Disp: 60 blister, Rfl: 11  •  levothyroxine 100 mcg tablet, Take 100 mcg by mouth every morning, Disp: , Rfl:   •  losartan (COZAAR) 25 mg tablet, Take 25 mg by mouth every morning, Disp: , Rfl:   •  metoprolol succinate (TOPROL-XL) 50 mg 24 hr tablet, Take 1 tablet (50 mg total) by mouth daily (Patient taking differently: Take 50 mg by mouth every morning), Disp: 90 tablet, Rfl: 1  •  naloxone (NARCAN) 4 mg/0.1 mL nasal spray, Administer 1 spray into a nostril. If no response after 2-3 minutes, give another dose in the other nostril using a new spray., Disp: 1 each, Rfl: 1  •  oxygen gas, Inhale continuous as needed 2.5 L, Disp: , Rfl:   •  predniSONE 20 mg tablet, Take 2 tablets (40 mg total) by mouth daily for 7  "days, THEN 1.5 tablets (30 mg total) daily for 7 days, THEN 1 tablet (20 mg total) daily for 7 days, THEN 0.5 tablets (10 mg total) daily for 7 days., Disp: 35 tablet, Rfl: 0  •  traZODone (DESYREL) 50 mg tablet, Take 100 mg by mouth daily at bedtime, Disp: , Rfl:   •  venlafaxine (EFFEXOR-XR) 150 mg 24 hr capsule, Take 1 capsule by mouth in the morning, Disp: , Rfl:   •  venlafaxine (EFFEXOR-XR) 75 mg 24 hr capsule, Take 75 mg by mouth daily at bedtime, Disp: , Rfl:     The following portions of the patient's history were reviewed and updated as appropriate: allergies, current medications, past family history, past medical history, past social history, past surgical history and problem list.    Review of Systems   Constitutional: Negative.  Negative for activity change, appetite change and fatigue.   HENT: Negative.  Negative for congestion, ear discharge, mouth sores, sinus pressure and tinnitus.    Eyes: Negative.  Negative for photophobia and visual disturbance.   Respiratory:  Positive for cough, shortness of breath and wheezing. Negative for chest tightness.    Cardiovascular:  Positive for palpitations. Negative for chest pain.   Gastrointestinal:  Positive for abdominal pain. Negative for abdominal distention, constipation, diarrhea, nausea and vomiting.   Endocrine: Negative.  Negative for polydipsia, polyphagia and polyuria.   Genitourinary: Negative.  Negative for difficulty urinating.   Musculoskeletal:  Positive for back pain and gait problem.        In a wheelchair   Skin: Negative.    Neurological:  Positive for numbness (Bilateral feet). Negative for dizziness, syncope, weakness and light-headedness.   Hematological: Negative.    Psychiatric/Behavioral:  Negative for agitation and dysphoric mood. The patient is nervous/anxious.            Objective:    /88   Pulse (!) 108   Ht 5' 3\" (1.6 m)   Wt 77.6 kg (171 lb)   LMP  (LMP Unknown)   SpO2 96%   BMI 30.29 kg/m²      Physical Exam  Vitals " and nursing note reviewed.   Constitutional:       General: She is not in acute distress.     Appearance: She is well-developed. She is ill-appearing (Chronically ill-appearing). She is not toxic-appearing or diaphoretic.   HENT:      Head: Normocephalic and atraumatic.      Nose: Nose normal.      Mouth/Throat:      Mouth: Mucous membranes are moist.      Pharynx: Oropharynx is clear.   Eyes:      Comments: Bilateral cataracts.   Neck:      Vascular: No carotid bruit.   Cardiovascular:      Rate and Rhythm: Normal rate and regular rhythm.      Pulses: Normal pulses.      Heart sounds: Normal heart sounds. No murmur heard.     Comments: Normal dorsalis pedis pulses  Pulmonary:      Effort: Pulmonary effort is normal.      Breath sounds: Wheezing present. No rales.      Comments: Diminished breath sounds bilaterally with expiratory wheezing.  Moist mucousy cough  Abdominal:      General: Bowel sounds are normal. There is no distension.      Palpations: Abdomen is soft.      Tenderness: There is no abdominal tenderness. There is no guarding or rebound.   Musculoskeletal:         General: Normal range of motion.      Cervical back: Normal range of motion and neck supple.   Lymphadenopathy:      Cervical: No cervical adenopathy.   Skin:     General: Skin is warm and dry.      Findings: Bruising present. No rash.   Neurological:      Mental Status: She is alert and oriented to person, place, and time.      Sensory: Sensory deficit (Bilateral feet) present.      Deep Tendon Reflexes: Reflexes are normal and symmetric.      Comments: Remains in wheelchair   Psychiatric:         Behavior: Behavior normal.         Thought Content: Thought content normal.         Judgment: Judgment normal.           Recent Results (from the past 6 weeks)   CBC and differential    Collection Time: 12/15/24  2:06 PM   Result Value Ref Range    WBC 9.41 4.31 - 10.16 Thousand/uL    RBC 4.80 3.81 - 5.12 Million/uL    Hemoglobin 13.9 11.5 - 15.4  g/dL    Hematocrit 45.5 34.8 - 46.1 %    MCV 95 82 - 98 fL    MCH 29.0 26.8 - 34.3 pg    MCHC 30.5 (L) 31.4 - 37.4 g/dL    RDW 15.9 (H) 11.6 - 15.1 %    MPV 9.1 8.9 - 12.7 fL    Platelets 251 149 - 390 Thousands/uL   Comprehensive metabolic panel    Collection Time: 12/15/24  2:06 PM   Result Value Ref Range    Sodium 143 135 - 147 mmol/L    Potassium 4.0 3.5 - 5.3 mmol/L    Chloride 104 96 - 108 mmol/L    CO2 29 21 - 32 mmol/L    ANION GAP 10 4 - 13 mmol/L    BUN 18 5 - 25 mg/dL    Creatinine 0.87 0.60 - 1.30 mg/dL    Glucose 118 65 - 140 mg/dL    Calcium 9.7 8.4 - 10.2 mg/dL    AST 15 13 - 39 U/L    ALT 17 7 - 52 U/L    Alkaline Phosphatase 46 34 - 104 U/L    Total Protein 7.2 6.4 - 8.4 g/dL    Albumin 4.1 3.5 - 5.0 g/dL    Total Bilirubin 0.34 0.20 - 1.00 mg/dL    eGFR 66 ml/min/1.73sq m   Lipase    Collection Time: 12/15/24  2:06 PM   Result Value Ref Range    Lipase 50 11 - 82 u/L   Manual Differential(PHLEBS Do Not Order)    Collection Time: 12/15/24  2:06 PM   Result Value Ref Range    Segmented % 68 43 - 75 %    Bands % 2 0 - 8 %    Lymphocytes % 16 14 - 44 %    Monocytes % 6 4 - 12 %    Eosinophils % 0 0 - 6 %    Basophils % 0 0 - 1 %    Metamyelocytes % 1 0 - 1 %    Atypical Lymphocytes % 7 (H) <=0 %    Absolute Neutrophils 6.59 1.85 - 7.62 Thousand/uL    Absolute Lymphocytes 2.16 0.60 - 4.47 Thousand/uL    Absolute Monocytes 0.56 0.00 - 1.22 Thousand/uL    Absolute Eosinophils 0.00 0.00 - 0.40 Thousand/uL    Absolute Basophils 0.00 0.00 - 0.10 Thousand/uL    Absolute Metamyelocytes 0.09 0.00 - 0.10 Thousand/uL    Total Counted      RBC Morphology Present     Platelet Estimate Adequate Adequate    Anisocytosis Present    ECG 12 lead    Collection Time: 12/15/24  2:22 PM   Result Value Ref Range    Ventricular Rate 73 BPM    Atrial Rate 73 BPM    KS Interval 142 ms    QRSD Interval 80 ms    QT Interval 372 ms    QTC Interval 409 ms    P [place] 77 degrees    QRS Axis 52 degrees    T Wave Axis 78 degrees    Urine Macroscopic, POC    Collection Time: 12/15/24  8:15 PM   Result Value Ref Range    Color, UA Yellow     Clarity, UA Clear     pH, UA 5.5 4.5 - 8.0    Leukocytes, UA Negative Negative    Nitrite, UA Negative Negative    Protein, UA 30 (1+) (A) Negative mg/dl    Glucose, UA Negative Negative mg/dl    Ketones, UA Negative Negative mg/dl    Urobilinogen, UA 0.2 0.2, 1.0 E.U./dl E.U./dl    Bilirubin, UA Negative Negative    Occult Blood, UA Negative Negative    Specific Gravity, UA 1.015 1.003 - 1.030   UA (URINE) with reflex to Scope    Collection Time: 12/15/24  8:17 PM   Result Value Ref Range    Color, UA Light Yellow     Clarity, UA Clear     Specific Gravity, UA >=1.050 (H) 1.003 - 1.030    pH, UA 6.0 4.5, 5.0, 5.5, 6.0, 6.5, 7.0, 7.5, 8.0    Leukocytes, UA Negative Negative    Nitrite, UA Negative Negative    Protein, UA 50 (1+) (A) Negative mg/dl    Glucose, UA Negative Negative mg/dl    Ketones, UA Negative Negative mg/dl    Urobilinogen, UA <2.0 <2.0 mg/dl mg/dl    Bilirubin, UA Negative Negative    Occult Blood, UA Negative Negative   Urine Microscopic    Collection Time: 12/15/24  8:17 PM   Result Value Ref Range    RBC, UA None Seen None Seen, 1-2 /hpf    WBC, UA None Seen None Seen, 1-2 /hpf    Epithelial Cells Occasional None Seen, Occasional /hpf    Bacteria, UA None Seen None Seen, Occasional /hpf   ECG 12 lead    Collection Time: 12/18/24  8:08 PM   Result Value Ref Range    Ventricular Rate 102 BPM    Atrial Rate 102 BPM    N[place] Interval 142 ms    QRSD Interval 86 ms    QT Interval 346 ms    QTC Interval 450 ms    P Axis 65 degrees    QRS Axis 17 degrees    T Wave Axis 82 degrees   Fingerstick Glucose (POCT)    Collection Time: 12/18/24  8:09 PM   Result Value Ref Range    POC Glucose 160 (H) 65 - 140 mg/dl   CBC and differential    Collection Time: 12/18/24  9:13 PM   Result Value Ref Range    WBC 8.25 4.31 - 10.16 Thousand/uL    RBC 4.28 3.81 - 5.12 Million/uL    Hemoglobin 12.7 11.5 - 15.4  "g/dL    Hematocrit 40.3 34.8 - 46.1 %    MCV 94 82 - 98 fL    MCH 29.7 26.8 - 34.3 pg    MCHC 31.5 31.4 - 37.4 g/dL    RDW 15.7 (H) 11.6 - 15.1 %    MPV 9.7 8.9 - 12.7 fL    Platelets 244 149 - 390 Thousands/uL    nRBC 0 /100 WBCs    Segmented % 83 (H) 43 - 75 %    Immature Grans % 2 0 - 2 %    Lymphocytes % 9 (L) 14 - 44 %    Monocytes % 6 4 - 12 %    Eosinophils Relative 0 0 - 6 %    Basophils Relative 0 0 - 1 %    Absolute Neutrophils 6.91 1.85 - 7.62 Thousands/µL    Absolute Immature Grans 0.13 0.00 - 0.20 Thousand/uL    Absolute Lymphocytes 0.72 0.60 - 4.47 Thousands/µL    Absolute Monocytes 0.47 0.17 - 1.22 Thousand/µL    Eosinophils Absolute 0.00 0.00 - 0.61 Thousand/µL    Basophils Absolute 0.02 0.00 - 0.10 Thousands/µL   Protime-INR    Collection Time: 12/18/24  9:13 PM   Result Value Ref Range    Protime 13.0 12.3 - 15.0 seconds    INR 0.91 0.85 - 1.19   APTT    Collection Time: 12/18/24  9:13 PM   Result Value Ref Range    PTT 35 (H) 23 - 34 seconds   Comprehensive metabolic panel    Collection Time: 12/18/24  9:13 PM   Result Value Ref Range    Sodium 136 135 - 147 mmol/L    Potassium 4.7 3.5 - 5.3 mmol/L    Chloride 103 96 - 108 mmol/L    CO2 25 21 - 32 mmol/L    ANION GAP 8 4 - 13 mmol/L    BUN 28 (H) 5 - 25 mg/dL    Creatinine 0.94 0.60 - 1.30 mg/dL    Glucose 150 (H) 65 - 140 mg/dL    Calcium 9.5 8.4 - 10.2 mg/dL    AST 37 13 - 39 U/L    ALT 28 7 - 52 U/L    Alkaline Phosphatase 42 34 - 104 U/L    Total Protein 7.0 6.4 - 8.4 g/dL    Albumin 3.9 3.5 - 5.0 g/dL    Total Bilirubin 0.33 0.20 - 1.00 mg/dL    eGFR 60 ml/min/1.73sq m   Lipase    Collection Time: 12/18/24  9:13 PM   Result Value Ref Range    Lipase 23 11 - 82 u/L   HS Troponin 0hr (reflex protocol)    Collection Time: 12/18/24  9:13 PM   Result Value Ref Range    hs TnI 0hr 12 \"Refer to ACS Flowchart\"- see link ng/L   B-Type Natriuretic Peptide(BNP)    Collection Time: 12/18/24  9:13 PM   Result Value Ref Range    BNP 68 0 - 100 pg/mL " "  FLU/RSV/COVID - if FLU/RSV clinically relevant (2hr TAT)    Collection Time: 12/18/24  9:13 PM    Specimen: Nose; Nares   Result Value Ref Range    SARS-CoV-2 Negative Negative    INFLUENZA A PCR Negative Negative    INFLUENZA B PCR Negative Negative    RSV PCR Negative Negative   Blood gas, venous    Collection Time: 12/18/24  9:13 PM   Result Value Ref Range    pH, Avinash 7.447 (H) 7.300 - 7.400    pCO2, Avinash 36.2 (L) 42.0 - 50.0 mm Hg    pO2, Avinash 109.3 (H) 35.0 - 45.0 mm Hg    HCO3, Avinash 24.4 24 - 30 mmol/L    Base Excess, Avinash 0.7 mmol/L    O2 Content, Avinash 17.6 ml/dL    O2 HGB, VENOUS 94.9 (H) 60.0 - 80.0 %   Blood culture #1    Collection Time: 12/18/24  9:13 PM    Specimen: Arm, Left; Blood   Result Value Ref Range    Blood Culture No Growth After 5 Days.    Blood culture #2    Collection Time: 12/18/24  9:13 PM    Specimen: Arm, Right; Blood   Result Value Ref Range    Blood Culture No Growth After 5 Days.    Procalcitonin    Collection Time: 12/18/24  9:13 PM   Result Value Ref Range    Procalcitonin 0.07 <=0.25 ng/ml   Hemoglobin A1c w/EAG Estimation (Prechecked if no A1C within 90 days)    Collection Time: 12/18/24  9:13 PM   Result Value Ref Range    Hemoglobin A1C 7.4 (H) Normal 4.0-5.6%; PreDiabetic 5.7-6.4%; Diabetic >=6.5%; Glycemic control for adults with diabetes <7.0% %     mg/dl   ECG 12 lead    Collection Time: 12/18/24 11:36 PM   Result Value Ref Range    Ventricular Rate 91 BPM    Atrial Rate 91 BPM    NE Interval 150 ms    QRSD Interval 90 ms    QT Interval 364 ms    QTC Interval 447 ms    P Axis 63 degrees    QRS Axis 1 degrees    T Wave [place] 82 degrees   HS Troponin I 2hr    Collection Time: 12/18/24 11:38 PM   Result Value Ref Range    hs TnI 2hr 12 \"Refer to ACS Flowchart\"- see link ng/L    Delta 2hr hsTnI 0 <20 ng/L   UA w Reflex to Microscopic w Reflex to Culture    Collection Time: 12/18/24 11:50 PM    Specimen: Urine, Clean Catch   Result Value Ref Range    Color, UA Light Yellow  "    Clarity, UA Clear     Specific Gravity, UA 1.031 (H) 1.003 - 1.030    pH, UA 5.5 4.5, 5.0, 5.5, 6.0, 6.5, 7.0, 7.5, 8.0    Leukocytes, UA Negative Negative    Nitrite, UA Negative Negative    Protein, UA Trace (A) Negative mg/dl    Glucose, UA Negative Negative mg/dl    Ketones, UA Negative Negative mg/dl    Urobilinogen, UA <2.0 <2.0 mg/dl mg/dl    Bilirubin, UA Negative Negative    Occult Blood, UA Negative Negative   Urine Microscopic    Collection Time: 12/18/24 11:50 PM   Result Value Ref Range    RBC, UA 1-2 None Seen, 1-2 /hpf    WBC, UA None Seen None Seen, 1-2 /hpf    Epithelial Cells Occasional None Seen, Occasional /hpf    Bacteria, UA None Seen None Seen, Occasional /hpf   Fingerstick Glucose (POCT)    Collection Time: 12/19/24  2:27 AM   Result Value Ref Range    POC Glucose 169 (H) 65 - 140 mg/dl   CBC and differential    Collection Time: 12/19/24  5:22 AM   Result Value Ref Range    WBC 7.86 4.31 - 10.16 Thousand/uL    RBC 4.00 3.81 - 5.12 Million/uL    Hemoglobin 11.6 11.5 - 15.4 g/dL    Hematocrit 37.5 34.8 - 46.1 %    MCV 94 82 - 98 fL    MCH 29.0 26.8 - 34.3 pg    MCHC 30.9 (L) 31.4 - 37.4 g/dL    RDW 15.5 (H) 11.6 - 15.1 %    MPV 9.6 8.9 - 12.7 fL    Platelets 207 149 - 390 Thousands/uL   Basic metabolic panel    Collection Time: 12/19/24  5:22 AM   Result Value Ref Range    Sodium 137 135 - 147 mmol/L    Potassium 4.6 3.5 - 5.3 mmol/L    Chloride 105 96 - 108 mmol/L    CO2 24 21 - 32 mmol/L    ANION GAP 8 4 - 13 mmol/L    BUN 23 5 - 25 mg/dL    Creatinine 0.86 0.60 - 1.30 mg/dL    Glucose 197 (H) 65 - 140 mg/dL    Calcium 9.0 8.4 - 10.2 mg/dL    eGFR 67 ml/min/1.73sq m   Manual Differential(PHLEBS Do Not Order)    Collection Time: 12/19/24  5:22 AM   Result Value Ref Range    Segmented % 86 (H) 43 - 75 %    Bands % 1 0 - 8 %    Lymphocytes % 8 (L) 14 - 44 %    Monocytes % 4 4 - 12 %    Eosinophils % 0 0 - 6 %    Basophils % 0 0 - 1 %    Myelocytes % 1 0 - 1 %    Absolute Neutrophils 6.84 1.85  - 7.62 Thousand/uL    Absolute Lymphocytes 0.63 0.60 - 4.47 Thousand/uL    Absolute Monocytes 0.31 0.00 - 1.22 Thousand/uL    Absolute Eosinophils 0.00 0.00 - 0.40 Thousand/uL    Absolute Basophils 0.00 0.00 - 0.10 Thousand/uL    Absolute Myelocytes 0.08 0.00 - 0.10 Thousand/uL    Total Counted      RBC Morphology Present     Platelet Estimate Adequate Adequate    Anisocytosis Present    Fingerstick Glucose (POCT)    Collection Time: 12/19/24  9:55 AM   Result Value Ref Range    POC Glucose 185 (H) 65 - 140 mg/dl   Blood gas, venous    Collection Time: 12/19/24 10:37 AM   Result Value Ref Range    pH, Avinash 7.389 7.300 - 7.400    pCO2, Avinash 40.8 (L) 42.0 - 50.0 mm Hg    pO2, Avinash 104.1 (H) 35.0 - 45.0 mm Hg    HCO3, Avinash 24.1 24 - 30 mmol/L    Base Excess, Avinahs -0.8 mmol/L    O2 Content, Avinash 15.8 ml/dL    O2 HGB, VENOUS 94.6 (H) 60.0 - 80.0 %   Fingerstick Glucose (POCT)    Collection Time: 12/19/24  1:24 PM   Result Value Ref Range    POC Glucose 191 (H) 65 - 140 mg/dl   Fingerstick Glucose (POCT)    Collection Time: 12/19/24  7:32 PM   Result Value Ref Range    POC Glucose 165 (H) 65 - 140 mg/dl   Fingerstick Glucose (POCT)    Collection Time: 12/19/24 10:22 PM   Result Value Ref Range    POC Glucose 110 65 - 140 mg/dl   Fingerstick Glucose (POCT)    Collection Time: 12/20/24  9:01 AM   Result Value Ref Range    POC Glucose 134 65 - 140 mg/dl   Fingerstick Glucose (POCT)    Collection Time: 12/20/24 11:04 AM   Result Value Ref Range    POC Glucose 260 (H) 65 - 140 mg/dl   Fingerstick Glucose (POCT)    Collection Time: 12/20/24  4:08 PM   Result Value Ref Range    POC Glucose 170 (H) 65 - 140 mg/dl       Assessment/Plan:    Hypertension  Remains on amlodipine, Toprol XL and losartan    CAD (coronary artery disease)  Remains on beta-blocker, aspirin and atorvastatin.  Needs to stop smoking.  Follow-up with cardiology as scheduled    Aneurysm of ascending aorta without rupture (HCC)  Needs to stop smoking, control blood  pressure, avoid cocaine    Bile duct abnormality  Reviewed.  Supposed to be having ultrasound and ERCP at [facility].  She is now scheduled for January 15    Hypothyroid  On levothyroxine 100 mcg once daily.  Needs to have repeat thyroid function testing done    Opioid dependence (HCC)  Patient was previously on Suboxone 4 mg sublingual daily    -Patient wishes to stop this so that she is able to take some type of narcotic pain medication to alleviate some of her back pain    Prior to prescribing the controlled substance, a patient search was performed on the [place] prescription drug monitoring program web site.  There was no evidence of diversion or misuse.  Last prescription for Suboxone was December 5    Cocaine abuse (HCC)  Once again advised that cocaine in any form with all of her underlying health conditions is something that she should never ever use.    Intractable back pain  Patient complains of intractable back pain.  I did advise the patient that she needs to follow-up with pain management who ordered imaging of her lumbar spine and will be getting MRI of her thoracic spine.  Told both her daughter and the patient that she needs to follow through with specialists as advised    Instead of getting narcotic pain medication from friends and neighbors I am providing a limited supply of oxycodone 5 mg.  Patient states that she is well aware of her history.  Daughter states that her stepfather is in charge of giving her medications and setting them out    I did send message to pain management of what I was doing, prescribing but is the patient's responsibility to follow through with physician visits    Hyperlipidemia  Patient remains on atorvastatin 40 mg once daily.  Still has not done repeat lipid panel.  Advised patient and her daughter that follow-up labs need to be completed          Problem List Items Addressed This Visit        Cardiovascular and Mediastinum    CAD (coronary artery  disease) (Chronic)    Remains on beta-blocker, aspirin and atorvastatin.  Needs to stop smoking.  Follow-up with cardiology as scheduled         Hypertension (Chronic)    Remains on amlodipine, Toprol XL and losartan         Aneurysm of ascending aorta without rupture (HCC)    Needs to stop smoking, control blood pressure, avoid cocaine            Digestive    Bile duct abnormality - Primary    Reviewed.  Supposed to be having ultrasound and ERCP at [facility].  She is now scheduled for January 15            Endocrine    Hypothyroid (Chronic)    On levothyroxine 100 mcg once daily.  Needs to have repeat thyroid function testing done            Behavioral Health    Opioid dependence (HCC) (Chronic)    Patient was previously on Suboxone 4 mg sublingual daily    -Patient wishes to stop this so that she is able to take some type of narcotic pain medication to alleviate some of her back pain    Prior to prescribing the controlled substance, a patient search was performed on the [place] prescription drug monitoring program web site.  There was no evidence of diversion or misuse.  Last prescription for Suboxone was December 5         Cocaine abuse ([facility])    Once again advised that cocaine in any form with all of her underlying health conditions is something that she should never ever use.            Surgery/Wound/Pain    Intractable back pain    Patient complains of intractable back pain.  I did advise the patient that she needs to follow-up with pain management who ordered imaging of her lumbar spine and will be getting MRI of her thoracic spine.  Told both her daughter and the patient that she needs to follow through with specialists as advised    Instead of getting narcotic pain medication from friends and neighbors I am providing a limited supply of oxycodone 5 mg.  Patient states that she is well aware of her history.  Daughter states that her stepfather is in charge of giving her medications and setting  them out    I did send message to pain management of what I was doing, prescribing but is the patient's responsibility to follow through with physician visits         Relevant Medications    oxyCODONE (Roxicodone) 5 immediate release tablet       Other    Hyperlipidemia    Patient remains on atorvastatin 40 mg once daily.  Still has not done repeat lipid panel.  Advised patient and her daughter that follow-up labs need to be completed        Other Visit Diagnoses       Opioid dependence, in remission (HCC)          Chronic respiratory failure with hypoxia (HCC)          Type 2 diabetes mellitus without complication, without long-term current use of insulin (HCC)          Uses walker

## 2025-01-09 NOTE — ASSESSMENT & PLAN NOTE
Patient remains on atorvastatin 40 mg once daily.  Still has not done repeat lipid panel.  Advised patient and her daughter that follow-up labs need to be completed

## 2025-01-09 NOTE — ASSESSMENT & PLAN NOTE
Once again advised that cocaine in any form with all of her underlying health conditions is something that she should never ever use.   [email or web address]

## 2025-01-10 ENCOUNTER — APPOINTMENT (OUTPATIENT)
Dept: LAB | Facility: CLINIC | Age: 75
End: 2025-01-10
Payer: COMMERCIAL

## 2025-01-10 DIAGNOSIS — I10 PRIMARY HYPERTENSION: ICD-10-CM

## 2025-01-10 DIAGNOSIS — R73.03 PREDIABETES: ICD-10-CM

## 2025-01-10 DIAGNOSIS — I25.118 CORONARY ARTERY DISEASE OF NATIVE HEART WITH STABLE ANGINA PECTORIS, UNSPECIFIED VESSEL OR LESION TYPE (HCC): Chronic | ICD-10-CM

## 2025-01-10 DIAGNOSIS — Z11.59 NEED FOR HEPATITIS C SCREENING TEST: ICD-10-CM

## 2025-01-10 DIAGNOSIS — G47.9 SLEEP DISORDER: ICD-10-CM

## 2025-01-10 LAB
ALBUMIN SERPL BCG-MCNC: 4 G/DL (ref 3.5–5)
ALP SERPL-CCNC: 35 U/L (ref 34–104)
ALT SERPL W P-5'-P-CCNC: 25 U/L (ref 7–52)
ANION GAP SERPL CALCULATED.3IONS-SCNC: 10 MMOL/L (ref 4–13)
ANISOCYTOSIS BLD QL SMEAR: PRESENT
AST SERPL W P-5'-P-CCNC: 17 U/L (ref 13–39)
BASOPHILS # BLD MANUAL: 0 THOUSAND/UL (ref 0–0.1)
BASOPHILS NFR MAR MANUAL: 0 % (ref 0–1)
BILIRUB SERPL-MCNC: 0.39 MG/DL (ref 0.2–1)
BUN SERPL-MCNC: 28 MG/DL (ref 5–25)
CALCIUM SERPL-MCNC: 9.4 MG/DL (ref 8.4–10.2)
CHLORIDE SERPL-SCNC: 104 MMOL/L (ref 96–108)
CHOLEST SERPL-MCNC: 172 MG/DL (ref ?–200)
CO2 SERPL-SCNC: 28 MMOL/L (ref 21–32)
CREAT SERPL-MCNC: 1.08 MG/DL (ref 0.6–1.3)
EOSINOPHIL # BLD MANUAL: 0 THOUSAND/UL (ref 0–0.4)
EOSINOPHIL NFR BLD MANUAL: 0 % (ref 0–6)
ERYTHROCYTE [DISTWIDTH] IN BLOOD BY AUTOMATED COUNT: 16 % (ref 11.6–15.1)
EST. AVERAGE GLUCOSE BLD GHB EST-MCNC: 177 MG/DL
GFR SERPL CREATININE-BSD FRML MDRD: 51 ML/MIN/1.73SQ M
GLUCOSE P FAST SERPL-MCNC: 127 MG/DL (ref 65–99)
HBA1C MFR BLD: 7.8 %
HCT VFR BLD AUTO: 43.2 % (ref 34.8–46.1)
HCV AB SER QL: NORMAL
HDLC SERPL-MCNC: 53 MG/DL
HGB BLD-MCNC: 13 G/DL (ref 11.5–15.4)
LDLC SERPL CALC-MCNC: 80 MG/DL (ref 0–100)
LYMPHOCYTES # BLD AUTO: 0.79 THOUSAND/UL (ref 0.6–4.47)
LYMPHOCYTES # BLD AUTO: 7 % (ref 14–44)
MACROCYTES BLD QL AUTO: PRESENT
MCH RBC QN AUTO: 29.1 PG (ref 26.8–34.3)
MCHC RBC AUTO-ENTMCNC: 30.1 G/DL (ref 31.4–37.4)
MCV RBC AUTO: 97 FL (ref 82–98)
MONOCYTES # BLD AUTO: 0.88 THOUSAND/UL (ref 0–1.22)
MONOCYTES NFR BLD: 9 % (ref 4–12)
NEUTROPHILS # BLD MANUAL: 8.15 THOUSAND/UL (ref 1.85–7.62)
NEUTS BAND NFR BLD MANUAL: 12 % (ref 0–8)
NEUTS SEG NFR BLD AUTO: 71 % (ref 43–75)
NONHDLC SERPL-MCNC: 119 MG/DL
PLATELET # BLD AUTO: 245 THOUSANDS/UL (ref 149–390)
PLATELET BLD QL SMEAR: ADEQUATE
PMV BLD AUTO: 9.8 FL (ref 8.9–12.7)
POTASSIUM SERPL-SCNC: 4.6 MMOL/L (ref 3.5–5.3)
PROT SERPL-MCNC: 6.7 G/DL (ref 6.4–8.4)
RBC # BLD AUTO: 4.46 MILLION/UL (ref 3.81–5.12)
RBC MORPH BLD: PRESENT
SODIUM SERPL-SCNC: 142 MMOL/L (ref 135–147)
TRIGL SERPL-MCNC: 193 MG/DL (ref ?–150)
TSH SERPL DL<=0.05 MIU/L-ACNC: 0.76 UIU/ML (ref 0.45–4.5)
VARIANT LYMPHS # BLD AUTO: 1 %
WBC # BLD AUTO: 9.82 THOUSAND/UL (ref 4.31–10.16)

## 2025-01-10 PROCEDURE — 85027 COMPLETE CBC AUTOMATED: CPT

## 2025-01-10 PROCEDURE — 80053 COMPREHEN METABOLIC PANEL: CPT

## 2025-01-10 PROCEDURE — 85007 BL SMEAR W/DIFF WBC COUNT: CPT

## 2025-01-10 PROCEDURE — 36415 COLL VENOUS BLD VENIPUNCTURE: CPT

## 2025-01-10 PROCEDURE — 80061 LIPID PANEL: CPT

## 2025-01-10 PROCEDURE — 84443 ASSAY THYROID STIM HORMONE: CPT

## 2025-01-10 PROCEDURE — 83036 HEMOGLOBIN GLYCOSYLATED A1C: CPT

## 2025-01-10 PROCEDURE — 86803 HEPATITIS C AB TEST: CPT

## 2025-01-12 ENCOUNTER — HOSPITAL ENCOUNTER (OUTPATIENT)
Dept: RADIOLOGY | Facility: HOSPITAL | Age: 75
Discharge: HOME | End: 2025-01-12
Attending: STUDENT IN AN ORGANIZED HEALTH CARE EDUCATION/TRAINING PROGRAM
Payer: COMMERCIAL

## 2025-01-12 PROCEDURE — 72157 MRI CHEST SPINE W/O & W/DYE: CPT

## 2025-01-12 PROCEDURE — A9585 GADOBUTROL INJECTION: HCPCS | Performed by: STUDENT IN AN ORGANIZED HEALTH CARE EDUCATION/TRAINING PROGRAM

## 2025-01-12 RX ORDER — GADOBUTROL 604.72 MG/ML
8 INJECTION INTRAVENOUS
Status: COMPLETED | OUTPATIENT
Start: 2025-01-12 | End: 2025-01-12

## 2025-01-12 RX ADMIN — GADOBUTROL 8 ML: 604.72 INJECTION INTRAVENOUS at 08:48

## 2025-01-13 ENCOUNTER — RESULTS FOLLOW-UP (OUTPATIENT)
Dept: FAMILY MEDICINE CLINIC | Facility: CLINIC | Age: 75
End: 2025-01-13

## 2025-01-14 ENCOUNTER — TELEPHONE (OUTPATIENT)
Age: 75
End: 2025-01-14

## 2025-01-14 NOTE — TELEPHONE ENCOUNTER
Pt's daughter Hoa (has consent) confirming pt's GI ERCP & EUS tomorrow. Also confirming NPO after midnight. I double checked w/ Katerina gi triage nurse and per previous notes re: ercp instructions no food or drinks after midnight. I informed her if pt needs to take any meds in the morning or 2 hrs prior to arrival time. Per Hoa she confirmed pt is no longer on Plavix. Hoa understood all.

## 2025-01-15 ENCOUNTER — TELEPHONE (OUTPATIENT)
Dept: RADIOLOGY | Facility: CLINIC | Age: 75
End: 2025-01-15

## 2025-01-15 ENCOUNTER — ANESTHESIA EVENT (OUTPATIENT)
Dept: GASTROENTEROLOGY | Facility: HOSPITAL | Age: 75
End: 2025-01-15
Payer: COMMERCIAL

## 2025-01-15 ENCOUNTER — ANESTHESIA (OUTPATIENT)
Dept: GASTROENTEROLOGY | Facility: HOSPITAL | Age: 75
End: 2025-01-15
Payer: COMMERCIAL

## 2025-01-15 ENCOUNTER — DOCUMENTATION (OUTPATIENT)
Dept: HEMATOLOGY ONCOLOGY | Facility: CLINIC | Age: 75
End: 2025-01-15

## 2025-01-15 ENCOUNTER — HOSPITAL ENCOUNTER (OUTPATIENT)
Dept: GASTROENTEROLOGY | Facility: HOSPITAL | Age: 75
Setting detail: OUTPATIENT SURGERY
Discharge: HOME | End: 2025-01-15
Attending: INTERNAL MEDICINE
Payer: COMMERCIAL

## 2025-01-15 ENCOUNTER — HOSPITAL ENCOUNTER (OUTPATIENT)
Dept: RADIOLOGY | Facility: HOSPITAL | Age: 75
Discharge: HOME | End: 2025-01-15
Payer: COMMERCIAL

## 2025-01-15 ENCOUNTER — PREP FOR PROCEDURE (OUTPATIENT)
Dept: GASTROENTEROLOGY | Facility: CLINIC | Age: 75
End: 2025-01-15

## 2025-01-15 VITALS
WEIGHT: 170 LBS | OXYGEN SATURATION: 94 % | HEIGHT: 63 IN | SYSTOLIC BLOOD PRESSURE: 156 MMHG | RESPIRATION RATE: 13 BRPM | HEART RATE: 65 BPM | DIASTOLIC BLOOD PRESSURE: 71 MMHG | TEMPERATURE: 97 F | BODY MASS INDEX: 30.12 KG/M2

## 2025-01-15 DIAGNOSIS — K83.9 BILE DUCT ABNORMALITY: ICD-10-CM

## 2025-01-15 DIAGNOSIS — R93.5 ABNORMAL ABDOMINAL MRI: ICD-10-CM

## 2025-01-15 DIAGNOSIS — K86.89 DILATION OF PANCREATIC DUCT: ICD-10-CM

## 2025-01-15 DIAGNOSIS — R93.5 ABNORMAL ABDOMINAL MRI: Primary | ICD-10-CM

## 2025-01-15 PROCEDURE — C2617 STENT, NON-COR, TEM W/O DEL: HCPCS

## 2025-01-15 PROCEDURE — 88112 CYTOPATH CELL ENHANCE TECH: CPT | Performed by: PATHOLOGY

## 2025-01-15 PROCEDURE — C1769 GUIDE WIRE: HCPCS

## 2025-01-15 PROCEDURE — C1889 IMPLANT/INSERT DEVICE, NOC: HCPCS

## 2025-01-15 PROCEDURE — 74328 X-RAY BILE DUCT ENDOSCOPY: CPT

## 2025-01-15 RX ORDER — SODIUM CHLORIDE, SODIUM LACTATE, POTASSIUM CHLORIDE, CALCIUM CHLORIDE 600; 310; 30; 20 MG/100ML; MG/100ML; MG/100ML; MG/100ML
125 INJECTION, SOLUTION INTRAVENOUS CONTINUOUS
Status: DISCONTINUED | OUTPATIENT
Start: 2025-01-15 | End: 2025-01-19 | Stop reason: HOSPADM

## 2025-01-15 RX ORDER — EPHEDRINE SULFATE 50 MG/ML
INJECTION INTRAVENOUS AS NEEDED
Status: DISCONTINUED | OUTPATIENT
Start: 2025-01-15 | End: 2025-01-15

## 2025-01-15 RX ORDER — SODIUM CHLORIDE, SODIUM LACTATE, POTASSIUM CHLORIDE, CALCIUM CHLORIDE 600; 310; 30; 20 MG/100ML; MG/100ML; MG/100ML; MG/100ML
INJECTION, SOLUTION INTRAVENOUS CONTINUOUS PRN
Status: DISCONTINUED | OUTPATIENT
Start: 2025-01-15 | End: 2025-01-15

## 2025-01-15 RX ORDER — FENTANYL CITRATE 50 UG/ML
INJECTION, SOLUTION INTRAMUSCULAR; INTRAVENOUS AS NEEDED
Status: DISCONTINUED | OUTPATIENT
Start: 2025-01-15 | End: 2025-01-15

## 2025-01-15 RX ORDER — ROCURONIUM BROMIDE 10 MG/ML
INJECTION, SOLUTION INTRAVENOUS AS NEEDED
Status: DISCONTINUED | OUTPATIENT
Start: 2025-01-15 | End: 2025-01-15

## 2025-01-15 RX ORDER — KETAMINE HCL IN NACL, ISO-OSM 100MG/10ML
SYRINGE (ML) INJECTION AS NEEDED
Status: DISCONTINUED | OUTPATIENT
Start: 2025-01-15 | End: 2025-01-15

## 2025-01-15 RX ORDER — DEXAMETHASONE SODIUM PHOSPHATE 10 MG/ML
INJECTION, SOLUTION INTRAMUSCULAR; INTRAVENOUS AS NEEDED
Status: DISCONTINUED | OUTPATIENT
Start: 2025-01-15 | End: 2025-01-15

## 2025-01-15 RX ORDER — MIDAZOLAM HYDROCHLORIDE 2 MG/2ML
INJECTION, SOLUTION INTRAMUSCULAR; INTRAVENOUS AS NEEDED
Status: DISCONTINUED | OUTPATIENT
Start: 2025-01-15 | End: 2025-01-15

## 2025-01-15 RX ORDER — PROPOFOL 10 MG/ML
INJECTION, EMULSION INTRAVENOUS AS NEEDED
Status: DISCONTINUED | OUTPATIENT
Start: 2025-01-15 | End: 2025-01-15

## 2025-01-15 RX ORDER — LIDOCAINE HYDROCHLORIDE 10 MG/ML
INJECTION, SOLUTION EPIDURAL; INFILTRATION; INTRACAUDAL; PERINEURAL AS NEEDED
Status: DISCONTINUED | OUTPATIENT
Start: 2025-01-15 | End: 2025-01-15

## 2025-01-15 RX ORDER — ONDANSETRON 2 MG/ML
INJECTION INTRAMUSCULAR; INTRAVENOUS AS NEEDED
Status: DISCONTINUED | OUTPATIENT
Start: 2025-01-15 | End: 2025-01-15

## 2025-01-15 RX ORDER — INDOMETHACIN 50 MG/1
SUPPOSITORY RECTAL AS NEEDED
Status: COMPLETED | OUTPATIENT
Start: 2025-01-15 | End: 2025-01-15

## 2025-01-15 RX ORDER — PROPOFOL 10 MG/ML
INJECTION, EMULSION INTRAVENOUS CONTINUOUS PRN
Status: DISCONTINUED | OUTPATIENT
Start: 2025-01-15 | End: 2025-01-15

## 2025-01-15 RX ADMIN — PROPOFOL 150 MG: 10 INJECTION, EMULSION INTRAVENOUS at 12:12

## 2025-01-15 RX ADMIN — SODIUM CHLORIDE, SODIUM LACTATE, POTASSIUM CHLORIDE, AND CALCIUM CHLORIDE: .6; .31; .03; .02 INJECTION, SOLUTION INTRAVENOUS at 12:08

## 2025-01-15 RX ADMIN — IOHEXOL 15 ML: 240 INJECTION, SOLUTION INTRATHECAL; INTRAVASCULAR; INTRAVENOUS; ORAL at 13:00

## 2025-01-15 RX ADMIN — INDOMETHACIN 100 MG: 50 SUPPOSITORY RECTAL at 12:42

## 2025-01-15 RX ADMIN — EPHEDRINE SULFATE 10 MG: 50 INJECTION, SOLUTION INTRAVENOUS at 12:55

## 2025-01-15 RX ADMIN — FENTANYL CITRATE 50 MCG: 50 INJECTION INTRAMUSCULAR; INTRAVENOUS at 11:54

## 2025-01-15 RX ADMIN — DEXMEDETOMIDINE HYDROCHLORIDE 8 MCG: 100 INJECTION, SOLUTION INTRAVENOUS at 13:22

## 2025-01-15 RX ADMIN — FENTANYL CITRATE 50 MCG: 50 INJECTION INTRAMUSCULAR; INTRAVENOUS at 12:12

## 2025-01-15 RX ADMIN — PROPOFOL 100 MCG/KG/MIN: 10 INJECTION, EMULSION INTRAVENOUS at 12:15

## 2025-01-15 RX ADMIN — Medication 30 MG: at 12:17

## 2025-01-15 RX ADMIN — LIDOCAINE HYDROCHLORIDE 50 MG: 10 INJECTION, SOLUTION EPIDURAL; INFILTRATION; INTRACAUDAL; PERINEURAL at 12:12

## 2025-01-15 RX ADMIN — SODIUM CHLORIDE, SODIUM LACTATE, POTASSIUM CHLORIDE, AND CALCIUM CHLORIDE: .6; .31; .03; .02 INJECTION, SOLUTION INTRAVENOUS at 13:18

## 2025-01-15 RX ADMIN — ROCURONIUM 40 MG: 50 INJECTION, SOLUTION INTRAVENOUS at 12:13

## 2025-01-15 RX ADMIN — MORPHINE SULFATE 2 MG: 2 INJECTION, SOLUTION INTRAMUSCULAR; INTRAVENOUS at 14:17

## 2025-01-15 RX ADMIN — EPHEDRINE SULFATE 10 MG: 50 INJECTION, SOLUTION INTRAVENOUS at 12:33

## 2025-01-15 RX ADMIN — DEXAMETHASONE SODIUM PHOSPHATE 10 MG: 10 INJECTION INTRAMUSCULAR; INTRAVENOUS at 12:20

## 2025-01-15 RX ADMIN — ONDANSETRON 4 MG: 2 INJECTION INTRAMUSCULAR; INTRAVENOUS at 13:10

## 2025-01-15 RX ADMIN — SUGAMMADEX 200 MG: 100 INJECTION, SOLUTION INTRAVENOUS at 13:15

## 2025-01-15 NOTE — TELEPHONE ENCOUNTER
Caller: Patient    Doctor: RS    Reason for call: Patient is in serve pain and would like to know if she is able to get a script for her back pain    Patient been off her suboxon 2mg for 2 weeks and she been taking her oxycodone    Call back#:

## 2025-01-15 NOTE — TELEPHONE ENCOUNTER
S/w pt. C/o severe right sided pain from center of lower back to right groin, hip and leg. States pain is sharp and she also has numbness to left leg. Pt states pain is not new, just worsening.  Taking Oxy 5mg and celebrex. States not helping pain. Asking for something stronger.  Advised ED appropriate for severe pain. Pt states she was just at ED and was advised to call [facility]

## 2025-01-15 NOTE — H&P
"History and Physical -  [facility]  Montserrat Sumner 74 y.o. female MRN: [id number]                  HPI: Montserrat Sumner is a 74 y.o. year old female who presents for abnormal MRI.      REVIEW OF SYSTEMS: Per the HPI, and otherwise unremarkable.    Historical Information   Past Medical History:   Diagnosis Date    Anxiety     Chronic pain 03/06/2023    lower abdomen and back    Colon polyp     COPD (chronic obstructive pulmonary disease) (HCC)     \"passes out\" with O2 levels dropping    Disease of thyroid gland     GERD (gastroesophageal reflux disease)     History of transfusion     with aneurysm repair-autologous-self and daughter    Hyperlipidemia     Hypertension     Teeth missing     Wears glasses     For reading     Past Surgical History:   Procedure Laterality Date    BACK SURGERY      x2 herniated, crushed disc in the lumbar, ablation    CHOLECYSTECTOMY      COLONOSCOPY      FRACTURE SURGERY Left     hip-pinned    HYSTERECTOMY      salpingo-oophorectomy    MN INJECT SI JOINT ARTHRGRPHY&/ANES/STEROID W/PATRICIA Bilateral 12/18/2024    Procedure: BILATERAL SACROILIAC JOINT INJECTION;  Surgeon: Nj Maddox DO;  Location: [facility] MAIN OR;  Service: Pain Management     THORACIC AORTIC ANEURYSM REPAIR  09/2016    ascending thoracic aortic repair with RCA bypass with SVG x 1    TONSILLECTOMY      UPPER GASTROINTESTINAL ENDOSCOPY       Social History   Social History     Substance and Sexual Activity   Alcohol Use Yes    Comment: occasionally     Social History     Substance and Sexual Activity   Drug Use Yes    Types: Marijuana, Cocaine    Comment: yes still Marijuana as of 9/5/24-2 times weekly     Social History     Tobacco Use   Smoking Status Every Day    Current packs/day: 0.25    Average packs/day: 0.3 packs/day for 57.0 years (14.3 ttl pk-yrs)    Types: Cigarettes    Start date: 1968    Passive exposure: Past   Smokeless Tobacco Never   Tobacco Comments    Currently smoking 5-6 " cigarettes daily     Family History   Problem Relation Age of Onset    Breast cancer Mother        Meds/Allergies       Current Outpatient Medications:     albuterol (2.5 mg/3 mL) 0.083 % nebulizer solution    albuterol (Proventil HFA) 90 mcg/act inhaler    ALPRAZolam (XANAX) 0.5 mg tablet    amLODIPine (NORVASC) 5 mg tablet    aspirin 81 mg chewable tablet    atorvastatin (LIPITOR) 40 mg tablet    b complex vitamins capsule    buprenorphine-naloxone (Suboxone) 4-1 MG    celecoxib (CeleBREX) 200 mg capsule    cyclobenzaprine (FLEXERIL) 10 mg tablet    DULoxetine (CYMBALTA) 60 mg delayed release capsule    esomeprazole (NexIUM) 40 MG capsule    fluticasone-umeclidinium-vilanterol (Trelegy Ellipta) 200-62.5-25 mcg/actuation AEPB inhaler    levothyroxine 100 mcg tablet    losartan (COZAAR) 25 mg tablet    oxyCODONE (Roxicodone) 5 immediate release tablet    traZODone (DESYREL) 50 mg tablet    venlafaxine (EFFEXOR-XR) 150 mg 24 hr capsule    venlafaxine (EFFEXOR-XR) 75 mg 24 hr capsule    ALPRAZolam (XANAX) 0.5 mg tablet    benzonatate (TESSALON PERLES) 100 mg capsule    metoprolol succinate (TOPROL-XL) 50 mg 24 hr tablet    naloxone (NARCAN) 4 mg/0.1 mL nasal spray    oxygen gas    predniSONE 20 mg tablet    No Known Allergies    Objective     LMP  (LMP Unknown)       PHYSICAL EXAM    Gen: NAD  Head: NCAT  CV: RRR  CHEST: Clear  ABD: soft, NT/ND  EXT: no edema      ASSESSMENT/PLAN:  This is a 74 y.o. year old female here for EUS/ERCP, and she is stable and optimized for her procedure.

## 2025-01-15 NOTE — ANESTHESIA PREPROCEDURE EVALUATION
Procedure:  ERCP  ENDOSCOPIC ULTRASOUND (UPPER)    Albuterol daily, trelegy - moderate COPD, no ED visits for resp distress. Has been on steroids since October and is beginning a taper now. Has a chronic productive cough with green phlegm (not new), chronic O2 2L when she sleeps and sometimes during the day.    Xanax daily  Norvasc  Smoker daily  Marijuana use 3 months ago  Cocaine use -last use 2 weekends ago, snorting. Avoid BB intraop  Stopped taking suboxone on her own because she was in pain and then started taking oxycodone  Relevant Problems   CARDIO   (+) Aneurysm of ascending aorta without rupture (HCC)   (+) CAD (coronary artery disease)   (+) History of coronary artery bypass graft   (+) Hyperlipidemia   (+) Hypertension      ENDO   (+) Hypothyroid      GI/HEPATIC   (+) Dysphagia   (+) Gastroesophageal reflux disease      MUSCULOSKELETAL   (+) Chronic bilateral low back pain without sciatica   (+) Intractable back pain   (+) Osteoarthritis of spine with radiculopathy, lumbar region   (+) Primary osteoarthritis of both knees      NEURO/PSYCH   (+) Anxiety   (+) Chronic bilateral low back pain without sciatica   (+) Chronic pain syndrome      PULMONARY   (+) COPD exacerbation (HCC)   (+) Centrilobular emphysema (HCC)   (+) Chronic respiratory failure with hypoxia and hypercapnia (HCC)   (+) Oxygen dependent at night only   (+) Shortness of breath      Echo 4/2023    Left Ventricle: Left ventricular cavity size is normal. Wall thickness is mildly increased. There is borderline concentric hypertrophy. The left ventricular ejection fraction is 65% by visual estimation. Systolic function is normal. Wall motion is normal. Diastolic function is mildly abnormal, consistent with grade I (abnormal) relaxation.  Left atrial filling pressure is elevated.    Left Atrium: The atrium is moderately dilated.    Aortic Valve: There is aortic valve sclerosis.    Mitral Valve: There is mild annular calcification. There is  mild regurgitation.    Tricuspid Valve: There is mild regurgitation.  Pulmonary artery pressure around 30 mmHg.    Pulmonic Valve: There is mild regurgitation.  Physical Exam    Airway    Mallampati score: III  TM Distance: <3 FB       Dental       Cardiovascular  Cardiovascular exam normal    Pulmonary  Pulmonary exam normal     Other Findings        Anesthesia Plan  ASA Score- 3     Anesthesia Type- general with ASA Monitors.         Additional Monitors:     Airway Plan: ETT.    Comment: Risks/benefits and alternatives discussed with patient including possible PONV, sore throat, damage to teeth/lips/gums/esophagus, and possibility of rare anesthetic and surgical emergencies including but not limited to heart attack, stroke, and/or death. All questions were answered.  .       Plan Factors-Exercise tolerance (METS): <4 METS.    Chart reviewed.   Existing labs reviewed. Patient summary reviewed.    Patient is a current smoker.              Induction- intravenous.    Postoperative Plan- . Planned trial extubation        Informed Consent- Anesthetic plan and risks discussed with patient.  I personally reviewed this patient with the CRNA. Discussed and agreed on the Anesthesia Plan with the CRNA..      NPO Status:  Vitals Value Taken Time   Date of last liquid 01/15/25 01/15/25 1050   Time of last liquid 0600 01/15/25 1050   Date of last solid 01/14/25 01/15/25 1050   Time of last solid 1700 01/15/25 1050

## 2025-01-15 NOTE — TELEPHONE ENCOUNTER
Lamar MARES    1/15/25  3:38 PM  Note      Caller: Patient     Doctor: STEPHANI     Reason for call: Patient is in serve pain and would like to know if she is able to get a script for her back pain     Patient been off her suboxon 2mg for 2 weeks and she been taking her oxycodone     Call back#:

## 2025-01-15 NOTE — PROGRESS NOTES
[facility] sent for scheduling[facility]    Chart rvw'd on 1/15/25    Referring Provider: Jose Watson DO    Diagnosis: abnormal abdominal MRI    EGD/EUS: EDG/EUS/ERCP 11/12/24    Impression:  EGD:  The esophagus appeared normal.  The stomach appeared normal.  The duodenum appeared normal.  EUS:  The major papilla had a fish mouth appearance with mucous drainage.  The entire bile duct was dilated.  Atrophic pancreas.  Diffuse dilation of the pancreatic duct.  Some sludge/stones noted.  No obstructive masses or lesions to explain diffuse dilation of CBD or PD.  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    The major papilla had a fish mouth appearance.  The common bile duct and pancreatic duct were deeply cannulated after 2 attempts by employing a double guidewire technique.  Complete major papilla sphincterotomy was performed.  I personally interpreted fluoroscopy images.  Cholangiogram was performed which revealed diffuse dilation of biliary tree including the intrahepatic's.  CBD measured approximately 2 cm.  No significant filling defects noted.  Pancreatic urogram was also noted which revealed diffusely dilated PD.  Given drainage of mucoid material from ampulla some of fluid was aspirated and sent to [facility] for analysis.  Performed brushing with cytology brush in the distal common bile duct.  Utilized 9 x 12 cm extraction balloon to sweep pancreatic duct which extracted sludge and stones.  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    Recommendation:  Await pathology results   Follow up with GI Clinic   Recheck MRI/MRCP.  Refer to surgical oncology given high suspicion for main duct IPMN.  Repeat ERCP in 6 to 8 weeks for stent removal.

## 2025-01-15 NOTE — ANESTHESIA POSTPROCEDURE EVALUATION
Post-Op Assessment Note    CV Status:  Stable    Pain management: adequate       Mental Status:  Sleepy and arousable   Hydration Status:  Stable   PONV Controlled:  None   Airway Patency:  Patent and adequate     Post Op Vitals Reviewed: Yes    No anethesia notable event occurred.    Staff: CRNA           Last Filed PACU Vitals:  Vitals Value Taken Time   Temp 97 °F (36.1 °C) 01/15/25 1330   Pulse 65 01/15/25 1355   /71 01/15/25 1355   Resp 13 01/15/25 1355   SpO2 94 % 01/15/25 1355

## 2025-01-16 ENCOUNTER — NURSE TRIAGE (OUTPATIENT)
Age: 75
End: 2025-01-16

## 2025-01-16 ENCOUNTER — TELEPHONE (OUTPATIENT)
Age: 75
End: 2025-01-16

## 2025-01-16 ENCOUNTER — RESULTS FOLLOW-UP (OUTPATIENT)
Dept: GASTROENTEROLOGY | Facility: CLINIC | Age: 75
End: 2025-01-16

## 2025-01-16 PROCEDURE — 43274 ERCP DUCT STENT PLACEMENT: CPT | Performed by: INTERNAL MEDICINE

## 2025-01-16 PROCEDURE — 43238 EGD US FINE NEEDLE BX/ASPIR: CPT | Performed by: INTERNAL MEDICINE

## 2025-01-16 PROCEDURE — 88112 CYTOPATH CELL ENHANCE TECH: CPT | Performed by: PATHOLOGY

## 2025-01-16 PROCEDURE — 43264 ERCP REMOVE DUCT CALCULI: CPT | Performed by: INTERNAL MEDICINE

## 2025-01-16 NOTE — TELEPHONE ENCOUNTER
Pt. Calling with pain to RUQ, constant 8/10  Status post ERCP biliary with stent yesterday, pain better today than yesterday but still 8/10, denies any other symptoms, treating pain with ibuprofen with not much relief, advised she can take Tylenol, rest and stay hydrated, if she develops any alarming symptoms to go to ER, please advise further if she needs further eval       Additional Information   Affirmative: The patient has post-procedure questions/concerns not meeting ED criteria (Ex: s/p EGD with stent).    Answer Questions - Advanced Endoscopy Initial Assessment  Which procedure specific to Advanced Endoscopy: ERCP    Do you have fever + jaundice: No or low grade fever (<100°F)    Do you have painful jaundice: no    Do you have had a fever post procedure: no    Do you have [severe abdominal pain], and/or [intractable N/V > 24 hours] following advanced procedure: yes, pain 8/10, worse yesterday after procedure     Do you have new onset jaundice: no      Do you have post-procedure questions/concerns not meeting ED criteria (Ex: s/p EGD with stent): yes    Protocols used: ADVANCED ENDOSCOPY

## 2025-01-16 NOTE — TELEPHONE ENCOUNTER
Pts daughter Lani on the consent form called for her mother having pain after her procedure yesterday in the stomach and back. Call was transferred to nurse Karin for further assistance

## 2025-01-16 NOTE — TELEPHONE ENCOUNTER
Pt. Daughter Josie, verified on consent , called and wanted to know why pt. Was sent to ER she was told she had an infection, advised she called with RUQ pain and was advised to go to ER for eval, pt. Daughter stating pt. Has some mild dementia and pt. daughter is asking that if patient calls in again to call her daughter Josie [phone] or pt. spouse Matthew at [phone] and let them know the details because sometimes the patient gets confused and forgets what she was told and they are in the process of getting PCP to test her for dementia

## 2025-01-16 NOTE — TELEPHONE ENCOUNTER
Spoke with patient- she is going to the [place] ED. She is still having horrible abdominal pain.     Just JESSA

## 2025-01-16 NOTE — TELEPHONE ENCOUNTER
S/W pt.  Pt had an ERCP performed by GI yesterday 1/15/2025 and has discomfort.  Nurse advised pt to reach out to GI office to let them know.  Pt verbalized she would reach out.    Also explained to pt that Dr Maddox will go over her plan of care at her scheduled visit tomorrow 1/17/2025.  Pt appreciative and verbalized understanding.

## 2025-01-16 NOTE — TELEPHONE ENCOUNTER
returning call for recommendations to today's telephone encounter at 10:18 AM. Informed pt that  has not yet provided recommendations as he is in procedure today. Advised  she should seek evaluation with the ED for constant RUQ pain post ERCP biliary with stent yesterday. Pt verbalized understand and states she will go to the [place] ED.

## 2025-01-17 ENCOUNTER — OFFICE VISIT (OUTPATIENT)
Dept: PAIN MEDICINE | Facility: CLINIC | Age: 75
End: 2025-01-17
Payer: COMMERCIAL

## 2025-01-17 VITALS — HEIGHT: 63 IN | WEIGHT: 170 LBS | BODY MASS INDEX: 30.12 KG/M2

## 2025-01-17 DIAGNOSIS — G89.29 CHRONIC INTRACTABLE PAIN: Primary | ICD-10-CM

## 2025-01-17 DIAGNOSIS — M96.1 POSTLAMINECTOMY SYNDROME: ICD-10-CM

## 2025-01-17 DIAGNOSIS — M47.26 OTHER SPONDYLOSIS WITH RADICULOPATHY, LUMBAR REGION: ICD-10-CM

## 2025-01-17 DIAGNOSIS — G89.4 CHRONIC PAIN SYNDROME: ICD-10-CM

## 2025-01-17 PROCEDURE — G2211 COMPLEX E/M VISIT ADD ON: HCPCS | Performed by: STUDENT IN AN ORGANIZED HEALTH CARE EDUCATION/TRAINING PROGRAM

## 2025-01-17 PROCEDURE — 99214 OFFICE O/P EST MOD 30 MIN: CPT | Performed by: STUDENT IN AN ORGANIZED HEALTH CARE EDUCATION/TRAINING PROGRAM

## 2025-01-17 NOTE — PROGRESS NOTES
Pain Medicine Follow-Up Note    Assessment:  1. Chronic intractable pain    2. Other spondylosis with radiculopathy, lumbar region    3. Postlaminectomy syndrome    4. Chronic pain syndrome        Plan:  No orders of the defined types were placed in this encounter.      No orders of the defined types were placed in this encounter.      My impressions and treatment recommendations were discussed in detail with the patient who verbalized understanding and had no further questions.      Montserrat presents for follow-up regarding low back pain and right greater than left lower limb pain.  I independently interpreted the patient's lumbar MRI showing adjacent segment degeneration from her previous spinal surgery.  She has had decompression laminectomy L2-L5 with hardware at L4-5.  Her pain appears to be more in an L3 distribution.     Janki has failed to improve with conservative care and multimodal analgesics including opioids.  She is currently on oxycodone and has weaned off her buprenorphine.  I did discuss with Janki that there is limited options for her pain and that a biopsychosocial approach would be the most beneficial.  I did discuss with her we could trial an epidural steroid injection above the level of her surgery at L1-2 with hopes that the medication will spread to her level pathology.  We discussed expected management that a reasonable result would be at least 50% pain reduction for at least 3 months.  If failure to achieve this result, we did discuss that the only other interventional option I would be able to provide her with is a spinal cord stimulator trial.  I did provide her with the information for the spinal cord stimulator trial including psychological clearance and will reach out to work on the authorization process.    Complete risks and benefits including bleeding, infection, tissue reaction, nerve injury and allergic reaction were discussed. The approach was demonstrated using models and  literature was provided. Verbal and written consent was obtained.     Discharge instructions were provided. I personally saw and examined the patient and I agree with the above discussed plan of care.    I have spent a total time of 35 minutes in caring for this patient on the day of the visit/encounter including Diagnostic results, Prognosis, Risks and benefits of tx options, Impressions, Documenting in the medical record, Reviewing / ordering tests, medicine, procedures  , and Obtaining or reviewing history  .     History of Present Illness:    Montserrat Sumner is a 74 y.o. female who presents to [facility] for interval re-evaluation of the above stated pain complaints. The patient has a past medical and chronic pain history as outlined in the assessment section. She was last seen on 12/18/2024.    She was returns today for follow-up regarding low back and bilateral leg pain.  She reports her pain is the same.  Pain is described as severe 10 out of 10 worse in the morning and constant.  The pain is sharp and throbbing.  Pain is located across the lumbosacral area worse on the right and radiates down the posterior aspect of her right thigh into the medial aspect.  It does slightly beyond the knee.  She reports her current pain medications are not helpful with this pain at all.  She recently stopped taking her buprenorphine as she was placed on oxycodone.      Other than as stated above, the patient denies any interval changes in medications, medical condition, mental condition, symptoms, or allergies since the last office visit.         Review of Systems:    Review of Systems   Respiratory:  Positive for chest tightness and shortness of breath.    Cardiovascular:  Negative for chest pain.   Gastrointestinal:  Negative for constipation, diarrhea, nausea and vomiting.   Musculoskeletal:  Positive for back pain, gait problem, joint swelling, neck pain and neck stiffness. Negative for  "arthralgias and myalgias.   Skin:  Negative for rash.   Neurological:  Positive for dizziness, weakness and numbness. Negative for seizures, light-headedness and headaches.   Psychiatric/Behavioral:  Positive for decreased concentration, dysphoric mood and sleep disturbance. The patient is nervous/anxious.    All other systems reviewed and are negative.        Past Medical History:   Diagnosis Date    Anxiety     Chronic pain 03/06/2023    lower abdomen and back    Colon polyp     COPD (chronic obstructive pulmonary disease) (HCC)     \"passes out\" with O2 levels dropping    Disease of thyroid gland     GERD (gastroesophageal reflux disease)     History of transfusion     with aneurysm repair-autologous-self and daughter    Hyperlipidemia     Hypertension     Teeth missing     Wears glasses     For reading       Past Surgical History:   Procedure Laterality Date    BACK SURGERY      x2 herniated, crushed disc in the lumbar, ablation    CHOLECYSTECTOMY      COLONOSCOPY      FRACTURE SURGERY Left     hip-pinned    HYSTERECTOMY      salpingo-oophorectomy    IL INJECT SI JOINT ARTHRGRPHY&/ANES/STEROID W/PATRICIA Bilateral 12/18/2024    Procedure: BILATERAL SACROILIAC JOINT INJECTION;  Surgeon: Nj Maddox DO;  Location: [facility] MAIN OR;  Service: Pain Management     THORACIC AORTIC ANEURYSM REPAIR  09/2016    ascending thoracic aortic repair with RCA bypass with SVG x 1    TONSILLECTOMY      UPPER GASTROINTESTINAL ENDOSCOPY         Family History   Problem Relation Age of Onset    Breast cancer Mother        Social History     Occupational History    Not on file   Tobacco Use    Smoking status: Every Day     Current packs/day: 0.25     Average packs/day: 0.3 packs/day for 57.0 years (14.3 ttl pk-yrs)     Types: Cigarettes     Start date: 1968     Passive exposure: Past    Smokeless tobacco: Never    Tobacco comments:     Currently smoking 5-6 cigarettes daily   Vaping Use    Vaping status: Never Used   Substance and " Sexual Activity    Alcohol use: Yes     Comment: occasionally    Drug use: Yes     Types: Marijuana, Cocaine     Comment: yes still Marijuana as of 9/5/24-2 times weekly    Sexual activity: Not Currently     Partners: Male     Birth control/protection: Post-menopausal         Current Outpatient Medications:     albuterol (2.5 mg/3 mL) 0.083 % nebulizer solution, Take 3 mL (2.5 mg total) by nebulization 4 (four) times a day as needed for wheezing or shortness of breath, Disp: 360 mL, Rfl: 7    albuterol (Proventil HFA) 90 mcg/act inhaler, Inhale 2 puffs every 4 (four) hours as needed for wheezing, Disp: 6.7 g, Rfl: 7    ALPRAZolam (XANAX) 0.5 mg tablet, Take 0.5 mg by mouth 2 (two) times a day, Disp: , Rfl:     amLODIPine (NORVASC) 5 mg tablet, Take 1 tablet (5 mg total) by mouth daily, Disp: 10 tablet, Rfl: 0    aspirin 81 mg chewable tablet, Chew 81 mg daily, Disp: , Rfl:     atorvastatin (LIPITOR) 40 mg tablet, Take 40 mg by mouth every morning, Disp: , Rfl:     b complex vitamins capsule, Take 1 capsule by mouth once a week, Disp: , Rfl:     benzonatate (TESSALON PERLES) 100 mg capsule, Take 1 capsule (100 mg total) by mouth 3 (three) times a day as needed for cough, Disp: 20 capsule, Rfl: 0    buprenorphine-naloxone (Suboxone) 4-1 MG, every morning Wafer, Disp: , Rfl:     celecoxib (CeleBREX) 200 mg capsule, Take 1 capsule (200 mg total) by mouth daily, Disp: 90 capsule, Rfl: 1    cyclobenzaprine (FLEXERIL) 10 mg tablet, Take 1 tablet (10 mg total) by mouth 3 (three) times a day as needed for muscle spasms, Disp: 90 tablet, Rfl: 1    DULoxetine (CYMBALTA) 60 mg delayed release capsule, Take 1 capsule (60 mg total) by mouth daily, Disp: 90 capsule, Rfl: 0    esomeprazole (NexIUM) 40 MG capsule, Take 1 capsule (40 mg total) by mouth 2 (two) times a day before meals, Disp: 180 capsule, Rfl: 1    fluticasone-umeclidinium-vilanterol (Trelegy Ellipta) 200-62.5-25 mcg/actuation AEPB inhaler, Inhale 1 puff daily Rinse  "mouth after use., Disp: 60 blister, Rfl: 11    levothyroxine 100 mcg tablet, Take 100 mcg by mouth every morning, Disp: , Rfl:     losartan (COZAAR) 25 mg tablet, Take 25 mg by mouth every morning, Disp: , Rfl:     naloxone (NARCAN) 4 mg/0.1 mL nasal spray, Administer 1 spray into a nostril. If no response after 2-3 minutes, give another dose in the other nostril using a new spray., Disp: 1 each, Rfl: 1    oxyCODONE (Roxicodone) 5 immediate release tablet, Take 1 tablet (5 mg total) by mouth every 6 (six) hours as needed for moderate pain Max Daily Amount: 20 mg, Disp: 20 tablet, Rfl: 0    oxygen gas, Inhale continuous as needed 2.5 L, Disp: , Rfl:     predniSONE 20 mg tablet, Take 2 tablets (40 mg total) by mouth daily for 7 days, THEN 1.5 tablets (30 mg total) daily for 7 days, THEN 1 tablet (20 mg total) daily for 7 days, THEN 0.5 tablets (10 mg total) daily for 7 days., Disp: 35 tablet, Rfl: 0    traZODone (DESYREL) 50 mg tablet, Take 100 mg by mouth daily at bedtime, Disp: , Rfl:     venlafaxine (EFFEXOR-XR) 150 mg 24 hr capsule, Take 1 capsule by mouth in the morning, Disp: , Rfl:     venlafaxine (EFFEXOR-XR) 75 mg 24 hr capsule, Take 75 mg by mouth daily at bedtime, Disp: , Rfl:     ALPRAZolam (XANAX) 0.5 mg tablet, Take 1 tablet (0.5 mg total) by mouth 1 (one) time for 1 dose, Disp: 1 tablet, Rfl: 0    metoprolol succinate (TOPROL-XL) 50 mg 24 hr tablet, Take 1 tablet (50 mg total) by mouth daily (Patient taking differently: Take 50 mg by mouth every morning), Disp: 90 tablet, Rfl: 1  No current facility-administered medications for this visit.    Facility-Administered Medications Ordered in Other Visits:     lactated ringers infusion, 125 mL/hr, Intravenous, Continuous, Christy Chiu PA-C    No Known Allergies    Physical Exam:    Ht 5' 3\" (1.6 m)   Wt 77.1 kg (170 lb)   LMP  (LMP Unknown)   BMI 30.11 kg/m²     Constitutional:normal, well developed, well nourished, alert, in no distress and non-toxic " and no overt pain behavior.  Eyes:anicteric  HEENT:grossly intact  Neck:supple, symmetric, trachea midline and no masses   Pulmonary:even and unlabored  Cardiovascular:No edema or pitting edema present  Skin:Normal without rashes or lesions and well hydrated  Psychiatric:Mood and affect appropriate  Neurologic:Cranial Nerves II-XII grossly intact  Musculoskeletal: Low back healed surgical incisions noted.  Tenderness to the paraspinals.  Range of motion limited with flexion extension secondary to pain.  Positive facet loading bilaterally.  Straight leg raise positive bilaterally.  Strength, sensation, reflexes in lower limbs normal and symmetric.    Imaging     MRI lumbar spine w wo contrast  Status: Final result     PACS Images     Show images for MRI lumbar spine w wo contrast  Study Result    Narrative & Impression   MRI LUMBAR SPINE WITH AND WITHOUT CONTRAST     INDICATION: M54.16: Radiculopathy, lumbar region  M96.1: Postlaminectomy syndrome, not elsewhere classified.     COMPARISON: 11/29/2024     TECHNIQUE:  Multiplanar, multisequence imaging of the lumbar spine was performed before and after gadolinium administration. .        IV Contrast:  8 mL of Gadobutrol injection (SINGLE-DOSE)     IMAGE QUALITY:  Diagnostic     FINDINGS:     VERTEBRAL BODIES:  There are 5 lumbar type vertebral bodies. Grade 1 anterolisthesis of L4-L5. Patient is status post lumbar spinal fusion at L4-L5. Susceptibility artifact from orthopedic hardware is noted. Mild chronic loss of height of the L1   vertebral body. Findings are stable since prior x-rays of 11/29/2024.     SACRUM:  Normal signal within the sacrum. No evidence of insufficiency or stress fracture.     DISTAL CORD AND CONUS:  Normal size and signal within the distal cord and conus.     PARASPINAL SOFT TISSUES: Postoperative changes within the posterior paraspinal soft tissues.     LOWER THORACIC DISC SPACES:  Normal disc height and signal.  No disc herniation, canal  stenosis or foraminal narrowing.     LUMBAR DISC SPACES:     L1-L2: There is a bulging annulus with a superimposed right paracentral disc protrusion. This effaces the right lateral recess for the descending L2 nerve root. There is facet arthrosis. There is mild right foraminal encroachment. There is mild mass   effect on the thecal sac.     L2-L3: There is a bulging annulus. There is facet arthrosis. There is marginal osteophytosis. Canal is decompressed by laminectomy. There is moderate right foraminal narrowing with contact of the exiting nerve root. There is no significant left foraminal   narrowing.     L3-L4: Canal is decompressed by laminectomy. There is facet arthrosis. There is moderate bilateral foraminal narrowing with contact of the exiting nerve roots.     L4-L5: Canal is decompressed by laminectomy. There is a mild bulge. There is marginal osteophytosis. There is facet arthrosis. There is mild to moderate left foraminal narrowing with near abutment of the exiting nerve root. There is no significant right   foraminal narrowing.     L5-S1: There is facet arthrosis. There is no significant canal stenosis or foraminal narrowing.     POSTCONTRAST IMAGING:  No abnormal enhancement.     OTHER FINDINGS:  None.     IMPRESSION:     Postoperative and degenerative changes of the lumbar spine, as described above.     Right paracentral disc extrusion at L1-L2 effaces the right lateral recess of the descending L2 nerve root. Correlate for focal radiculopathy at this level.     Stable mild chronic loss of height of the L1 vertebral body. No acute compression fractures identified.        Workstation performed: [id number]        Imaging    MRI lumbar spine w wo contrast (Order: [id number]) - 1/4/2025    Result History    MRI lumbar spine w wo contrast (Order #[id number]) on 1/6/2025 - Order Result History Report    Order Report     Order Details  Order Questions    Question Answer   Exam reason persistent lumbar  radiculopathy with hx of lumbar fusion. evaluate hardware and adjacent segments   Note: Enter reason for exam   What is the patient's sedation requirement? No Sedation   Does the patient need medication for Claustrophobia? If yes, order medication at this point. Yes   Does the patient wear a life vest, have an implanted cardiac device, a stimulation device, a sleep apnea stimulator, or a breast tissue expansion device? No   Note: i.e. - pacemaker, ICD, deep brain stimulator, bladder stimulator, spinal stimulator, vagus nerve stimulator   Please evaluate if any patient has any of the following risk factors that require renal function labs within 90 days prior to the MRI appointment. None of the Above   Release to patient through [employer] [facility]mediate            Result Information    Status Priority Source   Final result (1/6/2025  9:09 AM) Routine      Reason for Exam    persistent lumbar radiculopathy with hx of lumbar fusion. evaluate hardware and adjacent segments   Dx: Lumbar radiculopathy [M54.16 (ICD-10-CM)]; Postlaminectomy syndrome [M96.1 (ICD-10-CM)]       All Reviewers List    Nj Maddox DO on 1/6/2025 11:21 AM         MRI lumbar spine w wo contrast: Patient Communication     Add Comments   Not seen       Signed by    Signed Date/Time  Phone Pager   ROSEMARY MURRY 1/06/2025 09:09 [phone]        Exam Information    Status Exam Begun  Exam Ended  Performing Tech   Final [99] 1/04/2025 12:58 1/04/2025 13:20 Joan Rosa       Questionnaire          Question Answer Comment   1. Reason for Exam persistent lumbar radiculopathy with hx of lumbar fusion. evaluate hardware and adjacent segments    2. What is the patient's sedation requirement? No Sedation    3. Does the patient need medication for Claustrophobia? If yes, order medication at this point. Yes    4. Does the patient wear a life vest, have an implanted cardiac device, a stimulation device, a sleep apnea stimulator, or a breast tissue  expansion device? No    5. Please evaluate if any patient has any of the following risk factors that require renal function labs within 90 days prior to the MRI appointment. None of the Above    6. Release to patient through [employer] Immediate          Begin Exam      IMAGING BEGIN MRI    Question Answer Comment   1. Have you reviewed the MRI Screening Form? Yes    2. Have you performed a Full Stop/Final Check before entering Zone 4? Yes    3. Is the patient wearing a stimulator device? No    4. Device in safe mode - Verifying MRSO / Technologist :          End Exam      RIS IMAGING END VERIFY IMAGES IN PACS    Question Answer Comment   1. Have you verified the patient's images in PACS? Yes    2. Why have the images not been verified in PACS?           IMAGING IV CONTRAST EXTRAVASATION    Question Answer Comment   1. Was there an IV Contrast Extravasation? No    2. What was the Type and Amount of Contrast Inflitrated?     3. What was the location of the IV Site?     4. What treatment was provided to the patient?     5. If a surgeon was consulted, who was notified?     6. Name of Physician/Nurse who evaluated the patient:           IMAGING END MRI TECH NOTES    Question Answer Comment   1. Patient History: back pain    2. Has the patient had prior surgery on this body part? yes, 4 years ago, hx ablatation    3. Any history of cancer? no    4. Add'l Imaging Notes: na    5. Outside images/report information: na    6. Implant clearance information: na    7. LMP and ORAL contraceptives: na    8. Was jewelry removed from the patient? No    9. Jewelry removed:           PATIENT EDUCATION    Question Answer Comment   1. Was the patient educated? Yes    2. Why was the patient not educated?           IMAGING END MRI DEVICE SAFE MODE    Question Answer Comment   1. Is the patient wearing a stimulator device? No    2. Device out of safe mode - Verifying MRSO / Technologist :            Screening Form Questions     important  "suggestion  Questionnaires are displayed in the order they were answered.      Answer Comment   Has patient changed into the appropriate hospital gown?     What are your symptoms? lbp    Do you have a cardiac pacemaker or internal defibrillator? NA    Please list /make/model:     Have you had any HEART surgery? None    Please list make/model:     Heart surgery: If \"other\", please describe:     Have you had any BRAIN surgery? None    Please list  or describe implant or type \"NA\" if not applicable:     Brain surgery: If \"other\", please describe:     Have you had any EYE surgery? None    Eye surgery: If \"other\", please describe:     Have you ever injured your eyes with metal or metal fragments (grinding,metallic slivers)? No    Eye injury: Please describe:     Have you had any EAR surgery? None    Ear surgery: If \"other\", please describe:     Do you have an electronic \"stimulation\" device? None    Please provide  information:     Have you had any abdominal or pelvic surgery? No    Have you had any of the following abdominal or pelvic surgeries:     Abdominal/pelvic surgery: If \"other\", please describe:     Do you have any ORTHOPEDIC implants/devices? Screws/pins/anchors    Do you have the following: None    Do you have liver disease? None    Do you have kidney disease? None    Have you had a problem with a previous MRI? No    Does the patient require pre-medication?     Do you have a personal history of cancer? None    If \"other\", please specify:       Are you wearing medication patches?   No    Are you wearing any of the following: None    Date of last menstrual cycle: .    Postmenopausal? Yes    Pregnant?     Breastfeeding?     Breast tissue expander?     Do any of the following apply to you:     Please specify:     Did the patient provide this information? Yes    Who provided this information (if not the patient)?     Relationship to the patient:     Contact number:     MRI STAFF " ONLY- Prior imaging reviewed in PACS (initials): ks    MRI STAFF ONLY- Epic chart reviewed (initials): ks    MRI STAFF ONLY: The patient was scheduled to receive MRI contrast and has received the Medication Guide. Yes        External Results Report    Open External Results Report      Encounter    View Encounter                     Sedation Documentation Timeline (1/4/2025 00:00 to 1/4/2025 13:20)    An end event has not been filed for the most recent intervention. Due to this intervention’s length, all data may not appear below. To see all data, file an end event.  1/4/2025 1/4/2025 Event By   12:40:28 Orders Placed KS   Imaging  - MRI thoracic spine w wo contrast         12:40:34 Orders Placed KS   Imaging  - MRI lumbar spine w wo contrast         13:00:27 Orders Placed RS   Medications  - Gadobutrol injection (SINGLE-DOSE) SOLN 8 mL         13:15 Medication Given KS   Gadobutrol injection (SINGLE-DOSE) SOLN 8 mL - Dose: 8 mL ; Route: Intravenous ; Comment: 23g butterfly lt wrist           Pre-op Summary    Pre-op             Recovery Summary    Recovery                 Routing History    None            MRI thoracic spine w wo contrast  Status: Final result     PACS Images     Show images for MRI thoracic spine w wo contrast  Study Result    Narrative & Impression   MRI THORACIC SPINE WITH AND WITHOUT CONTRAST     INDICATION: M54.16: Radiculopathy, lumbar region  M96.1: Postlaminectomy syndrome, not elsewhere classified.     COMPARISON:  None.     TECHNIQUE:  Multiplanar, multisequence imaging of the thoracic spine was performed before and after gadolinium administration. .     IV Contrast:  8 mL of Gadobutrol injection (SINGLE-DOSE)     IMAGE QUALITY:  Diagnostic.     FINDINGS:     ALIGNMENT: Biphasic thoracic scoliosis. No compression fracture in the thoracic spine. Chronic height loss at L1.     MARROW SIGNAL: No aggressive osseous lesions. Schmorl's node changes in the L1 inferior endplate with edema.      THORACIC CORD: Questionable increased T2 signal in the thoracic cord at T6-7. No cord signal abnormalities elsewhere.     PREVERTEBRAL AND PARASPINAL SOFT TISSUES:   Partially imaged biliary ectasia, favored normal postcholecystectomy variant. Mild paraspinous muscular fatty atrophy.     THORACIC DEGENERATIVE CHANGE:  - T2-3: Right worse than left facet arthrosis, ligamentum flavum thickening, and shallow central disc extrusion with superior migration. Mild canal narrowing with contouring of the ventral cord. No significant foraminal narrowing.  - T5-6: Shallow central disc extrusion and mild bilateral facet arthrosis without significant stenosis.  - T6-7: Ligamentum flavum thickening and facet arthrosis. Central disc extrusion with slight superior migration. Severe canal stenosis. No foraminal narrowing.  - Multilevel disc height loss and endplate osteophytosis.     POSTCONTRAST:  No abnormal enhancement.     OTHER FINDINGS:  None.     IMPRESSION:     1.  T6-7 degenerative disc disease as discussed with resultant severe canal stenosis and possible myelomalacia.  2.  Degenerative changes at other levels as discussed without significant stenosis.  3.  Schmorl's node changes with edema in the L1 inferior endplate which could also be a source of back pain in the appropriate clinical setting.           Workstation performed: [id number]        Imaging    MRI thoracic spine w wo contrast (Order: [id number]) - 1/4/2025    Result History    MRI thoracic spine w wo contrast (Order #[id number]) on 1/13/2025 - Order Result History Report    Order Report     Order Details  Order Questions    Question Answer   Exam reason persistent lumbar radiculopathy with hx of lumbar fusion. evaluate hardware and adjacent segments   Note: Enter reason for exam   What is the patient's sedation requirement? No Sedation   Does the patient need medication for Claustrophobia? If yes, order medication at this point. Yes   Does the patient wear a  life vest, have an implanted cardiac device, a stimulation device, a sleep apnea stimulator, or a breast tissue expansion device? No   Note: i.e. - pacemaker, ICD, deep brain stimulator, bladder stimulator, spinal stimulator, vagus nerve stimulator   Please evaluate if any patient has any of the following risk factors that require renal function labs within 90 days prior to the MRI appointment. None of the Above   Release to patient through [employer]            Result Information    Status Priority Source   Final result (1/13/2025  2:06 PM) Routine      Reason for Exam    persistent lumbar radiculopathy with hx of lumbar fusion. evaluate hardware and adjacent segments   Dx: Lumbar radiculopathy [M54.16 (ICD-10-CM)]; Postlaminectomy syndrome [M96.1 (ICD-10-CM)]       All Reviewers List    Nj Maddox DO on 1/14/2025 10:07 AM         MRI thoracic spine w wo contrast: Patient Communication     Add Comments   Not seen       Signed by    Signed Date/Time  Phone Pager   WINDY LOWRY 1/13/2025 14:06 [phone]        Exam Information    Status Exam Begun  Exam Ended  Performing Tech   Final [99] 1/12/2025 08:17 1/12/2025 08:55 Joan Rosa       Questionnaire          Question Answer Comment   1. Reason for Exam persistent lumbar radiculopathy with hx of lumbar fusion. evaluate hardware and adjacent segments    2. What is the patient's sedation requirement? No Sedation    3. Does the patient need medication for Claustrophobia? If yes, order medication at this point. Yes    4. Does the patient wear a life vest, have an implanted cardiac device, a stimulation device, a sleep apnea stimulator, or a breast tissue expansion device? No    5. Please evaluate if any patient has any of the following risk factors that require renal function labs within 90 days prior to the MRI appointment. None of the Above    6. Release to patient through [facility] Immediate          Begin Exam      IMAGING BEGIN  MRI    Question Answer Comment   1. Have you reviewed the MRI Screening Form? Yes    2. Have you performed a Full Stop/Final Check before entering Zone 4? Yes    3. Is the patient wearing a stimulator device? No    4. Device in safe mode - Verifying MRSO / Technologist :          End Exam      [facility] IMAGING END VERIFY IMAGES IN PACS    Question Answer Comment   1. Have you verified the patient's images in PACS? Yes    2. Why have the images not been verified in PACS?           IMAGING IV CONTRAST EXTRAVASATION    Question Answer Comment   1. Was there an IV Contrast Extravasation? No    2. What was the Type and Amount of Contrast Inflitrated?     3. What was the location of the IV Site?     4. What treatment was provided to the patient?     5. If a surgeon was consulted, who was notified?     6. Name of Physician/Nurse who evaluated the patient:           IMAGING END MRI TECH NOTES    Question Answer Comment   1. Patient History: back pain    2. Has the patient had prior surgery on this body part? no    3. Any history of cancer? no    4. Add'l Imaging Notes: pt claustrophobic: mri usually done under conscious sedation    5. Outside images/report information: no    6. Implant clearance information: na    7. LMP and ORAL contraceptives: na    8. Was jewelry removed from the patient? No    9. Jewelry removed:           PATIENT EDUCATION    Question Answer Comment   1. Was the patient educated? Yes    2. Why was the patient not educated?           IMAGING END MRI DEVICE SAFE MODE    Question Answer Comment   1. Is the patient wearing a stimulator device? No    2. Device out of safe mode - Verifying MRSO / Technologist :            Screening Form Questions     important suggestion  Questionnaires are displayed in the order they were answered.      Answer Comment   Has patient changed into the appropriate hospital gown?     What are your symptoms? back pain    Do you have a cardiac pacemaker or internal defibrillator? NA   "  Please list /make/model:     Have you had any HEART surgery? None    Please list make/model:     Heart surgery: If \"other\", please describe:     Have you had any BRAIN surgery? None    Please list  or describe implant or type \"NA\" if not applicable:     Brain surgery: If \"other\", please describe:     Have you had any EYE surgery? None    Eye surgery: If \"other\", please describe:     Have you ever injured your eyes with metal or metal fragments (grinding,metallic slivers)? No    Eye injury: Please describe:     Have you had any EAR surgery? None    Ear surgery: If \"other\", please describe:     Do you have an electronic \"stimulation\" device? None    Please provide  information:     Have you had any abdominal or pelvic surgery? No    Have you had any of the following abdominal or pelvic surgeries:     Abdominal/pelvic surgery: If \"other\", please describe:     Do you have any ORTHOPEDIC implants/devices? Screws/pins/anchors    Do you have the following: None    Do you have liver disease? None    Do you have kidney disease? None    Have you had a problem with a previous MRI? No    Does the patient require pre-medication?     Do you have a personal history of cancer? None    If \"other\", please specify:       Are you wearing medication patches?   No    Are you wearing any of the following: None    Date of last menstrual cycle: .    Postmenopausal? Yes    Pregnant?     Breastfeeding?     Breast tissue expander?     Do any of the following apply to you:     Please specify:     Did the patient provide this information? Yes    Who provided this information (if not the patient)?     Relationship to the patient:     Contact number:     MRI STAFF ONLY- Prior imaging reviewed in PACS (initials): ks    MRI STAFF ONLY- Epic chart reviewed (initials): ks    MRI STAFF ONLY: The patient was scheduled to receive MRI contrast and has received the Medication Guide. Yes        External Results " Report    Open External Results Report      Encounter    View Encounter                     Sedation Documentation Timeline (1/12/2025 00:00 to 1/12/2025 08:55)    An end event has not been filed for the most recent intervention. Due to this intervention’s length, all data may not appear below. To see all data, file an end event.  1/12/2025 1/12/2025 Event By   08:43:17 Orders Placed RS   Medications  - Gadobutrol injection (SINGLE-DOSE) SOLN 8 mL         08:48 Medication Given KS   Gadobutrol injection (SINGLE-DOSE) SOLN 8 mL - Dose: 8 mL ; Route: Intravenous ; Comment: 23g butterfly rt upper wrist           Pre-op Summary    Pre-op             Recovery Summary    Recovery                 Routing History    None         No orders to display         No orders of the defined types were placed in this encounter.

## 2025-01-17 NOTE — H&P (VIEW-ONLY)
Pain Medicine Follow-Up Note    Assessment:  1. Chronic intractable pain    2. Other spondylosis with radiculopathy, lumbar region    3. Postlaminectomy syndrome    4. Chronic pain syndrome        Plan:  No orders of the defined types were placed in this encounter.      No orders of the defined types were placed in this encounter.      My impressions and treatment recommendations were discussed in detail with the patient who verbalized understanding and had no further questions.      Montserrat presents for follow-up regarding low back pain and right greater than left lower limb pain.  I independently interpreted the patient's lumbar MRI showing adjacent segment degeneration from her previous spinal surgery.  She has had decompression laminectomy L2-L5 with hardware at L4-5.  Her pain appears to be more in an L3 distribution.     Janki has failed to improve with conservative care and multimodal analgesics including opioids.  She is currently on oxycodone and has weaned off her buprenorphine.  I did discuss with Janki that there is limited options for her pain and that a biopsychosocial approach would be the most beneficial.  I did discuss with her we could trial an epidural steroid injection above the level of her surgery at L1-2 with hopes that the medication will spread to her level pathology.  We discussed expected management that a reasonable result would be at least 50% pain reduction for at least 3 months.  If failure to achieve this result, we did discuss that the only other interventional option I would be able to provide her with is a spinal cord stimulator trial.  I did provide her with the information for the spinal cord stimulator trial including psychological clearance and will reach out to work on the authorization process.    Complete risks and benefits including bleeding, infection, tissue reaction, nerve injury and allergic reaction were discussed. The approach was demonstrated using models and  literature was provided. Verbal and written consent was obtained.     Discharge instructions were provided. I personally saw and examined the patient and I agree with the above discussed plan of care.    I have spent a total time of 35 minutes in caring for this patient on the day of the visit/encounter including Diagnostic results, Prognosis, Risks and benefits of tx options, Impressions, Documenting in the medical record, Reviewing / ordering tests, medicine, procedures  , and Obtaining or reviewing history  .     History of Present Illness:    Montserrat Sumner is a 74 y.o. female who presents to [facility] for interval re-evaluation of the above stated pain complaints. The patient has a past medical and chronic pain history as outlined in the assessment section. She was last seen on 12/18/2024.    She was returns today for follow-up regarding low back and bilateral leg pain.  She reports her pain is the same.  Pain is described as severe 10 out of 10 worse in the morning and constant.  The pain is sharp and throbbing.  Pain is located across the lumbosacral area worse on the right and radiates down the posterior aspect of her right thigh into the medial aspect.  It does slightly beyond the knee.  She reports her current pain medications are not helpful with this pain at all.  She recently stopped taking her buprenorphine as she was placed on oxycodone.      Other than as stated above, the patient denies any interval changes in medications, medical condition, mental condition, symptoms, or allergies since the last office visit.         Review of Systems:    Review of Systems   Respiratory:  Positive for chest tightness and shortness of breath.    Cardiovascular:  Negative for chest pain.   Gastrointestinal:  Negative for constipation, diarrhea, nausea and vomiting.   Musculoskeletal:  Positive for back pain, gait problem, joint swelling, neck pain and neck stiffness. Negative for  "arthralgias and myalgias.   Skin:  Negative for rash.   Neurological:  Positive for dizziness, weakness and numbness. Negative for seizures, light-headedness and headaches.   Psychiatric/Behavioral:  Positive for decreased concentration, dysphoric mood and sleep disturbance. The patient is nervous/anxious.    All other systems reviewed and are negative.        Past Medical History:   Diagnosis Date    Anxiety     Chronic pain 03/06/2023    lower abdomen and back    Colon polyp     COPD (chronic obstructive pulmonary disease) (HCC)     \"passes out\" with O2 levels dropping    Disease of thyroid gland     GERD (gastroesophageal reflux disease)     History of transfusion     with aneurysm repair-autologous-self and daughter    Hyperlipidemia     Hypertension     Teeth missing     Wears glasses     For reading       Past Surgical History:   Procedure Laterality Date    BACK SURGERY      x2 herniated, crushed disc in the lumbar, ablation    CHOLECYSTECTOMY      COLONOSCOPY      FRACTURE SURGERY Left     hip-pinned    HYSTERECTOMY      salpingo-oophorectomy    KS INJECT SI JOINT ARTHRGRPHY&/ANES/STEROID W/PATRICIA Bilateral 12/18/2024    Procedure: BILATERAL SACROILIAC JOINT INJECTION;  Surgeon: Nj Maddox DO;  Location: [facility] MAIN OR;  Service: Pain Management     THORACIC AORTIC ANEURYSM REPAIR  09/2016    ascending thoracic aortic repair with RCA bypass with SVG x 1    TONSILLECTOMY      UPPER GASTROINTESTINAL ENDOSCOPY         Family History   Problem Relation Age of Onset    Breast cancer Mother        Social History     Occupational History    Not on file   Tobacco Use    Smoking status: Every Day     Current packs/day: 0.25     Average packs/day: 0.3 packs/day for 57.0 years (14.3 ttl pk-yrs)     Types: Cigarettes     Start date: 1968     Passive exposure: Past    Smokeless tobacco: Never    Tobacco comments:     Currently smoking 5-6 cigarettes daily   Vaping Use    Vaping status: Never Used   Substance and " Sexual Activity    Alcohol use: Yes     Comment: occasionally    Drug use: Yes     Types: Marijuana, Cocaine     Comment: yes still Marijuana as of 9/5/24-2 times weekly    Sexual activity: Not Currently     Partners: Male     Birth control/protection: Post-menopausal         Current Outpatient Medications:     albuterol (2.5 mg/3 mL) 0.083 % nebulizer solution, Take 3 mL (2.5 mg total) by nebulization 4 (four) times a day as needed for wheezing or shortness of breath, Disp: 360 mL, Rfl: 7    albuterol (Proventil HFA) 90 mcg/act inhaler, Inhale 2 puffs every 4 (four) hours as needed for wheezing, Disp: 6.7 g, Rfl: 7    ALPRAZolam (XANAX) 0.5 mg tablet, Take 0.5 mg by mouth 2 (two) times a day, Disp: , Rfl:     amLODIPine (NORVASC) 5 mg tablet, Take 1 tablet (5 mg total) by mouth daily, Disp: 10 tablet, Rfl: 0    aspirin 81 mg chewable tablet, Chew 81 mg daily, Disp: , Rfl:     atorvastatin (LIPITOR) 40 mg tablet, Take 40 mg by mouth every morning, Disp: , Rfl:     b complex vitamins capsule, Take 1 capsule by mouth once a week, Disp: , Rfl:     benzonatate (TESSALON PERLES) 100 mg capsule, Take 1 capsule (100 mg total) by mouth 3 (three) times a day as needed for cough, Disp: 20 capsule, Rfl: 0    buprenorphine-naloxone (Suboxone) 4-1 MG, every morning Wafer, Disp: , Rfl:     celecoxib (CeleBREX) 200 mg capsule, Take 1 capsule (200 mg total) by mouth daily, Disp: 90 capsule, Rfl: 1    cyclobenzaprine (FLEXERIL) 10 mg tablet, Take 1 tablet (10 mg total) by mouth 3 (three) times a day as needed for muscle spasms, Disp: 90 tablet, Rfl: 1    DULoxetine (CYMBALTA) 60 mg delayed release capsule, Take 1 capsule (60 mg total) by mouth daily, Disp: 90 capsule, Rfl: 0    esomeprazole (NexIUM) 40 MG capsule, Take 1 capsule (40 mg total) by mouth 2 (two) times a day before meals, Disp: 180 capsule, Rfl: 1    fluticasone-umeclidinium-vilanterol (Trelegy Ellipta) 200-62.5-25 mcg/actuation AEPB inhaler, Inhale 1 puff daily Rinse  "mouth after use., Disp: 60 blister, Rfl: 11    levothyroxine 100 mcg tablet, Take 100 mcg by mouth every morning, Disp: , Rfl:     losartan (COZAAR) 25 mg tablet, Take 25 mg by mouth every morning, Disp: , Rfl:     naloxone (NARCAN) 4 mg/0.1 mL nasal spray, Administer 1 spray into a nostril. If no response after 2-3 minutes, give another dose in the other nostril using a new spray., Disp: 1 each, Rfl: 1    oxyCODONE (Roxicodone) 5 immediate release tablet, Take 1 tablet (5 mg total) by mouth every 6 (six) hours as needed for moderate pain Max Daily Amount: 20 mg, Disp: 20 tablet, Rfl: 0    oxygen gas, Inhale continuous as needed 2.5 L, Disp: , Rfl:     predniSONE 20 mg tablet, Take 2 tablets (40 mg total) by mouth daily for 7 days, THEN 1.5 tablets (30 mg total) daily for 7 days, THEN 1 tablet (20 mg total) daily for 7 days, THEN 0.5 tablets (10 mg total) daily for 7 days., Disp: 35 tablet, Rfl: 0    traZODone (DESYREL) 50 mg tablet, Take 100 mg by mouth daily at bedtime, Disp: , Rfl:     venlafaxine (EFFEXOR-XR) 150 mg 24 hr capsule, Take 1 capsule by mouth in the morning, Disp: , Rfl:     venlafaxine (EFFEXOR-XR) 75 mg 24 hr capsule, Take 75 mg by mouth daily at bedtime, Disp: , Rfl:     ALPRAZolam (XANAX) 0.5 mg tablet, Take 1 tablet (0.5 mg total) by mouth 1 (one) time for 1 dose, Disp: 1 tablet, Rfl: 0    metoprolol succinate (TOPROL-XL) 50 mg 24 hr tablet, Take 1 tablet (50 mg total) by mouth daily (Patient taking differently: Take 50 mg by mouth every morning), Disp: 90 tablet, Rfl: 1  No current facility-administered medications for this visit.    Facility-Administered Medications Ordered in Other Visits:     lactated ringers infusion, 125 mL/hr, Intravenous, Continuous, Christy Chiu PA-C    No Known Allergies    Physical Exam:    Ht 5' 3\" (1.6 m)   Wt 77.1 kg (170 lb)   LMP  (LMP Unknown)   BMI 30.11 kg/m²     Constitutional:normal, well developed, well nourished, alert, in no distress and non-toxic " and no overt pain behavior.  Eyes:anicteric  HEENT:grossly intact  Neck:supple, symmetric, trachea midline and no masses   Pulmonary:even and unlabored  Cardiovascular:No edema or pitting edema present  Skin:Normal without rashes or lesions and well hydrated  Psychiatric:Mood and affect appropriate  Neurologic:Cranial Nerves II-XII grossly intact  Musculoskeletal: Low back healed surgical incisions noted.  Tenderness to the paraspinals.  Range of motion limited with flexion extension secondary to pain.  Positive facet loading bilaterally.  Straight leg raise positive bilaterally.  Strength, sensation, reflexes in lower limbs normal and symmetric.    Imaging     MRI lumbar spine w wo contrast  Status: Final result     PACS Images     Show images for MRI lumbar spine w wo contrast  Study Result    Narrative & Impression   MRI LUMBAR SPINE WITH AND WITHOUT CONTRAST     INDICATION: M54.16: Radiculopathy, lumbar region  M96.1: Postlaminectomy syndrome, not elsewhere classified.     COMPARISON: 11/29/2024     TECHNIQUE:  Multiplanar, multisequence imaging of the lumbar spine was performed before and after gadolinium administration. .        IV Contrast:  8 mL of Gadobutrol injection (SINGLE-DOSE)     IMAGE QUALITY:  Diagnostic     FINDINGS:     VERTEBRAL BODIES:  There are 5 lumbar type vertebral bodies. Grade 1 anterolisthesis of L4-L5. Patient is status post lumbar spinal fusion at L4-L5. Susceptibility artifact from orthopedic hardware is noted. Mild chronic loss of height of the L1   vertebral body. Findings are stable since prior x-rays of 11/29/2024.     SACRUM:  Normal signal within the sacrum. No evidence of insufficiency or stress fracture.     DISTAL CORD AND CONUS:  Normal size and signal within the distal cord and conus.     PARASPINAL SOFT TISSUES: Postoperative changes within the posterior paraspinal soft tissues.     LOWER THORACIC DISC SPACES:  Normal disc height and signal.  No disc herniation, canal  stenosis or foraminal narrowing.     LUMBAR DISC SPACES:     L1-L2: There is a bulging annulus with a superimposed right paracentral disc protrusion. This effaces the right lateral recess for the descending L2 nerve root. There is facet arthrosis. There is mild right foraminal encroachment. There is mild mass   effect on the thecal sac.     L2-L3: There is a bulging annulus. There is facet arthrosis. There is marginal osteophytosis. Canal is decompressed by laminectomy. There is moderate right foraminal narrowing with contact of the exiting nerve root. There is no significant left foraminal   narrowing.     L3-L4: Canal is decompressed by laminectomy. There is facet arthrosis. There is moderate bilateral foraminal narrowing with contact of the exiting nerve roots.     L4-L5: Canal is decompressed by laminectomy. There is a mild bulge. There is marginal osteophytosis. There is facet arthrosis. There is mild to moderate left foraminal narrowing with near abutment of the exiting nerve root. There is no significant right   foraminal narrowing.     L5-S1: There is facet arthrosis. There is no significant canal stenosis or foraminal narrowing.     POSTCONTRAST IMAGING:  No abnormal enhancement.     OTHER FINDINGS:  None.     IMPRESSION:     Postoperative and degenerative changes of the lumbar spine, as described above.     Right paracentral disc extrusion at L1-L2 effaces the right lateral recess of the descending L2 nerve root. Correlate for focal radiculopathy at this level.     Stable mild chronic loss of height of the L1 vertebral body. No acute compression fractures identified.        Workstation performed: [id number]        Imaging    MRI lumbar spine w wo contrast (Order: [id number]) - 1/4/2025    Result History    MRI lumbar spine w wo contrast (Order #[id number]) on 1/6/2025 - Order Result History Report    Order Report     Order Details  Order Questions    Question Answer   Exam reason persistent lumbar  radiculopathy with hx of lumbar fusion. evaluate hardware and adjacent segments   Note: Enter reason for exam   What is the patient's sedation requirement? No Sedation   Does the patient need medication for Claustrophobia? If yes, order medication at this point. Yes   Does the patient wear a life vest, have an implanted cardiac device, a stimulation device, a sleep apnea stimulator, or a breast tissue expansion device? No   Note: i.e. - pacemaker, ICD, deep brain stimulator, bladder stimulator, spinal stimulator, vagus nerve stimulator   Please evaluate if any patient has any of the following risk factors that require renal function labs within 90 days prior to the MRI appointment. None of the Above   Release to patient through [email or web address] Immediate            Result Information    Status Priority Source   Final result (1/6/2025  9:09 AM) Routine      Reason for Exam    persistent lumbar radiculopathy with hx of lumbar fusion. evaluate hardware and adjacent segments   Dx: Lumbar radiculopathy [M54.16 (ICD-10-CM)]; Postlaminectomy syndrome [M96.1 (ICD-10-CM)]       All Reviewers List    Nj Maddox DO on 1/6/2025 11:21 AM         MRI lumbar spine w wo contrast: Patient Communication     Add Comments   Not seen       Signed by    Signed Date/Time  Phone Pager   ROSEMARY MURRY 1/06/2025 09:09 [phone]        Exam Information    Status Exam Begun  Exam Ended  Performing Tech   Final [99] 1/04/2025 12:58 1/04/2025 13:20 Joan Rosa       Questionnaire          Question Answer Comment   1. Reason for Exam persistent lumbar radiculopathy with hx of lumbar fusion. evaluate hardware and adjacent segments    2. What is the patient's sedation requirement? No Sedation    3. Does the patient need medication for Claustrophobia? If yes, order medication at this point. Yes    4. Does the patient wear a life vest, have an implanted cardiac device, a stimulation device, a sleep apnea stimulator, or a breast tissue  expansion device? No    5. Please evaluate if any patient has any of the following risk factors that require renal function labs within 90 days prior to the MRI appointment. None of the Above    6. Release to patient through [employer] [facility]mediate          Begin Exam      IMAGING BEGIN MRI    Question Answer Comment   1. Have you reviewed the MRI Screening Form? Yes    2. Have you performed a Full Stop/Final Check before entering Zone 4? Yes    3. Is the patient wearing a stimulator device? No    4. Device in safe mode - Verifying MRSO / Technologist :          End Exam      [facility] IMAGING END VERIFY IMAGES IN PACS    Question Answer Comment   1. Have you verified the patient's images in PACS? Yes    2. Why have the images not been verified in PACS?           IMAGING IV CONTRAST EXTRAVASATION    Question Answer Comment   1. Was there an IV Contrast Extravasation? No    2. What was the Type and Amount of Contrast Inflitrated?     3. What was the location of the IV Site?     4. What treatment was provided to the patient?     5. If a surgeon was consulted, who was notified?     6. Name of Physician/Nurse who evaluated the patient:           IMAGING END MRI TECH NOTES    Question Answer Comment   1. Patient History: back pain    2. Has the patient had prior surgery on this body part? yes, 4 years ago, hx ablatation    3. Any history of cancer? no    4. Add'l Imaging Notes: na    5. Outside images/report information: na    6. Implant clearance information: na    7. LMP and ORAL contraceptives: na    8. Was jewelry removed from the patient? No    9. Jewelry removed:           PATIENT EDUCATION    Question Answer Comment   1. Was the patient educated? Yes    2. Why was the patient not educated?           IMAGING END MRI DEVICE SAFE MODE    Question Answer Comment   1. Is the patient wearing a stimulator device? No    2. Device out of safe mode - Verifying MRSO / Technologist :            Screening Form Questions     important  "suggestion  Questionnaires are displayed in the order they were answered.      Answer Comment   Has patient changed into the appropriate hospital gown?     What are your symptoms? lbp    Do you have a cardiac pacemaker or internal defibrillator? NA    Please list /make/model:     Have you had any HEART surgery? None    Please list make/model:     Heart surgery: If \"other\", please describe:     Have you had any BRAIN surgery? None    Please list  or describe implant or type \"NA\" if not applicable:     Brain surgery: If \"other\", please describe:     Have you had any EYE surgery? None    Eye surgery: If \"other\", please describe:     Have you ever injured your eyes with metal or metal fragments (grinding,metallic slivers)? No    Eye injury: Please describe:     Have you had any EAR surgery? None    Ear surgery: If \"other\", please describe:     Do you have an electronic \"stimulation\" device? None    Please provide  information:     Have you had any abdominal or pelvic surgery? No    Have you had any of the following abdominal or pelvic surgeries:     Abdominal/pelvic surgery: If \"other\", please describe:     Do you have any ORTHOPEDIC implants/devices? Screws/pins/anchors    Do you have the following: None    Do you have liver disease? None    Do you have kidney disease? None    Have you had a problem with a previous MRI? No    Does the patient require pre-medication?     Do you have a personal history of cancer? None    If \"other\", please specify:       Are you wearing medication patches?   No    Are you wearing any of the following: None    Date of last menstrual cycle: .    Postmenopausal? Yes    Pregnant?     Breastfeeding?     Breast tissue expander?     Do any of the following apply to you:     Please specify:     Did the patient provide this information? Yes    Who provided this information (if not the patient)?     Relationship to the patient:     Contact number:     MRI STAFF " ONLY- Prior imaging reviewed in PACS (initials): ks    MRI STAFF ONLY- Epic chart reviewed (initials): ks    MRI STAFF ONLY: The patient was scheduled to receive MRI contrast and has received the Medication Guide. Yes        External Results Report    Open External Results Report      Encounter    View Encounter                     Sedation Documentation Timeline (1/4/2025 00:00 to 1/4/2025 13:20)    An end event has not been filed for the most recent intervention. Due to this intervention’s length, all data may not appear below. To see all data, file an end event.  1/4/2025 1/4/2025 Event By   12:40:28 Orders Placed KS   Imaging  - MRI thoracic spine w wo contrast         12:40:34 Orders Placed KS   Imaging  - MRI lumbar spine w wo contrast         13:00:27 Orders Placed RS   Medications  - Gadobutrol injection (SINGLE-DOSE) SOLN 8 mL         13:15 Medication Given KS   Gadobutrol injection (SINGLE-DOSE) SOLN 8 mL - Dose: 8 mL ; Route: Intravenous ; Comment: 23g butterfly lt wrist           Pre-op Summary    Pre-op             Recovery Summary    Recovery                 Routing History    None            MRI thoracic spine w wo contrast  Status: Final result     PACS Images     Show images for MRI thoracic spine w wo contrast  Study Result    Narrative & Impression   MRI THORACIC SPINE WITH AND WITHOUT CONTRAST     INDICATION: M54.16: Radiculopathy, lumbar region  M96.1: Postlaminectomy syndrome, not elsewhere classified.     COMPARISON:  None.     TECHNIQUE:  Multiplanar, multisequence imaging of the thoracic spine was performed before and after gadolinium administration. .     IV Contrast:  8 mL of Gadobutrol injection (SINGLE-DOSE)     IMAGE QUALITY:  Diagnostic.     FINDINGS:     ALIGNMENT: Biphasic thoracic scoliosis. No compression fracture in the thoracic spine. Chronic height loss at L1.     MARROW SIGNAL: No aggressive osseous lesions. Schmorl's node changes in the L1 inferior endplate with edema.      THORACIC CORD: Questionable increased T2 signal in the thoracic cord at T6-7. No cord signal abnormalities elsewhere.     PREVERTEBRAL AND PARASPINAL SOFT TISSUES:   Partially imaged biliary ectasia, favored normal postcholecystectomy variant. Mild paraspinous muscular fatty atrophy.     THORACIC DEGENERATIVE CHANGE:  - T2-3: Right worse than left facet arthrosis, ligamentum flavum thickening, and shallow central disc extrusion with superior migration. Mild canal narrowing with contouring of the ventral cord. No significant foraminal narrowing.  - T5-6: Shallow central disc extrusion and mild bilateral facet arthrosis without significant stenosis.  - T6-7: Ligamentum flavum thickening and facet arthrosis. Central disc extrusion with slight superior migration. Severe canal stenosis. No foraminal narrowing.  - Multilevel disc height loss and endplate osteophytosis.     POSTCONTRAST:  No abnormal enhancement.     OTHER FINDINGS:  None.     IMPRESSION:     1.  T6-7 degenerative disc disease as discussed with resultant severe canal stenosis and possible myelomalacia.  2.  Degenerative changes at other levels as discussed without significant stenosis.  3.  Schmorl's node changes with edema in the L1 inferior endplate which could also be a source of back pain in the appropriate clinical setting.           Workstation performed: [id number]        Imaging    MRI thoracic spine w wo contrast (Order: [id number]) - 1/4/2025    Result History    MRI thoracic spine w wo contrast (Order #[id number]) on 1/13/2025 - Order Result History Report    Order Report     Order Details  Order Questions    Question Answer   Exam reason persistent lumbar radiculopathy with hx of lumbar fusion. evaluate hardware and adjacent segments   Note: Enter reason for exam   What is the patient's sedation requirement? No Sedation   Does the patient need medication for Claustrophobia? If yes, order medication at this point. Yes   Does the patient wear a  life vest, have an implanted cardiac device, a stimulation device, a sleep apnea stimulator, or a breast tissue expansion device? No   Note: i.e. - pacemaker, ICD, deep brain stimulator, bladder stimulator, spinal stimulator, vagus nerve stimulator   Please evaluate if any patient has any of the following risk factors that require renal function labs within 90 days prior to the MRI appointment. None of the Above   Release to patient through [employer] [facility]            Result Information    Status Priority Source   Final result (1/13/2025  2:06 PM) Routine      Reason for Exam    persistent lumbar radiculopathy with hx of lumbar fusion. evaluate hardware and adjacent segments   Dx: Lumbar radiculopathy [M54.16 (ICD-10-CM)]; Postlaminectomy syndrome [M96.1 (ICD-10-CM)]       All Reviewers List    Nj Maddox DO on 1/14/2025 10:07 AM         MRI thoracic spine w wo contrast: Patient Communication     Add Comments   Not seen       Signed by    Signed Date/Time  Phone Pager   WINDY LOWRY 1/13/2025 14:06 [phone]        Exam Information    Status Exam Begun  Exam Ended  Performing Tech   Final [99] 1/12/2025 08:17 1/12/2025 08:55 Joan Rosa       Questionnaire          Question Answer Comment   1. Reason for Exam persistent lumbar radiculopathy with hx of lumbar fusion. evaluate hardware and adjacent segments    2. What is the patient's sedation requirement? No Sedation    3. Does the patient need medication for Claustrophobia? If yes, order medication at this point. Yes    4. Does the patient wear a life vest, have an implanted cardiac device, a stimulation device, a sleep apnea stimulator, or a breast tissue expansion device? No    5. Please evaluate if any patient has any of the following risk factors that require renal function labs within 90 days prior to the MRI appointment. None of the Above    6. Release to patient through [facility]          Begin Exam      IMAGING BEGIN  MRI    Question Answer Comment   1. Have you reviewed the MRI Screening Form? Yes    2. Have you performed a Full Stop/Final Check before entering Zone 4? Yes    3. Is the patient wearing a stimulator device? No    4. Device in safe mode - Verifying MRSO / Technologist :          End Exam      [facility] IMAGING END VERIFY IMAGES IN PACS    Question Answer Comment   1. Have you verified the patient's images in PACS? Yes    2. Why have the images not been verified in PACS?           IMAGING IV CONTRAST EXTRAVASATION    Question Answer Comment   1. Was there an IV Contrast Extravasation? No    2. What was the Type and Amount of Contrast Inflitrated?     3. What was the location of the IV Site?     4. What treatment was provided to the patient?     5. If a surgeon was consulted, who was notified?     6. Name of Physician/Nurse who evaluated the patient:           IMAGING END MRI TECH NOTES    Question Answer Comment   1. Patient History: back pain    2. Has the patient had prior surgery on this body part? no    3. Any history of cancer? no    4. Add'l Imaging Notes: pt claustrophobic: mri usually done under conscious sedation    5. Outside images/report information: no    6. Implant clearance information: na    7. LMP and ORAL contraceptives: na    8. Was jewelry removed from the patient? No    9. Jewelry removed:           PATIENT EDUCATION    Question Answer Comment   1. Was the patient educated? Yes    2. Why was the patient not educated?           IMAGING END MRI DEVICE SAFE MODE    Question Answer Comment   1. Is the patient wearing a stimulator device? No    2. Device out of safe mode - Verifying MRSO / Technologist :            Screening Form Questions     important suggestion  Questionnaires are displayed in the order they were answered.      Answer Comment   Has patient changed into the appropriate hospital gown?     What are your symptoms? back pain    Do you have a cardiac pacemaker or internal defibrillator? NA   "  Please list /make/model:     Have you had any HEART surgery? None    Please list make/model:     Heart surgery: If \"other\", please describe:     Have you had any BRAIN surgery? None    Please list  or describe implant or type \"NA\" if not applicable:     Brain surgery: If \"other\", please describe:     Have you had any EYE surgery? None    Eye surgery: If \"other\", please describe:     Have you ever injured your eyes with metal or metal fragments (grinding,metallic slivers)? No    Eye injury: Please describe:     Have you had any EAR surgery? None    Ear surgery: If \"other\", please describe:     Do you have an electronic \"stimulation\" device? None    Please provide  information:     Have you had any abdominal or pelvic surgery? No    Have you had any of the following abdominal or pelvic surgeries:     Abdominal/pelvic surgery: If \"other\", please describe:     Do you have any ORTHOPEDIC implants/devices? Screws/pins/anchors    Do you have the following: None    Do you have liver disease? None    Do you have kidney disease? None    Have you had a problem with a previous MRI? No    Does the patient require pre-medication?     Do you have a personal history of cancer? None    If \"other\", please specify:       Are you wearing medication patches?   No    Are you wearing any of the following: None    Date of last menstrual cycle: .    Postmenopausal? Yes    Pregnant?     Breastfeeding?     Breast tissue expander?     Do any of the following apply to you:     Please specify:     Did the patient provide this information? Yes    Who provided this information (if not the patient)?     Relationship to the patient:     Contact number:     MRI STAFF ONLY- Prior imaging reviewed in PACS (initials): ks    MRI STAFF ONLY- Epic chart reviewed (initials): ks    MRI STAFF ONLY: The patient was scheduled to receive MRI contrast and has received the Medication Guide. Yes        External Results " Report    Open External Results Report      Encounter    View Encounter                     Sedation Documentation Timeline (1/12/2025 00:00 to 1/12/2025 08:55)    An end event has not been filed for the most recent intervention. Due to this intervention’s length, all data may not appear below. To see all data, file an end event.  1/12/2025 1/12/2025 Event By   08:43:17 Orders Placed RS   Medications  - Gadobutrol injection (SINGLE-DOSE) SOLN 8 mL         08:48 Medication Given KS   Gadobutrol injection (SINGLE-DOSE) SOLN 8 mL - Dose: 8 mL ; Route: Intravenous ; Comment: 23g butterfly rt upper wrist           Pre-op Summary    Pre-op             Recovery Summary    Recovery                 Routing History    None         No orders to display         No orders of the defined types were placed in this encounter.

## 2025-01-20 ENCOUNTER — TELEPHONE (OUTPATIENT)
Age: 75
End: 2025-01-20

## 2025-01-20 NOTE — TELEPHONE ENCOUNTER
Pt called in regarding her CT results . Patient  has an upcoming with oncology and would like to know if she should keep her appointment . Patient is a bit concern. Please advise

## 2025-01-21 ENCOUNTER — TELEPHONE (OUTPATIENT)
Age: 75
End: 2025-01-21

## 2025-01-21 NOTE — TELEPHONE ENCOUNTER
Pt called to confirm her procedure date.  Pt was made aware the surgery center will call her the day before with her arrival time

## 2025-01-22 ENCOUNTER — TELEPHONE (OUTPATIENT)
Dept: GASTROENTEROLOGY | Facility: CLINIC | Age: 75
End: 2025-01-22

## 2025-01-22 NOTE — TELEPHONE ENCOUNTER
----- Message from Catreina MARIN sent at 1/17/2025  7:50 AM EST -----    ----- Message -----  From: Jose Watson DO  Sent: 1/15/2025   2:02 PM EST  To: Vero Gomez MD; [facility]    Please schedule repeat ERCP in 8 weeks with Dr. Gomez.

## 2025-01-28 ENCOUNTER — TELEPHONE (OUTPATIENT)
Age: 75
End: 2025-01-28

## 2025-01-30 ENCOUNTER — OFFICE VISIT (OUTPATIENT)
Dept: FAMILY MEDICINE CLINIC | Facility: CLINIC | Age: 75
End: 2025-01-30

## 2025-01-30 VITALS
BODY MASS INDEX: 30.12 KG/M2 | DIASTOLIC BLOOD PRESSURE: 72 MMHG | RESPIRATION RATE: 18 BRPM | HEIGHT: 63 IN | SYSTOLIC BLOOD PRESSURE: 148 MMHG | HEART RATE: 72 BPM | WEIGHT: 170 LBS | OXYGEN SATURATION: 93 %

## 2025-01-30 DIAGNOSIS — R73.03 PREDIABETES: ICD-10-CM

## 2025-01-30 DIAGNOSIS — F11.29 OPIOID DEPENDENCE WITH OPIOID-INDUCED DISORDER (HCC): Chronic | ICD-10-CM

## 2025-01-30 DIAGNOSIS — Z72.0 TOBACCO USE: ICD-10-CM

## 2025-01-30 DIAGNOSIS — R10.9 INTRACTABLE ABDOMINAL PAIN: ICD-10-CM

## 2025-01-30 DIAGNOSIS — F14.10 COCAINE ABUSE (HCC): ICD-10-CM

## 2025-01-30 DIAGNOSIS — Z98.890 S/P ERCP: Primary | ICD-10-CM

## 2025-01-30 DIAGNOSIS — I25.10 CORONARY ARTERY DISEASE INVOLVING NATIVE CORONARY ARTERY OF NATIVE HEART WITHOUT ANGINA PECTORIS: Chronic | ICD-10-CM

## 2025-01-30 NOTE — ASSESSMENT & PLAN NOTE
Amylase over 160,000.  Seems like quite a bit.  She has seen GI.  Scheduled to see surgical oncology

## 2025-01-30 NOTE — ASSESSMENT & PLAN NOTE
Advised patient once again that she needs to stop smoking.  Needs to follow-up with cardiology.  Needs to remain on beta-blocker, aspirin, atorvastatin

## 2025-01-30 NOTE — ASSESSMENT & PLAN NOTE
Office/Clinical staff confirmed the PA/PDL paperwork has been faxed to the pharmacy. It is the responsibility of the filling pharmacy to obtain the prior auth. If the office has questions regarding this PA, please contact the patients pharmacy directly.    The EPA team will close out of this encounter at this time.      Once again, advised that she should not be doing that given her age and underlying health conditions

## 2025-01-30 NOTE — ASSESSMENT & PLAN NOTE
Hemoglobin A1c at 7.8%.  Previously was lower.  Reportedly just came off of prednisone.  Not certain who is prescribing that.

## 2025-01-30 NOTE — ASSESSMENT & PLAN NOTE
Previously on Suboxone which is no longer on.  I did provide a prescription for oxycodone to take on a as needed basis which did not provide her with any relief.  With her history of substance abuse I am reluctant on giving anything additionally.  She has seen pain management.

## 2025-01-30 NOTE — PROGRESS NOTES
"  Subjective:      Patient ID: Montserrat Sumner is a 74 y.o. female.    74-year-old female presents with her  to review results from her ERCP (?).  Had ERCP with GI and brushings were nondiagnostic/indolent.  Specialized testing for CEA and amylase level was sent out and returned January 27.  CEA level over 8000, amylase level 160,000.  She has been referred to surgical oncology and has on Tuesday to meet with surgical oncologist.  She is still having abdominal pain, back pain.  Still having back pain despite being given oxycodone.  I had sent message to pain management whom she had been seeing in regards to her back pain and bulging disks.  Planning on epidural corticosteroid injection.  Still smoking.  Has cut back on cigarettes to 3 cigarettes/day and is also tried to cut back on her use of cocaine at 74 years of age.  Finally tapered off of steroids.  She states that oxycodone that I prescribed provided no relief in regards to her pain        Past Medical History:   Diagnosis Date   • Anxiety    • Chronic pain 03/06/2023    lower abdomen and back   • Colon polyp    • COPD (chronic obstructive pulmonary disease) (HCC)     \"passes out\" with O2 levels dropping   • Disease of thyroid gland    • GERD (gastroesophageal reflux disease)    • History of transfusion     with aneurysm repair-autologous-self and daughter   • Hyperlipidemia    • Hypertension    • Teeth missing    • Wears glasses     For reading       Family History   Problem Relation Age of Onset   • Breast cancer Mother        Past Surgical History:   Procedure Laterality Date   • BACK SURGERY      x2 herniated, crushed disc in the lumbar, ablation   • CHOLECYSTECTOMY     • COLONOSCOPY     • FRACTURE SURGERY Left     hip-pinned   • HYSTERECTOMY      salpingo-oophorectomy   • NH INJECT SI JOINT ARTHRGRPHY&/ANES/STEROID W/PATRICIA Bilateral 12/18/2024    Procedure: BILATERAL SACROILIAC JOINT INJECTION;  Surgeon: Nj Maddox DO;  Location: [facility] " MAIN OR;  Service: Pain Management    • THORACIC AORTIC ANEURYSM REPAIR  09/2016    ascending thoracic aortic repair with RCA bypass with SVG x 1   • TONSILLECTOMY     • UPPER GASTROINTESTINAL ENDOSCOPY          reports that she has been smoking cigarettes. She started smoking about 57 years ago. She has a 14.3 pack-year smoking history. She has been exposed to tobacco smoke. She has never used smokeless tobacco. She reports current alcohol use. She reports current drug use. Drugs: Marijuana and Cocaine.      Current Outpatient Medications:   •  albuterol (2.5 mg/3 mL) 0.083 % nebulizer solution, Take 3 mL (2.5 mg total) by nebulization 4 (four) times a day as needed for wheezing or shortness of breath, Disp: 360 mL, Rfl: 7  •  albuterol (Proventil HFA) 90 mcg/act inhaler, Inhale 2 puffs every 4 (four) hours as needed for wheezing, Disp: 6.7 g, Rfl: 7  •  ALPRAZolam (XANAX) 0.5 mg tablet, Take 0.5 mg by mouth 2 (two) times a day, Disp: , Rfl:   •  amLODIPine (NORVASC) 5 mg tablet, Take 1 tablet (5 mg total) by mouth daily, Disp: 10 tablet, Rfl: 0  •  aspirin 81 mg chewable tablet, Chew 81 mg daily, Disp: , Rfl:   •  atorvastatin (LIPITOR) 40 mg tablet, Take 40 mg by mouth every morning, Disp: , Rfl:   •  b complex vitamins capsule, Take 1 capsule by mouth once a week, Disp: , Rfl:   •  benzonatate (TESSALON PERLES) 100 mg capsule, Take 1 capsule (100 mg total) by mouth 3 (three) times a day as needed for cough, Disp: 20 capsule, Rfl: 0  •  buprenorphine-naloxone (Suboxone) 4-1 MG, every morning Wafer, Disp: , Rfl:   •  celecoxib (CeleBREX) 200 mg capsule, Take 1 capsule (200 mg total) by mouth daily, Disp: 90 capsule, Rfl: 1  •  cyclobenzaprine (FLEXERIL) 10 mg tablet, Take 1 tablet (10 mg total) by mouth 3 (three) times a day as needed for muscle spasms, Disp: 90 tablet, Rfl: 1  •  DULoxetine (CYMBALTA) 60 mg delayed release capsule, Take 1 capsule (60 mg total) by mouth daily, Disp: 90 capsule, Rfl: 0  •   esomeprazole (NexIUM) 40 MG capsule, Take 1 capsule (40 mg total) by mouth 2 (two) times a day before meals, Disp: 180 capsule, Rfl: 1  •  fluticasone-umeclidinium-vilanterol (Trelegy Ellipta) 200-62.5-25 mcg/actuation AEPB inhaler, Inhale 1 puff daily Rinse mouth after use., Disp: 60 blister, Rfl: 11  •  levothyroxine 100 mcg tablet, Take 100 mcg by mouth every morning, Disp: , Rfl:   •  losartan (COZAAR) 25 mg tablet, Take 25 mg by mouth every morning, Disp: , Rfl:   •  oxyCODONE (Roxicodone) 5 immediate release tablet, Take 1 tablet (5 mg total) by mouth every 6 (six) hours as needed for moderate pain Max Daily Amount: 20 mg, Disp: 20 tablet, Rfl: 0  •  oxygen gas, Inhale continuous as needed 2.5 L, Disp: , Rfl:   •  traZODone (DESYREL) 50 mg tablet, Take 100 mg by mouth daily at bedtime, Disp: , Rfl:   •  venlafaxine (EFFEXOR-XR) 150 mg 24 hr capsule, Take 1 capsule by mouth in the morning, Disp: , Rfl:   •  venlafaxine (EFFEXOR-XR) 75 mg 24 hr capsule, Take 75 mg by mouth daily at bedtime, Disp: , Rfl:   •  ALPRAZolam (XANAX) 0.5 mg tablet, Take 1 tablet (0.5 mg total) by mouth 1 (one) time for 1 dose, Disp: 1 tablet, Rfl: 0  •  metoprolol succinate (TOPROL-XL) 50 mg 24 hr tablet, Take 1 tablet (50 mg total) by mouth daily (Patient taking differently: Take 50 mg by mouth every morning), Disp: 90 tablet, Rfl: 1  •  naloxone (NARCAN) 4 mg/0.1 mL nasal spray, Administer 1 spray into a nostril. If no response after 2-3 minutes, give another dose in the other nostril using a new spray. (Patient not taking: Reported on 1/30/2025), Disp: 1 each, Rfl: 1    The following portions of the patient's history were reviewed and updated as appropriate: allergies, current medications, past family history, past medical history, past social history, past surgical history and problem list.    Review of Systems   Constitutional: Negative.  Negative for activity change, appetite change and fatigue.   HENT: Negative.  Negative for  "congestion, ear discharge, mouth sores, sinus pressure and tinnitus.    Eyes: Negative.  Negative for photophobia and visual disturbance.   Respiratory:  Positive for cough, shortness of breath and wheezing. Negative for chest tightness.    Cardiovascular:  Positive for palpitations. Negative for chest pain.   Gastrointestinal:  Positive for abdominal pain. Negative for abdominal distention, constipation, diarrhea, nausea and vomiting.   Endocrine: Negative.  Negative for polydipsia, polyphagia and polyuria.   Genitourinary: Negative.  Negative for difficulty urinating.   Musculoskeletal:  Positive for back pain and gait problem.        In a wheelchair   Skin: Negative.    Neurological:  Positive for numbness (Bilateral feet). Negative for dizziness, syncope, weakness and light-headedness.   Hematological: Negative.    Psychiatric/Behavioral:  Negative for agitation and dysphoric mood. The patient is nervous/anxious.            Objective:    /72   Pulse 72   Resp 18   Ht 5' 3\" (1.6 m)   Wt 77.1 kg (170 lb)   LMP  (LMP Unknown)   SpO2 93%   BMI 30.11 kg/m²      Physical Exam  Vitals and nursing note reviewed.   Constitutional:       General: She is not in acute distress.     Appearance: She is well-developed. She is ill-appearing (Chronically ill-appearing). She is not toxic-appearing or diaphoretic.   HENT:      Head: Normocephalic and atraumatic.      Nose: Nose normal.      Mouth/Throat:      Mouth: Mucous membranes are moist.      Pharynx: Oropharynx is clear.   Eyes:      Comments: Bilateral cataracts.   Neck:      Vascular: No carotid bruit.   Cardiovascular:      Rate and Rhythm: Normal rate and regular rhythm.      Pulses: Normal pulses.      Heart sounds: Normal heart sounds. No murmur heard.     Comments: Normal dorsalis pedis pulses  Pulmonary:      Effort: Pulmonary effort is normal.      Breath sounds: Wheezing present. No rales.      Comments: Diminished breath sounds bilaterally with " expiratory wheezing.  Moist mucousy cough  Abdominal:      General: Bowel sounds are normal. There is no distension.      Palpations: Abdomen is soft.      Tenderness: There is no abdominal tenderness. There is no guarding or rebound.   Musculoskeletal:         General: Normal range of motion.      Cervical back: Normal range of motion and neck supple.   Lymphadenopathy:      Cervical: No cervical adenopathy.   Skin:     General: Skin is warm and dry.      Findings: Bruising present. No rash.   Neurological:      Mental Status: She is alert and oriented to person, place, and time.      Sensory: Sensory deficit (Bilateral feet) present.      Deep Tendon Reflexes: Reflexes are normal and symmetric.      Comments: Remains in wheelchair   Psychiatric:         Behavior: Behavior normal.         Thought Content: Thought content normal.         Judgment: Judgment normal.           Recent Results (from the past 6 weeks)   Fingerstick Glucose (POCT)    Collection Time: 12/19/24  7:32 PM   Result Value Ref Range    POC Glucose 165 (H) 65 - 140 mg/dl   Fingerstick Glucose (POCT)    Collection Time: 12/19/24 10:22 PM   Result Value Ref Range    POC Glucose 110 65 - 140 mg/dl   Fingerstick Glucose (POCT)    Collection Time: 12/20/24  9:01 AM   Result Value Ref Range    POC Glucose 134 65 - 140 mg/dl   Fingerstick Glucose (POCT)    Collection Time: 12/20/24 11:04 AM   Result Value Ref Range    POC Glucose 260 (H) 65 - 140 mg/dl   Fingerstick Glucose (POCT)    Collection Time: 12/20/24  4:08 PM   Result Value Ref Range    POC Glucose 170 (H) 65 - 140 mg/dl   CBC and differential    Collection Time: 01/10/25 11:15 AM   Result Value Ref Range    WBC 9.82 4.31 - 10.16 Thousand/uL    RBC 4.46 3.81 - 5.12 Million/uL    Hemoglobin 13.0 11.5 - 15.4 g/dL    Hematocrit 43.2 34.8 - 46.1 %    MCV 97 82 - 98 fL    MCH 29.1 26.8 - 34.3 pg    MCHC 30.1 (L) 31.4 - 37.4 g/dL    RDW 16.0 (H) 11.6 - 15.1 %    MPV 9.8 8.9 - 12.7 fL    Platelets 245  149 - 390 Thousands/uL   Comprehensive metabolic panel    Collection Time: 01/10/25 11:15 AM   Result Value Ref Range    Sodium 142 135 - 147 mmol/L    Potassium 4.6 3.5 - 5.3 mmol/L    Chloride 104 96 - 108 mmol/L    CO2 28 21 - 32 mmol/L    ANION GAP 10 4 - 13 mmol/L    BUN 28 (H) 5 - 25 mg/dL    Creatinine 1.08 0.60 - 1.30 mg/dL    Glucose, Fasting 127 (H) 65 - 99 mg/dL    Calcium 9.4 8.4 - 10.2 mg/dL    AST 17 13 - 39 U/L    ALT 25 7 - 52 U/L    Alkaline Phosphatase 35 34 - 104 U/L    Total Protein 6.7 6.4 - 8.4 g/dL    Albumin 4.0 3.5 - 5.0 g/dL    Total Bilirubin 0.39 0.20 - 1.00 mg/dL    eGFR 51 ml/min/1.73sq m   Hemoglobin A1C    Collection Time: 01/10/25 11:15 AM   Result Value Ref Range    Hemoglobin A1C 7.8 (H) Normal 4.0-5.6%; PreDiabetic 5.7-6.4%; Diabetic >=6.5%; Glycemic control for adults with diabetes <7.0% %     mg/dl   Hepatitis C antibody    Collection Time: 01/10/25 11:15 AM   Result Value Ref Range    Hepatitis C Ab Non-reactive Non-Reactive   Lipid panel    Collection Time: 01/10/25 11:15 AM   Result Value Ref Range    Cholesterol 172 See Comment mg/dL    Triglycerides 193 (H) See Comment mg/dL    HDL, Direct 53 >=50 mg/dL    LDL Calculated 80 0 - 100 mg/dL    Non-HDL-Chol (CHOL-HDL) 119 mg/dl   TSH, 3rd generation with Free T4 reflex    Collection Time: 01/10/25 11:15 AM   Result Value Ref Range    TSH 3RD GENERATON 0.761 0.450 - 4.500 uIU/mL   Manual Differential(PHLEBS Do Not Order)    Collection Time: 01/10/25 11:15 AM   Result Value Ref Range    Segmented % 71 43 - 75 %    Bands % 12 (H) 0 - 8 %    Lymphocytes % 7 (L) 14 - 44 %    Monocytes % 9 4 - 12 %    Eosinophils % 0 0 - 6 %    Basophils % 0 0 - 1 %    Atypical Lymphocytes % 1 (H) <=0 %    Absolute Neutrophils 8.15 (H) 1.85 - 7.62 Thousand/uL    Absolute Lymphocytes 0.79 0.60 - 4.47 Thousand/uL    Absolute Monocytes 0.88 0.00 - 1.22 Thousand/uL    Absolute Eosinophils 0.00 0.00 - 0.40 Thousand/uL    Absolute Basophils 0.00  0.00 - 0.10 Thousand/uL    Total Counted      RBC Morphology Present     Platelet Estimate Adequate Adequate    Anisocytosis Present     Macrocytes Present    Non-gynecologic cytology    Collection Time: 01/15/25  1:13 PM   Result Value Ref Range    Case Report       Non-gynecologic Cytology                          Case: [id number]                                  Authorizing Provider:  Jose Watson DO      Collected:           01/15/2025 1313              Ordering Location:     [facility]       Received:            01/15/2025 1535                                     [facility]                                                           Pathologist:           Monse Scales MD                                                                    Specimen:    Brushing, common bile duct, distal common bile duct                                        Final Diagnosis       A. Brushing, common bile duct, distal common bile duct, ThinPrep:  Non-diagnostic/Insufficient.    Note:   The sample is largely degenerated.  Repeat sampling may be considered.    As reported in the “WHO Reporting System for Pancreaticobiliary Cytopathology*” the diagnostic category of “non-diagnostic/insufficient” in a bile duct brushing carries a 28-69% risk of malignancy being found in subsequent ERCP brushing and/or biopsy/excisional specimen.  The usual management following an initial diagnosis of “non-diagnostic/insufficient” is a repeat ERCP  with cholangioscopy, brushing and biopsy. Ultimately clinical and radiologic correlation is needed for this patient in arriving at the actual management plan.     *Nino M, Field A, oNhemy I (Eds). (2022). WHO Reporting System for Pancreaticobiliary Cytopathology. IARC.  FNAB - fine needle aspiration/biopsy     Interpretation performed at [facility] [address] [place] [id number]       Gross Description        A. 25 mL, yellow, hazy, received in [facility]to[employer]        Clinical  Information       Per EUS,   IMPRESSION:  EGD:  · The esophagus appeared normal.  · The stomach appeared normal.  · The duodenum appeared normal.  EUS:  · The major papilla had a fish mouth appearance with mucous drainage.  · The entire bile duct was dilated.  · Atrophic pancreas.  Diffuse dilation of the pancreatic duct.  Some sludge/stones noted.  No obstructive masses or lesions to explain diffuse dilation of CBD or PD.  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    Additional Information       [employer]'s FDA approved ,  and ThinPrep Imaging Duo System are utilized with strict adherence to the 's instruction manual to prepare gynecologic and non-gynecologic cytology specimens for the production of ThinPrep slides as well as for gynecologic ThinPrep imaging. These processes have been validated by our laboratory and/or by the .    These tests were developed and their performance characteristics determined by [facility] or [facility]. They may not be cleared or approved by the U.S. Food and Drug Administration. The FDA has determined that such clearance or approval is not necessary. These tests are used for clinical purposes. They should not be regarded as investigational or for research. This laboratory has been approved by CLIA 88, designated as a high-complexity laboratory and is qualified to perform these tests.           Assessment/Plan:    CAD (coronary artery disease)  Advised patient once again that she needs to stop smoking.  Needs to follow-up with cardiology.  Needs to remain on beta-blocker, aspirin, atorvastatin    Tobacco use  Once again counseled on smoking cessation.  Needs to stop.  Potentially dealing with pancreatic malignancy.  Has history of coronary artery disease    Opioid dependence (HCC)  Previously on Suboxone which is no longer on.  I did provide a prescription for oxycodone to take on a as needed basis which did  not provide her with any relief.  With her history of substance abuse I am reluctant on giving anything additionally.  She has seen pain management.    Cocaine abuse (HCC)  Once again, advised that she should not be doing that given her age and underlying health conditions    Intractable abdominal pain  Amylase over 160,000.  Seems like quite a bit.  She has seen GI.  Scheduled to see surgical oncology    S/P ERCP  Reviewed results of ERCP and brushings.  I am certain that gastroenterology also went over these results with her.  Needs to see surgical oncology.    Prediabetes  Hemoglobin A1c at 7.8%.  Previously was lower.  Reportedly just came off of prednisone.  Not certain who is prescribing that.          Problem List Items Addressed This Visit        Cardiovascular and Mediastinum    CAD (coronary artery disease) (Chronic)    Advised patient once again that she needs to stop smoking.  Needs to follow-up with cardiology.  Needs to remain on beta-blocker, aspirin, atorvastatin            Behavioral Health    Opioid dependence (HCC) (Chronic)    Previously on Suboxone which is no longer on.  I did provide a prescription for oxycodone to take on a as needed basis which did not provide her with any relief.  With her history of substance abuse I am reluctant on giving anything additionally.  She has seen pain management.         Cocaine abuse (HCC)    Once again, advised that she should not be doing that given her age and underlying health conditions         Tobacco use    Once again counseled on smoking cessation.  Needs to stop.  Potentially dealing with pancreatic malignancy.  Has history of coronary artery disease            Surgery/Wound/Pain    Intractable abdominal pain    Amylase over 160,000.  Seems like quite a bit.  She has seen GI.  Scheduled to see surgical oncology            Other    Prediabetes    Hemoglobin A1c at 7.8%.  Previously was lower.  Reportedly just came off of prednisone.  Not certain who  is prescribing that.         S/P ERCP - Primary    Reviewed results of ERCP and brushings.  I am certain that gastroenterology also went over these results with her.  Needs to see surgical oncology.              I have spent a total time of 42 minutes in caring for this patient on the day of the visit/encounter including Diagnostic results, Prognosis, Risks and benefits of tx options, Instructions for management, Patient and family education, Importance of tx compliance, Risk factor reductions, Impressions, Counseling / Coordination of care, Documenting in the medical record, Reviewing / ordering tests, medicine, procedures  , and Obtaining or reviewing history  .

## 2025-01-30 NOTE — ASSESSMENT & PLAN NOTE
Once again counseled on smoking cessation.  Needs to stop.  Potentially dealing with pancreatic malignancy.  Has history of coronary artery disease

## 2025-01-30 NOTE — ASSESSMENT & PLAN NOTE
"Chief Complaint   Patient presents with     Prenatal Care     16+6.       Initial /67 (BP Location: Left arm, Patient Position: Sitting, Cuff Size: Adult Regular)  Pulse 81  Temp 99.3  F (37.4  C) (Oral)  Ht 5' 4\" (1.626 m)  Wt 141 lb 11.2 oz (64.3 kg)  LMP 2017  BMI 24.32 kg/m2 Estimated body mass index is 24.32 kg/(m^2) as calculated from the following:    Height as of this encounter: 5' 4\" (1.626 m).    Weight as of this encounter: 141 lb 11.2 oz (64.3 kg).  BP completed using cuff size: regular        The following HM Due: pap smear      The following patient reported/Care Every where data was sent to:  P ABSTRACT QUALITY INITIATIVES [[id number]]  n/a     n/a and patient has appointment for today             " Reviewed results of ERCP and brushings.  I am certain that gastroenterology also went over these results with her.  Needs to see surgical oncology.

## 2025-02-03 DIAGNOSIS — R10.9 INTRACTABLE ABDOMINAL PAIN: ICD-10-CM

## 2025-02-03 DIAGNOSIS — R13.10 DYSPHAGIA, UNSPECIFIED TYPE: ICD-10-CM

## 2025-02-03 DIAGNOSIS — E03.9 HYPOTHYROIDISM, UNSPECIFIED TYPE: Primary | Chronic | ICD-10-CM

## 2025-02-03 DIAGNOSIS — I10 PRIMARY HYPERTENSION: Chronic | ICD-10-CM

## 2025-02-03 DIAGNOSIS — K21.9 GASTROESOPHAGEAL REFLUX DISEASE, UNSPECIFIED WHETHER ESOPHAGITIS PRESENT: ICD-10-CM

## 2025-02-03 PROBLEM — D49.0 IPMN (INTRADUCTAL PAPILLARY MUCINOUS NEOPLASM): Status: ACTIVE | Noted: 2025-02-03

## 2025-02-03 PROBLEM — K86.89 DILATION OF PANCREATIC DUCT: Status: ACTIVE | Noted: 2025-02-03

## 2025-02-03 RX ORDER — ESOMEPRAZOLE MAGNESIUM 40 MG/1
40 CAPSULE, DELAYED RELEASE ORAL
Qty: 180 CAPSULE | Refills: 0 | Status: SHIPPED | OUTPATIENT
Start: 2025-02-03

## 2025-02-04 ENCOUNTER — CONSULT (OUTPATIENT)
Dept: SURGICAL ONCOLOGY | Facility: CLINIC | Age: 75
End: 2025-02-04
Payer: COMMERCIAL

## 2025-02-04 VITALS
DIASTOLIC BLOOD PRESSURE: 76 MMHG | HEIGHT: 63 IN | RESPIRATION RATE: 18 BRPM | WEIGHT: 167 LBS | BODY MASS INDEX: 29.59 KG/M2 | SYSTOLIC BLOOD PRESSURE: 132 MMHG | TEMPERATURE: 97.5 F

## 2025-02-04 DIAGNOSIS — D49.0 IPMN (INTRADUCTAL PAPILLARY MUCINOUS NEOPLASM): Primary | ICD-10-CM

## 2025-02-04 DIAGNOSIS — K86.89 DILATION OF PANCREATIC DUCT: ICD-10-CM

## 2025-02-04 DIAGNOSIS — R93.5 ABNORMAL ABDOMINAL MRI: ICD-10-CM

## 2025-02-04 PROCEDURE — 99205 OFFICE O/P NEW HI 60 MIN: CPT | Performed by: SURGERY

## 2025-02-04 RX ORDER — LEVOTHYROXINE SODIUM 100 UG/1
100 TABLET ORAL EVERY MORNING
Qty: 90 TABLET | Refills: 0 | Status: SHIPPED | OUTPATIENT
Start: 2025-02-04

## 2025-02-04 RX ORDER — LOSARTAN POTASSIUM 25 MG/1
25 TABLET ORAL EVERY MORNING
Qty: 90 TABLET | Refills: 0 | Status: SHIPPED | OUTPATIENT
Start: 2025-02-04

## 2025-02-04 RX ORDER — AMLODIPINE BESYLATE 5 MG/1
5 TABLET ORAL DAILY
Qty: 90 TABLET | Refills: 0 | Status: SHIPPED | OUTPATIENT
Start: 2025-02-04

## 2025-02-04 NOTE — ASSESSMENT & PLAN NOTE
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  On my review of the films, her dilatation is under 1 cm.  We discussed that this is a worrisome feature.  We discussed with main duct IPMN, the risk of malignancy in her lifetime is 50 to 70%.  With sidebranch IPMN, the risk is 10 to 20%.  There is also a less than 10% risk of malignancy developing elsewhere in the pancreas.  It is unclear why she had stones and sludge in her pancreatic duct, and if this could be contributing to her pancreatic dilatation.  At this time, her last MRI was in September.  I would plan on repeating the MRI in 1 month.  She is already planned to have a follow-up ERCP.  We will await the studies.  I will see her back once we have the MRI results.  I did discuss that given her age with the main duct dilatation, there is some data to suggest continued observation rather than surgical intervention.  I will see her back after the follow-up MRI.  If there is significant progression of the dilatation I would consider surgical intervention.  We did discuss this could be either pancreaticoduodenectomy or potentially even total pancreatectomy based on the imaging.  She and her  are agreeable to this plan.  All of their questions were answered.

## 2025-02-04 NOTE — PROGRESS NOTES
Surgical Oncology Consult       [facility] SURGICAL ONCOLOGY [place]  [address]  [place] [address]    Montserrat Sumner  1950  [id number]  [facility] SURGICAL ONCOLOGY [place]  [address]  [place] [address]    1. IPMN (intraductal papillary mucinous neoplasm)  Assessment & Plan:  74-year-old female with what appears to be main duct IPMN.  On my review of the films, her dilatation is under 1 cm.  We discussed that this is a worrisome feature.  We discussed with main duct IPMN, the risk of malignancy in her lifetime is 50 to 70%.  With sidebranch IPMN, the risk is 10 to 20%.  There is also a less than 10% risk of malignancy developing elsewhere in the pancreas.  It is unclear why she had stones and sludge in her pancreatic duct, and if this could be contributing to her pancreatic dilatation.  At this time, her last MRI was in September.  I would plan on repeating the MRI in 1 month.  She is already planned to have a follow-up ERCP.  We will await the studies.  I will see her back once we have the MRI results.  I did discuss that given her age with the main duct dilatation, there is some data to suggest continued observation rather than surgical intervention.  I will see her back after the follow-up MRI.  If there is significant progression of the dilatation I would consider surgical intervention.  We did discuss this could be either pancreaticoduodenectomy or potentially even total pancreatectomy based on the imaging.  She and her  are agreeable to this plan.  All of their questions were answered.  Orders:  -     MRI abdomen w wo contrast and mrcp; Future; Expected date: 03/04/2025  -     BUN; Future  -     Creatinine, serum; Future  2. Dilation of pancreatic duct  3. Abnormal abdominal MRI  -     Ambulatory Referral to Surgical Oncology      Chief Complaint   Patient presents with    Consult        Return in about 6 weeks (around 3/18/2025) for Ofice visit short, Imaging - See orders.    Oncology History    No history exists.       History of Present Illness: 74-year-old female with pancreatic duct dilatation.  She has been complaining of right mid to lower abdominal pain for some time.  She also has lower back pain that radiates to her lower abdomen and hip.  She has had a change in appetite with a 17 pound weight loss over the last 2 months.  She has chronic nausea.  There is no family history of pancreas cancer and no personal history of pancreatitis.  CT from May 7, 2024 revealed pancreatic atrophy with diffuse main duct dilatation.  The main duct was dilated to 8 mm.  Follow-up MRI on September 18, 2024 revealed a dilated bile duct as well as pancreatic duct dilatation.  This was 1 cm in the pancreatic body and 7 mm in the pancreatic head.  ERCP on November 4, 2024 that was not successful.  Follow-up CT of the abdomen on December 15, 2024 revealed similar main duct dilatation.  Repeat ERCP on January 15, 2025 revealed dilatation of the biliary tree with a 2 cm common bile duct.  The main pancreatic duct was also diffusely dilated.  There was mucoid material from the ampulla.  There was sludge and stones in the pancreatic duct.  CEA of the dilated duct fluid was 8337 ng/mL.  Amylase was 161,893 U/L.  Glucose was 8.4 mg/dL.  A second sample yielded similar results.  There was no G Sarahi or KRAS amplification.  This was statistically indolent.  She had a follow-up MRI ordered, that is not scheduled.  On my review of the CT images, the pancreatic duct dilatation is approximately 9 mm.  I personally reviewed the films.  She comes in now to discuss further therapy.    Review of Systems  Complete ROS Surg Onc:   Constitutional: The patient denies new or recent history of general fatigue, no recent weight loss, no change in appetite.   Eyes: No complaints of visual problems, no scleral icterus.   ENT: no  complaints of ear pain, no hoarseness, no difficulty swallowing,  no tinnitus and no new masses in head, oral cavity, or neck.   Cardiovascular: No complaints of chest pain, no palpitations, no ankle edema.   Respiratory: No complaints of shortness of breath, no cough.   Gastrointestinal: No complaints of jaundice, no bloody stools, no pale stools.   Genitourinary: No complaints of dysuria, no hematuria, no nocturia, no frequent urination, no urethral discharge.   Musculoskeletal: No complaints of weakness, paralysis, joint stiffness or arthralgias.  Integumentary: No complaints of rash, no new lesions.   Neurological: No complaints of convulsions, no seizures, no dizziness.   Hematologic/Lymphatic: No complaints of easy bruising.   Endocrine:  No hot or cold intolerance.  No polydipsia, polyphagia, or polyuria.  Allergy/immunology:  No environmental allergies.  No food allergies.  Not immunocompromised.  Skin:  No pallor or rash.  No wound.          Patient Active Problem List   Diagnosis    Hypertension    Hypothyroid    CAD (coronary artery disease)    History of coronary artery bypass graft    Centrilobular emphysema (HCC)    Anxiety    Opioid dependence (HCC)    Cigarette nicotine dependence without complication    Lung nodule    Cerebral infarct (HCC)    Cocaine abuse (HCC)    Chronic pain syndrome    Chronic respiratory failure with hypoxia and hypercapnia (HCC)    Obesity (BMI 30-39.9)    Abnormal CT scan    Intractable abdominal pain    Prediabetes    COPD exacerbation (HCC)    Gastroesophageal reflux disease    Dysphagia    Hyperlipidemia    Oxygen dependent at night only    Sleep disorder    Bile duct abnormality    Postmenopausal    Bilateral cataracts    Aneurysm of ascending aorta without rupture (HCC)    Marijuana smoker    Acute bronchitis    Chronic bilateral low back pain without sciatica    Shortness of breath    S/P ERCP    Primary osteoarthritis of both knees    Sacroiliitis (HCC)    Tobacco use  "   Osteoarthritis of spine with radiculopathy, lumbar region    Intractable back pain    IPMN (intraductal papillary mucinous neoplasm)    Dilation of pancreatic duct     Past Medical History:   Diagnosis Date    Anxiety     Chronic pain 03/06/2023    lower abdomen and back    Colon polyp     COPD (chronic obstructive pulmonary disease) (HCC)     \"passes out\" with O2 levels dropping    Disease of thyroid gland     GERD (gastroesophageal reflux disease)     History of transfusion     with aneurysm repair-autologous-self and daughter    Hyperlipidemia     Hypertension     Teeth missing     Wears glasses     For reading     Past Surgical History:   Procedure Laterality Date    BACK SURGERY      x2 herniated, crushed disc in the lumbar, ablation    CHOLECYSTECTOMY      COLONOSCOPY      FRACTURE SURGERY Left     hip-pinned    HYSTERECTOMY      salpingo-oophorectomy    NV INJECT SI JOINT ARTHRGRPHY&/ANES/STEROID W/PATRICIA Bilateral 12/18/2024    Procedure: BILATERAL SACROILIAC JOINT INJECTION;  Surgeon: Nj Maddox DO;  Location: [facility] MAIN OR;  Service: Pain Management     THORACIC AORTIC ANEURYSM REPAIR  09/2016    ascending thoracic aortic repair with RCA bypass with SVG x 1    TONSILLECTOMY      UPPER GASTROINTESTINAL ENDOSCOPY       Family History   Problem Relation Age of Onset    Breast cancer Mother      Social History     Socioeconomic History    Marital status: /Civil Union     Spouse name: Not on file    Number of children: Not on file    Years of education: Not on file    Highest education level: Not on file   Occupational History    Not on file   Tobacco Use    Smoking status: Every Day     Current packs/day: 0.25     Average packs/day: 0.3 packs/day for 57.1 years (14.3 ttl pk-yrs)     Types: Cigarettes     Start date: 1968     Passive exposure: Past    Smokeless tobacco: Never    Tobacco comments:     Currently smoking 5-6 cigarettes daily   Vaping Use    Vaping status: Never Used   Substance " and Sexual Activity    Alcohol use: Yes     Comment: occasionally    Drug use: Yes     Types: Marijuana, Cocaine     Comment: yes still Marijuana as of 9/5/24-2 times weekly    Sexual activity: Not Currently     Partners: Male     Birth control/protection: Post-menopausal   Other Topics Concern    Not on file   Social History Narrative    Not on file     Social Drivers of Health     Financial Resource Strain: Not on file   Food Insecurity: No Food Insecurity (4/25/2023)    Hunger Vital Sign     Worried About Running Out of Food in the Last Year: Never true     Ran Out of Food in the Last Year: Never true   Transportation Needs: No Transportation Needs (4/25/2023)    PRAPARE - Transportation     Lack of Transportation (Medical): No     Lack of Transportation (Non-Medical): No   Physical Activity: Not on file   Stress: Not on file   Social Connections: Not on file   Intimate Partner Violence: Not on file   Housing Stability: Low Risk  (4/25/2023)    Housing Stability Vital Sign     Unable to Pay for Housing in the Last Year: No     Number of Places Lived in the Last Year: 2     Unstable Housing in the Last Year: No       Current Outpatient Medications:     albuterol (2.5 mg/3 mL) 0.083 % nebulizer solution, Take 3 mL (2.5 mg total) by nebulization 4 (four) times a day as needed for wheezing or shortness of breath, Disp: 360 mL, Rfl: 7    albuterol (Proventil HFA) 90 mcg/act inhaler, Inhale 2 puffs every 4 (four) hours as needed for wheezing, Disp: 6.7 g, Rfl: 7    ALPRAZolam (XANAX) 0.5 mg tablet, Take 0.5 mg by mouth 2 (two) times a day, Disp: , Rfl:     amLODIPine (NORVASC) 5 mg tablet, Take 1 tablet (5 mg total) by mouth daily, Disp: 10 tablet, Rfl: 0    aspirin 81 mg chewable tablet, Chew 81 mg daily, Disp: , Rfl:     atorvastatin (LIPITOR) 40 mg tablet, Take 40 mg by mouth every morning, Disp: , Rfl:     b complex vitamins capsule, Take 1 capsule by mouth once a week, Disp: , Rfl:     benzonatate (TESSALON  PERLES) 100 mg capsule, Take 1 capsule (100 mg total) by mouth 3 (three) times a day as needed for cough, Disp: 20 capsule, Rfl: 0    buprenorphine-naloxone (Suboxone) 4-1 MG, every morning Wafer, Disp: , Rfl:     celecoxib (CeleBREX) 200 mg capsule, Take 1 capsule (200 mg total) by mouth daily, Disp: 90 capsule, Rfl: 1    cyclobenzaprine (FLEXERIL) 10 mg tablet, Take 1 tablet (10 mg total) by mouth 3 (three) times a day as needed for muscle spasms, Disp: 90 tablet, Rfl: 1    DULoxetine (CYMBALTA) 60 mg delayed release capsule, Take 1 capsule (60 mg total) by mouth daily, Disp: 90 capsule, Rfl: 0    esomeprazole (NexIUM) 40 MG capsule, Take 1 capsule (40 mg total) by mouth 2 (two) times a day before meals, Disp: 180 capsule, Rfl: 0    fluticasone-umeclidinium-vilanterol (Trelegy Ellipta) 200-62.5-25 mcg/actuation AEPB inhaler, Inhale 1 puff daily Rinse mouth after use., Disp: 60 blister, Rfl: 11    levothyroxine 100 mcg tablet, Take 100 mcg by mouth every morning, Disp: , Rfl:     losartan (COZAAR) 25 mg tablet, Take 25 mg by mouth every morning, Disp: , Rfl:     oxyCODONE (Roxicodone) 5 immediate release tablet, Take 1 tablet (5 mg total) by mouth every 6 (six) hours as needed for moderate pain Max Daily Amount: 20 mg, Disp: 20 tablet, Rfl: 0    oxygen gas, Inhale continuous as needed 2.5 L, Disp: , Rfl:     traZODone (DESYREL) 50 mg tablet, Take 100 mg by mouth daily at bedtime, Disp: , Rfl:     venlafaxine (EFFEXOR-XR) 150 mg 24 hr capsule, Take 1 capsule by mouth in the morning, Disp: , Rfl:     venlafaxine (EFFEXOR-XR) 75 mg 24 hr capsule, Take 75 mg by mouth daily at bedtime, Disp: , Rfl:     ALPRAZolam (XANAX) 0.5 mg tablet, Take 1 tablet (0.5 mg total) by mouth 1 (one) time for 1 dose, Disp: 1 tablet, Rfl: 0    metoprolol succinate (TOPROL-XL) 50 mg 24 hr tablet, Take 1 tablet (50 mg total) by mouth daily (Patient taking differently: Take 50 mg by mouth every morning), Disp: 90 tablet, Rfl: 1    naloxone  (NARCAN) 4 mg/0.1 mL nasal spray, Administer 1 spray into a nostril. If no response after 2-3 minutes, give another dose in the other nostril using a new spray. (Patient not taking: Reported on 2/4/2025), Disp: 1 each, Rfl: 1  No Known Allergies  Vitals:    02/04/25 0947   BP: 132/76   Resp: 18   Temp: 97.5 °F (36.4 °C)       Physical Exam   Constitutional: General appearance: The Patient is well-developed and well-nourished who appears the stated age in no acute distress. Patient is pleasant and talkative.     HEENT:  Normocephalic.  Sclerae are anicteric. Mucous membranes are moist. Neck is supple without adenopathy. No JVD.     Chest: The lungs are clear to auscultation.     Cardiac: Heart is regular rate.     Abdomen: Abdomen is soft, non-tender, non-distended and without masses.     Extremities: There is no clubbing or cyanosis. There is no edema.  Symmetric.  Neuro: Grossly nonfocal. Gait is normal.     Lymphatic: No evidence of cervical adenopathy bilaterally.   No evidence of axillary adenopathy bilaterally.   Skin: Warm, anicteric.    Psych:  Patient is pleasant and talkative.  Breasts:      Pathology:  [unfilled]    Labs:      Imaging  ERCP  Result Date: 1/16/2025  Narrative: Table formatting from the original result was not included. [facility] [address] [facility] [address] [phone] DATE OF SERVICE: 1/15/25 PHYSICIAN(S): Attending: Vero Gomez MD Fellow: Jose Watson DO INDICATION: Abnormal abdominal MRI POST-OP DIAGNOSIS: See the impression below. PREPROCEDURE: Informed consent was obtained for the procedure, including sedation.  Risks of perforation, hemorrhage, adverse drug reaction and aspiration were discussed. The patient was placed in the left lateral decubitus position. Patient was explained about the risks and benefits of the procedure. Risks including but not limited to bleeding, infection, and perforation were explained in detail. Also explained  "about less than 100% sensitivity with the exam and other alternatives. PROCEDURE: ERCP DETAILS OF PROCEDURE: Patient was taken to the procedure room where a time out was performed to confirm correct patient and correct procedure. The patient underwent general anesthesia, which was administered by an anesthesia professional. The patient's blood pressure, heart rate, level of consciousness, respirations, oxygen, ECG and ETCO2 were monitored throughout the procedure. The scope was introduced through the mouth and advanced to the second part of the duodenum. Clinical intention was achieved. The patient experienced no blood loss. The procedure was not difficult. The patient tolerated the procedure well. There were no apparent adverse events. ANESTHESIA INFORMATION: ASA: III Anesthesia Type: General MEDICATIONS: indomethacin (INDOCIN) rectal suppository 100 mg (Totals for administrations occurring from 1208 to 1325 on 01/15/25) FINDINGS: The major papilla was native. The major papilla had a fish mouth appearance. Mucoid material was seen draining from major papilla prior to cannulation. The common bile duct and pancreatic duct were deeply cannulated after 2 attempts using a cannula with 260 cm x 0.035\" guidewire by employing a double guidewire technique. Cannulation was not difficult Complete medium-sized major papilla sphincterotomy was performed using a sphincterotome. I personally interpreted fluoroscopy images. Cholangiogram was performed which revealed diffuse dilation of biliary tree including the intrahepatic's.  CBD measured approximately 2 cm.  No significant filling defects noted.  Pancreatic urogram was also noted which revealed diffusely dilated PD. Multiple sweeps were performed in the common bile duct and pancreatic duct using a 12 mm balloon. Sludge and stones were removed, achieving complete clearance. Small stones and sludge were removed from pancreatic duct. Given drainage of mucoid material from ampulla " some of fluid was aspirated and sent to [facility] for analysis. Performed 1 brushing with cytology brush in the distal common bile duct One 10 Fr x 5 cm double flanged duodenal bend plastic stent was placed successfully in the common bile duct One 5 Fr x 3 cm double flanged straight plastic stent was placed successfully in the pancreatic duct SPECIMENS: ID Type Source Tests Collected by Time Destination 1 : distal common bile duct Brushing Brushing, common bile duct NON-GYNECOLOGIC CYTOLOGY Jose HARLEEN Watson DO 1/15/2025  1:13 PM       Impression: The major papilla had a fish mouth appearance. The common bile duct and pancreatic duct were deeply cannulated after 2 attempts by employing a double guidewire technique. Complete major papilla sphincterotomy was performed. I personally interpreted fluoroscopy images. Cholangiogram was performed which revealed diffuse dilation of biliary tree including the intrahepatic's.  CBD measured approximately 2 cm.  No significant filling defects noted.  Pancreatic urogram was also noted which revealed diffusely dilated PD. Given drainage of mucoid material from ampulla some of fluid was aspirated and sent to [facility] for analysis. Performed brushing with cytology brush in the distal common bile duct. Utilized 9 x 12 cm extraction balloon to sweep pancreatic duct which extracted sludge and stones. One 10 Fr x 5 cm double flanged duodenal bend stent was placed in the common bile duct One 5 Fr x 3 cm double flanged straight stent was placed in the pancreatic duct Overall findings are concerning for main duct IPMN as cause of CBD/PD dilation. RECOMMENDATION: Await pathology results Follow up with GI Clinic Recheck MRI/MRCP. Refer to surgical oncology given high suspicion for main duct IPMN. Repeat ERCP in 6 to 8 weeks for stent removal.   Vero Gomez MD Suggested CPT codes: 61823, 43274x2     Endoscopic ultrasonography, GI (Upper)  Result Date: 1/16/2025  Narrative: Table  formatting from the original result was not included. [facility] [address]  [place] [address] [phone] DATE OF SERVICE: 1/15/25 PHYSICIAN(S): Attending: Vero Gomez MD Fellow: Jose Watson DO INDICATION: Abnormal abdominal MRI POST-OP DIAGNOSIS: See the impression below. PREPROCEDURE: Informed consent was obtained for the procedure, including sedation.  Risks of perforation, hemorrhage, adverse drug reaction and aspiration were discussed. The patient was placed in the left lateral decubitus position. Patient was explained about the risks and benefits of the procedure. Risks including but not limited to bleeding, infection, and perforation were explained in detail. Also explained about less than 100% sensitivity with the exam and other alternatives. PROCEDURE: EUS UPPER DETAILS OF PROCEDURE: Patient was taken to the procedure room where a time out was performed to confirm correct patient and correct procedure. The patient underwent monitored anesthesia care, which was administered by an anesthesia professional. The patient's blood pressure, heart rate, oxygen, respirations, level of consciousness, ECG and ETCO2 were monitored throughout the procedure. The endoscope and linear scope were introduced through the mouth and advanced to the second part of the duodenum. Retroflexion was performed in the cardia, fundus and incisura. The patient experienced no blood loss. The procedure was not difficult. The patient tolerated the procedure well. There were no apparent adverse events. ANESTHESIA INFORMATION: ASA: III Anesthesia Type: General MEDICATIONS: indomethacin (INDOCIN) rectal suppository 100 mg (Totals for administrations occurring from 1208 to 1317 on 01/15/25) FINDINGS: The esophagus appeared normal. Regular Z-line 36 cm from the incisors The stomach appeared normal. The duodenum appeared normal. The major papilla was native. The major papilla had a fish mouth  appearance. There was mucoid material draining from major papilla. The entire bile duct was dilated. The bile duct measured 17 mm at the distal end. The pancreas appeared atrophic. The parenchyma was visualized in the entire pancreas. The parenchyma was heterogeneous and solid with cystic components. The pancreatic duct measured 8 mm at the head, 7 mm at the body and 5 mm at the tail. The duct in the head, neck, body and tail of the pancreas was dilated. The duct in the head of the pancreas contained hyperechoic foci consistent with stones. The duct in the head of the pancreas contained sludge. The parenchyma of the liver appeared normal. The parenchyma was visualized in the left lobe of the liver. The spleen appeared normal. The left adrenal appeared normal. SPECIMENS: ID Type Source Tests Collected by Time Destination 1 : distal common bile duct Brushing Brushing, common bile duct NON-GYNECOLOGIC CYTOLOGY Jose HARLEEN Watson DO 1/15/2025  1:13 PM       Impression: EGD: The esophagus appeared normal. The stomach appeared normal. The duodenum appeared normal. EUS: The major papilla had a fish mouth appearance with mucous drainage. The entire bile duct was dilated. Atrophic pancreas.  Diffuse dilation of the pancreatic duct.  Some sludge/stones noted. No obstructive masses or lesions to explain diffuse dilation of CBD or PD.  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 RECOMMENDATION: Proceed with ERCP   Vero Gomez MD     [place] ERCP biliary only  Result Date: 1/15/2025  Narrative: ERCP INDICATION:  R93.5: Abnormal findings on diagnostic imaging of other abdominal regions, including retroperitoneum. COMPARISON: CT of the abdomen and pelvis from 12/18/2024. IMAGES: 9 FLUOROSCOPY TIME: 3 minutes 12 seconds CONTRAST: 15 mL of iohexol (OMNIPAQUE) FINDINGS: Fluoroscopic guidance was provided for performance of ERCP by the technologist. The interpreting radiologist was not present for the procedure. The provided  images demonstrate a guidewire placed into the main pancreatic duct with injection of contrast material and placement of the plastic stent. A guidewire was also placed into the CBD with injection of contrast material. A plastic CBD stent was  also placed.     Impression: Fluoroscopic guidance for ERCP. Please see procedure report for full details. Workstation performed: [id number]     MRI thoracic spine w wo contrast  Result Date: 1/13/2025  Narrative: MRI THORACIC SPINE WITH AND WITHOUT CONTRAST INDICATION: M54.16: Radiculopathy, lumbar region M96.1: Postlaminectomy syndrome, not elsewhere classified. COMPARISON:  None. TECHNIQUE:  Multiplanar, multisequence imaging of the thoracic spine was performed before and after gadolinium administration. . IV Contrast:  8 mL of Gadobutrol injection (SINGLE-DOSE) IMAGE QUALITY:  Diagnostic. FINDINGS: ALIGNMENT: Biphasic thoracic scoliosis. No compression fracture in the thoracic spine. Chronic height loss at L1. MARROW SIGNAL: No aggressive osseous lesions. Schmorl's node changes in the L1 inferior endplate with edema. THORACIC CORD: Questionable increased T2 signal in the thoracic cord at T6-7. No cord signal abnormalities elsewhere. PREVERTEBRAL AND PARASPINAL SOFT TISSUES:   Partially imaged biliary ectasia, favored normal postcholecystectomy variant. Mild paraspinous muscular fatty atrophy. THORACIC DEGENERATIVE CHANGE: - T2-3: Right worse than left facet arthrosis, ligamentum flavum thickening, and shallow central disc extrusion with superior migration. Mild canal narrowing with contouring of the ventral cord. No significant foraminal narrowing. - T5-6: Shallow central disc extrusion and mild bilateral facet arthrosis without significant stenosis. - T6-7: Ligamentum flavum thickening and facet arthrosis. Central disc extrusion with slight superior migration. Severe canal stenosis. No foraminal narrowing. - Multilevel disc height loss and endplate osteophytosis.  POSTCONTRAST:  No abnormal enhancement. OTHER FINDINGS:  None.     Impression: 1.  T6-7 degenerative disc disease as discussed with resultant severe canal stenosis and possible myelomalacia. 2.  Degenerative changes at other levels as discussed without significant stenosis. 3.  Schmorl's node changes with edema in the L1 inferior endplate which could also be a source of back pain in the appropriate clinical setting. Workstation performed: [id number]     I personally reviewed and interpreted the above laboratory and imaging data.

## 2025-02-05 ENCOUNTER — HOSPITAL ENCOUNTER (OUTPATIENT)
Facility: AMBULARY SURGERY CENTER | Age: 75
Setting detail: OUTPATIENT SURGERY
Discharge: HOME | End: 2025-02-05
Attending: STUDENT IN AN ORGANIZED HEALTH CARE EDUCATION/TRAINING PROGRAM | Admitting: STUDENT IN AN ORGANIZED HEALTH CARE EDUCATION/TRAINING PROGRAM
Payer: COMMERCIAL

## 2025-02-05 ENCOUNTER — APPOINTMENT (OUTPATIENT)
Dept: RADIOLOGY | Facility: HOSPITAL | Age: 75
End: 2025-02-05
Payer: COMMERCIAL

## 2025-02-05 VITALS
OXYGEN SATURATION: 94 % | RESPIRATION RATE: 18 BRPM | TEMPERATURE: 97.5 F | HEART RATE: 81 BPM | DIASTOLIC BLOOD PRESSURE: 87 MMHG | SYSTOLIC BLOOD PRESSURE: 149 MMHG

## 2025-02-05 PROCEDURE — 62323 NJX INTERLAMINAR LMBR/SAC: CPT | Performed by: STUDENT IN AN ORGANIZED HEALTH CARE EDUCATION/TRAINING PROGRAM

## 2025-02-05 RX ORDER — LIDOCAINE HYDROCHLORIDE 10 MG/ML
INJECTION, SOLUTION EPIDURAL; INFILTRATION; INTRACAUDAL; PERINEURAL AS NEEDED
Status: DISCONTINUED | OUTPATIENT
Start: 2025-02-05 | End: 2025-02-05 | Stop reason: HOSPADM

## 2025-02-05 RX ORDER — METHYLPREDNISOLONE ACETATE 80 MG/ML
INJECTION, SUSPENSION INTRA-ARTICULAR; INTRALESIONAL; INTRAMUSCULAR; SOFT TISSUE AS NEEDED
Status: DISCONTINUED | OUTPATIENT
Start: 2025-02-05 | End: 2025-02-05 | Stop reason: HOSPADM

## 2025-02-05 NOTE — OP NOTE
OPERATIVE REPORT  PATIENT NAME: Montserrat Sumner    :  1950  MRN: [id number]  Pt Location: [facility] MINOR/PAIN ROOM 01    SURGERY DATE: 2025    Surgeons and Role:     * Nj Maddox DO - Primary    Preop Diagnosis:  Lumbar radiculopathy [M54.16]    Post-Op Diagnosis Codes:     * Lumbar radiculopathy [M54.16]    Procedure(s):  L1-L2 LUMBAR EPIDURAL STEROID INJECTION    Specimen(s):  * No specimens in log *    Estimated Blood Loss:   Minimal    Drains:  * No LDAs found *    Anesthesia Type:   Local    Operative Indications:  Lumbar radiculopathy [M54.16]        PROCEDURE:  The patient gave informed written consent to proceed with this procedure following a detailed discussion of the risks and benefits associated with epidural steroid injection in the lumbar spine at the L1-2 level including but not limited to infection, bleeding, headache, intrathecal injection, allergic reaction, further exacerbation of current symptoms, neurological injury, and lack of efficacy. The patient was then placed in the prone position, the skin over the lumbar area was prepped with chlorhexadine, and the site was marked and draped with sterile towels. Strict sterile technique was maintained throughout the procedure.  A timeout was performed with full staff present to identify the patient, verify the procedure being performed, and review allergies.        Fluoroscopy was used to visualize the aforementioned disc space.  The skin and subcutaneous tissue over this level was anesthetized by infiltration of 1% lidocaine. A 20 guage touhy needle was inserted under fluoroscopic guidance by coaxial technique and advanced towards the interspace.  Loss of resistance with normal saline was used to find the epidural space.  One  pass was required and there was no paresthesia.  Contrast dye was injected under live fluoroscopy demonstrating a typical epidural pattern with no evidence of intravascular or intrathecal injection in both  the AP and contralateral oblique views.  After negative aspiration, 80 mg Depomedrol and 2 ml Normal Saline was injected.   The needle was then flushed and withdrawn.      The patient tolerated the procedure well, there were no apparent complications, and was discharged in stable condition.  Written and verbal discharge instructions were reviewed with the patient prior to discharge.       SIGNATURE: jN Maddox,   DATE: February 5, 2025  TIME: 7:42 AM

## 2025-02-05 NOTE — INTERVAL H&P NOTE
H&P reviewed. After examining the patient I find no changes in the patients condition since the H&P had been written.    Vitals:    02/05/25 0739   BP: 115/93   Pulse: 86   Resp: 18   Temp: 97.5 °F (36.4 °C)   SpO2: 94%

## 2025-02-05 NOTE — DISCHARGE INSTRUCTIONS
Epidural Steroid Injection   WHAT YOU NEED TO KNOW:   An epidural steroid injection (NIGEL) is a procedure to inject steroid medicine into the epidural space. The epidural space is between your spinal cord and vertebrae. Steroids reduce inflammation and fluid buildup in your spine that may be causing pain. You may be given pain medicine along with the steroids.          ACTIVITY  Do not drive or operate machinery today.  No strenuous activity today - bending, lifting, etc.  You may resume normal activites starting tomorrow - start slowly and as tolerated.  You may shower today, but no tub baths or hot tubs.  You may have numbness for several hours from the local anesthetic. Please use caution and common sense, especially with weight-bearing activities.    CARE OF THE INJECTION SITE  If you have soreness or pain, apply ice to the area today (20 minutes on/20 minutes off).  Starting tomorrow, you may use warm, moist heat or ice if needed.  You may have an increase or change in your discomfort for 36-48 hours after your treatment.  Apply ice and continue with any pain medication you have been prescribed.  Notify the [facility] if you have any of the following: redness, drainage, swelling, headache, stiff neck or fever above 100°F.    SPECIAL INSTRUCTIONS  Our office will contact you in approximately 14 days for a progress report.    MEDICATIONS  Continue to take all routine medications.  Our office may have instructed you to hold some medications.    As no general anesthesia was used in today's procedure, you should not experience any side effects related to anesthesia.     If you are diabetic, the steroids used in today's injection may temporarily increase your blood sugar levels after the first few days after your injection. Please keep a close eye on your sugars and alert the doctor who manages your diabetes if your sugars are significantly high from your baseline or you are symptomatic.     If you have a  problem specifically related to your procedure, please call our office at [phone].  Problems not related to your procedure should be directed to your primary care physician.

## 2025-02-07 NOTE — TELEPHONE ENCOUNTER
Pt confirmed her Feb 26th appt at 4 :00 w/ Dr. Brooke    Pt has a lot of appts and does not want to be on wait list/ appreciated sooner appt phone call / but patient declined. / Pt  wait list is deleted.

## 2025-02-13 ENCOUNTER — RESULTS FOLLOW-UP (OUTPATIENT)
Dept: GASTROENTEROLOGY | Facility: CLINIC | Age: 75
End: 2025-02-13

## 2025-02-13 NOTE — RESULT ENCOUNTER NOTE
Pancreatic cyst showing indolent features on pancragen. There are some worrisome features (calcifications, debris in duct, increased dilation of pd).  Patient has seen Dr. Hernández. She is getting MRI/MRCP in 1 month and then they will evaluate if she is candidate for resection.

## 2025-02-18 ENCOUNTER — OFFICE VISIT (OUTPATIENT)
Age: 75
End: 2025-02-18
Payer: COMMERCIAL

## 2025-02-18 VITALS — SYSTOLIC BLOOD PRESSURE: 124 MMHG | HEART RATE: 79 BPM | DIASTOLIC BLOOD PRESSURE: 84 MMHG

## 2025-02-18 DIAGNOSIS — R10.9 INTRACTABLE ABDOMINAL PAIN: ICD-10-CM

## 2025-02-18 DIAGNOSIS — R10.13 EPIGASTRIC PAIN: ICD-10-CM

## 2025-02-18 DIAGNOSIS — R13.10 DYSPHAGIA, UNSPECIFIED TYPE: ICD-10-CM

## 2025-02-18 DIAGNOSIS — K21.9 GASTROESOPHAGEAL REFLUX DISEASE, UNSPECIFIED WHETHER ESOPHAGITIS PRESENT: ICD-10-CM

## 2025-02-18 DIAGNOSIS — D49.0 IPMN (INTRADUCTAL PAPILLARY MUCINOUS NEOPLASM): Primary | ICD-10-CM

## 2025-02-18 PROCEDURE — 99214 OFFICE O/P EST MOD 30 MIN: CPT | Performed by: PHYSICIAN ASSISTANT

## 2025-02-18 RX ORDER — ESOMEPRAZOLE MAGNESIUM 40 MG/1
40 CAPSULE, DELAYED RELEASE ORAL
Qty: 180 CAPSULE | Refills: 1 | Status: SHIPPED | OUTPATIENT
Start: 2025-02-18

## 2025-02-18 RX ORDER — SODIUM CHLORIDE, SODIUM LACTATE, POTASSIUM CHLORIDE, CALCIUM CHLORIDE 600; 310; 30; 20 MG/100ML; MG/100ML; MG/100ML; MG/100ML
125 INJECTION, SOLUTION INTRAVENOUS CONTINUOUS
OUTPATIENT
Start: 2025-02-18

## 2025-02-18 RX ORDER — SUCRALFATE 1 G/1
1 TABLET ORAL 4 TIMES DAILY
Qty: 120 TABLET | Refills: 1 | Status: SHIPPED | OUTPATIENT
Start: 2025-02-18

## 2025-02-18 NOTE — PROGRESS NOTES
Name: Montserrat Sumner      : 1950      MRN: [id number]  Encounter Provider: Mariama Sy PA-C  Encounter Date: 2025   Encounter department: [facility]  :  Assessment & Plan  IPMN (intraductal papillary mucinous neoplasm)  Suspected main branch IPMN, patient seen by surgical oncology and has MRI for surveillance planned next month.  Also had biliary stent done 1/15/2025 with Dr. Gomez for which recommendation was made to remove stent in 6 to 8 weeks, for which she will be due soon.    -Preliminarily plan for ERCP at the same time as EGD with Dr. Gomez, will also follow-up on upcoming MRI/MRCP and surgical oncology's plans for addressing IPMN stent extraction    -  Orders:    EGD; Future    ERCP; Future    Epigastric pain  Clinically suspect developing gastritis/peptic ulcer disease, in the setting of heavy NSAID use, could also be related to some degree to undertreated constipation    -Advised patient to discuss with pain management regarding optimal strategies for her back/leg pain, from GI standpoint would strongly recommend to minimize or avoid use of NSAIDs    -If use of NSAIDs as necessary would recommend taking with food, can also combine with acetaminophen to hopefully minimize dosage required    -Continue twice daily esomeprazole 40 mg, will also add Carafate at this time    -Advised patient to monitor stool appearance closely and go to ER if she develops black tarry stools/melena    -Would also recommend EGD to be performed at the same time as ERCP if this is still indicated, see above    -Patient also endorses fairly significant constipation which could potentially be contributing to abdominal pain as well, accordingly recommended patient to take MiraLAX on a regular basis and encouraged regular hydration  Orders:    EGD; Future    ERCP; Future    sucralfate (CARAFATE) 1 g tablet; Take 1 tablet (1 g total) by mouth 4 (four) times a  day        History of Present Illness   Montserrat Sumner is a 74 y.o. female with history of remote cholecystectomy, COPD on supplemental oxygen, ongoing tobacco use who presents for follow-up, I had seen her last year for evaluation of abnormally dilated common bile duct which had been noted on imaging even the previous year, at the time had not yet followed up for MRI expressing significant claustrophobia, we ultimately pursued workup and she underwent ERCP later in 2024 with Dr. Rothman which was noted to be technically difficult, this was reattempted by Dr. Gomez in January 15, 2025, at which time a medium sphincterotomy was performed, CBD was noted dilated to 2 cm with unclear etiology, no filling defects were seen, PD was dilated and small amount of stones and sludge were extracted from the PD, there also appeared to be drainage of mucoid material from the ampulla concerning for IPMN and fluid studies appeared consistent with this.  During procedure she also had a duodenal been stent placed in the CBD, and a stent placed in the PD.  She was referred to surgical oncology, Dr. Hernández saw her 2/4 and she has been recommended for repeat MRI in 1 month which is scheduled now for 3/4/2024.    The patient says at this time that she is unfortunately having ongoing issues with nausea and epigastric pain, she notes that she has had persistent issues with back pain and leg pain and has been taking Motrin regularly, and lately has been taking this about 5-6 times a day.  She also struggles with constipation and has a bowel movement about once every 4 days, which is often painful, requiring hard pushing and sometimes with bright red blood mixed with the stool.  Epigastric pain is worsened postprandially.  She continues to take Nexium 40 mg twice daily.  Denies melena.      HPI    Review of Systems   Constitutional:  Negative for activity change, appetite change, chills, fatigue, fever and unexpected weight change.   HENT:   Negative for congestion, nosebleeds, rhinorrhea, sore throat and trouble swallowing.    Eyes:  Negative for pain, itching and visual disturbance.   Respiratory:  Negative for cough, shortness of breath and wheezing.    Cardiovascular:  Negative for chest pain, palpitations and leg swelling.   Gastrointestinal: Negative.    Endocrine: Negative for cold intolerance, heat intolerance and polyuria.   Genitourinary:  Negative for difficulty urinating, dysuria, flank pain and hematuria.   Musculoskeletal:  Positive for back pain. Negative for arthralgias, myalgias and neck pain.   Skin:  Negative for color change, pallor and rash.   Neurological:  Negative for dizziness, speech difficulty, weakness, numbness and headaches.   Hematological:  Does not bruise/bleed easily.   Psychiatric/Behavioral:  Negative for dysphoric mood and sleep disturbance. The patient is not nervous/anxious.    All other systems reviewed and are negative.   A complete review of systems is negative other than that noted above in the HPI.      Current Outpatient Medications   Medication Sig Dispense Refill    albuterol (2.5 mg/3 mL) 0.083 % nebulizer solution Take 3 mL (2.5 mg total) by nebulization 4 (four) times a day as needed for wheezing or shortness of breath 360 mL 7    albuterol (Proventil HFA) 90 mcg/act inhaler Inhale 2 puffs every 4 (four) hours as needed for wheezing 6.7 g 7    ALPRAZolam (XANAX) 0.5 mg tablet Take 0.5 mg by mouth 2 (two) times a day      amLODIPine (NORVASC) 5 mg tablet Take 1 tablet (5 mg total) by mouth daily 90 tablet 0    aspirin 81 mg chewable tablet Chew 81 mg daily      atorvastatin (LIPITOR) 40 mg tablet Take 40 mg by mouth every morning      b complex vitamins capsule Take 1 capsule by mouth once a week      benzonatate (TESSALON PERLES) 100 mg capsule Take 1 capsule (100 mg total) by mouth 3 (three) times a day as needed for cough 20 capsule 0    buprenorphine-naloxone (Suboxone) 4-1 MG every morning Wafer       celecoxib (CeleBREX) 200 mg capsule Take 1 capsule (200 mg total) by mouth daily 90 capsule 1    cyclobenzaprine (FLEXERIL) 10 mg tablet Take 1 tablet (10 mg total) by mouth 3 (three) times a day as needed for muscle spasms 90 tablet 1    DULoxetine (CYMBALTA) 60 mg delayed release capsule Take 1 capsule (60 mg total) by mouth daily 90 capsule 0    esomeprazole (NexIUM) 40 MG capsule Take 1 capsule (40 mg total) by mouth 2 (two) times a day before meals 180 capsule 0    fluticasone-umeclidinium-vilanterol (Trelegy Ellipta) 200-62.5-25 mcg/actuation AEPB inhaler Inhale 1 puff daily Rinse mouth after use. 60 blister 11    levothyroxine 100 mcg tablet Take 1 tablet (100 mcg total) by mouth every morning 90 tablet 0    losartan (COZAAR) 25 mg tablet Take 1 tablet (25 mg total) by mouth every morning 90 tablet 0    naloxone (NARCAN) 4 mg/0.1 mL nasal spray Administer 1 spray into a nostril. If no response after 2-3 minutes, give another dose in the other nostril using a new spray. 1 each 1    oxyCODONE (Roxicodone) 5 immediate release tablet Take 1 tablet (5 mg total) by mouth every 6 (six) hours as needed for moderate pain Max Daily Amount: 20 mg 20 tablet 0    oxygen gas Inhale continuous as needed 2.5 L      traZODone (DESYREL) 50 mg tablet Take 100 mg by mouth daily at bedtime      venlafaxine (EFFEXOR-XR) 150 mg 24 hr capsule Take 1 capsule by mouth in the morning      venlafaxine (EFFEXOR-XR) 75 mg 24 hr capsule Take 75 mg by mouth daily at bedtime      ALPRAZolam (XANAX) 0.5 mg tablet Take 1 tablet (0.5 mg total) by mouth 1 (one) time for 1 dose 1 tablet 0    metoprolol succinate (TOPROL-XL) 50 mg 24 hr tablet Take 1 tablet (50 mg total) by mouth daily (Patient taking differently: Take 50 mg by mouth every morning) 90 tablet 1     No current facility-administered medications for this visit.     Objective   /84 (BP Location: Left arm, Patient Position: Sitting, Cuff Size: Standard)   Pulse 79   LMP  (LMP  Unknown)     Physical Exam  Constitutional:       General: She is not in acute distress.     Appearance: She is well-developed. She is not diaphoretic.      Comments: Pt in wheelchair.   HENT:      Head: Normocephalic and atraumatic.   Eyes:      Conjunctiva/sclera: Conjunctivae normal.      Pupils: Pupils are equal, round, and reactive to light.   Cardiovascular:      Rate and Rhythm: Normal rate and regular rhythm.      Heart sounds: Normal heart sounds. No murmur heard.     No friction rub. No gallop.   Pulmonary:      Effort: Pulmonary effort is normal. No respiratory distress.      Breath sounds: Normal breath sounds. No stridor. No wheezing or rales.   Abdominal:      General: Bowel sounds are normal. There is no distension.      Palpations: Abdomen is soft. There is no mass.      Tenderness: There is no abdominal tenderness. There is no guarding or rebound.   Musculoskeletal:         General: No tenderness. Normal range of motion.      Cervical back: Normal range of motion and neck supple.   Skin:     General: Skin is warm and dry.      Coloration: Skin is not pale.      Findings: No erythema or rash.   Neurological:      Mental Status: She is alert and oriented to person, place, and time.   Psychiatric:         Behavior: Behavior normal.            Lab Results: I personally reviewed relevant lab results.       Results for orders placed during the hospital encounter of 01/15/25    Endoscopic ultrasonography, GI (Upper)    Impression  EGD:  The esophagus appeared normal.  The stomach appeared normal.  The duodenum appeared normal.  EUS:  The major papilla had a fish mouth appearance with mucous drainage.  The entire bile duct was dilated.  Atrophic pancreas.  Diffuse dilation of the pancreatic duct.  Some sludge/stones noted.  No obstructive masses or lesions to explain diffuse dilation of CBD or PD.  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    RECOMMENDATION:  Proceed with  ERCP              Vero Gomez MD

## 2025-02-18 NOTE — ASSESSMENT & PLAN NOTE
Suspected main branch IPMN, patient seen by surgical oncology and has MRI for surveillance planned next month.  Also had biliary stent done 1/15/2025 with Dr. Gomez for which recommendation was made to remove stent in 6 to 8 weeks, for which she will be due soon.    -Preliminarily plan for ERCP at the same time as EGD with Dr. Gomez, will also follow-up on upcoming MRI/MRCP and surgical oncology's plans for addressing IPMN stent extraction    -  Orders:    EGD; Future    ERCP; Future

## 2025-02-19 ENCOUNTER — HOSPITAL ENCOUNTER (OUTPATIENT)
Dept: RADIOLOGY | Facility: HOSPITAL | Age: 75
Discharge: HOME | End: 2025-02-19
Payer: COMMERCIAL

## 2025-02-19 ENCOUNTER — TELEPHONE (OUTPATIENT)
Dept: PAIN MEDICINE | Facility: CLINIC | Age: 75
End: 2025-02-19

## 2025-02-19 DIAGNOSIS — Z12.31 ENCOUNTER FOR SCREENING MAMMOGRAM FOR BREAST CANCER: ICD-10-CM

## 2025-02-19 PROCEDURE — 77063 BREAST TOMOSYNTHESIS BI: CPT

## 2025-02-19 PROCEDURE — 77067 SCR MAMMO BI INCL CAD: CPT

## 2025-02-21 ENCOUNTER — OFFICE VISIT (OUTPATIENT)
Dept: FAMILY MEDICINE CLINIC | Facility: CLINIC | Age: 75
End: 2025-02-21
Payer: COMMERCIAL

## 2025-02-21 VITALS
OXYGEN SATURATION: 95 % | RESPIRATION RATE: 18 BRPM | DIASTOLIC BLOOD PRESSURE: 68 MMHG | SYSTOLIC BLOOD PRESSURE: 124 MMHG | HEART RATE: 88 BPM

## 2025-02-21 DIAGNOSIS — E03.9 HYPOTHYROIDISM, UNSPECIFIED TYPE: Chronic | ICD-10-CM

## 2025-02-21 DIAGNOSIS — I10 HYPERTENSION, UNSPECIFIED TYPE: ICD-10-CM

## 2025-02-21 DIAGNOSIS — I10 PRIMARY HYPERTENSION: Chronic | ICD-10-CM

## 2025-02-21 DIAGNOSIS — J44.1 COPD EXACERBATION (HCC): ICD-10-CM

## 2025-02-21 DIAGNOSIS — E78.2 MIXED HYPERLIPIDEMIA: Primary | ICD-10-CM

## 2025-02-21 DIAGNOSIS — R73.03 PREDIABETES: ICD-10-CM

## 2025-02-21 DIAGNOSIS — K86.89 DILATION OF PANCREATIC DUCT: ICD-10-CM

## 2025-02-21 DIAGNOSIS — F11.29 OPIOID DEPENDENCE WITH OPIOID-INDUCED DISORDER (HCC): Chronic | ICD-10-CM

## 2025-02-21 DIAGNOSIS — F14.10 COCAINE ABUSE (HCC): ICD-10-CM

## 2025-02-21 DIAGNOSIS — J43.2 CENTRILOBULAR EMPHYSEMA (HCC): Chronic | ICD-10-CM

## 2025-02-21 DIAGNOSIS — M47.16 OSTEOARTHRITIS OF LUMBAR SPINE WITH MYELOPATHY: ICD-10-CM

## 2025-02-21 DIAGNOSIS — M25.551 RIGHT HIP PAIN: ICD-10-CM

## 2025-02-21 DIAGNOSIS — I25.10 CORONARY ARTERY DISEASE INVOLVING NATIVE CORONARY ARTERY OF NATIVE HEART WITHOUT ANGINA PECTORIS: Chronic | ICD-10-CM

## 2025-02-21 DIAGNOSIS — M54.9 INTRACTABLE BACK PAIN: ICD-10-CM

## 2025-02-21 PROBLEM — J96.11 CHRONIC RESPIRATORY FAILURE WITH HYPOXIA AND HYPERCAPNIA (HCC): Status: RESOLVED | Noted: 2023-04-24 | Resolved: 2025-02-21

## 2025-02-21 PROBLEM — J96.12 CHRONIC RESPIRATORY FAILURE WITH HYPOXIA AND HYPERCAPNIA (HCC): Status: RESOLVED | Noted: 2023-04-24 | Resolved: 2025-02-21

## 2025-02-21 PROCEDURE — 20610 DRAIN/INJ JOINT/BURSA W/O US: CPT | Performed by: FAMILY MEDICINE

## 2025-02-21 PROCEDURE — 99215 OFFICE O/P EST HI 40 MIN: CPT | Performed by: FAMILY MEDICINE

## 2025-02-21 RX ORDER — BENZONATATE 100 MG/1
100 CAPSULE ORAL 3 TIMES DAILY PRN
Qty: 30 CAPSULE | Refills: 3 | Status: SHIPPED | OUTPATIENT
Start: 2025-02-21

## 2025-02-21 RX ORDER — METOPROLOL SUCCINATE 50 MG/1
50 TABLET, EXTENDED RELEASE ORAL EVERY MORNING
Qty: 30 TABLET | Refills: 0 | Status: SHIPPED | OUTPATIENT
Start: 2025-02-21 | End: 2025-08-20

## 2025-02-21 RX ORDER — LIDOCAINE HYDROCHLORIDE 10 MG/ML
1 INJECTION, SOLUTION INFILTRATION; PERINEURAL
Status: COMPLETED | OUTPATIENT
Start: 2025-02-21 | End: 2025-02-21

## 2025-02-21 RX ORDER — DULOXETIN HYDROCHLORIDE 60 MG/1
60 CAPSULE, DELAYED RELEASE ORAL DAILY
Qty: 90 CAPSULE | Refills: 1 | Status: SHIPPED | OUTPATIENT
Start: 2025-02-21

## 2025-02-21 RX ORDER — ATORVASTATIN CALCIUM 40 MG/1
40 TABLET, FILM COATED ORAL EVERY MORNING
Qty: 90 TABLET | Refills: 3 | Status: SHIPPED | OUTPATIENT
Start: 2025-02-21

## 2025-02-21 RX ORDER — METHYLPREDNISOLONE ACETATE 40 MG/ML
1 INJECTION, SUSPENSION INTRA-ARTICULAR; INTRALESIONAL; INTRAMUSCULAR; SOFT TISSUE
Status: COMPLETED | OUTPATIENT
Start: 2025-02-21 | End: 2025-02-21

## 2025-02-21 RX ADMIN — METHYLPREDNISOLONE ACETATE 1 ML: 40 INJECTION, SUSPENSION INTRA-ARTICULAR; INTRALESIONAL; INTRAMUSCULAR; SOFT TISSUE at 11:00

## 2025-02-21 RX ADMIN — LIDOCAINE HYDROCHLORIDE 1 ML: 10 INJECTION, SOLUTION INFILTRATION; PERINEURAL at 11:00

## 2025-02-21 NOTE — ASSESSMENT & PLAN NOTE
Management as per pain management.  She did get some relief from L1-L2 epidural injection.  I informed her that she needs to contact pain management and let them know that

## 2025-02-21 NOTE — ASSESSMENT & PLAN NOTE
Again advised that she needs to stop smoking and stop using cocaine.  Needs follow-up with cardiology.  Message sent to cardiologist.  Remains on aspirin, atorvastatin and beta-blocker.  Cardiology to fill beta-blocker

## 2025-02-21 NOTE — ASSESSMENT & PLAN NOTE
Follow-up with gastroenterology as well as surgical oncology as scheduled.  Scheduled for repeat MRI.  If she still has dilation of her pancreatic duct then surgical oncologist was talking about the possibility of performing Whipple procedure or total pancreatectomy especially with findings of IPMN and the high risk of cancer associated with it

## 2025-02-21 NOTE — ASSESSMENT & PLAN NOTE
She remains on Suboxone.  Needs to follow-up with psychiatrist that prescribes her Suboxone.  If she is switching to [place] then she needs to have an accepting physician.  I do not provide Suboxone

## 2025-02-21 NOTE — ASSESSMENT & PLAN NOTE
Possibly an element of trochanteric bursitis.  Injection provided as per procedure note    Which hopefully will provide some relief    -Patient is already taking Celebrex    -She did have x-ray imaging done of her right hip when she was hospitalized back in December with no acute osseous abnormality.  No significant DJD

## 2025-02-21 NOTE — ASSESSMENT & PLAN NOTE
Once again, advised that she should not be doing that given her age and underlying health conditions

## 2025-02-21 NOTE — ASSESSMENT & PLAN NOTE
Adequately controlled on amlodipine, losartan and metoprolol.  She request refill of metoprolol.  This was previously filled by cardiology.  She does have history of coronary artery disease.  I would like for her to follow-up with cardiologist.  If I take over prescribing her metoprolol she will likely not follow-up with cardiology.  Message sent to cardiologist

## 2025-02-21 NOTE — ASSESSMENT & PLAN NOTE
Cholesterol has been well-controlled on atorvastatin 40 mg once daily.  Refill provided.  Repeat lipid panel to be done in May

## 2025-02-21 NOTE — PROGRESS NOTES
"  Subjective:      Patient ID: Montserrat Sumnre is a 74 y.o. female.    74-year-old female with extensive past medical history presents for 1 month follow-up in regards to chronic conditions..  Patient did receive epidural steroid injection at L1-L2 from pain management which she states did provide some relief to her back pain.  Still is undergoing workup for IPMN and pancreatic ductal dilatation.  She is scheduled for repeat MRI/MRCP of the pancreas.  Does have pancreatic stent in place.  Patient is still receiving Suboxone as well as psychiatric medications from psychiatrist in [place] but she is looking to switch into [place].  She states that Suboxone provider in [place] will not prescribe any oral medications show she would need me to prescribe her alprazolam, trazodone and Effexor.  Still smoking and occasionally using cocaine.  Despite having L1-L2 epidural she is still having pain in her right hip.  She did have imaging done of her right hip in November/December when she was hospitalized.  No significant degenerative arthritis noted in the hip however she does complain of pain and tenderness that goes from her right hip into the right groin which is worse when laying on her right side.  Needs refill of atorvastatin.  Also request refill of metoprolol.  She does have history of coronary artery disease.  Metoprolol was last prescribed by cardiologist in 2023 whom she has not had follow-up with        Past Medical History:   Diagnosis Date   • Anxiety    • Chronic pain 03/06/2023    lower abdomen and back   • Colon polyp    • COPD (chronic obstructive pulmonary disease) (HCC)     \"passes out\" with O2 levels dropping   • Disease of thyroid gland    • GERD (gastroesophageal reflux disease)    • History of transfusion     with aneurysm repair-autologous-self and daughter   • Hyperlipidemia    • Hypertension    • Teeth missing    • Wears glasses     For reading       Family History   Problem Relation " Age of Onset   • Breast cancer Mother 60       Past Surgical History:   Procedure Laterality Date   • BACK SURGERY      x2 herniated, crushed disc in the lumbar, ablation   • CHOLECYSTECTOMY     • COLONOSCOPY     • EPIDURAL BLOCK INJECTION N/A 2/5/2025    Procedure: L1-L2 LUMBAR EPIDURAL STEROID INJECTION;  Surgeon: Nj Maddox DO;  Location: [facility] MAIN OR;  Service: Pain Management    • FRACTURE SURGERY Left     hip-pinned   • HYSTERECTOMY      salpingo-oophorectomy   • TX INJECT SI JOINT ARTHRGRPHY&/ANES/STEROID W/PATRICIA Bilateral 12/18/2024    Procedure: BILATERAL SACROILIAC JOINT INJECTION;  Surgeon: Nj Maddox DO;  Location: [facility] MAIN OR;  Service: Pain Management    • THORACIC AORTIC ANEURYSM REPAIR  09/2016    ascending thoracic aortic repair with RCA bypass with SVG x 1   • TONSILLECTOMY     • UPPER GASTROINTESTINAL ENDOSCOPY          reports that she has been smoking cigarettes. She started smoking about 57 years ago. She has a 14.3 pack-year smoking history. She has been exposed to tobacco smoke. She has never used smokeless tobacco. She reports current alcohol use. She reports current drug use. Drugs: Marijuana and Cocaine.      Current Outpatient Medications:   •  albuterol (2.5 mg/3 mL) 0.083 % nebulizer solution, Take 3 mL (2.5 mg total) by nebulization 4 (four) times a day as needed for wheezing or shortness of breath, Disp: 360 mL, Rfl: 7  •  albuterol (Proventil HFA) 90 mcg/act inhaler, Inhale 2 puffs every 4 (four) hours as needed for wheezing, Disp: 6.7 g, Rfl: 7  •  ALPRAZolam (XANAX) 0.5 mg tablet, Take 0.5 mg by mouth 2 (two) times a day, Disp: , Rfl:   •  amLODIPine (NORVASC) 5 mg tablet, Take 1 tablet (5 mg total) by mouth daily, Disp: 90 tablet, Rfl: 0  •  aspirin 81 mg chewable tablet, Chew 81 mg daily, Disp: , Rfl:   •  atorvastatin (LIPITOR) 40 mg tablet, Take 1 tablet (40 mg total) by mouth every morning, Disp: 90 tablet, Rfl: 3  •  b complex vitamins capsule, Take 1  capsule by mouth once a week, Disp: , Rfl:   •  benzonatate (TESSALON PERLES) 100 mg capsule, Take 1 capsule (100 mg total) by mouth 3 (three) times a day as needed for cough, Disp: 30 capsule, Rfl: 3  •  buprenorphine-naloxone (Suboxone) 4-1 MG, every morning Wafer, Disp: , Rfl:   •  celecoxib (CeleBREX) 200 mg capsule, Take 1 capsule (200 mg total) by mouth daily, Disp: 90 capsule, Rfl: 1  •  cyclobenzaprine (FLEXERIL) 10 mg tablet, Take 1 tablet (10 mg total) by mouth 3 (three) times a day as needed for muscle spasms, Disp: 90 tablet, Rfl: 1  •  DULoxetine (CYMBALTA) 60 mg delayed release capsule, Take 1 capsule (60 mg total) by mouth daily, Disp: 90 capsule, Rfl: 1  •  esomeprazole (NexIUM) 40 MG capsule, Take 1 capsule (40 mg total) by mouth 2 (two) times a day before meals, Disp: 180 capsule, Rfl: 1  •  fluticasone-umeclidinium-vilanterol (Trelegy Ellipta) 200-62.5-25 mcg/actuation AEPB inhaler, Inhale 1 puff daily Rinse mouth after use., Disp: 60 blister, Rfl: 11  •  levothyroxine 100 mcg tablet, Take 1 tablet (100 mcg total) by mouth every morning, Disp: 90 tablet, Rfl: 0  •  losartan (COZAAR) 25 mg tablet, Take 1 tablet (25 mg total) by mouth every morning, Disp: 90 tablet, Rfl: 0  •  sucralfate (CARAFATE) 1 g tablet, Take 1 tablet (1 g total) by mouth 4 (four) times a day, Disp: 120 tablet, Rfl: 1  •  traZODone (DESYREL) 50 mg tablet, Take 100 mg by mouth daily at bedtime, Disp: , Rfl:   •  venlafaxine (EFFEXOR-XR) 150 mg 24 hr capsule, Take 1 capsule by mouth in the morning, Disp: , Rfl:   •  ALPRAZolam (XANAX) 0.5 mg tablet, Take 1 tablet (0.5 mg total) by mouth 1 (one) time for 1 dose, Disp: 1 tablet, Rfl: 0  •  metoprolol succinate (TOPROL-XL) 50 mg 24 hr tablet, Take 1 tablet (50 mg total) by mouth daily (Patient taking differently: Take 50 mg by mouth every morning), Disp: 90 tablet, Rfl: 1  •  naloxone (NARCAN) 4 mg/0.1 mL nasal spray, Administer 1 spray into a nostril. If no response after 2-3  minutes, give another dose in the other nostril using a new spray. (Patient not taking: Reported on 2/21/2025), Disp: 1 each, Rfl: 1  •  oxyCODONE (Roxicodone) 5 immediate release tablet, Take 1 tablet (5 mg total) by mouth every 6 (six) hours as needed for moderate pain Max Daily Amount: 20 mg (Patient not taking: Reported on 2/21/2025), Disp: 20 tablet, Rfl: 0  •  oxygen gas, Inhale continuous as needed 2.5 L, Disp: , Rfl:   No current facility-administered medications for this visit.    The following portions of the patient's history were reviewed and updated as appropriate: allergies, current medications, past family history, past medical history, past social history, past surgical history and problem list.    Review of Systems   Constitutional: Negative.  Negative for activity change, appetite change, fatigue and unexpected weight change.   HENT: Negative.  Negative for congestion, ear discharge, mouth sores, sinus pressure and tinnitus.    Eyes: Negative.  Negative for photophobia and visual disturbance.   Respiratory:  Positive for cough and wheezing. Negative for chest tightness and shortness of breath.    Cardiovascular:  Positive for palpitations. Negative for chest pain.   Gastrointestinal:  Positive for abdominal pain. Negative for abdominal distention, constipation, diarrhea, nausea and vomiting.   Endocrine: Negative.  Negative for polydipsia, polyphagia and polyuria.   Genitourinary: Negative.  Negative for difficulty urinating.   Musculoskeletal:  Positive for arthralgias (Right hip), back pain and gait problem.        In a wheelchair   Skin: Negative.    Neurological:  Positive for numbness (Bilateral feet). Negative for dizziness, syncope, weakness and light-headedness.   Hematological: Negative.    Psychiatric/Behavioral:  Negative for agitation and dysphoric mood. The patient is not nervous/anxious.            Objective:    /68   Pulse 88   Resp 18   LMP  (LMP Unknown)   SpO2 95%       Physical Exam  Vitals and nursing note reviewed.   Constitutional:       General: She is not in acute distress.     Appearance: She is well-developed. She is ill-appearing (Chronically ill-appearing). She is not toxic-appearing or diaphoretic.   HENT:      Head: Normocephalic and atraumatic.      Nose: Nose normal.      Mouth/Throat:      Mouth: Mucous membranes are moist.      Pharynx: Oropharynx is clear.   Eyes:      Comments: Bilateral cataracts.   Neck:      Vascular: No carotid bruit.   Cardiovascular:      Rate and Rhythm: Normal rate and regular rhythm.      Pulses: Normal pulses.      Heart sounds: Normal heart sounds. No murmur heard.     Comments: Normal dorsalis pedis pulses  Pulmonary:      Effort: Pulmonary effort is normal.      Breath sounds: Wheezing present. No rales.      Comments: Diminished breath sounds bilaterally with expiratory wheezing.  Moist mucousy cough  Abdominal:      General: Bowel sounds are normal. There is no distension.      Palpations: Abdomen is soft.      Tenderness: There is no abdominal tenderness. There is no guarding or rebound.   Musculoskeletal:         General: Tenderness (Right hip in the area of the trochanteric bursa) present. Normal range of motion.      Cervical back: Normal range of motion and neck supple.   Lymphadenopathy:      Cervical: No cervical adenopathy.   Skin:     General: Skin is warm and dry.      Findings: Bruising present. No rash.   Neurological:      Mental Status: She is alert and oriented to person, place, and time.      Sensory: Sensory deficit (Bilateral feet) present.      Deep Tendon Reflexes: Reflexes are normal and symmetric.      Comments: Remains in wheelchair   Psychiatric:         Behavior: Behavior normal.         Thought Content: Thought content normal.         Judgment: Judgment normal.           Recent Results (from the past 6 weeks)   Non-gynecologic cytology    Collection Time: 01/15/25  1:13 PM   Result Value Ref Range    Case  Report       Non-gynecologic Cytology                          Case: [id number]                                  Authorizing Provider:  Jose Watson DO      Collected:           01/15/2025 1313              Ordering Location:     [facility]       Received:            01/15/2025 [facility]                                     Hospital Endoscopy                                                           Pathologist:           Monse Scales MD                                                                    Specimen:    Brushing, common bile duct, distal common bile duct                                        Final Diagnosis       A. Brushing, common bile duct, distal common bile duct, ThinPrep:  Non-diagnostic/Insufficient.    Note:   The sample is largely degenerated.  Repeat sampling may be considered.    As reported in the “WHO Reporting System for Pancreaticobiliary Cytopathology*” the diagnostic category of “non-diagnostic/insufficient” in a bile duct brushing carries a 28-69% risk of malignancy being found in subsequent ERCP brushing and/or biopsy/excisional specimen.  The usual management following an initial diagnosis of “non-diagnostic/insufficient” is a repeat ERCP  with cholangioscopy, brushing and biopsy. Ultimately clinical and radiologic correlation is needed for this patient in arriving at the actual management plan.     *Nino MARIN, Field A, Nohemy I (Eds). (2022). WHO Reporting System for Pancreaticobiliary Cytopathology. [facility].  FNAB - fine needle aspiration/biopsy     Interpretation performed at [facility] [address] [place] [id number]       Gross Description        A. 25 mL, yellow, hazy, received in [facility]        Clinical Information       Per EUS,   IMPRESSION:  EGD:  · The esophagus appeared normal.  · The stomach appeared normal.  · The duodenum appeared normal.  EUS:  · The major papilla had a fish mouth appearance with mucous drainage.  · The entire bile duct was  dilated.  · Atrophic pancreas.  Diffuse dilation of the pancreatic duct.  Some sludge/stones noted.  No obstructive masses or lesions to explain diffuse dilation of CBD or PD.  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    Additional Information       [employer] FDA approved ,  and ThinPrep Imaging Duo System are utilized with strict adherence to the 's instruction manual to prepare gynecologic and non-gynecologic cytology specimens for the production of ThinPrep slides as well as for gynecologic ThinPrep imaging. These processes have been validated by our laboratory and/or by the .    These tests were developed and their performance characteristics determined by [facility] or [place]. They may not be cleared or approved by the U.S. Food and Drug Administration. The FDA has determined that such clearance or approval is not necessary. These tests are used for clinical purposes. They should not be regarded as investigational or for research. This laboratory has been approved by [facility], designated as a high-complexity laboratory and is qualified to perform these tests.           Assessment/Plan:    Right hip pain  Possibly an element of trochanteric bursitis.  Injection provided as per procedure note    Which hopefully will provide some relief    -Patient is already taking Celebrex    -She did have x-ray imaging done of her right hip when she was hospitalized back in December with no acute osseous abnormality.  No significant DJD    Hypertension  Adequately controlled on amlodipine, losartan and metoprolol.  She request refill of metoprolol.  This was previously filled by cardiology.  She does have history of coronary artery disease.  I would like for her to follow-up with cardiologist.  If I take over prescribing her metoprolol she will likely not follow-up with cardiology.  Message sent to cardiologist    CAD (coronary artery  disease)  Again advised that she needs to stop smoking and stop using cocaine.  Needs follow-up with cardiology.  Message sent to cardiologist.  Remains on aspirin, atorvastatin and beta-blocker.  Cardiology to fill beta-blocker    COPD exacerbation (HCC)  Probably would be a good idea to stop smoking.    Centrilobular emphysema (HCC)  Should be following up with pulmonology.  Should also stop smoking.    Dilation of pancreatic duct  Follow-up with gastroenterology as well as surgical oncology as scheduled.  Scheduled for repeat MRI.  If she still has dilation of her pancreatic duct then surgical oncologist was talking about the possibility of performing Whipple procedure or total pancreatectomy especially with findings of IPMN and the high risk of cancer associated with it    Hypothyroid  Remains on levothyroxine 100 mcg once daily.  Repeat thyroid function testing to be done in May    Opioid dependence (HCC)  She remains on Suboxone.  Needs to follow-up with psychiatrist that prescribes her Suboxone.  If she is switching to [place] then she needs to have an accepting physician.  I do not provide Suboxone    Cocaine abuse (HCC)  Once again, advised that she should not be doing that given her age and underlying health conditions    Intractable back pain  Management as per pain management.  She did get some relief from L1-L2 epidural injection.  I informed her that she needs to contact pain management and let them know that    Prediabetes  Needs repeat hemoglobin A1c to be done in May    Hyperlipidemia  Cholesterol has been well-controlled on atorvastatin 40 mg once daily.  Refill provided.  Repeat lipid panel to be done in May          Problem List Items Addressed This Visit        Cardiovascular and Mediastinum    CAD (coronary artery disease) (Chronic)    Again advised that she needs to stop smoking and stop using cocaine.  Needs follow-up with cardiology.  Message sent to cardiologist.  Remains on aspirin,  atorvastatin and beta-blocker.  Cardiology to fill beta-blocker         Relevant Medications    atorvastatin (LIPITOR) 40 mg tablet    Hypertension (Chronic)    Adequately controlled on amlodipine, losartan and metoprolol.  She request refill of metoprolol.  This was previously filled by cardiology.  She does have history of coronary artery disease.  I would like for her to follow-up with cardiologist.  If I take over prescribing her metoprolol she will likely not follow-up with cardiology.  Message sent to cardiologist         Relevant Orders    CBC and differential       Respiratory    Centrilobular emphysema (HCC) (Chronic)    Should be following up with pulmonology.  Should also stop smoking.         Relevant Medications    benzonatate (TESSALON PERLES) 100 mg capsule    methylPREDNISolone acetate (DEPO-MEDROL) injection 1 mL (Completed)    COPD exacerbation (HCC)    Probably would be a good idea to stop smoking.         Relevant Medications    benzonatate (TESSALON PERLES) 100 mg capsule    methylPREDNISolone acetate (DEPO-MEDROL) injection 1 mL (Completed)       Digestive    Dilation of pancreatic duct    Follow-up with gastroenterology as well as surgical oncology as scheduled.  Scheduled for repeat MRI.  If she still has dilation of her pancreatic duct then surgical oncologist was talking about the possibility of performing Whipple procedure or total pancreatectomy especially with findings of IPMN and the high risk of cancer associated with it            Endocrine    Hypothyroid (Chronic)    Remains on levothyroxine 100 mcg once daily.  Repeat thyroid function testing to be done in May         Relevant Medications    methylPREDNISolone acetate (DEPO-MEDROL) injection 1 mL (Completed)    Other Relevant Orders    TSH, 3rd generation with Free T4 reflex       Behavioral Health    Opioid dependence (HCC) (Chronic)    She remains on Suboxone.  Needs to follow-up with psychiatrist that prescribes her Suboxone.  If  she is switching to [place] then she needs to have an accepting physician.  I do not provide Suboxone         Cocaine abuse (HCC)    Once again, advised that she should not be doing that given her age and underlying health conditions            Surgery/Wound/Pain    Intractable back pain    Management as per pain management.  She did get some relief from L1-L2 epidural injection.  I informed her that she needs to contact pain management and let them know that         Right hip pain    Possibly an element of trochanteric bursitis.  Injection provided as per procedure note    Which hopefully will provide some relief    -Patient is already taking Celebrex    -She did have x-ray imaging done of her right hip when she was hospitalized back in December with no acute osseous abnormality.  No significant DJD         Relevant Medications    lidocaine (XYLOCAINE) 1 % injection 1 mL (Completed)    methylPREDNISolone acetate (DEPO-MEDROL) injection 1 mL (Completed)    Other Relevant Orders    Large joint arthrocentesis: R greater trochanteric bursa (Completed)       Other    Hyperlipidemia - Primary    Cholesterol has been well-controlled on atorvastatin 40 mg once daily.  Refill provided.  Repeat lipid panel to be done in May         Relevant Medications    atorvastatin (LIPITOR) 40 mg tablet    Other Relevant Orders    Comprehensive metabolic panel    Lipid panel    Prediabetes    Needs repeat hemoglobin A1c to be done in May         Relevant Orders    Hemoglobin A1C   Other Visit Diagnoses       Osteoarthritis of lumbar spine with myelopathy        Relevant Medications    DULoxetine (CYMBALTA) 60 mg delayed release capsule          Large joint arthrocentesis: R greater trochanteric bursa  Universal Protocol:  procedure performed by consultantConsent: Verbal consent obtained.  Consent given by: patient  Patient understanding: patient states understanding of the procedure being performed  Supporting  Documentation  Indications: pain   Procedure Details  Location: hip - R greater trochanteric bursa  Needle size: 22 G  Ultrasound guidance: no  Approach: lateral  Medications administered: 1 mL lidocaine 1 %; 1 mL methylPREDNISolone acetate 40 mg/mL    Patient tolerance: patient tolerated the procedure well with no immediate complications  Dressing:  Sterile dressing applied

## 2025-02-26 ENCOUNTER — OFFICE VISIT (OUTPATIENT)
Dept: PULMONOLOGY | Facility: MEDICAL CENTER | Age: 75
End: 2025-02-26
Payer: COMMERCIAL

## 2025-02-26 ENCOUNTER — TELEPHONE (OUTPATIENT)
Dept: PAIN MEDICINE | Facility: CLINIC | Age: 75
End: 2025-02-26

## 2025-02-26 VITALS
HEART RATE: 78 BPM | WEIGHT: 170.4 LBS | HEIGHT: 63 IN | TEMPERATURE: 97.3 F | DIASTOLIC BLOOD PRESSURE: 82 MMHG | OXYGEN SATURATION: 92 % | RESPIRATION RATE: 12 BRPM | SYSTOLIC BLOOD PRESSURE: 122 MMHG | BODY MASS INDEX: 30.19 KG/M2

## 2025-02-26 DIAGNOSIS — G47.36 NOCTURNAL HYPOXEMIA DUE TO EMPHYSEMA  (HCC): ICD-10-CM

## 2025-02-26 DIAGNOSIS — J44.1 COPD EXACERBATION (HCC): Primary | ICD-10-CM

## 2025-02-26 DIAGNOSIS — F17.210 CIGARETTE NICOTINE DEPENDENCE WITHOUT COMPLICATION: ICD-10-CM

## 2025-02-26 DIAGNOSIS — J43.9 NOCTURNAL HYPOXEMIA DUE TO EMPHYSEMA  (HCC): ICD-10-CM

## 2025-02-26 DIAGNOSIS — J43.2 CENTRILOBULAR EMPHYSEMA (HCC): Chronic | ICD-10-CM

## 2025-02-26 PROCEDURE — 99214 OFFICE O/P EST MOD 30 MIN: CPT | Performed by: INTERNAL MEDICINE

## 2025-02-26 RX ORDER — PREDNISONE 10 MG/1
TABLET ORAL
Qty: 50 TABLET | Refills: 0 | Status: SHIPPED | OUTPATIENT
Start: 2025-02-26

## 2025-02-26 RX ORDER — AZITHROMYCIN 250 MG/1
TABLET, FILM COATED ORAL
Qty: 6 TABLET | Refills: 0 | Status: SHIPPED | OUTPATIENT
Start: 2025-02-26 | End: 2025-03-02

## 2025-02-26 RX ORDER — FLUTICASONE FUROATE, UMECLIDINIUM BROMIDE AND VILANTEROL TRIFENATATE 200; 62.5; 25 UG/1; UG/1; UG/1
1 POWDER RESPIRATORY (INHALATION) DAILY
Qty: 60 BLISTER | Refills: 11 | Status: SHIPPED | COMMUNITY
Start: 2025-02-26 | End: 2026-02-21

## 2025-02-26 RX ORDER — ALBUTEROL SULFATE 90 UG/1
2 INHALANT RESPIRATORY (INHALATION) EVERY 4 HOURS PRN
Qty: 6.7 G | Refills: 7 | Status: SHIPPED | OUTPATIENT
Start: 2025-02-26

## 2025-02-26 NOTE — TELEPHONE ENCOUNTER
LMOM to see if she was interested in proceeding w SCS trial- did not hear back after psych eval vm.

## 2025-02-26 NOTE — TELEPHONE ENCOUNTER
----- Message from Nj Maddox DO sent at 1/17/2025 10:52 AM EST -----  Hello,    Looking start the process for getting Janki set up for spinal cord stimulator trial with [employer].  Thank you

## 2025-02-26 NOTE — PATIENT INSTRUCTIONS
I gave you 2 samples of Trelegy 200 mcg use 1 puff daily but wait till you are finished the prednisone    While on prednisone use Spiriva 1.25 mcg Respimat 2 puffs once a day.  Use your Proventil inhaler or nebulizer every 4 hours as needed.  If you are still struggling then stop the Spiriva Respimat and just started Trelegy 200 micro 1 puff daily    We gave you form for Trelegy 200 mcg assistance program    I also prescribed an antibiotic called Ezra for 5 days    If you need new medication for your nebulizer contact me.  I will order ipratropium-albuterol for you

## 2025-02-27 NOTE — PROGRESS NOTES
Assessment & Plan        Problem List Items Addressed This Visit          Respiratory    Centrilobular emphysema (HCC) (Chronic)    I did give her sample of Trelegy 200 mcg she ran out of it and I gave her prescription for refill on her Trelegy 200 mcg 1 puff daily.         Relevant Medications    fluticasone-umeclidinium-vilanterol (Trelegy Ellipta) 200-62.5-25 mcg/actuation AEPB inhaler    predniSONE 10 mg tablet    albuterol (Proventil HFA) 90 mcg/act inhaler    COPD exacerbation (HCC) - Primary    I prescribed her prednisone 40 mg daily for 4 days with 10 mg taper every fifth day.  I also prescribed a Z-Felix.         Relevant Medications    fluticasone-umeclidinium-vilanterol (Trelegy Ellipta) 200-62.5-25 mcg/actuation AEPB inhaler    predniSONE 10 mg tablet    azithromycin (ZITHROMAX) 250 mg tablet    albuterol (Proventil HFA) 90 mcg/act inhaler    Nocturnal hypoxemia due to emphysema  (HCC)    Montserrat does use oxygen at 2-1/2 to 3 L/min at bedtime for sleep-related hypoxemia related to her lung disease.      Pulse oximetry testing.  Room air O2 saturation at rest was 92% and on room air after ambulating 200 feet lowest O2 saturation was 89%             Relevant Medications    fluticasone-umeclidinium-vilanterol (Trelegy Ellipta) 200-62.5-25 mcg/actuation AEPB inhaler    predniSONE 10 mg tablet    albuterol (Proventil HFA) 90 mcg/act inhaler       Behavioral Health    Cigarette nicotine dependence without complication    She is still smoking about a half a pack of cigarettes per day.  I did talk about smoking cessation.  I did review CT scan of chest that was done as a PE study 12/18/2024 when she presented for evaluation of shortness of breath at [facility].  No suspicious lung lesions seen on CT scan of her chest.                  Cc: shortness of breath      HPI    Reyna states over past couple weeks she is but not breathing as well.  She has some cough with yellow mucus.  She is wheezing more  "short of breath.  She ran out of Trelegy couple weeks ago and states she cannot afford this with her new prescription coverage.  Trelegy 200 mcg was working well for her.  She still smoking about a pack of cigarettes per week.  Not having any fever or chills.  She does use oxygen 2.5 to 3 L/min at bedtime.  Not have any fever or chills.    Last spirometry done showed FEV1 of 1.05 L or 51% with obstructive ratio of 64%.  This was in November 2024    A CTA PE study of chest done on 12/18/2024 showed no evidence of any PE.  No suspicious lung lesions.  This was done when she presented to [facility] ER for shortness of breath and subsequent was admitted for 2 days for acute exacerbation of COPD.  Nasal swab for flu, COVID and RSV was negative.      Past Medical History:   Diagnosis Date    Anxiety     Chronic pain 03/06/2023    lower abdomen and back    Colon polyp     COPD (chronic obstructive pulmonary disease) (HCC)     \"passes out\" with O2 levels dropping    Disease of thyroid gland     GERD (gastroesophageal reflux disease)     History of transfusion     with aneurysm repair-autologous-self and daughter    Hyperlipidemia     Hypertension     Teeth missing     Wears glasses     For reading       Past Surgical History:   Procedure Laterality Date    BACK SURGERY      x2 herniated, crushed disc in the lumbar, ablation    CHOLECYSTECTOMY      COLONOSCOPY      EPIDURAL BLOCK INJECTION N/A 2/5/2025    Procedure: L1-L2 LUMBAR EPIDURAL STEROID INJECTION;  Surgeon: Nj Maddox DO;  Location: [facility] MAIN OR;  Service: Pain Management     FRACTURE SURGERY Left     hip-pinned    HYSTERECTOMY      salpingo-oophorectomy    DC INJECT SI JOINT ARTHRGRPHY&/ANES/STEROID W/PATRICIA Bilateral 12/18/2024    Procedure: BILATERAL SACROILIAC JOINT INJECTION;  Surgeon: Nj Maddox DO;  Location: [facility] MAIN OR;  Service: Pain Management     THORACIC AORTIC ANEURYSM REPAIR  09/2016    ascending thoracic aortic repair " with RCA bypass with SVG x 1    TONSILLECTOMY      UPPER GASTROINTESTINAL ENDOSCOPY           Current Outpatient Medications:     albuterol (2.5 mg/3 mL) 0.083 % nebulizer solution, Take 3 mL (2.5 mg total) by nebulization 4 (four) times a day as needed for wheezing or shortness of breath, Disp: 360 mL, Rfl: 7    albuterol (Proventil HFA) 90 mcg/act inhaler, Inhale 2 puffs every 4 (four) hours as needed for wheezing, Disp: 6.7 g, Rfl: 7    ALPRAZolam (XANAX) 0.5 mg tablet, Take 0.5 mg by mouth 2 (two) times a day, Disp: , Rfl:     amLODIPine (NORVASC) 5 mg tablet, Take 1 tablet (5 mg total) by mouth daily, Disp: 90 tablet, Rfl: 0    aspirin 81 mg chewable tablet, Chew 81 mg daily, Disp: , Rfl:     atorvastatin (LIPITOR) 40 mg tablet, Take 1 tablet (40 mg total) by mouth every morning, Disp: 90 tablet, Rfl: 3    azithromycin (ZITHROMAX) 250 mg tablet, Take 2 tablets today then 1 tablet daily x 4 days, Disp: 6 tablet, Rfl: 0    b complex vitamins capsule, Take 1 capsule by mouth once a week, Disp: , Rfl:     benzonatate (TESSALON PERLES) 100 mg capsule, Take 1 capsule (100 mg total) by mouth 3 (three) times a day as needed for cough, Disp: 30 capsule, Rfl: 3    buprenorphine-naloxone (Suboxone) 4-1 MG, every morning Wafer, Disp: , Rfl:     celecoxib (CeleBREX) 200 mg capsule, Take 1 capsule (200 mg total) by mouth daily, Disp: 90 capsule, Rfl: 1    cyclobenzaprine (FLEXERIL) 10 mg tablet, Take 1 tablet (10 mg total) by mouth 3 (three) times a day as needed for muscle spasms, Disp: 90 tablet, Rfl: 1    DULoxetine (CYMBALTA) 60 mg delayed release capsule, Take 1 capsule (60 mg total) by mouth daily, Disp: 90 capsule, Rfl: 1    esomeprazole (NexIUM) 40 MG capsule, Take 1 capsule (40 mg total) by mouth 2 (two) times a day before meals, Disp: 180 capsule, Rfl: 1    fluticasone-umeclidinium-vilanterol (Trelegy Ellipta) 200-62.5-25 mcg/actuation AEPB inhaler, Inhale 1 puff daily Rinse mouth after use., Disp: 60 blister, Rfl:  11    levothyroxine 100 mcg tablet, Take 1 tablet (100 mcg total) by mouth every morning, Disp: 90 tablet, Rfl: 0    losartan (COZAAR) 25 mg tablet, Take 1 tablet (25 mg total) by mouth every morning, Disp: 90 tablet, Rfl: 0    predniSONE 10 mg tablet, Take 4 tablets x4 days, 3 tablets x4 days, 2 tablets x4 days, 1 tablet x4 days, Disp: 50 tablet, Rfl: 0    sucralfate (CARAFATE) 1 g tablet, Take 1 tablet (1 g total) by mouth 4 (four) times a day, Disp: 120 tablet, Rfl: 1    traZODone (DESYREL) 50 mg tablet, Take 100 mg by mouth daily at bedtime, Disp: , Rfl:     venlafaxine (EFFEXOR-XR) 150 mg 24 hr capsule, Take 1 capsule by mouth in the morning, Disp: , Rfl:     ALPRAZolam (XANAX) 0.5 mg tablet, Take 1 tablet (0.5 mg total) by mouth 1 (one) time for 1 dose, Disp: 1 tablet, Rfl: 0    metoprolol succinate (TOPROL-XL) 50 mg 24 hr tablet, Take 1 tablet (50 mg total) by mouth every morning, Disp: 30 tablet, Rfl: 0    naloxone (NARCAN) 4 mg/0.1 mL nasal spray, Administer 1 spray into a nostril. If no response after 2-3 minutes, give another dose in the other nostril using a new spray. (Patient not taking: Reported on 2/21/2025), Disp: 1 each, Rfl: 1    oxyCODONE (Roxicodone) 5 immediate release tablet, Take 1 tablet (5 mg total) by mouth every 6 (six) hours as needed for moderate pain Max Daily Amount: 20 mg (Patient not taking: Reported on 2/21/2025), Disp: 20 tablet, Rfl: 0    oxygen gas, Inhale continuous as needed 2.5 L (Patient not taking: Reported on 2/26/2025), Disp: , Rfl:     No Known Allergies    Social History     Tobacco Use    Smoking status: Every Day     Current packs/day: 0.25     Average packs/day: 0.3 packs/day for 57.2 years (14.3 ttl pk-yrs)     Types: Cigarettes     Start date: 1968     Passive exposure: Past    Smokeless tobacco: Never    Tobacco comments:     Currently smoking 5-6 cigarettes daily   Substance Use Topics    Alcohol use: Yes     Comment: occasionally         Family History   Problem  "Relation Age of Onset    Breast cancer Mother 60       Review of Systems   Constitutional:  Positive for fatigue. Negative for chills, fever and unexpected weight change.   HENT:  Negative for congestion, rhinorrhea and sore throat.    Eyes:  Negative for discharge and redness.   Respiratory:  Positive for cough, shortness of breath and wheezing.    Cardiovascular:  Negative for chest pain, palpitations and leg swelling.   Gastrointestinal:  Negative for abdominal distention, abdominal pain and nausea.   Endocrine: Negative for polydipsia and polyphagia.   Genitourinary:  Negative for dysuria.   Musculoskeletal:  Negative for joint swelling and myalgias.   Skin:  Negative for rash.   Neurological:  Negative for light-headedness.   Psychiatric/Behavioral:  Negative for decreased concentration.            Vitals:    02/26/25 1605   BP: 122/82   Pulse: 78   Resp: 12   Temp: (!) 97.3 °F (36.3 °C)   SpO2: 92%     Height: 5' 3\" (160 cm)  IBW (Ideal Body Weight): 52.4 kg  Body mass index is 30.19 kg/m².  Weight (last 2 days)       None          Pulse oximetry testing.  Room air O2 saturation at rest was 92% and on room air after ambulating 200 feet lowest O2 saturation was 89%      Physical Exam  Vitals reviewed.   Constitutional:       General: She is not in acute distress.     Appearance: Normal appearance. She is well-developed. She is obese.   HENT:      Head: Normocephalic.      Right Ear: External ear normal.      Left Ear: External ear normal.      Nose: Nose normal.      Mouth/Throat:      Mouth: Mucous membranes are moist.      Pharynx: Oropharynx is clear. No oropharyngeal exudate.   Eyes:      Conjunctiva/sclera: Conjunctivae normal.      Pupils: Pupils are equal, round, and reactive to light.   Cardiovascular:      Rate and Rhythm: Normal rate and regular rhythm.      Heart sounds: Normal heart sounds.   Pulmonary:      Effort: Pulmonary effort is normal.      Comments: There is some coarse expiratory wheezes " in both mid to lower lung zones bilaterally.  Also few rhonchi  Abdominal:      General: There is no distension.      Palpations: Abdomen is soft.      Tenderness: There is no abdominal tenderness.   Musculoskeletal:      Cervical back: Neck supple.      Comments: No edema, cyanosis or clubbing   Lymphadenopathy:      Cervical: No cervical adenopathy.   Skin:     General: Skin is warm and dry.   Neurological:      General: No focal deficit present.      Mental Status: She is alert and oriented to person, place, and time.   Psychiatric:         Mood and Affect: Mood normal.         Behavior: Behavior normal.         Thought Content: Thought content normal.

## 2025-03-01 PROBLEM — G47.36: Status: ACTIVE | Noted: 2025-03-01

## 2025-03-01 PROBLEM — J43.9: Status: ACTIVE | Noted: 2025-03-01

## 2025-03-01 NOTE — ASSESSMENT & PLAN NOTE
She is still smoking about a half a pack of cigarettes per day.  I did talk about smoking cessation.  I did review CT scan of chest that was done as a PE study 12/18/2024 when she presented for evaluation of shortness of breath at [facility].  No suspicious lung lesions seen on CT scan of her chest.

## 2025-03-01 NOTE — ASSESSMENT & PLAN NOTE
I did give her sample of Trelegy 200 mcg she ran out of it and I gave her prescription for refill on her Trelegy 200 mcg 1 puff daily.

## 2025-03-01 NOTE — ASSESSMENT & PLAN NOTE
Montserrat does use oxygen at 2-1/2 to 3 L/min at bedtime for sleep-related hypoxemia related to her lung disease.      Pulse oximetry testing.  Room air O2 saturation at rest was 92% and on room air after ambulating 200 feet lowest O2 saturation was 89%

## 2025-03-01 NOTE — ASSESSMENT & PLAN NOTE
I prescribed her prednisone 40 mg daily for 4 days with 10 mg taper every fifth day.  I also prescribed a Z-Felix.

## 2025-03-04 ENCOUNTER — HOSPITAL ENCOUNTER (OUTPATIENT)
Dept: RADIOLOGY | Facility: HOSPITAL | Age: 75
Discharge: HOME | End: 2025-03-04
Payer: COMMERCIAL

## 2025-03-04 ENCOUNTER — PATIENT OUTREACH (OUTPATIENT)
Dept: CASE MANAGEMENT | Facility: OTHER | Age: 75
End: 2025-03-04

## 2025-03-04 DIAGNOSIS — D49.0 IPMN (INTRADUCTAL PAPILLARY MUCINOUS NEOPLASM): ICD-10-CM

## 2025-03-04 PROCEDURE — 76376 3D RENDER W/INTRP POSTPROCES: CPT

## 2025-03-04 PROCEDURE — A9585 GADOBUTROL INJECTION: HCPCS | Performed by: SURGERY

## 2025-03-04 PROCEDURE — 74183 MRI ABD W/O CNTR FLWD CNTR: CPT

## 2025-03-04 RX ORDER — GADOBUTROL 604.72 MG/ML
8 INJECTION INTRAVENOUS
Status: COMPLETED | OUTPATIENT
Start: 2025-03-04 | End: 2025-03-04

## 2025-03-04 RX ADMIN — GADOBUTROL 8 ML: 604.72 INJECTION INTRAVENOUS at 15:59

## 2025-03-04 NOTE — PROGRESS NOTES
OP RT CM called and left a VM requesting a return phone call. I will outreach in 1 week unless I hear back from patient prior. Outgoing in basket message received.   --------------------------------------------------------------------------

## 2025-03-07 ENCOUNTER — OFFICE VISIT (OUTPATIENT)
Dept: PULMONOLOGY | Facility: MEDICAL CENTER | Age: 75
End: 2025-03-07
Payer: COMMERCIAL

## 2025-03-07 VITALS
HEART RATE: 85 BPM | WEIGHT: 167.2 LBS | RESPIRATION RATE: 12 BRPM | HEIGHT: 63 IN | SYSTOLIC BLOOD PRESSURE: 122 MMHG | OXYGEN SATURATION: 93 % | BODY MASS INDEX: 29.62 KG/M2 | DIASTOLIC BLOOD PRESSURE: 62 MMHG

## 2025-03-07 DIAGNOSIS — J40 TRACHEOBRONCHITIS: ICD-10-CM

## 2025-03-07 DIAGNOSIS — Z72.0 TOBACCO USE: ICD-10-CM

## 2025-03-07 DIAGNOSIS — R06.02 SHORTNESS OF BREATH: ICD-10-CM

## 2025-03-07 DIAGNOSIS — R05.2 SUBACUTE COUGH: ICD-10-CM

## 2025-03-07 DIAGNOSIS — J43.2 CENTRILOBULAR EMPHYSEMA (HCC): Primary | ICD-10-CM

## 2025-03-07 PROCEDURE — 99214 OFFICE O/P EST MOD 30 MIN: CPT | Performed by: INTERNAL MEDICINE

## 2025-03-07 RX ORDER — PREDNISONE 10 MG/1
TABLET ORAL
Qty: 100 TABLET | Refills: 1 | Status: SHIPPED | OUTPATIENT
Start: 2025-03-07

## 2025-03-07 RX ORDER — ACETYLCYSTEINE 200 MG/ML
SOLUTION ORAL; RESPIRATORY (INHALATION)
Qty: 120 ML | Refills: 3 | Status: SHIPPED | OUTPATIENT
Start: 2025-03-07

## 2025-03-07 RX ORDER — VENLAFAXINE HYDROCHLORIDE 75 MG/1
1 CAPSULE, EXTENDED RELEASE ORAL DAILY
COMMUNITY
Start: 2025-02-27

## 2025-03-07 RX ORDER — CEFUROXIME AXETIL 500 MG/1
500 TABLET ORAL EVERY 12 HOURS SCHEDULED
Qty: 14 TABLET | Refills: 0 | Status: SHIPPED | OUTPATIENT
Start: 2025-03-07 | End: 2025-03-14

## 2025-03-07 NOTE — PATIENT INSTRUCTIONS
Take prednisone 10 mg 4 tablets for 7 days then 3 tablets for 7 days then 2 tablets for 7 days then stay 1 tablet daily twice see you again    I prescribed you acetylcysteine 20% solution to help you expectorate mucus.  Used 2 mL and put it in nebulizer with 1 vial of albuterol and use twice a day for any difficulty expectorating or coughing up mucus    I did prescribe 7-day antibiotic Ceftin 500 mg 1 capsule twice a day    try to decrease or quit smoking

## 2025-03-07 NOTE — PROGRESS NOTES
Assessment & Plan        Problem List Items Addressed This Visit          Respiratory    Centrilobular emphysema (HCC) - Primary (Chronic)    Continue Trelegy 200 mcg 1 puff daily         Relevant Medications    acetylcysteine (MUCOMYST) 200 mg/mL nebulizer solution    predniSONE 10 mg tablet    Tracheobronchitis    She is having productive cough for yellow mucus.  I did prescribe her Ceftin 500 mg twice daily for 7 days         Relevant Medications    acetylcysteine (MUCOMYST) 200 mg/mL nebulizer solution    cefuroxime (CEFTIN) 500 mg tablet       Behavioral Health    Tobacco use    She is still smoking 1 pack of cigarettes per day.  I did talk to her about smoking cessation            Other    Shortness of breath    Having some shortness of breath due to COPD exacerbation.  I did prescribe her prednisone milligram daily for 7 days then 30 mg for 7 days then 20 mg for 7 days then she will stay on 10 mg daily until I see her.    She had wanted to stay on 40 mg but I told her this was too high a dose to be on for a long time due to side effects from the prednisone         Subacute cough    Complains of difficulty expectorating mucus.  States it is thick.  I did order acetylcysteine percent solution that she can add 2 mLto her  nebulizer treatment with albuterol and use twice a day as needed              Cc: shortness of breath      HPI    Montserrat is on tapering dose of prednisone is down to 20 mg daily but still having some shortness of breath.  She thought she may need higher dose of prednisone again.  She also has a cough and sometimes has difficulty expectorating thick mucus in back of her throat.  She does have shortness of breath with exertion.  States she has cut down her smoking to 1 pack of cigarettes per week.    Last spirometry done showed FEV1 1.05 L or 51% predicted.      Past Medical History:   Diagnosis Date    Anxiety     Chronic pain 03/06/2023    lower abdomen and back    Colon polyp     COPD  "(chronic obstructive pulmonary disease) (HCC)     \"passes out\" with O2 levels dropping    Disease of thyroid gland     GERD (gastroesophageal reflux disease)     History of transfusion     with aneurysm repair-autologous-self and daughter    Hyperlipidemia     Hypertension     Teeth missing     Wears glasses     For reading       Past Surgical History:   Procedure Laterality Date    BACK SURGERY      x2 herniated, crushed disc in the lumbar, ablation    CHOLECYSTECTOMY      COLONOSCOPY      EPIDURAL BLOCK INJECTION N/A 2/5/2025    Procedure: L1-L2 LUMBAR EPIDURAL STEROID INJECTION;  Surgeon: Nj Maddox DO;  Location: [facility] MAIN OR;  Service: Pain Management     FRACTURE SURGERY Left     hip-pinned    HYSTERECTOMY      salpingo-oophorectomy    ND INJECT SI JOINT ARTHRGRPHY&/ANES/STEROID W/PATRICIA Bilateral 12/18/2024    Procedure: BILATERAL SACROILIAC JOINT INJECTION;  Surgeon: Nj Maddox DO;  Location: [facility] MAIN OR;  Service: Pain Management     THORACIC AORTIC ANEURYSM REPAIR  09/2016    ascending thoracic aortic repair with RCA bypass with SVG x 1    TONSILLECTOMY      UPPER GASTROINTESTINAL ENDOSCOPY           Current Outpatient Medications:     acetylcysteine (MUCOMYST) 200 mg/mL nebulizer solution, Use 2 ml and add to 1 vial of your albuterol twice a day as needed for thick mucous, Disp: 120 mL, Rfl: 3    albuterol (2.5 mg/3 mL) 0.083 % nebulizer solution, Take 3 mL (2.5 mg total) by nebulization 4 (four) times a day as needed for wheezing or shortness of breath, Disp: 360 mL, Rfl: 7    albuterol (Proventil HFA) 90 mcg/act inhaler, Inhale 2 puffs every 4 (four) hours as needed for wheezing, Disp: 6.7 g, Rfl: 7    ALPRAZolam (XANAX) 0.5 mg tablet, Take 0.5 mg by mouth 2 (two) times a day, Disp: , Rfl:     amLODIPine (NORVASC) 5 mg tablet, Take 1 tablet (5 mg total) by mouth daily, Disp: 90 tablet, Rfl: 0    aspirin 81 mg chewable tablet, Chew 81 mg daily, Disp: , Rfl:     atorvastatin (LIPITOR) " 40 mg tablet, Take 1 tablet (40 mg total) by mouth every morning, Disp: 90 tablet, Rfl: 3    b complex vitamins capsule, Take 1 capsule by mouth once a week, Disp: , Rfl:     benzonatate (TESSALON PERLES) 100 mg capsule, Take 1 capsule (100 mg total) by mouth 3 (three) times a day as needed for cough, Disp: 30 capsule, Rfl: 3    buprenorphine-naloxone (Suboxone) 4-1 MG, every morning Wafer, Disp: , Rfl:     cefuroxime (CEFTIN) 500 mg tablet, Take 1 tablet (500 mg total) by mouth every 12 (twelve) hours for 7 days, Disp: 14 tablet, Rfl: 0    celecoxib (CeleBREX) 200 mg capsule, Take 1 capsule (200 mg total) by mouth daily, Disp: 90 capsule, Rfl: 1    cyclobenzaprine (FLEXERIL) 10 mg tablet, Take 1 tablet (10 mg total) by mouth 3 (three) times a day as needed for muscle spasms, Disp: 90 tablet, Rfl: 1    DULoxetine (CYMBALTA) 60 mg delayed release capsule, Take 1 capsule (60 mg total) by mouth daily, Disp: 90 capsule, Rfl: 1    esomeprazole (NexIUM) 40 MG capsule, Take 1 capsule (40 mg total) by mouth 2 (two) times a day before meals, Disp: 180 capsule, Rfl: 1    fluticasone-umeclidinium-vilanterol (Trelegy Ellipta) 200-62.5-25 mcg/actuation AEPB inhaler, Inhale 1 puff daily Rinse mouth after use., Disp: 60 blister, Rfl: 11    levothyroxine 100 mcg tablet, Take 1 tablet (100 mcg total) by mouth every morning, Disp: 90 tablet, Rfl: 0    losartan (COZAAR) 25 mg tablet, Take 1 tablet (25 mg total) by mouth every morning, Disp: 90 tablet, Rfl: 0    metoprolol succinate (TOPROL-XL) 50 mg 24 hr tablet, Take 1 tablet (50 mg total) by mouth every morning, Disp: 30 tablet, Rfl: 0    predniSONE 10 mg tablet, Take 4 tablets x4 days, 3 tablets x4 days, 2 tablets x4 days, 1 tablet x4 days, Disp: 50 tablet, Rfl: 0    predniSONE 10 mg tablet, Take 4 tabs x 7 days then 3 tabs x 7 days then 2 tabs x 7 days then stay on 1 tab daily, Disp: 100 tablet, Rfl: 1    sucralfate (CARAFATE) 1 g tablet, Take 1 tablet (1 g total) by mouth 4  (four) times a day, Disp: 120 tablet, Rfl: 1    traZODone (DESYREL) 50 mg tablet, Take 100 mg by mouth daily at bedtime, Disp: , Rfl:     venlafaxine (EFFEXOR-XR) 150 mg 24 hr capsule, Take 1 capsule by mouth in the morning, Disp: , Rfl:     venlafaxine (EFFEXOR-XR) 75 mg 24 hr capsule, Take 1 capsule by mouth in the morning, Disp: , Rfl:     ALPRAZolam (XANAX) 0.5 mg tablet, Take 1 tablet (0.5 mg total) by mouth 1 (one) time for 1 dose, Disp: 1 tablet, Rfl: 0    naloxone (NARCAN) 4 mg/0.1 mL nasal spray, Administer 1 spray into a nostril. If no response after 2-3 minutes, give another dose in the other nostril using a new spray. (Patient not taking: Reported on 2/21/2025), Disp: 1 each, Rfl: 1    oxyCODONE (Roxicodone) 5 immediate release tablet, Take 1 tablet (5 mg total) by mouth every 6 (six) hours as needed for moderate pain Max Daily Amount: 20 mg (Patient not taking: Reported on 2/21/2025), Disp: 20 tablet, Rfl: 0    oxygen gas, Inhale continuous as needed 2.5 L (Patient not taking: Reported on 2/26/2025), Disp: , Rfl:     No Known Allergies    Social History     Tobacco Use    Smoking status: Every Day     Current packs/day: 0.25     Average packs/day: 0.3 packs/day for 57.2 years (14.3 ttl pk-yrs)     Types: Cigarettes     Start date: 1968     Passive exposure: Past    Smokeless tobacco: Never    Tobacco comments:     Currently smoking 5-6 cigarettes daily   Substance Use Topics    Alcohol use: Yes     Comment: occasionally         Family History   Problem Relation Age of Onset    Breast cancer Mother 60       Review of Systems   Constitutional:  Negative for chills, fever and unexpected weight change.   HENT:  Negative for congestion, rhinorrhea and sore throat.    Eyes:  Negative for discharge and redness.   Respiratory:  Positive for cough and shortness of breath.    Cardiovascular:  Negative for chest pain, palpitations and leg swelling.   Gastrointestinal:  Negative for abdominal distention, abdominal  "pain and nausea.   Endocrine: Negative for polydipsia and polyphagia.   Genitourinary:  Negative for dysuria.   Musculoskeletal:  Negative for joint swelling and myalgias.   Skin:  Negative for rash.   Neurological:  Negative for light-headedness.   Psychiatric/Behavioral:  Negative for confusion.            Vitals:    03/07/25 1102   BP: 122/62   Pulse: 85   Resp: 12   SpO2: 93%     Height: 5' 3\" (160 cm)  IBW (Ideal Body Weight): 52.4 kg  Body mass index is 29.62 kg/m².  Weight (last 2 days)       Date/Time Weight    03/07/25 1102 75.8 (167.2)                Physical Exam  Vitals reviewed.   Constitutional:       General: She is not in acute distress.     Appearance: Normal appearance. She is well-developed. She is obese.   HENT:      Head: Normocephalic.      Right Ear: External ear normal.      Left Ear: External ear normal.      Nose: Nose normal.      Mouth/Throat:      Mouth: Mucous membranes are moist.      Pharynx: Oropharynx is clear. No oropharyngeal exudate.   Eyes:      Conjunctiva/sclera: Conjunctivae normal.      Pupils: Pupils are equal, round, and reactive to light.   Cardiovascular:      Rate and Rhythm: Normal rate and regular rhythm.      Heart sounds: Normal heart sounds.   Pulmonary:      Effort: Pulmonary effort is normal.      Comments: Lung sounds reveal expiratory wheezes in mid to lower lungs bilaterally.  No rhonchi or crackles  Abdominal:      General: There is no distension.      Palpations: Abdomen is soft.      Tenderness: There is no abdominal tenderness.   Musculoskeletal:      Cervical back: Neck supple.      Comments: No edema, cyanosis or clubbing   Lymphadenopathy:      Cervical: No cervical adenopathy.   Skin:     General: Skin is warm and dry.   Neurological:      General: No focal deficit present.      Mental Status: She is alert and oriented to person, place, and time.   Psychiatric:         Mood and Affect: Mood normal.         Behavior: Behavior normal.         Thought " Content: Thought content normal.

## 2025-03-08 PROBLEM — J40 TRACHEOBRONCHITIS: Status: ACTIVE | Noted: 2025-03-08

## 2025-03-08 PROBLEM — R05.2 SUBACUTE COUGH: Status: ACTIVE | Noted: 2025-03-08

## 2025-03-09 NOTE — ASSESSMENT & PLAN NOTE
Complains of difficulty expectorating mucus.  States it is thick.  I did order acetylcysteine percent solution that she can add 2 mLto her  nebulizer treatment with albuterol and use twice a day as needed

## 2025-03-09 NOTE — ASSESSMENT & PLAN NOTE
She is having productive cough for yellow mucus.  I did prescribe her Ceftin 500 mg twice daily for 7 days

## 2025-03-09 NOTE — ASSESSMENT & PLAN NOTE
Having some shortness of breath due to COPD exacerbation.  I did prescribe her prednisone milligram daily for 7 days then 30 mg for 7 days then 20 mg for 7 days then she will stay on 10 mg daily until I see her.    She had wanted to stay on 40 mg but I told her this was too high a dose to be on for a long time due to side effects from the prednisone

## 2025-03-11 ENCOUNTER — TELEPHONE (OUTPATIENT)
Dept: PAIN MEDICINE | Facility: CLINIC | Age: 75
End: 2025-03-11

## 2025-03-11 ENCOUNTER — OFFICE VISIT (OUTPATIENT)
Dept: PAIN MEDICINE | Facility: CLINIC | Age: 75
End: 2025-03-11
Payer: COMMERCIAL

## 2025-03-11 ENCOUNTER — TELEPHONE (OUTPATIENT)
Dept: SURGICAL ONCOLOGY | Facility: CLINIC | Age: 75
End: 2025-03-11

## 2025-03-11 ENCOUNTER — PATIENT OUTREACH (OUTPATIENT)
Dept: CASE MANAGEMENT | Facility: OTHER | Age: 75
End: 2025-03-11

## 2025-03-11 VITALS — BODY MASS INDEX: 30.3 KG/M2 | HEIGHT: 63 IN | WEIGHT: 171 LBS

## 2025-03-11 DIAGNOSIS — M17.0 PRIMARY OSTEOARTHRITIS OF BOTH KNEES: Primary | ICD-10-CM

## 2025-03-11 DIAGNOSIS — Z98.1 STATUS POST LUMBAR SPINAL FUSION: ICD-10-CM

## 2025-03-11 DIAGNOSIS — M47.816 SPONDYLOSIS WITHOUT MYELOPATHY OR RADICULOPATHY, LUMBAR REGION: ICD-10-CM

## 2025-03-11 DIAGNOSIS — G89.4 CHRONIC PAIN SYNDROME: ICD-10-CM

## 2025-03-11 PROCEDURE — 20611 DRAIN/INJ JOINT/BURSA W/US: CPT | Performed by: STUDENT IN AN ORGANIZED HEALTH CARE EDUCATION/TRAINING PROGRAM

## 2025-03-11 PROCEDURE — 99214 OFFICE O/P EST MOD 30 MIN: CPT | Performed by: STUDENT IN AN ORGANIZED HEALTH CARE EDUCATION/TRAINING PROGRAM

## 2025-03-11 RX ORDER — BUPIVACAINE HYDROCHLORIDE 5 MG/ML
6 INJECTION, SOLUTION PERINEURAL ONCE
Status: COMPLETED | OUTPATIENT
Start: 2025-03-11 | End: 2025-03-11

## 2025-03-11 RX ORDER — METHYLPREDNISOLONE ACETATE 40 MG/ML
40 INJECTION, SUSPENSION INTRA-ARTICULAR; INTRALESIONAL; INTRAMUSCULAR; SOFT TISSUE ONCE
Status: COMPLETED | OUTPATIENT
Start: 2025-03-11 | End: 2025-03-11

## 2025-03-11 RX ADMIN — METHYLPREDNISOLONE ACETATE 40 MG: 40 INJECTION, SUSPENSION INTRA-ARTICULAR; INTRALESIONAL; INTRAMUSCULAR; SOFT TISSUE at 13:16

## 2025-03-11 RX ADMIN — METHYLPREDNISOLONE ACETATE 40 MG: 40 INJECTION, SUSPENSION INTRA-ARTICULAR; INTRALESIONAL; INTRAMUSCULAR; SOFT TISSUE at 13:18

## 2025-03-11 RX ADMIN — BUPIVACAINE HYDROCHLORIDE 6 ML: 5 INJECTION, SOLUTION PERINEURAL at 13:15

## 2025-03-11 NOTE — PROGRESS NOTES
Pain Medicine Follow-Up Note    Assessment:  1. Primary osteoarthritis of both knees    2. Chronic pain syndrome    3. Status post lumbar spinal fusion    4. Spondylosis without myelopathy or radiculopathy, lumbar region        Plan:  Orders Placed This Encounter   Procedures    Large joint arthrocentesis     This order was created via procedure documentation       New Medications Ordered This Visit   Medications    bupivacaine (MARCAINE) 0.5 % injection 6 mL    methylPREDNISolone acetate (DEPO-MEDROL) injection 40 mg    methylPREDNISolone acetate (DEPO-MEDROL) injection 40 mg       My impressions and treatment recommendations were discussed in detail with the patient who verbalized understanding and had no further questions.      Montserrat presents today for follow-up regarding low back and bilateral knee pain.  She reports her knees have been particularly bothersome.  I did provide her with bilateral intra-articular knee injections 3 months ago which were helpful for greater than 50% pain reduction and functional improvements including standing and walking tolerance.  She does report that her knees are again bothering her.  Given her improvement with the last injections, I did offer her bilateral ultrasound-guided knee injections today in the office.  Please see procedure note below.    In regards to the patient's low back pain.  Unfortunately she did not get significant improvement from L1-2 epidural steroid injection.  She did have a greater trochanteric bursa injection on the right side by her primary care provider which did provide her with significant improvement to her leg pain.  Her pain is more axial.  She likely has a component of facet mediated pain.  She did have L5-S1 therapeutic ablation in February 2023 which did help for a year however she did have epidural steroid injections for radicular pain in between.  I did discuss that if she would like to undergo therapeutic radiofrequency ablation we should  "trial diagnostic lumbar medial branch blocks.  I do think she would benefit from lumbar medial branch blocks above and below the level of her fusion at bilateral L3-4 and L5-S1.    Complete risks and benefits including bleeding, infection, tissue reaction, nerve injury and allergic reaction were discussed. The approach was demonstrated using models and literature was provided. Verbal and written consent was obtained.    Discharge instructions were provided. I personally saw and examined the patient and I agree with the above discussed plan of care.    I have spent a total time of 35 minutes in caring for this patient on the day of the visit/encounter including Prognosis, Risks and benefits of tx options, Instructions for management, Patient and family education, Impressions, Documenting in the medical record, Reviewing/placing orders in the medical record (including tests, medications, and/or procedures), and Obtaining or reviewing history  .     Large joint arthrocentesis: bilateral knee  Universal Protocol:  procedure performed by consultantConsent: Verbal consent obtained. Written consent obtained.  Risks and benefits: risks, benefits and alternatives were discussed  Consent given by: patient  Time out: Immediately prior to procedure a \"time out\" was called to verify the correct patient, procedure, equipment, support staff and site/side marked as required.  Patient understanding: patient states understanding of the procedure being performed  Patient consent: the patient's understanding of the procedure matches consent given  Procedure consent: procedure consent matches procedure scheduled  Relevant documents: relevant documents present and verified  Site marked: the operative site was marked  Patient identity confirmed: verbally with patient  Supporting Documentation  Indications: pain   Procedure Details  Location: knee - bilateral knee  Needle size: 25 G  Ultrasound guidance: yes  Approach: " anterolateral    Patient tolerance: patient tolerated the procedure well with no immediate complications  Dressing:  Sterile dressing applied            History of Present Illness:    Montserrat Sumner is a 74 y.o. female who presents to [facility] for interval re-evaluation of the above stated pain complaints. The patient has a past medical and chronic pain history as outlined in the assessment section. She was last seen on 2/5/2025.    Montserrat presents for follow-up regarding her low back and bilateral knee pain.  She reports since her last visit her pain in her low back is roughly the same.  Her pain is currently 9 out of 10.  She denies any improvement from her lumbar epidural steroid injection.  Her pain is constant associated burning, dull aching, sharpness, and throbbing.  Her pain is located in the lumbosacral spine.  She is denying any radiation to her lower limbs at this time.  She did have a right sided greater trochanteric bursa injection by her primary care provider which she reports improved her radiating lower limb pain significantly.  Her pain now is more axial.  Of note she did have lumbar radiofrequency ablation in February 2023 which she does report was helpful for about a year for her axial pain however she did need intermittent epidural steroid injections for her radicular pain.    She also reports she is having significant bilateral knee pain.  She did undergo bilateral intra-articular knee injections with me in December 2024.  She does report significant improvement with these with both pain reduction and functional improvements with standing and walking tolerance.  However her knee pain has returned and she is interested in repeating bilateral knee injections today.  Her pain is 6 out of 10 in the knees at the medial joint line.  She denies any other radiation.    Other than as stated above, the patient denies any interval changes in medications, medical condition,  "mental condition, symptoms, or allergies since the last office visit.         Review of Systems:    Review of Systems   Respiratory:  Negative for shortness of breath.    Cardiovascular:  Negative for chest pain.   Gastrointestinal:  Negative for constipation, diarrhea, nausea and vomiting.   Musculoskeletal:  Positive for back pain, neck pain and neck stiffness. Negative for arthralgias, gait problem, joint swelling and myalgias.   Skin:  Negative for rash.   Neurological:  Positive for weakness. Negative for dizziness, seizures and numbness.   Psychiatric/Behavioral:  Positive for sleep disturbance.    All other systems reviewed and are negative.        Past Medical History:   Diagnosis Date    Anxiety     Chronic pain 03/06/2023    lower abdomen and back    Colon polyp     COPD (chronic obstructive pulmonary disease) (HCC)     \"passes out\" with O2 levels dropping    Disease of thyroid gland     GERD (gastroesophageal reflux disease)     History of transfusion     with aneurysm repair-autologous-self and daughter    Hyperlipidemia     Hypertension     Teeth missing     Wears glasses     For reading       Past Surgical History:   Procedure Laterality Date    BACK SURGERY      x2 herniated, crushed disc in the lumbar, ablation    CHOLECYSTECTOMY      COLONOSCOPY      EPIDURAL BLOCK INJECTION N/A 2/5/2025    Procedure: L1-L2 LUMBAR EPIDURAL STEROID INJECTION;  Surgeon: Nj Maddox DO;  Location: [facility] MAIN OR;  Service: Pain Management     FRACTURE SURGERY Left     hip-pinned    HYSTERECTOMY      salpingo-oophorectomy    CO INJECT SI JOINT ARTHRGRPHY&/ANES/STEROID W/PATRICIA Bilateral 12/18/2024    Procedure: BILATERAL SACROILIAC JOINT INJECTION;  Surgeon: Nj Maddox DO;  Location: [facility] MAIN OR;  Service: Pain Management     THORACIC AORTIC ANEURYSM REPAIR  09/2016    ascending thoracic aortic repair with RCA bypass with SVG x 1    TONSILLECTOMY      UPPER GASTROINTESTINAL ENDOSCOPY         Family " History   Problem Relation Age of Onset    Breast cancer Mother 60       Social History     Occupational History    Not on file   Tobacco Use    Smoking status: Every Day     Current packs/day: 0.25     Average packs/day: 0.3 packs/day for 57.2 years (14.3 ttl pk-yrs)     Types: Cigarettes     Start date: 1968     Passive exposure: Past    Smokeless tobacco: Never    Tobacco comments:     Currently smoking 5-6 cigarettes daily   Vaping Use    Vaping status: Never Used   Substance and Sexual Activity    Alcohol use: Yes     Comment: occasionally    Drug use: Yes     Types: Marijuana, Cocaine     Comment: yes still Marijuana as of 9/5/24-2 times weekly    Sexual activity: Not Currently     Partners: Male     Birth control/protection: Post-menopausal         Current Outpatient Medications:     acetylcysteine (MUCOMYST) 200 mg/mL nebulizer solution, Use 2 ml and add to 1 vial of your albuterol twice a day as needed for thick mucous, Disp: 120 mL, Rfl: 3    albuterol (2.5 mg/3 mL) 0.083 % nebulizer solution, Take 3 mL (2.5 mg total) by nebulization 4 (four) times a day as needed for wheezing or shortness of breath, Disp: 360 mL, Rfl: 7    albuterol (Proventil HFA) 90 mcg/act inhaler, Inhale 2 puffs every 4 (four) hours as needed for wheezing, Disp: 6.7 g, Rfl: 7    ALPRAZolam (XANAX) 0.5 mg tablet, Take 0.5 mg by mouth 2 (two) times a day, Disp: , Rfl:     amLODIPine (NORVASC) 5 mg tablet, Take 1 tablet (5 mg total) by mouth daily, Disp: 90 tablet, Rfl: 0    aspirin 81 mg chewable tablet, Chew 81 mg daily, Disp: , Rfl:     atorvastatin (LIPITOR) 40 mg tablet, Take 1 tablet (40 mg total) by mouth every morning, Disp: 90 tablet, Rfl: 3    b complex vitamins capsule, Take 1 capsule by mouth once a week, Disp: , Rfl:     benzonatate (TESSALON PERLES) 100 mg capsule, Take 1 capsule (100 mg total) by mouth 3 (three) times a day as needed for cough, Disp: 30 capsule, Rfl: 3    buprenorphine-naloxone (Suboxone) 4-1 MG, every  morning Wafer, Disp: , Rfl:     cefuroxime (CEFTIN) 500 mg tablet, Take 1 tablet (500 mg total) by mouth every 12 (twelve) hours for 7 days, Disp: 14 tablet, Rfl: 0    celecoxib (CeleBREX) 200 mg capsule, Take 1 capsule (200 mg total) by mouth daily, Disp: 90 capsule, Rfl: 1    cyclobenzaprine (FLEXERIL) 10 mg tablet, Take 1 tablet (10 mg total) by mouth 3 (three) times a day as needed for muscle spasms, Disp: 90 tablet, Rfl: 1    DULoxetine (CYMBALTA) 60 mg delayed release capsule, Take 1 capsule (60 mg total) by mouth daily, Disp: 90 capsule, Rfl: 1    esomeprazole (NexIUM) 40 MG capsule, Take 1 capsule (40 mg total) by mouth 2 (two) times a day before meals, Disp: 180 capsule, Rfl: 1    fluticasone-umeclidinium-vilanterol (Trelegy Ellipta) 200-62.5-25 mcg/actuation AEPB inhaler, Inhale 1 puff daily Rinse mouth after use., Disp: 60 blister, Rfl: 11    levothyroxine 100 mcg tablet, Take 1 tablet (100 mcg total) by mouth every morning, Disp: 90 tablet, Rfl: 0    losartan (COZAAR) 25 mg tablet, Take 1 tablet (25 mg total) by mouth every morning, Disp: 90 tablet, Rfl: 0    metoprolol succinate (TOPROL-XL) 50 mg 24 hr tablet, Take 1 tablet (50 mg total) by mouth every morning, Disp: 30 tablet, Rfl: 0    predniSONE 10 mg tablet, Take 4 tablets x4 days, 3 tablets x4 days, 2 tablets x4 days, 1 tablet x4 days, Disp: 50 tablet, Rfl: 0    predniSONE 10 mg tablet, Take 4 tabs x 7 days then 3 tabs x 7 days then 2 tabs x 7 days then stay on 1 tab daily, Disp: 100 tablet, Rfl: 1    sucralfate (CARAFATE) 1 g tablet, Take 1 tablet (1 g total) by mouth 4 (four) times a day, Disp: 120 tablet, Rfl: 1    traZODone (DESYREL) 50 mg tablet, Take 100 mg by mouth daily at bedtime, Disp: , Rfl:     venlafaxine (EFFEXOR-XR) 150 mg 24 hr capsule, Take 1 capsule by mouth in the morning, Disp: , Rfl:     venlafaxine (EFFEXOR-XR) 75 mg 24 hr capsule, Take 1 capsule by mouth in the morning, Disp: , Rfl:     ALPRAZolam (XANAX) 0.5 mg tablet, Take  "1 tablet (0.5 mg total) by mouth 1 (one) time for 1 dose, Disp: 1 tablet, Rfl: 0    naloxone (NARCAN) 4 mg/0.1 mL nasal spray, Administer 1 spray into a nostril. If no response after 2-3 minutes, give another dose in the other nostril using a new spray. (Patient not taking: Reported on 2/21/2025), Disp: 1 each, Rfl: 1    oxyCODONE (Roxicodone) 5 immediate release tablet, Take 1 tablet (5 mg total) by mouth every 6 (six) hours as needed for moderate pain Max Daily Amount: 20 mg (Patient not taking: Reported on 2/21/2025), Disp: 20 tablet, Rfl: 0    oxygen gas, Inhale continuous as needed 2.5 L (Patient not taking: Reported on 2/26/2025), Disp: , Rfl:     Current Facility-Administered Medications:     bupivacaine (MARCAINE) 0.5 % injection 6 mL, 6 mL, Injection, Once,     methylPREDNISolone acetate (DEPO-MEDROL) injection 40 mg, 40 mg, Intramuscular, Once,     methylPREDNISolone acetate (DEPO-MEDROL) injection 40 mg, 40 mg, Intramuscular, Once,     No Known Allergies    Physical Exam:    Ht 5' 3\" (1.6 m)   Wt 77.6 kg (171 lb)   LMP  (LMP Unknown)   BMI 30.29 kg/m²     Constitutional:normal, well developed, well nourished, alert, in no distress and non-toxic and no overt pain behavior.  Eyes:anicteric  HEENT:grossly intact  Neck:supple, symmetric, trachea midline and no masses   Pulmonary:even and unlabored  Cardiovascular:No edema or pitting edema present  Skin:Normal without rashes or lesions and well hydrated  Psychiatric:Mood and affect appropriate  Neurologic:Cranial Nerves II-XII grossly intact  Musculoskeletal: Range of motion of the lumbar spine is limited in flexion extension.  Tenderness to the lumbar paraspinals with positive facet loading.  Strength preserved in the lower limbs.  Tenderness at the bilateral medial joint line of the knee with positive crepitus's.  Negative Lachman's.  Positive varus stress testing bilaterally      Imaging  No orders to display         Orders Placed This Encounter "   Procedures    Large joint arthrocentesis

## 2025-03-11 NOTE — PROGRESS NOTES
.OP RT CM called patient and requested a return phone call.     Unable to reach letter sent via standard mail.   OP RT CM will discontinue outreach if there is no response from patient within one week.

## 2025-03-18 ENCOUNTER — PATIENT OUTREACH (OUTPATIENT)
Dept: CASE MANAGEMENT | Facility: OTHER | Age: 75
End: 2025-03-18

## 2025-03-18 DIAGNOSIS — F17.200 NICOTINE DEPENDENCE, UNCOMPLICATED, UNSPECIFIED NICOTINE PRODUCT TYPE: Primary | ICD-10-CM

## 2025-03-18 NOTE — PROGRESS NOTES
OP RT Cm received incoming call from Montserrat. She is c/o retained secretions/sputum. She is using flutter valve TID, Albuterol nebulizer TID and Mucomyst nebulizer BID. She stated she is only raising a small amount and it varies in color. She denies fever/chills.   Montserrat stated her SpO2 95% on 2lpm nasal cannula.   She is cutting down on her smoking and she is smoking a half a pack per day.  I congratulated her on her progress. She agreed to [facility] smoking cessation program. Referral placed.   Montserrat finished antibiotic and is still on prednisone taper.  We discussed the importance of smoking cessation.  She is unsure if she received a COPD booklet/zone tools. Will send.       OP RT CM will in basket message provider regarding her sputum. I encouraged her flutter valve use but a bit more often and longer, and cont. Her current regime.     OP RT CM will outreach early next week and Montserrat has my contact information.

## 2025-03-24 ENCOUNTER — PATIENT OUTREACH (OUTPATIENT)
Dept: CASE MANAGEMENT | Facility: OTHER | Age: 75
End: 2025-03-24

## 2025-03-24 NOTE — PROGRESS NOTES
.OP RT CM called and spoke with patient regarding  breathing, medications and O2.  Montserrat stated she is feeling well and down to 1 cigarette per day. She has an appt. with [facility] for smoking cessation for Friday. She stated her daughter needs the car and she needs to reschedule, she has his number to call.   Montserrat stated she is planning on taking her  to the meeting to help him quit smoking as well.     She is using respiratory medications as prescribed. Trelegy ellipta Qday and rinsing out her mouth. She denies needing financial assistance at this time.  She stated she like Spiriva as well and we discussed both medications.  She is aware not to use both.  She is using Albuterol nebulizer TID and flutter valve regularly. She is using Mucinex.   Montserrat is almost done with her prednisone. She stated she is still raising green sputum. We discussed inhaled steroid vs systemic steroids. She denies fever/chills.   SpO2 95% on 2lpmp nasal cannula.     We discussed her upcoming pulmonary MD appt. Confirmed date, time, location and provider. Montserrat is very appreciative of outreach and accepted my contact information again.    OP RT CM will outreach in two weeks and Montserrat has my contact information.

## 2025-03-25 LAB
LEFT EYE DIABETIC RETINOPATHY: NORMAL
RIGHT EYE DIABETIC RETINOPATHY: NORMAL

## 2025-03-26 ENCOUNTER — OFFICE VISIT (OUTPATIENT)
Dept: SURGICAL ONCOLOGY | Facility: CLINIC | Age: 75
End: 2025-03-26
Payer: COMMERCIAL

## 2025-03-26 VITALS
SYSTOLIC BLOOD PRESSURE: 136 MMHG | DIASTOLIC BLOOD PRESSURE: 80 MMHG | OXYGEN SATURATION: 98 % | BODY MASS INDEX: 30.29 KG/M2 | HEART RATE: 81 BPM | RESPIRATION RATE: 14 BRPM | TEMPERATURE: 97.5 F | HEIGHT: 63 IN

## 2025-03-26 DIAGNOSIS — D49.0 IPMN (INTRADUCTAL PAPILLARY MUCINOUS NEOPLASM): Primary | ICD-10-CM

## 2025-03-26 PROCEDURE — 99214 OFFICE O/P EST MOD 30 MIN: CPT | Performed by: SURGERY

## 2025-03-26 RX ORDER — SODIUM CHLORIDE 9 MG/ML
100 INJECTION, SOLUTION INTRAVENOUS CONTINUOUS
Status: CANCELLED | OUTPATIENT
Start: 2025-03-26

## 2025-03-26 NOTE — ASSESSMENT & PLAN NOTE
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 On my review of the films, her dilatation is under 1 cm. We discussed that this is a worrisome feature. We discussed with main duct IPMN, the risk of malignancy in her lifetime is 50 to 70%. With sidebranch IPMN, the risk is 10 to 20%. There is also a less than 10% risk of malignancy developing elsewhere in the pancreas. It is unclear why she had stones and sludge in her pancreatic duct, and if this could be contributing to her pancreatic dilatation.  Her most recent MRI/MRCP is stable.  She is having her stent removed tomorrow.  We will await the results of the stent removal/ERCP.  I did discuss that given her age with the main duct dilatation, there is some data to suggest continued observation rather than surgical intervention. If there is significant progression of the dilatation I would consider surgical intervention. We did discuss this could be either pancreaticoduodenectomy or potentially even total pancreatectomy based on the imaging. She and her  are agreeable to this plan. All of their questions were answered.

## 2025-03-26 NOTE — LETTER
March 26, 2025     Julian Tobias DO  [address] [facility] [address]    Patient: Montserrat Sumner   YOB: 1950   Date of Visit: 3/26/2025       Dear Dr. Tobias:    Thank you for referring Montserrat Sumner to me for evaluation. Below are my notes for this consultation.    If you have questions, please do not hesitate to call me. I look forward to following your patient along with you.         Sincerely,        Tanner Hernández MD        CC: DO Vero Ledbetter MD Darius Desai, MD  3/26/2025  3:24 PM  Sign when Signing Visit               Surgical Oncology Follow Up       [facility] SURGICAL ONCOLOGY [place]  [address]  [place] [address]    Montserrat Sumner  1950  [id number]  [facility] SURGICAL ONCOLOGY [place]  [address]  [place] [address]    1. IPMN (intraductal papillary mucinous neoplasm)  Assessment & Plan:  74-year-old female with what appears to be main duct IPMN. On my review of the films, her dilatation is under 1 cm. We discussed that this is a worrisome feature. We discussed with main duct IPMN, the risk of malignancy in her lifetime is 50 to 70%. With sidebranch IPMN, the risk is 10 to 20%. There is also a less than 10% risk of malignancy developing elsewhere in the pancreas. It is unclear why she had stones and sludge in her pancreatic duct, and if this could be contributing to her pancreatic dilatation.  Her most recent MRI/MRCP is stable.  She is having her stent removed tomorrow.  We will await the results of the stent removal/ERCP.  I did discuss that given her age with the main duct dilatation, there is some data to suggest continued observation rather than surgical intervention. If there is significant progression of the dilatation I would consider surgical intervention. We did discuss this could be either pancreaticoduodenectomy or potentially even  total pancreatectomy based on the imaging. She and her  are agreeable to this plan. All of their questions were answered.   Orders:  -     MRI abdomen w wo contrast and mrcp; Future; Expected date: 09/26/2025  -     BUN; Future; Expected date: 09/15/2025  -     Creatinine, serum; Future; Expected date: 09/15/2025         Chief Complaint   Patient presents with   • Office Visit       Return in about 6 months (around 9/26/2025) for Ofice visit short, Imaging - See orders.      Oncology History    No history exists.       Staging: Duct IPMN with 9 mm dilatation  Treatment history: EUS January 2025  CEA of the dilated duct fluid was 8337 ng/mL.    Amylase was 161,893 U/L.  Glucose was 8.4 mg/dL.  A second sample yielded similar results.    No GNAS or KRAS amplification.  This was statistically indolent   Current treatment: Observation  Disease status: Stable    History of Present Illness: Patient returns in follow-up.  She continues to feel well.  MRI from March 4, 2025 revealed a 1.7 cm bile duct.  There was pancreatic duct dilatation that was 9 mm involving the entire duct.  I personally reviewed the films.    Review of Systems  Complete ROS Surg Onc:   Complete ROS Surg Onc:   Constitutional: The patient denies new or recent history of general fatigue, no recent weight loss, no change in appetite.   Eyes: No complaints of visual problems, no scleral icterus.   ENT: no complaints of ear pain, no hoarseness, no difficulty swallowing,  no tinnitus and no new masses in head, oral cavity, or neck.   Cardiovascular: No complaints of chest pain, no palpitations, no ankle edema.   Respiratory: No complaints of shortness of breath, no cough.   Gastrointestinal: No complaints of jaundice, no bloody stools, no pale stools.   Genitourinary: No complaints of dysuria, no hematuria, no nocturia, no frequent urination, no urethral discharge.   Musculoskeletal: No complaints of weakness, paralysis, joint stiffness or  "arthralgias.  Integumentary: No complaints of rash, no new lesions.   Neurological: No complaints of convulsions, no seizures, no dizziness.   Hematologic/Lymphatic: No complaints of easy bruising.   Endocrine:  No hot or cold intolerance.  No polydipsia, polyphagia, or polyuria.  Allergy/immunology:  No environmental allergies.  No food allergies.  Not immunocompromised.  Skin:  No pallor or rash.  No wound.        Patient Active Problem List   Diagnosis   • Hypertension   • Hypothyroid   • CAD (coronary artery disease)   • History of coronary artery bypass graft   • Centrilobular emphysema (HCC)   • Anxiety   • Opioid dependence (HCC)   • Cigarette nicotine dependence without complication   • Lung nodule   • Cerebral infarct (HCC)   • Cocaine abuse (HCC)   • Chronic pain syndrome   • Obesity (BMI 30-39.9)   • Abnormal CT scan   • Intractable abdominal pain   • Prediabetes   • COPD exacerbation ([facility])   • Gastroesophageal reflux disease   • Dysphagia   • Hyperlipidemia   • Oxygen dependent at night only   • Sleep disorder   • Bile duct abnormality   • Postmenopausal   • Bilateral cataracts   • Aneurysm of ascending aorta without rupture ([facility])   • Marijuana smoker   • Acute bronchitis   • Chronic bilateral low back pain without sciatica   • Shortness of breath   • S/P ERCP   • Primary osteoarthritis of both knees   • Sacroiliitis ([facility])   • Tobacco use   • Osteoarthritis of spine with radiculopathy, lumbar region   • Intractable back pain   • IPMN (intraductal papillary mucinous neoplasm)   • Dilation of pancreatic duct   • Right hip pain   • Nocturnal hypoxemia due to emphysema  ([facility])   • Tracheobronchitis   • Subacute cough     Past Medical History:   Diagnosis Date   • Anxiety    • Chronic pain 03/06/2023    lower abdomen and back   • Colon polyp    • COPD (chronic obstructive pulmonary disease) (HCC)     \"passes out\" with O2 levels dropping   • Disease of thyroid gland    • GERD (gastroesophageal reflux disease)  "   • History of transfusion     with aneurysm repair-autologous-self and daughter   • Hyperlipidemia    • Hypertension    • Teeth missing    • Wears glasses     For reading     Past Surgical History:   Procedure Laterality Date   • BACK SURGERY      x2 herniated, crushed disc in the lumbar, ablation   • CHOLECYSTECTOMY     • COLONOSCOPY     • EPIDURAL BLOCK INJECTION N/A 2/5/2025    Procedure: L1-L2 LUMBAR EPIDURAL STEROID INJECTION;  Surgeon: Nj Maddox DO;  Location: [facility] MAIN OR;  Service: Pain Management    • FRACTURE SURGERY Left     hip-pinned   • HYSTERECTOMY      salpingo-oophorectomy   • RI INJECT SI JOINT ARTHRGRPHY&/ANES/STEROID W/PATRICIA Bilateral 12/18/2024    Procedure: BILATERAL SACROILIAC JOINT INJECTION;  Surgeon: Nj Maddox DO;  Location: [facility] MAIN OR;  Service: Pain Management    • THORACIC AORTIC ANEURYSM REPAIR  09/2016    ascending thoracic aortic repair with RCA bypass with SVG x 1   • TONSILLECTOMY     • UPPER GASTROINTESTINAL ENDOSCOPY       Family History   Problem Relation Age of Onset   • Breast cancer Mother 60     Social History     Socioeconomic History   • Marital status: /Civil Union     Spouse name: Not on file   • Number of children: Not on file   • Years of education: Not on file   • Highest education level: Not on file   Occupational History   • Not on file   Tobacco Use   • Smoking status: Every Day     Current packs/day: 0.25     Average packs/day: 0.3 packs/day for 57.2 years (14.3 ttl pk-yrs)     Types: Cigarettes     Start date: 1968     Passive exposure: Past   • Smokeless tobacco: Never   • Tobacco comments:     Currently smoking 5-6 cigarettes daily   Vaping Use   • Vaping status: Never Used   Substance and Sexual Activity   • Alcohol use: Yes     Comment: occasionally   • Drug use: Yes     Types: Marijuana, Cocaine     Comment: yes still Marijuana as of 9/5/24-2 times weekly   • Sexual activity: Not Currently     Partners: Male     Birth  control/protection: Post-menopausal   Other Topics Concern   • Not on file   Social History Narrative   • Not on file     Social Drivers of Health     Financial Resource Strain: Not on file   Food Insecurity: No Food Insecurity (4/25/2023)    Hunger Vital Sign    • Worried About Running Out of Food in the Last Year: Never true    • Ran Out of Food in the Last Year: Never true   Transportation Needs: No Transportation Needs (4/25/2023)    PRAPARE - Transportation    • Lack of Transportation (Medical): No    • Lack of Transportation (Non-Medical): No   Physical Activity: Not on file   Stress: Not on file   Social Connections: Not on file   Intimate Partner Violence: Not on file   Housing Stability: Low Risk  (4/25/2023)    Housing Stability Vital Sign    • Unable to Pay for Housing in the Last Year: No    • Number of Places Lived in the Last Year: 2    • Unstable Housing in the Last Year: No       Current Outpatient Medications:   •  acetylcysteine (MUCOMYST) 200 mg/mL nebulizer solution, Use 2 ml and add to 1 vial of your albuterol twice a day as needed for thick mucous, Disp: 120 mL, Rfl: 3  •  albuterol (2.5 mg/3 mL) 0.083 % nebulizer solution, Take 3 mL (2.5 mg total) by nebulization 4 (four) times a day as needed for wheezing or shortness of breath, Disp: 360 mL, Rfl: 7  •  albuterol (Proventil HFA) 90 mcg/act inhaler, Inhale 2 puffs every 4 (four) hours as needed for wheezing, Disp: 6.7 g, Rfl: 7  •  ALPRAZolam (XANAX) 0.5 mg tablet, Take 0.5 mg by mouth 2 (two) times a day, Disp: , Rfl:   •  amLODIPine (NORVASC) 5 mg tablet, Take 1 tablet (5 mg total) by mouth daily, Disp: 90 tablet, Rfl: 0  •  aspirin 81 mg chewable tablet, Chew 81 mg daily, Disp: , Rfl:   •  atorvastatin (LIPITOR) 40 mg tablet, Take 1 tablet (40 mg total) by mouth every morning, Disp: 90 tablet, Rfl: 3  •  b complex vitamins capsule, Take 1 capsule by mouth once a week, Disp: , Rfl:   •  benzonatate (TESSALON PERLES) 100 mg capsule, Take 1  capsule (100 mg total) by mouth 3 (three) times a day as needed for cough, Disp: 30 capsule, Rfl: 3  •  buprenorphine-naloxone (Suboxone) 4-1 MG, every morning Wafer, Disp: , Rfl:   •  celecoxib (CeleBREX) 200 mg capsule, Take 1 capsule (200 mg total) by mouth daily, Disp: 90 capsule, Rfl: 1  •  cyclobenzaprine (FLEXERIL) 10 mg tablet, Take 1 tablet (10 mg total) by mouth 3 (three) times a day as needed for muscle spasms, Disp: 90 tablet, Rfl: 1  •  DULoxetine (CYMBALTA) 60 mg delayed release capsule, Take 1 capsule (60 mg total) by mouth daily, Disp: 90 capsule, Rfl: 1  •  esomeprazole (NexIUM) 40 MG capsule, Take 1 capsule (40 mg total) by mouth 2 (two) times a day before meals, Disp: 180 capsule, Rfl: 1  •  fluticasone-umeclidinium-vilanterol (Trelegy Ellipta) 200-62.5-25 mcg/actuation AEPB inhaler, Inhale 1 puff daily Rinse mouth after use., Disp: 60 blister, Rfl: 11  •  levothyroxine 100 mcg tablet, Take 1 tablet (100 mcg total) by mouth every morning, Disp: 90 tablet, Rfl: 0  •  losartan (COZAAR) 25 mg tablet, Take 1 tablet (25 mg total) by mouth every morning, Disp: 90 tablet, Rfl: 0  •  metoprolol succinate (TOPROL-XL) 50 mg 24 hr tablet, Take 1 tablet (50 mg total) by mouth every morning, Disp: 30 tablet, Rfl: 0  •  predniSONE 10 mg tablet, Take 4 tablets x4 days, 3 tablets x4 days, 2 tablets x4 days, 1 tablet x4 days, Disp: 50 tablet, Rfl: 0  •  predniSONE 10 mg tablet, Take 4 tabs x 7 days then 3 tabs x 7 days then 2 tabs x 7 days then stay on 1 tab daily, Disp: 100 tablet, Rfl: 1  •  sucralfate (CARAFATE) 1 g tablet, Take 1 tablet (1 g total) by mouth 4 (four) times a day, Disp: 120 tablet, Rfl: 1  •  traZODone (DESYREL) 50 mg tablet, Take 100 mg by mouth daily at bedtime, Disp: , Rfl:   •  venlafaxine (EFFEXOR-XR) 150 mg 24 hr capsule, Take 1 capsule by mouth in the morning, Disp: , Rfl:   •  venlafaxine (EFFEXOR-XR) 75 mg 24 hr capsule, Take 1 capsule by mouth in the morning, Disp: , Rfl:   •   ALPRAZolam (XANAX) 0.5 mg tablet, Take 1 tablet (0.5 mg total) by mouth 1 (one) time for 1 dose, Disp: 1 tablet, Rfl: 0  •  naloxone (NARCAN) 4 mg/0.1 mL nasal spray, Administer 1 spray into a nostril. If no response after 2-3 minutes, give another dose in the other nostril using a new spray. (Patient not taking: Reported on 2/21/2025), Disp: 1 each, Rfl: 1  •  oxyCODONE (Roxicodone) 5 immediate release tablet, Take 1 tablet (5 mg total) by mouth every 6 (six) hours as needed for moderate pain Max Daily Amount: 20 mg (Patient not taking: Reported on 2/21/2025), Disp: 20 tablet, Rfl: 0  •  oxygen gas, Inhale continuous as needed 2.5 L (Patient not taking: Reported on 2/26/2025), Disp: , Rfl:   No Known Allergies  Vitals:    03/26/25 1408   BP: 136/80   Pulse: 81   Resp: 14   Temp: 97.5 °F (36.4 °C)   SpO2: 98%       Physical Exam  Constitutional: General appearance: The Patient is well-developed and well-nourished who appears the stated age in no acute distress. Patient is pleasant and talkative.     HEENT:  Normocephalic.  Sclerae are anicteric. Mucous membranes are moist. Neck is supple without adenopathy. No JVD.     Abdomen: Abdomen is soft, non-tender, non-distended and without masses.     Extremities: There is no clubbing or cyanosis. There is no edema.  Symmetric.  Neuro: Grossly nonfocal. Gait-she is in a wheelchair.     Lymphatic: No evidence of cervical adenopathy bilaterally.   Skin: Warm, anicteric.    Psych:  Patient is pleasant and talkative.  Breasts:        Pathology:  [unfilled]    Labs:      Imaging  MRI abdomen w wo contrast and mrcp  Result Date: 3/7/2025  Narrative: MRI OF THE ABDOMEN WITH AND WITHOUT CONTRAST WITH MRCP INDICATION: 74 years / Female. D49.0: Neoplasm of unspecified behavior of digestive system. Surgical and oncology note from 2/4/2025 was reviewed. Patient has chronic abdominal pain. Imaging demonstrated main pancreatic ductal dilatation and common bile duct dilatation. ERCP  demonstrated mucoid material at the ampulla. Findings likely represent main duct IPMN. COMPARISON: Abdomen MR from 9/18/2024. TECHNIQUE: Multiplanar/multisequence MRI of the abdomen with 3D MRCP was performed before and after administration of contrast. IV Contrast: 8 mL of Gadobutrol injection FINDINGS: LOWER CHEST: Unremarkable. LIVER: Normal in size and configuration. No suspicious mass. Patent hepatic and portal veins. BILE DUCTS: Again seen is intra and extrahepatic biliary ductal dilatation with common bile duct at 1.7 cm. There is new pneumobilia likely due to interval sphincterotomy. GALLBLADDER: Post cholecystectomy. PANCREAS: Main pancreatic duct dilation > 5mm without other causes of MPD obstruction, suggestive of main duct IPMN: -Dilated main duct location: Entire main pancreattic duct. -Maximum main duct diameter: 9 mm, unchanged from prior. (Series 7 images 23-31.) -Associated nodules: No. -Management recommendation: Surgical consultation/management. Management and follow up recommendations for cystic pancreatic lesions are based on Institutional consensus and international evidence-based Kyoto guidelines for the management of intraductal papillary mucinous neoplasm of the pancreas. Pancreatology 24 (2024) 255-270. ADRENAL GLANDS: Unremarkable. SPLEEN: Normal. KIDNEYS/PROXIMAL URETERS: No hydroureteronephrosis. No suspicious renal mass. BOWEL: No dilated loops of bowel. PERITONEUM/RETROPERITONEUM: No ascites. LYMPH NODES: No abdominal lymphadenopathy. VESSELS: No aneurysm. ABDOMINAL WALL: Unremarkable. BONES: No suspicious osseous lesion.     Impression: Unchanged diffuse main pancreatic ductal dilatation up to 9 mm. No enhancing nodules. Management recommendation: Continued surgical oncology management. Unchanged intra and extrahepatic biliary ductal dilation with common bile duct up to 1.7 cm. Management and follow up recommendations for cystic pancreatic lesions are based on Institutional consensus  and international evidence-based Kyoto guidelines for the management of intraductal papillary mucinous neoplasm of the pancreas. Pancreatology 24 (2024) 255-270. Workstation performed: [id number]     I personally reviewed and interpreted the above laboratory and imaging data.

## 2025-03-28 ENCOUNTER — TELEPHONE (OUTPATIENT)
Age: 75
End: 2025-03-28

## 2025-03-28 NOTE — TELEPHONE ENCOUNTER
Patients GI provider:  Dr. Gomez    Number to return call: [phone]    Reason for call: Pt calling to reschedule her EGD with Dr. Jason keller which was cancelled for yesterday.  Since it was in room 4 please call pt to schedule.     Scheduled procedure/appointment date if applicable: none

## 2025-03-28 NOTE — TELEPHONE ENCOUNTER
She just saw surgical oncologist 2 days ago and pain management on March 11.  Those are the specialists that deal with her pain as well as cancer diagnosis.  I am family medicine.  General practice.

## 2025-03-28 NOTE — TELEPHONE ENCOUNTER
"PT called asking for an appt with PCP to review her oncology results and for a shot on her leg to help with pain. No appts until May 1st. Pt would like to know if PCP can \"squeeze her in\" and she does not want to wait the whole month of April without discussing her cancer diagnosis.  "

## 2025-04-02 ENCOUNTER — TELEPHONE (OUTPATIENT)
Age: 75
End: 2025-04-02

## 2025-04-02 NOTE — TELEPHONE ENCOUNTER
S/w patient to set up medial branch blocks. Patient also mentioned that her right hip traveling down to her leg has been bothering her. I advised that Dr. Maddox has not evaluated her for her hip pain and that I would send a message to Dr. Maddox to see if she can be scheduled for a right hip injection. Patient stated she last got an injection in her hip from her PCP but her PCP advised that going forward she should see Dr. Maddox for this. Upon review, patient had right GTB with Dr. Tobias 02/21/25.    Dr. Maddox, can patient be scheduled for a repeat right GTB?

## 2025-04-02 NOTE — TELEPHONE ENCOUNTER
Caller: Montserrat    Doctor/Office: Dr Maddox     Call regarding :  pt returning call      Call was transferred to:

## 2025-04-05 ENCOUNTER — HOSPITAL ENCOUNTER (INPATIENT)
Facility: HOSPITAL | Age: 75
LOS: 5 days | Discharge: HOME HEALTH | DRG: 190 | End: 2025-04-10
Attending: EMERGENCY MEDICINE
Payer: COMMERCIAL

## 2025-04-05 ENCOUNTER — APPOINTMENT (INPATIENT)
Dept: RADIOLOGY | Facility: HOSPITAL | Age: 75
DRG: 190 | End: 2025-04-05
Payer: COMMERCIAL

## 2025-04-05 ENCOUNTER — APPOINTMENT (EMERGENCY)
Dept: RADIOLOGY | Facility: HOSPITAL | Age: 75
DRG: 190 | End: 2025-04-05
Payer: COMMERCIAL

## 2025-04-05 DIAGNOSIS — R07.81 RIB PAIN ON LEFT SIDE: ICD-10-CM
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
## 2025-04-05 DIAGNOSIS — M25.552 LEFT HIP PAIN: ICD-10-CM

## 2025-04-05 DIAGNOSIS — S51.802A OPEN WOUND OF LEFT FOREARM, INITIAL ENCOUNTER: ICD-10-CM

## 2025-04-05 DIAGNOSIS — S81.802A OPEN WOUND OF LEFT LOWER LEG, INITIAL ENCOUNTER: ICD-10-CM

## 2025-04-05 DIAGNOSIS — J96.00 ACUTE RESPIRATORY FAILURE (HCC): ICD-10-CM

## 2025-04-05 DIAGNOSIS — R26.2 AMBULATORY DYSFUNCTION: ICD-10-CM

## 2025-04-05 DIAGNOSIS — J44.1 COPD EXACERBATION (HCC): Primary | ICD-10-CM

## 2025-04-05 PROBLEM — E87.29 RESPIRATORY ACIDOSIS: Status: ACTIVE | Noted: 2025-04-05

## 2025-04-05 PROBLEM — E11.9 TYPE 2 DIABETES MELLITUS, WITHOUT LONG-TERM CURRENT USE OF INSULIN (HCC): Status: ACTIVE | Noted: 2025-04-05

## 2025-04-05 LAB
ALBUMIN SERPL BCG-MCNC: 3.5 G/DL (ref 3.5–5)
ALP SERPL-CCNC: 38 U/L (ref 34–104)
ALT SERPL W P-5'-P-CCNC: 17 U/L (ref 7–52)
ANION GAP SERPL CALCULATED.3IONS-SCNC: 13 MMOL/L (ref 4–13)
APTT PPP: 31 SECONDS (ref 23–34)
AST SERPL W P-5'-P-CCNC: 11 U/L (ref 13–39)
ATRIAL RATE: 82 BPM
BASE EX.OXY STD BLDV CALC-SCNC: 18.6 % (ref 60–80)
BASE EX.OXY STD BLDV CALC-SCNC: 32.1 % (ref 60–80)
BASE EXCESS BLDV CALC-SCNC: 2.3 MMOL/L
BASE EXCESS BLDV CALC-SCNC: 4.2 MMOL/L
BASOPHILS # BLD AUTO: 0.03 THOUSANDS/ÂΜL (ref 0–0.1)
BASOPHILS NFR BLD AUTO: 0 % (ref 0–1)
BILIRUB SERPL-MCNC: 0.62 MG/DL (ref 0.2–1)
BUN SERPL-MCNC: 21 MG/DL (ref 5–25)
CALCIUM SERPL-MCNC: 9.8 MG/DL (ref 8.4–10.2)
CHLORIDE SERPL-SCNC: 99 MMOL/L (ref 96–108)
CO2 SERPL-SCNC: 25 MMOL/L (ref 21–32)
CREAT SERPL-MCNC: 1.15 MG/DL (ref 0.6–1.3)
EOSINOPHIL # BLD AUTO: 0.01 THOUSAND/ÂΜL (ref 0–0.61)
EOSINOPHIL NFR BLD AUTO: 0 % (ref 0–6)
ERYTHROCYTE [DISTWIDTH] IN BLOOD BY AUTOMATED COUNT: 13.9 % (ref 11.6–15.1)
FLUAV AG UPPER RESP QL IA.RAPID: NEGATIVE
FLUBV AG UPPER RESP QL IA.RAPID: NEGATIVE
GFR SERPL CREATININE-BSD FRML MDRD: 47 ML/MIN/1.73SQ M
GLUCOSE SERPL-MCNC: 219 MG/DL (ref 65–140)
GLUCOSE SERPL-MCNC: 254 MG/DL (ref 65–140)
GLUCOSE SERPL-MCNC: 271 MG/DL (ref 65–140)
HCO3 BLDV-SCNC: 31.1 MMOL/L (ref 24–30)
HCO3 BLDV-SCNC: 31.7 MMOL/L (ref 24–30)
HCT VFR BLD AUTO: 42.8 % (ref 34.8–46.1)
HGB BLD-MCNC: 12.9 G/DL (ref 11.5–15.4)
IMM GRANULOCYTES # BLD AUTO: 0.06 THOUSAND/UL (ref 0–0.2)
IMM GRANULOCYTES NFR BLD AUTO: 1 % (ref 0–2)
INR PPP: 1.07 (ref 0.85–1.19)
LYMPHOCYTES # BLD AUTO: 0.7 THOUSANDS/ÂΜL (ref 0.6–4.47)
LYMPHOCYTES NFR BLD AUTO: 9 % (ref 14–44)
MCH RBC QN AUTO: 29.3 PG (ref 26.8–34.3)
MCHC RBC AUTO-ENTMCNC: 30.1 G/DL (ref 31.4–37.4)
MCV RBC AUTO: 97 FL (ref 82–98)
MONOCYTES # BLD AUTO: 0.62 THOUSAND/ÂΜL (ref 0.17–1.22)
MONOCYTES NFR BLD AUTO: 8 % (ref 4–12)
NEUTROPHILS # BLD AUTO: 6.36 THOUSANDS/ÂΜL (ref 1.85–7.62)
NEUTS SEG NFR BLD AUTO: 82 % (ref 43–75)
NRBC BLD AUTO-RTO: 0 /100 WBCS
O2 CT BLDV-SCNC: 3.5 ML/DL
O2 CT BLDV-SCNC: 5.8 ML/DL
P AXIS: 53 DEGREES
PCO2 BLDV: 61.7 MM HG (ref 42–50)
PCO2 BLDV: 69.7 MM HG (ref 42–50)
PH BLDV: 7.27 [PH] (ref 7.3–7.4)
PH BLDV: 7.33 [PH] (ref 7.3–7.4)
PLATELET # BLD AUTO: 216 THOUSANDS/UL (ref 149–390)
PMV BLD AUTO: 9.3 FL (ref 8.9–12.7)
PO2 BLDV: 15.8 MM HG (ref 35–45)
PO2 BLDV: 21.7 MM HG (ref 35–45)
POTASSIUM SERPL-SCNC: 4.5 MMOL/L (ref 3.5–5.3)
PR INTERVAL: 134 MS
PROCALCITONIN SERPL-MCNC: 0.21 NG/ML
PROT SERPL-MCNC: 6.9 G/DL (ref 6.4–8.4)
PROTHROMBIN TIME: 14.4 SECONDS (ref 12.3–15)
QRS AXIS: 2 DEGREES
QRSD INTERVAL: 92 MS
QT INTERVAL: 384 MS
QTC INTERVAL: 448 MS
RBC # BLD AUTO: 4.4 MILLION/UL (ref 3.81–5.12)
SARS-COV+SARS-COV-2 AG RESP QL IA.RAPID: NEGATIVE
SODIUM SERPL-SCNC: 137 MMOL/L (ref 135–147)
T WAVE AXIS: 71 DEGREES
VENTRICULAR RATE: 82 BPM
WBC # BLD AUTO: 7.78 THOUSAND/UL (ref 4.31–10.16)

## 2025-04-05 PROCEDURE — 80053 COMPREHEN METABOLIC PANEL: CPT | Performed by: EMERGENCY MEDICINE

## 2025-04-05 PROCEDURE — 94640 AIRWAY INHALATION TREATMENT: CPT

## 2025-04-05 PROCEDURE — 96365 THER/PROPH/DIAG IV INF INIT: CPT

## 2025-04-05 PROCEDURE — 36415 COLL VENOUS BLD VENIPUNCTURE: CPT | Performed by: EMERGENCY MEDICINE

## 2025-04-05 PROCEDURE — 94002 VENT MGMT INPAT INIT DAY: CPT

## 2025-04-05 PROCEDURE — 96375 TX/PRO/DX INJ NEW DRUG ADDON: CPT

## 2025-04-05 PROCEDURE — 82948 REAGENT STRIP/BLOOD GLUCOSE: CPT

## 2025-04-05 PROCEDURE — 71260 CT THORAX DX C+: CPT

## 2025-04-05 PROCEDURE — 99285 EMERGENCY DEPT VISIT HI MDM: CPT

## 2025-04-05 PROCEDURE — 70450 CT HEAD/BRAIN W/O DYE: CPT

## 2025-04-05 PROCEDURE — 83036 HEMOGLOBIN GLYCOSYLATED A1C: CPT

## 2025-04-05 PROCEDURE — 93005 ELECTROCARDIOGRAM TRACING: CPT

## 2025-04-05 PROCEDURE — 96366 THER/PROPH/DIAG IV INF ADDON: CPT

## 2025-04-05 PROCEDURE — 85025 COMPLETE CBC W/AUTO DIFF WBC: CPT | Performed by: EMERGENCY MEDICINE

## 2025-04-05 PROCEDURE — 99222 1ST HOSP IP/OBS MODERATE 55: CPT

## 2025-04-05 PROCEDURE — 94760 N-INVAS EAR/PLS OXIMETRY 1: CPT

## 2025-04-05 PROCEDURE — 85610 PROTHROMBIN TIME: CPT | Performed by: EMERGENCY MEDICINE

## 2025-04-05 PROCEDURE — 82805 BLOOD GASES W/O2 SATURATION: CPT | Performed by: EMERGENCY MEDICINE

## 2025-04-05 PROCEDURE — 99285 EMERGENCY DEPT VISIT HI MDM: CPT | Performed by: EMERGENCY MEDICINE

## 2025-04-05 PROCEDURE — 90471 IMMUNIZATION ADMIN: CPT

## 2025-04-05 PROCEDURE — 74177 CT ABD & PELVIS W/CONTRAST: CPT

## 2025-04-05 PROCEDURE — 90715 TDAP VACCINE 7 YRS/> IM: CPT | Performed by: EMERGENCY MEDICINE

## 2025-04-05 PROCEDURE — 87811 SARS-COV-2 COVID19 W/OPTIC: CPT | Performed by: EMERGENCY MEDICINE

## 2025-04-05 PROCEDURE — 87804 INFLUENZA ASSAY W/OPTIC: CPT | Performed by: EMERGENCY MEDICINE

## 2025-04-05 PROCEDURE — 84145 PROCALCITONIN (PCT): CPT | Performed by: EMERGENCY MEDICINE

## 2025-04-05 PROCEDURE — 85730 THROMBOPLASTIN TIME PARTIAL: CPT | Performed by: EMERGENCY MEDICINE

## 2025-04-05 PROCEDURE — 72125 CT NECK SPINE W/O DYE: CPT

## 2025-04-05 PROCEDURE — 73502 X-RAY EXAM HIP UNI 2-3 VIEWS: CPT

## 2025-04-05 RX ORDER — DULOXETIN HYDROCHLORIDE 60 MG/1
60 CAPSULE, DELAYED RELEASE ORAL DAILY
Status: DISCONTINUED | OUTPATIENT
Start: 2025-04-05 | End: 2025-04-10 | Stop reason: HOSPADM

## 2025-04-05 RX ORDER — IPRATROPIUM BROMIDE AND ALBUTEROL SULFATE 2.5; .5 MG/3ML; MG/3ML
3 SOLUTION RESPIRATORY (INHALATION)
Status: DISCONTINUED | OUTPATIENT
Start: 2025-04-05 | End: 2025-04-06

## 2025-04-05 RX ORDER — FLUTICASONE FUROATE AND VILANTEROL 200; 25 UG/1; UG/1
1 POWDER RESPIRATORY (INHALATION)
Status: DISCONTINUED | OUTPATIENT
Start: 2025-04-05 | End: 2025-04-10 | Stop reason: HOSPADM

## 2025-04-05 RX ORDER — BUPRENORPHINE AND NALOXONE 2; .5 MG/1; MG/1
4 FILM, SOLUBLE BUCCAL; SUBLINGUAL DAILY
Status: DISCONTINUED | OUTPATIENT
Start: 2025-04-05 | End: 2025-04-10 | Stop reason: HOSPADM

## 2025-04-05 RX ORDER — ASPIRIN 81 MG/1
81 TABLET, CHEWABLE ORAL DAILY
Status: DISCONTINUED | OUTPATIENT
Start: 2025-04-05 | End: 2025-04-10 | Stop reason: HOSPADM

## 2025-04-05 RX ORDER — LIDOCAINE 50 MG/G
2 PATCH TOPICAL DAILY
Status: DISCONTINUED | OUTPATIENT
Start: 2025-04-06 | End: 2025-04-10 | Stop reason: HOSPADM

## 2025-04-05 RX ORDER — ACETAMINOPHEN 10 MG/ML
1000 INJECTION, SOLUTION INTRAVENOUS ONCE
Status: COMPLETED | OUTPATIENT
Start: 2025-04-05 | End: 2025-04-05

## 2025-04-05 RX ORDER — ALBUTEROL SULFATE 0.83 MG/ML
5 SOLUTION RESPIRATORY (INHALATION) ONCE
Status: COMPLETED | OUTPATIENT
Start: 2025-04-05 | End: 2025-04-05

## 2025-04-05 RX ORDER — TRAZODONE HYDROCHLORIDE 100 MG/1
100 TABLET ORAL
Status: DISCONTINUED | OUTPATIENT
Start: 2025-04-05 | End: 2025-04-05

## 2025-04-05 RX ORDER — PANTOPRAZOLE SODIUM 40 MG/1
40 TABLET, DELAYED RELEASE ORAL DAILY
Status: DISCONTINUED | OUTPATIENT
Start: 2025-04-06 | End: 2025-04-10 | Stop reason: HOSPADM

## 2025-04-05 RX ORDER — ATORVASTATIN CALCIUM 40 MG/1
40 TABLET, FILM COATED ORAL EVERY MORNING
Status: DISCONTINUED | OUTPATIENT
Start: 2025-04-06 | End: 2025-04-10 | Stop reason: HOSPADM

## 2025-04-05 RX ORDER — VENLAFAXINE HYDROCHLORIDE 75 MG/1
75 CAPSULE, EXTENDED RELEASE ORAL EVERY MORNING
Status: DISCONTINUED | OUTPATIENT
Start: 2025-04-06 | End: 2025-04-10 | Stop reason: HOSPADM

## 2025-04-05 RX ORDER — LIDOCAINE 50 MG/G
1 PATCH TOPICAL ONCE
Status: DISCONTINUED | OUTPATIENT
Start: 2025-04-05 | End: 2025-04-05

## 2025-04-05 RX ORDER — ALBUTEROL SULFATE 0.83 MG/ML
2.5 SOLUTION RESPIRATORY (INHALATION) 4 TIMES DAILY PRN
Status: DISCONTINUED | OUTPATIENT
Start: 2025-04-05 | End: 2025-04-10 | Stop reason: HOSPADM

## 2025-04-05 RX ORDER — TRAZODONE HYDROCHLORIDE 100 MG/1
100 TABLET ORAL
Status: DISCONTINUED | OUTPATIENT
Start: 2025-04-05 | End: 2025-04-10 | Stop reason: HOSPADM

## 2025-04-05 RX ORDER — ASCORBIC ACID, THIAMINE MONONITRATE,RIBOFLAVIN, NIACINAMIDE, PYRIDOXINE HYDROCHLORIDE, FOLIC ACID, CYANOCOBALAMIN, BIOTIN, CALCIUM PANTOTHENATE, 100; 1.5; 1.7; 20; 10; 1; 6000; 150000; 5 MG/1; MG/1; MG/1; MG/1; MG/1; MG/1; UG/1; UG/1; MG/1
1 CAPSULE, LIQUID FILLED ORAL WEEKLY
Status: DISCONTINUED | OUTPATIENT
Start: 2025-04-05 | End: 2025-04-10 | Stop reason: HOSPADM

## 2025-04-05 RX ORDER — METHYLPREDNISOLONE SODIUM SUCCINATE 125 MG/2ML
62.5 INJECTION, POWDER, LYOPHILIZED, FOR SOLUTION INTRAMUSCULAR; INTRAVENOUS ONCE
Status: COMPLETED | OUTPATIENT
Start: 2025-04-05 | End: 2025-04-05

## 2025-04-05 RX ORDER — ENOXAPARIN SODIUM 100 MG/ML
40 INJECTION SUBCUTANEOUS DAILY
Status: DISCONTINUED | OUTPATIENT
Start: 2025-04-05 | End: 2025-04-10 | Stop reason: HOSPADM

## 2025-04-05 RX ORDER — METHYLPREDNISOLONE SODIUM SUCCINATE 40 MG/ML
40 INJECTION, POWDER, LYOPHILIZED, FOR SOLUTION INTRAMUSCULAR; INTRAVENOUS EVERY 8 HOURS
Status: DISCONTINUED | OUTPATIENT
Start: 2025-04-05 | End: 2025-04-07

## 2025-04-05 RX ORDER — SUCRALFATE 1 G/1
1 TABLET ORAL
Status: DISCONTINUED | OUTPATIENT
Start: 2025-04-05 | End: 2025-04-10 | Stop reason: HOSPADM

## 2025-04-05 RX ORDER — ALPRAZOLAM 0.5 MG
1 TABLET ORAL
Status: DISCONTINUED | OUTPATIENT
Start: 2025-04-05 | End: 2025-04-05

## 2025-04-05 RX ORDER — ALPRAZOLAM 0.5 MG
0.5 TABLET ORAL 2 TIMES DAILY
Status: DISCONTINUED | OUTPATIENT
Start: 2025-04-05 | End: 2025-04-05

## 2025-04-05 RX ORDER — VENLAFAXINE HYDROCHLORIDE 150 MG/1
150 CAPSULE, EXTENDED RELEASE ORAL
Status: DISCONTINUED | OUTPATIENT
Start: 2025-04-05 | End: 2025-04-10 | Stop reason: HOSPADM

## 2025-04-05 RX ORDER — INSULIN LISPRO 100 [IU]/ML
1-6 INJECTION, SOLUTION INTRAVENOUS; SUBCUTANEOUS
Status: DISCONTINUED | OUTPATIENT
Start: 2025-04-05 | End: 2025-04-06

## 2025-04-05 RX ORDER — IPRATROPIUM BROMIDE AND ALBUTEROL SULFATE 2.5; .5 MG/3ML; MG/3ML
SOLUTION RESPIRATORY (INHALATION)
Status: COMPLETED
Start: 2025-04-05 | End: 2025-04-05

## 2025-04-05 RX ORDER — CYCLOBENZAPRINE HCL 10 MG
10 TABLET ORAL 3 TIMES DAILY PRN
Status: DISCONTINUED | OUTPATIENT
Start: 2025-04-05 | End: 2025-04-10 | Stop reason: HOSPADM

## 2025-04-05 RX ORDER — ALBUTEROL SULFATE 90 UG/1
2 INHALANT RESPIRATORY (INHALATION) EVERY 4 HOURS PRN
Status: DISCONTINUED | OUTPATIENT
Start: 2025-04-05 | End: 2025-04-10 | Stop reason: HOSPADM

## 2025-04-05 RX ORDER — AMLODIPINE BESYLATE 5 MG/1
5 TABLET ORAL DAILY
Status: DISCONTINUED | OUTPATIENT
Start: 2025-04-05 | End: 2025-04-10 | Stop reason: HOSPADM

## 2025-04-05 RX ORDER — LEVOTHYROXINE SODIUM 100 UG/1
100 TABLET ORAL
Status: DISCONTINUED | OUTPATIENT
Start: 2025-04-06 | End: 2025-04-10 | Stop reason: HOSPADM

## 2025-04-05 RX ORDER — ACETAMINOPHEN 325 MG/1
650 TABLET ORAL EVERY 6 HOURS PRN
Status: DISCONTINUED | OUTPATIENT
Start: 2025-04-05 | End: 2025-04-09

## 2025-04-05 RX ORDER — METOPROLOL SUCCINATE 50 MG/1
50 TABLET, EXTENDED RELEASE ORAL EVERY MORNING
Status: DISCONTINUED | OUTPATIENT
Start: 2025-04-06 | End: 2025-04-10 | Stop reason: HOSPADM

## 2025-04-05 RX ORDER — ALPRAZOLAM 0.5 MG
1 TABLET ORAL
Status: DISCONTINUED | OUTPATIENT
Start: 2025-04-05 | End: 2025-04-10 | Stop reason: HOSPADM

## 2025-04-05 RX ORDER — VENLAFAXINE HYDROCHLORIDE 150 MG/1
150 CAPSULE, EXTENDED RELEASE ORAL
Status: DISCONTINUED | OUTPATIENT
Start: 2025-04-05 | End: 2025-04-05

## 2025-04-05 RX ORDER — LOSARTAN POTASSIUM 25 MG/1
25 TABLET ORAL EVERY MORNING
Status: DISCONTINUED | OUTPATIENT
Start: 2025-04-06 | End: 2025-04-10 | Stop reason: HOSPADM

## 2025-04-05 RX ADMIN — TRAZODONE HYDROCHLORIDE 100 MG: 100 TABLET ORAL at 21:00

## 2025-04-05 RX ADMIN — ENOXAPARIN SODIUM 40 MG: 40 INJECTION SUBCUTANEOUS at 17:16

## 2025-04-05 RX ADMIN — VENLAFAXINE HYDROCHLORIDE 150 MG: 150 CAPSULE, EXTENDED RELEASE ORAL at 21:00

## 2025-04-05 RX ADMIN — AZITHROMYCIN MONOHYDRATE 500 MG: 500 INJECTION, POWDER, LYOPHILIZED, FOR SOLUTION INTRAVENOUS at 17:17

## 2025-04-05 RX ADMIN — IPRATROPIUM BROMIDE AND ALBUTEROL SULFATE 3 ML: .5; 3 SOLUTION RESPIRATORY (INHALATION) at 19:35

## 2025-04-05 RX ADMIN — UMECLIDINIUM 1 PUFF: 62.5 AEROSOL, POWDER ORAL at 17:17

## 2025-04-05 RX ADMIN — ALBUTEROL SULFATE 5 MG: 2.5 SOLUTION RESPIRATORY (INHALATION) at 13:12

## 2025-04-05 RX ADMIN — IOHEXOL 100 ML: 350 INJECTION, SOLUTION INTRAVENOUS at 12:39

## 2025-04-05 RX ADMIN — FLUTICASONE FUROATE AND VILANTEROL TRIFENATATE 1 PUFF: 200; 25 POWDER RESPIRATORY (INHALATION) at 17:17

## 2025-04-05 RX ADMIN — ACETAMINOPHEN 1000 MG: 10 INJECTION INTRAVENOUS at 11:19

## 2025-04-05 RX ADMIN — METHYLPREDNISOLONE SODIUM SUCCINATE 62.5 MG: 125 INJECTION, POWDER, FOR SOLUTION INTRAMUSCULAR; INTRAVENOUS at 13:33

## 2025-04-05 RX ADMIN — METHYLPREDNISOLONE SODIUM SUCCINATE 40 MG: 40 INJECTION, POWDER, FOR SOLUTION INTRAMUSCULAR; INTRAVENOUS at 17:16

## 2025-04-05 RX ADMIN — SUCRALFATE 1 G: 1 TABLET ORAL at 17:16

## 2025-04-05 RX ADMIN — ALPRAZOLAM 1 MG: 0.5 TABLET ORAL at 21:00

## 2025-04-05 RX ADMIN — IPRATROPIUM BROMIDE 0.5 MG: 0.5 SOLUTION RESPIRATORY (INHALATION) at 13:12

## 2025-04-05 RX ADMIN — TETANUS TOXOID, REDUCED DIPHTHERIA TOXOID AND ACELLULAR PERTUSSIS VACCINE, ADSORBED 0.5 ML: 5; 2.5; 8; 8; 2.5 SUSPENSION INTRAMUSCULAR at 11:25

## 2025-04-05 RX ADMIN — INSULIN LISPRO 2 UNITS: 100 INJECTION, SOLUTION INTRAVENOUS; SUBCUTANEOUS at 17:17

## 2025-04-05 RX ADMIN — Medication 1 CAPSULE: at 17:16

## 2025-04-05 RX ADMIN — LIDOCAINE 1 PATCH: 50 PATCH CUTANEOUS at 11:21

## 2025-04-05 NOTE — ASSESSMENT & PLAN NOTE
Takes duloxetine   Xanax 0.5 mg BID   Will continue regimen above  PDMP reviewed   Monitor Qtc . Currently wnl

## 2025-04-05 NOTE — PLAN OF CARE
Problem: RESPIRATORY - ADULT  Goal: Achieves optimal ventilation and oxygenation  Description: INTERVENTIONS:- Assess for changes in respiratory status- Assess for changes in mentation and behavior- Position to facilitate oxygenation and minimize respiratory effort- Oxygen administered by appropriate delivery if ordered- Initiate smoking cessation education as indicated- Encourage broncho-pulmonary hygiene including cough, deep breathe, Incentive Spirometry- Assess the need for suctioning and aspirate as needed- Assess and instruct to report SOB or any respiratory difficulty- Respiratory Therapy support as indicated  INTERVENTIONS:- Assess for changes in respiratory status- Assess for changes in mentation and behavior- Position to facilitate oxygenation and minimize respiratory effort- Oxygen administered by appropriate delivery if ordered- Initiate smoking cessation education as indicated- Encourage broncho-pulmonary hygiene including cough, deep breathe, Incentive Spirometry- Assess the need for suctioning and aspirate as needed- Assess and instruct to report SOB or any respiratory difficulty- Respiratory Therapy support as indicated  4/5/2025 1835 by Luz Burch RN  Outcome: Progressing  4/5/2025 1835 by Luz Burch RN  Outcome: Progressing     Problem: Prexisting or High Potential for Compromised Skin Integrity  Goal: Skin integrity is maintained or improved  Description: INTERVENTIONS:- Identify patients at risk for skin breakdown- Assess and monitor skin integrity- Assess and monitor nutrition and hydration status- Monitor labs - Assess for incontinence - Turn and reposition patient- Assist with mobility/ambulation- Relieve pressure over bony prominences- Avoid friction and shearing- Provide appropriate hygiene as needed including keeping skin clean and dry- Evaluate need for skin moisturizer/barrier cream- Collaborate with interdisciplinary team - Patient/family teaching- Consider wound care consult    4/5/2025 1835 by Luz Burch RN  Outcome: Progressing  4/5/2025 1835 by Luz Burch RN  Outcome: Progressing     Problem: PAIN - ADULT  Goal: Verbalizes/displays adequate comfort level or baseline comfort level  Description: Interventions:- Encourage patient to monitor pain and request assistance- Assess pain using appropriate pain scale- Administer analgesics based on type and severity of pain and evaluate response- Implement non-pharmacological measures as appropriate and evaluate response- Consider cultural and social influences on pain and pain management- Notify physician/advanced practitioner if interventions unsuccessful or patient reports new pain  Outcome: Progressing     Problem: INFECTION - ADULT  Goal: Absence or prevention of progression during hospitalization  Description: INTERVENTIONS:- Assess and monitor for signs and symptoms of infection- Monitor lab/diagnostic results- Monitor all insertion sites, i.e. indwelling lines, tubes, and drains- Monitor endotracheal if appropriate and nasal secretions for changes in amount and color- [place] appropriate cooling/warming therapies per order- Administer medications as ordered- Instruct and encourage patient and family to use good hand hygiene technique- Identify and instruct in appropriate isolation precautions for identified infection/condition  Outcome: Progressing  Goal: Absence of fever/infection during neutropenic period  Description: INTERVENTIONS:- Monitor WBC  Outcome: Progressing     Problem: SAFETY ADULT  Goal: Patient will remain free of falls  Description: INTERVENTIONS:- Educate patient/family on patient safety including physical limitations- Instruct patient to call for assistance with activity - Consult OT/PT to assist with strengthening/mobility - Keep Call bell within reach- Keep bed low and locked with side rails adjusted as appropriate- Keep care items and personal belongings within reach- Initiate and maintain comfort rounds- Make  Fall Risk Sign visible to staff- Offer Toileting every 2 Hours, in advance of need- Initiate/Maintain bed alarm- Obtain necessary fall risk management equipment: walker- Apply yellow socks and bracelet for high fall risk patients- Consider moving patient to room near nurses station  Outcome: Progressing  Goal: Maintain or return to baseline ADL function  Description: INTERVENTIONS:-  Assess patient's ability to carry out ADLs; assess patient's baseline for ADL function and identify physical deficits which impact ability to perform ADLs (bathing, care of mouth/teeth, toileting, grooming, dressing, etc.)- Assess/evaluate cause of self-care deficits - Assess range of motion- Assess patient's mobility; develop plan if impaired- Assess patient's need for assistive devices and provide as appropriate- Encourage maximum independence but intervene and supervise when necessary- Involve family in performance of ADLs- Assess for home care needs following discharge - Consider OT consult to assist with ADL evaluation and planning for discharge- Provide patient education as appropriate  Outcome: Progressing  Goal: Maintains/Returns to pre admission functional level  Description: INTERVENTIONS:- Perform AM-PAC 6 Click Basic Mobility/ Daily Activity assessment daily.- Set and communicate daily mobility goal to care team and patient/family/caregiver. - Collaborate with rehabilitation services on mobility goals if consulted- Perform Range of Motion 3 times a day.- Reposition patient every 2 hours.- Dangle patient 3 times a day- Stand patient 3 times a day- Ambulate patient 3 times a day- Out of bed to chair 3 times a day - Out of bed for meals 3 times a day- Out of bed for toileting- Record patient progress and toleration of activity level   Outcome: Progressing     Problem: DISCHARGE PLANNING  Goal: Discharge to home or other facility with appropriate resources  Description: INTERVENTIONS:- Identify barriers to discharge w/patient  and caregiver- Arrange for needed discharge resources and transportation as appropriate- Identify discharge learning needs (meds, wound care, etc.)- Arrange for interpretive services to assist at discharge as needed- Refer to Case Management Department for coordinating discharge planning if the patient needs post-hospital services based on physician/advanced practitioner order or complex needs related to functional status, cognitive ability, or social support system  Outcome: Progressing     Problem: Knowledge Deficit  Goal: Patient/family/caregiver demonstrates understanding of disease process, treatment plan, medications, and discharge instructions  Description: Complete learning assessment and assess knowledge base.Interventions:- Provide teaching at level of understanding- Provide teaching via preferred learning methods  Outcome: Progressing

## 2025-04-05 NOTE — ASSESSMENT & PLAN NOTE
Tripped and fell on the LT side 3 dasy ago , has LT sided flank pain and rib pain with breathing. No palpiation  , tingling , numbness , dizziness or lightheadedness.       Trauma scans including CT Cervical spine, CAP , Head ; No trauma findings    Continue PTOT   Fall precautions

## 2025-04-05 NOTE — ASSESSMENT & PLAN NOTE
Follows up with surgical/medical oncology     Planned for stent removal however was postponed per GI to 4/18/25

## 2025-04-05 NOTE — ASSESSMENT & PLAN NOTE
Patient presents with increased shortness of breath, productive cough, and increasing O2 requirements with Bipap in ED.   Usually wears 2L just at bedtime having to use now during the day.Has not improved despite inhaler use.    In the ED patient was given DuoNeb and steroids with improvement in symptoms   flu, COVID, RSV negative  CT C/AP: Right greater than left lower lobe reticular and groundglass densities concerning for infiltrates.   Patient follows with pulmonary outpatient setting her home regimen is 200 mcg Trelegy and as needed albuterol nebs 4 times daily    Plan  Start Xopenex/Atrovent/ saline nebs 3 times daily  IV Solu-Medrol 40 mg twice daily  Azithromycin x 5 days   Bipap PRN   Check VBG in 4 hrs  Pulm consult  Wean O2 as tolerated  Encouraged smoking cessation

## 2025-04-05 NOTE — H&P
H&P - Hospitalist   Name: Montserrat Sumner 74 y.o. female I MRN: [id number]  Unit/Bed#: ED 10 I Date of Admission: 4/5/2025   Date of Service: 4/5/2025 I Hospital Day: 0     Assessment & Plan  COPD exacerbation (HCC)  Patient presents with increased shortness of breath, productive cough, and increasing O2 requirements with Bipap in ED.   Usually wears 2L just at bedtime having to use now during the day.Has not improved despite inhaler use.    In the ED patient was given DuoNeb and steroids with improvement in symptoms   flu, COVID, RSV negative  CT C/AP: Right greater than left lower lobe reticular and groundglass densities concerning for infiltrates.   Patient follows with pulmonary outpatient setting her home regimen is 200 mcg Trelegy and as needed albuterol nebs 4 times daily    Plan  Start Xopenex/Atrovent/ saline nebs 3 times daily  IV Solu-Medrol 40 mg twice daily  Azithromycin x 5 days   Bipap PRN   Check VBG in 4 hrs  Pulm consult  Wean O2 as tolerated  Encouraged smoking cessation  Hypertension    Continue PTA amlodipine, metoprolol, and losartan  Hypothyroid  Continue PTA medication  CAD (coronary artery disease)  Known history of CAD s/p CABG x 1  Continue ASA and Lipitor daily  Centrilobular emphysema (HCC)  Moderate COPD per PFT from 2023   Follows up with pulmonary medicine Dr. Smith     PTA inhalers: Trellegy, Albuterol   Anxiety  Takes duloxetine   Xanax 0.5 mg BID   Will continue regimen above  PDMP reviewed   Monitor Qtc . Currently wnl   Opioid dependence (HCC)  On Suboxone 4 mg sublingual daily  PDMP reviewed last prescription filled 3/13/25  Continue while inpatient  Oxygen dependent at night only  2 LPM via NC at bedtime only   Follows up with Dr. Smith   Chronic bilateral low back pain without sciatica  PTOT eval and treat   Tobacco use  Reports smoking 3 cigarettes daily for many years  Patient encouraged to quit smoking given her underlying COPD  NRT offered  IPMN (intraductal  "papillary mucinous neoplasm)  Follows up with surgical/medical oncology     Planned for stent removal however was postponed per GI to 4/18/25     Acute respiratory failure (HCC)  Requires 2 LPM Qhs and PRN at baseline. Currently requiring Bipap with respiratory acidosis . Secondary to COPD .     Refer to A and P for AECOPD   Fall  Tripped and fell on the LT side 3 dasy ago , has LT sided flank pain and rib pain with breathing. No palpiation  , tingling , numbness , dizziness or lightheadedness.       Trauma scans including CT Cervical spine, CAP , Head ; No trauma findings    Continue PTOT   Fall precautions     Respiratory acidosis  Secondary to AECOPD     Refer to A and P for COPD   Type 2 diabetes mellitus, without long-term current use of insulin (HCC)  Lab Results   Component Value Date    HGBA1C 7.8 (H) 01/10/2025       No results for input(s): \"POCGLU\" in the last 72 hours.    Blood Sugar Average: Last 72 hrs:    SSI plus accu checks         VTE Pharmacologic Prophylaxis:   Moderate Risk (Score 3-4) - Pharmacological DVT Prophylaxis Ordered: enoxaparin (Lovenox).  Code Status: Level 1 - Full Code   Discussion with family: Updated  (significant other) via phone.    Anticipated Length of Stay: Patient will be admitted on an inpatient basis with an anticipated length of stay of greater than 2 midnights secondary to AECOPD , Acute respiratory failure .    History of Present Illness   Chief Complaint: Shortness of breath and left-sided chest wall pain    Montserrat Sumner is a 74 y.o. female with a PMH of COPD, respiratory failure on PRN O2 Qhs, DM, HTN , CAD who presents with SOB for the past 3 days symptoms have been progressing since onset. Associated with wheezing and chest tightness . Reports intermittent dry cough. She tried using her oxygen continuous as well as rescue inhalers without benefit. Reports around that time she tripped and fell on side walk around 3 days ago no direct head trauma " "or LOC. Currently reports LT sided Chest wall pain where the impact was.     Of note; patient has history of moderate COPD follows up with [facility] pulmonary medicine team.  She reports compliance with medications including inhaler and prednisone.  Recently had tracheobronchitis on 3/7/2025 per pulmonologist.   She uses oxygen at night only 2 L/min via nasal cannula.    She is still taking Xanax and Suboxone.  Confirmed with patient.    Review of Systems   Constitutional:  Positive for activity change. Negative for chills, fatigue and fever.   HENT: Negative.  Negative for hearing loss.    Eyes: Negative.  Negative for visual disturbance.   Respiratory:  Positive for apnea, cough, chest tightness, shortness of breath and wheezing.    Cardiovascular:  Negative for palpitations.   Gastrointestinal:  Negative for abdominal pain, blood in stool, constipation, diarrhea, nausea and vomiting.   Endocrine: Negative.    Genitourinary:  Negative for difficulty urinating and dysuria.   Musculoskeletal:  Negative for arthralgias and myalgias.   Skin: Negative.    Allergic/Immunologic: Negative.    Neurological:  Negative for seizures and syncope.   Hematological:  Negative for adenopathy.   Psychiatric/Behavioral: Negative.         Historical Information   Past Medical History:   Diagnosis Date    Anxiety     Chronic pain 03/06/2023    lower abdomen and back    Colon polyp     COPD (chronic obstructive pulmonary disease) (HCC)     \"passes out\" with O2 levels dropping    Disease of thyroid gland     GERD (gastroesophageal reflux disease)     History of transfusion     with aneurysm repair-autologous-self and daughter    Hyperlipidemia     Hypertension     Teeth missing     Wears glasses     For reading     Past Surgical History:   Procedure Laterality Date    BACK SURGERY      x2 herniated, crushed disc in the lumbar, ablation    CHOLECYSTECTOMY      COLONOSCOPY      EPIDURAL BLOCK INJECTION N/A 2/5/2025    Procedure: L1-L2 " LUMBAR EPIDURAL STEROID INJECTION;  Surgeon: Nj Maddox DO;  Location: [facility] MAIN OR;  Service: Pain Management     FRACTURE SURGERY Left     hip-pinned    HYSTERECTOMY      salpingo-oophorectomy    PA INJECT SI JOINT ARTHRGRPHY&/ANES/STEROID W/PATRICIA Bilateral 12/18/2024    Procedure: BILATERAL SACROILIAC JOINT INJECTION;  Surgeon: Nj Maddox DO;  Location: [facility] MAIN OR;  Service: Pain Management     THORACIC AORTIC ANEURYSM REPAIR  09/2016    ascending thoracic aortic repair with RCA bypass with SVG x 1    TONSILLECTOMY      UPPER GASTROINTESTINAL ENDOSCOPY       Social History     Tobacco Use    Smoking status: Every Day     Current packs/day: 0.25     Average packs/day: 0.3 packs/day for 57.3 years (14.3 ttl pk-yrs)     Types: Cigarettes     Start date: 1968     Passive exposure: Past    Smokeless tobacco: Never    Tobacco comments:     Currently smoking 5-6 cigarettes daily   Vaping Use    Vaping status: Never Used   Substance and Sexual Activity    Alcohol use: Yes     Comment: occasionally    Drug use: Yes     Types: Marijuana, Cocaine     Comment: yes still Marijuana as of 9/5/24-2 times weekly    Sexual activity: Not Currently     Partners: Male     Birth control/protection: Post-menopausal     E-Cigarette/Vaping    E-Cigarette Use Never User      E-Cigarette/Vaping Substances    Nicotine No     THC No     CBD No     Flavoring No     Other No     Unknown No      Family history non-contributory  Social History:  Marital Status: /Civil Union   Occupation: retired   Patient Pre-hospital Living Situation: Home  Patient Pre-hospital Level of Mobility: walks  Patient Pre-hospital Diet Restrictions: diabetix     Meds/Allergies   I have reviewed home medications with patient personally.  Prior to Admission medications    Medication Sig Start Date End Date Taking? Authorizing Provider   acetylcysteine (MUCOMYST) 200 mg/mL nebulizer solution Use 2 ml and add to 1 vial of your albuterol twice  a day as needed for thick mucous 3/7/25   Maik Smith DO   albuterol (2.5 mg/3 mL) 0.083 % nebulizer solution Take 3 mL (2.5 mg total) by nebulization 4 (four) times a day as needed for wheezing or shortness of breath 9/27/23   Maik Smith DO   albuterol (Proventil HFA) 90 mcg/act inhaler Inhale 2 puffs every 4 (four) hours as needed for wheezing 2/26/25   Maik Smith DO   ALPRAZolam (XANAX) 0.5 mg tablet Take 0.5 mg by mouth 2 (two) times a day    Historical Provider, MD   ALPRAZolam (XANAX) 0.5 mg tablet Take 1 tablet (0.5 mg total) by mouth 1 (one) time for 1 dose 12/31/24 12/31/24  Nj Maddox DO   amLODIPine (NORVASC) 5 mg tablet Take 1 tablet (5 mg total) by mouth daily 2/4/25   Julian Tobias DO   aspirin 81 mg chewable tablet Chew 81 mg daily    Historical Provider, MD   atorvastatin (LIPITOR) 40 mg tablet Take 1 tablet (40 mg total) by mouth every morning 2/21/25   Julian Tobias DO   b complex vitamins capsule Take 1 capsule by mouth once a week    Historical Provider, MD   benzonatate (TESSALON PERLES) 100 mg capsule Take 1 capsule (100 mg total) by mouth 3 (three) times a day as needed for cough 2/21/25   Julian Tobias DO   buprenorphine-naloxone (Suboxone) 4-1 MG every morning Wafer 12/8/23   Historical Provider, MD   celecoxib (CeleBREX) 200 mg capsule Take 1 capsule (200 mg total) by mouth daily 12/23/24   Julian Tobias DO   cyclobenzaprine (FLEXERIL) 10 mg tablet Take 1 tablet (10 mg total) by mouth 3 (three) times a day as needed for muscle spasms 11/29/24   Nj Maddox DO   DULoxetine (CYMBALTA) 60 mg delayed release capsule Take 1 capsule (60 mg total) by mouth daily 2/21/25   Julian Tobias DO   esomeprazole (NexIUM) 40 MG capsule Take 1 capsule (40 mg total) by mouth 2 (two) times a day before meals 2/18/25   Mariama Sy PA-C   fluticasone-umeclidinium-vilanterol (Trelegy Ellipta) 200-62.5-25 mcg/actuation AEPB inhaler Inhale 1 puff daily Rinse mouth after  use. 2/26/25 2/21/26  Maik Smith DO   levothyroxine 100 mcg tablet Take 1 tablet (100 mcg total) by mouth every morning 2/4/25   Julian Tobias DO   losartan (COZAAR) 25 mg tablet Take 1 tablet (25 mg total) by mouth every morning 2/4/25   Julian Tobias DO   metoprolol succinate (TOPROL-XL) 50 mg 24 hr tablet Take 1 tablet (50 mg total) by mouth every morning 2/21/25 8/20/25  Mushtaq Rodriguez MD   naloxone (NARCAN) 4 mg/0.1 mL nasal spray Administer 1 spray into a nostril. If no response after 2-3 minutes, give another dose in the other nostril using a new spray.  Patient not taking: Reported on 2/21/2025 12/31/24 12/31/25  Nj Maddox DO   oxyCODONE (Roxicodone) 5 immediate release tablet Take 1 tablet (5 mg total) by mouth every 6 (six) hours as needed for moderate pain Max Daily Amount: 20 mg  Patient not taking: Reported on 2/21/2025 1/9/25   Julian Tobias DO   oxygen gas Inhale continuous as needed 2.5 L  Patient not taking: Reported on 2/26/2025    Historical Provider, MD   predniSONE 10 mg tablet Take 4 tablets x4 days, 3 tablets x4 days, 2 tablets x4 days, 1 tablet x4 days 2/26/25   Maik Smith DO   predniSONE 10 mg tablet Take 4 tabs x 7 days then 3 tabs x 7 days then 2 tabs x 7 days then stay on 1 tab daily 3/7/25   Maik Smith DO   sucralfate (CARAFATE) 1 g tablet Take 1 tablet (1 g total) by mouth 4 (four) times a day 2/18/25   Mariama Sy PA-C   traZODone (DESYREL) 50 mg tablet Take 100 mg by mouth daily at bedtime    Historical Provider, MD   venlafaxine (EFFEXOR-XR) 150 mg 24 hr capsule Take 1 capsule by mouth in the morning 11/6/24   Historical Provider, MD   venlafaxine (EFFEXOR-XR) 75 mg 24 hr capsule Take 1 capsule by mouth in the morning 2/27/25   Historical Provider, MD     No Known Allergies    Objective :  Temp:  [97.6 °F (36.4 °C)] 97.6 °F (36.4 °C)  HR:  [71-82] 71  BP: (108-116)/(61) 108/61  Resp:  [16-20] 16  SpO2:  [90 %-95 %] 90 %  O2 Device: BiPAP  FiO2  (%):  [28] 28    Physical Exam  Vitals reviewed.   Constitutional:       Appearance: She is ill-appearing.   HENT:      Head: Normocephalic and atraumatic.      Mouth/Throat:      Mouth: Mucous membranes are moist.   Eyes:      Conjunctiva/sclera: Conjunctivae normal.      Pupils: Pupils are equal, round, and reactive to light.   Cardiovascular:      Rate and Rhythm: Normal rate and regular rhythm.      Pulses: Normal pulses.           Carotid pulses are 2+ on the right side and 2+ on the left side.       Radial pulses are 2+ on the right side and 2+ on the left side.      Heart sounds: Normal heart sounds, S1 normal and S2 normal. No murmur heard.  Pulmonary:      Effort: No tachypnea, bradypnea or accessory muscle usage.      Breath sounds: Normal breath sounds and air entry. No decreased breath sounds, wheezing, rhonchi or rales.   Abdominal:      General: Abdomen is flat. Bowel sounds are normal. There is no distension.      Palpations: Abdomen is soft.      Tenderness: There is no abdominal tenderness.   Musculoskeletal:      Cervical back: Normal range of motion.      Right lower leg: No edema.      Left lower leg: No edema.   Neurological:      Mental Status: She is alert and oriented to person, place, and time. Mental status is at baseline.   Psychiatric:         Mood and Affect: Mood normal.         Behavior: Behavior normal.          Lines/Drains:            Lab Results: I have reviewed the following results:  Results from last 7 days   Lab Units 04/05/25  1118   WBC Thousand/uL 7.78   HEMOGLOBIN g/dL 12.9   HEMATOCRIT % 42.8   PLATELETS Thousands/uL 216   SEGS PCT % 82*   LYMPHO PCT % 9*   MONO PCT % 8   EOS PCT % 0     Results from last 7 days   Lab Units 04/05/25  1118   SODIUM mmol/L 137   POTASSIUM mmol/L 4.5   CHLORIDE mmol/L 99   CO2 mmol/L 25   BUN mg/dL 21   CREATININE mg/dL 1.15   ANION GAP mmol/L 13   CALCIUM mg/dL 9.8   ALBUMIN g/dL 3.5   TOTAL BILIRUBIN mg/dL 0.62   ALK PHOS U/L 38   ALT U/L  17   AST U/L 11*   GLUCOSE RANDOM mg/dL 271*     Results from last 7 days   Lab Units 04/05/25  1118   INR  1.07         Lab Results   Component Value Date    HGBA1C 7.8 (H) 01/10/2025    HGBA1C 7.4 (H) 12/18/2024    HGBA1C 6.6 (H) 07/15/2023     Results from last 7 days   Lab Units 04/05/25  1118   PROCALCITONIN ng/ml 0.21       Imaging Results Review: No pertinent imaging studies reviewed.  Other Study Results Review: No additional pertinent studies reviewed.    Administrative Statements   I have spent a total time of 45 minutes in caring for this patient on the day of the visit/encounter including Diagnostic results, Prognosis, Risks and benefits of tx options, and Instructions for management.    ** Please Note: This note has been constructed using a voice recognition system. **

## 2025-04-05 NOTE — ASSESSMENT & PLAN NOTE
Moderate COPD per PFT from 2023   Follows up with pulmonary medicine Dr. Smith     PTA inhalers: Trellegy, Albuterol

## 2025-04-05 NOTE — ASSESSMENT & PLAN NOTE
"Lab Results   Component Value Date    HGBA1C 7.8 (H) 01/10/2025       No results for input(s): \"POCGLU\" in the last 72 hours.    Blood Sugar Average: Last 72 hrs:    SSI plus accu checks     "

## 2025-04-05 NOTE — ASSESSMENT & PLAN NOTE
On Suboxone 4 mg sublingual daily  PDMP reviewed last prescription filled 3/13/25  Continue while inpatient

## 2025-04-05 NOTE — ASSESSMENT & PLAN NOTE
Requires 2 LPM Qhs and PRN at baseline. Currently requiring Bipap with respiratory acidosis . Secondary to COPD .     Refer to A and P for AECOPD

## 2025-04-05 NOTE — ED PROVIDER NOTES
Time reflects when diagnosis was documented in both MDM as applicable and the Disposition within this note       Time User Action Codes Description Comment    4/5/2025  1:35 PM Mariama Alex Add [J44.1] COPD exacerbation (HCC)     4/5/2025  1:35 PM Mairama Alex Add [W19.XXXA] Fall, initial encounter     4/5/2025  1:35 PM Mariama Alex Add [R07.81] Rib pain on left side     4/5/2025  1:35 PM Mariama Alex Add [M25.552] Left hip pain     4/5/2025  1:36 PM Mariama Alex Add [R26.2] Ambulatory dysfunction     4/5/2025  2:25 PM Varun Sherwood Add [J96.00] Acute respiratory failure (HCC)           ED Disposition       ED Disposition   Admit    Condition   Stable    Date/Time   Sat Apr 5, 2025  1:36 PM    Comment                  Assessment & Plan       Medical Decision Making  Pt is a 75yo F who presents with SOB and pain.    Differential diagnosis to include but not limited to fracture, contusion, ICH, dislocation, PNA, atelectasis, COPD exacerbation.  Will plan for labs, EKG, and imaging. See ED course for results and details.    Plan to admit pt to [facility]. Pt discussed with admitting team and admission orders placed. Pt admitted without incident.           Amount and/or Complexity of Data Reviewed  Labs: ordered. Decision-making details documented in ED Course.  Radiology: ordered. Decision-making details documented in ED Course.  ECG/medicine tests:  Decision-making details documented in ED Course.    Risk  Prescription drug management.  Decision regarding hospitalization.        ED Course as of 04/05/25 1431   Sat Apr 05, 2025   1103 ECG 12 lead  Procedure Note: EKG  Date/Time: 04/05/25 11:03 AM   Interpreted by: Mariama Alex MD  Indications / Diagnosis: SOB  ECG reviewed by me, the ED Physician: yes   The EKG demonstrates:  Rhythm: normal sinus  Intervals: normal intervals  Axis: normal axis  QRS/Blocks: normal QRS  ST Changes: No acute ST Changes, no STD/DAISY.   1131 pH, Avinash(!): 7.268  Mild  respiratory acidosis.    1131 pCO2, Avinash(!): 69.7  CO2 retention   1131 CBC and differential(!)  Reviewed and without actionable derangement.    1145 FLU/COVID Rapid Antigen (30 min. TAT) - Preferred screening test in ED  Negative.    1145 POCT INR: 1.07  WNL   1145 PTT: 31  WNL   1148 GLUCOSE(!): 271  Hyperglycemia without gap.    1245 Pt agreeable to trial BiPAP.    1308 CT spine cervical without contrast  No acute cervical spine fracture or traumatic malalignment.   1311 CT head wo contrast  No acute intracranial abnormality.   1317 CT chest abdomen pelvis w contrast  1.  No evidence of acute posttraumatic injury throughout the chest, abdomen or pelvis.  2.  Right greater than left lower lobe reticular and groundglass densities concerning for infiltrates.  3.  Intrahepatic and extrahepatic biliary ductal dilatation with associated pneumobilia. Distal CBD stent noted.   1329 SLIM contacted for admission.    1410 Repeat VBG in process.    1418 pH, Avinash: 7.329  WNL   1419 pCO2, Avinash(!): 61.7  Improved.    1419 Procalcitonin: 0.21  WNL       Medications   lidocaine (LIDODERM) 5 % patch 1 patch (1 patch Topical Medication Applied 4/5/25 1121)   albuterol inhalation solution 2.5 mg (has no administration in time range)   albuterol (PROVENTIL HFA,VENTOLIN HFA) inhaler 2 puff (has no administration in time range)   amLODIPine (NORVASC) tablet 5 mg (has no administration in time range)   aspirin chewable tablet 81 mg (has no administration in time range)   atorvastatin (LIPITOR) tablet 40 mg (has no administration in time range)   b complex vitamins capsule 1 capsule (has no administration in time range)   buprenorphine-naloxone (Suboxone) film 4 mg (has no administration in time range)   cyclobenzaprine (FLEXERIL) tablet 10 mg (has no administration in time range)   DULoxetine (CYMBALTA) delayed release capsule 60 mg (has no administration in time range)   pantoprazole (PROTONIX) EC tablet 40 mg (has no administration in  time range)   levothyroxine tablet 100 mcg (has no administration in time range)   losartan (COZAAR) tablet 25 mg (has no administration in time range)   metoprolol succinate (TOPROL-XL) 24 hr tablet 50 mg (has no administration in time range)   sucralfate (CARAFATE) tablet 1 g (has no administration in time range)   traZODone (DESYREL) tablet 100 mg (has no administration in time range)   venlafaxine (EFFEXOR-XR) 24 hr capsule 150 mg (has no administration in time range)   fluticasone-vilanterol 200-25 mcg/actuation 1 puff (has no administration in time range)     And   umeclidinium 62.5 mcg/actuation inhaler AEPB 1 puff (has no administration in time range)   nicotine (NICODERM CQ) 7 mg/24hr TD 24 hr patch 1 patch (has no administration in time range)   enoxaparin (LOVENOX) subcutaneous injection 40 mg (has no administration in time range)   methylPREDNISolone sodium succinate (Solu-MEDROL) injection 40 mg (has no administration in time range)   azithromycin (ZITHROMAX) 500 mg in sodium chloride 0.9% 250mL IVPB 500 mg (has no administration in time range)   ALPRAZolam (XANAX) tablet 0.5 mg (has no administration in time range)   ipratropium-albuterol (DUO-NEB) 0.5-2.5 mg/3 mL inhalation solution **ADS Override Pull** (  Given to EMS 4/5/25 1046)   tetanus-diphtheria-acellular pertussis (BOOSTRIX) IM injection 0.5 mL (0.5 mL Intramuscular Given 4/5/25 1125)   acetaminophen (Ofirmev) injection 1,000 mg (1,000 mg Intravenous New Bag 4/5/25 1119)   methylPREDNISolone sodium succinate (Solu-MEDROL) injection 62.5 mg (62.5 mg Intravenous Given 4/5/25 1333)   albuterol inhalation solution 5 mg (5 mg Nebulization Given 4/5/25 1312)   ipratropium (ATROVENT) 0.02 % inhalation solution 0.5 mg (0.5 mg Nebulization Given 4/5/25 1312)   iohexol (OMNIPAQUE) 350 MG/ML injection (MULTI-DOSE) 100 mL (100 mL Intravenous Given 4/5/25 1239)       ED Risk Strat Scores                            SBIRT 22yo+      Flowsheet Row Most  "Recent Value   Initial Alcohol Screen: US AUDIT-C     1. How often do you have a drink containing alcohol? 0 Filed at: 04/05/2025 1102   2. How many drinks containing alcohol do you have on a typical day you are drinking?  0 Filed at: 04/05/2025 1102   3b. FEMALE Any Age, or MALE 65+: How often do you have 4 or more drinks on one occassion? 0 Filed at: 04/05/2025 1102   Audit-C Score 0 Filed at: 04/05/2025 1102   ARNOLDO: How many times in the past year have you...    Used an illegal drug or used a prescription medication for non-medical reasons? Never Filed at: 04/05/2025 1102                            History of Present Illness       Chief Complaint   Patient presents with    Shortness of Breath    Fall     Pt arrives via ALS after worsening SOB after a fall Wednesday. Pt COPD on 2 L at home, reports no head strike or LOC, reports right sided impact with R hip and rib pain       Past Medical History:   Diagnosis Date    Anxiety     Chronic pain 03/06/2023    lower abdomen and back    Colon polyp     COPD (chronic obstructive pulmonary disease) (HCC)     \"passes out\" with O2 levels dropping    Disease of thyroid gland     GERD (gastroesophageal reflux disease)     History of transfusion     with aneurysm repair-autologous-self and daughter    Hyperlipidemia     Hypertension     Teeth missing     Wears glasses     For reading      Past Surgical History:   Procedure Laterality Date    BACK SURGERY      x2 herniated, crushed disc in the lumbar, ablation    CHOLECYSTECTOMY      COLONOSCOPY      EPIDURAL BLOCK INJECTION N/A 2/5/2025    Procedure: L1-L2 LUMBAR EPIDURAL STEROID INJECTION;  Surgeon: Nj Maddox DO;  Location: [facility] MAIN OR;  Service: Pain Management     FRACTURE SURGERY Left     hip-pinned    HYSTERECTOMY      salpingo-oophorectomy    DC INJECT SI JOINT ARTHRGRPHY&/ANES/STEROID W/PATRICIA Bilateral 12/18/2024    Procedure: BILATERAL SACROILIAC JOINT INJECTION;  Surgeon: Nj Maddox DO;  Location: " [facility] MAIN OR;  Service: Pain Management     THORACIC AORTIC ANEURYSM REPAIR  09/2016    ascending thoracic aortic repair with RCA bypass with SVG x 1    TONSILLECTOMY      UPPER GASTROINTESTINAL ENDOSCOPY        Family History   Problem Relation Age of Onset    Breast cancer Mother 60      Social History     Tobacco Use    Smoking status: Every Day     Current packs/day: 0.25     Average packs/day: 0.3 packs/day for 57.3 years (14.3 ttl pk-yrs)     Types: Cigarettes     Start date: 1968     Passive exposure: Past    Smokeless tobacco: Never    Tobacco comments:     Currently smoking 5-6 cigarettes daily   Vaping Use    Vaping status: Never Used   Substance Use Topics    Alcohol use: Yes     Comment: occasionally    Drug use: Yes     Types: Marijuana, Cocaine     Comment: yes still Marijuana as of 9/5/24-2 times weekly      E-Cigarette/Vaping    E-Cigarette Use Never User       E-Cigarette/Vaping Substances    Nicotine No     THC No     CBD No     Flavoring No     Other No     Unknown No       I have reviewed and agree with the history as documented.     Pt is a 75yo F who presents for SOB and pain. Pt reports that three days ago she had a ground level fall. Pt reports that she landed on her L side. She does not believe she hit her head but is not for sure. Pt states that since that time she has not been able to ambulate secondary to L hip pain. She also reports L chest wall pain that is making it painful to breathe. Pt reports that she has been having increasing SOB since that time. Pt reports an associated cough as well. Pt states hx of COPD but reports that she has not been using her neb for the past several days because she hasn't been able to sit up secondary to the pain. Pt reports that she has been taking ASA for pain.           Objective       ED Triage Vitals   Temperature Pulse Blood Pressure Respirations SpO2 Patient Position - Orthostatic VS   04/05/25 1055 04/05/25 1055 04/05/25 1055 04/05/25 1055  04/05/25 1055 04/05/25 1055   97.6 °F (36.4 °C) 82 116/61 20 91 % Lying      Temp Source Heart Rate Source BP Location FiO2 (%) Pain Score    04/05/25 1055 04/05/25 1055 04/05/25 1055 04/05/25 1400 --    Oral Monitor Right arm 28       Vitals      Date and Time Temp Pulse SpO2 Resp BP Pain Score FACES Pain Rating User   04/05/25 1400 -- 71 90 % 16 108/61 -- -- JLC   04/05/25 1338 -- 71 95 % 20 110/61 -- -- DQ   04/05/25 1312 -- -- 92 % -- -- -- -- JH   04/05/25 1055 97.6 °F (36.4 °C) 82 91 % 20 116/61 -- -- LG            Physical Exam  Vitals reviewed.   Constitutional:       General: She is not in acute distress.     Appearance: She is well-developed. She is not toxic-appearing or diaphoretic.   HENT:      Head: Normocephalic and atraumatic.      Right Ear: External ear normal.      Left Ear: External ear normal.      Nose: Nose normal.      Mouth/Throat:      Pharynx: Oropharynx is clear.   Eyes:      Extraocular Movements: Extraocular movements intact.      Pupils: Pupils are equal, round, and reactive to light.   Cardiovascular:      Rate and Rhythm: Normal rate and regular rhythm.      Heart sounds: Normal heart sounds.   Pulmonary:      Effort: Pulmonary effort is normal.      Breath sounds: Wheezing present.   Chest:      Chest wall: Tenderness (L lateral) present.   Abdominal:      General: Bowel sounds are normal. There is no distension.      Palpations: Abdomen is soft.      Tenderness: There is abdominal tenderness. There is no guarding or rebound.   Musculoskeletal:         General: Tenderness (L hip) present.      Cervical back: Normal range of motion and neck supple.      Left hip: Decreased range of motion (2/2 pain).      Right lower leg: No edema.      Left lower leg: No edema.   Skin:     General: Skin is warm and dry.      Capillary Refill: Capillary refill takes less than 2 seconds.      Coloration: Skin is not pale.          Neurological:      General: No focal deficit present.      Mental  Status: She is alert and oriented to person, place, and time.   Psychiatric:         Speech: Speech normal.         Behavior: Behavior is cooperative.         Results Reviewed       Procedure Component Value Units Date/Time    Hemoglobin A1C [[id number]]     Lab Status: No result Specimen: Blood     Procalcitonin [[id number]]  (Normal) Collected: 04/05/25 1118    Lab Status: Final result Specimen: Blood from Arm, Right Updated: 04/05/25 1419     Procalcitonin 0.21 ng/ml     Blood gas, venous [[id number]]  (Abnormal) Collected: 04/05/25 1407    Lab Status: Final result Specimen: Blood from Arm, Right Updated: 04/05/25 1418     pH, Avinash 7.329     pCO2, Avinash 61.7 mm Hg      pO2, Avinash 21.7 mm Hg      HCO3, Avinash 31.7 mmol/L      Base Excess, Avinash 4.2 mmol/L      O2 Content, Avinash 5.8 ml/dL      O2 HGB, VENOUS 32.1 %     Comprehensive metabolic panel [[id number]]  (Abnormal) Collected: 04/05/25 1118    Lab Status: Final result Specimen: Blood from Arm, Right Updated: 04/05/25 1148     Sodium 137 mmol/L      Potassium 4.5 mmol/L      Chloride 99 mmol/L      CO2 25 mmol/L      ANION GAP 13 mmol/L      BUN 21 mg/dL      Creatinine 1.15 mg/dL      Glucose 271 mg/dL      Calcium 9.8 mg/dL      AST 11 U/L      ALT 17 U/L      Alkaline Phosphatase 38 U/L      Total Protein 6.9 g/dL      Albumin 3.5 g/dL      Total Bilirubin 0.62 mg/dL      eGFR 47 ml/min/1.73sq m     Narrative:      National Kidney Disease Foundation guidelines for Chronic Kidney Disease (CKD):     Stage 1 with normal or high GFR (GFR > 90 mL/min/1.73 square meters)    Stage 2 Mild CKD (GFR = 60-89 mL/min/1.73 square meters)    Stage 3A Moderate CKD (GFR = 45-59 mL/min/1.73 square meters)    Stage 3B Moderate CKD (GFR = 30-44 mL/min/1.73 square meters)    Stage 4 Severe CKD (GFR = 15-29 mL/min/1.73 square meters)    Stage 5 End Stage CKD (GFR <15 mL/min/1.73 square meters)  Note: GFR calculation is accurate only with a steady state creatinine    Protime-INR [[id number]]   (Normal) Collected: 04/05/25 1118    Lab Status: Final result Specimen: Blood from Arm, Right Updated: 04/05/25 1144     Protime 14.4 seconds      INR 1.07    Narrative:      INR Therapeutic Range    Indication                                             INR Range      Atrial Fibrillation                                               2.0-3.0  Hypercoagulable State                                    2.0.2.3  Left Ventricular Asist Device                            2.0-3.0  Mechanical Heart Valve                                  -    Aortic(with afib, MI, embolism, HF, LA enlargement,    and/or coagulopathy)                                     2.0-3.0 (2.5-3.5)     Mitral                                                             2.5-3.5  Prosthetic/Bioprosthetic Heart Valve               2.0-3.0  Venous thromboembolism (VTE: VT, PE        2.0-3.0    APTT [[id number]]  (Normal) Collected: 04/05/25 1118    Lab Status: Final result Specimen: Blood from Arm, Right Updated: 04/05/25 1144     PTT 31 seconds     FLU/COVID Rapid Antigen (30 min. TAT) - Preferred screening test in ED [[id number]]  (Normal) Collected: 04/05/25 1117    Lab Status: Final result Specimen: Nares from Nose Updated: 04/05/25 1144     SARS COV Rapid Antigen Negative     Influenza A Rapid Antigen Negative     Influenza B Rapid Antigen Negative    Narrative:      This test has been performed using the [employer] Latricia 2 FLU+SARS Antigen test under the Emergency Use Authorization (EUA). This test has been validated by the  and verified by the performing laboratory. The Latricia uses lateral flow immunofluorescent sandwich assay to detect SARS-COV, Influenza A and Influenza B Antigen.     The Quidel Latricia 2 SARS Antigen test does not differentiate between SARS-CoV and SARS-CoV-2.     Negative results are presumptive and may be confirmed with a molecular assay, if necessary, for patient management. Negative results do not rule out SARS-CoV-2 or  influenza infection and should not be used as the sole basis for treatment or patient management decisions. A negative test result may occur if the level of antigen in a sample is below the limit of detection of this test.     Positive results are indicative of the presence of viral antigens, but do not rule out bacterial infection or co-infection with other viruses.     All test results should be used as an adjunct to clinical observations and other information available to the provider.    FOR PEDIATRIC PATIENTS - copy/paste COVID Guidelines URL to browser: [email or web address]    Blood gas, venous [[id number]]  (Abnormal) Collected: 04/05/25 1118    Lab Status: Final result Specimen: Blood from Arm, Right Updated: 04/05/25 1130     pH, Avinash 7.268     pCO2, Avinash 69.7 mm Hg      pO2, Avinash 15.8 mm Hg      HCO3, Avinash 31.1 mmol/L      Base Excess, Avinash 2.3 mmol/L      O2 Content, Avinash 3.5 ml/dL      O2 HGB, VENOUS 18.6 %     CBC and differential [[id number]]  (Abnormal) Collected: 04/05/25 1118    Lab Status: Final result Specimen: Blood from Arm, Right Updated: 04/05/25 1127     WBC 7.78 Thousand/uL      RBC 4.40 Million/uL      Hemoglobin 12.9 g/dL      Hematocrit 42.8 %      MCV 97 fL      MCH 29.3 pg      MCHC 30.1 g/dL      RDW 13.9 %      MPV 9.3 fL      Platelets 216 Thousands/uL      nRBC 0 /100 WBCs      Segmented % 82 %      Immature Grans % 1 %      Lymphocytes % 9 %      Monocytes % 8 %      Eosinophils Relative 0 %      Basophils Relative 0 %      Absolute Neutrophils 6.36 Thousands/µL      Absolute Immature Grans 0.06 Thousand/uL      Absolute Lymphocytes 0.70 Thousands/µL      Absolute Monocytes 0.62 Thousand/µL      Eosinophils Absolute 0.01 Thousand/µL      Basophils Absolute 0.03 Thousands/µL             CT head wo contrast   Final Interpretation by Gonzalez Cole MD (04/05 1309)      No acute intracranial abnormality.      Similar  moderate-to-severe chronic microangiopathy.                  Workstation performed: [id number]         CT spine cervical without contrast   Final Interpretation by Gonzalez Cole MD (04/05 1305)      No acute cervical spine fracture or traumatic malalignment.      Diffuse osteopenia.      Additional chronic/incidental findings as detailed above                  Workstation performed: [id number]         CT chest abdomen pelvis w contrast   Final Interpretation by Mat Evans MD (04/05 1312)      1.  No evidence of acute posttraumatic injury throughout the chest, abdomen or pelvis.   2.  Right greater than left lower lobe reticular and groundglass densities concerning for infiltrates.   3.  Intrahepatic and extrahepatic biliary ductal dilatation with associated pneumobilia. Distal CBD stent noted.               Workstation performed: [id number]             Procedures    ED Medication and Procedure Management   Prior to Admission Medications   Prescriptions Last Dose Informant Patient Reported? Taking?   ALPRAZolam (XANAX) 0.5 mg tablet  Self Yes No   Sig: Take 0.5 mg by mouth 2 (two) times a day   ALPRAZolam (XANAX) 0.5 mg tablet   No No   Sig: Take 1 tablet (0.5 mg total) by mouth 1 (one) time for 1 dose   DULoxetine (CYMBALTA) 60 mg delayed release capsule  Self No No   Sig: Take 1 capsule (60 mg total) by mouth daily   acetylcysteine (MUCOMYST) 200 mg/mL nebulizer solution   No No   Sig: Use 2 ml and add to 1 vial of your albuterol twice a day as needed for thick mucous   albuterol (2.5 mg/3 mL) 0.083 % nebulizer solution  Self No No   Sig: Take 3 mL (2.5 mg total) by nebulization 4 (four) times a day as needed for wheezing or shortness of breath   albuterol (Proventil HFA) 90 mcg/act inhaler  Self No No   Sig: Inhale 2 puffs every 4 (four) hours as needed for wheezing   amLODIPine (NORVASC) 5 mg tablet  Self No No   Sig: Take 1 tablet (5 mg total) by mouth daily   aspirin 81 mg chewable tablet  Self Yes No    Sig: Chew 81 mg daily   atorvastatin (LIPITOR) 40 mg tablet  Self No No   Sig: Take 1 tablet (40 mg total) by mouth every morning   b complex vitamins capsule  Self Yes No   Sig: Take 1 capsule by mouth once a week   benzonatate (TESSALON PERLES) 100 mg capsule  Self No No   Sig: Take 1 capsule (100 mg total) by mouth 3 (three) times a day as needed for cough   buprenorphine-naloxone (Suboxone) 4-1 MG  Self Yes No   Sig: every morning Wafer   celecoxib (CeleBREX) 200 mg capsule  Self No No   Sig: Take 1 capsule (200 mg total) by mouth daily   cyclobenzaprine (FLEXERIL) 10 mg tablet  Self No No   Sig: Take 1 tablet (10 mg total) by mouth 3 (three) times a day as needed for muscle spasms   esomeprazole (NexIUM) 40 MG capsule  Self No No   Sig: Take 1 capsule (40 mg total) by mouth 2 (two) times a day before meals   fluticasone-umeclidinium-vilanterol (Trelegy Ellipta) 200-62.5-25 mcg/actuation AEPB inhaler  Self No No   Sig: Inhale 1 puff daily Rinse mouth after use.   levothyroxine 100 mcg tablet  Self No No   Sig: Take 1 tablet (100 mcg total) by mouth every morning   losartan (COZAAR) 25 mg tablet  Self No No   Sig: Take 1 tablet (25 mg total) by mouth every morning   metoprolol succinate (TOPROL-XL) 50 mg 24 hr tablet  Self No No   Sig: Take 1 tablet (50 mg total) by mouth every morning   naloxone (NARCAN) 4 mg/0.1 mL nasal spray  Self No No   Sig: Administer 1 spray into a nostril. If no response after 2-3 minutes, give another dose in the other nostril using a new spray.   Patient not taking: Reported on 2/21/2025   oxyCODONE (Roxicodone) 5 immediate release tablet  Self No No   Sig: Take 1 tablet (5 mg total) by mouth every 6 (six) hours as needed for moderate pain Max Daily Amount: 20 mg   Patient not taking: Reported on 2/21/2025   oxygen gas  Self Yes No   Sig: Inhale continuous as needed 2.5 L   Patient not taking: Reported on 2/26/2025   predniSONE 10 mg tablet  Self No No   Sig: Take 4 tablets x4 days,  3 tablets x4 days, 2 tablets x4 days, 1 tablet x4 days   predniSONE 10 mg tablet   No No   Sig: Take 4 tabs x 7 days then 3 tabs x 7 days then 2 tabs x 7 days then stay on 1 tab daily   sucralfate (CARAFATE) 1 g tablet  Self No No   Sig: Take 1 tablet (1 g total) by mouth 4 (four) times a day   traZODone (DESYREL) 50 mg tablet  Self Yes No   Sig: Take 100 mg by mouth daily at bedtime   venlafaxine (EFFEXOR-XR) 150 mg 24 hr capsule  Self Yes No   Sig: Take 1 capsule by mouth in the morning   venlafaxine (EFFEXOR-XR) 75 mg 24 hr capsule  Self Yes No   Sig: Take 1 capsule by mouth in the morning      Facility-Administered Medications: None     Patient's Medications   Discharge Prescriptions    No medications on file     No discharge procedures on file.  ED SEPSIS DOCUMENTATION   Time reflects when diagnosis was documented in both MDM as applicable and the Disposition within this note       Time User Action Codes Description Comment    4/5/2025  1:35 PM Mariama Alex Add [J44.1] COPD exacerbation (HCC)     4/5/2025  1:35 PM Mariama Alex [W19.XXXA] Fall, initial encounter     4/5/2025  1:35 PM Mariama Alex Add [R07.81] Rib pain on left side     4/5/2025  1:35 PM Mariama Alex [M25.552] Left hip pain     4/5/2025  1:36 PM Mariama Alex [R26.2] Ambulatory dysfunction     4/5/2025  2:25 PM Varun Sherwood Add [J96.00] Acute respiratory failure (HCC)                  Mariama Alex MD  04/05/25 9124

## 2025-04-06 PROBLEM — E87.29 RESPIRATORY ACIDOSIS: Status: RESOLVED | Noted: 2025-04-05 | Resolved: 2025-04-06

## 2025-04-06 PROBLEM — F19.90 DRUG USE: Status: ACTIVE | Noted: 2025-04-06

## 2025-04-06 PROBLEM — J96.21 ACUTE ON CHRONIC HYPOXIC RESPIRATORY FAILURE (HCC): Status: ACTIVE | Noted: 2025-04-06

## 2025-04-06 PROBLEM — R93.89 ABNORMAL CT OF THE CHEST: Status: ACTIVE | Noted: 2025-04-06

## 2025-04-06 LAB
ALBUMIN SERPL BCG-MCNC: 3.7 G/DL (ref 3.5–5)
ALP SERPL-CCNC: 36 U/L (ref 34–104)
ALT SERPL W P-5'-P-CCNC: 16 U/L (ref 7–52)
ANION GAP SERPL CALCULATED.3IONS-SCNC: 12 MMOL/L (ref 4–13)
AST SERPL W P-5'-P-CCNC: 10 U/L (ref 13–39)
BASE EX.OXY STD BLDV CALC-SCNC: 68.2 % (ref 60–80)
BASE EXCESS BLDV CALC-SCNC: 2.1 MMOL/L
BASOPHILS # BLD AUTO: 0.01 THOUSANDS/ÂΜL (ref 0–0.1)
BASOPHILS NFR BLD AUTO: 0 % (ref 0–1)
BILIRUB SERPL-MCNC: 0.32 MG/DL (ref 0.2–1)
BUN SERPL-MCNC: 21 MG/DL (ref 5–25)
CALCIUM SERPL-MCNC: 10 MG/DL (ref 8.4–10.2)
CHLORIDE SERPL-SCNC: 99 MMOL/L (ref 96–108)
CO2 SERPL-SCNC: 25 MMOL/L (ref 21–32)
CREAT SERPL-MCNC: 0.9 MG/DL (ref 0.6–1.3)
EOSINOPHIL # BLD AUTO: 0 THOUSAND/ÂΜL (ref 0–0.61)
EOSINOPHIL NFR BLD AUTO: 0 % (ref 0–6)
ERYTHROCYTE [DISTWIDTH] IN BLOOD BY AUTOMATED COUNT: 13.6 % (ref 11.6–15.1)
EST. AVERAGE GLUCOSE BLD GHB EST-MCNC: 212 MG/DL
GFR SERPL CREATININE-BSD FRML MDRD: 63 ML/MIN/1.73SQ M
GLUCOSE SERPL-MCNC: 148 MG/DL (ref 65–140)
GLUCOSE SERPL-MCNC: 204 MG/DL (ref 65–140)
GLUCOSE SERPL-MCNC: 221 MG/DL (ref 65–140)
GLUCOSE SERPL-MCNC: 244 MG/DL (ref 65–140)
GLUCOSE SERPL-MCNC: 457 MG/DL (ref 65–140)
HBA1C MFR BLD: 9 %
HCO3 BLDV-SCNC: 28.9 MMOL/L (ref 24–30)
HCT VFR BLD AUTO: 39.9 % (ref 34.8–46.1)
HGB BLD-MCNC: 12.4 G/DL (ref 11.5–15.4)
IMM GRANULOCYTES # BLD AUTO: 0.11 THOUSAND/UL (ref 0–0.2)
IMM GRANULOCYTES NFR BLD AUTO: 2 % (ref 0–2)
LYMPHOCYTES # BLD AUTO: 0.52 THOUSANDS/ÂΜL (ref 0.6–4.47)
LYMPHOCYTES NFR BLD AUTO: 9 % (ref 14–44)
MCH RBC QN AUTO: 29.2 PG (ref 26.8–34.3)
MCHC RBC AUTO-ENTMCNC: 31.1 G/DL (ref 31.4–37.4)
MCV RBC AUTO: 94 FL (ref 82–98)
MONOCYTES # BLD AUTO: 0.38 THOUSAND/ÂΜL (ref 0.17–1.22)
MONOCYTES NFR BLD AUTO: 7 % (ref 4–12)
NEUTROPHILS # BLD AUTO: 4.55 THOUSANDS/ÂΜL (ref 1.85–7.62)
NEUTS SEG NFR BLD AUTO: 82 % (ref 43–75)
NRBC BLD AUTO-RTO: 0 /100 WBCS
O2 CT BLDV-SCNC: 13.3 ML/DL
PCO2 BLDV: 54.3 MM HG (ref 42–50)
PH BLDV: 7.34 [PH] (ref 7.3–7.4)
PLATELET # BLD AUTO: 213 THOUSANDS/UL (ref 149–390)
PMV BLD AUTO: 9.4 FL (ref 8.9–12.7)
PO2 BLDV: 39.9 MM HG (ref 35–45)
POTASSIUM SERPL-SCNC: 4.4 MMOL/L (ref 3.5–5.3)
PROT SERPL-MCNC: 6.8 G/DL (ref 6.4–8.4)
RBC # BLD AUTO: 4.24 MILLION/UL (ref 3.81–5.12)
SODIUM SERPL-SCNC: 136 MMOL/L (ref 135–147)
WBC # BLD AUTO: 5.57 THOUSAND/UL (ref 4.31–10.16)

## 2025-04-06 PROCEDURE — 99223 1ST HOSP IP/OBS HIGH 75: CPT | Performed by: INTERNAL MEDICINE

## 2025-04-06 PROCEDURE — 80053 COMPREHEN METABOLIC PANEL: CPT

## 2025-04-06 PROCEDURE — 85025 COMPLETE CBC W/AUTO DIFF WBC: CPT

## 2025-04-06 PROCEDURE — 94668 MNPJ CHEST WALL SBSQ: CPT

## 2025-04-06 PROCEDURE — 94664 DEMO&/EVAL PT USE INHALER: CPT

## 2025-04-06 PROCEDURE — 94760 N-INVAS EAR/PLS OXIMETRY 1: CPT

## 2025-04-06 PROCEDURE — 94640 AIRWAY INHALATION TREATMENT: CPT

## 2025-04-06 PROCEDURE — 99232 SBSQ HOSP IP/OBS MODERATE 35: CPT

## 2025-04-06 PROCEDURE — 94660 CPAP INITIATION&MGMT: CPT

## 2025-04-06 PROCEDURE — 82948 REAGENT STRIP/BLOOD GLUCOSE: CPT

## 2025-04-06 PROCEDURE — 82805 BLOOD GASES W/O2 SATURATION: CPT

## 2025-04-06 RX ORDER — INSULIN LISPRO 100 [IU]/ML
2-12 INJECTION, SOLUTION INTRAVENOUS; SUBCUTANEOUS
Status: DISCONTINUED | OUTPATIENT
Start: 2025-04-06 | End: 2025-04-10 | Stop reason: HOSPADM

## 2025-04-06 RX ORDER — IPRATROPIUM BROMIDE AND ALBUTEROL SULFATE 2.5; .5 MG/3ML; MG/3ML
3 SOLUTION RESPIRATORY (INHALATION)
Status: DISCONTINUED | OUTPATIENT
Start: 2025-04-06 | End: 2025-04-08

## 2025-04-06 RX ORDER — ACETYLCYSTEINE 200 MG/ML
3 SOLUTION ORAL; RESPIRATORY (INHALATION)
Status: DISCONTINUED | OUTPATIENT
Start: 2025-04-06 | End: 2025-04-06

## 2025-04-06 RX ORDER — INSULIN LISPRO 100 [IU]/ML
1-6 INJECTION, SOLUTION INTRAVENOUS; SUBCUTANEOUS
Status: DISCONTINUED | OUTPATIENT
Start: 2025-04-06 | End: 2025-04-10 | Stop reason: HOSPADM

## 2025-04-06 RX ORDER — INSULIN LISPRO 100 [IU]/ML
5 INJECTION, SOLUTION INTRAVENOUS; SUBCUTANEOUS ONCE
Status: COMPLETED | OUTPATIENT
Start: 2025-04-06 | End: 2025-04-06

## 2025-04-06 RX ORDER — INSULIN GLARGINE 100 [IU]/ML
5 INJECTION, SOLUTION SUBCUTANEOUS
Status: DISCONTINUED | OUTPATIENT
Start: 2025-04-06 | End: 2025-04-10 | Stop reason: HOSPADM

## 2025-04-06 RX ORDER — INSULIN LISPRO 100 [IU]/ML
1-6 INJECTION, SOLUTION INTRAVENOUS; SUBCUTANEOUS
Status: DISCONTINUED | OUTPATIENT
Start: 2025-04-07 | End: 2025-04-10 | Stop reason: HOSPADM

## 2025-04-06 RX ORDER — GUAIFENESIN 600 MG/1
600 TABLET, EXTENDED RELEASE ORAL EVERY 12 HOURS SCHEDULED
Status: DISCONTINUED | OUTPATIENT
Start: 2025-04-06 | End: 2025-04-10 | Stop reason: HOSPADM

## 2025-04-06 RX ORDER — IPRATROPIUM BROMIDE AND ALBUTEROL SULFATE 2.5; .5 MG/3ML; MG/3ML
3 SOLUTION RESPIRATORY (INHALATION)
Status: DISCONTINUED | OUTPATIENT
Start: 2025-04-06 | End: 2025-04-06

## 2025-04-06 RX ADMIN — GUAIFENESIN 600 MG: 600 TABLET, EXTENDED RELEASE ORAL at 11:38

## 2025-04-06 RX ADMIN — LEVOTHYROXINE SODIUM 100 MCG: 100 TABLET ORAL at 05:04

## 2025-04-06 RX ADMIN — AZITHROMYCIN MONOHYDRATE 500 MG: 500 INJECTION, POWDER, LYOPHILIZED, FOR SOLUTION INTRAVENOUS at 15:07

## 2025-04-06 RX ADMIN — SUCRALFATE 1 G: 1 TABLET ORAL at 16:30

## 2025-04-06 RX ADMIN — DULOXETINE 60 MG: 60 CAPSULE, DELAYED RELEASE ORAL at 08:32

## 2025-04-06 RX ADMIN — VENLAFAXINE HYDROCHLORIDE 75 MG: 75 CAPSULE, EXTENDED RELEASE ORAL at 08:31

## 2025-04-06 RX ADMIN — INSULIN LISPRO 12 UNITS: 100 INJECTION, SOLUTION INTRAVENOUS; SUBCUTANEOUS at 12:02

## 2025-04-06 RX ADMIN — UMECLIDINIUM 1 PUFF: 62.5 AEROSOL, POWDER ORAL at 07:48

## 2025-04-06 RX ADMIN — LIDOCAINE 5% 2 PATCH: 700 PATCH TOPICAL at 08:29

## 2025-04-06 RX ADMIN — LOSARTAN POTASSIUM 25 MG: 25 TABLET, FILM COATED ORAL at 08:33

## 2025-04-06 RX ADMIN — ASPIRIN 81 MG 81 MG: 81 TABLET ORAL at 08:31

## 2025-04-06 RX ADMIN — AMLODIPINE BESYLATE 5 MG: 5 TABLET ORAL at 08:32

## 2025-04-06 RX ADMIN — METOPROLOL SUCCINATE 50 MG: 50 TABLET, EXTENDED RELEASE ORAL at 08:32

## 2025-04-06 RX ADMIN — IPRATROPIUM BROMIDE AND ALBUTEROL SULFATE 3 ML: .5; 3 SOLUTION RESPIRATORY (INHALATION) at 01:50

## 2025-04-06 RX ADMIN — PANTOPRAZOLE SODIUM 40 MG: 40 TABLET, DELAYED RELEASE ORAL at 08:32

## 2025-04-06 RX ADMIN — SUCRALFATE 1 G: 1 TABLET ORAL at 07:48

## 2025-04-06 RX ADMIN — METHYLPREDNISOLONE SODIUM SUCCINATE 40 MG: 40 INJECTION, POWDER, FOR SOLUTION INTRAMUSCULAR; INTRAVENOUS at 03:27

## 2025-04-06 RX ADMIN — INSULIN LISPRO 5 UNITS: 100 INJECTION, SOLUTION INTRAVENOUS; SUBCUTANEOUS at 12:02

## 2025-04-06 RX ADMIN — ENOXAPARIN SODIUM 40 MG: 40 INJECTION SUBCUTANEOUS at 08:28

## 2025-04-06 RX ADMIN — IPRATROPIUM BROMIDE AND ALBUTEROL SULFATE 3 ML: .5; 3 SOLUTION RESPIRATORY (INHALATION) at 19:22

## 2025-04-06 RX ADMIN — METHYLPREDNISOLONE SODIUM SUCCINATE 40 MG: 40 INJECTION, POWDER, FOR SOLUTION INTRAMUSCULAR; INTRAVENOUS at 17:11

## 2025-04-06 RX ADMIN — INSULIN LISPRO 3 UNITS: 100 INJECTION, SOLUTION INTRAVENOUS; SUBCUTANEOUS at 21:10

## 2025-04-06 RX ADMIN — BUPRENORPHINE AND NALOXONE 4 MG: 2; .5 FILM BUCCAL; SUBLINGUAL at 08:33

## 2025-04-06 RX ADMIN — FLUTICASONE FUROATE AND VILANTEROL TRIFENATATE 1 PUFF: 200; 25 POWDER RESPIRATORY (INHALATION) at 07:48

## 2025-04-06 RX ADMIN — METHYLPREDNISOLONE SODIUM SUCCINATE 40 MG: 40 INJECTION, POWDER, FOR SOLUTION INTRAMUSCULAR; INTRAVENOUS at 11:37

## 2025-04-06 RX ADMIN — VENLAFAXINE HYDROCHLORIDE 150 MG: 150 CAPSULE, EXTENDED RELEASE ORAL at 21:08

## 2025-04-06 RX ADMIN — TRIMETHOBENZAMIDE HYDROCHLORIDE 200 MG: 100 INJECTION INTRAMUSCULAR at 17:18

## 2025-04-06 RX ADMIN — GUAIFENESIN 600 MG: 600 TABLET, EXTENDED RELEASE ORAL at 21:08

## 2025-04-06 RX ADMIN — ALPRAZOLAM 1 MG: 0.5 TABLET ORAL at 21:08

## 2025-04-06 RX ADMIN — INSULIN GLARGINE 5 UNITS: 100 INJECTION, SOLUTION SUBCUTANEOUS at 21:09

## 2025-04-06 RX ADMIN — ATORVASTATIN CALCIUM 40 MG: 40 TABLET, FILM COATED ORAL at 08:32

## 2025-04-06 RX ADMIN — TRAZODONE HYDROCHLORIDE 100 MG: 100 TABLET ORAL at 21:08

## 2025-04-06 RX ADMIN — INSULIN LISPRO 3 UNITS: 100 INJECTION, SOLUTION INTRAVENOUS; SUBCUTANEOUS at 07:48

## 2025-04-06 NOTE — PLAN OF CARE
Problem: RESPIRATORY - ADULT  Goal: Achieves optimal ventilation and oxygenation  Description: INTERVENTIONS:- Assess for changes in respiratory status- Assess for changes in mentation and behavior- Position to facilitate oxygenation and minimize respiratory effort- Oxygen administered by appropriate delivery if ordered- Initiate smoking cessation education as indicated- Encourage broncho-pulmonary hygiene including cough, deep breathe, Incentive Spirometry- Assess the need for suctioning and aspirate as needed- Assess and instruct to report SOB or any respiratory difficulty- Respiratory Therapy support as indicated  INTERVENTIONS:- Assess for changes in respiratory status- Assess for changes in mentation and behavior- Position to facilitate oxygenation and minimize respiratory effort- Oxygen administered by appropriate delivery if ordered- Initiate smoking cessation education as indicated- Encourage broncho-pulmonary hygiene including cough, deep breathe, Incentive Spirometry- Assess the need for suctioning and aspirate as needed- Assess and instruct to report SOB or any respiratory difficulty- Respiratory Therapy support as indicated  Outcome: Progressing     Problem: Prexisting or High Potential for Compromised Skin Integrity  Goal: Skin integrity is maintained or improved  Description: INTERVENTIONS:- Identify patients at risk for skin breakdown- Assess and monitor skin integrity- Assess and monitor nutrition and hydration status- Monitor labs - Assess for incontinence - Turn and reposition patient- Assist with mobility/ambulation- Relieve pressure over bony prominences- Avoid friction and shearing- Provide appropriate hygiene as needed including keeping skin clean and dry- Evaluate need for skin moisturizer/barrier cream- Collaborate with interdisciplinary team - Patient/family teaching- Consider wound care consult   Outcome: Progressing     Problem: PAIN - ADULT  Goal: Verbalizes/displays adequate comfort  level or baseline comfort level  Description: Interventions:- Encourage patient to monitor pain and request assistance- Assess pain using appropriate pain scale- Administer analgesics based on type and severity of pain and evaluate response- Implement non-pharmacological measures as appropriate and evaluate response- Consider cultural and social influences on pain and pain management- Notify physician/advanced practitioner if interventions unsuccessful or patient reports new pain  Outcome: Progressing     Problem: INFECTION - ADULT  Goal: Absence or prevention of progression during hospitalization  Description: INTERVENTIONS:- Assess and monitor for signs and symptoms of infection- Monitor lab/diagnostic results- Monitor all insertion sites, i.e. indwelling lines, tubes, and drains- Monitor endotracheal if appropriate and nasal secretions for changes in amount and color- [place] appropriate cooling/warming therapies per order- Administer medications as ordered- Instruct and encourage patient and family to use good hand hygiene technique- Identify and instruct in appropriate isolation precautions for identified infection/condition  Outcome: Progressing  Goal: Absence of fever/infection during neutropenic period  Description: INTERVENTIONS:- Monitor WBC  Outcome: Progressing     Problem: SAFETY ADULT  Goal: Patient will remain free of falls  Description: INTERVENTIONS:- Educate patient/family on patient safety including physical limitations- Instruct patient to call for assistance with activity - Consult OT/PT to assist with strengthening/mobility - Keep Call bell within reach- Keep bed low and locked with side rails adjusted as appropriate- Keep care items and personal belongings within reach- Initiate and maintain comfort rounds- Make Fall Risk Sign visible to staff- Offer Toileting every  Hours, in advance of need- Initiate/Maintain alarm- bed/seat Obtain necessary fall risk management equipment: - Apply yellow  socks and bracelet for high fall risk patients- Consider moving patient to room near nurses station  Outcome: Progressing  Goal: Maintain or return to baseline ADL function  Description: INTERVENTIONS:-  Assess patient's ability to carry out ADLs; assess patient's baseline for ADL function and identify physical deficits which impact ability to perform ADLs (bathing, care of mouth/teeth, toileting, grooming, dressing, etc.)- Assess/evaluate cause of self-care deficits - Assess range of motion- Assess patient's mobility; develop plan if impaired- Assess patient's need for assistive devices and provide as appropriate- Encourage maximum independence but intervene and supervise when necessary- Involve family in performance of ADLs- Assess for home care needs following discharge - Consider OT consult to assist with ADL evaluation and planning for discharge- Provide patient education as appropriate  Outcome: Progressing  Goal: Maintains/Returns to pre admission functional level  Description: INTERVENTIONS:- Perform AM-PAC 6 Click Basic Mobility/ Daily Activity assessment daily.- Set and communicate daily mobility goal to care team and patient/family/caregiver. - Collaborate with rehabilitation services on mobility goals if consulted- Perform Range of Motion shift times a day.- Reposition patient every 2 hours.- Dangle patient 2 times a day- Stand patient 2 times a day- Ambulate patient 2 times a day- Out of bed to chair 2 times a day - Out of bed for meals 2 times a day- Out of bed for toileting- Record patient progress and toleration of activity level   Outcome: Progressing     Problem: DISCHARGE PLANNING  Goal: Discharge to home or other facility with appropriate resources  Description: INTERVENTIONS:- Identify barriers to discharge w/patient and caregiver- Arrange for needed discharge resources and transportation as appropriate- Identify discharge learning needs (meds, wound care, etc.)- Arrange for interpretive  services to assist at discharge as needed- Refer to Case Management Department for coordinating discharge planning if the patient needs post-hospital services based on physician/advanced practitioner order or complex needs related to functional status, cognitive ability, or social support system  Outcome: Progressing     Problem: Knowledge Deficit  Goal: Patient/family/caregiver demonstrates understanding of disease process, treatment plan, medications, and discharge instructions  Description: Complete learning assessment and assess knowledge base.Interventions:- Provide teaching at level of understanding- Provide teaching via preferred learning methods  Outcome: Progressing

## 2025-04-06 NOTE — PROGRESS NOTES
Progress Note - Hospitalist   Name: Montserrat Sumner 74 y.o. female I MRN: [id number]  Unit/Bed#: [address] [place] [address] I Date of Admission: 4/5/2025   Date of Service: 4/6/2025 I Hospital Day: 1    Assessment & Plan  COPD exacerbation (HCC)  Patient presents with increased shortness of breath, productive cough, and increasing O2 requirements with Bipap in ED.   Usually wears 2L just at bedtime having to use now during the day.Has not improved despite inhaler use.    In the ED patient was given DuoNeb and steroids with improvement in symptoms   flu, COVID, RSV negative  CT C/AP: Right greater than left lower lobe reticular and groundglass densities concerning for infiltrates.   Patient follows with pulmonary outpatient setting her home regimen is 200 mcg Trelegy and as needed albuterol nebs 4 times daily  Repeat VBG 4/6/25: no acid base disturbance noted.    Plan  Xopenex/Atrovent/ saline nebs 3 times daily  Mucinex BID  Mucomyst today   IV Solu-Medrol 40 mg twice daily  Azithromycin x 5 days  D2#  Bipap PRN   Pulm consult; appreciate input  Wean O2 as tolerated  Encouraged smoking cessation  Hypertension    Continue PTA amlodipine, metoprolol, and losartan  Hypothyroid  Continue PTA medication  CAD (coronary artery disease)  Known history of CAD s/p CABG x 1  Continue ASA and Lipitor daily  Centrilobular emphysema (HCC)  Moderate COPD per PFT from 2023   Follows up with pulmonary medicine Dr. Smith     PTA inhalers: Trellegy, Albuterol   Anxiety  Takes duloxetine   Xanax 0.5 mg BID   Will continue regimen above  PDMP reviewed   Monitor Qtc . Currently wnl   Opioid dependence (HCC)  On Suboxone 4 mg sublingual daily  PDMP reviewed last prescription filled 3/13/25  Continue while inpatient  Oxygen dependent at night only  2 LPM via NC at bedtime only   Follows up with Dr. Smith   Chronic bilateral low back pain without sciatica  PTOT eval and treat   Tobacco use  Reports smoking 3 cigarettes daily for many  years  Patient encouraged to quit smoking given her underlying COPD  NRT offered  IPMN (intraductal papillary mucinous neoplasm)  Follows up with surgical/medical oncology     Planned for stent removal however was postponed per GI to 4/18/25     Acute respiratory failure (HCC)  Requires 2 LPM Qhs and PRN at baseline. Currently requiring Bipap with respiratory acidosis . Secondary to COPD .         Currently on 3 LPM via NC   Refer to [facility] P for AECOPD   Fall  Tripped and fell on the LT side 3 dasy ago , has LT sided flank pain and rib pain with breathing. No palpiation  , tingling , numbness , dizziness or lightheadedness.       Trauma scans including CT Cervical spine, CAP , Head ; No trauma findings    Check LT hip XR ; pending   Continue PTOT   Fall precautions     Respiratory acidosis (Resolved: 4/6/2025)  Secondary to AECOPD     Refer to A [facility] P for COPD   Type 2 diabetes mellitus, without long-term current use of insulin (HCC)  Lab Results   Component Value Date    HGBA1C 9.0 (H) 04/05/2025       Recent Labs     04/05/25  1619 04/05/25 2001 04/06/25  0734   POCGLU 219* 254* 221*       Blood Sugar Average: Last 72 hrs:  (P) [id number]  SSI plus accu checks     Drug use  Reported using cocaine 3 days prior to admission. X 1       -counseling and education offered.  -resources per CM   -monitor opioid medications while hospitalized     VTE Pharmacologic Prophylaxis:   Moderate Risk (Score 3-4) - Pharmacological DVT Prophylaxis Ordered: enoxaparin (Lovenox).    Mobility:   Basic Mobility Inpatient Raw Score: 12  -HLM Goal: 4: Move to chair/commode  JH-HLM Achieved: 2: Bed activities/Dependent transfer  JH-HLM Goal achieved. Continue to encourage appropriate mobility.    Patient Centered Rounds: I performed bedside rounds with nursing staff today.   Discussions with Specialists or Other Care Team Provider: pulmonary medicine     Education and Discussions with Family / Patient: Updated   () via phone.    Current Length of Stay: 1 day(s)  Current Patient Status: Inpatient   Certification Statement: The patient will continue to require additional inpatient hospital stay due to AECOPD, acute respiratory failure   Discharge Plan: Anticipate discharge in 24-48 hrs to home.    Code Status: Level 1 - Full Code    Subjective   Patient seen today at bedside.  Reports marked improvement in breathing.  Still with intermittent wheezing and cough that is productive of very minimal watery phlegm.  Denies any nausea or vomiting or diarrhea or constipation.  No active urinary symptoms.  Patient reports left-sided hip pain however with improvement compared to yesterday.    Nursing staff reports patient endorsing cocaine use 3 days prior to presenting to the hospital.        Objective :  Temp:  [97 °F (36.1 °C)-98 °F (36.7 °C)] 97 °F (36.1 °C)  HR:  [71-84] 84  BP: (108-135)/(61-84) 133/83  Resp:  [16-22] 20  SpO2:  [90 %-95 %] 93 %  O2 Device: Nasal cannula  Nasal Cannula O2 Flow Rate (L/min):  [3 L/min] 3 L/min  FiO2 (%):  [28] 28    Body mass index is 29.58 kg/m².     Input and Output Summary (last 24 hours):     Intake/Output Summary (Last 24 hours) at 4/6/2025 0952  Last data filed at 4/5/2025 2001  Gross per 24 hour   Intake --   Output 400 ml   Net -400 ml       Physical Exam  Vitals and nursing note reviewed.   Constitutional:       General: She is not in acute distress.     Appearance: Normal appearance.   HENT:      Head: Normocephalic and atraumatic.      Mouth/Throat:      Mouth: Mucous membranes are moist.   Eyes:      Conjunctiva/sclera: Conjunctivae normal.      Pupils: Pupils are equal, round, and reactive to light.   Cardiovascular:      Rate and Rhythm: Normal rate and regular rhythm.      Pulses: Normal pulses.           Carotid pulses are 2+ on the right side and 2+ on the left side.       Radial pulses are 2+ on the right side and 2+ on the left side.      Heart sounds: Normal heart  sounds, S1 normal and S2 normal. No murmur heard.  Pulmonary:      Effort: No tachypnea, bradypnea or accessory muscle usage.      Breath sounds: Normal air entry. No transmitted upper airway sounds. Wheezing and rhonchi present. No decreased breath sounds or rales.   Abdominal:      General: Abdomen is flat. Bowel sounds are normal. There is no distension.      Palpations: Abdomen is soft.      Tenderness: There is no abdominal tenderness.   Musculoskeletal:      Right lower leg: No edema.      Left lower leg: No edema.      Comments: Intact ROM of Lower ext.   Tenderness to palpation on LT hip area , lateral joint line.    Neurological:      Mental Status: She is alert and oriented to person, place, and time. Mental status is at baseline.   Psychiatric:         Mood and Affect: Mood normal.         Behavior: Behavior normal.           Lines/Drains:              Lab Results: I have reviewed the following results:   Results from last 7 days   Lab Units 04/06/25  0510   WBC Thousand/uL 5.57   HEMOGLOBIN g/dL 12.4   HEMATOCRIT % 39.9   PLATELETS Thousands/uL 213   SEGS PCT % 82*   LYMPHO PCT % 9*   MONO PCT % 7   EOS PCT % 0     Results from last 7 days   Lab Units 04/06/25  0510   SODIUM mmol/L 136   POTASSIUM mmol/L 4.4   CHLORIDE mmol/L 99   CO2 mmol/L 25   BUN mg/dL 21   CREATININE mg/dL 0.90   ANION GAP mmol/L 12   CALCIUM mg/dL 10.0   ALBUMIN g/dL 3.7   TOTAL BILIRUBIN mg/dL 0.32   ALK PHOS U/L 36   ALT U/L 16   AST U/L 10*   GLUCOSE RANDOM mg/dL 204*     Results from last 7 days   Lab Units 04/05/25  1118   INR  1.07     Results from last 7 days   Lab Units 04/06/25  0734 04/05/25 2001 04/05/25  1619   POC GLUCOSE mg/dl 221* 254* 219*     Results from last 7 days   Lab Units 04/05/25  1118   HEMOGLOBIN A1C % 9.0*     Results from last 7 days   Lab Units 04/05/25  1118   PROCALCITONIN ng/ml 0.21       Recent Cultures (last 7 days):         Imaging Results Review: No pertinent imaging studies reviewed.  Other  Study Results Review: No additional pertinent studies reviewed.    Last 24 Hours Medication List:     Current Facility-Administered Medications:     acetaminophen (TYLENOL) tablet 650 mg, Q6H PRN    acetylcysteine (MUCOMYST) 200 mg/mL inhalation solution 600 mg, Q8H    albuterol (PROVENTIL HFA,VENTOLIN HFA) inhaler 2 puff, Q4H PRN    albuterol inhalation solution 2.5 mg, 4x Daily PRN    ALPRAZolam (XANAX) tablet 1 mg, HS    amLODIPine (NORVASC) tablet 5 mg, Daily    aspirin chewable tablet 81 mg, Daily    atorvastatin (LIPITOR) tablet 40 mg, QAM    azithromycin (ZITHROMAX) 500 mg in sodium chloride 0.9% 250mL IVPB 500 mg, Q24H    buprenorphine-naloxone (Suboxone) film 4 mg, Daily    cyclobenzaprine (FLEXERIL) tablet 10 mg, TID PRN    DULoxetine (CYMBALTA) delayed release capsule 60 mg, Daily    enoxaparin (LOVENOX) subcutaneous injection 40 mg, Daily    fluticasone-vilanterol 200-25 mcg/actuation 1 puff, Daily **AND** umeclidinium 62.5 mcg/actuation inhaler AEPB 1 puff, Daily    guaiFENesin (MUCINEX) 12 hr tablet 600 mg, Q12H TESSA    insulin lispro (HumALOG/ADMELOG) 100 units/mL subcutaneous injection 1-6 Units, TID AC **AND** Fingerstick Glucose (POCT), TID AC    ipratropium-albuterol (DUO-NEB) 0.5-2.5 mg/3 mL inhalation solution 3 mL, Q6H    levothyroxine tablet 100 mcg, Early Morning    lidocaine (LIDODERM) 5 % patch 2 patch, Daily    losartan (COZAAR) tablet 25 mg, QAM    methylPREDNISolone sodium succinate (Solu-MEDROL) injection 40 mg, Q8H    metoprolol succinate (TOPROL-XL) 24 hr tablet 50 mg, QAM    nicotine (NICODERM CQ) 7 mg/24hr TD 24 hr patch 1 patch, Daily    pantoprazole (PROTONIX) EC tablet 40 mg, Daily    sucralfate (CARAFATE) tablet 1 g, BID AC    traZODone (DESYREL) tablet 100 mg, HS    venlafaxine (EFFEXOR-XR) 24 hr capsule 150 mg, HS    venlafaxine (EFFEXOR-XR) 24 hr capsule 75 mg, QAM    vit B complex-vit C-folic acid (Renal Caps) capsule 1 capsule, Weekly    Administrative Statements   Today,  Patient Was Seen By: Varun Sherwood MD  I have spent a total time of 30 minutes in caring for this patient on the day of the visit/encounter including Diagnostic results, Prognosis, Risks and benefits of tx options, and Instructions for management.    **Please Note: This note may have been constructed using a voice recognition system.**

## 2025-04-06 NOTE — ASSESSMENT & PLAN NOTE
Requires 2 LPM Qhs and PRN at baseline. Currently requiring Bipap with respiratory acidosis . Secondary to COPD .         Currently on 3 LPM via NC   Refer to A and P for AECOPD

## 2025-04-06 NOTE — CONSULTS
Consultation - Pulmonology   Name: Montserrat Sumner 74 y.o. female I MRN: [id number]  Unit/Bed#: [address] [place] [address] I Date of Admission: 4/5/2025   Date of Service: 4/6/2025 I Hospital Day: 1   Inpatient consult to Pulmonology  Consult performed by: Katie Garrett MD  Consult ordered by: Varun Sherwood MD        Physician Requesting Evaluation: Varun Sherwood MD   Reason for Evaluation / Principal Problem: copd exacerbation    Assessment & Plan  Acute on chronic hypoxic respiratory failure (HCC)  Acute on chronic hypoxemic respiratory failure in the setting of moderate COPD with currently acute exacerbation and recent acute tracheobronchitis  Viral panel has been negative  Recently completed a course of antibiotic with Ceftin 3/7/2025  Can complete the course of azithromycin for 5 days    COPD exacerbation (HCC)  Moderate COPD with FEV1 of 51% currently in exacerbation  Will switch DuoNeb to every 4 hours  Continue with Trelegy 1 puff daily  Continue with Solu-Medrol 40 mg every 8 hours  Will hold off on the Mucomyst for now given acute exacerbation  She can continue the Mucomyst once she goes home she had states she has been on it for the past month  Continue with airway clearance measures with incentive spirometry and flutter valve  Centrilobular emphysema (HCC)  Centrilobular emphysema on the CT of the chest  Oxygen dependent at night only  Has been on 2 L of oxygen at nighttime only currently requiring around 3 L of oxygen continues  Titrate down as able to maintain oxygen saturation greater than 89%  Tobacco use  Smoking cessation discussed wants to cut down on  Candidate for lung cancer screening  Abnormal CT of the chest  Abnormal CT of the chest with bilateral lower lobe mild groundglass opacities viral panel has been negative  Unclear if this is aspiration with history of recent drug use as well and history of fall versus retrograde aspiration from the severe acid reflux and GERD symptoms will ask speech  "and swallow to see  I have discussed the above management plan in detail with the primary service.     History of Present Illness   Montserrat Sumner is a 74 y.o. female with significant smoking history still actively smoking a few cigarettes about 3 to 5 cigarettes in a day, history of moderate COPD with FEV1 of 51% in the spirometry in the office a few years ago currently on long-acting bronchodilators with Trelegy and also recently was added Mucomyst and prednisone 10 mg daily and recently she completed a course of antibiotics Ceftin for 7 days about 4 weeks ago.  For acute acupuncture  She states she was in the parking lot recently and with her bad leg she fell and since then her breathing has been back at baseline she only uses 2 L at bedtime but currently no needing around 3 L during the daytime as well    Review of Systems   Constitutional:  Positive for fatigue.   HENT: Negative.     Eyes: Negative.    Respiratory:  Positive for cough, shortness of breath and wheezing.    Cardiovascular: Negative.    Gastrointestinal: Negative.    Endocrine: Negative.    Genitourinary: Negative.    Musculoskeletal: Negative.         Leg pain   Allergic/Immunologic: Negative.    Neurological: Negative.    Psychiatric/Behavioral: Negative.         Historical Information   Historical Information   Past Medical History:   Diagnosis Date    Anxiety     Chronic pain 03/06/2023    lower abdomen and back    Colon polyp     COPD (chronic obstructive pulmonary disease) (HCC)     \"passes out\" with O2 levels dropping    Disease of thyroid gland     GERD (gastroesophageal reflux disease)     History of transfusion     with aneurysm repair-autologous-self and daughter    Hyperlipidemia     Hypertension     Respiratory acidosis 04/05/2025    Teeth missing     Wears glasses     For reading     Past Surgical History:   Procedure Laterality Date    BACK SURGERY      x2 herniated, crushed disc in the lumbar, ablation    CHOLECYSTECTOMY      " COLONOSCOPY      EPIDURAL BLOCK INJECTION N/A 2/5/2025    Procedure: L1-L2 LUMBAR EPIDURAL STEROID INJECTION;  Surgeon: Nj Maddox DO;  Location: [facility] MAIN OR;  Service: Pain Management     FRACTURE SURGERY Left     hip-pinned    HYSTERECTOMY      salpingo-oophorectomy    AR INJECT SI JOINT ARTHRGRPHY&/ANES/STEROID W/PATRICIA Bilateral 12/18/2024    Procedure: BILATERAL SACROILIAC JOINT INJECTION;  Surgeon: Nj Maddox DO;  Location: [facility] MAIN OR;  Service: Pain Management     THORACIC AORTIC ANEURYSM REPAIR  09/2016    ascending thoracic aortic repair with RCA bypass with SVG x 1    TONSILLECTOMY      UPPER GASTROINTESTINAL ENDOSCOPY       Social History     Tobacco Use    Smoking status: Every Day     Current packs/day: 0.25     Average packs/day: 0.3 packs/day for 57.3 years (14.3 ttl pk-yrs)     Types: Cigarettes     Start date: 1968     Passive exposure: Past    Smokeless tobacco: Never    Tobacco comments:     Currently smoking 5-6 cigarettes daily   Vaping Use    Vaping status: Never Used   Substance and Sexual Activity    Alcohol use: Yes     Comment: occasionally    Drug use: Yes     Types: Marijuana, Cocaine     Comment: yes still Marijuana as of 4/5    Sexual activity: Not Currently     Partners: Male     Birth control/protection: Post-menopausal     E-Cigarette/Vaping    E-Cigarette Use Never User      E-Cigarette/Vaping Substances    Nicotine No     THC No     CBD No     Flavoring No     Other No     Unknown No      Family history non-contributory  Tobacco History: 3 to 5 cigarettes still actively smoking  Occupational History: Retired  Family History:non-contributory    Objective :  Temp:  [97 °F (36.1 °C)-98 °F (36.7 °C)] 97 °F (36.1 °C)  HR:  [71-84] 84  BP: (108-135)/(61-84) 133/83  Resp:  [16-22] 18  SpO2:  [90 %-95 %] 93 %  O2 Device: Nasal cannula  Nasal Cannula O2 Flow Rate (L/min):  [3 L/min] 3 L/min  FiO2 (%):  [28] 28    Physical Exam  Currently in bed in no acute respiratory  distress  Eyes anicteric Sleeter, conjunctiva is clear  ENT nares is patent, no evidence of any discharge  Neck no JVD no cervical lymphadenopathy  Lungs bilateral expiratory rhonchi  Heart 1st and 2nd heart sounds are heard no murmur or gallop is heard  Abdomen soft nontender bowel sounds are present  Extremities no pedal edema  CNS awake alert oriented x 3    Lab Results: I have reviewed the following results:  .     04/05/25  1118 04/06/25  0510   WBC 7.78 5.57   HGB 12.9 12.4   HCT 42.8 39.9    213   SODIUM 137 136   K 4.5 4.4   CL 99 99   CO2 25 25   BUN 21 21   CREATININE 1.15 0.90   GLUC 271* 204*   AST 11* 10*   ALT 17 16   ALB 3.5 3.7   TBILI 0.62 0.32   ALKPHOS 38 36   PTT 31  --    INR 1.07  --      ABG: No new results in last 24 hours.    Imaging Results Review: I personally reviewed the following image studies in PACS and associated radiology reports: CT chest. My interpretation of the radiology images/reports is: Bilateral lower lobe groundglass opacities right greater than left.  Other Study Results Review: No additional pertinent studies reviewed.  PFT Results Reviewed: Last spirometry reviewed with FEV1 of 51% with moderate obstructive airflow limitation    VTE Prophylaxis: Sequential compression device (Venodyne)

## 2025-04-06 NOTE — PLAN OF CARE
Problem: RESPIRATORY - ADULT  Goal: Achieves optimal ventilation and oxygenation  Description: INTERVENTIONS:- Assess for changes in respiratory status- Assess for changes in mentation and behavior- Position to facilitate oxygenation and minimize respiratory effort- Oxygen administered by appropriate delivery if ordered- Initiate smoking cessation education as indicated- Encourage broncho-pulmonary hygiene including cough, deep breathe, Incentive Spirometry- Assess the need for suctioning and aspirate as needed- Assess and instruct to report SOB or any respiratory difficulty- Respiratory Therapy support as indicated  INTERVENTIONS:- Assess for changes in respiratory status- Assess for changes in mentation and behavior- Position to facilitate oxygenation and minimize respiratory effort- Oxygen administered by appropriate delivery if ordered- Initiate smoking cessation education as indicated- Encourage broncho-pulmonary hygiene including cough, deep breathe, Incentive Spirometry- Assess the need for suctioning and aspirate as needed- Assess and instruct to report SOB or any respiratory difficulty- Respiratory Therapy support as indicated  Outcome: Progressing     Problem: Prexisting or High Potential for Compromised Skin Integrity  Goal: Skin integrity is maintained or improved  Description: INTERVENTIONS:- Identify patients at risk for skin breakdown- Assess and monitor skin integrity- Assess and monitor nutrition and hydration status- Monitor labs - Assess for incontinence - Turn and reposition patient- Assist with mobility/ambulation- Relieve pressure over bony prominences- Avoid friction and shearing- Provide appropriate hygiene as needed including keeping skin clean and dry- Evaluate need for skin moisturizer/barrier cream- Collaborate with interdisciplinary team - Patient/family teaching- Consider wound care consult   Outcome: Progressing     Problem: PAIN - ADULT  Goal: Verbalizes/displays adequate comfort  level or baseline comfort level  Description: Interventions:- Encourage patient to monitor pain and request assistance- Assess pain using appropriate pain scale- Administer analgesics based on type and severity of pain and evaluate response- Implement non-pharmacological measures as appropriate and evaluate response- Consider cultural and social influences on pain and pain management- Notify physician/advanced practitioner if interventions unsuccessful or patient reports new pain  Outcome: Progressing     Problem: INFECTION - ADULT  Goal: Absence or prevention of progression during hospitalization  Description: INTERVENTIONS:- Assess and monitor for signs and symptoms of infection- Monitor lab/diagnostic results- Monitor all insertion sites, i.e. indwelling lines, tubes, and drains- Monitor endotracheal if appropriate and nasal secretions for changes in amount and color- [place] appropriate cooling/warming therapies per order- Administer medications as ordered- Instruct and encourage patient and family to use good hand hygiene technique- Identify and instruct in appropriate isolation precautions for identified infection/condition  Outcome: Progressing  Goal: Absence of fever/infection during neutropenic period  Description: INTERVENTIONS:- Monitor WBC  Outcome: Progressing     Problem: DISCHARGE PLANNING  Goal: Discharge to home or other facility with appropriate resources  Description: INTERVENTIONS:- Identify barriers to discharge w/patient and caregiver- Arrange for needed discharge resources and transportation as appropriate- Identify discharge learning needs (meds, wound care, etc.)- Arrange for interpretive services to assist at discharge as needed- Refer to Case Management Department for coordinating discharge planning if the patient needs post-hospital services based on physician/advanced practitioner order or complex needs related to functional status, cognitive ability, or social support system  Outcome:  Progressing     Problem: Knowledge Deficit  Goal: Patient/family/caregiver demonstrates understanding of disease process, treatment plan, medications, and discharge instructions  Description: Complete learning assessment and assess knowledge base.Interventions:- Provide teaching at level of understanding- Provide teaching via preferred learning methods  Outcome: Progressing

## 2025-04-06 NOTE — NUTRITION
Nutrition assessment completed today    Pt notes being on a regular diet PTA. Chart review negative for any significant wt changes: 04/05/25 167#, 03/11/25 171#, 01/30/25 171#, 11/12/24 169#, 04/26/24 172#.      EMR notes pt with DM, pt states she does not have DM since she is not on medication for it. Pt notes trying metformin for ~ 1 wk at home but stopped taking it d/t GI disturbance. Reviewed A1c 9.0 on 04/05/25, up from 7.8 on 01/10/25 & 7.4 on 12/18/24. Encouraged pt to discuss further with her provider.

## 2025-04-06 NOTE — SPEECH THERAPY NOTE
"Speech-Language Pathology Bedside Swallow screen      Patient Name: Montserrat Sumner    Today's Date: 4/6/2025     Problem List  Principal Problem:    Acute on chronic hypoxic respiratory failure (HCC)  Active Problems:    Hypertension    Hypothyroid    CAD (coronary artery disease)    Centrilobular emphysema (HCC)    Anxiety    Opioid dependence (HCC)    COPD exacerbation (HCC)    Oxygen dependent at night only    Chronic bilateral low back pain without sciatica    Tobacco use    IPMN (intraductal papillary mucinous neoplasm)    Acute respiratory failure (HCC)    Fall    Type 2 diabetes mellitus, without long-term current use of insulin (HCC)    Drug use    Abnormal CT of the chest      Past Medical History  Past Medical History:   Diagnosis Date    Anxiety     Chronic pain 03/06/2023    lower abdomen and back    Colon polyp     COPD (chronic obstructive pulmonary disease) (HCC)     \"passes out\" with O2 levels dropping    Disease of thyroid gland     GERD (gastroesophageal reflux disease)     History of transfusion     with aneurysm repair-autologous-self and daughter    Hyperlipidemia     Hypertension     Respiratory acidosis 04/05/2025    Teeth missing     Wears glasses     For reading       Past Surgical History  Past Surgical History:   Procedure Laterality Date    BACK SURGERY      x2 herniated, crushed disc in the lumbar, ablation    CHOLECYSTECTOMY      COLONOSCOPY      EPIDURAL BLOCK INJECTION N/A 2/5/2025    Procedure: L1-L2 LUMBAR EPIDURAL STEROID INJECTION;  Surgeon: Nj Maddox DO;  Location: [facility] MAIN OR;  Service: Pain Management     FRACTURE SURGERY Left     hip-pinned    HYSTERECTOMY      salpingo-oophorectomy    OR INJECT SI JOINT ARTHRGRPHY&/ANES/STEROID W/PATRICIA Bilateral 12/18/2024    Procedure: BILATERAL SACROILIAC JOINT INJECTION;  Surgeon: Nj Maddox DO;  Location: [facility] MAIN OR;  Service: Pain Management     THORACIC AORTIC ANEURYSM REPAIR  09/2016    ascending thoracic " aortic repair with RCA bypass with SVG x 1    TONSILLECTOMY      UPPER GASTROINTESTINAL ENDOSCOPY         Current Medical Status  Montserrat Sumner is a 74 y.o. female with significant smoking history still actively smoking a few cigarettes about 3 to 5 cigarettes in a day, history of moderate COPD with FEV1 of 51% in the spirometry in the office a few years ago currently on long-acting bronchodilators with Trelegy and also recently was added Mucomyst and prednisone 10 mg daily and recently she completed a course of antibiotics Ceftin for 7 days about 4 weeks ago.  For acute acupuncture  She states she was in the parking lot recently and with her bad leg she fell and since then her breathing has been back at baseline she only uses 2 L at bedtime but currently no needing around 3 L during the daytime as well      Swallow eval ordered. Pt passed rn swallow screen. Yadi Begum relays tolerance of PO. Pt and family greeted b/s- relays same- does not endorse s/s of dysphagia. Relays bringing up mucous due to COPD.  Beena Jackson MA, CCC-SLP  Speech Pathology [place] [id number]   Will discontinue orders as per tolerating current po diet w/o complaints nor s/s of dysphagia with PO .   Beena Jackson MA, CCC-SLP  Speech Pathology [place] [id number]

## 2025-04-06 NOTE — ASSESSMENT & PLAN NOTE
Acute on chronic hypoxemic respiratory failure in the setting of moderate COPD with currently acute exacerbation and recent acute tracheobronchitis  Viral panel has been negative  Recently completed a course of antibiotic with Ceftin 3/7/2025  Can complete the course of azithromycin for 5 days

## 2025-04-06 NOTE — ASSESSMENT & PLAN NOTE
Lab Results   Component Value Date    HGBA1C 9.0 (H) 04/05/2025       Recent Labs     04/05/25  1619 04/05/25 2001 04/06/25  0734   POCGLU 219* 254* 221*       Blood Sugar Average: Last 72 hrs:  (P) [id number]  SSI plus accu checks

## 2025-04-06 NOTE — ASSESSMENT & PLAN NOTE
Patient presents with increased shortness of breath, productive cough, and increasing O2 requirements with Bipap in ED.   Usually wears 2L just at bedtime having to use now during the day.Has not improved despite inhaler use.    In the ED patient was given DuoNeb and steroids with improvement in symptoms   flu, COVID, RSV negative  CT C/AP: Right greater than left lower lobe reticular and groundglass densities concerning for infiltrates.   Patient follows with pulmonary outpatient setting her home regimen is 200 mcg Trelegy and as needed albuterol nebs 4 times daily  Repeat VBG 4/6/25: no acid base disturbance noted.    Plan  Xopenex/Atrovent/ saline nebs 3 times daily  Mucinex BID  Mucomyst today   IV Solu-Medrol 40 mg twice daily  Azithromycin x 5 days  D2#  Bipap PRN   Pulm consult; appreciate input  Wean O2 as tolerated  Encouraged smoking cessation

## 2025-04-06 NOTE — ASSESSMENT & PLAN NOTE
Moderate COPD with FEV1 of 51% currently in exacerbation  Will switch DuoNeb to every 4 hours  Continue with Trelegy 1 puff daily  Continue with Solu-Medrol 40 mg every 8 hours  Will hold off on the Mucomyst for now given acute exacerbation  She can continue the Mucomyst once she goes home she had states she has been on it for the past month  Continue with airway clearance measures with incentive spirometry and flutter valve

## 2025-04-06 NOTE — ASSESSMENT & PLAN NOTE
Has been on 2 L of oxygen at nighttime only currently requiring around 3 L of oxygen continues  Titrate down as able to maintain oxygen saturation greater than 89%

## 2025-04-06 NOTE — ASSESSMENT & PLAN NOTE
Abnormal CT of the chest with bilateral lower lobe mild groundglass opacities viral panel has been negative  Unclear if this is aspiration with history of recent drug use as well and history of fall versus retrograde aspiration from the severe acid reflux and GERD symptoms will ask speech and swallow to see

## 2025-04-06 NOTE — ASSESSMENT & PLAN NOTE
Reported using cocaine 3 days prior to admission. X 1       -counseling and education offered.  -resources per CM   -monitor opioid medications while hospitalized

## 2025-04-07 ENCOUNTER — PATIENT OUTREACH (OUTPATIENT)
Dept: CASE MANAGEMENT | Facility: OTHER | Age: 75
End: 2025-04-07

## 2025-04-07 ENCOUNTER — APPOINTMENT (INPATIENT)
Dept: RADIOLOGY | Facility: HOSPITAL | Age: 75
DRG: 190 | End: 2025-04-07
Payer: COMMERCIAL

## 2025-04-07 PROBLEM — R05.2 SUBACUTE COUGH: Status: RESOLVED | Noted: 2025-03-08 | Resolved: 2025-04-07

## 2025-04-07 PROBLEM — K59.00 CONSTIPATION: Status: ACTIVE | Noted: 2025-04-07

## 2025-04-07 LAB
ALBUMIN SERPL BCG-MCNC: 3.5 G/DL (ref 3.5–5)
ALP SERPL-CCNC: 33 U/L (ref 34–104)
ALT SERPL W P-5'-P-CCNC: 18 U/L (ref 7–52)
ANION GAP SERPL CALCULATED.3IONS-SCNC: 11 MMOL/L (ref 4–13)
AST SERPL W P-5'-P-CCNC: 13 U/L (ref 13–39)
ATRIAL RATE: 82 BPM
BASOPHILS # BLD AUTO: 0.04 THOUSANDS/ÂΜL (ref 0–0.1)
BASOPHILS NFR BLD AUTO: 1 % (ref 0–1)
BILIRUB SERPL-MCNC: 0.27 MG/DL (ref 0.2–1)
BUN SERPL-MCNC: 23 MG/DL (ref 5–25)
CALCIUM SERPL-MCNC: 9.8 MG/DL (ref 8.4–10.2)
CHLORIDE SERPL-SCNC: 103 MMOL/L (ref 96–108)
CO2 SERPL-SCNC: 25 MMOL/L (ref 21–32)
CREAT SERPL-MCNC: 0.8 MG/DL (ref 0.6–1.3)
EOSINOPHIL # BLD AUTO: 0 THOUSAND/ÂΜL (ref 0–0.61)
EOSINOPHIL NFR BLD AUTO: 0 % (ref 0–6)
ERYTHROCYTE [DISTWIDTH] IN BLOOD BY AUTOMATED COUNT: 13.7 % (ref 11.6–15.1)
GFR SERPL CREATININE-BSD FRML MDRD: 73 ML/MIN/1.73SQ M
GLUCOSE SERPL-MCNC: 168 MG/DL (ref 65–140)
GLUCOSE SERPL-MCNC: 171 MG/DL (ref 65–140)
GLUCOSE SERPL-MCNC: 219 MG/DL (ref 65–140)
GLUCOSE SERPL-MCNC: 250 MG/DL (ref 65–140)
GLUCOSE SERPL-MCNC: 284 MG/DL (ref 65–140)
GLUCOSE SERPL-MCNC: 375 MG/DL (ref 65–140)
HCT VFR BLD AUTO: 40.2 % (ref 34.8–46.1)
HGB BLD-MCNC: 12.5 G/DL (ref 11.5–15.4)
IMM GRANULOCYTES # BLD AUTO: 0.19 THOUSAND/UL (ref 0–0.2)
IMM GRANULOCYTES NFR BLD AUTO: 2 % (ref 0–2)
LYMPHOCYTES # BLD AUTO: 0.49 THOUSANDS/ÂΜL (ref 0.6–4.47)
LYMPHOCYTES NFR BLD AUTO: 6 % (ref 14–44)
MCH RBC QN AUTO: 29.5 PG (ref 26.8–34.3)
MCHC RBC AUTO-ENTMCNC: 31.1 G/DL (ref 31.4–37.4)
MCV RBC AUTO: 95 FL (ref 82–98)
MONOCYTES # BLD AUTO: 0.6 THOUSAND/ÂΜL (ref 0.17–1.22)
MONOCYTES NFR BLD AUTO: 7 % (ref 4–12)
NEUTROPHILS # BLD AUTO: 6.82 THOUSANDS/ÂΜL (ref 1.85–7.62)
NEUTS SEG NFR BLD AUTO: 84 % (ref 43–75)
NRBC BLD AUTO-RTO: 0 /100 WBCS
P AXIS: 53 DEGREES
PLATELET # BLD AUTO: 250 THOUSANDS/UL (ref 149–390)
PMV BLD AUTO: 9.4 FL (ref 8.9–12.7)
POTASSIUM SERPL-SCNC: 4.7 MMOL/L (ref 3.5–5.3)
PR INTERVAL: 134 MS
PROT SERPL-MCNC: 6.6 G/DL (ref 6.4–8.4)
QRS AXIS: 2 DEGREES
QRSD INTERVAL: 92 MS
QT INTERVAL: 384 MS
QTC INTERVAL: 448 MS
RBC # BLD AUTO: 4.24 MILLION/UL (ref 3.81–5.12)
SODIUM SERPL-SCNC: 139 MMOL/L (ref 135–147)
T WAVE AXIS: 71 DEGREES
VENTRICULAR RATE: 82 BPM
WBC # BLD AUTO: 8.14 THOUSAND/UL (ref 4.31–10.16)

## 2025-04-07 PROCEDURE — 94669 MECHANICAL CHEST WALL OSCILL: CPT

## 2025-04-07 PROCEDURE — 97167 OT EVAL HIGH COMPLEX 60 MIN: CPT

## 2025-04-07 PROCEDURE — 97163 PT EVAL HIGH COMPLEX 45 MIN: CPT

## 2025-04-07 PROCEDURE — 99232 SBSQ HOSP IP/OBS MODERATE 35: CPT

## 2025-04-07 PROCEDURE — 80053 COMPREHEN METABOLIC PANEL: CPT

## 2025-04-07 PROCEDURE — 93010 ELECTROCARDIOGRAM REPORT: CPT | Performed by: INTERNAL MEDICINE

## 2025-04-07 PROCEDURE — 99232 SBSQ HOSP IP/OBS MODERATE 35: CPT | Performed by: INTERNAL MEDICINE

## 2025-04-07 PROCEDURE — 82948 REAGENT STRIP/BLOOD GLUCOSE: CPT

## 2025-04-07 PROCEDURE — 85025 COMPLETE CBC W/AUTO DIFF WBC: CPT

## 2025-04-07 PROCEDURE — 94640 AIRWAY INHALATION TREATMENT: CPT

## 2025-04-07 PROCEDURE — 97530 THERAPEUTIC ACTIVITIES: CPT

## 2025-04-07 PROCEDURE — 94664 DEMO&/EVAL PT USE INHALER: CPT

## 2025-04-07 PROCEDURE — 94760 N-INVAS EAR/PLS OXIMETRY 1: CPT

## 2025-04-07 PROCEDURE — 94668 MNPJ CHEST WALL SBSQ: CPT

## 2025-04-07 RX ORDER — DOCUSATE SODIUM 100 MG/1
100 CAPSULE, LIQUID FILLED ORAL 2 TIMES DAILY
Status: DISCONTINUED | OUTPATIENT
Start: 2025-04-07 | End: 2025-04-10 | Stop reason: HOSPADM

## 2025-04-07 RX ORDER — METHYLPREDNISOLONE SODIUM SUCCINATE 40 MG/ML
40 INJECTION, POWDER, LYOPHILIZED, FOR SOLUTION INTRAMUSCULAR; INTRAVENOUS EVERY 12 HOURS SCHEDULED
Status: DISCONTINUED | OUTPATIENT
Start: 2025-04-07 | End: 2025-04-08

## 2025-04-07 RX ORDER — POLYETHYLENE GLYCOL 3350 17 G/17G
17 POWDER, FOR SOLUTION ORAL DAILY PRN
Status: DISCONTINUED | OUTPATIENT
Start: 2025-04-07 | End: 2025-04-10 | Stop reason: HOSPADM

## 2025-04-07 RX ADMIN — ALPRAZOLAM 1 MG: 0.5 TABLET ORAL at 20:01

## 2025-04-07 RX ADMIN — TRIMETHOBENZAMIDE HYDROCHLORIDE 200 MG: 100 INJECTION INTRAMUSCULAR at 20:01

## 2025-04-07 RX ADMIN — TRAZODONE HYDROCHLORIDE 100 MG: 100 TABLET ORAL at 20:01

## 2025-04-07 RX ADMIN — IPRATROPIUM BROMIDE AND ALBUTEROL SULFATE 3 ML: .5; 3 SOLUTION RESPIRATORY (INHALATION) at 13:45

## 2025-04-07 RX ADMIN — SUCRALFATE 1 G: 1 TABLET ORAL at 17:45

## 2025-04-07 RX ADMIN — PANTOPRAZOLE SODIUM 40 MG: 40 TABLET, DELAYED RELEASE ORAL at 08:02

## 2025-04-07 RX ADMIN — INSULIN LISPRO 6 UNITS: 100 INJECTION, SOLUTION INTRAVENOUS; SUBCUTANEOUS at 11:35

## 2025-04-07 RX ADMIN — ACETAMINOPHEN 650 MG: 325 TABLET ORAL at 14:56

## 2025-04-07 RX ADMIN — INSULIN LISPRO 10 UNITS: 100 INJECTION, SOLUTION INTRAVENOUS; SUBCUTANEOUS at 17:44

## 2025-04-07 RX ADMIN — FLUTICASONE FUROATE AND VILANTEROL TRIFENATATE 1 PUFF: 200; 25 POWDER RESPIRATORY (INHALATION) at 08:04

## 2025-04-07 RX ADMIN — AZITHROMYCIN MONOHYDRATE 500 MG: 500 INJECTION, POWDER, LYOPHILIZED, FOR SOLUTION INTRAVENOUS at 15:01

## 2025-04-07 RX ADMIN — VENLAFAXINE HYDROCHLORIDE 150 MG: 150 CAPSULE, EXTENDED RELEASE ORAL at 20:01

## 2025-04-07 RX ADMIN — INSULIN LISPRO 2 UNITS: 100 INJECTION, SOLUTION INTRAVENOUS; SUBCUTANEOUS at 21:20

## 2025-04-07 RX ADMIN — INSULIN LISPRO 4 UNITS: 100 INJECTION, SOLUTION INTRAVENOUS; SUBCUTANEOUS at 02:11

## 2025-04-07 RX ADMIN — GUAIFENESIN 600 MG: 600 TABLET, EXTENDED RELEASE ORAL at 20:01

## 2025-04-07 RX ADMIN — ENOXAPARIN SODIUM 40 MG: 40 INJECTION SUBCUTANEOUS at 08:01

## 2025-04-07 RX ADMIN — INSULIN LISPRO 2 UNITS: 100 INJECTION, SOLUTION INTRAVENOUS; SUBCUTANEOUS at 08:05

## 2025-04-07 RX ADMIN — VENLAFAXINE HYDROCHLORIDE 75 MG: 75 CAPSULE, EXTENDED RELEASE ORAL at 08:01

## 2025-04-07 RX ADMIN — GUAIFENESIN 600 MG: 600 TABLET, EXTENDED RELEASE ORAL at 08:01

## 2025-04-07 RX ADMIN — AMLODIPINE BESYLATE 5 MG: 5 TABLET ORAL at 08:01

## 2025-04-07 RX ADMIN — METOPROLOL SUCCINATE 50 MG: 50 TABLET, EXTENDED RELEASE ORAL at 08:01

## 2025-04-07 RX ADMIN — ATORVASTATIN CALCIUM 40 MG: 40 TABLET, FILM COATED ORAL at 08:01

## 2025-04-07 RX ADMIN — METHYLPREDNISOLONE SODIUM SUCCINATE 40 MG: 40 INJECTION, POWDER, FOR SOLUTION INTRAMUSCULAR; INTRAVENOUS at 20:01

## 2025-04-07 RX ADMIN — DOCUSATE SODIUM 100 MG: 100 CAPSULE, LIQUID FILLED ORAL at 08:01

## 2025-04-07 RX ADMIN — BUPRENORPHINE AND NALOXONE 4 MG: 2; .5 FILM BUCCAL; SUBLINGUAL at 08:01

## 2025-04-07 RX ADMIN — DULOXETINE 60 MG: 60 CAPSULE, DELAYED RELEASE ORAL at 08:02

## 2025-04-07 RX ADMIN — SUCRALFATE 1 G: 1 TABLET ORAL at 08:02

## 2025-04-07 RX ADMIN — DOCUSATE SODIUM 100 MG: 100 CAPSULE, LIQUID FILLED ORAL at 17:45

## 2025-04-07 RX ADMIN — ACETAMINOPHEN 650 MG: 325 TABLET ORAL at 23:49

## 2025-04-07 RX ADMIN — LIDOCAINE 5% 2 PATCH: 700 PATCH TOPICAL at 08:01

## 2025-04-07 RX ADMIN — INSULIN GLARGINE 5 UNITS: 100 INJECTION, SOLUTION SUBCUTANEOUS at 21:20

## 2025-04-07 RX ADMIN — METHYLPREDNISOLONE SODIUM SUCCINATE 40 MG: 40 INJECTION, POWDER, FOR SOLUTION INTRAMUSCULAR; INTRAVENOUS at 02:11

## 2025-04-07 RX ADMIN — LEVOTHYROXINE SODIUM 100 MCG: 100 TABLET ORAL at 06:30

## 2025-04-07 RX ADMIN — LOSARTAN POTASSIUM 25 MG: 25 TABLET, FILM COATED ORAL at 08:01

## 2025-04-07 RX ADMIN — METHYLPREDNISOLONE SODIUM SUCCINATE 40 MG: 40 INJECTION, POWDER, FOR SOLUTION INTRAMUSCULAR; INTRAVENOUS at 08:04

## 2025-04-07 RX ADMIN — IPRATROPIUM BROMIDE AND ALBUTEROL SULFATE 3 ML: .5; 3 SOLUTION RESPIRATORY (INHALATION) at 07:21

## 2025-04-07 RX ADMIN — ASPIRIN 81 MG 81 MG: 81 TABLET ORAL at 08:01

## 2025-04-07 RX ADMIN — IPRATROPIUM BROMIDE AND ALBUTEROL SULFATE 3 ML: .5; 3 SOLUTION RESPIRATORY (INHALATION) at 20:11

## 2025-04-07 NOTE — ASSESSMENT & PLAN NOTE
Lab Results   Component Value Date    HGBA1C 9.0 (H) 04/05/2025       Recent Labs     04/06/25  1611 04/06/25  2009 04/07/25  0209 04/07/25  0710   POCGLU 148* 244* 284* 171*       Blood Sugar Average: Last 72 hrs:  (P) 249.75  SSI plus accu checks   Started on Lantus 5 units nightly

## 2025-04-07 NOTE — PROGRESS NOTES
Progress Note - Pulmonology   Name: Montserrat Sumner 74 y.o. female I MRN: [id number]  Unit/Bed#: [address] [place] [address] Date of Admission: 4/5/2025   Date of Service: 4/7/2025 I Hospital Day: 2     Assessment & Plan  Acute on chronic hypoxic respiratory failure (HCC)  Acute on chronic hypoxemic respiratory failure in the setting of moderate COPD with currently acute exacerbation and recent acute tracheobronchitis  Viral panel has been negative  Recently completed a course of antibiotic with Ceftin 3/7/2025  Can complete the course of azithromycin for 5 days    COPD exacerbation (HCC)  Moderate COPD with FEV1 of 51% currently in exacerbation  Will switch DuoNeb to every 4 hours  Continue with Trelegy 1 puff daily  Can reduce solumedrol to q 12 hrs for now   Will hold off on the Mucomyst for now given acute exacerbation  She can continue the Mucomyst once she goes home she had states she has been on it for the past month  Continue with airway clearance measures with incentive spirometry and flutter valve  Centrilobular emphysema (HCC)  Centrilobular emphysema on the CT of the chest  Oxygen dependent at night only  Has been on 2 L of oxygen at nighttime only currently requiring around 3 L of oxygen continues  Titrate down as able to maintain oxygen saturation greater than 89%  Tobacco use  Smoking cessation discussed wants to cut down on  Candidate for lung cancer screening  Abnormal CT of the chest  Abnormal CT of the chest with bilateral lower lobe mild groundglass opacities viral panel has been negative  Unclear if this is aspiration with history of recent drug use as well and history of fall versus retrograde aspiration from the severe acid reflux and GERD symptoms will ask speech and swallow to see    24 Hour Events :   Subjective : Pt is AAXO3. She still has cough and unable to bring phlegm.     Objective :  Temp:  [97 °F (36.1 °C)-97.5 °F (36.4 °C)] 97.5 °F (36.4 °C)  HR:  [73-90] 90  BP: (118-152)/(70-89)  152/89  Resp:  [18-22] 18  SpO2:  [88 %-95 %] 88 %  O2 Device: Nasal cannula  Nasal Cannula O2 Flow Rate (L/min):  [3 L/min] 3 L/min    Physical Exam  General: AAOX3  HEENT: atrumatic head  Lungs: decreased breath sound at bases, no rales or wheezing   CVS: S1S2+, no m/r/g  Abdomen: soft, Nd, NT  Ext; no edema  Neuro: AAXO3      Lab Results: I have reviewed the following results:   .     04/07/25  0659   WBC 8.14   HGB 12.5   HCT 40.2      SODIUM 139   K 4.7      CO2 25   BUN 23   CREATININE 0.80   GLUC 168*   AST 13   ALT 18   ALB 3.5   TBILI 0.27   ALKPHOS 33*     ABG: No new results in last 24 hours.

## 2025-04-07 NOTE — ASSESSMENT & PLAN NOTE
PTOT eval and treat   Fell at home on 4/3/25  CT spine / CAP: No acute cervical spine fracture or traumatic malalignment. Diffuse osteopenia.  acetaminophen PRN, lidocaine patch  PTOT   Check lumbar spine reconstructed CT scan

## 2025-04-07 NOTE — OCCUPATIONAL THERAPY NOTE
Occupational Therapy Evaluation       04/07/25 1345   OT Last Visit   OT Visit Date 04/07/25   Note Type   Note type Evaluation   Pain Assessment   Pain Assessment Tool 0-10   Pain Score 7   Pain Location/Orientation Orientation: Left;Location: Groin   Restrictions/Precautions   Other Precautions Pain;Fall Risk;O2;Chair Alarm;Bed Alarm   Home Living   Type of Home House   Home Layout One level;Performs ADLs on one level;Able to live on main level with bedroom/bathroom  (2 DAISY, also has ramp access)   Bathroom Toilet Standard   Bathroom Equipment Commode   Home Equipment Other (Comment)  (rollator, O2)   Additional Comments pt reports wearing 2.5L O2 at night, prn during the day   Prior Function   Level of [place] Independent with ADLs;Independent with functional mobility;Needs assistance with IADLS   Lives With Spouse;Daughter   Receives Help From Family   IADLs Independent with driving;Family/Friend/Other provides meals;Family/Friend/Other provides medication management   Falls in the last 6 months 1 to 4   General   Additional Pertinent History Pt presents with progressive SOB for the past 3 days. Associated with wheezing and chest tightness. Reports around that time she tripped and fell on side walk, - LOC, - headstrike, endorsing L hip and L sided-chest pain.   Subjective   Subjective Pt agreeable to OT evaluation   ADL   Eating Assistance 7  Independent   Grooming Assistance 5  Supervision/Setup   UB Bathing Assistance 5  Supervision/Setup   LB Bathing Assistance 3  Moderate Assistance   UB Dressing Assistance 5  Supervision/Setup   LB Dressing Assistance 3  Moderate Assistance   Toileting Assistance  3  Moderate Assistance   Bed Mobility   Supine to Sit Unable to assess   Sit to Supine Unable to assess   Additional Comments pt sitting OOB in chair and returned to chair at end of session   Transfers   Sit to Stand 4  Minimal assistance   Additional items Assist x 1;Verbal cues;Increased time required    Stand to Sit 4  Minimal assistance   Additional items Assist x 1;Verbal cues;Increased time required   Functional Mobility   Functional Mobility 4  Minimal assistance   Additional Comments engaged in fxl mobility short distances with RW, limited by pain   Additional items Rolling walker   Balance   Static Sitting Fair +   Dynamic Sitting Fair   Static Standing Fair -   Dynamic Standing Poor +   Activity Tolerance   Activity Tolerance Patient limited by fatigue;Patient limited by pain   Nurse Made Aware spoke with YURY Rojas   RUAMINA Assessment   RUE Assessment   (at least 3+/5 MMT as seen during fxl assessment)   LUE Assessment   LUE Assessment   (at least 3+/5 MMT as seen during fxl assessment)   Cognition   Overall Cognitive Status WFL   Arousal/Participation Alert;Cooperative   Attention Within functional limits   Orientation Level Oriented X4   Memory Within functional limits   Following Commands Follows multistep commands without difficulty   Assessment   Limitation Decreased ADL status;Decreased endurance;Decreased self-care trans;Decreased high-level ADLs  (decreased balance and mobility)   Prognosis Good   Assessment Patient evaluated by Occupational Therapy.  Patient admitted with COPD exacerbation (HCC).  The patients occupational profile, medical and therapy history includes a extensive additional review of physical, cognitive, or psychosocial history related to current functional performance.  Comorbidities affecting functional mobility and ADLS include: COPD, respiratory failure, DM, HTN, CAD.  Prior to admission, patient was independent with functional mobility with rollator, independent with ADLS, and requiring assist for IADLS.  The evaluation identifies the following performance deficits: weakness, impaired balance, decreased endurance, increased fall risk, new onset of impairment of functional mobility, decreased ADLS, decreased IADLS, pain, decreased activity tolerance, and decreased strength, that  result in activity limitations and/or participation restrictions. This evaluation requires clinical decision making of high complexity, because the patient presents with comorbidites that affect occupational performance and required significant modification of tasks or assistance with consideration of multiple treatment options.  The Barthel Index was used as a functional outcome tool presenting with a score of 60, indicating moderate limitations of functional mobility and ADLS.  The patient's raw score on the -PAC Daily Activity Inpatient Short Form is 15. A raw score of less than 19 suggests the patient may benefit from discharge to post-acute rehabilitation services. Please refer to the recommendation of the Occupational Therapist for safe discharge planning.  Patient will benefit from skilled Occupational Therapy services to address above deficits and facilitate a safe return to prior level of function.   Goals   Patient Goals to stay in the hospital couple more days to get stronger   STG Time Frame   (1-7 days)   Short Term Goal  Goals established to promote Patient Goals: to stay in the hospital couple more days to get stronger: Grooming: independent standing at sink; Bathing: min assist; Upper Body Dressing independent; Lower Body Dressing: min assist; Toileting: min assist; Patient will increase ambulatory standard toilet transfer to supervision with rolling walker to increase performance and safety with ADLS and functional mobility; Patient will increase standing tolerance to 3 minutes during ADL task to decrease assistance level and decrease fall risk; Patient will increase bed mobility to independent in preparation for ADLS and transfers; Patient will increase functional mobility to and from bathroom with rolling walker with supervision to increase performance with ADLS and to use a toilet; Patient will tolerate 8 minutes of UE ROM/strengthening to increase general activity tolerance and performance in  ADLS/IADLS; Patient will improve functional activity tolerance to 10 minutes of sustained functional tasks to increase participation in basic self-care and decrease assistance level;  Patient will be able to to verbalize understanding and perform energy conservation/proper body mechanics during ADLS and functional mobility at least 75% of the time with minimal cueing to decrease signs of fatigue and increase stamina to return to prior level of function; Patient will increase dynamic sitting balance to fair+ to improve the ability to sit at edge of bed or on a chair for ADLS;  Patient will increase dynamic standing balance to fair- to improve postural stability and decrease fall risk during standing ADLS and transfers.   LTG Time Frame   (8-14 days)   Long Term Goal Bathing: supervision; Lower Body Dressing: supervision; Toileting: supervision; Patient will increase ambulatory standard toilet transfer to independent with rolling walker to increase performance and safety with ADLS and functional mobility; Patient will increase standing tolerance to 6 minutes during ADL task to decrease assistance level and decrease fall risk; Patient will increase functional mobility to and from bathroom with rolling walker independently to increase performance with ADLS and to use a toilet; Patient will tolerate 15 minutes of UE ROM/strengthening to increase general activity tolerance and performance in ADLS/IADLS; Patient will improve functional activity tolerance to 20 minutes of sustained functional tasks to increase participation in basic self-care and decrease assistance level;  Patient will be able to to verbalize understanding and perform energy conservation/proper body mechanics during ADLS and functional mobility at least 90% of the time with no cueing to decrease signs of fatigue and increase stamina to return to prior level of function; Patient will increase dynamic sitting balance to good to improve the ability to sit at  edge of bed or on a chair for ADLS;  Patient will increase dynamic standing balance to fair to improve postural stability and decrease fall risk during standing ADLS and transfers.  Pt will score >/= 19/24 on AM-PAC Daily Activity Inpatient scale to promote safe independence with ADLs and functional mobility; Pt will score >/= 90/100 on Barthel Index in order to decrease caregiver assistance needed and increase ability to perform ADLs and functional mobility.   Plan   Treatment Interventions ADL retraining;Functional transfer training;UE strengthening/ROM;Endurance training;Patient/family training;Equipment evaluation/education;Compensatory technique education;Activityengagement;Energy conservation   Goal Expiration Date 04/21/25   OT Frequency 3-5x/wk   Discharge Recommendation   Rehab Resource Intensity Level, OT II (Moderate Resource Intensity)   Additional Comments  pt refusing rehab, states her spouse and daughter will be able to provide assistance/support   AM-PAC Daily Activity Inpatient   Lower Body Dressing 2   Bathing 2   Toileting 2   Upper Body Dressing 3   Grooming 3   Eating 3   Daily Activity Raw Score 15   Daily Activity Standardized Score (Calc for Raw Score >=11) 34.69   AM-PAC Applied Cognition Inpatient   Following a Speech/Presentation 4   Understanding Ordinary Conversation 4   Taking Medications 4   Remembering Where Things Are Placed or Put Away 4   Remembering List of 4-5 Errands 4   Taking Care of Complicated Tasks 4   Applied Cognition Raw Score 24   Applied Cognition Standardized Score 62.21   Barthel Index   Feeding 10   Bathing 0   Grooming Score 5   Dressing Score 5   Bladder Score 10   Bowels Score 10   Toilet Use Score 5   Transfers (Bed/Chair) Score 10   Mobility (Level Surface) Score 0   Stairs Score 5   Barthel Index Score 60     Jacqueline Ward OTR/L   [place] License # [id number]  [place] License # [id number]

## 2025-04-07 NOTE — PROGRESS NOTES
Progress Note - Hospitalist   Name: Montserrat Sumner 74 y.o. female I MRN: [id number]  Unit/Bed#: [address] [place] [address] I Date of Admission: 4/5/2025   Date of Service: 4/7/2025 I Hospital Day: 2    Assessment & Plan  COPD exacerbation (HCC)  Patient presents with increased shortness of breath, productive cough, and increasing O2 requirements with Bipap in ED.   Usually wears 2L just at bedtime having to use now during the day.Has not improved despite inhaler use.    In the ED patient was given DuoNeb and steroids with improvement in symptoms   flu, COVID, RSV negative  CT C/AP: Right greater than left lower lobe reticular and groundglass densities concerning for infiltrates.   Patient follows with pulmonary outpatient setting her home regimen is 200 mcg Trelegy and as needed albuterol nebs 4 times daily  Repeat VBG 4/6/25: no acid base disturbance noted.    Plan  Encouraged smoking cessation  Bipap PRN   Pulm consult; appreciate input  Hold mucomyst, can restart at discharge  Xopenex/Atrovent/ saline nebs 3 times daily  Mucinex BID  IV Solu-Medrol 40 mg twice daily  Azithromycin x 5 days  D3#  O2 titrated to room air today (4/7/25), continue to monitor o2 sat. Continue 2L at bedtime  Home o2 eval on discharge    Centrilobular emphysema (HCC)  Moderate COPD per PFT from 2023   Ct chest: Centrilobular emphysema   Follows up with pulmonary medicine Dr. Smith   PTA inhalers: Trellegy, Albuterol   Acute respiratory failure (HCC)  Requires 2 LPM Qhs and PRN at baseline. Currently requiring Bipap with respiratory acidosis . Secondary to COPD .  O2 titrated to room air today (4/7/25), continue to monitor o2 sat. Continue 2L at bedtime  Pulmonary consulted  Refer to A and P for AECOPD   Oxygen dependent at night only  2 LPM via NC at bedtime only   Follows up with Dr. Smith   Hypertension  Continue PTA amlodipine, metoprolol, and losartan  Hypothyroid  Continue PTA levothyroxine  CAD (coronary artery disease)  Known  history of CAD s/p CABG x 1  Continue ASA and Lipitor daily  Anxiety  Takes duloxetine   Xanax 0.5 mg BID   Will continue regimen above  PDMP reviewed   Monitor Qtc . Currently wnl   Opioid dependence (HCC)  On Suboxone 4 mg sublingual daily  PDMP reviewed last prescription filled 3/13/25  Continue while inpatient  Chronic bilateral low back pain without sciatica  PTOT eval and treat   Fell at home on 4/3/25  CT spine / CAP: No acute cervical spine fracture or traumatic malalignment. Diffuse osteopenia.  acetaminophen PRN, lidocaine patch  PTOT   Check lumbar spine reconstructed CT scan  Tobacco use  Reports smoking 3 cigarettes daily for many years  Patient encouraged to quit smoking given her underlying COPD  NRT offered  IPMN (intraductal papillary mucinous neoplasm)  Follows up with surgical/medical oncology   Planned for stent removal however was postponed per GI to 4/18/25     Fall  Tripped and fell on the LT 4/3/25 , has LT sided flank pain and rib pain with breathing. No palpiation  , tingling , numbness , dizziness or lightheadedness.   Trauma scans including CT Cervical spine, CAP , Head ; No trauma findings  L hip x ray: No acute osseous abnormality.    acetaminophen prn pain, lidocaine patch  PTOT  Fall precautions   Check reconstructed lumbar spine CT    Type 2 diabetes mellitus, without long-term current use of insulin (HCC)  Lab Results   Component Value Date    HGBA1C 9.0 (H) 04/05/2025       Recent Labs     04/06/25  1611 04/06/25 2009 04/07/25  0209 04/07/25  0710   POCGLU 148* 244* 284* 171*       Blood Sugar Average: Last 72 hrs:  (P) 249.75  SSI plus accu checks   Started on Lantus 5 units nightly    Drug use  Reported using cocaine 3 days prior to admission. X 1   -counseling and education offered.  -resources per CM   -monitor opioid medications while hospitalized   Constipation  Colace BID   Miralax prn  Monitor I/Os    VTE Pharmacologic Prophylaxis:   Moderate Risk (Score 3-4) -  Pharmacological DVT Prophylaxis Ordered: enoxaparin (Lovenox).    Mobility:   Basic Mobility Inpatient Raw Score: 13  JH-HLM Goal: 4: Move to chair/commode  JH-HLM Achieved: 1: Laying in bed  JH-HLM Goal achieved. Continue to encourage appropriate mobility.    Patient Centered Rounds: I performed bedside rounds with nursing staff today.   Discussions with Specialists or Other Care Team Provider: boyd, rn    Education and Discussions with Family / Patient: Patient declined call to . Family coming this afternoon    Current Length of Stay: 2 day(s)  Current Patient Status: Inpatient   Certification Statement: The patient will continue to require additional inpatient hospital stay due to hypoxia, copd exacerbation  Discharge Plan: Anticipate discharge in 24-48 hrs to home.    Code Status: Level 1 - Full Code    Subjective   Pt sitting in bed comfortably. States that she still is having pain in her L hip, leg, and back. States that it is a 7/10 sitting.   No bowel or bladder incontinence .   notes that her SOB has improved since titrating o2 down to room air. Denies chest pain, n/v, fever, chills, numbness/tingling, weakness.     Objective :  Temp:  [97 °F (36.1 °C)-97.5 °F (36.4 °C)] 97.5 °F (36.4 °C)  HR:  [73-90] 90  BP: (118-152)/(70-89) 152/89  Resp:  [18-22] 18  SpO2:  [88 %-95 %] 88 %  O2 Device: Nasal cannula  Nasal Cannula O2 Flow Rate (L/min):  [3 L/min] 3 L/min    Body mass index is 29.58 kg/m².     Input and Output Summary (last 24 hours):     Intake/Output Summary (Last 24 hours) at 4/7/2025 0913  Last data filed at 4/7/2025 0600  Gross per 24 hour   Intake 240 ml   Output 1000 ml   Net -760 ml       Physical Exam  Vitals and nursing note reviewed.   Constitutional:       General: She is not in acute distress.     Appearance: Normal appearance.   HENT:      Head: Normocephalic and atraumatic.   Eyes:      Pupils: Pupils are equal, round, and reactive to light.   Cardiovascular:      Rate and  Rhythm: Normal rate and regular rhythm.      Pulses: Normal pulses.           Carotid pulses are 2+ on the right side and 2+ on the left side.       Radial pulses are 2+ on the right side and 2+ on the left side.      Heart sounds: Normal heart sounds, S1 normal and S2 normal. No murmur heard.  Pulmonary:      Effort: No tachypnea, bradypnea, accessory muscle usage or respiratory distress.      Breath sounds: Normal air entry. No transmitted upper airway sounds. Wheezing and rhonchi present. No decreased breath sounds or rales.      Comments: Expiratory wheezing and ronchi, b/l bases  Abdominal:      General: Abdomen is flat. Bowel sounds are normal. There is no distension.      Palpations: Abdomen is soft.      Tenderness: There is no abdominal tenderness.   Musculoskeletal:         General: No swelling. Tenderness: Lumbar spine TTP.     Cervical back: Normal.      Lumbar back: Normal.      Right lower leg: No edema.      Left lower leg: No edema.        Legs:       Comments: Intact ROM of Lower ext.  With limitation at the left lower extremity hip level given pain with movement flexion/extension.    Tenderness to palpation on LT hip area , lateral joint line.    Neurological:      Mental Status: She is alert and oriented to person, place, and time. Mental status is at baseline.   Psychiatric:         Mood and Affect: Mood normal.         Behavior: Behavior normal.         Lines/Drains:              Lab Results: I have reviewed the following results:   Results from last 7 days   Lab Units 04/07/25  0659   WBC Thousand/uL 8.14   HEMOGLOBIN g/dL 12.5   HEMATOCRIT % 40.2   PLATELETS Thousands/uL 250   SEGS PCT % 84*   LYMPHO PCT % 6*   MONO PCT % 7   EOS PCT % 0     Results from last 7 days   Lab Units 04/07/25  0659   SODIUM mmol/L 139   POTASSIUM mmol/L 4.7   CHLORIDE mmol/L 103   CO2 mmol/L 25   BUN mg/dL 23   CREATININE mg/dL 0.80   ANION GAP mmol/L 11   CALCIUM mg/dL 9.8   ALBUMIN g/dL 3.5   TOTAL BILIRUBIN mg/dL  0.27   ALK PHOS U/L 33*   ALT U/L 18   AST U/L 13   GLUCOSE RANDOM mg/dL 168*     Results from last 7 days   Lab Units 04/05/25  1118   INR  1.07     Results from last 7 days   Lab Units 04/07/25  0710 04/07/25  0209 04/06/25 2009 04/06/25  1611 04/06/25  1053 04/06/25  0734 04/05/25 2001 04/05/25  1619   POC GLUCOSE mg/dl 171* 284* 244* 148* 457* 221* 254* 219*     Results from last 7 days   Lab Units 04/05/25  1118   HEMOGLOBIN A1C % 9.0*     Results from last 7 days   Lab Units 04/05/25  1118   PROCALCITONIN ng/ml 0.21       Recent Cultures (last 7 days):         Imaging Results Review: No pertinent imaging studies reviewed.  Other Study Results Review: No additional pertinent studies reviewed.    Last 24 Hours Medication List:     Current Facility-Administered Medications:     acetaminophen (TYLENOL) tablet 650 mg, Q6H PRN    albuterol (PROVENTIL HFA,VENTOLIN HFA) inhaler 2 puff, Q4H PRN    albuterol inhalation solution 2.5 mg, 4x Daily PRN    ALPRAZolam (XANAX) tablet 1 mg, HS    amLODIPine (NORVASC) tablet 5 mg, Daily    aspirin chewable tablet 81 mg, Daily    atorvastatin (LIPITOR) tablet 40 mg, QAM    azithromycin (ZITHROMAX) 500 mg in sodium chloride 0.9% 250mL IVPB 500 mg, Q24H    buprenorphine-naloxone (Suboxone) film 4 mg, Daily    cyclobenzaprine (FLEXERIL) tablet 10 mg, TID PRN    docusate sodium (COLACE) capsule 100 mg, BID    DULoxetine (CYMBALTA) delayed release capsule 60 mg, Daily    enoxaparin (LOVENOX) subcutaneous injection 40 mg, Daily    fluticasone-vilanterol 200-25 mcg/actuation 1 puff, Daily **AND** [DISCONTINUED] umeclidinium 62.5 mcg/actuation inhaler AEPB 1 puff, Daily    guaiFENesin (MUCINEX) 12 hr tablet 600 mg, Q12H TESSA    insulin glargine (LANTUS) subcutaneous injection 5 Units 0.05 mL, HS    insulin lispro (HumALOG/ADMELOG) 100 units/mL subcutaneous injection 1-6 Units, 0200    insulin lispro (HumALOG/ADMELOG) 100 units/mL subcutaneous injection 1-6 Units, HS    insulin lispro  (HumALOG/ADMELOG) 100 units/mL subcutaneous injection 2-12 Units, TID AC **AND** Fingerstick Glucose (POCT), TID AC    ipratropium-albuterol (DUO-NEB) 0.5-2.5 mg/3 mL inhalation solution 3 mL, Q6H    levothyroxine tablet 100 mcg, Early Morning    lidocaine (LIDODERM) 5 % patch 2 patch, Daily    losartan (COZAAR) tablet 25 mg, QAM    methylPREDNISolone sodium succinate (Solu-MEDROL) injection 40 mg, Q12H TESSA    metoprolol succinate (TOPROL-XL) 24 hr tablet 50 mg, QAM    nicotine (NICODERM CQ) 7 mg/24hr TD 24 hr patch 1 patch, Daily    pantoprazole (PROTONIX) EC tablet 40 mg, Daily    polyethylene glycol (MIRALAX) packet 17 g, Daily PRN    sucralfate (CARAFATE) tablet 1 g, BID AC    traZODone (DESYREL) tablet 100 mg, HS    trimethobenzamide (TIGAN) IM injection 200 mg, Q6H PRN    venlafaxine (EFFEXOR-XR) 24 hr capsule 150 mg, HS    venlafaxine (EFFEXOR-XR) 24 hr capsule 75 mg, QAM    vit B complex-vit C-folic acid (Renal Caps) capsule 1 capsule, Weekly    Administrative Statements   Today, Patient Was Seen By: Zoie Li      **Please Note: This note may have been constructed using a voice recognition system.**

## 2025-04-07 NOTE — CASE MANAGEMENT
Case Management Assessment & Discharge Planning Note    Patient name Montserrat Sumner  Location 4 [place] [facility]/4 [place] 415-* MRN [id number]  : 1950 Date 2025       Current Admission Date: 2025  Current Admission Diagnosis:COPD exacerbation (HCC)   Patient Active Problem List    Diagnosis Date Noted Date Diagnosed    Constipation 2025     Drug use 2025     Acute on chronic hypoxic respiratory failure (HCC) 2025     Abnormal CT of the chest 2025     Acute respiratory failure (HCC) 2025     Fall 2025     Type 2 diabetes mellitus, without long-term current use of insulin (HCC) 2025     Tracheobronchitis 2025     Subacute cough 2025     Nocturnal hypoxemia due to emphysema  (HCC) 2025     Right hip pain 2025     IPMN (intraductal papillary mucinous neoplasm) 2025     Dilation of pancreatic duct 2025     Osteoarthritis of spine with radiculopathy, lumbar region 2025     Intractable back pain 2025     Tobacco use 2024     Sacroiliitis (HCC) 2024     Primary osteoarthritis of both knees 2024     S/P ERCP 2024     Acute bronchitis 10/08/2024     Chronic bilateral low back pain without sciatica 10/08/2024     Shortness of breath 10/08/2024     Marijuana smoker 2024     Postmenopausal 2024     Bilateral cataracts 2024     Aneurysm of ascending aorta without rupture (HCC) 2024     Bile duct abnormality 2024     Sleep disorder 2023     Hyperlipidemia 2023     Oxygen dependent at night only 2023     Gastroesophageal reflux disease 10/04/2023     Dysphagia 10/04/2023     COPD exacerbation (HCC) 2023     Abnormal CT scan 2023     Intractable abdominal pain 2023     Prediabetes 2023     Obesity (BMI 30-39.9) 2023     Chronic pain syndrome 2023     Lung nodule 2023     Cerebral infarct (HCC) 2023      Cocaine abuse (HCC) 03/02/2023     Hypertension 03/01/2023     Hypothyroid 03/01/2023     CAD (coronary artery disease) 03/01/2023     History of coronary artery bypass graft 03/01/2023     Centrilobular emphysema (HCC) 03/01/2023     Anxiety 03/01/2023     Opioid dependence (HCC) 03/01/2023     Cigarette nicotine dependence without complication 03/01/2023       LOS (days): 2  Geometric Mean LOS (GMLOS) (days):   Days to GMLOS:     OBJECTIVE:    Risk of Unplanned Readmission Score: 18.73      Current admission status: Inpatient     Preferred Pharmacy:   [facility] [place] - [address]  [address]  [place] [address]  Phone: [phone] Fax: [phone]    Primary Care Provider: Julian Tobias DO    Primary Insurance: [employer] REP  Secondary Insurance:     ASSESSMENT:  Active Health Care Proxies       Matthew Sumner Health Care Agent - Spouse   Primary Phone: [phone] (Mobile)            Readmission Root Cause  30 Day Readmission: No    Patient Information  Admitted from:: Home  Mental Status: Alert  During Assessment patient was accompanied by: Spouse  Assessment information provided by:: Patient, Spouse  Primary Caregiver: Self  Support Systems: Spouse/significant other, Daughter  County of Residence: [place]  What [place] you [place]  Home entry access options. Select all that apply.: Stairs  Number of steps to enter home.: 2  Type of Current Residence: [facility]  Living Arrangements: Lives w/ Spouse/significant other, Lives w/ Daughter    Activities of Daily Living Prior to Admission  Functional Status: Independent  Completes ADLs independently?: Yes  Ambulates independently?: Yes  Does patient use assisted devices?: Yes  Assisted Devices (DME) used: Rollator, Home Oxygen concentrator, Portable Oxygen tanks, Nebulizer, Bedside Commode  DME Company Name (respiratory supplies): [employer]  O2 Rate(s): 2L  Does the patient have a history of Short-Term Rehab?:  Yes  Does patient have a history of HHC?: Yes  Does patient currently have HHC?: No     Patient Information Continued  Income Source: Pension/long-term  Does patient have prescription coverage?: Yes  Can the patient afford their medications and any related supplies (such as glucometers or test strips)?: Yes     Means of Transportation  Means of Transport to [facility]:: Drives Self    DISCHARGE DETAILS:    Discharge planning discussed with:: Patient, Spouse Matthew Kidd of Choice: Yes  Comments - Freedom of Choice: SW met bedside with patient and spouse to introduce role, complete assessment, and discuss discharge planning needs. Patient requests SW to order a replacement Rollator as her current equipment has faulty brakes. No DME provider of choice. SW discussed pending PT/OT evaluations and inquired on patient's interest in HHC services. Patient states she prefers to see how she does with therapy before SW moves forward with any referrals. Spouse verbalized agreement with patient's choice. Patient also stated that she would like ambulatory oxygen study on discharge and if qualifies she would like a POC ordered from [facility]. Patient/spouse both deny any additional questions, concerns, or anticipated discharge needs that JULIO can assist with at this time.  CM contacted family/caregiver?: Yes  Were Treatment Team discharge recommendations reviewed with patient/caregiver?: Yes  Did patient/caregiver verbalize understanding of patient care needs?: Yes  Were patient/caregiver advised of the risks associated with not following Treatment Team discharge recommendations?: Yes    Contacts  Patient Contacts: Matthew Sumner (spouse)  Relationship to Patient:: Family  Contact Method: In Person  Reason/Outcome: Continuity of Care, Emergency Contact, Discharge Planning     DME Referral Provided  Referral made for DME?: Yes  DME referral completed for the following items:: Rollator  DME Supplier Name:: [employer]    Other  Referral/Resources/Interventions Provided:  Interventions: DME  Referral Comments: Order for Rollator submitted via [email or web address] to [employer]. Approval pending.     Treatment Team Recommendation: Home (pending therapy evals)  Discharge Destination Plan:: Home (pending therapy evals)  Transport at Discharge : Family

## 2025-04-07 NOTE — ASSESSMENT & PLAN NOTE
Patient presents with increased shortness of breath, productive cough, and increasing O2 requirements with Bipap in ED.   Usually wears 2L just at bedtime having to use now during the day.Has not improved despite inhaler use.    In the ED patient was given DuoNeb and steroids with improvement in symptoms   flu, COVID, RSV negative  CT C/AP: Right greater than left lower lobe reticular and groundglass densities concerning for infiltrates.   Patient follows with pulmonary outpatient setting her home regimen is 200 mcg Trelegy and as needed albuterol nebs 4 times daily  Repeat VBG 4/6/25: no acid base disturbance noted.    Plan  Encouraged smoking cessation  Bipap PRN   Pulm consult; appreciate input  Hold mucomyst, can restart at discharge  Xopenex/Atrovent/ saline nebs 3 times daily  Mucinex BID  IV Solu-Medrol 40 mg twice daily  Azithromycin x 5 days  D3#  O2 titrated to room air today (4/7/25), continue to monitor o2 sat. Continue 2L at bedtime  Home o2 eval on discharge

## 2025-04-07 NOTE — PLAN OF CARE
Problem: RESPIRATORY - ADULT  Goal: Achieves optimal ventilation and oxygenation  Description: INTERVENTIONS:- Assess for changes in respiratory status- Assess for changes in mentation and behavior- Position to facilitate oxygenation and minimize respiratory effort- Oxygen administered by appropriate delivery if ordered- Initiate smoking cessation education as indicated- Encourage broncho-pulmonary hygiene including cough, deep breathe, Incentive Spirometry- Assess the need for suctioning and aspirate as needed- Assess and instruct to report SOB or any respiratory difficulty- Respiratory Therapy support as indicated  INTERVENTIONS:- Assess for changes in respiratory status- Assess for changes in mentation and behavior- Position to facilitate oxygenation and minimize respiratory effort- Oxygen administered by appropriate delivery if ordered- Initiate smoking cessation education as indicated- Encourage broncho-pulmonary hygiene including cough, deep breathe, Incentive Spirometry- Assess the need for suctioning and aspirate as needed- Assess and instruct to report SOB or any respiratory difficulty- Respiratory Therapy support as indicated  Outcome: Progressing     Problem: Prexisting or High Potential for Compromised Skin Integrity  Goal: Skin integrity is maintained or improved  Description: INTERVENTIONS:- Identify patients at risk for skin breakdown- Assess and monitor skin integrity- Assess and monitor nutrition and hydration status- Monitor labs - Assess for incontinence - Turn and reposition patient- Assist with mobility/ambulation- Relieve pressure over bony prominences- Avoid friction and shearing- Provide appropriate hygiene as needed including keeping skin clean and dry- Evaluate need for skin moisturizer/barrier cream- Collaborate with interdisciplinary team - Patient/family teaching- Consider wound care consult   Outcome: Progressing     Problem: PAIN - ADULT  Goal: Verbalizes/displays adequate comfort  level or baseline comfort level  Description: Interventions:- Encourage patient to monitor pain and request assistance- Assess pain using appropriate pain scale- Administer analgesics based on type and severity of pain and evaluate response- Implement non-pharmacological measures as appropriate and evaluate response- Consider cultural and social influences on pain and pain management- Notify physician/advanced practitioner if interventions unsuccessful or patient reports new pain  Outcome: Progressing     Problem: INFECTION - ADULT  Goal: Absence or prevention of progression during hospitalization  Description: INTERVENTIONS:- Assess and monitor for signs and symptoms of infection- Monitor lab/diagnostic results- Monitor all insertion sites, i.e. indwelling lines, tubes, and drains- Monitor endotracheal if appropriate and nasal secretions for changes in amount and color- [place] appropriate cooling/warming therapies per order- Administer medications as ordered- Instruct and encourage patient and family to use good hand hygiene technique- Identify and instruct in appropriate isolation precautions for identified infection/condition  Outcome: Progressing  Goal: Absence of fever/infection during neutropenic period  Description: INTERVENTIONS:- Monitor WBC  Outcome: Progressing     Problem: SAFETY ADULT  Goal: Patient will remain free of falls  Description: INTERVENTIONS:- Educate patient/family on patient safety including physical limitations- Instruct patient to call for assistance with activity - Consult OT/PT to assist with strengthening/mobility - Keep Call bell within reach- Keep bed low and locked with side rails adjusted as appropriate- Keep care items and personal belongings within reach- Initiate and maintain comfort rounds- Make Fall Risk Sign visible to staff- Offer Toileting every 2 Hours, in advance of need- Initiate/Maintain bed alarm- Obtain necessary fall risk management equipment: call bell within  reqach - Apply yellow socks and bracelet for high fall risk patients- Consider moving patient to room near nurses station  Outcome: Progressing  Goal: Maintain or return to baseline ADL function  Description: INTERVENTIONS:-  Assess patient's ability to carry out ADLs; assess patient's baseline for ADL function and identify physical deficits which impact ability to perform ADLs (bathing, care of mouth/teeth, toileting, grooming, dressing, etc.)- Assess/evaluate cause of self-care deficits - Assess range of motion- Assess patient's mobility; develop plan if impaired- Assess patient's need for assistive devices and provide as appropriate- Encourage maximum independence but intervene and supervise when necessary- Involve family in performance of ADLs- Assess for home care needs following discharge - Consider OT consult to assist with ADL evaluation and planning for discharge- Provide patient education as appropriate  Outcome: Progressing  Goal: Maintains/Returns to pre admission functional level  Description: INTERVENTIONS:- Perform AM-PAC 6 Click Basic Mobility/ Daily Activity assessment daily.- Set and communicate daily mobility goal to care team and patient/family/caregiver. - Collaborate with rehabilitation services on mobility goals if consulted- Perform Range of Motion 2 times a day.- Reposition patient every 2 hours.- Dangle patient 2 times a day- Stand patient 2 times a day- Ambulate patient 2 times a day- Out of bed to chair 2 times a day - Out of bed for meals 2 times a day- Out of bed for toileting- Record patient progress and toleration of activity level   Outcome: Progressing     Problem: DISCHARGE PLANNING  Goal: Discharge to home or other facility with appropriate resources  Description: INTERVENTIONS:- Identify barriers to discharge w/patient and caregiver- Arrange for needed discharge resources and transportation as appropriate- Identify discharge learning needs (meds, wound care, etc.)- Arrange for  interpretive services to assist at discharge as needed- Refer to Case Management Department for coordinating discharge planning if the patient needs post-hospital services based on physician/advanced practitioner order or complex needs related to functional status, cognitive ability, or social support system  Outcome: Progressing     Problem: Knowledge Deficit  Goal: Patient/family/caregiver demonstrates understanding of disease process, treatment plan, medications, and discharge instructions  Description: Complete learning assessment and assess knowledge base.Interventions:- Provide teaching at level of understanding- Provide teaching via preferred learning methods  Outcome: Progressing

## 2025-04-07 NOTE — ASSESSMENT & PLAN NOTE
Moderate COPD with FEV1 of 51% currently in exacerbation  Will switch DuoNeb to every 4 hours  Continue with Trelegy 1 puff daily  Can reduce solumedrol to q 12 hrs for now   Will hold off on the Mucomyst for now given acute exacerbation  She can continue the Mucomyst once she goes home she had states she has been on it for the past month  Continue with airway clearance measures with incentive spirometry and flutter valve

## 2025-04-07 NOTE — PLAN OF CARE
Problem: RESPIRATORY - ADULT  Goal: Achieves optimal ventilation and oxygenation  Description: INTERVENTIONS:- Assess for changes in respiratory status- Assess for changes in mentation and behavior- Position to facilitate oxygenation and minimize respiratory effort- Oxygen administered by appropriate delivery if ordered- Initiate smoking cessation education as indicated- Encourage broncho-pulmonary hygiene including cough, deep breathe, Incentive Spirometry- Assess the need for suctioning and aspirate as needed- Assess and instruct to report SOB or any respiratory difficulty- Respiratory Therapy support as indicated  INTERVENTIONS:- Assess for changes in respiratory status- Assess for changes in mentation and behavior- Position to facilitate oxygenation and minimize respiratory effort- Oxygen administered by appropriate delivery if ordered- Initiate smoking cessation education as indicated- Encourage broncho-pulmonary hygiene including cough, deep breathe, Incentive Spirometry- Assess the need for suctioning and aspirate as needed- Assess and instruct to report SOB or any respiratory difficulty- Respiratory Therapy support as indicated  Outcome: Progressing     Problem: Prexisting or High Potential for Compromised Skin Integrity  Goal: Skin integrity is maintained or improved  Description: INTERVENTIONS:- Identify patients at risk for skin breakdown- Assess and monitor skin integrity- Assess and monitor nutrition and hydration status- Monitor labs - Assess for incontinence - Turn and reposition patient- Assist with mobility/ambulation- Relieve pressure over bony prominences- Avoid friction and shearing- Provide appropriate hygiene as needed including keeping skin clean and dry- Evaluate need for skin moisturizer/barrier cream- Collaborate with interdisciplinary team - Patient/family teaching- Consider wound care consult   Outcome: Progressing     Problem: PAIN - ADULT  Goal: Verbalizes/displays adequate comfort  level or baseline comfort level  Description: Interventions:- Encourage patient to monitor pain and request assistance- Assess pain using appropriate pain scale- Administer analgesics based on type and severity of pain and evaluate response- Implement non-pharmacological measures as appropriate and evaluate response- Consider cultural and social influences on pain and pain management- Notify physician/advanced practitioner if interventions unsuccessful or patient reports new pain  Outcome: Progressing     Problem: INFECTION - ADULT  Goal: Absence or prevention of progression during hospitalization  Description: INTERVENTIONS:- Assess and monitor for signs and symptoms of infection- Monitor lab/diagnostic results- Monitor all insertion sites, i.e. indwelling lines, tubes, and drains- Monitor endotracheal if appropriate and nasal secretions for changes in amount and color- [place] appropriate cooling/warming therapies per order- Administer medications as ordered- Instruct and encourage patient and family to use good hand hygiene technique- Identify and instruct in appropriate isolation precautions for identified infection/condition  Outcome: Progressing  Goal: Absence of fever/infection during neutropenic period  Description: INTERVENTIONS:- Monitor WBC  Outcome: Progressing     Problem: SAFETY ADULT  Goal: Patient will remain free of falls  Description: INTERVENTIONS:- Educate patient/family on patient safety including physical limitations- Instruct patient to call for assistance with activity - Consult OT/PT to assist with strengthening/mobility - Keep Call bell within reach- Keep bed low and locked with side rails adjusted as appropriate- Keep care items and personal belongings within reach- Initiate and maintain comfort rounds- Make Fall Risk Sign visible to staff- Offer Toileting every 2 Hours, in advance of need- Initiate/Maintain bed alarm- Obtain necessary fall risk management equipment: yellow socks- Apply  yellow socks and bracelet for high fall risk patients- Consider moving patient to room near nurses station  Outcome: Progressing  Goal: Maintain or return to baseline ADL function  Description: INTERVENTIONS:-  Assess patient's ability to carry out ADLs; assess patient's baseline for ADL function and identify physical deficits which impact ability to perform ADLs (bathing, care of mouth/teeth, toileting, grooming, dressing, etc.)- Assess/evaluate cause of self-care deficits - Assess range of motion- Assess patient's mobility; develop plan if impaired- Assess patient's need for assistive devices and provide as appropriate- Encourage maximum independence but intervene and supervise when necessary- Involve family in performance of ADLs- Assess for home care needs following discharge - Consider OT consult to assist with ADL evaluation and planning for discharge- Provide patient education as appropriate  Outcome: Progressing  Goal: Maintains/Returns to pre admission functional level  Description: INTERVENTIONS:- Perform AM-PAC 6 Click Basic Mobility/ Daily Activity assessment daily.- Set and communicate daily mobility goal to care team and patient/family/caregiver. - Collaborate with rehabilitation services on mobility goals if consulted- Perform Range of Motion 3 times a day.- Reposition patient every 2 hours.- Dangle patient 3 times a day- Stand patient 3 times a day- Ambulate patient 3 times a day- Out of bed to chair 3 times a day - Out of bed for meals 3 times a day- Out of bed for toileting- Record patient progress and toleration of activity level   Outcome: Progressing     Problem: DISCHARGE PLANNING  Goal: Discharge to home or other facility with appropriate resources  Description: INTERVENTIONS:- Identify barriers to discharge w/patient and caregiver- Arrange for needed discharge resources and transportation as appropriate- Identify discharge learning needs (meds, wound care, etc.)- Arrange for interpretive  services to assist at discharge as needed- Refer to Case Management Department for coordinating discharge planning if the patient needs post-hospital services based on physician/advanced practitioner order or complex needs related to functional status, cognitive ability, or social support system  Outcome: Progressing     Problem: Knowledge Deficit  Goal: Patient/family/caregiver demonstrates understanding of disease process, treatment plan, medications, and discharge instructions  Description: Complete learning assessment and assess knowledge base.Interventions:- Provide teaching at level of understanding- Provide teaching via preferred learning methods  Outcome: Progressing

## 2025-04-07 NOTE — PHYSICAL THERAPY NOTE
"       PHYSICAL THERAPY EVALUATION/TREATMENT     04/07/25 1510   PT Last Visit   PT Visit Date 04/07/25   Note Type   Note type Evaluation   Pain Assessment   Pain Assessment Tool 0-10   Pain Score 7   Pain Location/Orientation Orientation: Left;Location: Groin   Restrictions/Precautions   Weight Bearing Precautions Per Order No   Other Precautions Fall Risk;Pain;O2;Chair Alarm;Bed Alarm   Home Living   Type of Home House   Home Layout One level  (2 DAISY;  also has ramp access)   Bathroom Toilet Standard   Bathroom Equipment Commode   Home Equipment   (3L02 hs, rollator, WC)   Prior Function   Level of [place] Independent with ADLs;Independent with functional mobility with the rollator   Lives With Spouse;Daughter   Receives Help From Family   Falls in the last 6 months 1 to 4   Vocational Retired   General   Additional Pertinent History Pt admitted with COPD exacerbation.  Of note, pt had a fall 5 days ago now with L groin pain.  CT was  negative for fx.  Pt also has a history of chronic R leg pain due to L spine issues.   Family/Caregiver Present No   Cognition   Overall Cognitive Status WFL   Arousal/Participation Alert   Attention Within functional limits   Orientation Level Oriented X4   Memory Within functional limits   Following Commands Follows all commands and directions without difficulty   Subjective   Subjective \"I just want to be able to walk more\"   RLE Assessment   RLE Assessment   (ROM WFL, MMT 4-/5)   LLE Assessment   LLE Assessment   (ROM WFL, MMT hip 3-/5, knee/ankle 4-/5)   Light Touch   RLE Light Touch Impaired   LLE Light Touch Impaired   Transfers   Sit to Stand 4  Minimal assistance   Stand to Sit 4  Minimal assistance   Stand pivot 4  Minimal assistance   Additional items   (with walker)   Ambulation/Elevation   Gait pattern   (antalgic L)   Gait Assistance 4  Minimal assist   Assistive Device Rolling walker  (3L02)   Distance 8 feet   Balance   Static Sitting Good   Static Standing Fair "   Ambulatory Fair -   Activity Tolerance   Activity Tolerance Patient limited by pain   Nurse Made Aware Crystal asked to give pt  tylenol as pt requested   Assessment   Prognosis Good   Problem List Decreased strength;Decreased endurance;Decreased range of motion;Impaired balance;Decreased mobility;Pain   Assessment Pt is 74 y.o. female seen for PT evaluation s/p admit to [facility] on 4/5/2025 w/ COPD exacerbation (HCC). PT consulted to assess pt's functional mobility and d/c needs. Order placed for PT eval and tx, w/ activity as tolerated order.  Co-morbidities affecting patient's physical performance include: emphysema, anxiety, opioid dependence, fall, DM.  Personal factors affecting patient at time of initial evaluation include: ambulating with assistive device, stairs to enter home, inability to ambulate household distances, and positive fall history.         Prior to admission, patient was independent with functional mobility with a rollator walker and independent with ADLS.  Upon evaluation: Patient was able to ambulate x 10 feet with a walker but was limited by left groin pain.          Please find objective findings from Physical Therapy assessment regarding body systems outlined above with impairments and limitations including weakness, decreased endurance, gait deviations, pain, decreased activity tolerance, decreased functional mobility tolerance, and fall risk.The Barthel Index was used as a functional outcome tool presenting with a score of Barthel Index Score: 55 today indicating marked limitations of functional mobility and ADLS.  Patient's clinical presentation is currently unstable/unpredictable as seen in patient's presentation of changing level of pain, increased fall risk, new onset of impairment of functional mobility, and new onset of weakness. Pt would benefit from continued Physical Therapy treatment to address deficits as defined above and maximize level of functional mobility.      As demonstrated by objective findings, the assigned level of complexity for this evaluation is high.    The patient's AM-[facility] Basic Mobility Inpatient Short Form Raw Score is 15. A Raw score of less than or equal to 16 suggests the patient may benefit from discharge to post-acute rehabilitation services. Please also refer to the recommendation of the Physical Therapist for safe discharge planning.   Goals   Patient Goals to stay in the hospital couple more days to get stronger   STG Expiration Date 04/14/25   Short Term Goal #1 1.independent transfers,   2. pt will ambulate with a walker x 25 feet with supervision  so pt can from her bedroom to her bathroom   LTG Expiration Date 04/21/25   Long Term Goal #1 1. independent up and down 2 steps so pt can enter and exit her home,   2. independent ambulation with a walker x 100 feet so pt can go shopping.   Plan   Treatment/Interventions Elevations;LE strengthening/ROM;Functional transfer training;ADL retraining;Gait training;Bed mobility;Equipment eval/education;Patient/family training;Therapeutic exercise   PT Frequency 3-5x/wk   Discharge Recommendation   Rehab Resource Intensity Level, PT II (Moderate Resource Intensity)   Equipment Recommended   (Pt has a rollator, commode, WC)   [facility] Basic Mobility Inpatient   Turning in Flat Bed Without Bedrails 3   Lying on Back to Sitting on Edge of Flat Bed Without Bedrails 3   Moving Bed to Chair 3   Standing Up From Chair Using Arms 3   Walk in Room 2   Climb 3-5 Stairs With Railing 1   Basic Mobility Inpatient Raw Score 15   Basic Mobility Standardized Score 36.97   [facility] Highest Level Of Mobility   -[facility] Goal 4: Move to chair/commode   -[facility] Achieved 6: Walk 10 steps or more   Barthel Index   Feeding 10   Bathing 0   Grooming Score 5   Dressing Score 5   Bladder Score 10   Bowels Score 10   Toilet Use Score 5   Transfers (Bed/Chair) Score 10   Mobility (Level Surface) Score 0   Stairs Score 0   Barthel Index Score  "55   Additional Treatment Session   Start Time 1500   End Time 1510   Treatment Assessment S: \"I can try again\"   O:  Pt ambulated with min assist with a walker and 3L02 x 10 feet.    A:  Activity was limited by L groin pain.   P:  Plan to have pt ask the RN for tylenol prior to therapy so pt might tolerate more activity. Encourage pt to mobilize with nursing staff.   End of Consult   Patient Position at End of Consult All needs within reach;Bedside chair   Licensure   [place] License Number  Jacqueline Lara PT  [id number]     "

## 2025-04-07 NOTE — UTILIZATION REVIEW
Initial Clinical Review    Admission: Date/Time/Statement:   Admission Orders (From admission, onward)       Ordered        04/05/25 1420  INPATIENT ADMISSION  Once                          Orders Placed This Encounter   Procedures    INPATIENT ADMISSION     Standing Status:   Standing     Number of Occurrences:   1     Level of Care:   Level 2 Stepdown / HOT [14]     Estimated length of stay:   More than 2 Midnights     Certification:   I certify that inpatient services are medically necessary for this patient for a duration of greater than two midnights. See H&P and MD Progress Notes for additional information about the patient's course of treatment.     ED Arrival Information       Expected   -    Arrival   4/5/2025 10:40    Acuity   Urgent              Means of arrival   Ambulance    Escorted by   [facility]    Service   Hospitalist    Admission type   Emergency              Arrival complaint   SOB             Chief Complaint   Patient presents with    Shortness of Breath    Fall     Pt arrives via ALS after worsening SOB after a fall Wednesday. Pt COPD on 2 L at home, reports no head strike or LOC, reports right sided impact with R hip and rib pain       Initial Presentation: 74 y.o. female presents to the ED via EMS from home with c/o worsening SOB, wheezing, chest tightness, intermittent dry cough not improved with home oxygen and rescue inhalers x 3 days.  Also had yrip and fall w/o head strike or LOC and c/o L side Chest wall pain at impact point.  Is on Xanax and Suboxone.  EMS treated with DuoNebs.  PMH: COPD, respiratory failure on PRN O2 Qhs, NIDDM, HTN , CAD, IPMN.  In the ED VS stable, sat 91%, pain rated 8/10.  Treated with IV Tylenol, Lidoderm patch, IV SoluMedrol, Albuterol Atrovent nebs.  Labs - glucose 271, acidotic gasses.  ECG - NSR.  Imaging - no acute traumatic injuries; R>L infiltrates; Intrahepatic and extrahepatic biliary ductal dilatation with associated pneumobilia. Distal CBD stent  noted. On exam ill-appearing, normal breath sounds, A&O x 3.  Admitted to INPATIENT status with COPD exac, centrilobular emphysema, acutre respiratory failure, fall, anxiety - PLAN: scheduled nebs TID, IV SoluMedrol q 12 hrs, IV Azithromycin x 5 days, PRN BIPAP, pulmonary consult, wean oxygen, smoking cessation encouraged, continue home meds, therapy evals, SSI cover.     Anticipated Length of Stay/Certification Statement: Patient will be admitted on an inpatient basis with an anticipated length of stay of greater than 2 midnights secondary to AECOPD , Acute respiratory failure .     Date: 4/6   Day 2:   COPD exac, centrilobular emphysema, acutre respiratory failure, fall, anxiety - repeat gasses WNL. Continue nebs, Mucinex BID, add Mycomyst, continue steroids, IV Azithromycin 2/5.  Pt notes improvement in breathing today.  Pt also had cocaine used 3 days PTA.  On exam + wheezing, rhonchi, TTP L hip area , lateral joint line. Per SLP pt passed swallow eval.      4/6 Pulmonary Consult - Acute on chronic hypoxic respiratory failure, COPD exacr, emphysema, oxygen dep at HS, smoker, abnormal CT chest - negative viral panel, recent antibiotics, continue Azithromycin x 5 days, DuoNebs to q 4 hr, IV Steroids to q8 hr, hold Mucomyst here and continue at home, titrate down oxygen, ? Aspiration d/t drug use or retrograde aspiration from severe acid reflux and GERD, SLP to eval.  On exam no acute resp distress, bilat E rhonchi, no pedal edema.  A&O x 3.      Date: 4/7  Day 3: Has surpassed a 2nd midnight with active treatments and services.  COPD exac, centrilobular emphysema, acutre respiratory failure, fall, anxiety - IV steroids to BID today, is day 3/5 IV Azithromycin, was able to titrate down to room air today.  Needs home oxygen eval.  Pt notes breathing comfortable.  C/o pain L hip, leg, back rated 7/10 w/ sitting and 10/10 w/ movement.  Improving SOB. On exam E wheezing and rhonchi bilat bases, TTP L spine, L hip and  lateral joint line.     ED Treatment-Medication Administration from 04/05/2025 1040 to 04/05/2025 1521         Date/Time Order Dose Route Action     04/05/2025 1046 ipratropium-albuterol (DUO-NEB) 0.5-2.5 mg/3 mL inhalation solution **ADS Override Pull** --  Given to EMS     04/05/2025 1125 tetanus-diphtheria-acellular pertussis (BOOSTRIX) IM injection 0.5 mL 0.5 mL Intramuscular Given     04/05/2025 1119 acetaminophen (Ofirmev) injection 1,000 mg 1,000 mg Intravenous New Bag     04/05/2025 1121 lidocaine (LIDODERM) 5 % patch 1 patch 1 patch Topical Medication Applied     04/05/2025 1333 methylPREDNISolone sodium succinate (Solu-MEDROL) injection 62.5 mg 62.5 mg Intravenous Given     04/05/2025 1312 albuterol inhalation solution 5 mg 5 mg Nebulization Given     04/05/2025 1312 ipratropium (ATROVENT) 0.02 % inhalation solution 0.5 mg 0.5 mg Nebulization Given     04/05/2025 1239 iohexol (OMNIPAQUE) 350 MG/ML injection (MULTI-DOSE) 100 mL 100 mL Intravenous Given            Scheduled Medications:  ALPRAZolam, 1 mg, Oral, HS  amLODIPine, 5 mg, Oral, Daily  aspirin, 81 mg, Oral, Daily  atorvastatin, 40 mg, Oral, QAM  azithromycin, 500 mg, Intravenous, Q24H  buprenorphine-naloxone, 4 mg, Sublingual, Daily  docusate sodium, 100 mg, Oral, BID  DULoxetine, 60 mg, Oral, Daily  enoxaparin, 40 mg, Subcutaneous, Daily  fluticasone-vilanterol, 1 puff, Inhalation, Daily  guaiFENesin, 600 mg, Oral, Q12H TESSA  insulin glargine, 5 Units, Subcutaneous, HS  insulin lispro, 1-6 Units, Subcutaneous, 0200  insulin lispro, 1-6 Units, Subcutaneous, HS  insulin lispro, 2-12 Units, Subcutaneous, TID AC  ipratropium-albuterol, 3 mL, Nebulization, Q6H  levothyroxine, 100 mcg, Oral, Early Morning  lidocaine, 2 patch, Topical, Daily  losartan, 25 mg, Oral, QAM  methylPREDNISolone sodium succinate, 40 mg, Intravenous, Q12H TESSA  metoprolol succinate, 50 mg, Oral, QAM  nicotine, 1 patch, Transdermal, Daily  pantoprazole, 40 mg, Oral,  Daily  sucralfate, 1 g, Oral, BID AC  traZODone, 100 mg, Oral, HS  venlafaxine, 150 mg, Oral, HS  venlafaxine, 75 mg, Oral, QAM  vit B complex-vit C-folic acid, 1 capsule, Oral, Weekly      Continuous IV Infusions:     PRN Meds:  acetaminophen, 650 mg, Oral, Q6H PRN  albuterol, 2 puff, Inhalation, Q4H PRN  albuterol, 2.5 mg, Nebulization, 4x Daily PRN  cyclobenzaprine, 10 mg, Oral, TID PRN  polyethylene glycol, 17 g, Oral, Daily PRN  trimethobenzamide, 200 mg, Intramuscular, Q6H PRN - x 1 4/6      ED Triage Vitals   Temperature Pulse Respirations Blood Pressure SpO2 Pain Score   04/05/25 1055 04/05/25 1055 04/05/25 1055 04/05/25 1055 04/05/25 1055 04/05/25 1620   97.6 °F (36.4 °C) 82 20 116/61 91 % 8     Weight (last 2 days)       Date/Time Weight    04/05/25 1528 75.8 (167)            Vital Signs (last 3 days)       Date/Time Temp Pulse Resp BP MAP (mmHg) SpO2 FiO2 (%) Calculated FIO2 (%) - Nasal Cannula O2 Flow Rate (L/min) Nasal Cannula O2 Flow Rate (L/min) O2 Device O2 Interface Device Patient Position - Orthostatic VS Mignon Coma Scale Score Pain    04/07/25 0900 -- -- -- -- -- -- -- -- -- -- -- -- -- 15 No Pain    04/07/25 07:57:24 97.5 °F (36.4 °C) 90 -- 152/89 110 88 % -- -- -- -- -- -- -- -- --    04/07/25 0723 -- -- -- -- -- 93 % -- 32 -- 3 L/min Nasal cannula -- -- -- --    04/06/25 22:03:53 97 °F (36.1 °C) 83 18 121/70 87 92 % -- -- -- -- -- -- -- -- --    04/06/25 2000 -- -- -- -- -- -- -- 32 -- 3 L/min Nasal cannula -- -- 15 No Pain    04/06/25 1922 -- -- -- -- -- -- -- -- 3 L/min -- Nasal cannula -- -- -- --    04/06/25 18:15:51 97.3 °F (36.3 °C) 80 21 121/71 88 93 % -- 32 -- 3 L/min Nasal cannula -- Lying -- --    04/06/25 16:12:17 -- 73 22 118/71 87 95 % -- -- -- -- -- -- -- -- --    04/06/25 07:46:52 97 °F (36.1 °C) 84 18 133/83 100 93 % -- 32 -- 3 L/min Nasal cannula -- Lying -- No Pain    04/06/25 02:18:47 98 °F (36.7 °C) 77 20 126/79 95 92 % -- -- -- -- -- -- -- -- --    04/06/25 0151 -- -- 21  -- -- -- -- 32 -- 3 L/min Nasal cannula -- -- -- --    04/05/25 2355 -- -- -- -- -- -- -- 32 -- 3 L/min Nasal cannula -- -- -- --    04/05/25 22:12:14 -- 76 19 124/81 95 91 % -- -- -- -- -- -- -- -- --    04/05/25 2107 -- -- -- -- -- -- 28 -- -- -- BiPAP Face mask -- -- --    04/05/25 2100 -- -- -- -- -- -- -- -- -- -- -- -- -- -- No Pain    04/05/25 1938 -- 75 22 -- -- -- -- 32 -- 3 L/min Nasal cannula -- -- -- --    04/05/25 18:05:24 -- 78 21 122/81 95 91 % -- -- -- -- -- -- -- -- --    04/05/25 1620 -- -- -- -- -- -- -- -- -- -- -- -- -- -- 8    04/05/25 15:36:04 97.9 °F (36.6 °C) 79 21 135/84 101 95 % -- -- -- -- -- -- -- -- --    04/05/25 1528 -- -- -- -- -- 93 % -- -- -- -- -- Full face mask -- -- --    04/05/25 1453 -- -- -- -- -- -- -- -- -- -- -- -- -- 15 --    04/05/25 1400 -- 71 16 108/61 80 90 % 28 -- -- -- BiPAP -- -- -- --    04/05/25 1338 -- 71 20 110/61 -- 95 % -- -- -- -- BiPAP -- -- -- --    04/05/25 1312 -- -- -- -- -- 92 % -- -- -- -- -- -- -- -- --    04/05/25 1301 -- -- -- -- -- -- -- -- -- -- -- Full face mask -- -- --    04/05/25 1055 97.6 °F (36.4 °C) 82 20 116/61 -- 91 % -- -- -- -- None (Room air) -- Lying -- --              Pertinent Labs/Diagnostic Test Results:   Radiology:  XR hip/pelv 2-3 vws left if performed   Final Interpretation by Jim Beckham DO (04/07 0921)      No acute osseous abnormality.         Computerized Assisted Algorithm (CAA) may have been used to analyze all applicable images.            Workstation performed: [id number]         CT head wo contrast   Final Interpretation by Gonzalez Cole MD (04/05 1306)      No acute intracranial abnormality.      Similar moderate-to-severe chronic microangiopathy.                  Workstation performed: [id number]         CT spine cervical without contrast   Final Interpretation by Gonzalez Cole MD (04/05 1307)      No acute cervical spine fracture or traumatic malalignment.      Diffuse osteopenia.       Additional chronic/incidental findings as detailed above                  Workstation performed: [id number]         CT chest abdomen pelvis w contrast   Final Interpretation by Mat Evans MD (04/05 1312)      1.  No evidence of acute posttraumatic injury throughout the chest, abdomen or pelvis.   2.  Right greater than left lower lobe reticular and groundglass densities concerning for infiltrates.   3.  Intrahepatic and extrahepatic biliary ductal dilatation with associated pneumobilia. Distal CBD stent noted.               Workstation performed: [id number]           Cardiology:  ECG 12 lead   Final Result by Pattie Gomez MD (04/07 0952)   Normal sinus rhythm   Normal ECG   When compared with ECG of 18-Dec-2024 23:36,   No significant change was found   Confirmed by Pattie Gomez ([id number]) on 4/7/2025 9:52:15 AM        GI:  No orders to display           Results from last 7 days   Lab Units 04/07/25 0659 04/06/25  0510 04/05/25  1118   WBC Thousand/uL 8.14 5.57 7.78   HEMOGLOBIN g/dL 12.5 12.4 12.9   HEMATOCRIT % 40.2 39.9 42.8   PLATELETS Thousands/uL 250 213 216   TOTAL NEUT ABS Thousands/µL 6.82 4.55 6.36         Results from last 7 days   Lab Units 04/07/25  0659 04/06/25  0510 04/05/25  1118   SODIUM mmol/L 139 136 137   POTASSIUM mmol/L 4.7 4.4 4.5   CHLORIDE mmol/L 103 99 99   CO2 mmol/L 25 25 25   ANION GAP mmol/L 11 12 13   BUN mg/dL 23 21 21   CREATININE mg/dL 0.80 0.90 1.15   EGFR ml/min/1.73sq m 73 63 47   CALCIUM mg/dL 9.8 10.0 9.8     Results from last 7 days   Lab Units 04/07/25  0659 04/06/25  0510 04/05/25  1118   AST U/L 13 10* 11*   ALT U/L 18 16 17   ALK PHOS U/L 33* 36 38   TOTAL PROTEIN g/dL 6.6 6.8 6.9   ALBUMIN g/dL 3.5 3.7 3.5   TOTAL BILIRUBIN mg/dL 0.27 0.32 0.62     Results from last 7 days   Lab Units 04/07/25 0710 04/07/25 0209 04/06/25 2009 04/06/25  1611 04/06/25  1053 04/06/25  0734 04/05/25 2001 04/05/25  1619   POC GLUCOSE mg/dl 171* 284* 244* 148* 457* 221* 254* 219*  "    Results from last 7 days   Lab Units 04/07/25  0659 04/06/25  0510 04/05/25  1118   GLUCOSE RANDOM mg/dL 168* 204* 271*         Results from last 7 days   Lab Units 04/05/25  1118   HEMOGLOBIN A1C % 9.0*   EAG mg/dl 212         Results from last 7 days   Lab Units 04/06/25  0510 04/05/25  1407 04/05/25  1118   PH JAKOB  7.344 7.329 7.268*   PCO2 JAKOB mm Hg 54.3* 61.7* 69.7*   PO2 JAKOB mm Hg 39.9 21.7* 15.8*   HCO3 JAKOB mmol/L 28.9 31.7* 31.1*   BASE EXC JAKOB mmol/L 2.1 4.2 2.3   O2 CONTENT JAKOB ml/dL 13.3 5.8 3.5   O2 HGB, VENOUS % 68.2 32.1* 18.6*           Results from last 7 days   Lab Units 04/05/25  1118   PROTIME seconds 14.4   INR  1.07   PTT seconds 31         Results from last 7 days   Lab Units 04/05/25  1118   PROCALCITONIN ng/ml 0.21         Past Medical History:   Diagnosis Date    Anxiety     Chronic pain 03/06/2023    lower abdomen and back    Colon polyp     COPD (chronic obstructive pulmonary disease) (HCC)     \"passes out\" with O2 levels dropping    Disease of thyroid gland     GERD (gastroesophageal reflux disease)     History of transfusion     with aneurysm repair-autologous-self and daughter    Hyperlipidemia     Hypertension     Respiratory acidosis 04/05/2025    Teeth missing     Wears glasses     For reading     Present on Admission:   Hypothyroid   CAD (coronary artery disease)   Anxiety   Opioid dependence (HCC)   Chronic bilateral low back pain without sciatica   Tobacco use   IPMN (intraductal papillary mucinous neoplasm)   Hypertension   Centrilobular emphysema (HCC)   COPD exacerbation (HCC)      Admitting Diagnosis: Acute respiratory failure (HCC) [J96.00]  Shortness of breath [R06.02]  Left hip pain [M25.552]  COPD exacerbation (HCC) [J44.1]  Rib pain on left side [R07.81]  Ambulatory dysfunction [R26.2]  Age/Sex: 74 y.o. female    Network Utilization Review Department  ATTENTION: Please call with any questions or concerns to [phone] and carefully listen to the prompts so that you " are directed to the right person. All voicemails are confidential.   For Discharge needs, contact Care Management DC Support Team at [phone] opt. 2  Send all requests for admission clinical reviews, approved or denied determinations and any other requests to dedicated fax number below belonging to the campus where the patient is receiving treatment. List of dedicated fax numbers for the Facilities:  FACILITY NAME UR FAX NUMBER   ADMISSION DENIALS (Administrative/Medical Necessity) [phone]   DISCHARGE SUPPORT TEAM (NETWORK) [phone]   PARENT CHILD HEALTH (Maternity/NICU/Pediatrics) [phone]   [facility] [phone]   [facility] [phone]   [facility] [phone]   [facility] [phone]   [facility] [phone]   [facility] [phone]   [facility] [phone]   [facility] [phone]   [facility] [phone]   [facility] [phone]   [facility] [phone]   [facility] [phone]

## 2025-04-07 NOTE — ASSESSMENT & PLAN NOTE
Tripped and fell on the LT 4/3/25 , has LT sided flank pain and rib pain with breathing. No palpiation  , tingling , numbness , dizziness or lightheadedness.   Trauma scans including CT Cervical spine, CAP , Head ; No trauma findings  L hip x ray: No acute osseous abnormality.    acetaminophen prn pain, lidocaine patch  PTOT  Fall precautions   Check reconstructed lumbar spine CT

## 2025-04-07 NOTE — PROGRESS NOTES
OP RT CM received incoming ADT alert notification. Patient currently hospitalized.   OP RT CM will outreach once discharged to home.   Outgoing in basket reminder message placed.

## 2025-04-07 NOTE — ASSESSMENT & PLAN NOTE
Requires 2 LPM Qhs and PRN at baseline. Currently requiring Bipap with respiratory acidosis . Secondary to COPD .  O2 titrated to room air today (4/7/25), continue to monitor o2 sat. Continue 2L at bedtime  Pulmonary consulted  Refer to A and P for AECOPD

## 2025-04-07 NOTE — ASSESSMENT & PLAN NOTE
Moderate COPD per PFT from 2023   Ct chest: Centrilobular emphysema   Follows up with pulmonary medicine Dr. Sarah FLORES inhalers: Trellegy, Albuterol

## 2025-04-07 NOTE — PLAN OF CARE
Problem: PHYSICAL THERAPY ADULT  Goal: Performs mobility at highest level of function for planned discharge setting.  See evaluation for individualized goals.  Description: Treatment/Interventions: Elevations, LE strengthening/ROM, Functional transfer training, ADL retraining, Gait training, Bed mobility, Equipment eval/education, Patient/family training, Therapeutic exercise  Equipment Recommended:  (Pt has a rollator, donnaode, WC)       See flowsheet documentation for full assessment, interventions and recommendations.  Note: Prognosis: Good  Problem List: Decreased strength, Decreased endurance, Decreased range of motion, Impaired balance, Decreased mobility, Pain  Assessment: Pt is 74 y.o. female seen for PT evaluation s/p admit to [facility] on 4/5/2025 w/ COPD exacerbation (HCC). PT consulted to assess pt's functional mobility and d/c needs. Order placed for PT eval and tx, w/ activity as tolerated order.  Co-morbidities affecting patient's physical performance include: emphysema, anxiety, opioid dependence, fall, DM.  Personal factors affecting patient at time of initial evaluation include: ambulating with assistive device, stairs to enter home, inability to ambulate household distances, and positive fall history.     Prior to admission, patient was independent with functional mobility with walker and independent with ADLS.  Upon evaluation: Patient was able to ambulate x 10 feet with a walker but was limited by left groin pain.      Please find objective findings from Physical Therapy assessment regarding body systems outlined above with impairments and limitations including weakness, decreased endurance, gait deviations, pain, decreased activity tolerance, decreased functional mobility tolerance, and fall risk.The Barthel Index was used as a functional outcome tool presenting with a score of Barthel Index Score: 55 today indicating marked limitations of functional mobility and ADLS.  Patient's  clinical presentation is currently unstable/unpredictable as seen in patient's presentation of changing level of pain, increased fall risk, new onset of impairment of functional mobility, and new onset of weakness. Pt would benefit from continued Physical Therapy treatment to address deficits as defined above and maximize level of functional mobility.     As demonstrated by objective findings, the assigned level of complexity for this evaluation is high.The patient's AM-PAC Basic Mobility Inpatient Short Form Raw Score is 15. A Raw score of less than or equal to 16 suggests the patient may benefit from discharge to post-acute rehabilitation services. Please also refer to the recommendation of the Physical Therapist for safe discharge planning.        Rehab Resource Intensity Level, PT: II (Moderate Resource Intensity)    See flowsheet documentation for full assessment.

## 2025-04-08 LAB
ALBUMIN SERPL BCG-MCNC: 3.6 G/DL (ref 3.5–5)
ALP SERPL-CCNC: 33 U/L (ref 34–104)
ALT SERPL W P-5'-P-CCNC: 21 U/L (ref 7–52)
ANION GAP SERPL CALCULATED.3IONS-SCNC: 10 MMOL/L (ref 4–13)
ANISOCYTOSIS BLD QL SMEAR: PRESENT
AST SERPL W P-5'-P-CCNC: 12 U/L (ref 13–39)
BASOPHILS # BLD MANUAL: 0 THOUSAND/UL (ref 0–0.1)
BASOPHILS NFR MAR MANUAL: 0 % (ref 0–1)
BILIRUB SERPL-MCNC: 0.22 MG/DL (ref 0.2–1)
BUN SERPL-MCNC: 20 MG/DL (ref 5–25)
CALCIUM SERPL-MCNC: 9.7 MG/DL (ref 8.4–10.2)
CHLORIDE SERPL-SCNC: 101 MMOL/L (ref 96–108)
CO2 SERPL-SCNC: 25 MMOL/L (ref 21–32)
CREAT SERPL-MCNC: 0.83 MG/DL (ref 0.6–1.3)
EOSINOPHIL # BLD MANUAL: 0 THOUSAND/UL (ref 0–0.4)
EOSINOPHIL NFR BLD MANUAL: 0 % (ref 0–6)
ERYTHROCYTE [DISTWIDTH] IN BLOOD BY AUTOMATED COUNT: 13.7 % (ref 11.6–15.1)
GFR SERPL CREATININE-BSD FRML MDRD: 70 ML/MIN/1.73SQ M
GLUCOSE SERPL-MCNC: 161 MG/DL (ref 65–140)
GLUCOSE SERPL-MCNC: 178 MG/DL (ref 65–140)
GLUCOSE SERPL-MCNC: 216 MG/DL (ref 65–140)
GLUCOSE SERPL-MCNC: 234 MG/DL (ref 65–140)
GLUCOSE SERPL-MCNC: 237 MG/DL (ref 65–140)
GLUCOSE SERPL-MCNC: 257 MG/DL (ref 65–140)
HCT VFR BLD AUTO: 40.1 % (ref 34.8–46.1)
HGB BLD-MCNC: 12.5 G/DL (ref 11.5–15.4)
LYMPHOCYTES # BLD AUTO: 1.3 THOUSAND/UL (ref 0.6–4.47)
LYMPHOCYTES # BLD AUTO: 10 % (ref 14–44)
MACROCYTES BLD QL AUTO: PRESENT
MCH RBC QN AUTO: 29.6 PG (ref 26.8–34.3)
MCHC RBC AUTO-ENTMCNC: 31.2 G/DL (ref 31.4–37.4)
MCV RBC AUTO: 95 FL (ref 82–98)
METAMYELOCYTE ABSOLUTE CT: 0 THOUSAND/UL (ref 0–0.1)
METAMYELOCYTES NFR BLD MANUAL: 0 % (ref 0–1)
MONOCYTES # BLD AUTO: 1.08 THOUSAND/UL (ref 0–1.22)
MONOCYTES NFR BLD: 10 % (ref 4–12)
MYELOCYTE ABSOLUTE CT: 0.11 THOUSAND/UL (ref 0–0.1)
MYELOCYTES NFR BLD MANUAL: 1 % (ref 0–1)
NEUTROPHILS # BLD MANUAL: 8.34 THOUSAND/UL (ref 1.85–7.62)
NEUTS BAND NFR BLD MANUAL: 1 % (ref 0–8)
NEUTS SEG NFR BLD AUTO: 76 % (ref 43–75)
PLATELET # BLD AUTO: 270 THOUSANDS/UL (ref 149–390)
PLATELET BLD QL SMEAR: ADEQUATE
PMV BLD AUTO: 9.5 FL (ref 8.9–12.7)
POLYCHROMASIA BLD QL SMEAR: PRESENT
POTASSIUM SERPL-SCNC: 4.3 MMOL/L (ref 3.5–5.3)
PROT SERPL-MCNC: 6.6 G/DL (ref 6.4–8.4)
RBC # BLD AUTO: 4.22 MILLION/UL (ref 3.81–5.12)
RBC MORPH BLD: PRESENT
SODIUM SERPL-SCNC: 136 MMOL/L (ref 135–147)
VARIANT LYMPHS # BLD AUTO: 2 %
WBC # BLD AUTO: 10.83 THOUSAND/UL (ref 4.31–10.16)

## 2025-04-08 PROCEDURE — 94664 DEMO&/EVAL PT USE INHALER: CPT

## 2025-04-08 PROCEDURE — 97530 THERAPEUTIC ACTIVITIES: CPT

## 2025-04-08 PROCEDURE — 94640 AIRWAY INHALATION TREATMENT: CPT

## 2025-04-08 PROCEDURE — 94760 N-INVAS EAR/PLS OXIMETRY 1: CPT

## 2025-04-08 PROCEDURE — 85007 BL SMEAR W/DIFF WBC COUNT: CPT

## 2025-04-08 PROCEDURE — 80053 COMPREHEN METABOLIC PANEL: CPT

## 2025-04-08 PROCEDURE — 82948 REAGENT STRIP/BLOOD GLUCOSE: CPT

## 2025-04-08 PROCEDURE — 99232 SBSQ HOSP IP/OBS MODERATE 35: CPT | Performed by: FAMILY MEDICINE

## 2025-04-08 PROCEDURE — 94669 MECHANICAL CHEST WALL OSCILL: CPT

## 2025-04-08 PROCEDURE — 85027 COMPLETE CBC AUTOMATED: CPT

## 2025-04-08 PROCEDURE — 94668 MNPJ CHEST WALL SBSQ: CPT

## 2025-04-08 RX ORDER — ACETYLCYSTEINE 200 MG/ML
3 SOLUTION ORAL; RESPIRATORY (INHALATION)
Status: DISCONTINUED | OUTPATIENT
Start: 2025-04-08 | End: 2025-04-10 | Stop reason: HOSPADM

## 2025-04-08 RX ORDER — METHYLPREDNISOLONE SODIUM SUCCINATE 40 MG/ML
40 INJECTION, POWDER, LYOPHILIZED, FOR SOLUTION INTRAMUSCULAR; INTRAVENOUS DAILY
Status: DISCONTINUED | OUTPATIENT
Start: 2025-04-09 | End: 2025-04-09

## 2025-04-08 RX ORDER — ACETYLCYSTEINE 200 MG/ML
3 SOLUTION ORAL; RESPIRATORY (INHALATION)
Status: DISCONTINUED | OUTPATIENT
Start: 2025-04-08 | End: 2025-04-08

## 2025-04-08 RX ORDER — IPRATROPIUM BROMIDE AND ALBUTEROL SULFATE 2.5; .5 MG/3ML; MG/3ML
3 SOLUTION RESPIRATORY (INHALATION)
Status: DISCONTINUED | OUTPATIENT
Start: 2025-04-08 | End: 2025-04-10 | Stop reason: HOSPADM

## 2025-04-08 RX ORDER — SENNOSIDES 8.6 MG
1 TABLET ORAL ONCE
Status: COMPLETED | OUTPATIENT
Start: 2025-04-08 | End: 2025-04-08

## 2025-04-08 RX ADMIN — AMLODIPINE BESYLATE 5 MG: 5 TABLET ORAL at 09:31

## 2025-04-08 RX ADMIN — INSULIN LISPRO 4 UNITS: 100 INJECTION, SOLUTION INTRAVENOUS; SUBCUTANEOUS at 11:59

## 2025-04-08 RX ADMIN — DULOXETINE 60 MG: 60 CAPSULE, DELAYED RELEASE ORAL at 09:30

## 2025-04-08 RX ADMIN — ALBUTEROL SULFATE 2 PUFF: 90 AEROSOL, METERED RESPIRATORY (INHALATION) at 09:28

## 2025-04-08 RX ADMIN — ACETAMINOPHEN 650 MG: 325 TABLET ORAL at 21:09

## 2025-04-08 RX ADMIN — ACETAMINOPHEN 650 MG: 325 TABLET ORAL at 11:59

## 2025-04-08 RX ADMIN — ATORVASTATIN CALCIUM 40 MG: 40 TABLET, FILM COATED ORAL at 09:31

## 2025-04-08 RX ADMIN — METOPROLOL SUCCINATE 50 MG: 50 TABLET, EXTENDED RELEASE ORAL at 09:30

## 2025-04-08 RX ADMIN — ASPIRIN 81 MG 81 MG: 81 TABLET ORAL at 09:39

## 2025-04-08 RX ADMIN — GUAIFENESIN 600 MG: 600 TABLET, EXTENDED RELEASE ORAL at 09:31

## 2025-04-08 RX ADMIN — METHYLPREDNISOLONE SODIUM SUCCINATE 40 MG: 40 INJECTION, POWDER, FOR SOLUTION INTRAMUSCULAR; INTRAVENOUS at 09:49

## 2025-04-08 RX ADMIN — ALBUTEROL SULFATE 2 PUFF: 90 AEROSOL, METERED RESPIRATORY (INHALATION) at 16:57

## 2025-04-08 RX ADMIN — LEVOTHYROXINE SODIUM 100 MCG: 100 TABLET ORAL at 06:32

## 2025-04-08 RX ADMIN — INSULIN LISPRO 2 UNITS: 100 INJECTION, SOLUTION INTRAVENOUS; SUBCUTANEOUS at 08:12

## 2025-04-08 RX ADMIN — GUAIFENESIN 600 MG: 600 TABLET, EXTENDED RELEASE ORAL at 21:07

## 2025-04-08 RX ADMIN — VENLAFAXINE HYDROCHLORIDE 150 MG: 150 CAPSULE, EXTENDED RELEASE ORAL at 21:07

## 2025-04-08 RX ADMIN — LIDOCAINE 5% 2 PATCH: 700 PATCH TOPICAL at 09:28

## 2025-04-08 RX ADMIN — SENNOSIDES 8.6 MG: 8.6 TABLET, FILM COATED ORAL at 12:07

## 2025-04-08 RX ADMIN — TRIMETHOBENZAMIDE HYDROCHLORIDE 200 MG: 100 INJECTION INTRAMUSCULAR at 21:15

## 2025-04-08 RX ADMIN — IPRATROPIUM BROMIDE AND ALBUTEROL SULFATE 3 ML: .5; 3 SOLUTION RESPIRATORY (INHALATION) at 19:52

## 2025-04-08 RX ADMIN — IPRATROPIUM BROMIDE AND ALBUTEROL SULFATE 3 ML: .5; 3 SOLUTION RESPIRATORY (INHALATION) at 07:21

## 2025-04-08 RX ADMIN — LOSARTAN POTASSIUM 25 MG: 25 TABLET, FILM COATED ORAL at 09:31

## 2025-04-08 RX ADMIN — FLUTICASONE FUROATE AND VILANTEROL TRIFENATATE 1 PUFF: 200; 25 POWDER RESPIRATORY (INHALATION) at 10:01

## 2025-04-08 RX ADMIN — INSULIN GLARGINE 5 UNITS: 100 INJECTION, SOLUTION SUBCUTANEOUS at 21:07

## 2025-04-08 RX ADMIN — INSULIN LISPRO 6 UNITS: 100 INJECTION, SOLUTION INTRAVENOUS; SUBCUTANEOUS at 16:45

## 2025-04-08 RX ADMIN — IPRATROPIUM BROMIDE AND ALBUTEROL SULFATE 3 ML: .5; 3 SOLUTION RESPIRATORY (INHALATION) at 13:47

## 2025-04-08 RX ADMIN — TRAZODONE HYDROCHLORIDE 100 MG: 100 TABLET ORAL at 21:07

## 2025-04-08 RX ADMIN — ACETAMINOPHEN 650 MG: 325 TABLET ORAL at 06:32

## 2025-04-08 RX ADMIN — ALPRAZOLAM 1 MG: 0.5 TABLET ORAL at 21:07

## 2025-04-08 RX ADMIN — SUCRALFATE 1 G: 1 TABLET ORAL at 06:32

## 2025-04-08 RX ADMIN — ENOXAPARIN SODIUM 40 MG: 40 INJECTION SUBCUTANEOUS at 09:29

## 2025-04-08 RX ADMIN — DOCUSATE SODIUM 100 MG: 100 CAPSULE, LIQUID FILLED ORAL at 09:31

## 2025-04-08 RX ADMIN — SUCRALFATE 1 G: 1 TABLET ORAL at 16:46

## 2025-04-08 RX ADMIN — INSULIN LISPRO 3 UNITS: 100 INJECTION, SOLUTION INTRAVENOUS; SUBCUTANEOUS at 02:45

## 2025-04-08 RX ADMIN — PANTOPRAZOLE SODIUM 40 MG: 40 TABLET, DELAYED RELEASE ORAL at 09:31

## 2025-04-08 RX ADMIN — VENLAFAXINE HYDROCHLORIDE 75 MG: 75 CAPSULE, EXTENDED RELEASE ORAL at 09:31

## 2025-04-08 RX ADMIN — ACETYLCYSTEINE 600 MG: 200 SOLUTION ORAL; RESPIRATORY (INHALATION) at 19:52

## 2025-04-08 RX ADMIN — BUPRENORPHINE AND NALOXONE 4 MG: 2; .5 FILM BUCCAL; SUBLINGUAL at 09:29

## 2025-04-08 RX ADMIN — INSULIN LISPRO 2 UNITS: 100 INJECTION, SOLUTION INTRAVENOUS; SUBCUTANEOUS at 21:08

## 2025-04-08 RX ADMIN — AZITHROMYCIN MONOHYDRATE 500 MG: 500 INJECTION, POWDER, LYOPHILIZED, FOR SOLUTION INTRAVENOUS at 16:49

## 2025-04-08 NOTE — PLAN OF CARE
Problem: RESPIRATORY - ADULT  Goal: Achieves optimal ventilation and oxygenation  Description: INTERVENTIONS:- Assess for changes in respiratory status- Assess for changes in mentation and behavior- Position to facilitate oxygenation and minimize respiratory effort- Oxygen administered by appropriate delivery if ordered- Initiate smoking cessation education as indicated- Encourage broncho-pulmonary hygiene including cough, deep breathe, Incentive Spirometry- Assess the need for suctioning and aspirate as needed- Assess and instruct to report SOB or any respiratory difficulty- Respiratory Therapy support as indicated  INTERVENTIONS:- Assess for changes in respiratory status- Assess for changes in mentation and behavior- Position to facilitate oxygenation and minimize respiratory effort- Oxygen administered by appropriate delivery if ordered- Initiate smoking cessation education as indicated- Encourage broncho-pulmonary hygiene including cough, deep breathe, Incentive Spirometry- Assess the need for suctioning and aspirate as needed- Assess and instruct to report SOB or any respiratory difficulty- Respiratory Therapy support as indicated  Outcome: Progressing     Problem: Prexisting or High Potential for Compromised Skin Integrity  Goal: Skin integrity is maintained or improved  Description: INTERVENTIONS:- Identify patients at risk for skin breakdown- Assess and monitor skin integrity- Assess and monitor nutrition and hydration status- Monitor labs - Assess for incontinence - Turn and reposition patient- Assist with mobility/ambulation- Relieve pressure over bony prominences- Avoid friction and shearing- Provide appropriate hygiene as needed including keeping skin clean and dry- Evaluate need for skin moisturizer/barrier cream- Collaborate with interdisciplinary team - Patient/family teaching- Consider wound care consult   Outcome: Progressing     Problem: PAIN - ADULT  Goal: Verbalizes/displays adequate comfort  level or baseline comfort level  Description: Interventions:- Encourage patient to monitor pain and request assistance- Assess pain using appropriate pain scale- Administer analgesics based on type and severity of pain and evaluate response- Implement non-pharmacological measures as appropriate and evaluate response- Consider cultural and social influences on pain and pain management- Notify physician/advanced practitioner if interventions unsuccessful or patient reports new pain  Outcome: Progressing     Problem: INFECTION - ADULT  Goal: Absence or prevention of progression during hospitalization  Description: INTERVENTIONS:- Assess and monitor for signs and symptoms of infection- Monitor lab/diagnostic results- Monitor all insertion sites, i.e. indwelling lines, tubes, and drains- Monitor endotracheal if appropriate and nasal secretions for changes in amount and color- [place] appropriate cooling/warming therapies per order- Administer medications as ordered- Instruct and encourage patient and family to use good hand hygiene technique- Identify and instruct in appropriate isolation precautions for identified infection/condition  Outcome: Progressing  Goal: Absence of fever/infection during neutropenic period  Description: INTERVENTIONS:- Monitor WBC  Outcome: Progressing     Problem: SAFETY ADULT  Goal: Patient will remain free of falls  Description: INTERVENTIONS:- Educate patient/family on patient safety including physical limitations- Instruct patient to call for assistance with activity - Consult OT/PT to assist with strengthening/mobility - Keep Call bell within reach- Keep bed low and locked with side rails adjusted as appropriate- Keep care items and personal belongings within reach- Initiate and maintain comfort rounds- Make Fall Risk Sign visible to staff- Offer Toileting every 3 Hours, in advance of need- Initiate/Maintain fall risk sign alarm- Obtain necessary fall risk management equipment: bed/chair  alarm- Apply yellow socks and bracelet for high fall risk patients- Consider moving patient to room near nurses station  Outcome: Progressing  Goal: Maintain or return to baseline ADL function  Description: INTERVENTIONS:-  Assess patient's ability to carry out ADLs; assess patient's baseline for ADL function and identify physical deficits which impact ability to perform ADLs (bathing, care of mouth/teeth, toileting, grooming, dressing, etc.)- Assess/evaluate cause of self-care deficits - Assess range of motion- Assess patient's mobility; develop plan if impaired- Assess patient's need for assistive devices and provide as appropriate- Encourage maximum independence but intervene and supervise when necessary- Involve family in performance of ADLs- Assess for home care needs following discharge - Consider OT consult to assist with ADL evaluation and planning for discharge- Provide patient education as appropriate  Outcome: Progressing  Goal: Maintains/Returns to pre admission functional level  Description: INTERVENTIONS:- Perform AM-PAC 6 Click Basic Mobility/ Daily Activity assessment daily.- Set and communicate daily mobility goal to care team and patient/family/caregiver. - Collaborate with rehabilitation services on mobility goals if consulted- Perform Range of Motion 3 times a day.- Reposition patient every 3 hours.- Dangle patient 3 times a day- Stand patient 3 times a day- Ambulate patient 3 times a day- Out of bed to chair 3 times a day - Out of bed for meals 3 times a day- Out of bed for toileting- Record patient progress and toleration of activity level   Outcome: Progressing     Problem: DISCHARGE PLANNING  Goal: Discharge to home or other facility with appropriate resources  Description: INTERVENTIONS:- Identify barriers to discharge w/patient and caregiver- Arrange for needed discharge resources and transportation as appropriate- Identify discharge learning needs (meds, wound care, etc.)- Arrange for  interpretive services to assist at discharge as needed- Refer to Case Management Department for coordinating discharge planning if the patient needs post-hospital services based on physician/advanced practitioner order or complex needs related to functional status, cognitive ability, or social support system  Outcome: Progressing     Problem: Knowledge Deficit  Goal: Patient/family/caregiver demonstrates understanding of disease process, treatment plan, medications, and discharge instructions  Description: Complete learning assessment and assess knowledge base.Interventions:- Provide teaching at level of understanding- Provide teaching via preferred learning methods  Outcome: Progressing     Problem: Nutrition/Hydration-ADULT  Goal: Nutrient/Hydration intake appropriate for improving, restoring or maintaining nutritional needs  Description: Monitor and assess patient's nutrition/hydration status for malnutrition. Collaborate with interdisciplinary team and initiate plan and interventions as ordered.  Monitor patient's weight and dietary intake as ordered or per policy. Utilize nutrition screening tool and intervene as necessary. Determine patient's food preferences and provide high-protein, high-caloric foods as appropriate. INTERVENTIONS:- Monitor oral intake, urinary output, labs, and treatment plans- Assess nutrition and hydration status and recommend course of action- Evaluate amount of meals eaten- Assist patient with eating if necessary - Allow adequate time for meals- Recommend/ encourage appropriate diets, oral nutritional supplements, and vitamin/mineral supplements- Order, calculate, and assess calorie counts as needed- Recommend, monitor, and adjust tube feedings and TPN/PPN based on assessed needs- Assess need for intravenous fluids- Provide specific nutrition/hydration education as appropriate- Include patient/family/caregiver in decisions related to nutrition  Outcome: Progressing

## 2025-04-08 NOTE — ASSESSMENT & PLAN NOTE
Patient presents with increased shortness of breath, productive cough, and increasing O2 requirements with Bipap in ED.   Usually wears 2L just at bedtime having to use now during the day.Has not improved despite inhaler use.    In the ED patient was given DuoNeb and steroids with improvement in symptoms   flu, COVID, RSV negative  CT C/AP: Right greater than left lower lobe reticular and groundglass densities concerning for infiltrates.   Patient follows with pulmonary outpatient setting her home regimen is 200 mcg Trelegy and as needed albuterol nebs 4 times daily  Repeat VBG 4/6/25: no acid base disturbance noted.    Plan  Encouraged smoking cessation  Bipap PRN   Pulmonology following  Xopenex/Atrovent/ saline nebs 3 times daily  Mucinex BID  IV Solu-Medrol 40 mg decreased to once daily  Azithromycin x 5 days  Continue supplemental oxygen therapy as needed and wean as tolerated.  Patient uses 2 L at bedtime.  Home o2 eval on discharge

## 2025-04-08 NOTE — DISCHARGE INSTR - OTHER ORDERS
Wound Care Plan:    1-Cleanse wound to left lateral forearm with normal saline & pat dry. Leave steristrips in place. If they fall off, apply 3M No Sting to periwound and reapply steristrips. Cover with 1/2 ABD pad, rolled gauze and tape. Change 3x/week & as needed for soilage/dislodgement.  2-Cleanse wound to left lateral lower leg with NSS & pat dry. Apply Normlgel to wound, Adaptic cut to size of wound, 1/2 ABD, rolled gauze and tape. Change 3x/week & as needed for soilage/dislodgement.  3-Apply Prevent barrier cream to bilateral sacrum, buttock and heels 2x/day and as needed.  4-Float heels on 2 pillows in bed to offload pressure so heels are not in contact with mattress or pillows.  5-Use pressure redistribution cushion in chair when out of bed. Avoid prolonged sitting.  6-Moisturize skin daily with skin nourishing cream.  7-Turn/reposition every 2 hours for pressure re-distribution on skin.   8-You will receive a call from Central Scheduling for follow up appointment at [facility]. [phone]

## 2025-04-08 NOTE — PLAN OF CARE
Problem: RESPIRATORY - ADULT  Goal: Achieves optimal ventilation and oxygenation  Description: INTERVENTIONS:- Assess for changes in respiratory status- Assess for changes in mentation and behavior- Position to facilitate oxygenation and minimize respiratory effort- Oxygen administered by appropriate delivery if ordered- Initiate smoking cessation education as indicated- Encourage broncho-pulmonary hygiene including cough, deep breathe, Incentive Spirometry- Assess the need for suctioning and aspirate as needed- Assess and instruct to report SOB or any respiratory difficulty- Respiratory Therapy support as indicated  INTERVENTIONS:- Assess for changes in respiratory status- Assess for changes in mentation and behavior- Position to facilitate oxygenation and minimize respiratory effort- Oxygen administered by appropriate delivery if ordered- Initiate smoking cessation education as indicated- Encourage broncho-pulmonary hygiene including cough, deep breathe, Incentive Spirometry- Assess the need for suctioning and aspirate as needed- Assess and instruct to report SOB or any respiratory difficulty- Respiratory Therapy support as indicated  Outcome: Progressing

## 2025-04-08 NOTE — PHYSICAL THERAPY NOTE
"   PT TREATMENT     04/08/25 1140   PT Last Visit   PT Visit Date 04/08/25   Note Type   Note Type Treatment   Pain Assessment   Pain Assessment Tool 0-10   Pain Score 7   Pain Location/Orientation Orientation: Left;Location: Buttocks;Location: Groin   Restrictions/Precautions   Weight Bearing Precautions Per Order No   Other Precautions Pain;Fall Risk;O2   General   Chart Reviewed Yes   Cognition   Overall Cognitive Status WFL   Subjective   Subjective \"I am doing better today\"   Bed Mobility   Supine to Sit 5  Supervision   Sit to Supine 5  Supervision   Transfers   Sit to Stand 4  Minimal assistance   Stand to Sit  Toilet 4  Minimal assistance  Min assist   Ambulation/Elevation   Gait pattern   (Antalgic L)   Gait Assistance 4  Minimal assist   Assistive Device Rolling walker   Distance 2x15 feet, 1x30 feet with 1L02;  Sp02 >90% with activity   Balance   Static Sitting Fair +   Static Standing Fair   Ambulatory Fair   Assessment   Prognosis Good   Problem List Decreased strength;Decreased endurance;Impaired balance;Decreased mobility;Pain   Assessment Pt demonstrates daily improvement in pain and function as pt was able to increase her distance ambulated with less pain.  Pt educated about the natural history of healing of musculoskeletal injuries and that progressive activity will help her tissues heal.  Encouraged pt to ambulate with nursing assist with a walker.    The patient's AM-PAC Basic Mobility Inpatient Short Form Raw Score is 20. A Raw score of greater than 16 suggests the patient may benefit from discharge to home. Please also refer to the recommendation of the Physical Therapist for safe discharge planning.         Plan   Treatment/Interventions Elevations;LE strengthening/ROM;Functional transfer training;Gait training;Bed mobility;Equipment eval/education;Patient/family training;Therapeutic exercise   Progress Progressing toward goals   PT Frequency 3-5x/wk   Discharge Recommendation   Rehab Resource " Intensity Level, PT III (Minimum Resource Intensity)   Equipment Recommended   (Pt has a rollator)   AM-PAC Basic Mobility Inpatient   Turning in Flat Bed Without Bedrails 4   Lying on Back to Sitting on Edge of Flat Bed Without Bedrails 4   Moving Bed to Chair 3   Standing Up From Chair Using Arms 3   Walk in Room 3   Climb 3-5 Stairs With Railing 3   Basic Mobility Inpatient Raw Score 20   Basic Mobility Standardized Score 43.99   [facility] Highest Level Of Mobility   -HLM Goal 6: Walk 10 steps or more   -HLM Achieved 7: Walk 25 feet or more   End of Consult   Patient Position at End of Consult All needs within reach;Supine   Licensure   [place] License Number  Jacqueline Lara PT [id number]

## 2025-04-08 NOTE — ASSESSMENT & PLAN NOTE
Tripped and fell on the LT 4/3/25 , has LT sided flank pain and rib pain with breathing. No palpiation  , tingling , numbness , dizziness or lightheadedness.   Trauma scans including CT Cervical spine, CAP , Head ; No trauma findings  L hip x ray: No acute osseous abnormality.    acetaminophen prn pain, lidocaine patch  PTOT  Fall precautions   Reconstructed lumbar spine CT shows no acute osseous abnormality of postsurgical lumbar spine with diffuse osteopenia

## 2025-04-08 NOTE — WOUND OSTOMY CARE
Progress Note - Wound   Montserrat Sumner 74 y.o. female MRN: [id number]  Unit/Bed#: [address] [place] [address] Encounter: [id number]        Assessment:   This is a 74 year old female admitted on 4/5/25 with COPD exacerbation. Patient was AAO x 3 and agreeable to have wound visit done. She turned and repositioned independently. She is continent of urine with no record of BM.    Assessment Findings:  1-Full thickness traumatic wound to left lateral foream with skin flap partially intact. Orders are in place for wound care.  2-Full thickness traumatic wound to left lateral lower leg - orders are in place for wound care.  3-Bilateral heels and sacrobuttocks intact - orders are in place for skin care and for prevention.    Wound Care Plan:  1-Cleanse wound to left lateral forearm with NSS & pat dry. Leave steristrips in place. If they fall off, apply 3M No Sting to periwound and reapply steristrips. Cover with 1/2 ABD pad, rolled gauze and tape. Change every other day & prn soilage/dislodgement.  2-Cleanse wound to left lateral lower leg with NSS & pat dry. Apply Normlgel to wound, Adaptic cut to size of wound, 1/2 ABD, rolled gauze and tape. Change every other day & prn soilage/dislodgement.  3-Apply Prevent barrier cream to bilateral sacrum, buttock and heels BID and PRN  4-Float heels on 2 pillows to offload pressure so heels are not in contact with mattress or pillows.  5-Ehob pressure redistribution cushion in chair when out of bed. Avoid prolonged sitting.  6-Moisturize skin daily with skin nourishing cream.  7-Turn/reposition q2h or when medically stable for pressure re-distribution on skin.     Wound 04/07/25 Traumatic Arm Left;Lower;Lateral (Active)   Wound Image   04/07/25 0942   Wound Description Granulation tissue;Red;Slough;Yellow 04/07/25 0942   Non-staged Wound Description Full thickness 04/07/25 0942   Wound Length (cm) 2.9 cm 04/07/25 0942   Wound Width (cm) 2 cm 04/07/25 0942   Wound Depth (cm) 0.1 cm 04/07/25  0942   Wound Surface Area (cm^2) 5.8 cm^2 04/07/25 0942   Wound Volume (cm^3) 0.58 cm^3 04/07/25 0942   Calculated Wound Volume (cm^3) 0.58 cm^3 04/07/25 0942   Drainage Amount Small 04/07/25 0942   Drainage Description Serous;Yellow 04/07/25 0942   Zahra-wound Assessment Intact 04/07/25 0942   Treatments Cleansed 04/07/25 0942   Wound Site Closure Adhesive closure strips 04/07/25 0942   Dressing Steristrips;ABD;Dry dressing 04/07/25 0942   Dressing Changed New 04/07/25 0942   Dressing Status Clean;Dry;Intact 04/07/25 0942       Wound 04/07/25 Traumatic Leg Left;Lateral (Active)   Wound Image   04/07/25 0949   Wound Description Dry;Black;Eschar;Granulation tissue;Pink;Slough;Yellow 04/07/25 0949   Non-staged Wound Description Full thickness 04/07/25 0949   Wound Length (cm) 1.2 cm 04/07/25 0949   Wound Width (cm) 5 cm 04/07/25 0949   Wound Depth (cm) 0.1 cm 04/07/25 0949   Wound Surface Area (cm^2) 6 cm^2 04/07/25 0949   Wound Volume (cm^3) 0.6 cm^3 04/07/25 0949   Calculated Wound Volume (cm^3) 0.6 cm^3 04/07/25 0949   Drainage Amount Moderate 04/07/25 0949   Drainage Description Serosanguineous 04/07/25 0949   Treatments Cleansed 04/07/25 0949   Dressing Normlgel;Adaptic;ABD;Dry dressing 04/07/25 0949   Dressing Changed New 04/07/25 0949   Dressing Status Clean;Dry;Intact 04/07/25 0949     Discussed assessment findings, and plan of care/recommendations with Crystal COTTON.    Wound care will follow along with patient throughout admission, please call or text with questions and concerns.    Recommendations written as orders.  Shabnam Rogers MSN, RN, CWON

## 2025-04-08 NOTE — ASSESSMENT & PLAN NOTE
Lab Results   Component Value Date    HGBA1C 9.0 (H) 04/05/2025       Recent Labs     04/07/25  2115 04/08/25  0243 04/08/25  0818 04/08/25  1118   POCGLU 219* 237* 161* 234*       Blood Sugar Average: Last 72 hrs:  (P) [id number]  SSI plus accu checks   Started on Lantus 5 units nightly

## 2025-04-08 NOTE — PLAN OF CARE
Problem: RESPIRATORY - ADULT  Goal: Achieves optimal ventilation and oxygenation  Description: INTERVENTIONS:- Assess for changes in respiratory status- Assess for changes in mentation and behavior- Position to facilitate oxygenation and minimize respiratory effort- Oxygen administered by appropriate delivery if ordered- Initiate smoking cessation education as indicated- Encourage broncho-pulmonary hygiene including cough, deep breathe, Incentive Spirometry- Assess the need for suctioning and aspirate as needed- Assess and instruct to report SOB or any respiratory difficulty- Respiratory Therapy support as indicated  INTERVENTIONS:- Assess for changes in respiratory status- Assess for changes in mentation and behavior- Position to facilitate oxygenation and minimize respiratory effort- Oxygen administered by appropriate delivery if ordered- Initiate smoking cessation education as indicated- Encourage broncho-pulmonary hygiene including cough, deep breathe, Incentive Spirometry- Assess the need for suctioning and aspirate as needed- Assess and instruct to report SOB or any respiratory difficulty- Respiratory Therapy support as indicated  Outcome: Progressing     Problem: Prexisting or High Potential for Compromised Skin Integrity  Goal: Skin integrity is maintained or improved  Description: INTERVENTIONS:- Identify patients at risk for skin breakdown- Assess and monitor skin integrity- Assess and monitor nutrition and hydration status- Monitor labs - Assess for incontinence - Turn and reposition patient- Assist with mobility/ambulation- Relieve pressure over bony prominences- Avoid friction and shearing- Provide appropriate hygiene as needed including keeping skin clean and dry- Evaluate need for skin moisturizer/barrier cream- Collaborate with interdisciplinary team - Patient/family teaching- Consider wound care consult   Outcome: Progressing     Problem: PAIN - ADULT  Goal: Verbalizes/displays adequate comfort  level or baseline comfort level  Description: Interventions:- Encourage patient to monitor pain and request assistance- Assess pain using appropriate pain scale- Administer analgesics based on type and severity of pain and evaluate response- Implement non-pharmacological measures as appropriate and evaluate response- Consider cultural and social influences on pain and pain management- Notify physician/advanced practitioner if interventions unsuccessful or patient reports new pain  Outcome: Progressing     Problem: INFECTION - ADULT  Goal: Absence or prevention of progression during hospitalization  Description: INTERVENTIONS:- Assess and monitor for signs and symptoms of infection- Monitor lab/diagnostic results- Monitor all insertion sites, i.e. indwelling lines, tubes, and drains- Monitor endotracheal if appropriate and nasal secretions for changes in amount and color- [place] appropriate cooling/warming therapies per order- Administer medications as ordered- Instruct and encourage patient and family to use good hand hygiene technique- Identify and instruct in appropriate isolation precautions for identified infection/condition  Outcome: Progressing  Goal: Absence of fever/infection during neutropenic period  Description: INTERVENTIONS:- Monitor WBC  Outcome: Progressing     Problem: SAFETY ADULT  Goal: Patient will remain free of falls  Description: INTERVENTIONS:- Educate patient/family on patient safety including physical limitations- Instruct patient to call for assistance with activity - Consult OT/PT to assist with strengthening/mobility - Keep Call bell within reach- Keep bed low and locked with side rails adjusted as appropriate- Keep care items and personal belongings within reach- Initiate and maintain comfort rounds- Make Fall Risk Sign visible to staff- Offer Toileting every 2 Hours, in advance of need- Initiate/Maintain bed alarm- Obtain necessary fall risk management equipment: nonskid socks- Apply  yellow socks and bracelet for high fall risk patients- Consider moving patient to room near nurses station  Outcome: Progressing  Goal: Maintain or return to baseline ADL function  Description: INTERVENTIONS:-  Assess patient's ability to carry out ADLs; assess patient's baseline for ADL function and identify physical deficits which impact ability to perform ADLs (bathing, care of mouth/teeth, toileting, grooming, dressing, etc.)- Assess/evaluate cause of self-care deficits - Assess range of motion- Assess patient's mobility; develop plan if impaired- Assess patient's need for assistive devices and provide as appropriate- Encourage maximum independence but intervene and supervise when necessary- Involve family in performance of ADLs- Assess for home care needs following discharge - Consider OT consult to assist with ADL evaluation and planning for discharge- Provide patient education as appropriate  Outcome: Progressing  Goal: Maintains/Returns to pre admission functional level  Description: INTERVENTIONS:- Perform AM-PAC 6 Click Basic Mobility/ Daily Activity assessment daily.- Set and communicate daily mobility goal to care team and patient/family/caregiver. - Collaborate with rehabilitation services on mobility goals if consulted- Perform Range of Motion 3 times a day.- Reposition patient every 2 hours.- Dangle patient 3 times a day- Stand patient 3 times a day- Ambulate patient 2 times a day- Out of bed to chair 3 times a day - Out of bed for meals 3 times a day- Out of bed for toileting- Record patient progress and toleration of activity level   Outcome: Progressing     Problem: DISCHARGE PLANNING  Goal: Discharge to home or other facility with appropriate resources  Description: INTERVENTIONS:- Identify barriers to discharge w/patient and caregiver- Arrange for needed discharge resources and transportation as appropriate- Identify discharge learning needs (meds, wound care, etc.)- Arrange for interpretive  services to assist at discharge as needed- Refer to Case Management Department for coordinating discharge planning if the patient needs post-hospital services based on physician/advanced practitioner order or complex needs related to functional status, cognitive ability, or social support system  Outcome: Progressing     Problem: Knowledge Deficit  Goal: Patient/family/caregiver demonstrates understanding of disease process, treatment plan, medications, and discharge instructions  Description: Complete learning assessment and assess knowledge base.Interventions:- Provide teaching at level of understanding- Provide teaching via preferred learning methods  Outcome: Progressing     Problem: Nutrition/Hydration-ADULT  Goal: Nutrient/Hydration intake appropriate for improving, restoring or maintaining nutritional needs  Description: Monitor and assess patient's nutrition/hydration status for malnutrition. Collaborate with interdisciplinary team and initiate plan and interventions as ordered.  Monitor patient's weight and dietary intake as ordered or per policy. Utilize nutrition screening tool and intervene as necessary. Determine patient's food preferences and provide high-protein, high-caloric foods as appropriate. INTERVENTIONS:- Monitor oral intake, urinary output, labs, and treatment plans- Assess nutrition and hydration status and recommend course of action- Evaluate amount of meals eaten- Assist patient with eating if necessary - Allow adequate time for meals- Recommend/ encourage appropriate diets, oral nutritional supplements, and vitamin/mineral supplements- Order, calculate, and assess calorie counts as needed- Recommend, monitor, and adjust tube feedings and TPN/PPN based on assessed needs- Assess need for intravenous fluids- Provide specific nutrition/hydration education as appropriate- Include patient/family/caregiver in decisions related to nutrition  Outcome: Progressing

## 2025-04-08 NOTE — PROGRESS NOTES
Progress Note - Hospitalist   Name: Montserrat Sumner 74 y.o. female I MRN: [id number]  Unit/Bed#: [address] [place] [address] I Date of Admission: 4/5/2025   Date of Service: 4/8/2025 I Hospital Day: 3     Assessment & Plan  COPD exacerbation (HCC)  Patient presents with increased shortness of breath, productive cough, and increasing O2 requirements with Bipap in ED.   Usually wears 2L just at bedtime having to use now during the day.Has not improved despite inhaler use.    In the ED patient was given DuoNeb and steroids with improvement in symptoms   flu, COVID, RSV negative  CT C/AP: Right greater than left lower lobe reticular and groundglass densities concerning for infiltrates.   Patient follows with pulmonary outpatient setting her home regimen is 200 mcg Trelegy and as needed albuterol nebs 4 times daily  Repeat VBG 4/6/25: no acid base disturbance noted.    Plan  Encouraged smoking cessation  Bipap PRN   Pulmonology following  Xopenex/Atrovent/ saline nebs 3 times daily  Mucinex BID  IV Solu-Medrol 40 mg decreased to once daily  Azithromycin x 5 days  Continue supplemental oxygen therapy as needed and wean as tolerated.  Patient uses 2 L at bedtime.  Home o2 eval on discharge    Centrilobular emphysema (HCC)  Moderate COPD per PFT from 2023   Ct chest: Centrilobular emphysema   Follows up with pulmonary medicine Dr. Smith   PTA inhalers: Trellegy, Albuterol   Acute respiratory failure (HCC)  Requires 2 LPM Qhs and PRN at baseline. Currently requiring Bipap with respiratory acidosis . Secondary to COPD .  O2 titrated to room air today (4/7/25), continue to monitor o2 sat. Continue 2L at bedtime  Pulmonary consulted  Refer to [facility] P for AECOPD   Oxygen dependent at night only  2 LPM via NC at bedtime only   Follows up with Dr. Smith   Hypertension  Continue PTA amlodipine, metoprolol, and losartan  Hypothyroid  Continue PTA levothyroxine  CAD (coronary artery disease)  Known history of CAD s/p CABG x 1  Continue  ASA and Lipitor daily  Anxiety  Takes duloxetine   Xanax 0.5 mg BID   Will continue regimen above  PDMP reviewed   Monitor Qtc . Currently wnl   Opioid dependence (HCC)  On Suboxone 4 mg sublingual daily  PDMP reviewed last prescription filled 3/13/25  Continue while inpatient  Chronic bilateral low back pain without sciatica  PTOT eval and treat   Fell at home on 4/3/25  CT spine / CAP: No acute cervical spine fracture or traumatic malalignment. Diffuse osteopenia.  acetaminophen PRN, lidocaine patch  PTOT   Check lumbar spine reconstructed CT scan  Tobacco use  Reports smoking 3 cigarettes daily for many years  Patient encouraged to quit smoking given her underlying COPD  NRT offered  IPMN (intraductal papillary mucinous neoplasm)  Follows up with surgical/medical oncology   Planned for stent removal however was postponed per GI to 4/18/25     Fall  Tripped and fell on the LT 4/3/25 , has LT sided flank pain and rib pain with breathing. No palpiation  , tingling , numbness , dizziness or lightheadedness.   Trauma scans including CT Cervical spine, CAP , Head ; No trauma findings  L hip x ray: No acute osseous abnormality.    acetaminophen prn pain, lidocaine patch  PTOT  Fall precautions   Reconstructed lumbar spine CT shows no acute osseous abnormality of postsurgical lumbar spine with diffuse osteopenia    Type 2 diabetes mellitus, without long-term current use of insulin (HCC)  Lab Results   Component Value Date    HGBA1C 9.0 (H) 04/05/2025       Recent Labs     04/07/25  2115 04/08/25  0243 04/08/25  0818 04/08/25  1118   POCGLU 219* 237* 161* 234*       Blood Sugar Average: Last 72 hrs:  (P) [id number]  SSI plus accu checks   Started on Lantus 5 units nightly    Drug use  Reported using cocaine 3 days prior to admission. X 1   -counseling and education offered.  -resources per CM   -monitor opioid medications while hospitalized   Constipation  Colace BID   Miralax prn  Monitor I/Os  VTE  Pharmacologic Prophylaxis: Lovenox prophylaxis.    Mobility:   Basic Mobility Inpatient Raw Score: 20  JH-HLM Goal: 6: Walk 10 steps or more  JH-HLM Achieved: 7: Walk 25 feet or more    Patient Centered Rounds: I performed bedside rounds with nursing staff today.     Education and Discussions with Family / Patient: Discussed with patient's spouse at bedside.    Current Length of Stay: 3 day(s)  Current Patient Status: Inpatient   Certification Statement: The patient will continue to require additional inpatient hospital stay due to above  Discharge Plan: Home.    Code Status: Level 1 - Full Code    Subjective   Patient was seen and examined at bedside. No acute events overnight. Shortness of breath improving. No chest pain.    Objective :  Temp:  [97.1 °F (36.2 °C)-98.4 °F (36.9 °C)] 97.1 °F (36.2 °C)  HR:  [81-89] 81  BP: (140-151)/(81-85) 151/85  Resp:  [16-20] 18  SpO2:  [87 %-95 %] 94 %  O2 Device: Nasal cannula  Nasal Cannula O2 Flow Rate (L/min):  [2 L/min-3 L/min] 2 L/min    Body mass index is 29.58 kg/m².     Input and Output Summary (last 24 hours):   No intake or output data in the 24 hours ending 04/08/25 1355    Physical Exam  HENT:      Head: Normocephalic.      Mouth/Throat:      Mouth: Mucous membranes are moist.   Eyes:      Extraocular Movements: Extraocular movements intact.   Cardiovascular:      Rate and Rhythm: Normal rate.   Pulmonary:      Effort: Pulmonary effort is normal.      Comments: Decreased in the bases  Abdominal:      Palpations: Abdomen is soft.      Tenderness: There is no abdominal tenderness.   Skin:     General: Skin is warm.   Neurological:      Mental Status: She is alert and oriented to person, place, and time.   Psychiatric:         Mood and Affect: Mood normal.         Lab Results: I have reviewed the following results:   Results from last 7 days   Lab Units 04/08/25  0629 04/07/25  0659   WBC Thousand/uL 10.83* 8.14   HEMOGLOBIN g/dL 12.5 12.5   HEMATOCRIT % 40.1 40.2    PLATELETS Thousands/uL 270 250   BANDS PCT % 1  --    SEGS PCT %  --  84*   LYMPHO PCT % 10* 6*   MONO PCT % 10 7   EOS PCT % 0 0     Results from last 7 days   Lab Units 04/08/25  0629   SODIUM mmol/L 136   POTASSIUM mmol/L 4.3   CHLORIDE mmol/L 101   CO2 mmol/L 25   BUN mg/dL 20   CREATININE mg/dL 0.83   ANION GAP mmol/L 10   CALCIUM mg/dL 9.7   ALBUMIN g/dL 3.6   TOTAL BILIRUBIN mg/dL 0.22   ALK PHOS U/L 33*   ALT U/L 21   AST U/L 12*   GLUCOSE RANDOM mg/dL 178*     Results from last 7 days   Lab Units 04/05/25  1118   INR  1.07     Results from last 7 days   Lab Units 04/08/25  1118 04/08/25  0818 04/08/25  0243 04/07/25  2115 04/07/25  1622 04/07/25  1040 04/07/25  0710 04/07/25  0209 04/06/25  2009 04/06/25  1611 04/06/25  1053 04/06/25  0734   POC GLUCOSE mg/dl 234* 161* 237* 219* 375* 250* 171* 284* 244* 148* 457* 221*     Results from last 7 days   Lab Units 04/05/25  1118   HEMOGLOBIN A1C % 9.0*     Results from last 7 days   Lab Units 04/05/25  1118   PROCALCITONIN ng/ml 0.21     Last 24 Hours Medication List:     Current Facility-Administered Medications:     acetaminophen (TYLENOL) tablet 650 mg, Q6H PRN    acetylcysteine (MUCOMYST) 200 mg/mL inhalation solution 600 mg, Q8H    albuterol (PROVENTIL HFA,VENTOLIN HFA) inhaler 2 puff, Q4H PRN    albuterol inhalation solution 2.5 mg, 4x Daily PRN    ALPRAZolam (XANAX) tablet 1 mg, HS    amLODIPine (NORVASC) tablet 5 mg, Daily    aspirin chewable tablet 81 mg, Daily    atorvastatin (LIPITOR) tablet 40 mg, QAM    azithromycin (ZITHROMAX) 500 mg in sodium chloride 0.9% 250mL IVPB 500 mg, Q24H    buprenorphine-naloxone (Suboxone) film 4 mg, Daily    cyclobenzaprine (FLEXERIL) tablet 10 mg, TID PRN    docusate sodium (COLACE) capsule 100 mg, BID    DULoxetine (CYMBALTA) delayed release capsule 60 mg, Daily    enoxaparin (LOVENOX) subcutaneous injection 40 mg, Daily    fluticasone-vilanterol 200-25 mcg/actuation 1 puff, Daily **AND** [DISCONTINUED] umeclidinium  62.5 mcg/actuation inhaler AEPB 1 puff, Daily    guaiFENesin (MUCINEX) 12 hr tablet 600 mg, Q12H TESSA    insulin glargine (LANTUS) subcutaneous injection 5 Units 0.05 mL, HS    insulin lispro (HumALOG/ADMELOG) 100 units/mL subcutaneous injection 1-6 Units, 0200    insulin lispro (HumALOG/ADMELOG) 100 units/mL subcutaneous injection 1-6 Units, HS    insulin lispro (HumALOG/ADMELOG) 100 units/mL subcutaneous injection 2-12 Units, TID AC **AND** Fingerstick Glucose (POCT), TID AC    ipratropium-albuterol (DUO-NEB) 0.5-2.5 mg/3 mL inhalation solution 3 mL, TID    levothyroxine tablet 100 mcg, Early Morning    lidocaine (LIDODERM) 5 % patch 2 patch, Daily    losartan (COZAAR) tablet 25 mg, QAM    [START ON 4/9/2025] methylPREDNISolone sodium succinate (Solu-MEDROL) injection 40 mg, Daily    metoprolol succinate (TOPROL-XL) 24 hr tablet 50 mg, QAM    nicotine (NICODERM CQ) 7 mg/24hr TD 24 hr patch 1 patch, Daily    pantoprazole (PROTONIX) EC tablet 40 mg, Daily    polyethylene glycol (MIRALAX) packet 17 g, Daily PRN    sucralfate (CARAFATE) tablet 1 g, BID AC    traZODone (DESYREL) tablet 100 mg, HS    trimethobenzamide (TIGAN) IM injection 200 mg, Q6H PRN    venlafaxine (EFFEXOR-XR) 24 hr capsule 150 mg, HS    venlafaxine (EFFEXOR-XR) 24 hr capsule 75 mg, QAM    vit B complex-vit C-folic acid (Renal Caps) capsule 1 capsule, Weekly    Administrative Statements   Today, Patient Was Seen By: Rui Meadows MD    **Please Note: This note may have been constructed using a voice recognition system.**

## 2025-04-09 ENCOUNTER — APPOINTMENT (INPATIENT)
Dept: RADIOLOGY | Facility: HOSPITAL | Age: 75
DRG: 190 | End: 2025-04-09
Payer: COMMERCIAL

## 2025-04-09 LAB
ANION GAP SERPL CALCULATED.3IONS-SCNC: 12 MMOL/L (ref 4–13)
BUN SERPL-MCNC: 20 MG/DL (ref 5–25)
CALCIUM SERPL-MCNC: 9.5 MG/DL (ref 8.4–10.2)
CHLORIDE SERPL-SCNC: 104 MMOL/L (ref 96–108)
CO2 SERPL-SCNC: 25 MMOL/L (ref 21–32)
CREAT SERPL-MCNC: 0.85 MG/DL (ref 0.6–1.3)
DME PARACHUTE DELIVERY DATE ACTUAL: NORMAL
DME PARACHUTE DELIVERY DATE REQUESTED: NORMAL
DME PARACHUTE ITEM DESCRIPTION: NORMAL
DME PARACHUTE ITEM DESCRIPTION: NORMAL
DME PARACHUTE ORDER STATUS: NORMAL
DME PARACHUTE SUPPLIER NAME: NORMAL
DME PARACHUTE SUPPLIER PHONE: NORMAL
ERYTHROCYTE [DISTWIDTH] IN BLOOD BY AUTOMATED COUNT: 13.8 % (ref 11.6–15.1)
GFR SERPL CREATININE-BSD FRML MDRD: 68 ML/MIN/1.73SQ M
GLUCOSE SERPL-MCNC: 124 MG/DL (ref 65–140)
GLUCOSE SERPL-MCNC: 154 MG/DL (ref 65–140)
GLUCOSE SERPL-MCNC: 209 MG/DL (ref 65–140)
GLUCOSE SERPL-MCNC: 233 MG/DL (ref 65–140)
GLUCOSE SERPL-MCNC: 236 MG/DL (ref 65–140)
GLUCOSE SERPL-MCNC: 96 MG/DL (ref 65–140)
HCT VFR BLD AUTO: 41 % (ref 34.8–46.1)
HGB BLD-MCNC: 12.6 G/DL (ref 11.5–15.4)
MAGNESIUM SERPL-MCNC: 2 MG/DL (ref 1.9–2.7)
MCH RBC QN AUTO: 29.6 PG (ref 26.8–34.3)
MCHC RBC AUTO-ENTMCNC: 30.7 G/DL (ref 31.4–37.4)
MCV RBC AUTO: 96 FL (ref 82–98)
PLATELET # BLD AUTO: 254 THOUSANDS/UL (ref 149–390)
PMV BLD AUTO: 9.5 FL (ref 8.9–12.7)
POTASSIUM SERPL-SCNC: 4 MMOL/L (ref 3.5–5.3)
RBC # BLD AUTO: 4.26 MILLION/UL (ref 3.81–5.12)
SODIUM SERPL-SCNC: 141 MMOL/L (ref 135–147)
WBC # BLD AUTO: 10.07 THOUSAND/UL (ref 4.31–10.16)

## 2025-04-09 PROCEDURE — 82948 REAGENT STRIP/BLOOD GLUCOSE: CPT

## 2025-04-09 PROCEDURE — 94640 AIRWAY INHALATION TREATMENT: CPT

## 2025-04-09 PROCEDURE — 80048 BASIC METABOLIC PNL TOTAL CA: CPT | Performed by: FAMILY MEDICINE

## 2025-04-09 PROCEDURE — 99232 SBSQ HOSP IP/OBS MODERATE 35: CPT | Performed by: INTERNAL MEDICINE

## 2025-04-09 PROCEDURE — 71045 X-RAY EXAM CHEST 1 VIEW: CPT

## 2025-04-09 PROCEDURE — 94664 DEMO&/EVAL PT USE INHALER: CPT

## 2025-04-09 PROCEDURE — 85027 COMPLETE CBC AUTOMATED: CPT | Performed by: FAMILY MEDICINE

## 2025-04-09 PROCEDURE — 83735 ASSAY OF MAGNESIUM: CPT | Performed by: FAMILY MEDICINE

## 2025-04-09 PROCEDURE — 94668 MNPJ CHEST WALL SBSQ: CPT

## 2025-04-09 PROCEDURE — 99232 SBSQ HOSP IP/OBS MODERATE 35: CPT | Performed by: FAMILY MEDICINE

## 2025-04-09 PROCEDURE — 94760 N-INVAS EAR/PLS OXIMETRY 1: CPT

## 2025-04-09 PROCEDURE — 94761 N-INVAS EAR/PLS OXIMETRY MLT: CPT

## 2025-04-09 PROCEDURE — 97530 THERAPEUTIC ACTIVITIES: CPT

## 2025-04-09 RX ORDER — ACETAMINOPHEN 325 MG/1
975 TABLET ORAL EVERY 8 HOURS PRN
Status: DISCONTINUED | OUTPATIENT
Start: 2025-04-09 | End: 2025-04-10 | Stop reason: HOSPADM

## 2025-04-09 RX ORDER — IBUPROFEN 400 MG/1
400 TABLET, FILM COATED ORAL ONCE
Status: COMPLETED | OUTPATIENT
Start: 2025-04-09 | End: 2025-04-09

## 2025-04-09 RX ORDER — PREDNISONE 20 MG/1
40 TABLET ORAL DAILY
Status: DISCONTINUED | OUTPATIENT
Start: 2025-04-10 | End: 2025-04-10 | Stop reason: HOSPADM

## 2025-04-09 RX ADMIN — AMLODIPINE BESYLATE 5 MG: 5 TABLET ORAL at 08:22

## 2025-04-09 RX ADMIN — IPRATROPIUM BROMIDE AND ALBUTEROL SULFATE 3 ML: .5; 3 SOLUTION RESPIRATORY (INHALATION) at 13:19

## 2025-04-09 RX ADMIN — ACETYLCYSTEINE 600 MG: 200 SOLUTION ORAL; RESPIRATORY (INHALATION) at 19:44

## 2025-04-09 RX ADMIN — VENLAFAXINE HYDROCHLORIDE 75 MG: 75 CAPSULE, EXTENDED RELEASE ORAL at 08:21

## 2025-04-09 RX ADMIN — METHYLPREDNISOLONE SODIUM SUCCINATE 40 MG: 40 INJECTION, POWDER, FOR SOLUTION INTRAMUSCULAR; INTRAVENOUS at 08:19

## 2025-04-09 RX ADMIN — PANTOPRAZOLE SODIUM 40 MG: 40 TABLET, DELAYED RELEASE ORAL at 08:22

## 2025-04-09 RX ADMIN — INSULIN LISPRO 2 UNITS: 100 INJECTION, SOLUTION INTRAVENOUS; SUBCUTANEOUS at 02:08

## 2025-04-09 RX ADMIN — DULOXETINE 60 MG: 60 CAPSULE, DELAYED RELEASE ORAL at 08:21

## 2025-04-09 RX ADMIN — VENLAFAXINE HYDROCHLORIDE 150 MG: 150 CAPSULE, EXTENDED RELEASE ORAL at 21:19

## 2025-04-09 RX ADMIN — METOPROLOL SUCCINATE 50 MG: 50 TABLET, EXTENDED RELEASE ORAL at 08:22

## 2025-04-09 RX ADMIN — ACETYLCYSTEINE 600 MG: 200 SOLUTION ORAL; RESPIRATORY (INHALATION) at 07:28

## 2025-04-09 RX ADMIN — AZITHROMYCIN MONOHYDRATE 500 MG: 500 INJECTION, POWDER, LYOPHILIZED, FOR SOLUTION INTRAVENOUS at 16:52

## 2025-04-09 RX ADMIN — IPRATROPIUM BROMIDE AND ALBUTEROL SULFATE 3 ML: .5; 3 SOLUTION RESPIRATORY (INHALATION) at 07:29

## 2025-04-09 RX ADMIN — IBUPROFEN 400 MG: 400 TABLET, FILM COATED ORAL at 14:20

## 2025-04-09 RX ADMIN — ALPRAZOLAM 1 MG: 0.5 TABLET ORAL at 21:21

## 2025-04-09 RX ADMIN — LOSARTAN POTASSIUM 25 MG: 25 TABLET, FILM COATED ORAL at 08:22

## 2025-04-09 RX ADMIN — DOCUSATE SODIUM 100 MG: 100 CAPSULE, LIQUID FILLED ORAL at 08:22

## 2025-04-09 RX ADMIN — INSULIN GLARGINE 5 UNITS: 100 INJECTION, SOLUTION SUBCUTANEOUS at 21:21

## 2025-04-09 RX ADMIN — FLUTICASONE FUROATE AND VILANTEROL TRIFENATATE 1 PUFF: 200; 25 POWDER RESPIRATORY (INHALATION) at 08:24

## 2025-04-09 RX ADMIN — ACETAMINOPHEN 650 MG: 325 TABLET ORAL at 11:48

## 2025-04-09 RX ADMIN — GUAIFENESIN 600 MG: 600 TABLET, EXTENDED RELEASE ORAL at 08:21

## 2025-04-09 RX ADMIN — ACETYLCYSTEINE 600 MG: 200 SOLUTION ORAL; RESPIRATORY (INHALATION) at 13:19

## 2025-04-09 RX ADMIN — SUCRALFATE 1 G: 1 TABLET ORAL at 16:53

## 2025-04-09 RX ADMIN — ENOXAPARIN SODIUM 40 MG: 40 INJECTION SUBCUTANEOUS at 08:19

## 2025-04-09 RX ADMIN — GUAIFENESIN 600 MG: 600 TABLET, EXTENDED RELEASE ORAL at 21:21

## 2025-04-09 RX ADMIN — INSULIN LISPRO 1 UNITS: 100 INJECTION, SOLUTION INTRAVENOUS; SUBCUTANEOUS at 21:22

## 2025-04-09 RX ADMIN — DOCUSATE SODIUM 100 MG: 100 CAPSULE, LIQUID FILLED ORAL at 17:26

## 2025-04-09 RX ADMIN — LIDOCAINE 5% 2 PATCH: 700 PATCH TOPICAL at 08:17

## 2025-04-09 RX ADMIN — BUPRENORPHINE AND NALOXONE 4 MG: 2; .5 FILM BUCCAL; SUBLINGUAL at 08:19

## 2025-04-09 RX ADMIN — IPRATROPIUM BROMIDE AND ALBUTEROL SULFATE 3 ML: .5; 3 SOLUTION RESPIRATORY (INHALATION) at 19:42

## 2025-04-09 RX ADMIN — TRAZODONE HYDROCHLORIDE 100 MG: 100 TABLET ORAL at 21:20

## 2025-04-09 RX ADMIN — INSULIN LISPRO 4 UNITS: 100 INJECTION, SOLUTION INTRAVENOUS; SUBCUTANEOUS at 11:49

## 2025-04-09 RX ADMIN — ASPIRIN 81 MG 81 MG: 81 TABLET ORAL at 08:39

## 2025-04-09 RX ADMIN — LEVOTHYROXINE SODIUM 100 MCG: 100 TABLET ORAL at 05:13

## 2025-04-09 RX ADMIN — ACETAMINOPHEN 975 MG: 325 TABLET ORAL at 21:18

## 2025-04-09 RX ADMIN — SUCRALFATE 1 G: 1 TABLET ORAL at 08:27

## 2025-04-09 RX ADMIN — ATORVASTATIN CALCIUM 40 MG: 40 TABLET, FILM COATED ORAL at 08:21

## 2025-04-09 RX ADMIN — INSULIN LISPRO 4 UNITS: 100 INJECTION, SOLUTION INTRAVENOUS; SUBCUTANEOUS at 17:27

## 2025-04-09 NOTE — ASSESSMENT & PLAN NOTE
Abnormal CT of the chest with bilateral lower lobe mild groundglass opacities viral panel has been negative  Unclear if this is aspiration with history of recent drug use as well and history of fall versus retrograde aspiration from the severe acid reflux and GERD

## 2025-04-09 NOTE — ASSESSMENT & PLAN NOTE
2 LPM via NC at bedtime only   Follows up with Dr. Smith   Ambulatory walk test performed and patient saturating 94% on room air at rest and 92% on room air during exertion

## 2025-04-09 NOTE — PROGRESS NOTES
Progress Note - Hospitalist   Name: Montserrat Sumner 74 y.o. female I MRN: [id number]  Unit/Bed#: [address] [place] [address] I Date of Admission: 4/5/2025   Date of Service: 4/9/2025 I Hospital Day: 4     Assessment & Plan  COPD exacerbation (HCC)  Patient presents with increased shortness of breath, productive cough, and increasing O2 requirements with Bipap in ED.   Usually wears 2L just at bedtime having to use now during the day.Has not improved despite inhaler use.    In the ED patient was given DuoNeb and steroids with improvement in symptoms   flu, COVID, RSV negative  CT C/AP: Right greater than left lower lobe reticular and groundglass densities concerning for infiltrates.   Patient follows with pulmonary outpatient setting her home regimen is 200 mcg Trelegy and as needed albuterol nebs 4 times daily  Repeat VBG 4/6/25: no acid base disturbance noted.    Plan  Encouraged smoking cessation  Bipap PRN   Pulmonology following  Xopenex/Atrovent/ saline nebs 3 times daily  Mucinex BID  IV Solu-Medrol 40 mg decreased to once daily  Azithromycin x 5 days  Continue supplemental oxygen therapy as needed and wean as tolerated.  Patient uses 2 L at bedtime.  Home o2 eval completed and patient noted to be saturating 94% on room air at rest and 92% on room air during ambulation  Follow up cxr ordered and noted to be negative    Centrilobular emphysema (HCC)  Moderate COPD per PFT from 2023   Ct chest: Centrilobular emphysema   Follows up with pulmonary medicine Dr. Smith   PTA inhalers: Trellegy, Albuterol   Acute respiratory failure (HCC)  Requires 2 LPM Qhs and PRN at baseline.  O2 titrated to room air (4/7/25), continue to monitor o2 sat. Continue 2L at bedtime  Pulmonary following  Refer to A and P for AECOPD   Oxygen dependent at night only  2 LPM via NC at bedtime only   Follows up with Dr. Smith   Ambulatory walk test performed and patient saturating 94% on room air at rest and 92% on room air during  exertion  Hypertension  Continue PTA amlodipine, metoprolol, and losartan  Hypothyroid  Continue PTA levothyroxine  CAD (coronary artery disease)  Known history of CAD s/p CABG x 1  Continue ASA and Lipitor daily  Anxiety  Takes duloxetine   Xanax 0.5 mg BID   Will continue regimen above  PDMP reviewed   Monitor Qtc . Currently wnl   Opioid dependence (HCC)  On Suboxone 4 mg sublingual daily  PDMP reviewed last prescription filled 3/13/25  Continue while inpatient  Chronic bilateral low back pain without sciatica  PTOT eval and treat   Fell at home on 4/3/25  CT spine / CAP: No acute cervical spine fracture or traumatic malalignment. Diffuse osteopenia.  acetaminophen PRN, lidocaine patch  PTOT   CT recon only lumbar spine shows no acute osseous abnormality of postsurgical lumbar spine with diffuse osteopenia.  Tobacco use  Reports smoking 3 cigarettes daily for many years  Patient encouraged to quit smoking given her underlying COPD  NRT offered  IPMN (intraductal papillary mucinous neoplasm)  Follows up with surgical/medical oncology   Planned for stent removal however was postponed per GI to 4/18/25   Fall  Tripped and fell on the LT 4/3/25 , has LT sided flank pain and rib pain with breathing. No palpiation  , tingling , numbness , dizziness or lightheadedness.   Trauma scans including CT Cervical spine, CAP , Head ; No trauma findings  L hip x ray: No acute osseous abnormality.    acetaminophen prn pain, lidocaine patch  PTOT  Fall precautions   Reconstructed lumbar spine CT shows no acute osseous abnormality of postsurgical lumbar spine with diffuse osteopenia    Type 2 diabetes mellitus, without long-term current use of insulin ([facility])  Lab Results   Component Value Date    HGBA1C 9.0 (H) 04/05/2025       Recent Labs     04/08/25 2000 04/09/25  0149 04/09/25  0711 04/09/25  1133   POCGLU 216* 209* 96 236*       Blood Sugar Average: Last 72 hrs:  (P) [id number]  SSI plus accu checks   Started on  Lantus 5 units nightly    Drug use  Reported using cocaine 3 days prior to admission. X 1   -counseling and education offered.  -resources per CM   -monitor opioid medications while hospitalized   Constipation  Colace BID   Miralax prn  Monitor I/Os  VTE Pharmacologic Prophylaxis: Lovenox prophylaxis.    Mobility:   Basic Mobility Inpatient Raw Score: 21  JH-HLM Goal: 6: Walk 10 steps or more  JH-HLM Achieved: 7: Walk 25 feet or more    Patient Centered Rounds: I performed bedside rounds with nursing staff today.     Current Length of Stay: 4 day(s)  Current Patient Status: Inpatient   Certification Statement: The patient will continue to require additional inpatient hospital stay due to above  Discharge Plan: Home.    Code Status: Level 1 - Full Code    Subjective   Patient was seen and examined at bedside. No acute events overnight. No new complaints. Slowly improving.    Objective :  Temp:  [96.8 °F (36 °C)-97.8 °F (36.6 °C)] 96.8 °F (36 °C)  HR:  [78-87] 78  BP: (151-158)/(79-96) 158/96  Resp:  [18-21] 20  SpO2:  [94 %-100 %] 100 %  O2 Device: Nasal cannula  Nasal Cannula O2 Flow Rate (L/min):  [2 L/min] 2 L/min    Body mass index is 29.58 kg/m².     Input and Output Summary (last 24 hours):     Intake/Output Summary (Last 24 hours) at 4/9/2025 1235  Last data filed at 4/9/2025 0901  Gross per 24 hour   Intake 400 ml   Output 250 ml   Net 150 ml       Physical Exam  HENT:      Head: Normocephalic.      Mouth/Throat:      Mouth: Mucous membranes are moist.   Eyes:      Extraocular Movements: Extraocular movements intact.   Cardiovascular:      Rate and Rhythm: Normal rate.   Pulmonary:      Effort: Pulmonary effort is normal.      Comments: Decreased in the bases  Abdominal:      Palpations: Abdomen is soft.      Tenderness: There is no abdominal tenderness.   Skin:     General: Skin is warm.   Neurological:      Mental Status: She is alert and oriented to person, place, and time.   Psychiatric:         Mood and  Affect: Mood normal.         Lab Results: I have reviewed the following results:   Results from last 7 days   Lab Units 04/09/25  0512 04/08/25  0629 04/07/25  0659   WBC Thousand/uL 10.07 10.83* 8.14   HEMOGLOBIN g/dL 12.6 12.5 12.5   HEMATOCRIT % 41.0 40.1 40.2   PLATELETS Thousands/uL 254 270 250   BANDS PCT %  --  1  --    SEGS PCT %  --   --  84*   LYMPHO PCT %  --  10* 6*   MONO PCT %  --  10 7   EOS PCT %  --  0 0     Results from last 7 days   Lab Units 04/09/25  0512 04/08/25  0629   SODIUM mmol/L 141 136   POTASSIUM mmol/L 4.0 4.3   CHLORIDE mmol/L 104 101   CO2 mmol/L 25 25   BUN mg/dL 20 20   CREATININE mg/dL 0.85 0.83   ANION GAP mmol/L 12 10   CALCIUM mg/dL 9.5 9.7   ALBUMIN g/dL  --  3.6   TOTAL BILIRUBIN mg/dL  --  0.22   ALK PHOS U/L  --  33*   ALT U/L  --  21   AST U/L  --  12*   GLUCOSE RANDOM mg/dL 124 178*     Results from last 7 days   Lab Units 04/05/25  1118   INR  1.07     Results from last 7 days   Lab Units 04/09/25  1133 04/09/25  0711 04/09/25  0149 04/08/25  2000 04/08/25  1546 04/08/25  1118 04/08/25  0818 04/08/25  0243 04/07/25  2115 04/07/25  1622 04/07/25  1040 04/07/25  0710   POC GLUCOSE mg/dl 236* 96 209* 216* 257* 234* 161* 237* 219* 375* 250* 171*     Results from last 7 days   Lab Units 04/05/25  1118   HEMOGLOBIN A1C % 9.0*     Results from last 7 days   Lab Units 04/05/25  1118   PROCALCITONIN ng/ml 0.21     Last 24 Hours Medication List:     Current Facility-Administered Medications:     acetaminophen (TYLENOL) tablet 650 mg, Q6H PRN    acetylcysteine (MUCOMYST) 200 mg/mL inhalation solution 600 mg, TID    albuterol (PROVENTIL HFA,VENTOLIN HFA) inhaler 2 puff, Q4H PRN    albuterol inhalation solution 2.5 mg, 4x Daily PRN    ALPRAZolam (XANAX) tablet 1 mg, HS    amLODIPine (NORVASC) tablet 5 mg, Daily    aspirin chewable tablet 81 mg, Daily    atorvastatin (LIPITOR) tablet 40 mg, QAM    azithromycin (ZITHROMAX) 500 mg in sodium chloride 0.9% 250mL IVPB 500 mg, Q24H     buprenorphine-naloxone (Suboxone) film 4 mg, Daily    cyclobenzaprine (FLEXERIL) tablet 10 mg, TID PRN    docusate sodium (COLACE) capsule 100 mg, BID    DULoxetine (CYMBALTA) delayed release capsule 60 mg, Daily    enoxaparin (LOVENOX) subcutaneous injection 40 mg, Daily    fluticasone-vilanterol 200-25 mcg/actuation 1 puff, Daily **AND** [DISCONTINUED] umeclidinium 62.5 mcg/actuation inhaler AEPB 1 puff, Daily    guaiFENesin (MUCINEX) 12 hr tablet 600 mg, Q12H TESSA    insulin glargine (LANTUS) subcutaneous injection 5 Units 0.05 mL, HS    insulin lispro (HumALOG/ADMELOG) 100 units/mL subcutaneous injection 1-6 Units, 0200    insulin lispro (HumALOG/ADMELOG) 100 units/mL subcutaneous injection 1-6 Units, HS    insulin lispro (HumALOG/ADMELOG) 100 units/mL subcutaneous injection 2-12 Units, TID AC **AND** Fingerstick Glucose (POCT), TID AC    ipratropium-albuterol (DUO-NEB) 0.5-2.5 mg/3 mL inhalation solution 3 mL, TID    levothyroxine tablet 100 mcg, Early Morning    lidocaine (LIDODERM) 5 % patch 2 patch, Daily    losartan (COZAAR) tablet 25 mg, QAM    methylPREDNISolone sodium succinate (Solu-MEDROL) injection 40 mg, Daily    metoprolol succinate (TOPROL-XL) 24 hr tablet 50 mg, QAM    nicotine (NICODERM CQ) 7 mg/24hr TD 24 hr patch 1 patch, Daily    pantoprazole (PROTONIX) EC tablet 40 mg, Daily    polyethylene glycol (MIRALAX) packet 17 g, Daily PRN    sucralfate (CARAFATE) tablet 1 g, BID AC    traZODone (DESYREL) tablet 100 mg, HS    trimethobenzamide (TIGAN) IM injection 200 mg, Q6H PRN    venlafaxine (EFFEXOR-XR) 24 hr capsule 150 mg, HS    venlafaxine (EFFEXOR-XR) 24 hr capsule 75 mg, QAM    vit B complex-vit C-folic acid (Renal Caps) capsule 1 capsule, Weekly    Administrative Statements   Today, Patient Was Seen By: Rui Meadows MD    **Please Note: This note may have been constructed using a voice recognition system.**

## 2025-04-09 NOTE — CASE MANAGEMENT
Case Management Discharge Planning Note    Patient name Montserrat Sumner  Location [address] [facility]-* MRN [id number]  : 1950 Date 2025       Current Admission Date: 2025  Current Admission Diagnosis:COPD exacerbation (HCC)   Patient Active Problem List    Diagnosis Date Noted Date Diagnosed    Constipation 2025     Drug use 2025     Acute on chronic hypoxic respiratory failure (HCC) 2025     Abnormal CT of the chest 2025     Acute respiratory failure (HCC) 2025     Fall 2025     Type 2 diabetes mellitus, without long-term current use of insulin (HCC) 2025     Tracheobronchitis 2025     Nocturnal hypoxemia due to emphysema  (HCC) 2025     Right hip pain 2025     IPMN (intraductal papillary mucinous neoplasm) 2025     Dilation of pancreatic duct 2025     Osteoarthritis of spine with radiculopathy, lumbar region 2025     Intractable back pain 2025     Tobacco use 2024     Sacroiliitis (HCC) 2024     Primary osteoarthritis of both knees 2024     S/P ERCP 2024     Acute bronchitis 10/08/2024     Chronic bilateral low back pain without sciatica 10/08/2024     Shortness of breath 10/08/2024     Marijuana smoker 2024     Postmenopausal 2024     Bilateral cataracts 2024     Aneurysm of ascending aorta without rupture ([facility]) 2024     Bile duct abnormality 2024     Sleep disorder 2023     Hyperlipidemia 2023     Oxygen dependent at night only 2023     Gastroesophageal reflux disease 10/04/2023     Dysphagia 10/04/2023     COPD exacerbation (HCC) 2023     Abnormal CT scan 2023     Intractable abdominal pain 2023     Prediabetes 2023     Obesity (BMI 30-39.9) 2023     Chronic pain syndrome 2023     Lung nodule 2023     Cerebral infarct (HCC) 2023     Cocaine abuse (HCC) 2023     Hypertension  03/01/2023     Hypothyroid 03/01/2023     CAD (coronary artery disease) 03/01/2023     History of coronary artery bypass graft 03/01/2023     Centrilobular emphysema (HCC) 03/01/2023     Anxiety 03/01/2023     Opioid dependence (HCC) 03/01/2023     Cigarette nicotine dependence without complication 03/01/2023       LOS (days): 4  Geometric Mean LOS (GMLOS) (days): 3.4  Days to GMLOS:-0.5     OBJECTIVE:  Risk of Unplanned Readmission Score: 19.47         Current admission status: Inpatient   Preferred Pharmacy:   [facility] [place] [address]  [address] [facility] [address]  Phone: [phone] Fax: [phone]    Primary Care Provider: Julian Tobias DO    Primary Insurance: [employer] REP  Secondary Insurance:     DISCHARGE DETAILS:    Discharge planning discussed with:: Patient, Spouse Matthew  Freedom of Choice: Yes  Comments - Freedom of Choice: SW met bedside with patient and spouse to continue discussion on discharge plan. SW reviewed evaluations by PT/OT. Patient states she and  have chosen for patient to go home w/ HHC. Patient consenting to blanket referrals. Spouse confirmed he received a call from [facility] and will have equipment shipped to home.  CM contacted family/caregiver?: Yes  Were Treatment Team discharge recommendations reviewed with patient/caregiver?: Yes  Did patient/caregiver verbalize understanding of patient care needs?: Yes  Were patient/caregiver advised of the risks associated with not following Treatment Team discharge recommendations?: Yes    Contacts  Patient Contacts: Matthew Sumner (spouse)  Relationship to Patient:: Family  Contact Method: In Person  Reason/Outcome: Continuity of Care, Emergency Contact, Discharge Planning    Requested Home Health Care         Is the patient interested in HHC at discharge?: Yes  Home Health Discipline requested:: Nursing, Occupational Therapy, Physical Therapy  Home Health Agency Name::  Other  Home Health Follow-Up Provider:: PCP  Home Health Services Needed:: Evaluate Functional Status and Safety, Gait/ADL Training, COPD Management, Strengthening/Theraputic Exercises to Improve Function  Homebound Criteria Met:: Requires the Assistance of Another Person for Safe Ambulation or to Leave the Home, Uses an Assist Device (i.e. cane, walker, etc)  Supporting Clincal Findings:: Limited Endurance, Fatigues Easliy in Short Distances     Other Referral/Resources/Interventions Provided:  Interventions: HHC  Referral Comments: [facility] referrals sent in [facility]. Responses pending. Attending updated re chosen dispo plan.     Treatment Team Recommendation: Home with Home Health Care  Discharge Destination Plan:: Home with Home Health Care  Transport at Discharge : Family

## 2025-04-09 NOTE — PROGRESS NOTES
Progress Note - Pulmonology   Name: Montserrat Sumner 74 y.o. female I MRN: [id number]  Unit/Bed#: [address] [place] [address] Date of Admission: 4/5/2025   Date of Service: 4/9/2025 I Hospital Day: 4     Assessment & Plan  Acute on chronic hypoxic respiratory failure (HCC)  Acute on chronic hypoxemic respiratory failure in the setting of moderate COPD with currently acute exacerbation and recent acute tracheobronchitis  Viral panel has been negative  Recently completed a course of antibiotic with Ceftin 3/7/2025  Can complete the course of azithromycin for 5 days    COPD exacerbation (HCC)  Moderate COPD with FEV1 of 51% currently in exacerbation  Will switch DuoNeb to every 4 hours  Continue with Trelegy 1 puff daily  Can change to oral prednisone 40 mg daily and taper 10 mg over 7 days. PATIENT DOES HAVE ENOUGH MEDICATIONS AT HOME AND DOES NOT REQUIRE PRESCRIPTION- Pt and her  both confirmed this.   Will hold off on the Mucomyst for now given acute exacerbation  She can continue the Mucomyst once she goes home she had states she has been on it for the past month  Continue with airway clearance measures with incentive spirometry and flutter valve  Centrilobular emphysema (HCC)  Centrilobular emphysema on the CT of the chest  Oxygen dependent at night only  Has been on 2 L of oxygen at nighttime only currently requiring around 3 L of oxygen continues  Titrate down as able to maintain oxygen saturation greater than 89%  Tobacco use  Smoking cessation discussed wants to cut down on  Candidate for lung cancer screening  Abnormal CT of the chest  Abnormal CT of the chest with bilateral lower lobe mild groundglass opacities viral panel has been negative  Unclear if this is aspiration with history of recent drug use as well and history of fall versus retrograde aspiration from the severe acid reflux and GERD       Subjective : Pt feels better but still has some cough and unable to bring phlegm.     Objective :  Temp:  [96.8  °F (36 °C)-97.8 °F (36.6 °C)] 96.8 °F (36 °C)  HR:  [78-85] 78  BP: (154-158)/(79-96) 158/96  Resp:  [19-21] 20  SpO2:  [91 %-100 %] 91 %  O2 Device: None (Room air)  Nasal Cannula O2 Flow Rate (L/min):  [2 L/min] 2 L/min    Physical Exam  General: AAOX3  HEENT: atrumatic head  Lungs: decreased breath sound at bases, no rales or wheezing   CVS: S1S2+, no m/r/g  Abdomen: soft, Nd, NT  Ext; no edema  Neuro: AAXO3      Lab Results: I have reviewed the following results:   .     04/09/25  0512   WBC 10.07   HGB 12.6   HCT 41.0      SODIUM 141   K 4.0      CO2 25   BUN 20   CREATININE 0.85   GLUC 124   MG 2.0     ABG: No new results in last 24 hours.

## 2025-04-09 NOTE — PHYSICAL THERAPY NOTE
"   PT TREATMENT     04/09/25 0945   PT Last Visit   PT Visit Date 04/09/25   Note Type   Note Type Treatment   Pain Assessment   Pain Assessment Tool 0-10   Pain Score 7   Pain Location/Orientation Orientation: Left;Location: Groin   Restrictions/Precautions   Weight Bearing Precautions Per Order No   Other Precautions Fall Risk;Pain   General   Chart Reviewed Yes   Family/Caregiver Present No   Cognition   Overall Cognitive Status WFL   Arousal/Participation Alert   Attention Within functional limits   Orientation Level Oriented X4   Following Commands Follows multistep commands with increased time or repetition   Subjective   Subjective \"I am nervous about going home\"   Bed Mobility   Additional Comments Arrived to pt's room with pt sitting EOB.   Transfers   Sit to Stand 5  Supervision   Stand to Sit 5  Supervision   Stand pivot 5  Supervision   Additional items   (with walker)   Toilet transfer 5  Supervision  (Pt managed her clothes and wiped herself)   Additional items   (commode over toilet)   Ambulation/Elevation   Gait pattern   (slow kandace, antalgic gait L)   Gait Assistance 5  Supervision   Assistive Device Rolling walker   Distance 50 feet, 15 feet, 30 feet   Balance   Static Sitting Fair +   Static Standing Fair   Ambulatory Fair   Activity Tolerance   Activity Tolerance Patient limited by pain;Patient limited by fatigue  (Pt's Sp02 remained >90% on RA with activity.)   Assessment   Prognosis Good   Problem List Decreased strength;Decreased range of motion;Impaired balance;Decreased mobility;Pain   Assessment Pt demonstrates daily improvement in pain and function s/p recent fall now admitted with COPD exacerbation.  Pt still c/o L groin pain with walking but is able to ambulate 50 feet with a walker with supervision assist. Pt still c/o significant pain but reminded that she can ask for prn tylenol.  Pt also reminded that soft tissue injuries take time to heal but heal better with frequent movement.  " Mariah RN messaged requesting tylenol for the pt.  Pt appears anxious and but does well with support and encouragement.    The patient's AM-PAC Basic Mobility Inpatient Short Form Raw Score is 20. A Raw score of greater than 16 suggests the patient may benefit from discharge to home. Please also refer to the recommendation of the Physical Therapist for safe discharge planning.         Plan   Treatment/Interventions Endurance training;Therapeutic exercise;Elevations;LE strengthening/ROM;Functional transfer training;Gait training;Bed mobility;Equipment eval/education;Patient/family training   Progress Progressing toward goals   PT Frequency 3-5x/wk   Discharge Recommendation   Rehab Resource Intensity Level, PT III (Minimum Resource Intensity)   AM-PAC Basic Mobility Inpatient   Turning in Flat Bed Without Bedrails 4   Lying on Back to Sitting on Edge of Flat Bed Without Bedrails 4   Moving Bed to Chair 3   Standing Up From Chair Using Arms 4   Walk in Room 3   Climb 3-5 Stairs With Railing 3   Basic Mobility Inpatient Raw Score 21   Basic Mobility Standardized Score 45.55   [facility] Highest Level Of Mobility   JH-HLM Goal 6: Walk 10 steps or more   JH-HLM Achieved 7: Walk 25 feet or more   End of Consult   Patient Position at End of Consult All needs within reach;Bedside chair   Licensure   [place] License Number  Jacqueline Lara PT [id number]

## 2025-04-09 NOTE — ASSESSMENT & PLAN NOTE
Lab Results   Component Value Date    HGBA1C 9.0 (H) 04/05/2025       Recent Labs     04/08/25 2000 04/09/25  0149 04/09/25  0711 04/09/25  1133   POCGLU 216* 209* 96 236*       Blood Sugar Average: Last 72 hrs:  (P) [id number]  SSI plus accu checks   Started on Lantus 5 units nightly

## 2025-04-09 NOTE — RESPIRATORY THERAPY NOTE
Home Oxygen Qualifying Test     Patient name: Montserrat Sumner        : 1950   Date of Test:  2025  Diagnosis:    Home Oxygen Test:    Medicare Guidelines require item(s) 1-5 on all ambulatory patients or 1 and 2 on non-ambulatory patients.    1. Baseline SPO2 on Room Air at rest 94 %   If <= 88% on Room Air add O2 via NC to obtain SpO2 >=88%. If LPM needed, document LPM n/a needed to reach =>88%    SPO2 during exertion on Room Air 92  %  During exertion monitor SPO2. If SPO2 increases >=89%, do not add supplemental oxygen    SPO2 on Oxygen at Rest n/a % at n/a LPM    SPO2 during exertion on Oxygen n/a % at n/a LPM    Test performed during exertion activity.      []  Supplemental Home Oxygen is indicated.    [x]  Client does not qualify for home oxygen.    Respiratory Additional Notes-  Victoirna Pizano, RT

## 2025-04-09 NOTE — PLAN OF CARE
Problem: RESPIRATORY - ADULT  Goal: Achieves optimal ventilation and oxygenation  Description: INTERVENTIONS:- Assess for changes in respiratory status- Assess for changes in mentation and behavior- Position to facilitate oxygenation and minimize respiratory effort- Oxygen administered by appropriate delivery if ordered- Initiate smoking cessation education as indicated- Encourage broncho-pulmonary hygiene including cough, deep breathe, Incentive Spirometry- Assess the need for suctioning and aspirate as needed- Assess and instruct to report SOB or any respiratory difficulty- Respiratory Therapy support as indicated  INTERVENTIONS:- Assess for changes in respiratory status- Assess for changes in mentation and behavior- Position to facilitate oxygenation and minimize respiratory effort- Oxygen administered by appropriate delivery if ordered- Initiate smoking cessation education as indicated- Encourage broncho-pulmonary hygiene including cough, deep breathe, Incentive Spirometry- Assess the need for suctioning and aspirate as needed- Assess and instruct to report SOB or any respiratory difficulty- Respiratory Therapy support as indicated  Outcome: Progressing     Problem: Prexisting or High Potential for Compromised Skin Integrity  Goal: Skin integrity is maintained or improved  Description: INTERVENTIONS:- Identify patients at risk for skin breakdown- Assess and monitor skin integrity- Assess and monitor nutrition and hydration status- Monitor labs - Assess for incontinence - Turn and reposition patient- Assist with mobility/ambulation- Relieve pressure over bony prominences- Avoid friction and shearing- Provide appropriate hygiene as needed including keeping skin clean and dry- Evaluate need for skin moisturizer/barrier cream- Collaborate with interdisciplinary team - Patient/family teaching- Consider wound care consult   Outcome: Progressing     Problem: PAIN - ADULT  Goal: Verbalizes/displays adequate comfort  level or baseline comfort level  Description: Interventions:- Encourage patient to monitor pain and request assistance- Assess pain using appropriate pain scale- Administer analgesics based on type and severity of pain and evaluate response- Implement non-pharmacological measures as appropriate and evaluate response- Consider cultural and social influences on pain and pain management- Notify physician/advanced practitioner if interventions unsuccessful or patient reports new pain  Outcome: Progressing     Problem: INFECTION - ADULT  Goal: Absence or prevention of progression during hospitalization  Description: INTERVENTIONS:- Assess and monitor for signs and symptoms of infection- Monitor lab/diagnostic results- Monitor all insertion sites, i.e. indwelling lines, tubes, and drains- Monitor endotracheal if appropriate and nasal secretions for changes in amount and color- [place] appropriate cooling/warming therapies per order- Administer medications as ordered- Instruct and encourage patient and family to use good hand hygiene technique- Identify and instruct in appropriate isolation precautions for identified infection/condition  Outcome: Progressing  Goal: Absence of fever/infection during neutropenic period  Description: INTERVENTIONS:- Monitor WBC  Outcome: Progressing     Problem: SAFETY ADULT  Goal: Patient will remain free of falls  Description: INTERVENTIONS:- Educate patient/family on patient safety including physical limitations- Instruct patient to call for assistance with activity - Consult OT/PT to assist with strengthening/mobility - Keep Call bell within reach- Keep bed low and locked with side rails adjusted as appropriate- Keep care items and personal belongings within reach- Initiate and maintain comfort rounds- Make Fall Risk Sign visible to staff- Offer Toileting every 2 Hours, in advance of need- Initiate/Maintain bed/chair alarm- Obtain necessary fall risk management equipment: bed/chair-  Apply yellow socks and bracelet for high fall risk patients- Consider moving patient to room near nurses station  Outcome: Progressing  Goal: Maintain or return to baseline ADL function  Description: INTERVENTIONS:-  Assess patient's ability to carry out ADLs; assess patient's baseline for ADL function and identify physical deficits which impact ability to perform ADLs (bathing, care of mouth/teeth, toileting, grooming, dressing, etc.)- Assess/evaluate cause of self-care deficits - Assess range of motion- Assess patient's mobility; develop plan if impaired- Assess patient's need for assistive devices and provide as appropriate- Encourage maximum independence but intervene and supervise when necessary- Involve family in performance of ADLs- Assess for home care needs following discharge - Consider OT consult to assist with ADL evaluation and planning for discharge- Provide patient education as appropriate  Outcome: Progressing  Goal: Maintains/Returns to pre admission functional level  Description: INTERVENTIONS:- Perform AM-PAC 6 Click Basic Mobility/ Daily Activity assessment daily.- Set and communicate daily mobility goal to care team and patient/family/caregiver. - Collaborate with rehabilitation services on mobility goals if consulted- Perform Range of Motion 2 times a day.- Reposition patient every 2 hours.- Dangle patient 2 times a day- Stand patient 3 times a day- Ambulate patient 2 times a day- Out of bed to chair 3 times a day - Out of bed for meals 3 times a day- Out of bed for toileting- Record patient progress and toleration of activity level   Outcome: Progressing     Problem: DISCHARGE PLANNING  Goal: Discharge to home or other facility with appropriate resources  Description: INTERVENTIONS:- Identify barriers to discharge w/patient and caregiver- Arrange for needed discharge resources and transportation as appropriate- Identify discharge learning needs (meds, wound care, etc.)- Arrange for  interpretive services to assist at discharge as needed- Refer to Case Management Department for coordinating discharge planning if the patient needs post-hospital services based on physician/advanced practitioner order or complex needs related to functional status, cognitive ability, or social support system  Outcome: Progressing     Problem: Knowledge Deficit  Goal: Patient/family/caregiver demonstrates understanding of disease process, treatment plan, medications, and discharge instructions  Description: Complete learning assessment and assess knowledge base.Interventions:- Provide teaching at level of understanding- Provide teaching via preferred learning methods  Outcome: Progressing     Problem: Nutrition/Hydration-ADULT  Goal: Nutrient/Hydration intake appropriate for improving, restoring or maintaining nutritional needs  Description: Monitor and assess patient's nutrition/hydration status for malnutrition. Collaborate with interdisciplinary team and initiate plan and interventions as ordered.  Monitor patient's weight and dietary intake as ordered or per policy. Utilize nutrition screening tool and intervene as necessary. Determine patient's food preferences and provide high-protein, high-caloric foods as appropriate. INTERVENTIONS:- Monitor oral intake, urinary output, labs, and treatment plans- Assess nutrition and hydration status and recommend course of action- Evaluate amount of meals eaten- Assist patient with eating if necessary - Allow adequate time for meals- Recommend/ encourage appropriate diets, oral nutritional supplements, and vitamin/mineral supplements- Order, calculate, and assess calorie counts as needed- Recommend, monitor, and adjust tube feedings and TPN/PPN based on assessed needs- Assess need for intravenous fluids- Provide specific nutrition/hydration education as appropriate- Include patient/family/caregiver in decisions related to nutrition  Outcome: Progressing

## 2025-04-09 NOTE — ASSESSMENT & PLAN NOTE
Requires 2 LPM Qhs and PRN at baseline.  O2 titrated to room air (4/7/25), continue to monitor o2 sat. Continue 2L at bedtime  Pulmonary following  Refer to [facility] for [facility]

## 2025-04-09 NOTE — ASSESSMENT & PLAN NOTE
Moderate COPD with FEV1 of 51% currently in exacerbation  Will switch DuoNeb to every 4 hours  Continue with Trelegy 1 puff daily  Can change to oral prednisone 40 mg daily and taper 10 mg over 7 days. PATIENT DOES HAVE ENOUGH MEDICATIONS AT HOME AND DOES NOT REQUIRE PRESCRIPTION- Pt and her  both confirmed this.   Will hold off on the Mucomyst for now given acute exacerbation  She can continue the Mucomyst once she goes home she had states she has been on it for the past month  Continue with airway clearance measures with incentive spirometry and flutter valve

## 2025-04-09 NOTE — ASSESSMENT & PLAN NOTE
PTOT eval and treat   Fell at home on 4/3/25  CT spine / CAP: No acute cervical spine fracture or traumatic malalignment. Diffuse osteopenia.  acetaminophen PRN, lidocaine patch  PTOT   CT recon only lumbar spine shows no acute osseous abnormality of postsurgical lumbar spine with diffuse osteopenia.

## 2025-04-09 NOTE — PLAN OF CARE
Problem: RESPIRATORY - ADULT  Goal: Achieves optimal ventilation and oxygenation  Description: INTERVENTIONS:- Assess for changes in respiratory status- Assess for changes in mentation and behavior- Position to facilitate oxygenation and minimize respiratory effort- Oxygen administered by appropriate delivery if ordered- Initiate smoking cessation education as indicated- Encourage broncho-pulmonary hygiene including cough, deep breathe, Incentive Spirometry- Assess the need for suctioning and aspirate as needed- Assess and instruct to report SOB or any respiratory difficulty- Respiratory Therapy support as indicated  INTERVENTIONS:- Assess for changes in respiratory status- Assess for changes in mentation and behavior- Position to facilitate oxygenation and minimize respiratory effort- Oxygen administered by appropriate delivery if ordered- Initiate smoking cessation education as indicated- Encourage broncho-pulmonary hygiene including cough, deep breathe, Incentive Spirometry- Assess the need for suctioning and aspirate as needed- Assess and instruct to report SOB or any respiratory difficulty- Respiratory Therapy support as indicated  Outcome: Progressing     Problem: Prexisting or High Potential for Compromised Skin Integrity  Goal: Skin integrity is maintained or improved  Description: INTERVENTIONS:- Identify patients at risk for skin breakdown- Assess and monitor skin integrity- Assess and monitor nutrition and hydration status- Monitor labs - Assess for incontinence - Turn and reposition patient- Assist with mobility/ambulation- Relieve pressure over bony prominences- Avoid friction and shearing- Provide appropriate hygiene as needed including keeping skin clean and dry- Evaluate need for skin moisturizer/barrier cream- Collaborate with interdisciplinary team - Patient/family teaching- Consider wound care consult   Outcome: Progressing     Problem: PAIN - ADULT  Goal: Verbalizes/displays adequate comfort  level or baseline comfort level  Description: Interventions:- Encourage patient to monitor pain and request assistance- Assess pain using appropriate pain scale- Administer analgesics based on type and severity of pain and evaluate response- Implement non-pharmacological measures as appropriate and evaluate response- Consider cultural and social influences on pain and pain management- Notify physician/advanced practitioner if interventions unsuccessful or patient reports new pain  Outcome: Progressing     Problem: INFECTION - ADULT  Goal: Absence or prevention of progression during hospitalization  Description: INTERVENTIONS:- Assess and monitor for signs and symptoms of infection- Monitor lab/diagnostic results- Monitor all insertion sites, i.e. indwelling lines, tubes, and drains- Monitor endotracheal if appropriate and nasal secretions for changes in amount and color- [place] appropriate cooling/warming therapies per order- Administer medications as ordered- Instruct and encourage patient and family to use good hand hygiene technique- Identify and instruct in appropriate isolation precautions for identified infection/condition  Outcome: Progressing  Goal: Absence of fever/infection during neutropenic period  Description: INTERVENTIONS:- Monitor WBC  Outcome: Progressing     Problem: DISCHARGE PLANNING  Goal: Discharge to home or other facility with appropriate resources  Description: INTERVENTIONS:- Identify barriers to discharge w/patient and caregiver- Arrange for needed discharge resources and transportation as appropriate- Identify discharge learning needs (meds, wound care, etc.)- Arrange for interpretive services to assist at discharge as needed- Refer to Case Management Department for coordinating discharge planning if the patient needs post-hospital services based on physician/advanced practitioner order or complex needs related to functional status, cognitive ability, or social support system  Outcome:  Progressing     Problem: Knowledge Deficit  Goal: Patient/family/caregiver demonstrates understanding of disease process, treatment plan, medications, and discharge instructions  Description: Complete learning assessment and assess knowledge base.Interventions:- Provide teaching at level of understanding- Provide teaching via preferred learning methods  Outcome: Progressing     Problem: Nutrition/Hydration-ADULT  Goal: Nutrient/Hydration intake appropriate for improving, restoring or maintaining nutritional needs  Description: Monitor and assess patient's nutrition/hydration status for malnutrition. Collaborate with interdisciplinary team and initiate plan and interventions as ordered.  Monitor patient's weight and dietary intake as ordered or per policy. Utilize nutrition screening tool and intervene as necessary. Determine patient's food preferences and provide high-protein, high-caloric foods as appropriate. INTERVENTIONS:- Monitor oral intake, urinary output, labs, and treatment plans- Assess nutrition and hydration status and recommend course of action- Evaluate amount of meals eaten- Assist patient with eating if necessary - Allow adequate time for meals- Recommend/ encourage appropriate diets, oral nutritional supplements, and vitamin/mineral supplements- Order, calculate, and assess calorie counts as needed- Recommend, monitor, and adjust tube feedings and TPN/PPN based on assessed needs- Assess need for intravenous fluids- Provide specific nutrition/hydration education as appropriate- Include patient/family/caregiver in decisions related to nutrition  Outcome: Progressing

## 2025-04-09 NOTE — ASSESSMENT & PLAN NOTE
Patient presents with increased shortness of breath, productive cough, and increasing O2 requirements with Bipap in ED.   Usually wears 2L just at bedtime having to use now during the day.Has not improved despite inhaler use.    In the ED patient was given DuoNeb and steroids with improvement in symptoms   flu, COVID, RSV negative  CT C/AP: Right greater than left lower lobe reticular and groundglass densities concerning for infiltrates.   Patient follows with pulmonary outpatient setting her home regimen is 200 mcg Trelegy and as needed albuterol nebs 4 times daily  Repeat VBG 4/6/25: no acid base disturbance noted.    Plan  Encouraged smoking cessation  Bipap PRN   Pulmonology following  Xopenex/Atrovent/ saline nebs 3 times daily  Mucinex BID  IV Solu-Medrol 40 mg decreased to once daily  Azithromycin x 5 days  Continue supplemental oxygen therapy as needed and wean as tolerated.  Patient uses 2 L at bedtime.  Home o2 eval completed and patient noted to be saturating 94% on room air at rest and 92% on room air during ambulation  Follow up cxr ordered and noted to be negative

## 2025-04-10 ENCOUNTER — TELEPHONE (OUTPATIENT)
Age: 75
End: 2025-04-10

## 2025-04-10 VITALS
OXYGEN SATURATION: 91 % | BODY MASS INDEX: 29.59 KG/M2 | SYSTOLIC BLOOD PRESSURE: 151 MMHG | TEMPERATURE: 97.8 F | HEIGHT: 63 IN | HEART RATE: 86 BPM | DIASTOLIC BLOOD PRESSURE: 99 MMHG | RESPIRATION RATE: 19 BRPM | WEIGHT: 167 LBS

## 2025-04-10 LAB
GLUCOSE SERPL-MCNC: 118 MG/DL (ref 65–140)
GLUCOSE SERPL-MCNC: 221 MG/DL (ref 65–140)
GLUCOSE SERPL-MCNC: 84 MG/DL (ref 65–140)

## 2025-04-10 PROCEDURE — 94668 MNPJ CHEST WALL SBSQ: CPT

## 2025-04-10 PROCEDURE — 97110 THERAPEUTIC EXERCISES: CPT

## 2025-04-10 PROCEDURE — 99238 HOSP IP/OBS DSCHRG MGMT 30/<: CPT | Performed by: FAMILY MEDICINE

## 2025-04-10 PROCEDURE — 94640 AIRWAY INHALATION TREATMENT: CPT

## 2025-04-10 PROCEDURE — 94760 N-INVAS EAR/PLS OXIMETRY 1: CPT

## 2025-04-10 PROCEDURE — 82948 REAGENT STRIP/BLOOD GLUCOSE: CPT

## 2025-04-10 PROCEDURE — 97535 SELF CARE MNGMENT TRAINING: CPT

## 2025-04-10 RX ORDER — DOCUSATE SODIUM 100 MG/1
100 CAPSULE, LIQUID FILLED ORAL 2 TIMES DAILY
Start: 2025-04-10

## 2025-04-10 RX ORDER — ACETAMINOPHEN 325 MG/1
975 TABLET ORAL EVERY 8 HOURS PRN
Start: 2025-04-10 | End: 2025-04-15

## 2025-04-10 RX ORDER — POLYETHYLENE GLYCOL 3350 17 G/17G
17 POWDER, FOR SOLUTION ORAL DAILY PRN
Start: 2025-04-10

## 2025-04-10 RX ORDER — PREDNISONE 10 MG/1
TABLET ORAL
Start: 2025-04-10

## 2025-04-10 RX ADMIN — IPRATROPIUM BROMIDE AND ALBUTEROL SULFATE 3 ML: .5; 3 SOLUTION RESPIRATORY (INHALATION) at 07:38

## 2025-04-10 RX ADMIN — INSULIN LISPRO 4 UNITS: 100 INJECTION, SOLUTION INTRAVENOUS; SUBCUTANEOUS at 11:55

## 2025-04-10 RX ADMIN — GUAIFENESIN 600 MG: 600 TABLET, EXTENDED RELEASE ORAL at 08:07

## 2025-04-10 RX ADMIN — LIDOCAINE 5% 2 PATCH: 700 PATCH TOPICAL at 08:06

## 2025-04-10 RX ADMIN — FLUTICASONE FUROATE AND VILANTEROL TRIFENATATE 1 PUFF: 200; 25 POWDER RESPIRATORY (INHALATION) at 08:12

## 2025-04-10 RX ADMIN — ASPIRIN 81 MG 81 MG: 81 TABLET ORAL at 08:07

## 2025-04-10 RX ADMIN — DULOXETINE 60 MG: 60 CAPSULE, DELAYED RELEASE ORAL at 08:08

## 2025-04-10 RX ADMIN — PREDNISONE 40 MG: 20 TABLET ORAL at 08:07

## 2025-04-10 RX ADMIN — LEVOTHYROXINE SODIUM 100 MCG: 100 TABLET ORAL at 05:34

## 2025-04-10 RX ADMIN — ENOXAPARIN SODIUM 40 MG: 40 INJECTION SUBCUTANEOUS at 08:07

## 2025-04-10 RX ADMIN — ACETYLCYSTEINE 600 MG: 200 SOLUTION ORAL; RESPIRATORY (INHALATION) at 07:37

## 2025-04-10 RX ADMIN — BUPRENORPHINE AND NALOXONE 4 MG: 2; .5 FILM BUCCAL; SUBLINGUAL at 08:12

## 2025-04-10 RX ADMIN — PANTOPRAZOLE SODIUM 40 MG: 40 TABLET, DELAYED RELEASE ORAL at 08:07

## 2025-04-10 RX ADMIN — VENLAFAXINE HYDROCHLORIDE 75 MG: 75 CAPSULE, EXTENDED RELEASE ORAL at 08:08

## 2025-04-10 RX ADMIN — ATORVASTATIN CALCIUM 40 MG: 40 TABLET, FILM COATED ORAL at 08:07

## 2025-04-10 RX ADMIN — LOSARTAN POTASSIUM 25 MG: 25 TABLET, FILM COATED ORAL at 08:08

## 2025-04-10 RX ADMIN — ACETAMINOPHEN 975 MG: 325 TABLET ORAL at 08:07

## 2025-04-10 RX ADMIN — TRIMETHOBENZAMIDE HYDROCHLORIDE 200 MG: 100 INJECTION INTRAMUSCULAR at 08:12

## 2025-04-10 RX ADMIN — METOPROLOL SUCCINATE 50 MG: 50 TABLET, EXTENDED RELEASE ORAL at 08:08

## 2025-04-10 RX ADMIN — SUCRALFATE 1 G: 1 TABLET ORAL at 06:02

## 2025-04-10 RX ADMIN — AMLODIPINE BESYLATE 5 MG: 5 TABLET ORAL at 08:07

## 2025-04-10 NOTE — QUICK NOTE
I have personally counseled the patient on medication indication, compliance, utilization and side effects. Patient may be discharged home with albuterol inhaler.

## 2025-04-10 NOTE — DISCHARGE INSTR - AVS FIRST PAGE
Please continue taking all medications as prescribed. Please purchase a home blood pressure cuff to monitor you blood pressure levels at home. Please follow up with your primary medical doctor within 1 week of discharge. Please follow up with pulmonology in 1 to 2 weeks. Please return to the nearest emergency department if you develop any signs or symptoms of worsening disease or infection, including but not limited to chest pain, shortness of breath, abdominal pain, lightheadedness, dizziness, palpitations, fevers or chills.

## 2025-04-10 NOTE — OCCUPATIONAL THERAPY NOTE
OT TREATMENT       04/10/25 1110   OT Last Visit   OT Visit Date 04/10/25   Note Type   Note Type Treatment   Pain Assessment   Pain Assessment Tool 0-10   Pain Score 6   Pain Location/Orientation Orientation: Left;Location: Groin;Location: Hip;Location: Buttocks   Pain Onset/Description Frequency: Constant/Continuous   Hospital Pain Intervention(s) Repositioned;Ambulation/increased activity;Emotional support  (RN aware)   Restrictions/Precautions   Other Precautions Fall Risk;Pain;Chair Alarm;Bed Alarm   ADL   Eating Assistance 7  Independent   Grooming Assistance 5  Supervision/Setup   Grooming Deficit Setup;Verbal cueing;Increased time to complete;Supervision/safety   Grooming Comments Sitting at sink   LB Bathing Assistance 4  Minimal Assistance   LB Bathing Deficit Setup;Steadying;Verbal cueing;Supervision/safety;Increased time to complete   LB Dressing Assistance 4  Minimal Assistance   LB Dressing Deficit Setup;Requires assistive device for steadying;Verbal cueing;Supervision/safety;Increased time to complete   Toileting Assistance  5  Supervision/Setup   Toileting Deficit Grab bar use;Verbal cueing;Supervison/safety   Transfers   Sit to Stand 5  Supervision  (with RW)   Stand to Sit 5  Supervision  (with RW)   Stand pivot 5  Supervision   Additional items Verbal cues  (with RW)   Functional Mobility   Functional Mobility 5  Supervision   Additional Comments 50 feet with cues to use BLE more vs supporting body weight opn BUE on RW   Additional items Rolling walker   Toilet Transfers   Toilet Transfer From   (chair)   Toilet Transfer Type To and from   Toilet Transfer to Standard toilet   Toilet Transfer Technique Ambulating   Toilet Transfers Independent   Therapeutic Excerise-Strength   UE Strength Yes   Right Upper Extremity- Strength   R Shoulder Flexion;ABduction;Extension;External rotation;Internal rotation   R Elbow Elbow flexion;Elbow extension   R Wrist Wrist flexion;Wrist extension   R Hand  "Thumb;Index finger;Long finger;Ring finger;Little finger   R Position Seated   Equipment Dowel   R Weight/Reps/Sets 10 reps x 2 sets, 1.5#   Left Upper Extremity-Strength   L Shoulder Flexion;ABduction;Extension;External rotation;Internal rotation   L Elbow Elbow flexion;Elbow extension   L Wrist Wrist flexion   L Hand Thumb;Index finger;Long finger;Ring finger;Little finger   L Position Seated   Equipment Dowel   L Weights/Reps/Sets 10 reps x 2 sets, 1.5#   Subjective   Subjective \"I just really want to go home.\"   Cognition   Overall Cognitive Status WFL   Attention Within functional limits   Orientation Level Oriented X4   Memory Within functional limits   Following Commands Follows multistep commands with increased time or repetition   Activity Tolerance   Activity Tolerance Patient tolerated treatment well;Patient limited by pain   Assessment   Assessment Pt seen for ADL training. Education and training with teachback method with pt regarding BUE/BLE HEP to increase strength, AROM,  decrease fall risks, and increase stamina needed to return to prior level of function. Pt demonstrating increased strength, balance and functional activity tolerance allowing for increased active participation with ADLS and functional mobility tasks. Min SOB with exertion requiring cues for deep, pursed lip breathing. SpO2 drops to 88-89% on RA during exertion, but immediately goes back up to 93-94% with seated rest. Pt demonstrating good verbal understanding of all information provided and all questions answered. Pt left in chair with all needs within reach and tab alarm in place. Continue OT per POC.   The patient's raw score on the AM-PAC Daily Activity Inpatient Short Form is 22. A raw score of greater than or equal to 19 suggests the patient may benefit from discharge to home. Please refer to the recommendation of the Occupational Therapist for safe discharge planning.   Plan   Treatment Interventions ADL retraining;Functional " transfer training;UE strengthening/ROM;Endurance training;Patient/family training;Equipment evaluation/education;Compensatory technique education;Activityengagement;Energy conservation   OT Frequency 3-5x/wk   Discharge Recommendation   Rehab Resource Intensity Level, OT III (Minimum Resource Intensity)   AM-PAC Daily Activity Inpatient   Lower Body Dressing 3   Bathing 3   Toileting 4   Upper Body Dressing 4   Grooming 4   Eating 4   Daily Activity Raw Score 22   Daily Activity Standardized Score (Calc for Raw Score >=11) 47.1   AM-PAC Applied Cognition Inpatient   Following a Speech/Presentation 4   Understanding Ordinary Conversation 4   Taking Medications 4   Remembering Where Things Are Placed or Put Away 4   Remembering List of 4-5 Errands 4   Taking Care of Complicated Tasks 4   Applied Cognition Raw Score 24   Applied Cognition Standardized Score 62.21   End of Consult   Education Provided Yes   Patient Position at End of Consult Bedside chair;All needs within reach   Nurse Communication Nurse aware of consult   Licensure   [place] License Number  Asia Hoffman MS, OTR/L, [place] Lic# [id number]

## 2025-04-10 NOTE — ASSESSMENT & PLAN NOTE
Patient presents with increased shortness of breath, productive cough, and increasing O2 requirements with Bipap in ED.   Usually wears 2L just at bedtime having to use now during the day.Has not improved despite inhaler use.    In the ED patient was given DuoNeb and steroids with improvement in symptoms   flu, COVID, RSV negative  CT C/AP: Right greater than left lower lobe reticular and groundglass densities concerning for infiltrates.   Patient follows with pulmonary outpatient setting her home regimen is 200 mcg Trelegy and as needed albuterol nebs 4 times daily  Repeat VBG 4/6/25: no acid base disturbance noted.    Plan  Encouraged smoking cessation  Bipap PRN   Pulmonology following  Xopenex/Atrovent/ saline nebs 3 times daily  Mucinex BID  IV Solu-Medrol 40 mg decreased to once daily  Azithromycin x 5 days  Continue supplemental oxygen therapy as needed and wean as tolerated.  Patient uses 2 L at bedtime.  Home o2 eval completed and patient noted to be saturating 94% on room air at rest and 92% on room air during ambulation  Follow up cxr ordered and noted to be negative    4/10 - Patient reports feeling significantly improved today. Discussed with pulmonology. Ok for discharge on a prednisone taper and outpatient follow up.

## 2025-04-10 NOTE — PLAN OF CARE
Problem: RESPIRATORY - ADULT  Goal: Achieves optimal ventilation and oxygenation  Description: INTERVENTIONS:- Assess for changes in respiratory status- Assess for changes in mentation and behavior- Position to facilitate oxygenation and minimize respiratory effort- Oxygen administered by appropriate delivery if ordered- Initiate smoking cessation education as indicated- Encourage broncho-pulmonary hygiene including cough, deep breathe, Incentive Spirometry- Assess the need for suctioning and aspirate as needed- Assess and instruct to report SOB or any respiratory difficulty- Respiratory Therapy support as indicated  INTERVENTIONS:- Assess for changes in respiratory status- Assess for changes in mentation and behavior- Position to facilitate oxygenation and minimize respiratory effort- Oxygen administered by appropriate delivery if ordered- Initiate smoking cessation education as indicated- Encourage broncho-pulmonary hygiene including cough, deep breathe, Incentive Spirometry- Assess the need for suctioning and aspirate as needed- Assess and instruct to report SOB or any respiratory difficulty- Respiratory Therapy support as indicated  Outcome: Progressing     Problem: Prexisting or High Potential for Compromised Skin Integrity  Goal: Skin integrity is maintained or improved  Description: INTERVENTIONS:- Identify patients at risk for skin breakdown- Assess and monitor skin integrity- Assess and monitor nutrition and hydration status- Monitor labs - Assess for incontinence - Turn and reposition patient- Assist with mobility/ambulation- Relieve pressure over bony prominences- Avoid friction and shearing- Provide appropriate hygiene as needed including keeping skin clean and dry- Evaluate need for skin moisturizer/barrier cream- Collaborate with interdisciplinary team - Patient/family teaching- Consider wound care consult   Outcome: Progressing     Problem: PAIN - ADULT  Goal: Verbalizes/displays adequate comfort  level or baseline comfort level  Description: Interventions:- Encourage patient to monitor pain and request assistance- Assess pain using appropriate pain scale- Administer analgesics based on type and severity of pain and evaluate response- Implement non-pharmacological measures as appropriate and evaluate response- Consider cultural and social influences on pain and pain management- Notify physician/advanced practitioner if interventions unsuccessful or patient reports new pain  Outcome: Progressing     Problem: INFECTION - ADULT  Goal: Absence or prevention of progression during hospitalization  Description: INTERVENTIONS:- Assess and monitor for signs and symptoms of infection- Monitor lab/diagnostic results- Monitor all insertion sites, i.e. indwelling lines, tubes, and drains- Monitor endotracheal if appropriate and nasal secretions for changes in amount and color- [place] appropriate cooling/warming therapies per order- Administer medications as ordered- Instruct and encourage patient and family to use good hand hygiene technique- Identify and instruct in appropriate isolation precautions for identified infection/condition  Outcome: Progressing  Goal: Absence of fever/infection during neutropenic period  Description: INTERVENTIONS:- Monitor WBC  Outcome: Progressing     Problem: DISCHARGE PLANNING  Goal: Discharge to home or other facility with appropriate resources  Description: INTERVENTIONS:- Identify barriers to discharge w/patient and caregiver- Arrange for needed discharge resources and transportation as appropriate- Identify discharge learning needs (meds, wound care, etc.)- Arrange for interpretive services to assist at discharge as needed- Refer to Case Management Department for coordinating discharge planning if the patient needs post-hospital services based on physician/advanced practitioner order or complex needs related to functional status, cognitive ability, or social support system  Outcome:  Progressing     Problem: Knowledge Deficit  Goal: Patient/family/caregiver demonstrates understanding of disease process, treatment plan, medications, and discharge instructions  Description: Complete learning assessment and assess knowledge base.Interventions:- Provide teaching at level of understanding- Provide teaching via preferred learning methods  Outcome: Progressing     Problem: Nutrition/Hydration-ADULT  Goal: Nutrient/Hydration intake appropriate for improving, restoring or maintaining nutritional needs  Description: Monitor and assess patient's nutrition/hydration status for malnutrition. Collaborate with interdisciplinary team and initiate plan and interventions as ordered.  Monitor patient's weight and dietary intake as ordered or per policy. Utilize nutrition screening tool and intervene as necessary. Determine patient's food preferences and provide high-protein, high-caloric foods as appropriate. INTERVENTIONS:- Monitor oral intake, urinary output, labs, and treatment plans- Assess nutrition and hydration status and recommend course of action- Evaluate amount of meals eaten- Assist patient with eating if necessary - Allow adequate time for meals- Recommend/ encourage appropriate diets, oral nutritional supplements, and vitamin/mineral supplements- Order, calculate, and assess calorie counts as needed- Recommend, monitor, and adjust tube feedings and TPN/PPN based on assessed needs- Assess need for intravenous fluids- Provide specific nutrition/hydration education as appropriate- Include patient/family/caregiver in decisions related to nutrition  Outcome: Progressing

## 2025-04-10 NOTE — ASSESSMENT & PLAN NOTE
Lab Results   Component Value Date    HGBA1C 9.0 (H) 04/05/2025       Recent Labs     04/09/25  2058 04/10/25  0150 04/10/25  0725 04/10/25  1139   POCGLU 154* 118 84 221*       Blood Sugar Average: Last 72 hrs:  (P) [id number]  SSI plus accu checks   Started on Lantus 5 units nightly  Metformin ordered on discharge

## 2025-04-10 NOTE — DISCHARGE SUMMARY
Discharge Summary - Hospitalist   Name: Montserrat Sumner 74 y.o. female I MRN: [id number]  Unit/Bed#: [address] [place] [address] I Date of Admission: 4/5/2025   Date of Service: 4/10/2025 I Hospital Day: 5     Assessment & Plan  COPD exacerbation (HCC)  Patient presents with increased shortness of breath, productive cough, and increasing O2 requirements with Bipap in ED.   Usually wears 2L just at bedtime having to use now during the day.Has not improved despite inhaler use.    In the ED patient was given DuoNeb and steroids with improvement in symptoms   flu, COVID, RSV negative  CT C/AP: Right greater than left lower lobe reticular and groundglass densities concerning for infiltrates.   Patient follows with pulmonary outpatient setting her home regimen is 200 mcg Trelegy and as needed albuterol nebs 4 times daily  Repeat VBG 4/6/25: no acid base disturbance noted.    Plan  Encouraged smoking cessation  Bipap PRN   Pulmonology following  Xopenex/Atrovent/ saline nebs 3 times daily  Mucinex BID  IV Solu-Medrol 40 mg decreased to once daily  Azithromycin x 5 days  Continue supplemental oxygen therapy as needed and wean as tolerated.  Patient uses 2 L at bedtime.  Home o2 eval completed and patient noted to be saturating 94% on room air at rest and 92% on room air during ambulation  Follow up cxr ordered and noted to be negative    4/10 - Patient reports feeling significantly improved today. Discussed with pulmonology. Ok for discharge on a prednisone taper and outpatient follow up.    Centrilobular emphysema (HCC)  Moderate COPD per PFT from 2023   Ct chest: Centrilobular emphysema   Follows up with pulmonary medicine Dr. Smith   PTA inhalers: Trellegy, Albuterol   Acute respiratory failure (HCC)  Requires 2 LPM Qhs and PRN at baseline.  O2 titrated to room air (4/7/25), continue to monitor o2 sat. Continue 2L at bedtime  Pulmonary following  Refer to A and P for AECOPD   Oxygen dependent at night only  2 LPM via NC at  bedtime only   Follows up with Dr. Smith   Ambulatory walk test performed and patient saturating 94% on room air at rest and 92% on room air during exertion  Hypertension  Continue PTA amlodipine, metoprolol, and losartan  Hypothyroid  Continue PTA levothyroxine  CAD (coronary artery disease)  Known history of CAD s/p CABG x 1  Continue ASA and Lipitor daily  Anxiety  Takes duloxetine   Xanax 0.5 mg BID   Will continue regimen above  PDMP reviewed   Monitor Qtc . Currently wnl   Opioid dependence (HCC)  On Suboxone 4 mg sublingual daily  PDMP reviewed last prescription filled 3/13/25  Continue while inpatient  Chronic bilateral low back pain without sciatica  PTOT eval and treat   Fell at home on 4/3/25  CT spine / CAP: No acute cervical spine fracture or traumatic malalignment. Diffuse osteopenia.  acetaminophen PRN, lidocaine patch  PTOT   CT recon only lumbar spine shows no acute osseous abnormality of postsurgical lumbar spine with diffuse osteopenia.  Tobacco use  Reports smoking 3 cigarettes daily for many years  Patient encouraged to quit smoking given her underlying COPD  NRT offered  IPMN (intraductal papillary mucinous neoplasm)  Follows up with surgical/medical oncology   Planned for stent removal however was postponed per GI to 4/18/25   Fall  Tripped and fell on the LT 4/3/25 , has LT sided flank pain and rib pain with breathing. No palpiation  , tingling , numbness , dizziness or lightheadedness.   Trauma scans including CT Cervical spine, CAP , Head ; No trauma findings  L hip x ray: No acute osseous abnormality.    acetaminophen prn pain, lidocaine patch  PTOT  Fall precautions   Reconstructed lumbar spine CT shows no acute osseous abnormality of postsurgical lumbar spine with diffuse osteopenia    Type 2 diabetes mellitus, without long-term current use of insulin ([facility])  Lab Results   Component Value Date    HGBA1C 9.0 (H) 04/05/2025       Recent Labs     04/09/25  2058 04/10/25  0150  04/10/25  0725 04/10/25  1139   POCGLU 154* 118 84 221*       Blood Sugar Average: Last 72 hrs:  (P) [id number]  SSI plus accu checks   Started on Lantus 5 units nightly  Metformin ordered on discharge    Drug use  Reported using cocaine 3 days prior to admission. X 1   -counseling and education offered.  -resources per CM   -monitor opioid medications while hospitalized   Constipation  Colace BID   Miralax prn  Monitor I/Os     Medical Problems       Resolved Problems  Date Reviewed: 4/5/2025          Resolved    Respiratory acidosis 4/6/2025     Resolved by  Varun Sherwood MD        Discharging Physician / Practitioner: Rui Meadows MD  PCP: Julian Tobias DO  Admission Date:   Admission Orders (From admission, onward)       Ordered        04/05/25 1420  INPATIENT ADMISSION  Once                          Discharge Date: 04/10/25    Consultations During Hospital Stay:  Pulmonology    Reason for Admission: 74 y.o. female with a PMH of COPD, respiratory failure on PRN O2 Qhs, DM, HTN , CAD who presents with SOB for the past 3 days symptoms have been progressing since onset. Associated with wheezing and chest tightness . Reports intermittent dry cough. She tried using her oxygen continuous as well as rescue inhalers without benefit. Reports around that time she tripped and fell on side walk around 3 days ago no direct head trauma or LOC. Currently reports LT sided Chest wall pain where the impact was. Of note; patient has history of moderate COPD follows up with [facility] pulmonary medicine team.  She reports compliance with medications including inhaler and prednisone.  Recently had tracheobronchitis on 3/7/2025 per pulmonologist. She uses oxygen at night only 2 L/min via nasal cannula.    Hospital Course: Patient was seen and examined in the emergency department and was admitted to the hospital for further evaluation and management.  Patient presenting with increased shortness of breath and  "productive cough found to have evidence of a COPD exacerbation.  Patient was treated with IV Solu-Medrol and antibiotic therapy.  Patient did show slow but gradual improvement.  Ambulatory walk test was performed prior to discharge and patient was found to be saturating 94% on room air at rest and 92% on room air during ambulation.  Repeat chest x-ray prior to discharge was also completed and noted to be negative.  Patient has been cleared for discharge with continued follow-up with her primary medical doctor and pulmonologist in the outpatient setting.  Patient will be discharged home on a prednisone taper.  At the time of discharge patient reports feeling significantly improved and currently denies any acute complaints or concerns including chest pain or shortness of breath.    Please see above list of diagnoses and related plan for additional information.     Condition at Discharge: stable    Discharge Day Visit / Exam:   Vitals: Blood Pressure: 151/99 (04/10/25 1140)  Pulse: 86 (04/10/25 1140)  Temperature: 97.8 °F (36.6 °C) (04/10/25 0737)  Temp Source: Oral (04/09/25 0734)  Respirations: 19 (04/10/25 0737)  Height: 5' 3\" (160 cm) (04/05/25 1528)  Weight - Scale: 75.8 kg (167 lb) (04/05/25 1528)  SpO2: (!) 89 % (04/10/25 1140)  Physical Exam  HENT:      Head: Normocephalic.      Mouth/Throat:      Mouth: Mucous membranes are moist.   Eyes:      Extraocular Movements: Extraocular movements intact.   Cardiovascular:      Rate and Rhythm: Normal rate.   Pulmonary:      Effort: Pulmonary effort is normal.      Comments: Decreased in the bases  Abdominal:      Palpations: Abdomen is soft.      Tenderness: There is no abdominal tenderness.   Skin:     General: Skin is warm.   Neurological:      Mental Status: She is alert and oriented to person, place, and time.   Psychiatric:         Mood and Affect: Mood normal.       Discharge instructions/Information to patient and family:   See after visit summary for " information provided to patient and family.      Provisions for Follow-Up Care:  See after visit summary for information related to follow-up care and any pertinent home health orders.      Mobility at time of Discharge:   Basic Mobility Inpatient Raw Score: 21  JH-HLM Goal: 6: Walk 10 steps or more  JH-HLM Achieved: 7: Walk 25 feet or more     Disposition:   Home    Discharge Medications:  See after visit summary for reconciled discharge medications provided to patient and/or family.      Administrative Statements   Discharge Statement:  I have spent a total time of 35 minutes in caring for this patient on the day of the visit/encounter. >30 minutes of time was spent on: Diagnostic results, Prognosis, Risks and benefits of tx options, Instructions for management, Patient and family education, Importance of tx compliance, Risk factor reductions, Impressions, Counseling / Coordination of care, Documenting in the medical record, Reviewing / ordering tests, medicine, procedures and Communicating with other healthcare professionals.    **Please Note: This note may have been constructed using a voice recognition system**

## 2025-04-10 NOTE — CASE MANAGEMENT
Case Management Discharge Planning Note    Patient name Montserrat Sumner  Location [facility] 415-* MRN [id number]  : 1950 Date 4/10/2025       Current Admission Date: 2025  Current Admission Diagnosis:COPD exacerbation (HCC)   Patient Active Problem List    Diagnosis Date Noted Date Diagnosed    Constipation 2025     Drug use 2025     Acute on chronic hypoxic respiratory failure (HCC) 2025     Abnormal CT of the chest 2025     Acute respiratory failure (HCC) 2025     Fall 2025     Type 2 diabetes mellitus, without long-term current use of insulin (HCC) 2025     Tracheobronchitis 2025     Nocturnal hypoxemia due to emphysema  (HCC) 2025     Right hip pain 2025     IPMN (intraductal papillary mucinous neoplasm) 2025     Dilation of pancreatic duct 2025     Osteoarthritis of spine with radiculopathy, lumbar region 2025     Intractable back pain 2025     Tobacco use 2024     Sacroiliitis (HCC) 2024     Primary osteoarthritis of both knees 2024     S/P ERCP 2024     Acute bronchitis 10/08/2024     Chronic bilateral low back pain without sciatica 10/08/2024     Shortness of breath 10/08/2024     Marijuana smoker 2024     Postmenopausal 2024     Bilateral cataracts 2024     Aneurysm of ascending aorta without rupture ([facility]) 2024     Bile duct abnormality 2024     Sleep disorder 2023     Hyperlipidemia 2023     Oxygen dependent at night only 2023     Gastroesophageal reflux disease 10/04/2023     Dysphagia 10/04/2023     COPD exacerbation (HCC) 2023     Abnormal CT scan 2023     Intractable abdominal pain 2023     Prediabetes 2023     Obesity (BMI 30-39.9) 2023     Chronic pain syndrome 2023     Lung nodule 2023     Cerebral infarct (HCC) 2023     Cocaine abuse (HCC) 2023     Hypertension  03/01/2023     Hypothyroid 03/01/2023     CAD (coronary artery disease) 03/01/2023     History of coronary artery bypass graft 03/01/2023     Centrilobular emphysema (HCC) 03/01/2023     Anxiety 03/01/2023     Opioid dependence (HCC) 03/01/2023     Cigarette nicotine dependence without complication 03/01/2023       LOS (days): 5  Geometric Mean LOS (GMLOS) (days): 3.4  Days to GMLOS:-1.5     OBJECTIVE:  Risk of Unplanned Readmission Score: 19.74         Current admission status: Inpatient   Preferred Pharmacy:   [facility] - [place]ANSEN - [address]  [address] [facility] [address]  Phone: [phone] Fax: [phone]    Primary Care Provider: Julian Tobias DO    Primary Insurance: [employer] REP  Secondary Insurance:     DISCHARGE DETAILS:    Discharge planning discussed with:: Patient  Freedom of Choice: Yes  Comments - Freedom of Choice: SW met bedside with patient to provide and review accepting C list. Agency choice is [facility] due to delay in SOC with [facility].    Requested Home Health Care         Home Health Agency Name:: [facility]     Other Referral/Resources/Interventions Provided:  Interventions: HHC  Referral Comments: [facility] reserved in Aidin. Contact info listed on AVS. Agency notified via Aidin of planned discharge home today. AVS to be faxed when available.     Treatment Team Recommendation: Home with Home Health Care  Discharge Destination Plan:: Home with Home Health Care  Transport at Discharge : Family     IMM Given (Date):: 04/10/25  IMM Given to:: Patient  IMM reviewed with patient, patient agrees with discharge determination. Copy placed in scan bin.

## 2025-04-10 NOTE — TELEPHONE ENCOUNTER
Shannon called from [facility] requesting provider to sign home care orders for nursing ,physical therapy,occupational therapy.Deirdre could like to start services tomorrow.

## 2025-04-11 ENCOUNTER — PATIENT OUTREACH (OUTPATIENT)
Dept: CASE MANAGEMENT | Facility: OTHER | Age: 75
End: 2025-04-11

## 2025-04-11 ENCOUNTER — TRANSITIONAL CARE MANAGEMENT (OUTPATIENT)
Dept: FAMILY MEDICINE CLINIC | Facility: CLINIC | Age: 75
End: 2025-04-11

## 2025-04-11 NOTE — PROGRESS NOTES
.OP RT CM called and spoke with patient regarding their breathing, medications and O2.      Montserrat is using Albuterol/Mucomyst BID and Albuterol nebulizer BID via nebulizer. She stated she was told to stop Trelegy inhaler per patient upon discharge. We discussed the difference between long acting and short acting inhaled medications. I encouraged her to start the Trelegy inhaler again since this was prescribed by her pulm. MD. OP RT CM will contact provider to inform.     Montserrat stated she is raising green sputum and denies fever/chills. She is wearing her 2lpm nasal cannula mostly HS and prn, however she stated her SpO2 levels drop. She stated home health nurse encouraged regular use and I agreed. We discussed the guidelines and she understands. She stated she has a POC for portability.     Montserrat stated she has not smoked in about two weeks, she stated she is not using NRT patches. I congratulated her on her success!  Montserrat has a f/u pulm. Provider appt. Next week. We confirmed date, time, location and provider.     Montserrat stated she did not receive the COPD booklet/zone tools I sent out last month, will resend. OP RT CM will outreach next week and Montserrat accepted my contact information again.

## 2025-04-14 ENCOUNTER — TELEPHONE (OUTPATIENT)
Age: 75
End: 2025-04-14

## 2025-04-14 NOTE — TELEPHONE ENCOUNTER
Patient calling regarding a phone call she just missed , no notes on the patient chart . Patient did call in earlier today want to know if she can be seen sooner than 4/16/2025 . Please advise

## 2025-04-14 NOTE — TELEPHONE ENCOUNTER
Patient calling saying she may be later for her 4/16/25 appointment, she has another appointment earlier in the morning.  I did notify her of the 15 minute joe period.    Patient did ask if another appointment becomes available sooner to give her a call

## 2025-04-16 ENCOUNTER — OFFICE VISIT (OUTPATIENT)
Dept: FAMILY MEDICINE CLINIC | Facility: CLINIC | Age: 75
End: 2025-04-16
Payer: COMMERCIAL

## 2025-04-16 ENCOUNTER — APPOINTMENT (OUTPATIENT)
Dept: RADIOLOGY | Facility: CLINIC | Age: 75
End: 2025-04-16
Attending: FAMILY MEDICINE
Payer: COMMERCIAL

## 2025-04-16 ENCOUNTER — TELEPHONE (OUTPATIENT)
Age: 75
End: 2025-04-16

## 2025-04-16 VITALS
TEMPERATURE: 96.7 F | DIASTOLIC BLOOD PRESSURE: 86 MMHG | RESPIRATION RATE: 20 BRPM | BODY MASS INDEX: 29.59 KG/M2 | HEART RATE: 60 BPM | SYSTOLIC BLOOD PRESSURE: 148 MMHG | WEIGHT: 167 LBS | OXYGEN SATURATION: 94 % | HEIGHT: 63 IN

## 2025-04-16 DIAGNOSIS — G47.9 SLEEP DISORDER: ICD-10-CM

## 2025-04-16 DIAGNOSIS — G47.36 NOCTURNAL HYPOXEMIA DUE TO EMPHYSEMA  (HCC): ICD-10-CM

## 2025-04-16 DIAGNOSIS — J43.9 NOCTURNAL HYPOXEMIA DUE TO EMPHYSEMA  (HCC): ICD-10-CM

## 2025-04-16 DIAGNOSIS — J40 TRACHEOBRONCHITIS: Primary | ICD-10-CM

## 2025-04-16 DIAGNOSIS — I10 HYPERTENSION, UNSPECIFIED TYPE: ICD-10-CM

## 2025-04-16 DIAGNOSIS — M54.9 INTRACTABLE BACK PAIN: ICD-10-CM

## 2025-04-16 DIAGNOSIS — J40 TRACHEOBRONCHITIS: ICD-10-CM

## 2025-04-16 DIAGNOSIS — J43.2 CENTRILOBULAR EMPHYSEMA (HCC): Chronic | ICD-10-CM

## 2025-04-16 DIAGNOSIS — F19.90 DRUG USE: ICD-10-CM

## 2025-04-16 DIAGNOSIS — E11.00 TYPE 2 DIABETES MELLITUS WITH HYPEROSMOLARITY WITHOUT COMA, WITHOUT LONG-TERM CURRENT USE OF INSULIN (HCC): ICD-10-CM

## 2025-04-16 DIAGNOSIS — F11.29 OPIOID DEPENDENCE WITH OPIOID-INDUCED DISORDER (HCC): Chronic | ICD-10-CM

## 2025-04-16 DIAGNOSIS — F41.9 ANXIETY: Chronic | ICD-10-CM

## 2025-04-16 DIAGNOSIS — I10 PRIMARY HYPERTENSION: Chronic | ICD-10-CM

## 2025-04-16 DIAGNOSIS — F17.210 CIGARETTE NICOTINE DEPENDENCE WITHOUT COMPLICATION: ICD-10-CM

## 2025-04-16 PROCEDURE — 71046 X-RAY EXAM CHEST 2 VIEWS: CPT

## 2025-04-16 PROCEDURE — 99496 TRANSJ CARE MGMT HIGH F2F 7D: CPT | Performed by: FAMILY MEDICINE

## 2025-04-16 RX ORDER — METOPROLOL SUCCINATE 50 MG/1
50 TABLET, EXTENDED RELEASE ORAL EVERY MORNING
Qty: 7 TABLET | Refills: 0 | Status: SHIPPED | OUTPATIENT
Start: 2025-04-16 | End: 2025-04-21 | Stop reason: SDUPTHER

## 2025-04-16 RX ORDER — GLIPIZIDE 2.5 MG/1
2.5 TABLET, EXTENDED RELEASE ORAL DAILY
Qty: 90 TABLET | Refills: 0 | Status: SHIPPED | OUTPATIENT
Start: 2025-04-16

## 2025-04-16 RX ORDER — ALPRAZOLAM 0.5 MG
1 TABLET ORAL 2 TIMES DAILY PRN
Qty: 60 TABLET | Refills: 1 | Status: SHIPPED | OUTPATIENT
Start: 2025-04-16

## 2025-04-16 RX ORDER — LOSARTAN POTASSIUM 25 MG/1
25 TABLET ORAL EVERY MORNING
Qty: 90 TABLET | Refills: 1 | Status: SHIPPED | OUTPATIENT
Start: 2025-04-16 | End: 2025-04-21 | Stop reason: SDUPTHER

## 2025-04-16 RX ORDER — AMLODIPINE BESYLATE 5 MG/1
5 TABLET ORAL DAILY
Qty: 90 TABLET | Refills: 1 | Status: SHIPPED | OUTPATIENT
Start: 2025-04-16 | End: 2025-04-21 | Stop reason: SDUPTHER

## 2025-04-16 RX ORDER — TRAZODONE HYDROCHLORIDE 50 MG/1
100 TABLET ORAL
Qty: 90 TABLET | Refills: 1 | Status: SHIPPED | OUTPATIENT
Start: 2025-04-16

## 2025-04-16 NOTE — ASSESSMENT & PLAN NOTE
Continue on trazodone.  I did refill it for her since she is no longer seeing psychiatrist in [place]

## 2025-04-16 NOTE — ASSESSMENT & PLAN NOTE
Seeing pain management.  She will be having RFA in May.  Needs to contact pain management for any other pain medications.    Patient is using wheelchair.  She really is not ambulating.  Probably after having RFA she could potentially go for physical therapy.  In all likelihood I would have made recommendation for discharge to skilled nursing facility for rehabilitation and strengthening following hospital admission

## 2025-04-16 NOTE — ASSESSMENT & PLAN NOTE
Patient is on duloxetine.  Reluctantly I am filling her alprazolam 0.5 mg twice daily.  She states that she is no longer seeing psychiatrist in [place].  Placing order for [facility]ry with  [facility] which will take some time to get her in.  If she does have a gastrointestinal malignancy then it might expedite getting her in with psychiatry

## 2025-04-16 NOTE — ASSESSMENT & PLAN NOTE
Patient remains on amlodipine, metoprolol, losartan.  I did provide refill of amlodipine and losartan.  She is scheduled for follow-up in 4 days with her cardiologist

## 2025-04-16 NOTE — ASSESSMENT & PLAN NOTE
Continues to have productive cough.  Sending her for chest x-ray.  She has follow-up scheduled with pulmonologist in 20 minutes.  I did send him a message for his reevaluation of her.    In looking at her CT scan of her chest from hospitalization she did have bilateral groundglass appearing infiltrates

## 2025-04-16 NOTE — ASSESSMENT & PLAN NOTE
Lab Results   Component Value Date    HGBA1C 9.0 (H) 04/05/2025     Explained to the patient that she is now diabetic.  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  She states that metformin has caused stomach upset in the past.  Will try placing her on low-dose glipizide 2.5 mg once daily.  Other medications that could be used would be DPP 4 inhibitor however that is a tier 3 medication on her insurance and would likely be unaffordable

## 2025-04-16 NOTE — ASSESSMENT & PLAN NOTE
Patient is on Suboxone.  Reportedly she is decreasing her dose and no longer seeing physician who prescribes it.  I explained to her that there is no way that I am prescribing it

## 2025-04-16 NOTE — ASSESSMENT & PLAN NOTE
Reportedly she had quit smoking.  She has Trelegy and albuterol as well as nebulizer treatments    - She will be sent for chest x-ray and is following up with pulmonologist later today

## 2025-04-16 NOTE — PROGRESS NOTES
"  Subjective:      Patient ID: Montserrat Sumner is a 74 y.o. female.    TCM Call (since 4/2/2025)     Hospital care reviewed  Records reviewed    Patient was hospitialized at  [facility]    Date of Admission  04/05/25    Date of discharge  04/10/25    Diagnosis  COPD exarbation and Fall    Disposition  Home    Were the patients medications reviewed and updated  Yes    Current Symptoms  --  Leg pain      TCM Call (since 4/2/2025)     Post hospital issues  None    Scheduled for follow up?  Yes    Referrals needed  NONE    Did you obtain your prescribed medications  Yes    Do you need help managing your prescriptions or medications  No    Is transportation to your appointment needed  No    I have advised the patient to call PCP with any new or worsening symptoms    Marie Wilson MA    Living Arrangements  Spouse or Significiant other    Support System  Spouse    The type of support provided  Emotional; Financial; Physical    Do you have social support  Yes, as much as I need    Are you recieving home care services  Yes    Types of home care services  Nurse visit      Patient presents with her  for TCM visit following hospitalization for COPD exacerbation.  Below is discharge summary as per hospitalist group:    \"Hospital Course: Patient was seen and examined in the emergency department and was admitted to the hospital for further evaluation and management.  Patient presenting with increased shortness of breath and productive cough found to have evidence of a COPD exacerbation.  Patient was treated with IV Solu-Medrol and antibiotic therapy.  Patient did show slow but gradual improvement.  Ambulatory walk test was performed prior to discharge and patient was found to be saturating 94% on room air at rest and 92% on room air during ambulation.  Repeat chest x-ray prior to discharge was also completed and noted to be negative.  Patient has been cleared for discharge with continued follow-up with her primary " "medical doctor and pulmonologist in the outpatient setting.  Patient will be discharged home on a prednisone taper.  At the time of discharge patient reports feeling significantly improved and currently denies any acute complaints or concerns including chest pain or shortness of breath.\"    While in the hospital she was diagnosed as being diabetic.  Previously was prediabetic.  She has been on multiple rounds of steroids.  She is no longer seeing her psychiatrist in [place] who was prescribing her Suboxone.  She is reportedly weaning herself off of Suboxone.  Last Suboxone prescription was from March 13.  She requests referral to psychiatry.  She feels overwhelmed with her declining health.  Very upset and tearful.  She relies on her  and other family members to take her to appointments.  She requests refill of her trazodone and alprazolam.  Still has significant pain in her back as well as into her left leg and buttocks.  She is scheduled with pain management for RFA at L3-L5 in early May.  States that she cannot ambulate and that her left leg feels weak.  She is scheduled to see pulmonologist later this morning.  She is still coughing significantly and does feel shortness of breath when she is exerting herself.        Past Medical History:   Diagnosis Date   • Anxiety    • Chronic pain 03/06/2023    lower abdomen and back   • Colon polyp    • COPD (chronic obstructive pulmonary disease) (HCC)     \"passes out\" with O2 levels dropping   • Disease of thyroid gland    • GERD (gastroesophageal reflux disease)    • History of transfusion     with aneurysm repair-autologous-self and daughter   • Hyperlipidemia    • Hypertension    • Respiratory acidosis 04/05/2025   • Teeth missing    • Wears glasses     For reading       Family History   Problem Relation Age of Onset   • Breast cancer Mother 60       Past Surgical History:   Procedure Laterality Date   • BACK SURGERY      x2 herniated, crushed disc in the " lumbar, ablation   • CHOLECYSTECTOMY     • COLONOSCOPY     • EPIDURAL BLOCK INJECTION N/A 2/5/2025    Procedure: L1-L2 LUMBAR EPIDURAL STEROID INJECTION;  Surgeon: Nj Maddox DO;  Location: [facility] MAIN OR;  Service: Pain Management    • FRACTURE SURGERY Left     hip-pinned   • HYSTERECTOMY      salpingo-oophorectomy   • AR INJECT SI JOINT ARTHRGRPHY&/ANES/STEROID W/PATRICIA Bilateral 12/18/2024    Procedure: BILATERAL SACROILIAC JOINT INJECTION;  Surgeon: Nj Maddox DO;  Location: [facility] MAIN OR;  Service: Pain Management    • THORACIC AORTIC ANEURYSM REPAIR  09/2016    ascending thoracic aortic repair with RCA bypass with SVG x 1   • TONSILLECTOMY     • UPPER GASTROINTESTINAL ENDOSCOPY          reports that she has been smoking cigarettes. She started smoking about 57 years ago. She has a 14.3 pack-year smoking history. She has been exposed to tobacco smoke. She has never used smokeless tobacco. She reports current alcohol use. She reports current drug use. Drugs: Marijuana and Cocaine.      Current Outpatient Medications:   •  acetylcysteine (MUCOMYST) 200 mg/mL nebulizer solution, Use 2 ml and add to 1 vial of your albuterol twice a day as needed for thick mucous, Disp: 120 mL, Rfl: 3  •  albuterol (2.5 mg/3 mL) 0.083 % nebulizer solution, Take 3 mL (2.5 mg total) by nebulization 4 (four) times a day as needed for wheezing or shortness of breath, Disp: 360 mL, Rfl: 7  •  albuterol (Proventil HFA) 90 mcg/act inhaler, Inhale 2 puffs every 4 (four) hours as needed for wheezing, Disp: 6.7 g, Rfl: 7  •  ALPRAZolam (XANAX) 0.5 mg tablet, Take 2 tablets (1 mg total) by mouth 2 (two) times a day as needed for anxiety, Disp: 60 tablet, Rfl: 1  •  amLODIPine (NORVASC) 5 mg tablet, Take 1 tablet (5 mg total) by mouth daily, Disp: 90 tablet, Rfl: 1  •  aspirin 81 mg chewable tablet, Chew 81 mg daily, Disp: , Rfl:   •  atorvastatin (LIPITOR) 40 mg tablet, Take 1 tablet (40 mg total) by mouth every morning, Disp:  90 tablet, Rfl: 3  •  benzonatate (TESSALON PERLES) 100 mg capsule, Take 1 capsule (100 mg total) by mouth 3 (three) times a day as needed for cough, Disp: 30 capsule, Rfl: 3  •  buprenorphine-naloxone (Suboxone) 4-1 MG, every morning Wafer, Disp: , Rfl:   •  celecoxib (CeleBREX) 200 mg capsule, Take 1 capsule (200 mg total) by mouth daily, Disp: 90 capsule, Rfl: 1  •  cyclobenzaprine (FLEXERIL) 10 mg tablet, Take 1 tablet (10 mg total) by mouth 3 (three) times a day as needed for muscle spasms, Disp: 90 tablet, Rfl: 1  •  docusate sodium (COLACE) 100 mg capsule, Take 1 capsule (100 mg total) by mouth 2 (two) times a day, Disp: , Rfl:   •  DULoxetine (CYMBALTA) 60 mg delayed release capsule, Take 1 capsule (60 mg total) by mouth daily, Disp: 90 capsule, Rfl: 1  •  esomeprazole (NexIUM) 40 MG capsule, Take 1 capsule (40 mg total) by mouth 2 (two) times a day before meals, Disp: 180 capsule, Rfl: 1  •  fluticasone-umeclidinium-vilanterol (Trelegy Ellipta) 200-62.5-25 mcg/actuation AEPB inhaler, Inhale 1 puff daily Rinse mouth after use., Disp: 60 blister, Rfl: 11  •  glipiZIDE (GLUCOTROL XL) 2.5 mg 24 hr tablet, Take 1 tablet (2.5 mg total) by mouth daily, Disp: 90 tablet, Rfl: 0  •  levothyroxine 100 mcg tablet, Take 1 tablet (100 mcg total) by mouth every morning, Disp: 90 tablet, Rfl: 0  •  losartan (COZAAR) 25 mg tablet, Take 1 tablet (25 mg total) by mouth every morning, Disp: 90 tablet, Rfl: 1  •  metoprolol succinate (TOPROL-XL) 50 mg 24 hr tablet, Take 1 tablet (50 mg total) by mouth every morning, Disp: 30 tablet, Rfl: 0  •  nicotine (NICODERM CQ) 7 mg/24hr TD 24 hr patch, Place 1 patch on the skin over 24 hours daily, Disp: 28 patch, Rfl: 0  •  polyethylene glycol (MIRALAX) 17 g packet, Take 17 g by mouth daily as needed (Constipation), Disp: , Rfl:   •  sucralfate (CARAFATE) 1 g tablet, Take 1 tablet (1 g total) by mouth 4 (four) times a day, Disp: 120 tablet, Rfl: 1  •  traZODone (DESYREL) 50 mg tablet,  Take 2 tablets (100 mg total) by mouth daily at bedtime, Disp: 90 tablet, Rfl: 1  •  venlafaxine (EFFEXOR-XR) 150 mg 24 hr capsule, Take 1 capsule by mouth daily at bedtime, Disp: , Rfl:   •  b complex vitamins capsule, Take 1 capsule by mouth once a week (Patient not taking: Reported on 4/16/2025), Disp: , Rfl:   •  oxygen gas, Inhale continuous as needed 2.5 L (Patient not taking: Reported on 2/26/2025), Disp: , Rfl:   •  predniSONE 10 mg tablet, 4 tablets (40mg) by mouth once daily for 3 days, followed by 3 tablets (30mg) by mouth once daily for 3 days, followed by 2 tablets (20mg) by mouth once daily for 3 days, followed by 1 tablet (10mg) by mouth once daily for 3 days., Disp: , Rfl:     The following portions of the patient's history were reviewed and updated as appropriate: allergies, current medications, past family history, past medical history, past social history, past surgical history and problem list.    Review of Systems   Constitutional: Negative.  Negative for activity change, appetite change, fatigue and unexpected weight change.   HENT: Negative.  Negative for congestion, ear discharge, mouth sores, sinus pressure and tinnitus.    Eyes: Negative.  Negative for photophobia and visual disturbance.   Respiratory:  Positive for cough, shortness of breath and wheezing. Negative for chest tightness.    Cardiovascular:  Positive for palpitations. Negative for chest pain.   Gastrointestinal:  Positive for abdominal pain. Negative for abdominal distention, constipation, diarrhea, nausea and vomiting.   Endocrine: Negative.  Negative for polydipsia, polyphagia and polyuria.   Genitourinary: Negative.  Negative for difficulty urinating.   Musculoskeletal:  Positive for arthralgias (Right hip), back pain and gait problem (Difficulty ambulating.  Wheelchair).        In a wheelchair   Skin: Negative.    Neurological:  Positive for weakness (Left leg) and numbness (Bilateral feet). Negative for dizziness, syncope  "and light-headedness.   Hematological:  Bruises/bleeds easily.   Psychiatric/Behavioral:  Positive for agitation, dysphoric mood and sleep disturbance. The patient is nervous/anxious.            Objective:    /86   Pulse 60   Temp (!) 96.7 °F (35.9 °C) (Tympanic)   Resp 20   Ht 5' 3\" (1.6 m)   Wt 75.8 kg (167 lb)   LMP  (LMP Unknown)   SpO2 94%   BMI 29.58 kg/m²      Physical Exam  Vitals and nursing note reviewed.   Constitutional:       General: She is not in acute distress.     Appearance: She is well-developed. She is ill-appearing (Chronically ill-appearing). She is not toxic-appearing or diaphoretic.   HENT:      Head: Normocephalic and atraumatic.      Nose: Nose normal.      Mouth/Throat:      Mouth: Mucous membranes are moist.      Pharynx: Oropharynx is clear.   Eyes:      Comments: Bilateral cataracts.   Neck:      Vascular: No carotid bruit.   Cardiovascular:      Rate and Rhythm: Normal rate and regular rhythm.      Pulses: Normal pulses.      Heart sounds: Normal heart sounds. No murmur heard.     Comments: Normal dorsalis pedis pulses  Pulmonary:      Effort: Pulmonary effort is normal. No respiratory distress.      Breath sounds: Wheezing and rhonchi present. No rales.      Comments: Moist mucousy cough  Abdominal:      General: Bowel sounds are normal. There is no distension.      Palpations: Abdomen is soft.      Tenderness: There is no abdominal tenderness. There is no guarding or rebound.   Musculoskeletal:         General: Tenderness (Right hip in the area of the trochanteric bursa) present. Normal range of motion.      Cervical back: Normal range of motion and neck supple.      Right lower leg: No edema.      Left lower leg: No edema.   Lymphadenopathy:      Cervical: No cervical adenopathy.   Skin:     General: Skin is warm and dry.      Findings: Bruising present. No rash.   Neurological:      Mental Status: She is alert and oriented to person, place, and time.      Sensory: " Sensory deficit (Bilateral feet) present.      Gait: Gait abnormal.      Deep Tendon Reflexes: Reflexes are normal and symmetric.      Comments: Remains in wheelchair   Psychiatric:         Mood and Affect: Mood is depressed. Affect is tearful.         Behavior: Behavior normal.         Thought Content: Thought content normal.         Judgment: Judgment normal.           Recent Results (from the past 6 weeks)   ECG 12 lead    Collection Time: 04/05/25 11:00 AM   Result Value Ref Range    Ventricular Rate 82 BPM    Atrial Rate 82 BPM    ND Interval 134 ms    QRSD Interval 92 ms    QT Interval 384 ms    QTC Interval 448 ms    P [place] 53 degrees    QRS Axis 2 degrees    T Wave Axis 71 degrees   FLU/COVID Rapid Antigen (30 min. TAT) - Preferred screening test in ED    Collection Time: 04/05/25 11:17 AM    Specimen: Nose; Nares   Result Value Ref Range    SARS COV Rapid Antigen Negative Negative    Influenza A Rapid Antigen Negative Negative    Influenza B Rapid Antigen Negative Negative   CBC and differential    Collection Time: 04/05/25 11:18 AM   Result Value Ref Range    WBC 7.78 4.31 - 10.16 Thousand/uL    RBC 4.40 3.81 - 5.12 Million/uL    Hemoglobin 12.9 11.5 - 15.4 g/dL    Hematocrit 42.8 34.8 - 46.1 %    MCV 97 82 - 98 fL    MCH 29.3 26.8 - 34.3 pg    MCHC 30.1 (L) 31.4 - 37.4 g/dL    RDW 13.9 11.6 - 15.1 %    MPV 9.3 8.9 - 12.7 fL    Platelets 216 149 - 390 Thousands/uL    nRBC 0 /100 WBCs    Segmented % 82 (H) 43 - 75 %    Immature Grans % 1 0 - 2 %    Lymphocytes % 9 (L) 14 - 44 %    Monocytes % 8 4 - 12 %    Eosinophils Relative 0 0 - 6 %    Basophils Relative 0 0 - 1 %    Absolute Neutrophils 6.36 1.85 - 7.62 Thousands/µL    Absolute Immature Grans 0.06 0.00 - 0.20 Thousand/uL    Absolute Lymphocytes 0.70 0.60 - 4.47 Thousands/µL    Absolute Monocytes 0.62 0.17 - 1.22 Thousand/µL    Eosinophils Absolute 0.01 0.00 - 0.61 Thousand/µL    Basophils Absolute 0.03 0.00 - 0.10 Thousands/µL   Comprehensive metabolic  panel    Collection Time: 04/05/25 11:18 AM   Result Value Ref Range    Sodium 137 135 - 147 mmol/L    Potassium 4.5 3.5 - 5.3 mmol/L    Chloride 99 96 - 108 mmol/L    CO2 25 21 - 32 mmol/L    ANION GAP 13 4 - 13 mmol/L    BUN 21 5 - 25 mg/dL    Creatinine 1.15 0.60 - 1.30 mg/dL    Glucose 271 (H) 65 - 140 mg/dL    Calcium 9.8 8.4 - 10.2 mg/dL    AST 11 (L) 13 - 39 U/L    ALT 17 7 - 52 U/L    Alkaline Phosphatase 38 34 - 104 U/L    Total Protein 6.9 6.4 - 8.4 g/dL    Albumin 3.5 3.5 - 5.0 g/dL    Total Bilirubin 0.62 0.20 - 1.00 mg/dL    eGFR 47 ml/min/1.73sq m   Protime-INR    Collection Time: 04/05/25 11:18 AM   Result Value Ref Range    Protime 14.4 12.3 - 15.0 seconds    INR 1.07 0.85 - 1.19   APTT    Collection Time: 04/05/25 11:18 AM   Result Value Ref Range    PTT 31 23 - 34 seconds   Blood gas, venous    Collection Time: 04/05/25 11:18 AM   Result Value Ref Range    pH, Avinash 7.268 (L) 7.300 - 7.400    pCO2, Avinash 69.7 (H) 42.0 - 50.0 mm Hg    pO2, Avinash 15.8 (L) 35.0 - 45.0 mm Hg    HCO3, Avinash 31.1 (H) 24 - 30 mmol/L    Base Excess, Avinash 2.3 mmol/L    O2 Content, Avinash 3.5 ml/dL    O2 HGB, VENOUS 18.6 (L) 60.0 - 80.0 %   Procalcitonin    Collection Time: 04/05/25 11:18 AM   Result Value Ref Range    Procalcitonin 0.21 <=0.25 ng/ml   Hemoglobin A1C    Collection Time: 04/05/25 11:18 AM   Result Value Ref Range    Hemoglobin A1C 9.0 (H) Normal 4.0-5.6%; PreDiabetic 5.7-6.4%; Diabetic >=6.5%; Glycemic control for adults with diabetes <7.0% %     mg/dl   Blood gas, venous    Collection Time: 04/05/25  2:07 PM   Result Value Ref Range    pH, Avinash 7.329 7.300 - 7.400    pCO2, Avinash 61.7 (H) 42.0 - 50.0 mm Hg    pO2, Avinash 21.7 (L) 35.0 - 45.0 mm Hg    HCO3, Avinash 31.7 (H) 24 - 30 mmol/L    Base Excess, Avinash 4.2 mmol/L    O2 Content, Avinash 5.8 ml/dL    O2 HGB, VENOUS 32.1 (L) 60.0 - 80.0 %   Fingerstick Glucose (POCT)    Collection Time: 04/05/25  4:19 PM   Result Value Ref Range    POC Glucose 219 (H) 65 - 140 mg/dl    Fingerstick Glucose (POCT)    Collection Time: 04/05/25  8:01 PM   Result Value Ref Range    POC Glucose 254 (H) 65 - 140 mg/dl   CBC and differential    Collection Time: 04/06/25  5:10 AM   Result Value Ref Range    WBC 5.57 4.31 - 10.16 Thousand/uL    RBC 4.24 3.81 - 5.12 Million/uL    Hemoglobin 12.4 11.5 - 15.4 g/dL    Hematocrit 39.9 34.8 - 46.1 %    MCV 94 82 - 98 fL    MCH 29.2 26.8 - 34.3 pg    MCHC 31.1 (L) 31.4 - 37.4 g/dL    RDW 13.6 11.6 - 15.1 %    MPV 9.4 8.9 - 12.7 fL    Platelets 213 149 - 390 Thousands/uL    nRBC 0 /100 WBCs    Segmented % 82 (H) 43 - 75 %    Immature Grans % 2 0 - 2 %    Lymphocytes % 9 (L) 14 - 44 %    Monocytes % 7 4 - 12 %    Eosinophils Relative 0 0 - 6 %    Basophils Relative 0 0 - 1 %    Absolute Neutrophils 4.55 1.85 - 7.62 Thousands/µL    Absolute Immature Grans 0.11 0.00 - 0.20 Thousand/uL    Absolute Lymphocytes 0.52 (L) 0.60 - 4.47 Thousands/µL    Absolute Monocytes 0.38 0.17 - 1.22 Thousand/µL    Eosinophils Absolute 0.00 0.00 - 0.61 Thousand/µL    Basophils Absolute 0.01 0.00 - 0.10 Thousands/µL   Comprehensive metabolic panel    Collection Time: 04/06/25  5:10 AM   Result Value Ref Range    Sodium 136 135 - 147 mmol/L    Potassium 4.4 3.5 - 5.3 mmol/L    Chloride 99 96 - 108 mmol/L    CO2 25 21 - 32 mmol/L    ANION GAP 12 4 - 13 mmol/L    BUN 21 5 - 25 mg/dL    Creatinine 0.90 0.60 - 1.30 mg/dL    Glucose 204 (H) 65 - 140 mg/dL    Calcium 10.0 8.4 - 10.2 mg/dL    AST 10 (L) 13 - 39 U/L    ALT 16 7 - 52 U/L    Alkaline Phosphatase 36 34 - 104 U/L    Total Protein 6.8 6.4 - 8.4 g/dL    Albumin 3.7 3.5 - 5.0 g/dL    Total Bilirubin 0.32 0.20 - 1.00 mg/dL    eGFR 63 ml/min/1.73sq m   Blood gas, venous    Collection Time: 04/06/25  5:10 AM   Result Value Ref Range    pH, Avinash 7.344 7.300 - 7.400    pCO2, Avinash 54.3 (H) 42.0 - 50.0 mm Hg    pO2, Avinash 39.9 35.0 - 45.0 mm Hg    HCO3, Avinash 28.9 24 - 30 mmol/L    Base Excess, Avinash 2.1 mmol/L    O2 Content, Avinash 13.3 ml/dL    O2 HGB,  VENOUS 68.2 60.0 - 80.0 %   Fingerstick Glucose (POCT)    Collection Time: 04/06/25  7:34 AM   Result Value Ref Range    POC Glucose 221 (H) 65 - 140 mg/dl   Fingerstick Glucose (POCT)    Collection Time: 04/06/25 10:53 AM   Result Value Ref Range    POC Glucose 457 (HH) 65 - 140 mg/dl   Fingerstick Glucose (POCT)    Collection Time: 04/06/25  4:11 PM   Result Value Ref Range    POC Glucose 148 (H) 65 - 140 mg/dl   Fingerstick Glucose (POCT)    Collection Time: 04/06/25  8:09 PM   Result Value Ref Range    POC Glucose 244 (H) 65 - 140 mg/dl   Fingerstick Glucose (POCT)    Collection Time: 04/07/25  2:09 AM   Result Value Ref Range    POC Glucose 284 (H) 65 - 140 mg/dl   CBC and differential    Collection Time: 04/07/25  6:59 AM   Result Value Ref Range    WBC 8.14 4.31 - 10.16 Thousand/uL    RBC 4.24 3.81 - 5.12 Million/uL    Hemoglobin 12.5 11.5 - 15.4 g/dL    Hematocrit 40.2 34.8 - 46.1 %    MCV 95 82 - 98 fL    MCH 29.5 26.8 - 34.3 pg    MCHC 31.1 (L) 31.4 - 37.4 g/dL    RDW 13.7 11.6 - 15.1 %    MPV 9.4 8.9 - 12.7 fL    Platelets 250 149 - 390 Thousands/uL    nRBC 0 /100 WBCs    Segmented % 84 (H) 43 - 75 %    Immature Grans % 2 0 - 2 %    Lymphocytes % 6 (L) 14 - 44 %    Monocytes % 7 4 - 12 %    Eosinophils Relative 0 0 - 6 %    Basophils Relative 1 0 - 1 %    Absolute Neutrophils 6.82 1.85 - 7.62 Thousands/µL    Absolute Immature Grans 0.19 0.00 - 0.20 Thousand/uL    Absolute Lymphocytes 0.49 (L) 0.60 - 4.47 Thousands/µL    Absolute Monocytes 0.60 0.17 - 1.22 Thousand/µL    Eosinophils Absolute 0.00 0.00 - 0.61 Thousand/µL    Basophils Absolute 0.04 0.00 - 0.10 Thousands/µL   Comprehensive metabolic panel    Collection Time: 04/07/25  6:59 AM   Result Value Ref Range    Sodium 139 135 - 147 mmol/L    Potassium 4.7 3.5 - 5.3 mmol/L    Chloride 103 96 - 108 mmol/L    CO2 25 21 - 32 mmol/L    ANION GAP 11 4 - 13 mmol/L    BUN 23 5 - 25 mg/dL    Creatinine 0.80 0.60 - 1.30 mg/dL    Glucose 168 (H) 65 - 140 mg/dL     Calcium 9.8 8.4 - 10.2 mg/dL    AST 13 13 - 39 U/L    ALT 18 7 - 52 U/L    Alkaline Phosphatase 33 (L) 34 - 104 U/L    Total Protein 6.6 6.4 - 8.4 g/dL    Albumin 3.5 3.5 - 5.0 g/dL    Total Bilirubin 0.27 0.20 - 1.00 mg/dL    eGFR 73 ml/min/1.73sq m   Fingerstick Glucose (POCT)    Collection Time: 04/07/25  7:10 AM   Result Value Ref Range    POC Glucose 171 (H) 65 - 140 mg/dl   Fingerstick Glucose (POCT)    Collection Time: 04/07/25 10:40 AM   Result Value Ref Range    POC Glucose 250 (H) 65 - 140 mg/dl   Fingerstick Glucose (POCT)    Collection Time: 04/07/25  4:22 PM   Result Value Ref Range    POC Glucose 375 (H) 65 - 140 mg/dl   Fingerstick Glucose (POCT)    Collection Time: 04/07/25  9:15 PM   Result Value Ref Range    POC Glucose 219 (H) 65 - 140 mg/dl   Fingerstick Glucose (POCT)    Collection Time: 04/08/25  2:43 AM   Result Value Ref Range    POC Glucose 237 (H) 65 - 140 mg/dl   CBC and differential    Collection Time: 04/08/25  6:29 AM   Result Value Ref Range    WBC 10.83 (H) 4.31 - 10.16 Thousand/uL    RBC 4.22 3.81 - 5.12 Million/uL    Hemoglobin 12.5 11.5 - 15.4 g/dL    Hematocrit 40.1 34.8 - 46.1 %    MCV 95 82 - 98 fL    MCH 29.6 26.8 - 34.3 pg    MCHC 31.2 (L) 31.4 - 37.4 g/dL    RDW 13.7 11.6 - 15.1 %    MPV 9.5 8.9 - 12.7 fL    Platelets 270 149 - 390 Thousands/uL   Comprehensive metabolic panel    Collection Time: 04/08/25  6:29 AM   Result Value Ref Range    Sodium 136 135 - 147 mmol/L    Potassium 4.3 3.5 - 5.3 mmol/L    Chloride 101 96 - 108 mmol/L    CO2 25 21 - 32 mmol/L    ANION GAP 10 4 - 13 mmol/L    BUN 20 5 - 25 mg/dL    Creatinine 0.83 0.60 - 1.30 mg/dL    Glucose 178 (H) 65 - 140 mg/dL    Calcium 9.7 8.4 - 10.2 mg/dL    AST 12 (L) 13 - 39 U/L    ALT 21 7 - 52 U/L    Alkaline Phosphatase 33 (L) 34 - 104 U/L    Total Protein 6.6 6.4 - 8.4 g/dL    Albumin 3.6 3.5 - 5.0 g/dL    Total Bilirubin 0.22 0.20 - 1.00 mg/dL    eGFR 70 ml/min/1.73sq m   Manual Differential(PHLEBS Do Not  Order)    Collection Time: 04/08/25  6:29 AM   Result Value Ref Range    Segmented % 76 (H) 43 - 75 %    Bands % 1 0 - 8 %    Lymphocytes % 10 (L) 14 - 44 %    Monocytes % 10 4 - 12 %    Eosinophils % 0 0 - 6 %    Basophils % 0 0 - 1 %    Metamyelocytes % 0 0 - 1 %    Myelocytes % 1 0 - 1 %    Atypical Lymphocytes % 2 (H) <=0 %    Absolute Neutrophils 8.34 (H) 1.85 - 7.62 Thousand/uL    Absolute Lymphocytes 1.30 0.60 - 4.47 Thousand/uL    Absolute Monocytes 1.08 0.00 - 1.22 Thousand/uL    Absolute Eosinophils 0.00 0.00 - 0.40 Thousand/uL    Absolute Basophils 0.00 0.00 - 0.10 Thousand/uL    Absolute Metamyelocytes 0.00 0.00 - 0.10 Thousand/uL    Absolute Myelocytes 0.11 (H) 0.00 - 0.10 Thousand/uL    Total Counted      RBC Morphology Present     Platelet Estimate Adequate Adequate    Anisocytosis Present     Macrocytes Present     Polychromasia Present    Fingerstick Glucose (POCT)    Collection Time: 04/08/25  8:18 AM   Result Value Ref Range    POC Glucose 161 (H) 65 - 140 mg/dl   Fingerstick Glucose (POCT)    Collection Time: 04/08/25 11:18 AM   Result Value Ref Range    POC Glucose 234 (H) 65 - 140 mg/dl   Fingerstick Glucose (POCT)    Collection Time: 04/08/25  3:46 PM   Result Value Ref Range    POC Glucose 257 (H) 65 - 140 mg/dl   Fingerstick Glucose (POCT)    Collection Time: 04/08/25  8:00 PM   Result Value Ref Range    POC Glucose 216 (H) 65 - 140 mg/dl   Fingerstick Glucose (POCT)    Collection Time: 04/09/25  1:49 AM   Result Value Ref Range    POC Glucose 209 (H) 65 - 140 mg/dl   Basic metabolic panel    Collection Time: 04/09/25  5:12 AM   Result Value Ref Range    Sodium 141 135 - 147 mmol/L    Potassium 4.0 3.5 - 5.3 mmol/L    Chloride 104 96 - 108 mmol/L    CO2 25 21 - 32 mmol/L    ANION GAP 12 4 - 13 mmol/L    BUN 20 5 - 25 mg/dL    Creatinine 0.85 0.60 - 1.30 mg/dL    Glucose 124 65 - 140 mg/dL    Calcium 9.5 8.4 - 10.2 mg/dL    eGFR 68 ml/min/1.73sq m   CBC and differential    Collection Time:  04/09/25  5:12 AM   Result Value Ref Range    WBC 10.07 4.31 - 10.16 Thousand/uL    RBC 4.26 3.81 - 5.12 Million/uL    Hemoglobin 12.6 11.5 - 15.4 g/dL    Hematocrit 41.0 34.8 - 46.1 %    MCV 96 82 - 98 fL    MCH 29.6 26.8 - 34.3 pg    MCHC 30.7 (L) 31.4 - 37.4 g/dL    RDW 13.8 11.6 - 15.1 %    MPV 9.5 8.9 - 12.7 fL    Platelets 254 149 - 390 Thousands/uL   Magnesium    Collection Time: 04/09/25  5:12 AM   Result Value Ref Range    Magnesium 2.0 1.9 - 2.7 mg/dL   Fingerstick Glucose (POCT)    Collection Time: 04/09/25  7:11 AM   Result Value Ref Range    POC Glucose 96 65 - 140 mg/dl   Fingerstick Glucose (POCT)    Collection Time: 04/09/25 11:33 AM   Result Value Ref Range    POC Glucose 236 (H) 65 - 140 mg/dl   Fingerstick Glucose (POCT)    Collection Time: 04/09/25  5:16 PM   Result Value Ref Range    POC Glucose 233 (H) 65 - 140 mg/dl   Fingerstick Glucose (POCT)    Collection Time: 04/09/25  8:58 PM   Result Value Ref Range    POC Glucose 154 (H) 65 - 140 mg/dl   Fingerstick Glucose (POCT)    Collection Time: 04/10/25  1:50 AM   Result Value Ref Range    POC Glucose 118 65 - 140 mg/dl   Fingerstick Glucose (POCT)    Collection Time: 04/10/25  7:25 AM   Result Value Ref Range    POC Glucose 84 65 - 140 mg/dl   Fingerstick Glucose (POCT)    Collection Time: 04/10/25 11:39 AM   Result Value Ref Range    POC Glucose 221 (H) 65 - 140 mg/dl       Assessment/Plan:    Hypertension  Patient remains on amlodipine, metoprolol, losartan.  I did provide refill of amlodipine and losartan.  She is scheduled for follow-up in 4 days with her cardiologist    Tracheobronchitis  Continues to have productive cough.  Sending her for chest x-ray.  She has follow-up scheduled with pulmonologist in 20 minutes.  I did send him a message for his reevaluation of her.    In looking at her CT scan of her chest from hospitalization she did have bilateral groundglass appearing infiltrates    Centrilobular emphysema (HCC)  Reportedly she had  quit smoking.  She has Trelegy and albuterol as well as nebulizer treatments    - She will be sent for chest x-ray and is following up with pulmonologist later today    Nocturnal hypoxemia due to emphysema  ([facility])  Has supplemental oxygen to use at bedtime    Type 2 diabetes mellitus, without long-term current use of insulin ([facility])    Lab Results   Component Value Date    HGBA1C 9.0 (H) 04/05/2025     Explained to the patient that she is now diabetic.  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  She states that metformin has caused stomach upset in the past.  Will try placing her on low-dose glipizide 2.5 mg once daily.  Other medications that could be used would be DPP 4 inhibitor however that is a tier 3 medication on her insurance and would likely be unaffordable    Anxiety  Patient is on duloxetine.  Reluctantly I am filling her alprazolam 0.5 mg twice daily.  She states that she is no longer seeing psychiatrist in [place].  Placing order for psychiatry with [facility] which will take some time to get her in.  If she does have a gastrointestinal malignancy then it might expedite getting her in with psychiatry    Cigarette nicotine dependence without complication  Reportedly she quit smoking.  Hopefully she can continue not to smoke    Opioid dependence ([facility])  Patient is on Suboxone.  Reportedly she is decreasing her dose and no longer seeing physician who prescribes it.  I explained to her that there is no way that I am prescribing it    Sleep disorder  Continue on trazodone.  I did refill it for her since she is no longer seeing psychiatrist in [place]    Intractable back pain  Seeing pain management.  She will be having RFA in May.  Needs to contact pain  management for any other pain medications.    Patient is using wheelchair.  She really is not ambulating.  Probably after having RFA she could potentially go for physical therapy.  In all likelihood I would have made recommendation for discharge to skilled nursing facility for rehabilitation and strengthening following hospital admission          Problem List Items Addressed This Visit        Cardiovascular and Mediastinum    Hypertension (Chronic)    Patient remains on amlodipine, metoprolol, losartan.  I did provide refill of amlodipine and losartan.  She is scheduled for follow-up in 4 days with her cardiologist         Relevant Medications    amLODIPine (NORVASC) 5 mg tablet    losartan (COZAAR) 25 mg tablet       Respiratory    Centrilobular emphysema (HCC) (Chronic)    Reportedly she had quit smoking.  She has Trelegy and albuterol as well as nebulizer treatments    - She will be sent for chest x-ray and is following up with pulmonologist later today         Nocturnal hypoxemia due to emphysema  (HCC)    Has supplemental oxygen to use at bedtime         Tracheobronchitis - Primary    Continues to have productive cough.  Sending her for chest x-ray.  She has follow-up scheduled with pulmonologist in 20 minutes.  I did send him a message for his reevaluation of her.    In looking at her CT scan of her chest from hospitalization she did have bilateral groundglass appearing infiltrates         Relevant Orders    XR chest pa and lateral       Endocrine    Type 2 diabetes mellitus, without long-term current use of insulin (HCC)      Lab Results   Component Value Date    HGBA1C 9.0 (H) 04/05/2025     Explained to the patient that she is now diabetic.  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  She states that metformin has caused stomach upset in the past.  Will try placing her on low-dose glipizide 2.5 mg once daily.  Other medications that could be used would be DPP 4 inhibitor however that is a tier 3 medication on her insurance and would likely be unaffordable         Relevant Medications    glipiZIDE (GLUCOTROL XL) 2.5 mg 24 hr tablet    Other Relevant Orders    Ambulatory referral to Diabetic Education       Behavioral Health    Anxiety (Chronic)    Patient is on duloxetine.  Reluctantly I am filling her alprazolam 0.5 mg twice daily.  She states that she is no longer seeing psychiatrist in [place].  Placing order for psychiatry with [facility] which will take some time to get her in.  If she does have a gastrointestinal malignancy then it might expedite getting her in with psychiatry         Relevant Medications    ALPRAZolam (XANAX) 0.5 mg tablet    Other Relevant Orders    Ambulatory referral to Psych Services    Opioid dependence (HCC) (Chronic)    Patient is on Suboxone.  Reportedly she is decreasing her dose and no longer seeing physician who prescribes it.  I explained to her that there is no way that I am prescribing it         Relevant Medications    traZODone (DESYREL) 50 mg tablet    ALPRAZolam (XANAX) 0.5 mg tablet    Cigarette nicotine dependence without complication    Reportedly she quit smoking.  Hopefully she can continue not to smoke         Relevant Medications    traZODone (DESYREL) 50 mg tablet    ALPRAZolam (XANAX) 0.5 mg tablet    Drug use       Surgery/Wound/Pain    Intractable back pain    Seeing pain management.  She will be having RFA in May.  Needs to contact pain management for any other pain medications.    Patient is using wheelchair.  She really is not ambulating.  Probably after having RFA she could potentially go for physical therapy.  In all likelihood I would have made recommendation for discharge to skilled nursing facility for  rehabilitation and strengthening following hospital admission            Neurology/Sleep    Sleep disorder    Continue on trazodone.  I did refill it for her since she is no longer seeing psychiatrist in [place]         Relevant Medications    traZODone (DESYREL) 50 mg tablet       I have spent a total time of 52 minutes in caring for this patient on the day of the visit/encounter including Diagnostic results, Prognosis, Risks and benefits of tx options, Instructions for management, Patient and family education, Importance of tx compliance, Risk factor reductions, Impressions, Counseling / Coordination of care, Documenting in the medical record, Reviewing/placing orders in the medical record (including tests, medications, and/or procedures), Obtaining or reviewing history  , and Communicating with other healthcare professionals .

## 2025-04-16 NOTE — TELEPHONE ENCOUNTER
Contacted patient in regards to Routine Referral in attempts to verify patient's needs of services and add patient to proper wait list. LVM for patient to contact intake dept  in regards to routine referral at [phone]. 1st attempt

## 2025-04-16 NOTE — TELEPHONE ENCOUNTER
Patient to cancel today's appointment, she got out of another doctors appointment late.  Patient states she just had a chest x-ray done and would like Dr. Smith to look at it and give her a call.  Patient states she has a lot of chest congestion and may need an antibiotic

## 2025-04-16 NOTE — TELEPHONE ENCOUNTER
Patient has been added to the Medication Management and Talk Therapy wait list with a referral.    Insurance: [employer]/ medicare  Insurance Type:    Commercial []   Medicaid []   [facility] (if applicable)   Medicare [x]  Location Preference: [place]  Provider Preference: non  Virtual: Yes [] No [x]  Were outside resources sent: Yes [] No [x]

## 2025-04-17 ENCOUNTER — OFFICE VISIT (OUTPATIENT)
Age: 75
End: 2025-04-17
Payer: COMMERCIAL

## 2025-04-17 ENCOUNTER — TELEPHONE (OUTPATIENT)
Age: 75
End: 2025-04-17

## 2025-04-17 ENCOUNTER — RESULTS FOLLOW-UP (OUTPATIENT)
Dept: FAMILY MEDICINE CLINIC | Facility: CLINIC | Age: 75
End: 2025-04-17

## 2025-04-17 VITALS — HEIGHT: 63 IN | BODY MASS INDEX: 29.59 KG/M2 | WEIGHT: 167 LBS

## 2025-04-17 DIAGNOSIS — M47.816 LUMBAR SPONDYLOSIS: Primary | ICD-10-CM

## 2025-04-17 DIAGNOSIS — M79.2 NEUROPATHIC PAIN: ICD-10-CM

## 2025-04-17 PROCEDURE — 99214 OFFICE O/P EST MOD 30 MIN: CPT | Performed by: STUDENT IN AN ORGANIZED HEALTH CARE EDUCATION/TRAINING PROGRAM

## 2025-04-17 RX ORDER — LIDOCAINE 50 MG/G
1 PATCH TOPICAL DAILY
Qty: 30 PATCH | Refills: 1 | Status: SHIPPED | OUTPATIENT
Start: 2025-04-17

## 2025-04-17 NOTE — TELEPHONE ENCOUNTER
PA for LIDO 5% PATCHES SUBMITTED to [employer]     via      [x]Ludia-Case ID #  [id number]        [x]PA sent as URGENT    All office notes, labs and other pertaining documents and studies sent. Clinical questions answered. Awaiting determination from insurance company.     Turnaround time for your insurance to make a decision on your Prior Authorization can take 7-21 business days.

## 2025-04-17 NOTE — TELEPHONE ENCOUNTER
PA for LIDO 5% PATCHES APPROVED     Date(s) approved UNTIL 12/31/2025    Case #    Patient advised by          []MyChart Message  []Phone call   [x]LMOM  []L/M to call office as no active Communication consent on file  []Unable to leave detailed message as VM not approved on Communication consent       Pharmacy advised by    [x]Fax  []Phone call  []Secure Chat    Specialty Pharmacy    []     Approval letter scanned into Media Yes

## 2025-04-17 NOTE — PROGRESS NOTES
Pain Medicine Follow-Up Note    Assessment:  1. Lumbar spondylosis    2. Neuropathic pain        Plan:  No orders of the defined types were placed in this encounter.      New Medications Ordered This Visit   Medications    lidocaine (Lidoderm) 5 %     Sig: Apply 1 patch topically over 12 hours daily Remove & Discard patch within 12 hours or as directed by MD     Dispense:  30 patch     Refill:  1    tiZANidine (ZANAFLEX) 4 mg tablet     Sig: Take 1 tablet (4 mg total) by mouth every 8 (eight) hours as needed for muscle spasms     Dispense:  90 tablet     Refill:  1       My impressions and treatment recommendations were discussed in detail with the patient who verbalized understanding and had no further questions.      Montserrat presents for follow-up regarding her chronic low back pain.  Unfortunately she sustained a fall at the beginning of April exacerbating her left buttock and low back pain.  She is currently on prednisone due to COPD exacerbation.  Her hemoglobin A1c is currently 9.  She is scheduled for lumbar medial branch blocks at the beginning of May which I am hopeful will cover some of the area of her pain.  Additionally I provided her with prescriptions for tizanidine to be taken in favor of the cyclobenzaprine as well as lidocaine patches.    Discharge instructions were provided. I personally saw and examined the patient and I agree with the above discussed plan of care.    I have spent a total time of 31 minutes in caring for this patient on the day of the visit/encounter including Prognosis, Risks and benefits of tx options, Instructions for management, Patient and family education, Impressions, Documenting in the medical record, Reviewing/placing orders in the medical record (including tests, medications, and/or procedures), and Obtaining or reviewing history  .     History of Present Illness:    Montserrat Sumner is a 74 y.o. female who presents to [facility] for interval  "re-evaluation of the above stated pain complaints. The patient has a past medical and chronic pain history as outlined in the assessment section. She was last seen on 3/11/2025.    Since Janki's last visit, unfortunately she sustained a fall on 4/5/2025 in the parking lot.  She landed on her left posterior hip and chest wall.  She did have left hip radiographs which showed her previous internal fixation without new acute abnormality.  Her pain is located in the left lumbosacral area and proximal thigh.  She is scheduled for therapeutic radiofrequency ablation.  Her pain is rated a 7 out of 10, is constant associated with throbbing and burning.  She reports her pain interferes with her activities of daily living and ambulation.  She presents today for further evaluation.    Other than as stated above, the patient denies any interval changes in medications, medical condition, mental condition, symptoms, or allergies since the last office visit.         Review of Systems:    Review of Systems   Constitutional:  Positive for fatigue.   Respiratory:  Positive for chest tightness and shortness of breath.    Cardiovascular:  Negative for chest pain.   Gastrointestinal:  Negative for constipation, diarrhea, nausea and vomiting.   Musculoskeletal:  Positive for back pain, gait problem and joint swelling. Negative for arthralgias, myalgias, neck pain and neck stiffness.        Bilateral leg pain   Skin:  Negative for rash.   Neurological:  Positive for weakness and numbness. Negative for dizziness and seizures.   Psychiatric/Behavioral:  Positive for sleep disturbance.    All other systems reviewed and are negative.        Past Medical History:   Diagnosis Date    Anxiety     Chronic pain 03/06/2023    lower abdomen and back    Colon polyp     COPD (chronic obstructive pulmonary disease) (HCC)     \"passes out\" with O2 levels dropping    Disease of thyroid gland     GERD (gastroesophageal reflux disease)     History of " transfusion     with aneurysm repair-autologous-self and daughter    Hyperlipidemia     Hypertension     Respiratory acidosis 04/05/2025    Teeth missing     Wears glasses     For reading       Past Surgical History:   Procedure Laterality Date    BACK SURGERY      x2 herniated, crushed disc in the lumbar, ablation    CHOLECYSTECTOMY      COLONOSCOPY      EPIDURAL BLOCK INJECTION N/A 2/5/2025    Procedure: L1-L2 LUMBAR EPIDURAL STEROID INJECTION;  Surgeon: Nj Maddox DO;  Location: [facility] [place] MAIN OR;  Service: Pain Management     FRACTURE SURGERY Left     hip-pinned    HYSTERECTOMY      salpingo-oophorectomy    NY INJECT SI JOINT ARTHRGRPHY&/ANES/STEROID W/PATRICIA Bilateral 12/18/2024    Procedure: BILATERAL SACROILIAC JOINT INJECTION;  Surgeon: Nj Maddox DO;  Location: [facility] [place] MAIN OR;  Service: Pain Management     THORACIC AORTIC ANEURYSM REPAIR  09/2016    ascending thoracic aortic repair with RCA bypass with SVG x 1    TONSILLECTOMY      UPPER GASTROINTESTINAL ENDOSCOPY         Family History   Problem Relation Age of Onset    Breast cancer Mother 60       Social History     Occupational History    Not on file   Tobacco Use    Smoking status: Every Day     Current packs/day: 0.25     Average packs/day: 0.3 packs/day for 57.3 years (14.3 ttl pk-yrs)     Types: Cigarettes     Start date: 1968     Passive exposure: Past    Smokeless tobacco: Never    Tobacco comments:     Currently smoking 5-6 cigarettes daily   Vaping Use    Vaping status: Never Used   Substance and Sexual Activity    Alcohol use: Yes     Comment: occasionally    Drug use: Yes     Types: Marijuana, Cocaine     Comment: yes still Marijuana as of 4/5    Sexual activity: Not Currently     Partners: Male     Birth control/protection: Post-menopausal         Current Outpatient Medications:     acetylcysteine (MUCOMYST) 200 mg/mL nebulizer solution, Use 2 ml and add to 1 vial of your albuterol twice a day as needed for thick mucous, Disp: 120  mL, Rfl: 3    albuterol (2.5 mg/3 mL) 0.083 % nebulizer solution, Take 3 mL (2.5 mg total) by nebulization 4 (four) times a day as needed for wheezing or shortness of breath, Disp: 360 mL, Rfl: 7    albuterol (Proventil HFA) 90 mcg/act inhaler, Inhale 2 puffs every 4 (four) hours as needed for wheezing, Disp: 6.7 g, Rfl: 7    ALPRAZolam (XANAX) 0.5 mg tablet, Take 2 tablets (1 mg total) by mouth 2 (two) times a day as needed for anxiety, Disp: 60 tablet, Rfl: 1    amLODIPine (NORVASC) 5 mg tablet, Take 1 tablet (5 mg total) by mouth daily, Disp: 90 tablet, Rfl: 1    aspirin 81 mg chewable tablet, Chew 81 mg daily, Disp: , Rfl:     atorvastatin (LIPITOR) 40 mg tablet, Take 1 tablet (40 mg total) by mouth every morning, Disp: 90 tablet, Rfl: 3    benzonatate (TESSALON PERLES) 100 mg capsule, Take 1 capsule (100 mg total) by mouth 3 (three) times a day as needed for cough, Disp: 30 capsule, Rfl: 3    buprenorphine-naloxone (Suboxone) 4-1 MG, every morning Wafer, Disp: , Rfl:     celecoxib (CeleBREX) 200 mg capsule, Take 1 capsule (200 mg total) by mouth daily, Disp: 90 capsule, Rfl: 1    docusate sodium (COLACE) 100 mg capsule, Take 1 capsule (100 mg total) by mouth 2 (two) times a day, Disp: , Rfl:     DULoxetine (CYMBALTA) 60 mg delayed release capsule, Take 1 capsule (60 mg total) by mouth daily, Disp: 90 capsule, Rfl: 1    esomeprazole (NexIUM) 40 MG capsule, Take 1 capsule (40 mg total) by mouth 2 (two) times a day before meals, Disp: 180 capsule, Rfl: 1    fluticasone-umeclidinium-vilanterol (Trelegy Ellipta) 200-62.5-25 mcg/actuation AEPB inhaler, Inhale 1 puff daily Rinse mouth after use., Disp: 60 blister, Rfl: 11    glipiZIDE (GLUCOTROL XL) 2.5 mg 24 hr tablet, Take 1 tablet (2.5 mg total) by mouth daily, Disp: 90 tablet, Rfl: 0    levothyroxine 100 mcg tablet, Take 1 tablet (100 mcg total) by mouth every morning, Disp: 90 tablet, Rfl: 0    lidocaine (Lidoderm) 5 %, Apply 1 patch topically over 12 hours daily  "Remove & Discard patch within 12 hours or as directed by MD, Disp: 30 patch, Rfl: 1    losartan (COZAAR) 25 mg tablet, Take 1 tablet (25 mg total) by mouth every morning, Disp: 90 tablet, Rfl: 1    metoprolol succinate (TOPROL-XL) 50 mg 24 hr tablet, Take 1 tablet (50 mg total) by mouth every morning, Disp: 7 tablet, Rfl: 0    nicotine (NICODERM CQ) 7 mg/24hr TD 24 hr patch, Place 1 patch on the skin over 24 hours daily, Disp: 28 patch, Rfl: 0    polyethylene glycol (MIRALAX) 17 g packet, Take 17 g by mouth daily as needed (Constipation), Disp: , Rfl:     predniSONE 10 mg tablet, 4 tablets (40mg) by mouth once daily for 3 days, followed by 3 tablets (30mg) by mouth once daily for 3 days, followed by 2 tablets (20mg) by mouth once daily for 3 days, followed by 1 tablet (10mg) by mouth once daily for 3 days., Disp: , Rfl:     sucralfate (CARAFATE) 1 g tablet, Take 1 tablet (1 g total) by mouth 4 (four) times a day, Disp: 120 tablet, Rfl: 1    tiZANidine (ZANAFLEX) 4 mg tablet, Take 1 tablet (4 mg total) by mouth every 8 (eight) hours as needed for muscle spasms, Disp: 90 tablet, Rfl: 1    traZODone (DESYREL) 50 mg tablet, Take 2 tablets (100 mg total) by mouth daily at bedtime, Disp: 90 tablet, Rfl: 1    venlafaxine (EFFEXOR-XR) 150 mg 24 hr capsule, Take 1 capsule by mouth daily at bedtime, Disp: , Rfl:     b complex vitamins capsule, Take 1 capsule by mouth once a week (Patient not taking: Reported on 4/16/2025), Disp: , Rfl:     oxygen gas, Inhale continuous as needed 2.5 L (Patient not taking: Reported on 2/26/2025), Disp: , Rfl:     No Known Allergies    Physical Exam:    Ht 5' 3\" (1.6 m)   Wt 75.8 kg (167 lb)   LMP  (LMP Unknown)   BMI 29.58 kg/m²     Constitutional:normal, well developed, well nourished, alert, in no distress and non-toxic and no overt pain behavior.  Eyes:anicteric  HEENT:grossly intact  Neck:supple, symmetric, trachea midline and no masses   Pulmonary:even and unlabored  Cardiovascular:No " edema or pitting edema present  Skin:Normal without rashes or lesions and well hydrated  Psychiatric:Mood and affect appropriate  Neurologic:Cranial Nerves II-XII grossly intact  Musculoskeletal: Tenderness to the lumbar paraspinals with positive facet loading.  Taut myofascial bands in the left lumbar paraspinals.  Negative Theresa's and FADIR testing.  Positive Stinchfield's test.  Strength antigravity in the lower limbs.      Imaging       CT recon only lumbar spine  Status: Final result     PACS Images - GE     Show images for CT recon only lumbar spine  PACS Images - Sectra     Show images for CT recon only lumbar spine  Study Result    Narrative & Impression   CT RECON ONLY LUMBAR SPINE (NO CHARGE)     INDICATION:   evaluate for back pain.     COMPARISON: CT chest abdomen pelvis with contrast 4/5/2025. MRI thoracic spine with and without contrast 1/12/2025. MRI lumbar spine with and without contrast 1/4/2025.     TECHNIQUE: Axial CT examination of the lumbar spine was obtained utilizing reconstructed images from CT of the chest, abdomen and pelvis performed the same day.  Images were reformatted in the sagittal and coronal planes.     This examination, like all CT scans performed [facility] the [facility], was performed utilizing techniques to minimize radiation dose exposure, including the use of iterative reconstruction and automated exposure control.     FINDINGS:     ALIGNMENT: Mild levoscoliosis of lumbar spine, unchanged. Minor grade 1 anterolisthesis L4-L5, unchanged.     VERTEBRAE:     L4-L5 posterior spinal fixation hardware. Hardware is intact.  No surrounding hardware lucency to suggest infection or loosening.     No acute fracture. Chronic L1 superior and inferior endplate compression fractures with mild height loss. Diffuse osteopenia.     DEGENERATIVE CHANGES: Mild-to-moderate multilevel degenerative changes consisting of scattered endplate osteophytes, vacuum phenomenon, subchondral  sclerosis, disc height loss, and lumbar facet arthropathy status post decompressive laminectomy changes   from L3-L4. No critical central canal stenosis. Similar multilevel canal stenosis and foraminal narrowing which was better evaluated on MRI lumbar spine 1/4/2025.     PREVERTEBRAL AND PARASPINAL SOFT TISSUES: Fatty atrophy of paraspinal lumbar musculature (worse inferiorly), likely due to denervation changes.     OTHER: Please see same day CT chest abdomen pelvis with contrast for further evaluation.     IMPRESSION:     No acute osseous abnormality of postsurgical lumbar spine with diffuse osteopenia.     L4-L5 posterior spinal fixation hardware. No acute hardware complication.     Chronic L1 compression fracture with mild height loss, unchanged.     Degenerative changes, as detailed above.     Additional chronic/incidental findings as detailed above.     Please see same-day CT chest abdomen pelvis with contrast for further evaluation.           Workstation performed: [id number]        Imaging    CT recon only lumbar spine (Order: [id number]) - 4/7/2025    Result History    CT recon only lumbar spine (Order #[id number]) on 4/7/2025 - Order Result History Report    Order Report     Order Details  Order Questions    Question Answer   Exam reason evaluate for back pain   Note: Enter reason for exam   What is the patient's sedation requirement? No Sedation   Note: If medication for claustrophobia is needed, please order medication at this point.   Release to patient through [facility]            Result Information    Status Priority Source   Final result (4/7/2025 12:20 PM) Routine      Other Results from 4/5/2025     Fingerstick Glucose (POCT) Final result 4/10/2025    Fingerstick Glucose (POCT) Final result 4/10/2025    Fingerstick Glucose (POCT) Final result 4/10/2025    Fingerstick Glucose (POCT) Final result 4/9/2025    Fingerstick Glucose (POCT) Final result 4/9/2025    Fingerstick Glucose (POCT) Final  result 4/9/2025    Fingerstick Glucose (POCT) Final result 4/9/2025    Basic metabolic panel Final result 4/9/2025    CBC and differential Final result 4/9/2025    Magnesium Final result 4/9/2025    Fingerstick Glucose (POCT) Final result 4/9/2025    Fingerstick Glucose (POCT) Final result 4/8/2025    Fingerstick Glucose (POCT) Final result 4/8/2025    Fingerstick Glucose (POCT) Final result 4/8/2025    Fingerstick Glucose (POCT) Final result 4/8/2025    CBC and differential Final result 4/8/2025    Comprehensive metabolic panel Final result 4/8/2025    Manual Differential(PHLEBS Do Not Order) Final result 4/8/2025    RBC Morphology Reflex Test Final result 4/8/2025    Fingerstick Glucose (POCT) Final result 4/8/2025    important suggestion  Warning: Additional results from 4/5/2025 are available but are not displayed in this report.       Reason for Exam    evaluate for back pain           CT recon only lumbar spine: Patient Communication     Add Comments   Not seen       Signed by    Signed Date/Time  Phone Pager   PARISH MANDUJANO 4/07/2025 12:20 [phone]        Exam Information    Status Exam Begun  Exam Ended  Performing Tech   Final [99] 4/07/2025 11:53 4/07/2025 12:02 Sarah Kelsey V       Questionnaire          Question Answer Comment   1. Reason for Exam: evaluate for back pain    2. What is the patient's sedation requirement? No Sedation    3. Release to patient through [facility]          End Exam      [facility] IMAG[facility] END VERIFY IMAGES IN PACS    Question Answer Comment   1. Have you verified the patient's images in PACS? N/A    2. Why have the images not been verified in PACS?           IMAGING END EXAM CT    Question Answer Comment   1. Smoking status: Unknown    2. GFR/date drawn (if not already in Epic):     3. Is the patient on Metformin? No    4. Enteric? Not given    5. Script Info/History/Comments: back pain    6. Other comments for Radiologist:     7. Relevant outside  priors (not already in PACS)? No    8. If yes, are they being acquired?     9. Was there an IV Contrast Extravasation? No    10. What was the Type and Amount of Contrast Inflitrated?     11. What was the location of the IV Site?     12. What treatment was provided to the patient?     13. If a surgeon was consulted, who was notified?     14. Name of Physician/Nurse who evaluated the patient:     15. Did the patient have a contrast reaction? No    16. Was the patient pre-medicated?     17. What type of reaction did the patient experience?     18. Was jewelry removed from the patient? No    19. Jewelry removed:           PATIENT EDUCATION    Question Answer Comment   1. Was the patient educated? Yes    2. Why was the patient not educated?            Screening Form Questions    No questions have been answered for this form.         External Results Report    Open External Results Report      Encounter    View Encounter                     Sedation Documentation Timeline (4/7/2025 00:00 to 4/7/2025 12:03)    An end event has not been filed for the most recent intervention. Due to this intervention’s length, all data may not appear below. To see all data, file an end event.  4/5/2025 4/5/2025 Event By   10:42 In Facility --   Status: Arrived             4/7/2025 4/7/2025 Event By   00:00 Wound 12/18/24 Back Bilateral Removed MP   Resolved Date: 04/07/25  Date First Assessed/Time First Assessed: 12/18/24 0921   Location: Back  Wound Location Orientation: Bilateral  Incision's 1st Dressing: BANDAID 1 X 3 IN LF  Wound Outcome: Healed         00:00 Wound 02/05/25 Back N/A Removed MP   Resolved Date: 04/07/25  Date First Assessed/Time First Assessed: 02/05/25 0801   Location: Back  Wound Location Orientation: N/A  Incision's 1st Dressing: BANDAID 1 X 3 IN LF  Wound Outcome: Healed         02:09 Specimens Collected LN   Fingerstick Glucose (POCT)  - ID: [id number] Type: Blood         02:11 Medication Given LN    methylPREDNISolone sodium succinate (Solu-MEDROL) injection 40 mg - Dose: 40 mg ; Route: Intravenous ; Line: Peripheral IV 01/15/25 Dorsal (posterior);Right Forearm ; Scheduled Time: 0200         02:11 Medication Given LN   insulin lispro (HumALOG/ADMELOG) 100 units/mL subcutaneous injection 1-6 Units - Dose: 4 Units ; Route: Subcutaneous ; Site: Abdominal Tissue ; Scheduled Time: 0200         02:15 Medication Not Given WL   ipratropium-albuterol (DUO-NEB) 0.5-2.5 mg/3 mL inhalation solution 3 mL - Dose: 3 mL ; Route: Nebulization ; Reason: Patient/family refused ; Scheduled Time: 0200         02:15:29 Orders Placed SI   Point of Care Testing-Docked Device  - Fingerstick Glucose (POCT)         02:15:29 Fingerstick Glucose (POCT) Resulted BL   Abnormal Result  Collected: 4/7/2025 02:09  Last updated: 4/7/2025 02:15  Status: Final result  POC Glucose: 284 mg/dl High  [Ref Range: 65 - 140]         06:00 Intake/Output LN   Other flowsheet entries   Urine: 400 mL   Urine Color: Yellow/straw   Urine Appearance: Clear   Urine Odor: No odor   Urinary Incontinence: No   06:30 Medication Given LN   levothyroxine tablet 100 mcg - Dose: 100 mcg ; Route: Oral ; Scheduled Time: 0600         06:59 Collect CBC and differential Completed LN   CBC and differential  - Type: Blood ; Source: Arm, Right         06:59 Collect Comprehensive metabolic panel Completed LN   Comprehensive metabolic panel  - Type: Blood ; Source: Arm, Right         06:59:02 Specimens Collected LN   CBC and differential  - ID: [id number] Type: Blood  Comprehensive metabolic panel  - ID: [id number] Type: Blood         07:10 Specimens Collected RAY   Fingerstick Glucose (POCT)  - ID: [id number] Type: Blood         07:10:50 Orders Placed SI   Point of Care Testing-Docked Device  - Fingerstick Glucose (POCT)         07:10:53 Fingerstick Glucose (POCT) Resulted BL   Abnormal Result  Collected: 4/7/2025 07:10  Last updated: 4/7/2025 07:10  Status: Final  result  POC Glucose: 171 mg/dl High  [Ref Range: 65 - 140]         07:21 Medication Given SP   ipratropium-albuterol (DUO-NEB) 0.5-2.5 mg/3 mL inhalation solution 3 mL - Dose: 3 mL ; Route: Nebulization ; Scheduled Time: 0800         07:21:53 I/O Completed KD   I/O         07:21:54 Out of bed to chair Completed KD   Out of bed to chair         07:21:55 Tobacco cessation education Completed KD   Tobacco cessation education         07:21:55 Vital Signs per unit routine Completed KD   Vital Signs per unit routine         07:21:56 Intake and Output Completed KD   Intake and Output         07:21:57 Measure post void residual Completed KD   Measure post void residual         07:22:13 Comprehensive metabolic panel Resulted BL   Abnormal Result  Collected: 4/7/2025 06:59  Last updated: 4/7/2025 07:22  Status: Final result  Sodium: 139 mmol/L [Ref Range: 135 - 147]  Potassium: 4.7 mmol/L [Ref Range: 3.5 - 5.3]  Chloride: 103 mmol/L [Ref Range: 96 - 108]  CO2: 25 mmol/L [Ref Range: 21 - 32]  ANION GAP: 11 mmol/L [Ref Range: 4 - 13]  BUN: 23 mg/dL [Ref Range: 5 - 25]  Creatinine: 0.80 mg/dL [Ref Range: 0.60 - 1.30] (Standardized to IDMS reference method)  Glucose: 168 mg/dL High  [Ref Range: 65 - 140] (If the patient is fasting, the ADA then defines impaired fasting glucose as > 100 mg/dL and diabetes as > or equal to 123 mg/dL.)  Calcium: 9.8 mg/dL [Ref Range: 8.4 - 10.2]  AST: 13 U/L [Ref Range: 13 - 39]  ALT: 18 U/L [Ref Range: 7 - 52] (Specimen collection should occur prior to Sulfasalazine administration due to the potential for falsely depressed results. )  Alkaline Phosphatase: 33 U/L Low  [Ref Range: 34 - 104]  Total Protein: 6.6 g/dL [Ref Range: 6.4 - 8.4]  Albumin: 3.5 g/dL [Ref Range: 3.5 - 5.0]  Total Bilirubin: 0.27 mg/dL [Ref Range: 0.20 - 1.00] (Use of this assay is not recommended for patients undergoing treatment with eltrombopag due to the potential for falsely elevated results.  N-acetyl-p-benzoquinone imine  (metabolite of Acetaminophen) will generate erroneously low results in samples for patients that have taken an overdose of Acetaminophen.)  eGFR: 73 ml/min/1.73sq m         07:22:27 Perform Dysphagia Screening Completed    CT head wo contrast         07:22:29 ED Respiratory Tasks Completed    Respiratory Protocol         07:22:38 Collect Blood gas, venous Completed    Blood gas, venous         07:23 Oxygen SP   Other flowsheet entries   SpO2: 93 %   Calculated FIO2 (%) - Nasal Cannula: 32   Nasal Cannula O2 Flow Rate (L/min): 3 L/min   O2 Device: Nasal cannula   07:24:51 CBC and differential Resulted BL   Abnormal Result  Collected: 4/7/2025 06:59  Last updated: 4/7/2025 07:24  Status: Final result  WBC: 8.14 Thousand/uL [Ref Range: 4.31 - 10.16]  RBC: 4.24 Million/uL [Ref Range: 3.81 - 5.12]  Hemoglobin: 12.5 g/dL [Ref Range: 11.5 - 15.4]  Hematocrit: 40.2 % [Ref Range: 34.8 - 46.1]  MCV: 95 fL [Ref Range: 82 - 98]  MCH: 29.5 pg [Ref Range: 26.8 - 34.3]  MCHC: 31.1 g/dL Low  [Ref Range: 31.4 - 37.4]  RDW: 13.7 % [Ref Range: 11.6 - 15.1]  MPV: 9.4 fL [Ref Range: 8.9 - 12.7]  Platelets: 250 Thousands/uL [Ref Range: 149 - 390]  nRBC: 0 /100 WBCs  Segmented %: 84 % High  [Ref Range: 43 - 75]  Immature Grans %: 2 % [Ref Range: 0 - 2]  Lymphocytes %: 6 % Low  [Ref Range: 14 - 44]  Monocytes %: 7 % [Ref Range: 4 - 12]  Eosinophils Relative: 0 % [Ref Range: 0 - 6]  Basophils Relative: 1 % [Ref Range: 0 - 1]  Absolute Neutrophils: 6.82 Thousands/µL [Ref Range: 1.85 - 7.62]  Absolute Immature Grans: 0.19 Thousand/uL [Ref Range: 0.00 - 0.20]  Absolute Lymphocytes: 0.49 Thousands/µL Low  [Ref Range: 0.60 - 4.47]  Absolute Monocytes: 0.60 Thousand/µL [Ref Range: 0.17 - 1.22]  Eosinophils Absolute: 0.00 Thousand/µL [Ref Range: 0.00 - 0.61]  Basophils Absolute: 0.04 Thousands/µL [Ref Range: 0.00 - 0.10]         07:38:03 Orders Modified AA   Order Modified  - methylPREDNISolone sodium succinate (Solu-MEDROL) injection 40 mg  (Comment: Modified from methylPREDNISolone sodium succinate (Solu-MEDROL) injection 40 mg)         07:38:07 Orders Placed AA   Medications  - docusate sodium (COLACE) capsule 100 mg         07:38:14 Orders Placed AA   Medications  - polyethylene glycol (MIRALAX) packet 17 g         07:40:37 Orders Completed RAY   Fingerstick Glucose (POCT)         07:40:37 Fingerstick Glucose (POCT) Completed RAY   Fingerstick Glucose (POCT)         07:42:22 Orders Acknowledged KD   New  - docusate sodium (COLACE) capsule 100 mg; polyethylene glycol (MIRALAX) packet 17 g  Modified  - methylPREDNISolone sodium succinate (Solu-MEDROL) injection 40 mg (Comment: Modified from methylPREDNISolone sodium succinate (Solu-MEDROL) injection 40 mg)         07:57:24 Vital Signs DI    Other flowsheet entries   Blood Pressure: 152/89 (Device Time: 07:57:24)   Temperature: 97.5 °F (36.4 °C) (Device Time: 07:57:24)   Pulse: 90 (Device Time: 07:57:24)   SpO2: 88 % Abnormal  (Device Time: 07:57:24)   08:01 Medication Given KD   amLODIPine (NORVASC) tablet 5 mg - Dose: 5 mg ; Route: Oral ; Scheduled Time: 0900         08:01 Medication Given KD   aspirin chewable tablet 81 mg - Dose: 81 mg ; Route: Oral ; Scheduled Time: 0900         08:01 Medication Given KD   atorvastatin (LIPITOR) tablet 40 mg - Dose: 40 mg ; Route: Oral ; Scheduled Time: 0900         08:01 Medication Given KD   buprenorphine-naloxone (Suboxone) film 4 mg - Dose: 4 mg ; Route: Sublingual ; Scheduled Time: 0900         08:01 Medication Given KD   losartan (COZAAR) tablet 25 mg - Dose: 25 mg ; Route: Oral ; Scheduled Time: 0900         08:01 Medication Given KD   metoprolol succinate (TOPROL-XL) 24 hr tablet 50 mg - Dose: 50 mg ; Route: Oral ; Scheduled Time: 0900         08:01 Medication Not Given KD   nicotine (NICODERM CQ) 7 mg/24hr TD 24 hr patch 1 patch - Dose: 1 patch ; Route: Transdermal ; Reason: Patient/family refused ; Scheduled Time: 0900         08:01 Medication Given KD    enoxaparin (LOVENOX) subcutaneous injection 40 mg - Dose: 40 mg ; Route: Subcutaneous ; Site: Abdominal Tissue ; Scheduled Time: 0900         08:01 Medication Medication Applied KD   lidocaine (LIDODERM) 5 % patch 2 patch - Dose: 2 patch ; Route: Topical ; Site: Back ; Scheduled Time: 0900         08:01 Medication Given KD   venlafaxine (EFFEXOR-XR) 24 hr capsule 75 mg - Dose: 75 mg ; Route: Oral ; Scheduled Time: 0900         08:01 Medication Given KD   guaiFENesin (MUCINEX) 12 hr tablet 600 mg - Dose: 600 mg ; Route: Oral ; Scheduled Time: 0900         08:01 Medication Given KD   docusate sodium (COLACE) capsule 100 mg - Dose: 100 mg ; Route: Oral ; Scheduled Time: 0900         08:02 Medication Given KD   DULoxetine (CYMBALTA) delayed release capsule 60 mg - Dose: 60 mg ; Route: Oral ; Scheduled Time: 0900         08:02 Medication Given KD   pantoprazole (PROTONIX) EC tablet 40 mg - Dose: 40 mg ; Route: Oral ; Scheduled Time: 0900         08:02 Medication Given KD   sucralfate (CARAFATE) tablet 1 g - Dose: 1 g ; Route: Oral ; Scheduled Time: 0700         08:04 Medication Given KD   fluticasone-vilanterol 200-25 mcg/actuation 1 puff - Dose: 1 puff ; Route: Inhalation ; Scheduled Time: 0800         08:04 Medication Given KD   methylPREDNISolone sodium succinate (Solu-MEDROL) injection 40 mg - Dose: 40 mg ; Route: Intravenous ; Line: Peripheral IV 01/15/25 Dorsal (posterior);Right Forearm ; Scheduled Time: 0900         08:05 Medication Given KD   insulin lispro (HumALOG/ADMELOG) 100 units/mL subcutaneous injection 2-12 Units - Dose: 2 Units ; Route: Subcutaneous ; Site: Right Arm ; Scheduled Time: 0700         08:23 Progress Notes Addendum AA   Progress Note - Hospitalist   Name: Montserrat Sumner 74 y.o. female I MRN: [id number]  Unit/Bed#: [address] [place], [place] [id number] I Date of Admission: 4/5/2025   Date of Service: 4/7/2025 I Hospital Day: 2     Assessment & Plan  COPD exacerbation (HCC)  Patient presents with increased  shortness of breath, productive cough, and increasing O2 requirements with Bipap in ED.   Usually wears 2L just at bedtime having to use now during the day.Has not improved despite inhaler use.     In the ED patient was given DuoNeb and steroids with improvement in symptoms   flu, COVID, RSV negative  CT C/AP: Right greater than left lower lobe reticular and groundglass densities concerning for infiltrates.   Patient follows with pulmonary outpatient setting her home regimen is 200 mcg Trelegy and as needed albuterol nebs 4 times daily  Repeat VBG 4/6/25: no acid base disturbance noted.     Plan  Encouraged smoking cessation  Bipap PRN   Pulm consult; appreciate input  Hold mucomyst, can restart at discharge  Xopenex/Atrovent/ saline nebs 3 times daily  Mucinex BID  IV Solu-Medrol 40 mg twice daily  Azithromycin x 5 days  D3#  O2 titrated to room air today (4/7/25), continue to monitor o2 sat. Continue 2L at bedtime  Home o2 eval on discharge     Centrilobular emphysema (HCC)  Moderate COPD per PFT from 2023   Ct chest: Centrilobular emphysema   Follows up with pulmonary medicine Dr. Smith   PTA inhalers: Trellegy, Albuterol   Acute respiratory failure (HCC)  Requires 2 LPM Qhs and PRN at baseline. Currently requiring Bipap with respiratory acidosis . Secondary to COPD .  O2 titrated to room air today (4/7/25), continue to monitor o2 sat. Continue 2L at bedtime  Pulmonary consulted  Refer to A and P for AECOPD   Oxygen dependent at night only  2 LPM via NC at bedtime only   Follows up with Dr. Smith   Hypertension  Continue PTA amlodipine, metoprolol, and losartan  Hypothyroid  Continue PTA levothyroxine  CAD (coronary artery disease)  Known history of CAD s/p CABG x 1  Continue ASA and Lipitor daily  Anxiety  Takes duloxetine   Xanax 0.5 mg BID   Will continue regimen above  PDMP reviewed   Monitor Qtc . Currently wnl   Opioid dependence (HCC)  On Suboxone 4 mg sublingual daily  PDMP reviewed last  prescription filled 3/13/25  Continue while inpatient  Chronic bilateral low back pain without sciatica  PTOT eval and treat   Fell at home on 4/3/25  CT spine / CAP: No acute cervical spine fracture or traumatic malalignment. Diffuse osteopenia.  acetaminophen PRN, lidocaine patch  PTOT   Check lumbar spine reconstructed CT scan  Tobacco use  Reports smoking 3 cigarettes daily for many years  Patient encouraged to quit smoking given her underlying COPD  NRT offered  IPMN (intraductal papillary mucinous neoplasm)  Follows up with surgical/medical oncology   Planned for stent removal however was postponed per GI to 4/18/25      Fall  Tripped and fell on the LT 4/3/25 , has LT sided flank pain and rib pain with breathing. No palpiation  , tingling , numbness , dizziness or lightheadedness.   Trauma scans including CT Cervical spine, CAP , Head ; No trauma findings  L hip x ray: No acute osseous abnormality.     acetaminophen prn pain, lidocaine patch  PTOT  Fall precautions   Check reconstructed lumbar spine CT     Type 2 diabetes mellitus, without long-term current use of insulin (HCC)        Lab Results   Component Value Date     HGBA1C 9.0 (H) 04/05/2025                Recent Labs     04/06/25  1611 04/06/25 2009 04/07/25  0209 04/07/25  0710   POCGLU 148* 244* 284* 171*         Blood Sugar Average: Last 72 hrs:  (P) 249.75  SSI plus accu checks   Started on Lantus 5 units nightly     Drug use  Reported using cocaine 3 days prior to admission. X 1   -counseling and education offered.  -resources per CM   -monitor opioid medications while hospitalized   Constipation  Colace BID   Miralax prn  Monitor I/Os     VTE Pharmacologic Prophylaxis:   Moderate Risk (Score 3-4) - Pharmacological DVT Prophylaxis Ordered: enoxaparin (Lovenox).     Mobility:   Basic Mobility Inpatient Raw Score: 13  JH-HLM Goal: 4: Move to chair/commode  JH-HLM Achieved: 1: Laying in bed  JH-HLM Goal achieved. Continue to encourage appropriate  mobility.     Patient Centered Rounds: I performed bedside rounds with nursing staff today.   Discussions with Specialists or Other Care Team Provider: boyd, rn     Education and Discussions with Family / Patient: Patient declined call to . Family coming this afternoon     Current Length of Stay: 2 day(s)  Current Patient Status: Inpatient   Certification Statement: The patient will continue to require additional inpatient hospital stay due to hypoxia, copd exacerbation  Discharge Plan: Anticipate discharge in 24-48 hrs to home.     Code Status: Level 1 - Full Code     Subjective  Pt sitting in bed comfortably. States that she still is having pain in her L hip, leg, and back. States that it is a 7/10 sitting.   No bowel or bladder incontinence .   notes that her SOB has improved since titrating o2 down to room air. Denies chest pain, n/v, fever, chills, numbness/tingling, weakness.      Objective:  Temp:  [97 °F (36.1 °C)-97.5 °F (36.4 °C)] 97.5 °F (36.4 °C)  HR:  [73-90] 90  BP: (118-152)/(70-89) 152/89  Resp:  [18-22] 18  SpO2:  [88 %-95 %] 88 %  O2 Device: Nasal cannula  Nasal Cannula O2 Flow Rate (L/min):  [3 L/min] 3 L/min     Body mass index is 29.58 kg/m².      Input and Output Summary (last 24 hours):      Intake/Output Summary (Last 24 hours) at 4/7/2025 0913  Last data filed at 4/7/2025 0600      Gross per 24 hour   Intake 240 ml   Output 1000 ml   Net -760 ml         Physical Exam  Vitals and nursing note reviewed.   Constitutional:       General: She is not in acute distress.     Appearance: Normal appearance.   HENT:      Head: Normocephalic and atraumatic.   Eyes:      Pupils: Pupils are equal, round, and reactive to light.   Cardiovascular:      Rate and Rhythm: Normal rate and regular rhythm.      Pulses: Normal pulses.           Carotid pulses are 2+ on the right side and 2+ on the left side.       Radial pulses are 2+ on the right side and 2+ on the left side.      Heart sounds: Normal  heart sounds, S1 normal and S2 normal. No murmur heard.  Pulmonary:      Effort: No tachypnea, bradypnea, accessory muscle usage or respiratory distress.      Breath sounds: Normal air entry. No transmitted upper airway sounds. Wheezing and rhonchi present. No decreased breath sounds or rales.      Comments: Expiratory wheezing and ronchi, b/l bases  Abdominal:      General: Abdomen is flat. Bowel sounds are normal. There is no distension.      Palpations: Abdomen is soft.      Tenderness: There is no abdominal tenderness.   Musculoskeletal:         General: No swelling. Tenderness: Lumbar spine TTP.     Cervical back: Normal.      Lumbar back: Normal.      Right lower leg: No edema.      Left lower leg: No edema.        Legs:       Comments: Intact ROM of Lower ext.  With limitation at the left lower extremity hip level given pain with movement flexion/extension.     Tenderness to palpation on LT hip area , lateral joint line.    Neurological:      Mental Status: She is alert and oriented to person, place, and time. Mental status is at baseline.   Psychiatric:         Mood and Affect: Mood normal.         Behavior: Behavior normal.            Lines/Drains:                    Lab Results: I have reviewed the following results:        Results from last 7 days   Lab Units 04/07/25  0659   WBC Thousand/uL 8.14   HEMOGLOBIN g/dL 12.5   HEMATOCRIT % 40.2   PLATELETS Thousands/uL 250   SEGS PCT % 84*   LYMPHO PCT % 6*   MONO PCT % 7   EOS PCT % 0           Results from last 7 days   Lab Units 04/07/25  0659   SODIUM mmol/L 139   POTASSIUM mmol/L 4.7   CHLORIDE mmol/L 103   CO2 mmol/L 25   BUN mg/dL 23   CREATININE mg/dL 0.80   ANION GAP mmol/L 11   CALCIUM mg/dL 9.8   ALBUMIN g/dL 3.5   TOTAL BILIRUBIN mg/dL 0.27   ALK PHOS U/L 33*   ALT U/L 18   AST U/L 13   GLUCOSE RANDOM mg/dL 168*           Results from last 7 days   Lab Units 04/05/25  1118   INR   1.07                  Results from last 7 days   Lab Units  04/07/25  0710 04/07/25  0209 04/06/25 2009 04/06/25  1611 04/06/25  1053 04/06/25  0734 04/05/25 2001 04/05/25  1619   POC GLUCOSE mg/dl 171* 284* 244* 148* 457* 221* 254* 219*           Results from last 7 days   Lab Units 04/05/25  1118   HEMOGLOBIN A1C % 9.0*           Results from last 7 days   Lab Units 04/05/25  1118   PROCALCITONIN ng/ml 0.21         Recent Cultures (last 7 days):          Imaging Results Review: No pertinent imaging studies reviewed.  Other Study Results Review: No additional pertinent studies reviewed.     Last 24 Hours Medication List:      Current Facility-Administered Medications:     acetaminophen (TYLENOL) tablet 650 mg, Q6H PRN    albuterol (PROVENTIL HFA,VENTOLIN HFA) inhaler 2 puff, Q4H PRN    albuterol inhalation solution 2.5 mg, 4x Daily PRN    ALPRAZolam (XANAX) tablet 1 mg, HS    amLODIPine (NORVASC) tablet 5 mg, Daily    aspirin chewable tablet 81 mg, Daily    atorvastatin (LIPITOR) tablet 40 mg, QAM    azithromycin (ZITHROMAX) 500 mg in sodium chloride 0.9% 250mL IVPB 500 mg, Q24H    buprenorphine-naloxone (Suboxone) film 4 mg, Daily    cyclobenzaprine (FLEXERIL) tablet 10 mg, TID PRN    docusate sodium (COLACE) capsule 100 mg, BID    DULoxetine (CYMBALTA) delayed release capsule 60 mg, Daily    enoxaparin (LOVENOX) subcutaneous injection 40 mg, Daily    fluticasone-vilanterol 200-25 mcg/actuation 1 puff, Daily **AND** [DISCONTINUED] umeclidinium 62.5 mcg/actuation inhaler AEPB 1 puff, Daily    guaiFENesin (MUCINEX) 12 hr tablet 600 mg, Q12H TESSA    insulin glargine (LANTUS) subcutaneous injection 5 Units 0.05 mL, HS    insulin lispro (HumALOG/ADMELOG) 100 units/mL subcutaneous injection 1-6 Units, 0200    insulin lispro (HumALOG/ADMELOG) 100 units/mL subcutaneous injection 1-6 Units, HS    insulin lispro (HumALOG/ADMELOG) 100 units/mL subcutaneous injection 2-12 Units, TID AC **AND** Fingerstick Glucose (POCT), TID AC    ipratropium-albuterol (DUO-NEB) 0.5-2.5 mg/3 mL  inhalation solution 3 mL, Q6H    levothyroxine tablet 100 mcg, Early Morning    lidocaine (LIDODERM) 5 % patch 2 patch, Daily    losartan (COZAAR) tablet 25 mg, QAM    methylPREDNISolone sodium succinate (Solu-MEDROL) injection 40 mg, Q12H TESSA    metoprolol succinate (TOPROL-XL) 24 hr tablet 50 mg, QAM    nicotine (NICODERM CQ) 7 mg/24hr TD 24 hr patch 1 patch, Daily    pantoprazole (PROTONIX) EC tablet 40 mg, Daily    polyethylene glycol (MIRALAX) packet 17 g, Daily PRN    sucralfate (CARAFATE) tablet 1 g, BID AC    traZODone (DESYREL) tablet 100 mg, HS    trimethobenzamide (TIGAN) IM injection 200 mg, Q6H PRN    venlafaxine (EFFEXOR-XR) 24 hr capsule 150 mg, HS    venlafaxine (EFFEXOR-XR) 24 hr capsule 75 mg, QAM    vit B complex-vit C-folic acid (Renal Caps) capsule 1 capsule, Weekly     Administrative Statements  Today, Patient Was Seen By: Zoie Li        **Please Note: This note may have been constructed using a voice recognition system.**         09:00 Pain Assessment KD   Pain Assessment   Pain Assessment Tool: 0-10   Pain Score: 0         Pain Assessment Timer   Restart Pain Assessment Timer: Yes   09:00 Assessment KD   Other flowsheet entries   Level of Consciousness: Alert/awake   Orientation Level: Oriented X4   Cognition: Appropriate for developmental age   Speech: Clear   Bilateral Breath Sounds: Rhonchi; Coarse   Peripheral Vascular (WDL): Within Defined Limits   Sensory Perceptions: No impairment   Moisture: Occasionally moist   Activity: Walks occasionally   Mobility: Very limited   Nutrition: Adequate   Friction and Shear: Potential problem   Yunier Scale Score: 17   Tongue Deviation: Midline   R Hand : Moderate   L Hand : Moderate   Neuro Symptoms: Anxiety   Relieved by: Rest   RUE Muscle Strength: 4- Movement against gravity and limited resistance   LUE Muscle Strength: 4- Movement against gravity and limited resistance   RLE Muscle Strength: 4- Movement against gravity and limited  resistance   LLE Muscle Strength: 4- Movement against gravity and limited resistance   Pupil Assessment: No   Muscle Function/Sensation Assessment: ; Muscle strength   Facial Symmetry: Symmetrical   Extraocular Movements: Full   Right Upper Visual Fields: Intact   Left Upper Visual Fields: Intact   Right Lower Visual Fields: Intact   Left Lower Visual Fields: Intact   Ataxia Symptoms: No   09:19:53 Order Performed    XR hip/pelv 2-3 vws left if performed  - ID: [id number]         09:21:41 XR hip/pelv 2-3 vws left if performed Resulted RI   Collected: 4/7/2025 09:19  Last updated: 4/7/2025 09:22  Status: Final result         09:42 Wound 04/07/25 Traumatic Arm Left;Lower;Lateral Placed MP   Date First Assessed/Time First Assessed: 04/07/25 0942   Present on Original Admission: Yes  Primary Wound Type: Traumatic  Location: Arm  Wound Location Orientation: Left;Lower;Lateral         09:42 Wound 04/07/25 Traumatic Arm Left;Lower;Lateral Assessment MP   Wound Image: Images linked   Wound Description: Granulation tissue; Beefy red; Slough; Yellow   Wound Site Closure: Adhesive closure strips   Drainage Amount: Small   Drainage Description: Serous; Yellow   Dressing: Other (Comment); Abdominal dressing; Dry dressing (steristrips)   Dressing Changed: New   Zahra-wound Assessment: Intact   Non-staged Wound Description: Full thickness   Wound Length (cm): 2.9 cm   Wound Width (cm): 2 cm   Wound Depth (cm): 0.1 cm   Wound Surface Area (cm^2): 5.8 cm^2   Wound Volume (cm^3): 0.58 cm^3   Treatments: Cleansed   Calculated Wound Volume (cm^3): 0.58 cm^3   Dressing Status: Clean; Dry; Intact   09:49 Wound 04/07/25 Traumatic Leg Left;Lateral Placed MP   Date First Assessed/Time First Assessed: 04/07/25 0949   Primary Wound Type: Traumatic  Location: Leg  Wound Location Orientation: Left;Lateral         09:49 Wound 04/07/25 Traumatic Leg Left;Lateral Assessment MP   Wound Image: Images linked   Wound Description: Dry; Black; Eschar;  Granulation tissue; Pink; Slough; Yellow   Drainage Amount: Moderate   Drainage Description: Serosanguineous   Dressing: Other (Comment); Abdominal dressing; Dry dressing (Normlgel;Adaptic)   Dressing Changed: New   Non-staged Wound Description: Full thickness   Wound Length (cm): 1.2 cm   Wound Width (cm): 5 cm   Wound Depth (cm): 0.1 cm   Wound Surface Area (cm^2): 6 cm^2   Wound Volume (cm^3): 0.6 cm^3   Treatments: Cleansed   Calculated Wound Volume (cm^3): 0.6 cm^3   Dressing Status: Clean; Dry; Intact   09:52:21 ECG 12 lead Resulted RI   Collected: 4/5/2025 11:00  Last updated: 4/7/2025 09:52  Status: Final result  Ventricular Rate: 82 BPM  Atrial Rate: 82 BPM  GA Interval: 134 ms  QRSD Interval: 92 ms  QT Interval: 384 ms  QTC Interval: 448 ms  P Axis: 53 degrees  QRS Axis: 2 degrees  T Wave Axis: 71 degrees         10:00 Peripheral IV 04/05/25 Distal;Dorsal (posterior);Right Forearm Assessment KD   Site Assessment: WDL   Dressing Status: Clean; Dry   Line Status: Flushed   Dressing Change Due: 04/09/25   Reason Not Rotated: Not due   Dressing Type: Transparent   10:00 Daily Cares/Safety KD   Other flowsheet entries   General Pressure Injury Prevention Interventions: Encourage Mobilization; Weight Shifts   Moderate Risk Prevention Interventions: Incontinence care   10:01 Intake/Output KD   Other flowsheet entries   P.O.: 300 mL   10:19:59 Orders Placed AA   PT  - PT eval and treat  OT  - OT eval and treat         10:30 PCT Vitals, Intake and Output RAY   Other flowsheet entries   Urine: 400 mL   10:40 Specimens Collected RAY   Fingerstick Glucose (POCT)  - ID: [id number] Type: Blood         10:41:21 Orders Placed SI   Point of Care Testing-Docked Device  - Fingerstick Glucose (POCT)         10:41:23 Fingerstick Glucose (POCT) Resulted BL   Abnormal Result  Collected: 4/7/2025 10:40  Last updated: 4/7/2025 10:41  Status: Final result  POC Glucose: 250 mg/dl High  [Ref Range: 65 - 140]         10:42:18 Orders  Acknowledged KD   New  - PT eval and treat; OT eval and treat         10:46:24 Orders Completed RAY   Fingerstick Glucose (POCT)         10:46:24 Fingerstick Glucose (POCT) Completed RAY   Fingerstick Glucose (POCT)         11:20:08 Progress Notes HU   Progress Note - Pulmonology   Name: Montserrat Sumner 74 y.o. female I MRN: [id number]  Unit/Bed#: [address] [place] [address] Date of Admission: 4/5/2025   Date of Service: 4/7/2025 I Hospital Day: 2      Assessment & Plan  Acute on chronic hypoxic respiratory failure (HCC)  Acute on chronic hypoxemic respiratory failure in the setting of moderate COPD with currently acute exacerbation and recent acute tracheobronchitis  Viral panel has been negative  Recently completed a course of antibiotic with Ceftin 3/7/2025  Can complete the course of azithromycin for 5 days     COPD exacerbation (HCC)  Moderate COPD with FEV1 of 51% currently in exacerbation  Will switch DuoNeb to every 4 hours  Continue with Trelegy 1 puff daily  Can reduce solumedrol to q 12 hrs for now   Will hold off on the Mucomyst for now given acute exacerbation  She can continue the Mucomyst once she goes home she had states she has been on it for the past month  Continue with airway clearance measures with incentive spirometry and flutter valve  Centrilobular emphysema (HCC)  Centrilobular emphysema on the CT of the chest  Oxygen dependent at night only  Has been on 2 L of oxygen at nighttime only currently requiring around 3 L of oxygen continues  Titrate down as able to maintain oxygen saturation greater than 89%  Tobacco use  Smoking cessation discussed wants to cut down on  Candidate for lung cancer screening  Abnormal CT of the chest  Abnormal CT of the chest with bilateral lower lobe mild groundglass opacities viral panel has been negative  Unclear if this is aspiration with history of recent drug use as well and history of fall versus retrograde aspiration from the severe acid reflux and GERD symptoms will  ask speech and swallow to see     24 Hour Events:   Subjective: Pt is AAXO3. She still has cough and unable to bring phlegm.      Objective:  Temp:  [97 °F (36.1 °C)-97.5 °F (36.4 °C)] 97.5 °F (36.4 °C)  HR:  [73-90] 90  BP: (118-152)/(70-89) 152/89  Resp:  [18-22] 18  SpO2:  [88 %-95 %] 88 %  O2 Device: Nasal cannula  Nasal Cannula O2 Flow Rate (L/min):  [3 L/min] 3 L/min     Physical Exam  General: AAOX3  HEENT: atrumatic head  Lungs: decreased breath sound at bases, no rales or wheezing   CVS: S1S2+, no m/r/g  Abdomen: soft, Nd, NT  Ext; no edema  Neuro: AAXO3        Lab Results: I have reviewed the following results:       .     04/07/25  0659   WBC 8.14   HGB 12.5   HCT 40.2      SODIUM 139   K 4.7      CO2 25   BUN 23   CREATININE 0.80   GLUC 168*   AST 13   ALT 18   ALB 3.5   TBILI 0.27   ALKPHOS 33*      ABG: No new results in last 24 hours.            11:28:25 Orders Placed AA   Imaging  - CT recon only lumbar spine         11:29:46 Orders Acknowledged KD   New  - CT recon only lumbar spine         11:35 Medication Given KD   insulin lispro (HumALOG/ADMELOG) 100 units/mL subcutaneous injection 2-12 Units - Dose: 6 Units ; Route: Subcutaneous ; Site: Right Arm ; Scheduled Time: 1130         11:38 Order Performed    Rollator  - ID: [id number]           Pre-op Summary    Pre-op             Recovery Summary    Recovery                 Routing History    None         No orders to display         No orders of the defined types were placed in this encounter.

## 2025-04-18 ENCOUNTER — ANESTHESIA EVENT (OUTPATIENT)
Dept: GASTROENTEROLOGY | Facility: HOSPITAL | Age: 75
End: 2025-04-18
Payer: COMMERCIAL

## 2025-04-18 ENCOUNTER — ANESTHESIA (OUTPATIENT)
Dept: GASTROENTEROLOGY | Facility: HOSPITAL | Age: 75
End: 2025-04-18
Payer: COMMERCIAL

## 2025-04-18 ENCOUNTER — HOSPITAL ENCOUNTER (OUTPATIENT)
Dept: RADIOLOGY | Facility: HOSPITAL | Age: 75
Discharge: HOME | End: 2025-04-18
Payer: COMMERCIAL

## 2025-04-18 ENCOUNTER — HOSPITAL ENCOUNTER (OUTPATIENT)
Dept: GASTROENTEROLOGY | Facility: HOSPITAL | Age: 75
Setting detail: OUTPATIENT SURGERY
Discharge: HOME | End: 2025-04-18
Attending: STUDENT IN AN ORGANIZED HEALTH CARE EDUCATION/TRAINING PROGRAM
Payer: COMMERCIAL

## 2025-04-18 VITALS
SYSTOLIC BLOOD PRESSURE: 186 MMHG | DIASTOLIC BLOOD PRESSURE: 96 MMHG | HEART RATE: 79 BPM | RESPIRATION RATE: 16 BRPM | OXYGEN SATURATION: 91 % | TEMPERATURE: 97 F

## 2025-04-18 DIAGNOSIS — R10.13 EPIGASTRIC PAIN: ICD-10-CM

## 2025-04-18 DIAGNOSIS — R93.5 ABNORMAL ABDOMINAL MRI: ICD-10-CM

## 2025-04-18 DIAGNOSIS — K86.89 DILATION OF PANCREATIC DUCT: ICD-10-CM

## 2025-04-18 DIAGNOSIS — D49.0 IPMN (INTRADUCTAL PAPILLARY MUCINOUS NEOPLASM): ICD-10-CM

## 2025-04-18 DIAGNOSIS — K83.9 BILE DUCT ABNORMALITY: ICD-10-CM

## 2025-04-18 LAB — GLUCOSE SERPL-MCNC: 92 MG/DL (ref 65–140)

## 2025-04-18 PROCEDURE — 74328 X-RAY BILE DUCT ENDOSCOPY: CPT

## 2025-04-18 PROCEDURE — C1769 GUIDE WIRE: HCPCS

## 2025-04-18 PROCEDURE — 93005 ELECTROCARDIOGRAM TRACING: CPT

## 2025-04-18 PROCEDURE — 88112 CYTOPATH CELL ENHANCE TECH: CPT | Performed by: PATHOLOGY

## 2025-04-18 PROCEDURE — C2617 STENT, NON-COR, TEM W/O DEL: HCPCS

## 2025-04-18 PROCEDURE — 82948 REAGENT STRIP/BLOOD GLUCOSE: CPT

## 2025-04-18 RX ORDER — FENTANYL CITRATE/PF 50 MCG/ML
25 SYRINGE (ML) INJECTION
Refills: 0 | Status: CANCELLED | OUTPATIENT
Start: 2025-04-18

## 2025-04-18 RX ORDER — ONDANSETRON 2 MG/ML
INJECTION INTRAMUSCULAR; INTRAVENOUS AS NEEDED
Status: DISCONTINUED | OUTPATIENT
Start: 2025-04-18 | End: 2025-04-18

## 2025-04-18 RX ORDER — ACETAMINOPHEN 10 MG/ML
1000 INJECTION, SOLUTION INTRAVENOUS ONCE
Status: COMPLETED | OUTPATIENT
Start: 2025-04-18 | End: 2025-04-18

## 2025-04-18 RX ORDER — ROCURONIUM BROMIDE 10 MG/ML
INJECTION, SOLUTION INTRAVENOUS AS NEEDED
Status: DISCONTINUED | OUTPATIENT
Start: 2025-04-18 | End: 2025-04-18

## 2025-04-18 RX ORDER — SODIUM CHLORIDE 9 MG/ML
INJECTION, SOLUTION INTRAVENOUS CONTINUOUS PRN
Status: DISCONTINUED | OUTPATIENT
Start: 2025-04-18 | End: 2025-04-18

## 2025-04-18 RX ORDER — FENTANYL CITRATE 50 UG/ML
25 INJECTION, SOLUTION INTRAMUSCULAR; INTRAVENOUS
Refills: 0 | Status: DISCONTINUED | OUTPATIENT
Start: 2025-04-18 | End: 2025-04-22 | Stop reason: HOSPADM

## 2025-04-18 RX ORDER — PROPOFOL 10 MG/ML
INJECTION, EMULSION INTRAVENOUS AS NEEDED
Status: DISCONTINUED | OUTPATIENT
Start: 2025-04-18 | End: 2025-04-18

## 2025-04-18 RX ORDER — ALBUTEROL SULFATE 0.83 MG/ML
2.5 SOLUTION RESPIRATORY (INHALATION) ONCE
Status: COMPLETED | OUTPATIENT
Start: 2025-04-18 | End: 2025-04-18

## 2025-04-18 RX ORDER — CEFAZOLIN SODIUM 1 G/3ML
INJECTION, POWDER, FOR SOLUTION INTRAMUSCULAR; INTRAVENOUS AS NEEDED
Status: DISCONTINUED | OUTPATIENT
Start: 2025-04-18 | End: 2025-04-18

## 2025-04-18 RX ORDER — ONDANSETRON 2 MG/ML
4 INJECTION INTRAMUSCULAR; INTRAVENOUS ONCE AS NEEDED
Status: CANCELLED | OUTPATIENT
Start: 2025-04-18

## 2025-04-18 RX ORDER — DEXAMETHASONE SODIUM PHOSPHATE 10 MG/ML
INJECTION, SOLUTION INTRAMUSCULAR; INTRAVENOUS AS NEEDED
Status: DISCONTINUED | OUTPATIENT
Start: 2025-04-18 | End: 2025-04-18

## 2025-04-18 RX ORDER — FENTANYL CITRATE 50 UG/ML
INJECTION, SOLUTION INTRAMUSCULAR; INTRAVENOUS AS NEEDED
Status: DISCONTINUED | OUTPATIENT
Start: 2025-04-18 | End: 2025-04-18

## 2025-04-18 RX ORDER — LIDOCAINE HYDROCHLORIDE 20 MG/ML
INJECTION, SOLUTION EPIDURAL; INFILTRATION; INTRACAUDAL; PERINEURAL AS NEEDED
Status: DISCONTINUED | OUTPATIENT
Start: 2025-04-18 | End: 2025-04-18

## 2025-04-18 RX ORDER — KETAMINE HCL IN NACL, ISO-OSM 100MG/10ML
SYRINGE (ML) INJECTION AS NEEDED
Status: DISCONTINUED | OUTPATIENT
Start: 2025-04-18 | End: 2025-04-18

## 2025-04-18 RX ADMIN — Medication 20 MG: at 10:34

## 2025-04-18 RX ADMIN — ONDANSETRON 4 MG: 2 INJECTION INTRAMUSCULAR; INTRAVENOUS at 10:24

## 2025-04-18 RX ADMIN — FENTANYL CITRATE 25 MCG: 50 INJECTION INTRAMUSCULAR; INTRAVENOUS at 12:55

## 2025-04-18 RX ADMIN — FENTANYL CITRATE 25 MCG: 50 INJECTION INTRAMUSCULAR; INTRAVENOUS at 12:41

## 2025-04-18 RX ADMIN — SUGAMMADEX 200 MG: 100 INJECTION, SOLUTION INTRAVENOUS at 11:21

## 2025-04-18 RX ADMIN — IOHEXOL 13 ML: 240 INJECTION, SOLUTION INTRATHECAL; INTRAVASCULAR; INTRAVENOUS; ORAL at 11:39

## 2025-04-18 RX ADMIN — FENTANYL CITRATE 50 MCG: 50 INJECTION INTRAMUSCULAR; INTRAVENOUS at 11:36

## 2025-04-18 RX ADMIN — ACETAMINOPHEN 1000 MG: 10 INJECTION INTRAVENOUS at 12:25

## 2025-04-18 RX ADMIN — PROPOFOL 120 MG: 10 INJECTION, EMULSION INTRAVENOUS at 10:34

## 2025-04-18 RX ADMIN — SODIUM CHLORIDE: 0.9 INJECTION, SOLUTION INTRAVENOUS at 10:24

## 2025-04-18 RX ADMIN — LIDOCAINE HYDROCHLORIDE 100 MG: 20 INJECTION, SOLUTION EPIDURAL; INFILTRATION; INTRACAUDAL; PERINEURAL at 10:33

## 2025-04-18 RX ADMIN — ROCURONIUM BROMIDE 30 MG: 10 INJECTION, SOLUTION INTRAVENOUS at 10:35

## 2025-04-18 RX ADMIN — PHENYLEPHRINE HYDROCHLORIDE 30 MCG/MIN: 10 INJECTION INTRAVENOUS at 10:41

## 2025-04-18 RX ADMIN — FENTANYL CITRATE 25 MCG: 50 INJECTION INTRAMUSCULAR; INTRAVENOUS at 12:19

## 2025-04-18 RX ADMIN — ONDANSETRON 4 MG: 2 INJECTION INTRAMUSCULAR; INTRAVENOUS at 11:36

## 2025-04-18 RX ADMIN — DEXAMETHASONE SODIUM PHOSPHATE 10 MG: 10 INJECTION, SOLUTION INTRAMUSCULAR; INTRAVENOUS at 10:24

## 2025-04-18 RX ADMIN — FENTANYL CITRATE 25 MCG: 50 INJECTION INTRAMUSCULAR; INTRAVENOUS at 10:52

## 2025-04-18 RX ADMIN — FENTANYL CITRATE 25 MCG: 50 INJECTION INTRAMUSCULAR; INTRAVENOUS at 12:31

## 2025-04-18 RX ADMIN — CEFAZOLIN 2000 MG: 1 INJECTION, POWDER, FOR SOLUTION INTRAMUSCULAR; INTRAVENOUS at 11:10

## 2025-04-18 RX ADMIN — ALBUTEROL SULFATE 2.5 MG: 2.5 SOLUTION RESPIRATORY (INHALATION) at 10:01

## 2025-04-18 RX ADMIN — FENTANYL CITRATE 25 MCG: 50 INJECTION INTRAMUSCULAR; INTRAVENOUS at 10:33

## 2025-04-18 NOTE — H&P
"History and Physical -  [facility]  Montserrat Sumner 74 y.o. female MRN: [id number]                  HPI: Montserrat Sumner is a 74 y.o. year old female who presents for ERCP      REVIEW OF SYSTEMS: Per the HPI, and otherwise unremarkable.    Historical Information   Past Medical History:   Diagnosis Date    Anxiety     Chronic pain 03/06/2023    lower abdomen and back    Colon polyp     COPD (chronic obstructive pulmonary disease) (HCC)     \"passes out\" with O2 levels dropping    Disease of thyroid gland     GERD (gastroesophageal reflux disease)     History of transfusion     with aneurysm repair-autologous-self and daughter    Hyperlipidemia     Hypertension     Respiratory acidosis 04/05/2025    Teeth missing     Wears glasses     For reading     Past Surgical History:   Procedure Laterality Date    BACK SURGERY      x2 herniated, crushed disc in the lumbar, ablation    CHOLECYSTECTOMY      COLONOSCOPY      EPIDURAL BLOCK INJECTION N/A 2/5/2025    Procedure: L1-L2 LUMBAR EPIDURAL STEROID INJECTION;  Surgeon: Nj Maddox DO;  Location: [facility] MAIN OR;  Service: Pain Management     FRACTURE SURGERY Left     hip-pinned    HYSTERECTOMY      salpingo-oophorectomy    MT INJECT SI JOINT ARTHRGRPHY&/ANES/STEROID W/PATRICIA Bilateral 12/18/2024    Procedure: BILATERAL SACROILIAC JOINT INJECTION;  Surgeon: Nj Maddox DO;  Location: [facility] MAIN OR;  Service: Pain Management     THORACIC AORTIC ANEURYSM REPAIR  09/2016    ascending thoracic aortic repair with RCA bypass with SVG x 1    TONSILLECTOMY      UPPER GASTROINTESTINAL ENDOSCOPY       Social History   Social History     Substance and Sexual Activity   Alcohol Use Yes    Comment: occasionally     Social History     Substance and Sexual Activity   Drug Use Yes     Social History     Tobacco Use   Smoking Status Former    Current packs/day: 0.25    Average packs/day: 0.3 packs/day for 57.3 years (14.3 ttl pk-yrs)    Types: Cigarettes "    Start date: 1968    Passive exposure: Past   Smokeless Tobacco Never   Tobacco Comments    Currently smoking 5-6 cigarettes daily     Family History   Problem Relation Age of Onset    Breast cancer Mother 60       Meds/Allergies       Current Outpatient Medications:     acetylcysteine (MUCOMYST) 200 mg/mL nebulizer solution    albuterol (2.5 mg/3 mL) 0.083 % nebulizer solution    albuterol (Proventil HFA) 90 mcg/act inhaler    ALPRAZolam (XANAX) 0.5 mg tablet    amLODIPine (NORVASC) 5 mg tablet    aspirin 81 mg chewable tablet    atorvastatin (LIPITOR) 40 mg tablet    buprenorphine-naloxone (Suboxone) 4-1 MG    celecoxib (CeleBREX) 200 mg capsule    docusate sodium (COLACE) 100 mg capsule    DULoxetine (CYMBALTA) 60 mg delayed release capsule    esomeprazole (NexIUM) 40 MG capsule    fluticasone-umeclidinium-vilanterol (Trelegy Ellipta) 200-62.5-25 mcg/actuation AEPB inhaler    glipiZIDE (GLUCOTROL XL) 2.5 mg 24 hr tablet    levothyroxine 100 mcg tablet    losartan (COZAAR) 25 mg tablet    metoprolol succinate (TOPROL-XL) 50 mg 24 hr tablet    predniSONE 10 mg tablet    tiZANidine (ZANAFLEX) 4 mg tablet    traZODone (DESYREL) 50 mg tablet    venlafaxine (EFFEXOR-XR) 150 mg 24 hr capsule    b complex vitamins capsule    benzonatate (TESSALON PERLES) 100 mg capsule    lidocaine (Lidoderm) 5 %    nicotine (NICODERM CQ) 7 mg/24hr TD 24 hr patch    oxygen gas    polyethylene glycol (MIRALAX) 17 g packet    sucralfate (CARAFATE) 1 g tablet    Current Facility-Administered Medications:     albuterol inhalation solution 2.5 mg, 2.5 mg, Nebulization, Once    No Known Allergies    Objective     /87   Pulse 81   Temp (!) 97 °F (36.1 °C) (Tympanic)   Resp 20   LMP  (LMP Unknown)   SpO2 96%       PHYSICAL EXAM    Gen: NAD  Head: NCAT  CV: RRR  CHEST: Clear  ABD: soft, NT/ND  EXT: no edema      ASSESSMENT/PLAN:  This is a 74 y.o. year old female here for ERCP, and she is stable and optimized for her  procedure.

## 2025-04-18 NOTE — ANESTHESIA PREPROCEDURE EVALUATION
Procedure:  ERCP  EGD    Relevant Problems   ANESTHESIA (within normal limits)      CARDIO   (+) Aneurysm of ascending aorta without rupture (HCC)   (+) CAD (coronary artery disease)   (+) History of coronary artery bypass graft   (+) Hyperlipidemia   (+) Hypertension      ENDO   (+) Hypothyroid   (+) Type 2 diabetes mellitus, without long-term current use of insulin (HCC)      GI/HEPATIC   (+) Dilation of pancreatic duct   (+) Dysphagia   (+) Gastroesophageal reflux disease   (+) IPMN (intraductal papillary mucinous neoplasm)      MUSCULOSKELETAL   (+) Chronic bilateral low back pain without sciatica   (+) Intractable back pain   (+) Osteoarthritis of spine with radiculopathy, lumbar region   (+) Primary osteoarthritis of both knees      NEURO/PSYCH   (+) Anxiety   (+) Chronic bilateral low back pain without sciatica   (+) Chronic pain syndrome      PULMONARY   (+) Acute on chronic hypoxic respiratory failure (HCC)   (+) Acute respiratory failure (HCC)   (+) COPD exacerbation (HCC)   (+) Centrilobular emphysema (HCC)   (+) Oxygen dependent at night only   (+) Shortness of breath        Physical Exam    Airway    Mallampati score: III  TM Distance: <3 FB  Neck ROM: full     Dental   Comment: Multiple missing teeth; denies loose teeth     Cardiovascular      Pulmonary      Other Findings  post-pubertal.      Anesthesia Plan  ASA Score- 4     Anesthesia Type- general with ASA Monitors.         Additional Monitors:     Airway Plan: ETT.    Comment: Belinda TAYLOR.       Plan Factors-Exercise tolerance (METS): >4 METS.    Chart reviewed. EKG reviewed.  Existing labs reviewed. Patient summary reviewed.    Patient is a current smoker.              Induction- intravenous.    Postoperative Plan- Plan for postoperative opioid use.         Informed Consent- Anesthetic plan and risks discussed with patient.  I personally reviewed this patient with the CRNA. Discussed and agreed on the Anesthesia Plan with the CRNA..      NPO  Status:  Vitals Value Taken Time   Date of last liquid 04/17/25 04/18/25 0936   Time of last liquid 2300 04/18/25 0936   Date of last solid 04/17/25 04/18/25 0936   Time of last solid 2100 04/18/25 0936

## 2025-04-18 NOTE — ANESTHESIA POSTPROCEDURE EVALUATION
Post-Op Assessment Note    CV Status:  Stable  Pain Score: 9    Pain management: adequate       Mental Status:  Alert and awake   Hydration Status:  Euvolemic   PONV Controlled:  Controlled   Airway Patency:  Patent     Post Op Vitals Reviewed: Yes    No anethesia notable event occurred.    Staff: CRNA           Last Filed PACU Vitals:  Vitals Value Taken Time   Temp 97 °F (36.1 °C) 04/18/25 1134   Pulse 79 04/18/25 1139   /86 04/18/25 1139   Resp 18 04/18/25 1139   SpO2 100 % 04/18/25 1139       Modified Rohit:     Vitals Value Taken Time   Activity 2 04/18/25 1138   Respiration 2 04/18/25 1138   Circulation 1 04/18/25 1138   Consciousness 1 04/18/25 1138   Oxygen Saturation 2 04/18/25 1138     Modified Rohit Score: 8

## 2025-04-18 NOTE — PROGRESS NOTES
Progress Note - Cardiology Office  [facility]    Montserrat Sumner 74 y.o. female MRN: [id number]  : 1950  Encounter: [id number]      Assessment & Plan  Hypertension, unspecified type    Coronary artery disease, unspecified vessel or lesion type, unspecified whether angina present, unspecified whether native or transplanted heart    History of coronary artery bypass graft    Hyperlipidemia, unspecified hyperlipidemia type    Primary hypertension    Acute respiratory failure (HCC)       ASSESSMENT:   CAD,  S/p CABG, SVG to RCA      Nuclear stress test, 2023 at [place]  No ischemia, EF 69%     TTE, 2023:  EF 65%, borderline LVH, G1 DD,  Moderate LAE, mild MR, TR and IA, RSVP 30 mmHg  Normal aortic root size     TTE, 2022  Normal LV systolic function, borderline LVH and normal ascending aorta size     S/p ascending aortic aneurysm repair in      COPD  Continued tobacco abuse, now down to 5/day  Followed by pulmonary     Essential hypertension  BP is well-controlled today at 118/80 with heart rate of 83/min     Dyslipidemia  On atorvastatin, 40 mg  1/10/2025: LDL 80, , HDL 53, normal AST and ALT     Numbness of feet  Has lower back pathology  Managed by other physician     RECOMMENDATIONS:  Fish oil 2 g twice daily  Continue other cardiac medications including amlodipine, aspirin, atorvastatin, losartan and Toprol  Low-salt and low-cholesterol diet  Regular cardiovascular exercise as tolerated  Weight loss       Please call [phone] if any questions.    HPI :     Montserrat Sumner is a 74 y.o. year old female who came for follow up.  Her blood pressure is well-controlled today on her current regime of medications.  Her lipid panel from January showed that the LDL is in acceptable range however her triglycerides were elevated at 193.  I am recommending fish oil 2 g twice a day along with low-salt and low-cholesterol diet    REVIEW OF  "SYSTEMS:  Denies any new or acute cardiac symptoms.  Denies any change in her dyspnea.  Denies any anginal pain, palpitations or syncope      Historical Information   Past Medical History:   Diagnosis Date    Anxiety     Chronic pain 03/06/2023    lower abdomen and back    Colon polyp     COPD (chronic obstructive pulmonary disease) (HCC)     \"passes out\" with O2 levels dropping    Disease of thyroid gland     GERD (gastroesophageal reflux disease)     History of transfusion     with aneurysm repair-autologous-self and daughter    Hyperlipidemia     Hypertension     Respiratory acidosis 04/05/2025    Teeth missing     Wears glasses     For reading     Past Surgical History:   Procedure Laterality Date    BACK SURGERY      x2 herniated, crushed disc in the lumbar, ablation    CHOLECYSTECTOMY      COLONOSCOPY      EPIDURAL BLOCK INJECTION N/A 2/5/2025    Procedure: L1-L2 LUMBAR EPIDURAL STEROID INJECTION;  Surgeon: Nj Maddox DO;  Location: [facility] MAIN OR;  Service: Pain Management     FRACTURE SURGERY Left     hip-pinned    HYSTERECTOMY      salpingo-oophorectomy    TX INJECT SI JOINT ARTHRGRPHY&/ANES/STEROID W/PATRICIA Bilateral 12/18/2024    Procedure: BILATERAL SACROILIAC JOINT INJECTION;  Surgeon: Nj Maddox DO;  Location: [facility] MAIN OR;  Service: Pain Management     THORACIC AORTIC ANEURYSM REPAIR  09/2016    ascending thoracic aortic repair with RCA bypass with SVG x 1    TONSILLECTOMY      UPPER GASTROINTESTINAL ENDOSCOPY       Social History     Substance and Sexual Activity   Alcohol Use Yes    Comment: occasionally     Social History     Substance and Sexual Activity   Drug Use Yes     Social History     Tobacco Use   Smoking Status Former    Current packs/day: 0.25    Average packs/day: 0.3 packs/day for 57.3 years (14.3 ttl pk-yrs)    Types: Cigarettes    Start date: 1968    Passive exposure: Past   Smokeless Tobacco Never   Tobacco Comments    Currently smoking 5-6 cigarettes daily "     Family History:   Family History   Problem Relation Age of Onset    Breast cancer Mother 60       Meds/Allergies     No Known Allergies    Current Outpatient Medications:     acetylcysteine (MUCOMYST) 200 mg/mL nebulizer solution, Use 2 ml and add to 1 vial of your albuterol twice a day as needed for thick mucous, Disp: 120 mL, Rfl: 3    albuterol (2.5 mg/3 mL) 0.083 % nebulizer solution, Take 3 mL (2.5 mg total) by nebulization 4 (four) times a day as needed for wheezing or shortness of breath, Disp: 360 mL, Rfl: 7    albuterol (Proventil HFA) 90 mcg/act inhaler, Inhale 2 puffs every 4 (four) hours as needed for wheezing, Disp: 6.7 g, Rfl: 7    ALPRAZolam (XANAX) 0.5 mg tablet, Take 2 tablets (1 mg total) by mouth 2 (two) times a day as needed for anxiety, Disp: 60 tablet, Rfl: 1    amLODIPine (NORVASC) 5 mg tablet, Take 1 tablet (5 mg total) by mouth daily, Disp: 90 tablet, Rfl: 2    aspirin 81 mg chewable tablet, Chew 81 mg daily, Disp: , Rfl:     atorvastatin (LIPITOR) 40 mg tablet, Take 1 tablet (40 mg total) by mouth every morning, Disp: 90 tablet, Rfl: 3    b complex vitamins capsule, Take 1 capsule by mouth once a week, Disp: , Rfl:     benzonatate (TESSALON PERLES) 100 mg capsule, Take 1 capsule (100 mg total) by mouth 3 (three) times a day as needed for cough, Disp: 30 capsule, Rfl: 3    buprenorphine-naloxone (Suboxone) 4-1 MG, every morning Wafer, Disp: , Rfl:     celecoxib (CeleBREX) 200 mg capsule, Take 1 capsule (200 mg total) by mouth daily, Disp: 90 capsule, Rfl: 1    docusate sodium (COLACE) 100 mg capsule, Take 1 capsule (100 mg total) by mouth 2 (two) times a day, Disp: , Rfl:     DULoxetine (CYMBALTA) 60 mg delayed release capsule, Take 1 capsule (60 mg total) by mouth daily, Disp: 90 capsule, Rfl: 1    esomeprazole (NexIUM) 40 MG capsule, Take 1 capsule (40 mg total) by mouth 2 (two) times a day before meals, Disp: 180 capsule, Rfl: 1    fluticasone-umeclidinium-vilanterol (Trelegy Ellipta)  200-62.5-25 mcg/actuation AEPB inhaler, Inhale 1 puff daily Rinse mouth after use., Disp: 60 blister, Rfl: 11    glipiZIDE (GLUCOTROL XL) 2.5 mg 24 hr tablet, Take 1 tablet (2.5 mg total) by mouth daily, Disp: 90 tablet, Rfl: 0    levothyroxine 100 mcg tablet, Take 1 tablet (100 mcg total) by mouth every morning, Disp: 90 tablet, Rfl: 0    lidocaine (Lidoderm) 5 %, Apply 1 patch topically over 12 hours daily Remove & Discard patch within 12 hours or as directed by MD, Disp: 30 patch, Rfl: 1    losartan (COZAAR) 25 mg tablet, Take 1 tablet (25 mg total) by mouth every morning, Disp: 90 tablet, Rfl: 2    metoprolol succinate (TOPROL-XL) 50 mg 24 hr tablet, Take 1 tablet (50 mg total) by mouth every morning, Disp: 90 tablet, Rfl: 2    nicotine (NICODERM CQ) 7 mg/24hr TD 24 hr patch, Place 1 patch on the skin over 24 hours daily, Disp: 28 patch, Rfl: 0    Omega-3 Fatty Acids (fish oil) 1,000 mg, Take 2 capsules (2,000 mg total) by mouth 2 (two) times a day, Disp: 180 capsule, Rfl: 4    oxygen gas, Inhale continuous as needed 2.5 L, Disp: , Rfl:     polyethylene glycol (MIRALAX) 17 g packet, Take 17 g by mouth daily as needed (Constipation), Disp: , Rfl:     predniSONE 10 mg tablet, 4 tablets (40mg) by mouth once daily for 3 days, followed by 3 tablets (30mg) by mouth once daily for 3 days, followed by 2 tablets (20mg) by mouth once daily for 3 days, followed by 1 tablet (10mg) by mouth once daily for 3 days., Disp: , Rfl:     sucralfate (CARAFATE) 1 g tablet, Take 1 tablet (1 g total) by mouth 4 (four) times a day, Disp: 120 tablet, Rfl: 1    tiZANidine (ZANAFLEX) 4 mg tablet, Take 1 tablet (4 mg total) by mouth every 8 (eight) hours as needed for muscle spasms, Disp: 90 tablet, Rfl: 1    traZODone (DESYREL) 50 mg tablet, Take 2 tablets (100 mg total) by mouth daily at bedtime, Disp: 90 tablet, Rfl: 1    venlafaxine (EFFEXOR-XR) 150 mg 24 hr capsule, Take 1 capsule by mouth daily at bedtime, Disp: , Rfl:   No current  "facility-administered medications for this visit.    Facility-Administered Medications Ordered in Other Visits:     fentaNYL injection 25 mcg, 25 mcg, Intravenous, Q10 Min PRN, Delio Tran, , 25 mcg at 04/18/25 1255    Vitals: Blood pressure 118/80, pulse 83, height 5' 3\" (1.6 m), weight 77.6 kg (171 lb), SpO2 96%.    Body mass index is 30.29 kg/m².  Vitals:    04/21/25 1358   Weight: 77.6 kg (171 lb)     BP Readings from Last 3 Encounters:   04/21/25 118/80   04/18/25 (!) 186/96   04/16/25 148/86       Physical Exam:  Physical Exam    Neurologic:  Alert & oriented x 3, no new focal deficits, Not in any acute distress,  Constitutional:  Well developed, well nourished, non-toxic appearance   Eyes:  Pupil equal and reacting to light, conjunctiva normal,   HENT:  Atraumatic, oropharynx moist, Neck- normal range of motion, no tenderness,  Neck supple, No JVP, No LNP   Respiratory:  Bilateral air entry, mostly clear to auscultation  Cardiovascular: S1-S2 regular with a I/VI systolic murmur   GI:  Soft, nondistended, normal bowel sounds, nontender, no hepatosplenomegaly appreciated.  Musculoskeletal:  No tenderness, no deformities.   Skin:  Well hydrated, no rash   Lymphatic:  No lymphadenopathy noted   Extremities:  No edema and distal pulses are present        Diagnostic Studies Review Cardio:      EKG: Normal sinus rhythm, heart rate 83/min, nonspecific ST and T wave abnormality    Cardiac testing:       Results for orders placed during the hospital encounter of 04/24/23    Echo complete w/ contrast if indicated    Interpretation Summary    Left Ventricle: Left ventricular cavity size is normal. Wall thickness is mildly increased. There is borderline concentric hypertrophy. The left ventricular ejection fraction is 65% by visual estimation. Systolic function is normal. Wall motion is normal. Diastolic function is mildly abnormal, consistent with grade I (abnormal) relaxation.  Left atrial filling pressure " "is elevated.    Left Atrium: The atrium is moderately dilated.    Aortic Valve: There is aortic valve sclerosis.    Mitral Valve: There is mild annular calcification. There is mild regurgitation.    Tricuspid Valve: There is mild regurgitation.  Pulmonary artery pressure around 30 mmHg.    Pulmonic Valve: There is mild regurgitation.        Imaging:  Chest X-Ray:   XR chest pa and lateral  Result Date: 4/16/2025  Impression No acute cardiopulmonary disease. Post CABG and median sternotomy. Improvement in previous atelectasis in the right base from prior study of 4/9/2025. Slightly improved pulmonary expansion. Electronically signed: 04/16/2025 03:09 PM Oscar Kimbrough MD      CT-scan of the chest:     No CTA results available for this patient.  Lab Review   Lab Results   Component Value Date    WBC 10.07 04/09/2025    HGB 12.6 04/09/2025    HCT 41.0 04/09/2025    MCV 96 04/09/2025    RDW 13.8 04/09/2025     04/09/2025     BMP:  Lab Results   Component Value Date    SODIUM 141 04/09/2025    K 4.0 04/09/2025     04/09/2025    CO2 25 04/09/2025    BUN 20 04/09/2025    CREATININE 0.85 04/09/2025    GLUC 124 04/09/2025    GLUF 127 (H) 01/10/2025    CALCIUM 9.5 04/09/2025    EGFR 68 04/09/2025    MG 2.0 04/09/2025     LFT:  Lab Results   Component Value Date    AST 12 (L) 04/08/2025    ALT 21 04/08/2025    ALKPHOS 33 (L) 04/08/2025    TP 6.6 04/08/2025    ALB 3.6 04/08/2025      No components found for: \"TSH3\"  Lab Results   Component Value Date    XQB4BEJNHSQM 0.761 01/10/2025     Lab Results   Component Value Date    HGBA1C 9.0 (H) 04/05/2025     Lipid Profile:   Lab Results   Component Value Date    CHOLESTEROL 172 01/10/2025    HDL 53 01/10/2025    LDLCALC 80 01/10/2025    TRIG 193 (H) 01/10/2025     Lab Results   Component Value Date    CHOLESTEROL 172 01/10/2025     No results found for: \"CKTOTAL\", \"CKMB\", \"CKMBINDEX\", \"TROPONINI\"  No results found for: \"NTBNP\"   Recent Results (from the past 4 weeks) "   ECG 12 lead    Collection Time: 04/05/25 11:00 AM   Result Value Ref Range    Ventricular Rate 82 BPM    Atrial Rate 82 BPM    OK Interval 134 ms    QRSD Interval 92 ms    QT Interval 384 ms    QTC Interval 448 ms    P [place] 53 degrees    QRS Axis 2 degrees    T Wave Axis 71 degrees   FLU/COVID Rapid Antigen (30 min. TAT) - Preferred screening test in ED    Collection Time: 04/05/25 11:17 AM    Specimen: Nose; Nares   Result Value Ref Range    SARS COV Rapid Antigen Negative Negative    Influenza A Rapid Antigen Negative Negative    Influenza B Rapid Antigen Negative Negative   CBC and differential    Collection Time: 04/05/25 11:18 AM   Result Value Ref Range    WBC 7.78 4.31 - 10.16 Thousand/uL    RBC 4.40 3.81 - 5.12 Million/uL    Hemoglobin 12.9 11.5 - 15.4 g/dL    Hematocrit 42.8 34.8 - 46.1 %    MCV 97 82 - 98 fL    MCH 29.3 26.8 - 34.3 pg    MCHC 30.1 (L) 31.4 - 37.4 g/dL    RDW 13.9 11.6 - 15.1 %    MPV 9.3 8.9 - 12.7 fL    Platelets 216 149 - 390 Thousands/uL    nRBC 0 /100 WBCs    Segmented % 82 (H) 43 - 75 %    Immature Grans % 1 0 - 2 %    Lymphocytes % 9 (L) 14 - 44 %    Monocytes % 8 4 - 12 %    Eosinophils Relative 0 0 - 6 %    Basophils Relative 0 0 - 1 %    Absolute Neutrophils 6.36 1.85 - 7.62 Thousands/µL    Absolute Immature Grans 0.06 0.00 - 0.20 Thousand/uL    Absolute Lymphocytes 0.70 0.60 - 4.47 Thousands/µL    Absolute Monocytes 0.62 0.17 - 1.22 Thousand/µL    Eosinophils Absolute 0.01 0.00 - 0.61 Thousand/µL    Basophils Absolute 0.03 0.00 - 0.10 Thousands/µL   Comprehensive metabolic panel    Collection Time: 04/05/25 11:18 AM   Result Value Ref Range    Sodium 137 135 - 147 mmol/L    Potassium 4.5 3.5 - 5.3 mmol/L    Chloride 99 96 - 108 mmol/L    CO2 25 21 - 32 mmol/L    ANION GAP 13 4 - 13 mmol/L    BUN 21 5 - 25 mg/dL    Creatinine 1.15 0.60 - 1.30 mg/dL    Glucose 271 (H) 65 - 140 mg/dL    Calcium 9.8 8.4 - 10.2 mg/dL    AST 11 (L) 13 - 39 U/L    ALT 17 7 - 52 U/L    Alkaline  Phosphatase 38 34 - 104 U/L    Total Protein 6.9 6.4 - 8.4 g/dL    Albumin 3.5 3.5 - 5.0 g/dL    Total Bilirubin 0.62 0.20 - 1.00 mg/dL    eGFR 47 ml/min/1.73sq m   Protime-INR    Collection Time: 04/05/25 11:18 AM   Result Value Ref Range    Protime 14.4 12.3 - 15.0 seconds    INR 1.07 0.85 - 1.19   APTT    Collection Time: 04/05/25 11:18 AM   Result Value Ref Range    PTT 31 23 - 34 seconds   Blood gas, venous    Collection Time: 04/05/25 11:18 AM   Result Value Ref Range    pH, Avinash 7.268 (L) 7.300 - 7.400    pCO2, Avinash 69.7 (H) 42.0 - 50.0 mm Hg    pO2, Avinash 15.8 (L) 35.0 - 45.0 mm Hg    HCO3, Avinash 31.1 (H) 24 - 30 mmol/L    Base Excess, Avinash 2.3 mmol/L    O2 Content, Avinash 3.5 ml/dL    O2 HGB, VENOUS 18.6 (L) 60.0 - 80.0 %   Procalcitonin    Collection Time: 04/05/25 11:18 AM   Result Value Ref Range    Procalcitonin 0.21 <=0.25 ng/ml   Hemoglobin A1C    Collection Time: 04/05/25 11:18 AM   Result Value Ref Range    Hemoglobin A1C 9.0 (H) Normal 4.0-5.6%; PreDiabetic 5.7-6.4%; Diabetic >=6.5%; Glycemic control for adults with diabetes <7.0% %     mg/dl   Blood gas, venous    Collection Time: 04/05/25  2:07 PM   Result Value Ref Range    pH, Avinash 7.329 7.300 - 7.400    pCO2, Avinash 61.7 (H) 42.0 - 50.0 mm Hg    pO2, Avinash 21.7 (L) 35.0 - 45.0 mm Hg    HCO3, Avinash 31.7 (H) 24 - 30 mmol/L    Base Excess, Avinash 4.2 mmol/L    O2 Content, Avinash 5.8 ml/dL    O2 HGB, VENOUS 32.1 (L) 60.0 - 80.0 %   Fingerstick Glucose (POCT)    Collection Time: 04/05/25  4:19 PM   Result Value Ref Range    POC Glucose 219 (H) 65 - 140 mg/dl   Fingerstick Glucose (POCT)    Collection Time: 04/05/25  8:01 PM   Result Value Ref Range    POC Glucose 254 (H) 65 - 140 mg/dl   CBC and differential    Collection Time: 04/06/25  5:10 AM   Result Value Ref Range    WBC 5.57 4.31 - 10.16 Thousand/uL    RBC 4.24 3.81 - 5.12 Million/uL    Hemoglobin 12.4 11.5 - 15.4 g/dL    Hematocrit 39.9 34.8 - 46.1 %    MCV 94 82 - 98 fL    MCH 29.2 26.8 - 34.3 pg    MCHC  31.1 (L) 31.4 - 37.4 g/dL    RDW 13.6 11.6 - 15.1 %    MPV 9.4 8.9 - 12.7 fL    Platelets 213 149 - 390 Thousands/uL    nRBC 0 /100 WBCs    Segmented % 82 (H) 43 - 75 %    Immature Grans % 2 0 - 2 %    Lymphocytes % 9 (L) 14 - 44 %    Monocytes % 7 4 - 12 %    Eosinophils Relative 0 0 - 6 %    Basophils Relative 0 0 - 1 %    Absolute Neutrophils 4.55 1.85 - 7.62 Thousands/µL    Absolute Immature Grans 0.11 0.00 - 0.20 Thousand/uL    Absolute Lymphocytes 0.52 (L) 0.60 - 4.47 Thousands/µL    Absolute Monocytes 0.38 0.17 - 1.22 Thousand/µL    Eosinophils Absolute 0.00 0.00 - 0.61 Thousand/µL    Basophils Absolute 0.01 0.00 - 0.10 Thousands/µL   Comprehensive metabolic panel    Collection Time: 04/06/25  5:10 AM   Result Value Ref Range    Sodium 136 135 - 147 mmol/L    Potassium 4.4 3.5 - 5.3 mmol/L    Chloride 99 96 - 108 mmol/L    CO2 25 21 - 32 mmol/L    ANION GAP 12 4 - 13 mmol/L    BUN 21 5 - 25 mg/dL    Creatinine 0.90 0.60 - 1.30 mg/dL    Glucose 204 (H) 65 - 140 mg/dL    Calcium 10.0 8.4 - 10.2 mg/dL    AST 10 (L) 13 - 39 U/L    ALT 16 7 - 52 U/L    Alkaline Phosphatase 36 34 - 104 U/L    Total Protein 6.8 6.4 - 8.4 g/dL    Albumin 3.7 3.5 - 5.0 g/dL    Total Bilirubin 0.32 0.20 - 1.00 mg/dL    eGFR 63 ml/min/1.73sq m   Blood gas, venous    Collection Time: 04/06/25  5:10 AM   Result Value Ref Range    pH, Avinash 7.344 7.300 - 7.400    pCO2, Avinash 54.3 (H) 42.0 - 50.0 mm Hg    pO2, Avinash 39.9 35.0 - 45.0 mm Hg    HCO3, Avinash 28.9 24 - 30 mmol/L    Base Excess, Avinash 2.1 mmol/L    O2 Content, Avinash 13.3 ml/dL    O2 HGB, VENOUS 68.2 60.0 - 80.0 %   Fingerstick Glucose (POCT)    Collection Time: 04/06/25  7:34 AM   Result Value Ref Range    POC Glucose 221 (H) 65 - 140 mg/dl   Fingerstick Glucose (POCT)    Collection Time: 04/06/25 10:53 AM   Result Value Ref Range    POC Glucose 457 (HH) 65 - 140 mg/dl   Fingerstick Glucose (POCT)    Collection Time: 04/06/25  4:11 PM   Result Value Ref Range    POC Glucose 148 (H) 65 - 140  mg/dl   Fingerstick Glucose (POCT)    Collection Time: 04/06/25  8:09 PM   Result Value Ref Range    POC Glucose 244 (H) 65 - 140 mg/dl   Fingerstick Glucose (POCT)    Collection Time: 04/07/25  2:09 AM   Result Value Ref Range    POC Glucose 284 (H) 65 - 140 mg/dl   CBC and differential    Collection Time: 04/07/25  6:59 AM   Result Value Ref Range    WBC 8.14 4.31 - 10.16 Thousand/uL    RBC 4.24 3.81 - 5.12 Million/uL    Hemoglobin 12.5 11.5 - 15.4 g/dL    Hematocrit 40.2 34.8 - 46.1 %    MCV 95 82 - 98 fL    MCH 29.5 26.8 - 34.3 pg    MCHC 31.1 (L) 31.4 - 37.4 g/dL    RDW 13.7 11.6 - 15.1 %    MPV 9.4 8.9 - 12.7 fL    Platelets 250 149 - 390 Thousands/uL    nRBC 0 /100 WBCs    Segmented % 84 (H) 43 - 75 %    Immature Grans % 2 0 - 2 %    Lymphocytes % 6 (L) 14 - 44 %    Monocytes % 7 4 - 12 %    Eosinophils Relative 0 0 - 6 %    Basophils Relative 1 0 - 1 %    Absolute Neutrophils 6.82 1.85 - 7.62 Thousands/µL    Absolute Immature Grans 0.19 0.00 - 0.20 Thousand/uL    Absolute Lymphocytes 0.49 (L) 0.60 - 4.47 Thousands/µL    Absolute Monocytes 0.60 0.17 - 1.22 Thousand/µL    Eosinophils Absolute 0.00 0.00 - 0.61 Thousand/µL    Basophils Absolute 0.04 0.00 - 0.10 Thousands/µL   Comprehensive metabolic panel    Collection Time: 04/07/25  6:59 AM   Result Value Ref Range    Sodium 139 135 - 147 mmol/L    Potassium 4.7 3.5 - 5.3 mmol/L    Chloride 103 96 - 108 mmol/L    CO2 25 21 - 32 mmol/L    ANION GAP 11 4 - 13 mmol/L    BUN 23 5 - 25 mg/dL    Creatinine 0.80 0.60 - 1.30 mg/dL    Glucose 168 (H) 65 - 140 mg/dL    Calcium 9.8 8.4 - 10.2 mg/dL    AST 13 13 - 39 U/L    ALT 18 7 - 52 U/L    Alkaline Phosphatase 33 (L) 34 - 104 U/L    Total Protein 6.6 6.4 - 8.4 g/dL    Albumin 3.5 3.5 - 5.0 g/dL    Total Bilirubin 0.27 0.20 - 1.00 mg/dL    eGFR 73 ml/min/1.73sq m   Fingerstick Glucose (POCT)    Collection Time: 04/07/25  7:10 AM   Result Value Ref Range    POC Glucose 171 (H) 65 - 140 mg/dl   Fingerstick Glucose  (POCT)    Collection Time: 04/07/25 10:40 AM   Result Value Ref Range    POC Glucose 250 (H) 65 - 140 mg/dl   Rollator    Collection Time: 04/07/25 11:38 AM   Result Value Ref Range    Supplier Name [employer]     Supplier Phone Number [phone]     Order Status Delivery Successful     Delivery Note      Delivery Request Date 04/07/2025     Date Delivered  04/08/2025     Item Description Rollator Not Specific Color     Item Description Seat Attachment    Fingerstick Glucose (POCT)    Collection Time: 04/07/25  4:22 PM   Result Value Ref Range    POC Glucose 375 (H) 65 - 140 mg/dl   Fingerstick Glucose (POCT)    Collection Time: 04/07/25  9:15 PM   Result Value Ref Range    POC Glucose 219 (H) 65 - 140 mg/dl   Fingerstick Glucose (POCT)    Collection Time: 04/08/25  2:43 AM   Result Value Ref Range    POC Glucose 237 (H) 65 - 140 mg/dl   CBC and differential    Collection Time: 04/08/25  6:29 AM   Result Value Ref Range    WBC 10.83 (H) 4.31 - 10.16 Thousand/uL    RBC 4.22 3.81 - 5.12 Million/uL    Hemoglobin 12.5 11.5 - 15.4 g/dL    Hematocrit 40.1 34.8 - 46.1 %    MCV 95 82 - 98 fL    MCH 29.6 26.8 - 34.3 pg    MCHC 31.2 (L) 31.4 - 37.4 g/dL    RDW 13.7 11.6 - 15.1 %    MPV 9.5 8.9 - 12.7 fL    Platelets 270 149 - 390 Thousands/uL   Comprehensive metabolic panel    Collection Time: 04/08/25  6:29 AM   Result Value Ref Range    Sodium 136 135 - 147 mmol/L    Potassium 4.3 3.5 - 5.3 mmol/L    Chloride 101 96 - 108 mmol/L    CO2 25 21 - 32 mmol/L    ANION GAP 10 4 - 13 mmol/L    BUN 20 5 - 25 mg/dL    Creatinine 0.83 0.60 - 1.30 mg/dL    Glucose 178 (H) 65 - 140 mg/dL    Calcium 9.7 8.4 - 10.2 mg/dL    AST 12 (L) 13 - 39 U/L    ALT 21 7 - 52 U/L    Alkaline Phosphatase 33 (L) 34 - 104 U/L    Total Protein 6.6 6.4 - 8.4 g/dL    Albumin 3.6 3.5 - 5.0 g/dL    Total Bilirubin 0.22 0.20 - 1.00 mg/dL    eGFR 70 ml/min/1.73sq m   Manual Differential(PHLEBS Do Not Order)    Collection Time:  04/08/25  6:29 AM   Result Value Ref Range    Segmented % 76 (H) 43 - 75 %    Bands % 1 0 - 8 %    Lymphocytes % 10 (L) 14 - 44 %    Monocytes % 10 4 - 12 %    Eosinophils % 0 0 - 6 %    Basophils % 0 0 - 1 %    Metamyelocytes % 0 0 - 1 %    Myelocytes % 1 0 - 1 %    Atypical Lymphocytes % 2 (H) <=0 %    Absolute Neutrophils 8.34 (H) 1.85 - 7.62 Thousand/uL    Absolute Lymphocytes 1.30 0.60 - 4.47 Thousand/uL    Absolute Monocytes 1.08 0.00 - 1.22 Thousand/uL    Absolute Eosinophils 0.00 0.00 - 0.40 Thousand/uL    Absolute Basophils 0.00 0.00 - 0.10 Thousand/uL    Absolute Metamyelocytes 0.00 0.00 - 0.10 Thousand/uL    Absolute Myelocytes 0.11 (H) 0.00 - 0.10 Thousand/uL    Total Counted      RBC Morphology Present     Platelet Estimate Adequate Adequate    Anisocytosis Present     Macrocytes Present     Polychromasia Present    Fingerstick Glucose (POCT)    Collection Time: 04/08/25  8:18 AM   Result Value Ref Range    POC Glucose 161 (H) 65 - 140 mg/dl   Fingerstick Glucose (POCT)    Collection Time: 04/08/25 11:18 AM   Result Value Ref Range    POC Glucose 234 (H) 65 - 140 mg/dl   Fingerstick Glucose (POCT)    Collection Time: 04/08/25  3:46 PM   Result Value Ref Range    POC Glucose 257 (H) 65 - 140 mg/dl   Fingerstick Glucose (POCT)    Collection Time: 04/08/25  8:00 PM   Result Value Ref Range    POC Glucose 216 (H) 65 - 140 mg/dl   Fingerstick Glucose (POCT)    Collection Time: 04/09/25  1:49 AM   Result Value Ref Range    POC Glucose 209 (H) 65 - 140 mg/dl   Basic metabolic panel    Collection Time: 04/09/25  5:12 AM   Result Value Ref Range    Sodium 141 135 - 147 mmol/L    Potassium 4.0 3.5 - 5.3 mmol/L    Chloride 104 96 - 108 mmol/L    CO2 25 21 - 32 mmol/L    ANION GAP 12 4 - 13 mmol/L    BUN 20 5 - 25 mg/dL    Creatinine 0.85 0.60 - 1.30 mg/dL    Glucose 124 65 - 140 mg/dL    Calcium 9.5 8.4 - 10.2 mg/dL    eGFR 68 ml/min/1.73sq m   CBC and differential    Collection Time: 04/09/25  5:12 AM   Result  "Value Ref Range    WBC 10.07 4.31 - 10.16 Thousand/uL    RBC 4.26 3.81 - 5.12 Million/uL    Hemoglobin 12.6 11.5 - 15.4 g/dL    Hematocrit 41.0 34.8 - 46.1 %    MCV 96 82 - 98 fL    MCH 29.6 26.8 - 34.3 pg    MCHC 30.7 (L) 31.4 - 37.4 g/dL    RDW 13.8 11.6 - 15.1 %    MPV 9.5 8.9 - 12.7 fL    Platelets 254 149 - 390 Thousands/uL   Magnesium    Collection Time: 04/09/25  5:12 AM   Result Value Ref Range    Magnesium 2.0 1.9 - 2.7 mg/dL   Fingerstick Glucose (POCT)    Collection Time: 04/09/25  7:11 AM   Result Value Ref Range    POC Glucose 96 65 - 140 mg/dl   Fingerstick Glucose (POCT)    Collection Time: 04/09/25 11:33 AM   Result Value Ref Range    POC Glucose 236 (H) 65 - 140 mg/dl   Fingerstick Glucose (POCT)    Collection Time: 04/09/25  5:16 PM   Result Value Ref Range    POC Glucose 233 (H) 65 - 140 mg/dl   Fingerstick Glucose (POCT)    Collection Time: 04/09/25  8:58 PM   Result Value Ref Range    POC Glucose 154 (H) 65 - 140 mg/dl   Fingerstick Glucose (POCT)    Collection Time: 04/10/25  1:50 AM   Result Value Ref Range    POC Glucose 118 65 - 140 mg/dl   Fingerstick Glucose (POCT)    Collection Time: 04/10/25  7:25 AM   Result Value Ref Range    POC Glucose 84 65 - 140 mg/dl   Fingerstick Glucose (POCT)    Collection Time: 04/10/25 11:39 AM   Result Value Ref Range    POC Glucose 221 (H) 65 - 140 mg/dl   Fingerstick Glucose (POCT)    Collection Time: 04/18/25 10:02 AM   Result Value Ref Range    POC Glucose 92 65 - 140 mg/dl   ECG 12 lead    Collection Time: 04/18/25 12:05 PM   Result Value Ref Range    Ventricular Rate 74 BPM    Atrial Rate 74 BPM    DE Interval 142 ms    QRSD Interval 96 ms    QT Interval 402 ms    QTC Interval 446 ms    P Axis 57 degrees    QRS Axis -17 degrees    T Wave Axis 70 degrees             Dr. Mushtaq Rodriguez MD, FACC      \"This note has been constructed using a voice recognition system.Therefore there may be syntax, spelling, and/or grammatical errors. Please call if you have any " "questions. \"  "

## 2025-04-19 LAB
ATRIAL RATE: 74 BPM
P AXIS: 57 DEGREES
PR INTERVAL: 142 MS
QRS AXIS: -17 DEGREES
QRSD INTERVAL: 96 MS
QT INTERVAL: 402 MS
QTC INTERVAL: 446 MS
T WAVE AXIS: 70 DEGREES
VENTRICULAR RATE: 74 BPM

## 2025-04-19 PROCEDURE — 93010 ELECTROCARDIOGRAM REPORT: CPT | Performed by: INTERNAL MEDICINE

## 2025-04-21 ENCOUNTER — PATIENT OUTREACH (OUTPATIENT)
Dept: CASE MANAGEMENT | Facility: OTHER | Age: 75
End: 2025-04-21

## 2025-04-21 ENCOUNTER — OFFICE VISIT (OUTPATIENT)
Dept: CARDIOLOGY CLINIC | Facility: CLINIC | Age: 75
End: 2025-04-21
Payer: COMMERCIAL

## 2025-04-21 VITALS
DIASTOLIC BLOOD PRESSURE: 80 MMHG | OXYGEN SATURATION: 96 % | BODY MASS INDEX: 30.3 KG/M2 | HEIGHT: 63 IN | HEART RATE: 83 BPM | SYSTOLIC BLOOD PRESSURE: 118 MMHG | WEIGHT: 171 LBS

## 2025-04-21 DIAGNOSIS — I10 HYPERTENSION, UNSPECIFIED TYPE: Chronic | ICD-10-CM

## 2025-04-21 DIAGNOSIS — Z95.1 HISTORY OF CORONARY ARTERY BYPASS GRAFT: ICD-10-CM

## 2025-04-21 DIAGNOSIS — I10 PRIMARY HYPERTENSION: Chronic | ICD-10-CM

## 2025-04-21 DIAGNOSIS — J96.00 ACUTE RESPIRATORY FAILURE (HCC): ICD-10-CM

## 2025-04-21 DIAGNOSIS — I25.10 CORONARY ARTERY DISEASE, UNSPECIFIED VESSEL OR LESION TYPE, UNSPECIFIED WHETHER ANGINA PRESENT, UNSPECIFIED WHETHER NATIVE OR TRANSPLANTED HEART: Chronic | ICD-10-CM

## 2025-04-21 DIAGNOSIS — E78.5 HYPERLIPIDEMIA, UNSPECIFIED HYPERLIPIDEMIA TYPE: Primary | ICD-10-CM

## 2025-04-21 PROCEDURE — 93000 ELECTROCARDIOGRAM COMPLETE: CPT | Performed by: INTERNAL MEDICINE

## 2025-04-21 PROCEDURE — 99214 OFFICE O/P EST MOD 30 MIN: CPT | Performed by: INTERNAL MEDICINE

## 2025-04-21 RX ORDER — AMLODIPINE BESYLATE 5 MG/1
5 TABLET ORAL DAILY
Qty: 90 TABLET | Refills: 2 | Status: SHIPPED | OUTPATIENT
Start: 2025-04-21

## 2025-04-21 RX ORDER — METOPROLOL SUCCINATE 50 MG/1
50 TABLET, EXTENDED RELEASE ORAL EVERY MORNING
Qty: 90 TABLET | Refills: 2 | Status: SHIPPED | OUTPATIENT
Start: 2025-04-21 | End: 2025-10-18

## 2025-04-21 RX ORDER — CHLORAL HYDRATE 500 MG
2000 CAPSULE ORAL 2 TIMES DAILY
Qty: 180 CAPSULE | Refills: 4 | Status: SHIPPED | OUTPATIENT
Start: 2025-04-21

## 2025-04-21 RX ORDER — LOSARTAN POTASSIUM 25 MG/1
25 TABLET ORAL EVERY MORNING
Qty: 90 TABLET | Refills: 2 | Status: SHIPPED | OUTPATIENT
Start: 2025-04-21

## 2025-04-21 NOTE — PROGRESS NOTES
.OP RT CM called and spoke with patient regarding their breathing, medications and O2.     Montserrat stated she isn't feeling well with her breathing. She stated she I using nebulizer QID and twice of those with Mucomyst. She stated it is a struggle getting her sputum out despite her use of flutter valve regularly. Sputum raised is green in color. Montserrat is using Trelegy Qday and rinsing out mouth as well.   She stated her SpO2 is 91% on room air and sometimes drops to 88%. I encouraged O2 use, especially with showering and chores.     Montserrat stated she need to canceled pulm.MD appt.due to conflicting appt's OP RT CM offered to reschedule for her and she accepted.     OP RT CM will outreach in two weeks and Montserrat has my contact information.   ________________________  OP RT CM called pulm. MD office and rescheduled appt. OP RT MC will call Montserrat and let her know date, time, location and provider.   ___________________________________OP RT CM called and left detailed VM stating date, time, location and provider for tomorrows' appt.

## 2025-04-22 ENCOUNTER — OFFICE VISIT (OUTPATIENT)
Dept: PULMONOLOGY | Facility: MEDICAL CENTER | Age: 75
End: 2025-04-22
Payer: COMMERCIAL

## 2025-04-22 VITALS
OXYGEN SATURATION: 94 % | BODY MASS INDEX: 29.41 KG/M2 | SYSTOLIC BLOOD PRESSURE: 120 MMHG | HEART RATE: 76 BPM | RESPIRATION RATE: 16 BRPM | TEMPERATURE: 97.6 F | DIASTOLIC BLOOD PRESSURE: 70 MMHG | HEIGHT: 63 IN | WEIGHT: 166 LBS

## 2025-04-22 DIAGNOSIS — J41.0 SIMPLE CHRONIC BRONCHITIS (HCC): Primary | ICD-10-CM

## 2025-04-22 DIAGNOSIS — G47.36 NOCTURNAL HYPOXEMIA DUE TO EMPHYSEMA  (HCC): ICD-10-CM

## 2025-04-22 DIAGNOSIS — E11.00 TYPE 2 DIABETES MELLITUS WITH HYPEROSMOLARITY WITHOUT COMA, WITHOUT LONG-TERM CURRENT USE OF INSULIN (HCC): ICD-10-CM

## 2025-04-22 DIAGNOSIS — J43.9 NOCTURNAL HYPOXEMIA DUE TO EMPHYSEMA  (HCC): ICD-10-CM

## 2025-04-22 PROCEDURE — 99214 OFFICE O/P EST MOD 30 MIN: CPT | Performed by: INTERNAL MEDICINE

## 2025-04-22 NOTE — PATIENT INSTRUCTIONS
Starting tomorrow go down to prednisone 10 mg 1/2 pill daily which is 5 mg for 4 days then take 1/2 tablet every other day for 8 doses then stop    Watch your carbohydrate intake    Use nebulizer with albuterol up to 4 times a day as needed and use the acetylcysteine 2 mL and add to albuterol when needed for thick mucus    Oxygen 2 L/min at bedtime    Continue Trelegy 200 mcg 1 puff daily    When you are ready for pulmonary rehab contact me and I can order pulmonary rehab at [facility]    Motrin plus for your pain.  This is a small dose of ibuprofen 125 mg and acetaminophen 250 mg combined into 1 pill.  Can take 1 to 2 tablets as needed 3-4 times a day for pain

## 2025-04-23 PROCEDURE — 88112 CYTOPATH CELL ENHANCE TECH: CPT | Performed by: PATHOLOGY

## 2025-04-23 NOTE — ASSESSMENT & PLAN NOTE
She had recent acute exacerbation of COPD in February.  This is slowly improving.  I instructed her to start prednisone 5 mg daily more.  Presently on 10 mg dose now.  She will take 5 mg daily for 4 days then will take 5 mg every other day for total of 8 doses.  I told importance of staying off prednisone therapy is important.  Relates that she does have diabetes mellitus type 2 and elevated hemoglobin A1c.  I did talk to her about the side effects of prolonged prednisone therapy    She will continue with Trelegy 200 mcg 1 puff daily nebulizer with albuterol 2.5 mg 4 times a day as needed or her rescue albuterol inhaler

## 2025-04-23 NOTE — PROGRESS NOTES
Follow-up  Visit - Pulmonary Medicine   Name: Montserrat Sumner      : 1950      MRN: [id number]  Encounter Provider: Maik Smith DO  Encounter Date: 2025   Encounter department: [facility]  :  Assessment & Plan  Type 2 diabetes mellitus with hyperosmolarity without coma, without long-term current use of insulin (HCC)    Lab Results   Component Value Date    HGBA1C 9.0 (H) 2025     García is now on glipizide XL 2.5 mg for diabetes mellitus.  Most recent serum hemoglobin A1c was elevated at 9.0.  I did talk to her about portance of diet particularly carbohydrate intake.  I did tell her she may want to speak with a nutritionist and have consult with them but she wanted to try to do it on her own         Nocturnal hypoxemia due to emphysema  (HCC)  She does use oxygen 2 L/min at bedtime,  not having any nocturnal dyspnea         Simple chronic bronchitis (HCC)  She had recent acute exacerbation of COPD in February.  This is slowly improving.  I instructed her to start prednisone 5 mg daily more.  Presently on 10 mg dose now.  She will take 5 mg daily for 4 days then will take 5 mg every other day for total of 8 doses.  I told importance of staying off prednisone therapy is important.  Relates that she does have diabetes mellitus type 2 and elevated hemoglobin A1c.  I did talk to her about the side effects of prolonged prednisone therapy    She will continue with Trelegy 200 mcg 1 puff daily nebulizer with albuterol 2.5 mg 4 times a day as needed or her rescue albuterol inhaler           Return in about 1 month (around 2025).    History of Present Illness   Montserrat Sumner is a 74 y.o. female who presents for a follow-up visit.  She has had recent COPD exacerbation and is gradually improving.  She is on prednisone now 10 mg daily and start tomorrow will be decreased in two 5 mg daily.  She does have some chronic intermittent cough which is sometimes  "productive for white mucus.  Does have shortness of breath with activity.  She states she has managed with not restart smoking since she was hospitalized in February of this year for COPD exacerbation.  To that she was smoking 1/2 pack of cigarettes per day.    She is on Trelegy 200 mcg 1 puff daily.  Does have oxygen she uses 2 L at bedtime.  He does get short of breath with activity.    She does have diabetes mellitus type 2 and her last serum hemoglobin A1c done on 4/5/2025 was elevated at 9.0.  Is on glipizide XL 2.5 mg daily    Spirometry done November 2024 showed FEV1 to be 1.05 L or 51% predicted obstructive index of 64%    Review of Systems   Constitutional:  Negative for chills, fever and unexpected weight change.   HENT:  Negative for congestion, rhinorrhea and sore throat.    Eyes:  Negative for discharge and redness.   Respiratory:  Positive for cough and shortness of breath.    Cardiovascular:  Negative for chest pain, palpitations and leg swelling.   Gastrointestinal:  Negative for abdominal distention, abdominal pain and nausea.   Endocrine: Negative for polydipsia and polyphagia.   Genitourinary:  Negative for dysuria.   Musculoskeletal:  Negative for joint swelling and myalgias.   Skin:  Negative for rash.   Neurological:  Negative for light-headedness.   Psychiatric/Behavioral:  Negative for confusion.        Aside from what is mentioned in the HPI, ROS is otherwise negative    Pertinent Medical History   I reviewed her past medical history which includes COPD, diabetes mellitus type 2, osteoporosis, history of opioid dependence in past, coronary artery disease           Medical History Reviewed by provider this encounter:  Tobacco  Allergies  Meds  Problems  Med Hx  Surg Hx  Fam Hx     .    Objective   /70 (BP Location: Left arm, Patient Position: Sitting, Cuff Size: Standard)   Pulse 76   Temp 97.6 °F (36.4 °C) (Temporal)   Resp 16   Ht 5' 3\" (1.6 m)   Wt 75.3 kg (166 lb) " Comment: reported by patient-w/c  LMP  (LMP Unknown)   SpO2 94%   BMI 29.41 kg/m²     Physical Exam  Constitutional:       General: She is not in acute distress.     Appearance: Normal appearance. She is well-developed.      Comments: On 4 L/min nasal cannula oxygen O2 saturation 94%   HENT:      Head: Normocephalic.      Right Ear: External ear normal.      Left Ear: External ear normal.      Nose: Nose normal.      Mouth/Throat:      Mouth: Mucous membranes are moist.      Pharynx: Oropharynx is clear. No oropharyngeal exudate.   Eyes:      Conjunctiva/sclera: Conjunctivae normal.      Pupils: Pupils are equal, round, and reactive to light.   Cardiovascular:      Rate and Rhythm: Normal rate and regular rhythm.      Heart sounds: Normal heart sounds.   Pulmonary:      Effort: Pulmonary effort is normal.      Comments: Lung sounds are clear, no wheezes, crackles or rhonchi  Abdominal:      General: There is no distension.      Palpations: Abdomen is soft.      Tenderness: There is no abdominal tenderness.   Musculoskeletal:      Cervical back: Neck supple.      Comments: No edema, cyanosis or clubbing   Lymphadenopathy:      Cervical: No cervical adenopathy.   Skin:     General: Skin is warm and dry.   Neurological:      General: No focal deficit present.      Mental Status: She is alert and oriented to person, place, and time.   Psychiatric:         Mood and Affect: Mood normal.         Behavior: Behavior normal.         Thought Content: Thought content normal.           Diagnostic Data:  Labs: I personally reviewed the most recent laboratory data pertinent to today's visit.  Lab Results   Component Value Date    HGBA1C 9.0 (H) 04/05/2025              Maik Smith DO

## 2025-04-23 NOTE — ASSESSMENT & PLAN NOTE
Lab Results   Component Value Date    HGBA1C 9.0 (H) 04/05/2025     García is now on glipizide XL 2.5 mg for diabetes mellitus.  Most recent serum hemoglobin A1c was elevated at 9.0.  I did talk to her about portance of diet particularly carbohydrate intake.  I did tell her she may want to speak with a nutritionist and have consult with them but she wanted to try to do it on her own

## 2025-04-28 ENCOUNTER — RESULTS FOLLOW-UP (OUTPATIENT)
Dept: GASTROENTEROLOGY | Facility: CLINIC | Age: 75
End: 2025-04-28

## 2025-04-30 DIAGNOSIS — R10.13 EPIGASTRIC PAIN: ICD-10-CM

## 2025-04-30 NOTE — TELEPHONE ENCOUNTER
Reason for call:   [x] Refill   [] Prior Auth  [] Other:     Office:   [] PCP/Provider -   [x] Specialty/Provider - Gastro    Medication: sucralfate (CARAFATE) 1 g     Dose/Frequency: Take 1 tablet (1 g total) by mouth 4 (four) times a day     Quantity: 120    Pharmacy:   [facility] - [facility] - [address]   Does the patient have enough for 3 days?   [] Yes   [x] No - Send as HP to POD    Mail Away Pharmacy   Does the patient have enough for 10 days?   [] Yes   [] No - Send as HP to POD

## 2025-05-01 DIAGNOSIS — F41.9 ANXIETY: Primary | Chronic | ICD-10-CM

## 2025-05-01 RX ORDER — SUCRALFATE 1 G/1
1 TABLET ORAL 4 TIMES DAILY
Qty: 120 TABLET | Refills: 2 | Status: SHIPPED | OUTPATIENT
Start: 2025-05-01

## 2025-05-02 ENCOUNTER — PATIENT OUTREACH (OUTPATIENT)
Dept: CASE MANAGEMENT | Facility: OTHER | Age: 75
End: 2025-05-02

## 2025-05-02 RX ORDER — VENLAFAXINE HYDROCHLORIDE 150 MG/1
150 CAPSULE, EXTENDED RELEASE ORAL DAILY
Qty: 30 CAPSULE | Refills: 0 | Status: SHIPPED | OUTPATIENT
Start: 2025-05-02

## 2025-05-02 NOTE — PROGRESS NOTES
.OP RT CM called and left a VM requesting a return phone call. I will outreach in two weeks unless I hear back from patient prior. Outgoing in basket message received.

## 2025-05-05 ENCOUNTER — TELEPHONE (OUTPATIENT)
Dept: RADIOLOGY | Facility: CLINIC | Age: 75
End: 2025-05-05

## 2025-05-05 NOTE — TELEPHONE ENCOUNTER
Asher from [employer] emailed me checking on status of pts SCS trial- I let him know that pt has not returned the few voicemails I've left- he said she has not called him back to complete SCS education.  I noticed she is coming on Wed for MBB, just wanted to update you before that appt.

## 2025-05-07 ENCOUNTER — HOSPITAL ENCOUNTER (OUTPATIENT)
Facility: AMBULARY SURGERY CENTER | Age: 75
Setting detail: OUTPATIENT SURGERY
Discharge: HOME | End: 2025-05-07
Attending: STUDENT IN AN ORGANIZED HEALTH CARE EDUCATION/TRAINING PROGRAM | Admitting: STUDENT IN AN ORGANIZED HEALTH CARE EDUCATION/TRAINING PROGRAM
Payer: COMMERCIAL

## 2025-05-07 ENCOUNTER — APPOINTMENT (OUTPATIENT)
Dept: RADIOLOGY | Facility: HOSPITAL | Age: 75
End: 2025-05-07
Payer: COMMERCIAL

## 2025-05-07 VITALS
TEMPERATURE: 96.8 F | RESPIRATION RATE: 18 BRPM | OXYGEN SATURATION: 95 % | BODY MASS INDEX: 29.41 KG/M2 | HEIGHT: 63 IN | DIASTOLIC BLOOD PRESSURE: 68 MMHG | HEART RATE: 68 BPM | WEIGHT: 166 LBS | SYSTOLIC BLOOD PRESSURE: 116 MMHG

## 2025-05-07 PROBLEM — M47.816 LUMBAR SPONDYLOSIS: Status: ACTIVE | Noted: 2025-05-07

## 2025-05-07 PROCEDURE — 64493 INJ PARAVERT F JNT L/S 1 LEV: CPT | Performed by: STUDENT IN AN ORGANIZED HEALTH CARE EDUCATION/TRAINING PROGRAM

## 2025-05-07 PROCEDURE — 64494 INJ PARAVERT F JNT L/S 2 LEV: CPT | Performed by: STUDENT IN AN ORGANIZED HEALTH CARE EDUCATION/TRAINING PROGRAM

## 2025-05-07 RX ORDER — BUPIVACAINE HYDROCHLORIDE 5 MG/ML
INJECTION, SOLUTION EPIDURAL; INTRACAUDAL; PERINEURAL AS NEEDED
Status: DISCONTINUED | OUTPATIENT
Start: 2025-05-07 | End: 2025-05-07 | Stop reason: HOSPADM

## 2025-05-07 RX ORDER — LIDOCAINE HYDROCHLORIDE 10 MG/ML
INJECTION, SOLUTION EPIDURAL; INFILTRATION; INTRACAUDAL; PERINEURAL AS NEEDED
Status: DISCONTINUED | OUTPATIENT
Start: 2025-05-07 | End: 2025-05-07 | Stop reason: HOSPADM

## 2025-05-07 NOTE — DISCHARGE INSTRUCTIONS

## 2025-05-07 NOTE — H&P (VIEW-ONLY)
"History of Present Illness: The patient is a 74 y.o. female who presents with complaints of low back pain    Past Medical History:   Diagnosis Date    Anxiety     Chronic pain 03/06/2023    lower abdomen and back    Colon polyp     COPD (chronic obstructive pulmonary disease) (HCC)     \"passes out\" with O2 levels dropping    Disease of thyroid gland     GERD (gastroesophageal reflux disease)     History of transfusion     with aneurysm repair-autologous-self and daughter    Hyperlipidemia     Hypertension     Respiratory acidosis 04/05/2025    Teeth missing     Wears glasses     For reading       Past Surgical History:   Procedure Laterality Date    BACK SURGERY      x2 herniated, crushed disc in the lumbar, ablation    CHOLECYSTECTOMY      COLONOSCOPY      EPIDURAL BLOCK INJECTION N/A 2/5/2025    Procedure: L1-L2 LUMBAR EPIDURAL STEROID INJECTION;  Surgeon: Nj Maddox DO;  Location: [facility] MAIN OR;  Service: Pain Management     FRACTURE SURGERY Left     hip-pinned    HYSTERECTOMY      salpingo-oophorectomy    DE INJECT SI JOINT ARTHRGRPHY&/ANES/STEROID W/PATRICIA Bilateral 12/18/2024    Procedure: BILATERAL SACROILIAC JOINT INJECTION;  Surgeon: Nj Maddox DO;  Location: [facility] MAIN OR;  Service: Pain Management     THORACIC AORTIC ANEURYSM REPAIR  09/2016    ascending thoracic aortic repair with RCA bypass with SVG x 1    TONSILLECTOMY      UPPER GASTROINTESTINAL ENDOSCOPY         No current facility-administered medications for this encounter.    No Known Allergies    Physical Exam: There were no vitals filed for this visit.  General: Awake, Alert, Oriented x 3, Mood and affect appropriate  Respiratory: Respirations even and unlabored  Cardiovascular: Peripheral pulses intact; no edema  Musculoskeletal Exam: tenderness to lumbar paraspinals    ASA Score: 3         Assessment: lumbar spondyosis    Plan:  proceed with bilateral L3-4 L5-S1 MBB #1  "

## 2025-05-07 NOTE — H&P
"History of Present Illness: The patient is a 74 y.o. female who presents with complaints of low back pain    Past Medical History:   Diagnosis Date    Anxiety     Chronic pain 03/06/2023    lower abdomen and back    Colon polyp     COPD (chronic obstructive pulmonary disease) (HCC)     \"passes out\" with O2 levels dropping    Disease of thyroid gland     GERD (gastroesophageal reflux disease)     History of transfusion     with aneurysm repair-autologous-self and daughter    Hyperlipidemia     Hypertension     Respiratory acidosis 04/05/2025    Teeth missing     Wears glasses     For reading       Past Surgical History:   Procedure Laterality Date    BACK SURGERY      x2 herniated, crushed disc in the lumbar, ablation    CHOLECYSTECTOMY      COLONOSCOPY      EPIDURAL BLOCK INJECTION N/A 2/5/2025    Procedure: L1-L2 LUMBAR EPIDURAL STEROID INJECTION;  Surgeon: Nj Maddox DO;  Location: [facility] MAIN OR;  Service: Pain Management     FRACTURE SURGERY Left     hip-pinned    HYSTERECTOMY      salpingo-oophorectomy    MI INJECT SI JOINT ARTHRGRPHY&/ANES/STEROID W/PATRICIA Bilateral 12/18/2024    Procedure: BILATERAL SACROILIAC JOINT INJECTION;  Surgeon: Nj Maddox DO;  Location: [facility] MAIN OR;  Service: Pain Management     THORACIC AORTIC ANEURYSM REPAIR  09/2016    ascending thoracic aortic repair with RCA bypass with SVG x 1    TONSILLECTOMY      UPPER GASTROINTESTINAL ENDOSCOPY         No current facility-administered medications for this encounter.    No Known Allergies    Physical Exam: There were no vitals filed for this visit.  General: Awake, Alert, Oriented x 3, Mood and affect appropriate  Respiratory: Respirations even and unlabored  Cardiovascular: Peripheral pulses intact; no edema  Musculoskeletal Exam: tenderness to lumbar paraspinals    ASA Score: 3         Assessment: lumbar spondyosis    Plan:  proceed with bilateral L3-4 L5-S1 MBB #1  "

## 2025-05-07 NOTE — OP NOTE
OPERATIVE REPORT  PATIENT NAME: Montserrat Sumner    :  1950  MRN: [id number]  Pt Location: [facility] MINOR/PAIN ROOM 01    SURGERY DATE: 2025    Surgeons and Role:     * Nj Maddox DO - Primary    Preop Diagnosis:  Lumbar spondylosis [M47.816]    Post-Op Diagnosis Codes:     * Lumbar spondylosis [M47.816]    Procedure(s):  Bilateral - BILATERAL L3-L4 L5-S1 MEDIAL BRANCH BLOCK #1    Specimen(s):  * No specimens in log *    Estimated Blood Loss:   Minimal    Drains:  * No LDAs found *    Anesthesia Type:   Local    Operative Indications:  Lumbar spondylosis [M47.816]        PROCEDURE:  The patient gave informed written consent to proceed with this procedure following a detailed discussion of the risks and benefits associated with lumbar medial branch blocks at bilateral L3-4 L5-S1 including but not limited to infection, bleeding, headache, further exacerbation of current symptoms, neurological injury, and lack of efficacy.  The patient was then placed in prone position, the skin over the lumbar region was prepped with chlorhexadine, and the site was marked and draped with sterile towels.  Strict sterile technique was maintained throughout the procedure.  A time out was performed with full staff present to identify the patient, verify the procedure being performed, and review allergies    Flouroscopy was used to identify the appropriate lumbar anatomy.  The skin and subcutaneous tissue were anesthetized with 1% lidocaine.  A 22g 3.5 inch  spinal needle was inserted under fluoroscopic guidance to contact the junction of the superior articulating process and transverse process at the aformentioned levels. Final needle position was confirmed with fluoroscopy.  Aspiration was negative for blood or CSF.  Next, 0.5 ml of 0.5% bupivacaine was injected at each site, the needles were restyletted and withdrawn.  Fluoroscopic spot images were saved during the procedure.     There was no paresthesia during  needle placement and aspiration was negative at all times.  The patient tolerated the procedure well and there were no apparent complications.  Written and verbal discharge instructions were reviewed with the patient prior to discharge.  They will report the results of today's diagnostic injection via telephone within 24 hours       SIGNATURE: Nj Maddox,   DATE: May 7, 2025  TIME: 10:13 AM

## 2025-05-19 ENCOUNTER — PATIENT OUTREACH (OUTPATIENT)
Dept: CASE MANAGEMENT | Facility: OTHER | Age: 75
End: 2025-05-19

## 2025-05-19 NOTE — PROGRESS NOTES
OP RT CM called patient but could not leave voice mail. Outgoing in basket  reminder message sent. OP RT CM sent UTRL to patient home mailing address. This is the second unsuccessful outreach call.  First outreach call made 5/2/25. If no patient response case will be closed and OPRT  CM will remove self from case.   -----------------------------------------------------------------

## 2025-05-21 ENCOUNTER — APPOINTMENT (OUTPATIENT)
Dept: RADIOLOGY | Facility: HOSPITAL | Age: 75
End: 2025-05-21
Payer: COMMERCIAL

## 2025-05-21 ENCOUNTER — HOSPITAL ENCOUNTER (OUTPATIENT)
Facility: AMBULARY SURGERY CENTER | Age: 75
Setting detail: OUTPATIENT SURGERY
Discharge: HOME | End: 2025-05-21
Attending: STUDENT IN AN ORGANIZED HEALTH CARE EDUCATION/TRAINING PROGRAM | Admitting: STUDENT IN AN ORGANIZED HEALTH CARE EDUCATION/TRAINING PROGRAM
Payer: COMMERCIAL

## 2025-05-21 VITALS
HEART RATE: 84 BPM | RESPIRATION RATE: 18 BRPM | SYSTOLIC BLOOD PRESSURE: 135 MMHG | OXYGEN SATURATION: 91 % | TEMPERATURE: 97.4 F | DIASTOLIC BLOOD PRESSURE: 85 MMHG

## 2025-05-21 DIAGNOSIS — M47.816 LUMBAR SPONDYLOSIS: ICD-10-CM

## 2025-05-21 LAB — GLUCOSE SERPL-MCNC: 160 MG/DL (ref 65–140)

## 2025-05-21 PROCEDURE — 82948 REAGENT STRIP/BLOOD GLUCOSE: CPT

## 2025-05-21 PROCEDURE — 64494 INJ PARAVERT F JNT L/S 2 LEV: CPT | Performed by: STUDENT IN AN ORGANIZED HEALTH CARE EDUCATION/TRAINING PROGRAM

## 2025-05-21 PROCEDURE — 64493 INJ PARAVERT F JNT L/S 1 LEV: CPT | Performed by: STUDENT IN AN ORGANIZED HEALTH CARE EDUCATION/TRAINING PROGRAM

## 2025-05-21 RX ORDER — BUPIVACAINE HYDROCHLORIDE 5 MG/ML
INJECTION, SOLUTION EPIDURAL; INTRACAUDAL; PERINEURAL AS NEEDED
Status: DISCONTINUED | OUTPATIENT
Start: 2025-05-21 | End: 2025-05-21 | Stop reason: HOSPADM

## 2025-05-21 RX ORDER — LIDOCAINE HYDROCHLORIDE 10 MG/ML
INJECTION, SOLUTION EPIDURAL; INFILTRATION; INTRACAUDAL; PERINEURAL AS NEEDED
Status: DISCONTINUED | OUTPATIENT
Start: 2025-05-21 | End: 2025-05-21 | Stop reason: HOSPADM

## 2025-05-21 NOTE — OP NOTE
OPERATIVE REPORT  PATIENT NAME: Montserrat Sumner    :  1950  MRN: [id number]  Pt Location: [facility] MINOR/PAIN ROOM 01    SURGERY DATE: 2025    Surgeons and Role:     * Nj Maddox DO - Primary    Preop Diagnosis:  Lumbar spondylosis [M47.816]    Post-Op Diagnosis Codes:     * Lumbar spondylosis [M47.816]    Procedure(s):  Bilateral - BILATERAL L3-L4 L5-S1 MEDIAL BRANCH BLOCK #2    Specimen(s):  * No specimens in log *    Estimated Blood Loss:   Minimal    Drains:  * No LDAs found *    Anesthesia Type:   Local    Operative Indications:  Lumbar spondylosis [M47.816]        PROCEDURE:  The patient gave informed written consent to proceed with this procedure following a detailed discussion of the risks and benefits associated with lumbar medial branch blocks at bilateral L3-4, L5-S1 including but not limited to infection, bleeding, headache, further exacerbation of current symptoms, neurological injury, and lack of efficacy.  The patient was then placed in prone position, the skin over the lumbar region was prepped with chlorhexadine, and the site was marked and draped with sterile towels.  Strict sterile technique was maintained throughout the procedure.  A time out was performed with full staff present to identify the patient, verify the procedure being performed, and review allergies    Flouroscopy was used to identify the appropriate lumbar anatomy.  The skin and subcutaneous tissue were anesthetized with 1% lidocaine.  A 22g 3.5 inch  spinal needle was inserted under fluoroscopic guidance to contact the junction of the superior articulating process and transverse process at the aformentioned levels. Final needle position was confirmed with fluoroscopy.  Aspiration was negative for blood or CSF.  Next, 0.5 ml of 0.5% bupivacaine was injected at each site, the needles were restyletted and withdrawn.  Fluoroscopic spot images were saved during the procedure.     There was no paresthesia during  needle placement and aspiration was negative at all times.  The patient tolerated the procedure well and there were no apparent complications.  Written and verbal discharge instructions were reviewed with the patient prior to discharge.  They will report the results of today's diagnostic injection via telephone within 24 hours        SIGNATURE: Nj Maddox,   DATE: May 21, 2025  TIME: 9:23 AM

## 2025-05-21 NOTE — DISCHARGE INSTRUCTIONS

## 2025-05-21 NOTE — INTERVAL H&P NOTE
H&P reviewed. After examining the patient I find no changes in the patients condition since the H&P had been written.    Vitals:    05/21/25 0910   BP: 106/78   Pulse: 92   Resp: 18   Temp: (!) 97.4 °F (36.3 °C)   SpO2: 92%

## 2025-05-27 ENCOUNTER — TELEPHONE (OUTPATIENT)
Age: 75
End: 2025-05-27

## 2025-05-27 DIAGNOSIS — F41.9 ANXIETY: Chronic | ICD-10-CM

## 2025-05-27 RX ORDER — VENLAFAXINE HYDROCHLORIDE 150 MG/1
150 CAPSULE, EXTENDED RELEASE ORAL DAILY
Qty: 30 CAPSULE | Refills: 0 | Status: SHIPPED | OUTPATIENT
Start: 2025-05-27 | End: 2025-05-28 | Stop reason: SDUPTHER

## 2025-05-27 NOTE — TELEPHONE ENCOUNTER
Caller: pt    Doctor: Dr. lopez    Reason for call: pt wants to know when she should make a f/u appt?    Call back#: [phone]

## 2025-05-27 NOTE — TELEPHONE ENCOUNTER
LMOM to CB, CB# provided    When pt calls back, we need her pain diaries for procedures done to move to next step

## 2025-05-27 NOTE — TELEPHONE ENCOUNTER
Yadira visiting nurse is calling stating patient will be discharged next week.  She is feeling ok.    Any questions, please call nurse Yadira at [phone]

## 2025-05-28 ENCOUNTER — TELEPHONE (OUTPATIENT)
Dept: ADMINISTRATIVE | Facility: OTHER | Age: 75
End: 2025-05-28

## 2025-05-28 ENCOUNTER — OFFICE VISIT (OUTPATIENT)
Dept: FAMILY MEDICINE CLINIC | Facility: CLINIC | Age: 75
End: 2025-05-28
Payer: COMMERCIAL

## 2025-05-28 VITALS
DIASTOLIC BLOOD PRESSURE: 78 MMHG | HEART RATE: 80 BPM | RESPIRATION RATE: 18 BRPM | HEIGHT: 63 IN | BODY MASS INDEX: 30.83 KG/M2 | SYSTOLIC BLOOD PRESSURE: 124 MMHG | OXYGEN SATURATION: 95 % | WEIGHT: 174 LBS

## 2025-05-28 DIAGNOSIS — I25.10 CORONARY ARTERY DISEASE INVOLVING NATIVE CORONARY ARTERY OF NATIVE HEART WITHOUT ANGINA PECTORIS: Chronic | ICD-10-CM

## 2025-05-28 DIAGNOSIS — Z95.1 HISTORY OF CORONARY ARTERY BYPASS GRAFT: ICD-10-CM

## 2025-05-28 DIAGNOSIS — F41.9 ANXIETY: Chronic | ICD-10-CM

## 2025-05-28 DIAGNOSIS — K83.9 BILE DUCT ABNORMALITY: ICD-10-CM

## 2025-05-28 DIAGNOSIS — I10 PRIMARY HYPERTENSION: Chronic | ICD-10-CM

## 2025-05-28 DIAGNOSIS — H02.003 ENTROPION OF RIGHT EYELID: ICD-10-CM

## 2025-05-28 DIAGNOSIS — E11.00 TYPE 2 DIABETES MELLITUS WITH HYPEROSMOLARITY WITHOUT COMA, WITHOUT LONG-TERM CURRENT USE OF INSULIN (HCC): Primary | ICD-10-CM

## 2025-05-28 PROBLEM — J20.9 ACUTE BRONCHITIS: Status: RESOLVED | Noted: 2024-10-08 | Resolved: 2025-05-28

## 2025-05-28 PROBLEM — R73.03 PREDIABETES: Status: RESOLVED | Noted: 2023-07-14 | Resolved: 2025-05-28

## 2025-05-28 PROBLEM — J96.00 ACUTE RESPIRATORY FAILURE (HCC): Status: RESOLVED | Noted: 2025-04-05 | Resolved: 2025-05-28

## 2025-05-28 PROCEDURE — 99215 OFFICE O/P EST HI 40 MIN: CPT | Performed by: FAMILY MEDICINE

## 2025-05-28 RX ORDER — GENTAMICIN SULFATE 3 MG/ML
1 SOLUTION/ DROPS OPHTHALMIC 3 TIMES DAILY
Qty: 5 ML | Refills: 0 | Status: SHIPPED | OUTPATIENT
Start: 2025-05-28

## 2025-05-28 RX ORDER — VENLAFAXINE HYDROCHLORIDE 150 MG/1
150 CAPSULE, EXTENDED RELEASE ORAL DAILY
Qty: 90 CAPSULE | Refills: 1 | Status: SHIPPED | OUTPATIENT
Start: 2025-05-28

## 2025-05-28 NOTE — PROGRESS NOTES
Subjective:      Patient ID: Montserrat Sumner is a 74 y.o. female.    74-year-old with extensive past medical history presents for follow-up of chronic conditions including coronary artery disease, tension, diabetes.  She is undergoing treatments for her chronic back pain with pain management.  Has been going for RFA trial.  She does follow-up with pulmonologist.  She was never contacted by diabetic education.  She remains on Glucotrol XL 2.5 mg once daily.  She was contacted by psychiatry and is currently on a wait list to establish care.  Until that time I am prescribing her psychiatric medications        Past Medical History[1]    Family History[2]    Past Surgical History[3]     reports that she has quit smoking. Her smoking use included cigarettes. She started smoking about 57 years ago. She has a 14.4 pack-year smoking history. She has been exposed to tobacco smoke. She has never used smokeless tobacco. She reports current alcohol use. She reports current drug use.    Current Medications[4]    The following portions of the patient's history were reviewed and updated as appropriate: allergies, current medications, past family history, past medical history, past social history, past surgical history and problem list.    Review of Systems   Constitutional: Negative.  Negative for activity change, appetite change, fatigue and unexpected weight change.   HENT: Negative.  Negative for congestion, ear discharge, mouth sores, sinus pressure and tinnitus.    Eyes: Negative.  Negative for photophobia and visual disturbance.   Respiratory:  Positive for shortness of breath. Negative for cough, chest tightness and wheezing.    Cardiovascular:  Positive for palpitations. Negative for chest pain.   Gastrointestinal:  Negative for abdominal distention, abdominal pain, constipation, diarrhea, nausea and vomiting.   Endocrine: Negative.  Negative for polydipsia, polyphagia and polyuria.   Genitourinary: Negative.  Negative  "for difficulty urinating.   Musculoskeletal:  Positive for arthralgias (Right hip), back pain and gait problem (Difficulty ambulating.  Wheelchair).        Using rolling walker   Skin: Negative.    Neurological:  Positive for weakness (Left leg) and numbness (Bilateral feet). Negative for dizziness, syncope and light-headedness.   Hematological:  Bruises/bleeds easily.   Psychiatric/Behavioral:  Positive for agitation, dysphoric mood and sleep disturbance. The patient is nervous/anxious.            Objective:    /78   Pulse 80   Resp 18   Ht 5' 3\" (1.6 m)   Wt 78.9 kg (174 lb)   LMP  (LMP Unknown)   SpO2 95%   BMI 30.82 kg/m²      Physical Exam  Vitals and nursing note reviewed.   Constitutional:       General: She is not in acute distress.     Appearance: She is well-developed. She is ill-appearing. She is not toxic-appearing or diaphoretic.   HENT:      Head: Normocephalic and atraumatic.      Nose: Nose normal.      Mouth/Throat:      Mouth: Mucous membranes are moist.      Pharynx: Oropharynx is clear.     Eyes:      Comments: Bilateral cataracts.   Neck:      Vascular: No carotid bruit.     Cardiovascular:      Rate and Rhythm: Normal rate and regular rhythm.      Pulses: Normal pulses.      Heart sounds: Normal heart sounds. No murmur heard.     Comments: Normal dorsalis pedis pulses  Pulmonary:      Effort: Pulmonary effort is normal.      Breath sounds: Wheezing present. No rales.      Comments: Diminished breath sounds bilaterally with expiratory wheezing.  Moist mucousy cough  Abdominal:      General: Bowel sounds are normal. There is no distension.      Palpations: Abdomen is soft.      Tenderness: There is no abdominal tenderness. There is no guarding or rebound.     Musculoskeletal:         General: Normal range of motion.      Cervical back: Normal range of motion and neck supple.   Lymphadenopathy:      Cervical: No cervical adenopathy.     Skin:     General: Skin is warm and dry.      " Findings: Bruising present. No rash.     Neurological:      Mental Status: She is alert and oriented to person, place, and time.      Sensory: Sensory deficit (Bilateral feet) present.      Deep Tendon Reflexes: Reflexes are normal and symmetric.      Comments: Remains in wheelchair   Psychiatric:         Behavior: Behavior normal.         Thought Content: Thought content normal.         Judgment: Judgment normal.           Recent Results (from the past 6 weeks)   Fingerstick Glucose (POCT)    Collection Time: 04/18/25 10:02 AM   Result Value Ref Range    POC Glucose 92 65 - 140 mg/dl   Non-gynecologic cytology    Collection Time: 04/18/25 11:20 AM   Result Value Ref Range    Case Report       Non-gynecologic Cytology                          Case: [id number]                                  Authorizing Provider:  Vero Gomez MD         Collected:           04/18/2025 1120              Ordering Location:     [facility]       Received:            04/18/2025 [facility]                                     [facility]                                                           Pathologist:           Reyna Lewis MD                                                         Specimen:    Brushing, common bile duct                                                                 Final Diagnosis       A. Common Bile Duct, Brushing:  Non-diagnostic/Insufficient   Bile and debris  Bacterial rods and cocci     Satisfactory for evaluation.     Note:  - The bacteria may represent GI contamination. Suggest clinical correlation and appropriate follow-up.   -  As reported in the “WHO Reporting System for Pancreaticobiliary Cytopathology*” the diagnostic category of “non-diagnostic/insufficient” in a bile duct brushing carries a 28-69% risk of malignancy being found in subsequent ERCP brushing and/or biopsy/excisional specimen.  The usual management following an initial diagnosis of “non-diagnostic/insufficient” is a  repeat ERCP  with cholangioscopy, brushing and biopsy. Ultimately clinical and radiologic correlation is needed for this patient in arriving at the actual management plan.     *Nino MARIN, Field SHOOK, Nohemy WOO (Eds). (2022). WHO Reporting System for Pancreaticobiliary Cytopathology. [place].  FNAB - fine needle aspiration/biopsy     Interpretation performed at [facility], [address] [place] [address]        Gross Description       A. 20mL, pale yellow tint, clear, received in [facility]  w/ brush        Additional Information       [employer] [facility] FDA approved ,  and ThinPrep Imaging Duo System are utilized with strict adherence to the 's instruction manual to prepare gynecologic and non-gynecologic cytology specimens for the production of ThinPrep slides as well as for gynecologic ThinPrep imaging. These processes have been validated by our laboratory and/or by the .    These tests were developed and their performance characteristics determined by [facility] or [facility]. They may not be cleared or approved by the U.S. Food and Drug Administration. The FDA has determined that such clearance or approval is not necessary. These tests are used for clinical purposes. They should not be regarded as investigational or for research. This laboratory has been approved by [place], designated as a high-complexity laboratory and is qualified to perform these tests.       ECG 12 lead    Collection Time: 04/18/25 12:05 PM   Result Value Ref Range    Ventricular Rate 74 BPM    Atrial Rate 74 BPM    ME Interval 142 ms    QRSD Interval 96 ms    QT Interval 402 ms    QTC Interval 446 ms    P Axis 57 degrees    QRS Axis -17 degrees    T Wave Axis 70 degrees   Fingerstick Glucose (POCT)    Collection Time: 05/21/25  9:10 AM   Result Value Ref Range    POC Glucose 160 (H) 65 - 140 mg/dl       Assessment/Plan:    CAD (coronary artery disease)  Control risk factors  including hypertension, hyperlipidemia as well as diabetes    - Patient remains on aspirin and atorvastatin.  Does need follow-up with cardiology    Hypertension  Hypertension remains adequately controlled on current regimen of amlodipine, metoprolol as well as losartan.    Bile duct abnormality  Patient continues to follow-up with gastroenterology as well as surgical oncology    Type 2 diabetes mellitus, without long-term current use of insulin ([facility])    Lab Results   Component Value Date    HGBA1C 9.0 (H) 04/05/2025     Patient had nonfasting glucose performed here in the office at 155.  Patient again given an order for diabetic education.  I believe some of her hyperglycemia is related to frequent use of steroids for chronic pain as well as her lungs.  Continue on Glucotrol XL 2.5 mg once daily.  Was not able to tolerate metformin in the past due to gastrointestinal side effects    Anxiety  Patient is on wait list to get in with [facility] psychiatry.  She is no longer seeing psychiatrist in [place].  She remains on duloxetine as well as Effexor.  These were medications that she had previously from prior psychiatrist.  She is on duloxetine for nerve related pain and Effexor for treatment of anxiety/depression.  She also has as needed alprazolam.    Entropion of right eyelid  Prescription provided for gentamicin eyedrops.  Referral to optometry          Problem List Items Addressed This Visit        Cardiovascular and Mediastinum    CAD (coronary artery disease) (Chronic)    Control risk factors including hypertension, hyperlipidemia as well as diabetes    - Patient remains on aspirin and atorvastatin.  Does need follow-up with cardiology         Hypertension (Chronic)    Hypertension remains adequately controlled on current regimen of amlodipine, metoprolol as well as losartan.         Relevant Orders    CBC and differential    TSH, 3rd generation with Free T4 reflex       Digestive    Bile duct abnormality     Patient continues to follow-up with gastroenterology as well as surgical oncology            Endocrine    Type 2 diabetes mellitus, without long-term current use of insulin ([facility]) - Primary      Lab Results   Component Value Date    HGBA1C 9.0 (H) 04/05/2025     Patient had nonfasting glucose performed here in the office at 155.  Patient again given an order for diabetic education.  I believe some of her hyperglycemia is related to frequent use of steroids for chronic pain as well as her lungs.  Continue on Glucotrol XL 2.5 mg once daily.  Was not able to tolerate metformin in the past due to gastrointestinal side effects         Relevant Orders    Ambulatory referral to Diabetic Education    Ambulatory referral to Podiatry    Comprehensive metabolic panel    Hemoglobin A1C    Albumin / creatinine urine ratio    Lipid panel    Fingerstick Glucose (POCT)       Behavioral Health    Anxiety (Chronic)    Patient is on wait list to get in with [facility] psychiatry.  She is no longer seeing psychiatrist in [place].  She remains on duloxetine as well as Effexor.  These were medications that she had previously from prior psychiatrist.  She is on duloxetine for nerve related pain and Effexor for treatment of anxiety/depression.  She also has as needed alprazolam.         Relevant Medications    venlafaxine (EFFEXOR-XR) 150 mg 24 hr capsule    Other Relevant Orders    TSH, 3rd generation with Free T4 reflex       Eye    Entropion of right eyelid    Prescription provided for gentamicin eyedrops.  Referral to optometry         Relevant Medications    gentamicin (GARAMYCIN) 0.3 % ophthalmic solution    Other Relevant Orders    Ambulatory Referral to Optometry       Surgery/Wound/Pain    History of coronary artery bypass graft    Relevant Orders    CBC and differential              [1]  Past Medical History:  Diagnosis Date   • Acute bronchitis 10/08/2024   • Acute respiratory failure (HCC) 04/05/2025   • Anxiety    • Chronic  "pain 03/06/2023    lower abdomen and back   • Colon polyp    • COPD (chronic obstructive pulmonary disease) (HCC)     \"passes out\" with O2 levels dropping   • Disease of thyroid gland    • GERD (gastroesophageal reflux disease)    • History of transfusion     with aneurysm repair-autologous-self and daughter   • Hyperlipidemia    • Hypertension    • Respiratory acidosis 04/05/2025   • Teeth missing    • Wears glasses     For reading   [2]  Family History  Problem Relation Name Age of Onset   • Breast cancer Mother  60   [3]  Past Surgical History:  Procedure Laterality Date   • BACK SURGERY      x2 herniated, crushed disc in the lumbar, ablation   • CHOLECYSTECTOMY     • COLONOSCOPY     • EPIDURAL BLOCK INJECTION N/A 2/5/2025    Procedure: L1-L2 LUMBAR EPIDURAL STEROID INJECTION;  Surgeon: Nj Maddox DO;  Location: [facility] MAIN OR;  Service: Pain Management    • FRACTURE SURGERY Left     hip-pinned   • HYSTERECTOMY      salpingo-oophorectomy   • NERVE BLOCK Bilateral 5/7/2025    Procedure: BILATERAL L3-L4 L5-S1 MEDIAL BRANCH BLOCK #1;  Surgeon: Nj Maddox DO;  Location: [facility] MAIN OR;  Service: Pain Management    • NERVE BLOCK Bilateral 5/21/2025    Procedure: BILATERAL L3-L4 L5-S1 MEDIAL BRANCH BLOCK #2;  Surgeon: Nj Maddox DO;  Location: [facility] MAIN OR;  Service: Pain Management    • VA INJECT SI JOINT ARTHRGRPHY&/ANES/STEROID W/PATRICIA Bilateral 12/18/2024    Procedure: BILATERAL SACROILIAC JOINT INJECTION;  Surgeon: Nj Maddox DO;  Location: [facility] MAIN OR;  Service: Pain Management    • THORACIC AORTIC ANEURYSM REPAIR  09/2016    ascending thoracic aortic repair with RCA bypass with SVG x 1   • TONSILLECTOMY     • UPPER GASTROINTESTINAL ENDOSCOPY     [4]    Current Outpatient Medications:   •  gentamicin (GARAMYCIN) 0.3 % ophthalmic solution, Administer 1 drop to both eyes 3 (three) times a day, Disp: 5 mL, Rfl: 0  •  venlafaxine (EFFEXOR-XR) 150 mg 24 hr capsule, Take 1 capsule " (150 mg total) by mouth in the morning, Disp: 90 capsule, Rfl: 1  •  acetylcysteine (MUCOMYST) 200 mg/mL nebulizer solution, Use 2 ml and add to 1 vial of your albuterol twice a day as needed for thick mucous, Disp: 120 mL, Rfl: 3  •  albuterol (2.5 mg/3 mL) 0.083 % nebulizer solution, Take 3 mL (2.5 mg total) by nebulization 4 (four) times a day as needed for wheezing or shortness of breath, Disp: 360 mL, Rfl: 7  •  albuterol (Proventil HFA) 90 mcg/act inhaler, Inhale 2 puffs every 4 (four) hours as needed for wheezing, Disp: 6.7 g, Rfl: 7  •  ALPRAZolam (XANAX) 0.5 mg tablet, Take 2 tablets (1 mg total) by mouth 2 (two) times a day as needed for anxiety, Disp: 60 tablet, Rfl: 1  •  amLODIPine (NORVASC) 5 mg tablet, Take 1 tablet (5 mg total) by mouth daily, Disp: 90 tablet, Rfl: 2  •  aspirin 81 mg chewable tablet, Chew 81 mg in the morning., Disp: , Rfl:   •  atorvastatin (LIPITOR) 40 mg tablet, Take 1 tablet (40 mg total) by mouth every morning, Disp: 90 tablet, Rfl: 3  •  b complex vitamins capsule, Take 1 capsule by mouth once a week (Patient not taking: Reported on 4/22/2025), Disp: , Rfl:   •  benzonatate (TESSALON PERLES) 100 mg capsule, Take 1 capsule (100 mg total) by mouth 3 (three) times a day as needed for cough, Disp: 30 capsule, Rfl: 3  •  buprenorphine-naloxone (Suboxone) 4-1 MG, every morning Wafer, Disp: , Rfl:   •  celecoxib (CeleBREX) 200 mg capsule, Take 1 capsule (200 mg total) by mouth daily, Disp: 90 capsule, Rfl: 1  •  docusate sodium (COLACE) 100 mg capsule, Take 1 capsule (100 mg total) by mouth 2 (two) times a day, Disp: , Rfl:   •  DULoxetine (CYMBALTA) 60 mg delayed release capsule, Take 1 capsule (60 mg total) by mouth daily, Disp: 90 capsule, Rfl: 1  •  esomeprazole (NexIUM) 40 MG capsule, Take 1 capsule (40 mg total) by mouth 2 (two) times a day before meals, Disp: 180 capsule, Rfl: 1  •  fluticasone-umeclidinium-vilanterol (Trelegy Ellipta) 200-62.5-25 mcg/actuation AEPB inhaler,  Inhale 1 puff daily Rinse mouth after use., Disp: 60 blister, Rfl: 11  •  glipiZIDE (GLUCOTROL XL) 2.5 mg 24 hr tablet, Take 1 tablet (2.5 mg total) by mouth daily, Disp: 90 tablet, Rfl: 0  •  levothyroxine 100 mcg tablet, Take 1 tablet (100 mcg total) by mouth every morning, Disp: 90 tablet, Rfl: 0  •  lidocaine (Lidoderm) 5 %, Apply 1 patch topically over 12 hours daily Remove & Discard patch within 12 hours or as directed by MD, Disp: 30 patch, Rfl: 1  •  losartan (COZAAR) 25 mg tablet, Take 1 tablet (25 mg total) by mouth every morning, Disp: 90 tablet, Rfl: 2  •  metoprolol succinate (TOPROL-XL) 50 mg 24 hr tablet, Take 1 tablet (50 mg total) by mouth every morning, Disp: 90 tablet, Rfl: 2  •  nicotine (NICODERM CQ) 7 mg/24hr TD 24 hr patch, Place 1 patch on the skin over 24 hours daily, Disp: 28 patch, Rfl: 0  •  Omega-3 Fatty Acids (fish oil) 1,000 mg, Take 2 capsules (2,000 mg total) by mouth 2 (two) times a day, Disp: 180 capsule, Rfl: 4  •  oxygen gas, Inhale continuous as needed 2.5 L, Disp: , Rfl:   •  polyethylene glycol (MIRALAX) 17 g packet, Take 17 g by mouth daily as needed (Constipation), Disp: , Rfl:   •  predniSONE 10 mg tablet, 4 tablets (40mg) by mouth once daily for 3 days, followed by 3 tablets (30mg) by mouth once daily for 3 days, followed by 2 tablets (20mg) by mouth once daily for 3 days, followed by 1 tablet (10mg) by mouth once daily for 3 days., Disp: , Rfl:   •  sucralfate (CARAFATE) 1 g tablet, Take 1 tablet (1 g total) by mouth 4 (four) times a day, Disp: 120 tablet, Rfl: 2  •  tiZANidine (ZANAFLEX) 4 mg tablet, Take 1 tablet (4 mg total) by mouth every 8 (eight) hours as needed for muscle spasms, Disp: 90 tablet, Rfl: 2  •  traZODone (DESYREL) 50 mg tablet, Take 2 tablets (100 mg total) by mouth daily at bedtime, Disp: 90 tablet, Rfl: 1

## 2025-05-28 NOTE — ASSESSMENT & PLAN NOTE
Control risk factors including hypertension, hyperlipidemia as well as diabetes    - Patient remains on aspirin and atorvastatin.  Does need follow-up with cardiology

## 2025-05-28 NOTE — ASSESSMENT & PLAN NOTE
Hypertension remains adequately controlled on current regimen of amlodipine, metoprolol as well as losartan.

## 2025-05-28 NOTE — TELEPHONE ENCOUNTER
Caller: luna Mariano    Doctor: Dr. Maddox    Reason for call: pt misplaced her pain diary.  Pt stated this MBB was much better this time than last time.  She said it didn't last very long and she does want to proceed with the next step    Call back#: [phone]

## 2025-05-28 NOTE — TELEPHONE ENCOUNTER
Upon review of the In Basket request we were able to locate, review, and update the patient chart as requested for Diabetic Eye Exam.    Any additional questions or concerns should be emailed to the Practice Liaisons via the appropriate education email address, please do not reply via In Basket.    Thank you  Twila Mccarty MA   [facility]

## 2025-05-28 NOTE — ASSESSMENT & PLAN NOTE
Lab Results   Component Value Date    HGBA1C 9.0 (H) 04/05/2025     Patient had nonfasting glucose performed here in the office at 155.  Patient again given an order for diabetic education.  I believe some of her hyperglycemia is related to frequent use of steroids for chronic pain as well as her lungs.  Continue on Glucotrol XL 2.5 mg once daily.  Was not able to tolerate metformin in the past due to gastrointestinal side effects

## 2025-05-28 NOTE — TELEPHONE ENCOUNTER
----- Message from Marie CARPIO sent at 5/27/2025 11:28 AM EDT -----  Regarding: Eye Exam  05/27/25 11:28 Deni, our patient attached above has had Diabetic Eye Exam completed/performed. Please assist in updating the patient chart by pulling the document from the Media Tab. The date of service is 04/30/2025 at [facility]. Thank you,Marie RITCHIE

## 2025-05-28 NOTE — ASSESSMENT & PLAN NOTE
Patient is on wait list to get in with [facility] psychiatry.  She is no longer seeing psychiatrist in [place].  She remains on duloxetine as well as Effexor.  These were medications that she had previously from prior psychiatrist.  She is on duloxetine for nerve related pain and Effexor for treatment of anxiety/depression.  She also has as needed alprazolam.

## 2025-05-30 NOTE — TELEPHONE ENCOUNTER
S/w pt who reports that she cannot find the pain diary but does feel that she has 80% relief from the procedure that lasted for ~ 1- 1 1/2 hours. Pt did feel that she got relief with walking, which is what causes a lot of her pain, but did rest for a part of the day.   Pt has had an RFA in the past and pain relief lasted for > 1 year.   Pt would like to have the ablation.     Please advise    NOTE: Pt's phone is broken, getting a new one next week. Pt does not want her phone number to be updated. She will keep her present phone number with the new phone.

## 2025-05-30 NOTE — TELEPHONE ENCOUNTER
Caller: luna Mariano     Doctor: Dr. Maddox    Reason for call: pt returning nurses call     Call back#: [phone] pt stated if she can not be reached on this number.   Please call [phone]

## 2025-06-03 ENCOUNTER — TELEPHONE (OUTPATIENT)
Dept: PAIN MEDICINE | Facility: CLINIC | Age: 75
End: 2025-06-03

## 2025-06-03 ENCOUNTER — OFFICE VISIT (OUTPATIENT)
Dept: PULMONOLOGY | Facility: MEDICAL CENTER | Age: 75
End: 2025-06-03
Payer: COMMERCIAL

## 2025-06-03 VITALS
OXYGEN SATURATION: 92 % | SYSTOLIC BLOOD PRESSURE: 108 MMHG | BODY MASS INDEX: 30.48 KG/M2 | DIASTOLIC BLOOD PRESSURE: 64 MMHG | WEIGHT: 172 LBS | HEIGHT: 63 IN | HEART RATE: 75 BPM | RESPIRATION RATE: 12 BRPM

## 2025-06-03 DIAGNOSIS — J41.0 SIMPLE CHRONIC BRONCHITIS (HCC): Primary | ICD-10-CM

## 2025-06-03 DIAGNOSIS — J43.2 CENTRILOBULAR EMPHYSEMA (HCC): Chronic | ICD-10-CM

## 2025-06-03 DIAGNOSIS — F17.210 CIGARETTE NICOTINE DEPENDENCE WITHOUT COMPLICATION: ICD-10-CM

## 2025-06-03 DIAGNOSIS — R09.02 HYPOXEMIA: ICD-10-CM

## 2025-06-03 PROCEDURE — 99214 OFFICE O/P EST MOD 30 MIN: CPT | Performed by: INTERNAL MEDICINE

## 2025-06-03 RX ORDER — ALBUTEROL SULFATE 90 UG/1
2 INHALANT RESPIRATORY (INHALATION) EVERY 4 HOURS PRN
Qty: 6.7 G | Refills: 8 | Status: SHIPPED | OUTPATIENT
Start: 2025-06-03

## 2025-06-03 NOTE — PROGRESS NOTES
Follow-up  Visit - Pulmonary Medicine   Name: Montserrat Sumner      : 1950      MRN: [id number]  Encounter Provider: Maik Smith DO  Encounter Date: 6/3/2025   Encounter department: [facility]  :  Assessment & Plan  Centrilobular emphysema (HCC)    Orders:    albuterol (Proventil HFA) 90 mcg/act inhaler; Inhale 2 puffs every 4 (four) hours as needed for wheezing    Complete PFT with post Bronchodilator and Six Minute walk; Future    Oxygen Supplies    Hypoxemia    She does have sleep-related hypoxemia does use oxygen 2 L/min at bedtime.  DME company is [employer].  Not having any nocturnal dyspnea.  I also recommend she use her oxygen 2 L/min as needed with activity.    Room air O2 saturation at rest was 94% and on room air after walking 200 feet O2 saturation decreased to 87%    On 2 L/min at rest O2 saturation was 96% and on 2 L/min after walking 200 feet O2 saturation was 93%    I did tell the social heart to use her portable oxygen tanks not that she got with oxygen conserving device and she did not find that she can make do with this over next 3 months that we could look into getting her a portable battery-operated concentrator.  Spoke with [employer] [facility] she had a 3-month trial on small oxygen tanks with conserving device    Orders:    Oxygen Supplies    Simple chronic bronchitis (HCC)  She is doing well on Trelegy 200 mcg 1 puff daily.  Last measured FEV1 was 1.05 L or 51% predicted obstructive ratio 64%.  This was by spirometry done 2024.  She has at least moderate airflow obstruction.  I did order a complete PFT with 6-minute walk test BEFORE next office visit         Cigarette nicotine dependence without complication  States she just quit smoking in May or so.  She is having some difficult difficulty as her  still smokes.  She is trying to remain smoke-free.  I did encourage her in her efforts to not resume smoking.    Last CT scan of her chest  done in April of this year was reviewed with her.  No suspicious lung lesions.           Return in about 3 months (around 9/3/2025).    History of Present Illness   Montserrat Sumner is a 74 y.o. female who presents for follow-up of her COPD.  She states she quit smoking in May.  Still gets some intermittent cough and wheeze and shortness of breath.  Does have home oxygen through [employer] which uses 2 L at night.  She would like a portable battery-operated concentrator and does not have one.  States her  still smokes which is making it difficult for her to remain smoke-free.    She is on Trelegy 200 mg 1 puff daily and does use her albuterol periodically.  Does not use her nebulizer with albuterol very often.    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  Does use oxygen 2 L/min at bedtime.    Spirometry done in November 2024 showed FEV1 of 1.05 L or 51% of predicted and obstructive index was 64%.  This was consistent with at least moderate airflow obstruction    Review of Systems   Constitutional:  Negative for chills, fever and unexpected weight change.   HENT:  Negative for congestion, rhinorrhea and sore throat.    Eyes:  Negative for discharge and redness.   Respiratory:          Does have some intermittent cough wheezing and shortness of breath   Cardiovascular:  Negative for chest pain, palpitations and leg swelling.   Gastrointestinal:  Negative for abdominal distention, abdominal pain and nausea.   Endocrine: Negative for polydipsia and polyphagia.   Genitourinary:  Negative for dysuria.   Musculoskeletal:  Negative for joint swelling and myalgias.   Skin:  Negative for rash.   Neurological:  Negative for light-headedness.   Psychiatric/Behavioral:  Negative for confusion.        Aside from what is mentioned in the HPI, ROS is otherwise negative         Medical History Reviewed by provider this encounter:  Tobacco  Allergies  Meds  Problems  Med Hx  Surg  "Hx  Fam Hx     .    Objective   /64 (BP Location: Left arm, Patient Position: Sitting, Cuff Size: Extra-Large)   Pulse 75   Resp 12   Ht 5' 3\" (1.6 m)   Wt 78 kg (172 lb)   LMP  (LMP Unknown)   SpO2 92%   BMI 30.47 kg/m²     Physical Exam  Vitals reviewed.   Constitutional:       General: She is not in acute distress.     Appearance: Normal appearance. She is well-developed.   HENT:      Head: Normocephalic.      Right Ear: External ear normal.      Left Ear: External ear normal.      Nose: Nose normal.      Mouth/Throat:      Mouth: Mucous membranes are moist.      Pharynx: Oropharynx is clear. No oropharyngeal exudate.     Eyes:      Conjunctiva/sclera: Conjunctivae normal.      Pupils: Pupils are equal, round, and reactive to light.       Cardiovascular:      Rate and Rhythm: Normal rate and regular rhythm.      Heart sounds: Normal heart sounds.   Pulmonary:      Effort: Pulmonary effort is normal.      Comments: There are a few coarse expiratory wheezes in both lower lobes  Abdominal:      General: There is no distension.      Palpations: Abdomen is soft.      Tenderness: There is no abdominal tenderness.     Musculoskeletal:      Cervical back: Neck supple.      Comments: No edema, cyanosis or clubbing   Lymphadenopathy:      Cervical: No cervical adenopathy.     Skin:     General: Skin is warm and dry.     Neurological:      General: No focal deficit present.      Mental Status: She is alert and oriented to person, place, and time.     Psychiatric:         Mood and Affect: Mood normal.         Behavior: Behavior normal.         Thought Content: Thought content normal.       Pulse oximetry testing:    Room air O2 saturation at rest was 94% and on room air after walking 200 feet O2 saturation decreased to 87%    On 2 L/min at rest O2 saturation was 96% and on 2 L/min after walking 200 feet O2 saturation was 93%    Radiology results:  Radiology Results Review: I personally reviewed the following " image studies in PACS and associated radiology reports: CT chest. My interpretation of the radiology images/reports is: 4/5/2025 CT scan of chest was reviewed.  Showed no evidence of any suspicious lung lesions.      Maik Smith DO

## 2025-06-03 NOTE — ASSESSMENT & PLAN NOTE
Orders:    albuterol (Proventil HFA) 90 mcg/act inhaler; Inhale 2 puffs every 4 (four) hours as needed for wheezing    Complete PFT with post Bronchodilator and Six Minute walk; Future    Oxygen Supplies

## 2025-06-06 ENCOUNTER — TELEPHONE (OUTPATIENT)
Dept: SURGICAL ONCOLOGY | Facility: CLINIC | Age: 75
End: 2025-06-06

## 2025-06-06 ENCOUNTER — TELEPHONE (OUTPATIENT)
Age: 75
End: 2025-06-06

## 2025-06-06 ENCOUNTER — PATIENT OUTREACH (OUTPATIENT)
Dept: PULMONOLOGY | Facility: CLINIC | Age: 75
End: 2025-06-06

## 2025-06-06 NOTE — TELEPHONE ENCOUNTER
Patient's daughter, Karin, called with patient present who provided verbal consent to speak with her. States patient has been very confused about her diagnosis and would like clarification of that. States she keeps saying she has a malignant tumor and is very concerned about it. The daughter is aware of MRI and follow up appointments in September but would like clarification of diagnosis. I did review Dr. Hernández's office note with her discussing IPMN and lifetime malignancy risk. Patient's daughter is inquiring if any additional testing clarified what the diagnosis is or if anything else needs to be done between now and September. [phone]

## 2025-06-06 NOTE — PROGRESS NOTES
Pt daughter called asking for general information about 3/26/2025 surgical oncology visit with Dr. Hernández - pt daughter asked specific questions about possible cancer and definitive results. Explained I could not overstep my scope of practice and provided Dr. Hernández's office number along with visit date and information. Pt was agreeable.

## 2025-06-06 NOTE — ASSESSMENT & PLAN NOTE
She is doing well on Trelegy 200 mcg 1 puff daily.  Last measured FEV1 was 1.05 L or 51% predicted obstructive ratio 64%.  This was by spirometry done November 2024.  She has at least moderate airflow obstruction.  I did order a complete PFT with 6-minute walk test BEFORE next office visit

## 2025-06-06 NOTE — ASSESSMENT & PLAN NOTE
States she just quit smoking in May or so.  She is having some difficult difficulty as her  still smokes.  She is trying to remain smoke-free.  I did encourage her in her efforts to not resume smoking.    Last CT scan of her chest done in April of this year was reviewed with her.  No suspicious lung lesions.

## 2025-06-06 NOTE — ASSESSMENT & PLAN NOTE
She does have sleep-related hypoxemia does use oxygen 2 L/min at bedtime.  DME company is [employer].  Not having any nocturnal dyspnea.  I also recommend she use her oxygen 2 L/min as needed with activity.    Room air O2 saturation at rest was 94% and on room air after walking 200 feet O2 saturation decreased to 87%    On 2 L/min at rest O2 saturation was 96% and on 2 L/min after walking 200 feet O2 saturation was 93%    I did tell the social heart to use her portable oxygen tanks not that she got with oxygen conserving device and she did not find that she can make do with this over next 3 months that we could look into getting her a portable battery-operated concentrator.  Spoke with [employer] Sylvester she had a 3-month trial on small oxygen tanks with conserving device    Orders:    Oxygen Supplies

## 2025-06-10 ENCOUNTER — OFFICE VISIT (OUTPATIENT)
Dept: PAIN MEDICINE | Facility: CLINIC | Age: 75
End: 2025-06-10
Payer: COMMERCIAL

## 2025-06-10 VITALS — HEIGHT: 63 IN | BODY MASS INDEX: 30.48 KG/M2 | WEIGHT: 172 LBS

## 2025-06-10 DIAGNOSIS — M47.816 LUMBAR SPONDYLOSIS: ICD-10-CM

## 2025-06-10 DIAGNOSIS — G89.4 CHRONIC PAIN SYNDROME: Primary | ICD-10-CM

## 2025-06-10 PROCEDURE — 99214 OFFICE O/P EST MOD 30 MIN: CPT | Performed by: STUDENT IN AN ORGANIZED HEALTH CARE EDUCATION/TRAINING PROGRAM

## 2025-06-10 NOTE — H&P (VIEW-ONLY)
Name: Montserrat Sumner      : 1950      MRN: [id number]  Encounter Provider: Nj Maddox DO  Encounter Date: 6/10/2025   Encounter department: [facility]  :  Assessment & Plan  Chronic pain syndrome         Lumbar spondylosis         Montserrat presents today for follow-up regarding her low back pain.  She is status post 2 lumbar medial branch blocks above and below the level of her fusion.  She does report significant improvement in standing and walking tolerance.  She had a positive diagnostic response with greater than 80% pain relief for 6 hours.  Given her improvement, she is a candidate for therapeutic radiofrequency ablation.  We will have this scheduled for her.    Complete risks and benefits including bleeding, infection, tissue reaction, nerve injury and allergic reaction were discussed. The approach was demonstrated using models and literature was provided. Verbal and written consent was obtained.    My impressions and treatment recommendations were discussed in detail with the patient who verbalized understanding and had no further questions.  Discharge instructions were provided. I personally saw and examined the patient and I agree with the above discussed plan of care.    History of Present Illness     Montserrat Sumner is a 74 y.o. female who presents for a follow up office visit in regards to Back Pain.  Montserrat presents today for follow-up regarding her low back pain.  She is status post lumbar medial branch block and had 2 positive diagnostic test.  She reports greater than 80% improvement for 6 hours.  Today she reports her pain is the same.  Her current level pain is 8 out of 10.  The pain is constant associate with sharpness and shooting.  She reports her pain interferes with her daily daily activities.  She continues with her home exercise program.  Pain is localized to the lumbar area with radiation of the bilateral buttocks.  She presents today for  further evaluation.        Opioid contract date    Last UDS Date: 4/24/2023                    last taken on    Review of Systems   Respiratory:  Negative for chest tightness and shortness of breath.    Cardiovascular:  Negative for chest pain.   Gastrointestinal:  Negative for constipation, diarrhea, nausea and vomiting.   Musculoskeletal:  Positive for back pain, gait problem, joint swelling and neck stiffness. Negative for arthralgias, myalgias and neck pain.   Skin:  Negative for rash.   Neurological:  Positive for weakness, numbness and headaches. Negative for dizziness, seizures and light-headedness.   Psychiatric/Behavioral:  Negative for sleep disturbance.    All other systems reviewed and are negative.    Medical History Reviewed by provider this encounter:     .  Past Medical History   Past Medical History[1]  Past Surgical History[2]  Family History[3]   reports that she has quit smoking. Her smoking use included cigarettes. She started smoking about 57 years ago. She has a 14.4 pack-year smoking history. She has been exposed to tobacco smoke. She has never used smokeless tobacco. She reports current alcohol use. She reports current drug use.  Current Outpatient Medications   Medication Instructions    acetylcysteine (MUCOMYST) 200 mg/mL nebulizer solution Use 2 ml and add to 1 vial of your albuterol twice a day as needed for thick mucous    albuterol (Proventil HFA) 90 mcg/act inhaler 2 puffs, Inhalation, Every 4 hours PRN    albuterol 2.5 mg, Nebulization, 4 times daily PRN    ALPRAZolam (XANAX) 1 mg, Oral, 2 times daily PRN    amLODIPine (NORVASC) 5 mg, Oral, Daily    aspirin 81 mg, Daily    atorvastatin (LIPITOR) 40 mg, Oral, Every morning    b complex vitamins capsule 1 capsule, Weekly    benzonatate (TESSALON PERLES) 100 mg, Oral, 3 times daily PRN    buprenorphine-naloxone (Suboxone) 4-1 MG Every morning    celecoxib (CELEBREX) 200 mg, Oral, Daily    docusate sodium (COLACE) 100 mg, Oral, 2 times  "daily    DULoxetine (CYMBALTA) 60 mg, Oral, Daily    esomeprazole (NEXIUM) 40 mg, Oral, 2 times daily before meals    fish oil 2,000 mg, Oral, 2 times daily    fluticasone-umeclidinium-vilanterol (Trelegy Ellipta) 200-62.5-25 mcg/actuation AEPB inhaler 1 puff, Inhalation, Daily, Rinse mouth after use.    gentamicin (GARAMYCIN) 0.3 % ophthalmic solution 1 drop, Both Eyes, 3 times daily    glipiZIDE (GLUCOTROL XL) 2.5 mg, Oral, Daily    levothyroxine 100 mcg, Oral, Every morning    lidocaine (Lidoderm) 5 % 1 patch, Topical, Daily, Remove & Discard patch within 12 hours or as directed by MD    losartan (COZAAR) 25 mg, Oral, Every morning    metoprolol succinate (TOPROL-XL) 50 mg, Oral, Every morning    nicotine (NICODERM CQ) 7 mg/24hr TD 24 hr patch 1 patch, Transdermal, Daily    oxygen gas Continuous PRN    polyethylene glycol (MIRALAX) 17 g, Oral, Daily PRN    predniSONE 10 mg tablet 4 tablets (40mg) by mouth once daily for 3 days, followed by 3 tablets (30mg) by mouth once daily for 3 days, followed by 2 tablets (20mg) by mouth once daily for 3 days, followed by 1 tablet (10mg) by mouth once daily for 3 days.    sucralfate (CARAFATE) 1 g, Oral, 4 times daily    tiZANidine (ZANAFLEX) 4 mg, Oral, Every 8 hours PRN    traZODone (DESYREL) 100 mg, Oral, Daily at bedtime    venlafaxine (EFFEXOR-XR) 150 mg, Oral, Daily   Allergies[4]   Medications Ordered Prior to Encounter[5]   Social History[6]     Objective   Ht 5' 3\" (1.6 m)   Wt 78 kg (172 lb)   LMP  (LMP Unknown)   BMI 30.47 kg/m²      Pain Score:   8  Physical Exam  Constitutional: normal, well developed, well nourished, alert, in no distress and non-toxic and no overt pain behavior.  Eyes: anicteric  HEENT: grossly intact  Neck: supple, symmetric, trachea midline and no masses   Pulmonary: even and unlabored  Cardiovascular: No edema or pitting edema present  Skin: Normal without rashes or lesions and well hydrated  Psychiatric: Mood and affect " "appropriate  Neurologic: Cranial Nerves II-XII grossly intact  Musculoskeletal: Gait is antalgic.  Tenderness in the lumbar paraspinals with positive facet loading.  Strength preserved in lower limbs.      Administrative Statements   I have spent a total time of 31 minutes in caring for this patient on the day of the visit/encounter including Prognosis, Risks and benefits of tx options, Instructions for management, Impressions, Documenting in the medical record, Reviewing/placing orders in the medical record (including tests, medications, and/or procedures), and Obtaining or reviewing history  .       [1]   Past Medical History:  Diagnosis Date    Acute bronchitis 10/08/2024    Acute respiratory failure (HCC) 04/05/2025    Anxiety     Chronic pain 03/06/2023    lower abdomen and back    Colon polyp     COPD (chronic obstructive pulmonary disease) (HCC)     \"passes out\" with O2 levels dropping    Disease of thyroid gland     GERD (gastroesophageal reflux disease)     History of transfusion     with aneurysm repair-autologous-self and daughter    Hyperlipidemia     Hypertension     Respiratory acidosis 04/05/2025    Teeth missing     Wears glasses     For reading   [2]   Past Surgical History:  Procedure Laterality Date    BACK SURGERY      x2 herniated, crushed disc in the lumbar, ablation    CHOLECYSTECTOMY      COLONOSCOPY      EPIDURAL BLOCK INJECTION N/A 2/5/2025    Procedure: L1-L2 LUMBAR EPIDURAL STEROID INJECTION;  Surgeon: Nj Maddox DO;  Location: [facility] OR;  Service: Pain Management     FRACTURE SURGERY Left     hip-pinned    HYSTERECTOMY      salpingo-oophorectomy    NERVE BLOCK Bilateral 5/7/2025    Procedure: BILATERAL L3-L4 L5-S1 MEDIAL BRANCH BLOCK #1;  Surgeon: Nj Maddox DO;  Location: [facility] OR;  Service: Pain Management     NERVE BLOCK Bilateral 5/21/2025    Procedure: BILATERAL L3-L4 L5-S1 MEDIAL BRANCH BLOCK #2;  Surgeon: Nj Maddox DO;  Location: [facility]" OR;  Service: Pain Management     NE INJECT SI JOINT ARTHRGRPHY&/ANES/STEROID W/PATRICIA Bilateral 12/18/2024    Procedure: BILATERAL SACROILIAC JOINT INJECTION;  Surgeon: Nj Maddox DO;  Location: [facility] MAIN OR;  Service: Pain Management     THORACIC AORTIC ANEURYSM REPAIR  09/2016    ascending thoracic aortic repair with RCA bypass with SVG x 1    TONSILLECTOMY      UPPER GASTROINTESTINAL ENDOSCOPY     [3]   Family History  Problem Relation Name Age of Onset    Breast cancer Mother  60   [4] No Known Allergies  [5]   Current Outpatient Medications on File Prior to Visit   Medication Sig Dispense Refill    acetylcysteine (MUCOMYST) 200 mg/mL nebulizer solution Use 2 ml and add to 1 vial of your albuterol twice a day as needed for thick mucous 120 mL 3    albuterol (2.5 mg/3 mL) 0.083 % nebulizer solution Take 3 mL (2.5 mg total) by nebulization 4 (four) times a day as needed for wheezing or shortness of breath 360 mL 7    albuterol (Proventil HFA) 90 mcg/act inhaler Inhale 2 puffs every 4 (four) hours as needed for wheezing 6.7 g 8    ALPRAZolam (XANAX) 0.5 mg tablet Take 2 tablets (1 mg total) by mouth 2 (two) times a day as needed for anxiety 60 tablet 1    amLODIPine (NORVASC) 5 mg tablet Take 1 tablet (5 mg total) by mouth daily 90 tablet 2    aspirin 81 mg chewable tablet Chew 81 mg in the morning.      atorvastatin (LIPITOR) 40 mg tablet Take 1 tablet (40 mg total) by mouth every morning 90 tablet 3    benzonatate (TESSALON PERLES) 100 mg capsule Take 1 capsule (100 mg total) by mouth 3 (three) times a day as needed for cough 30 capsule 3    buprenorphine-naloxone (Suboxone) 4-1 MG every morning Wafer      celecoxib (CeleBREX) 200 mg capsule Take 1 capsule (200 mg total) by mouth daily 90 capsule 1    docusate sodium (COLACE) 100 mg capsule Take 1 capsule (100 mg total) by mouth 2 (two) times a day      DULoxetine (CYMBALTA) 60 mg delayed release capsule Take 1 capsule (60 mg total) by mouth daily 90  capsule 1    esomeprazole (NexIUM) 40 MG capsule Take 1 capsule (40 mg total) by mouth 2 (two) times a day before meals 180 capsule 1    fluticasone-umeclidinium-vilanterol (Trelegy Ellipta) 200-62.5-25 mcg/actuation AEPB inhaler Inhale 1 puff daily Rinse mouth after use. 60 blister 11    gentamicin (GARAMYCIN) 0.3 % ophthalmic solution Administer 1 drop to both eyes 3 (three) times a day 5 mL 0    glipiZIDE (GLUCOTROL XL) 2.5 mg 24 hr tablet Take 1 tablet (2.5 mg total) by mouth daily 90 tablet 0    levothyroxine 100 mcg tablet Take 1 tablet (100 mcg total) by mouth every morning 90 tablet 0    lidocaine (Lidoderm) 5 % Apply 1 patch topically over 12 hours daily Remove & Discard patch within 12 hours or as directed by MD 30 patch 1    losartan (COZAAR) 25 mg tablet Take 1 tablet (25 mg total) by mouth every morning 90 tablet 2    metoprolol succinate (TOPROL-XL) 50 mg 24 hr tablet Take 1 tablet (50 mg total) by mouth every morning 90 tablet 2    Omega-3 Fatty Acids (fish oil) 1,000 mg Take 2 capsules (2,000 mg total) by mouth 2 (two) times a day 180 capsule 4    oxygen gas Inhale continuous as needed 2.5 L      polyethylene glycol (MIRALAX) 17 g packet Take 17 g by mouth daily as needed (Constipation)      sucralfate (CARAFATE) 1 g tablet Take 1 tablet (1 g total) by mouth 4 (four) times a day 120 tablet 2    tiZANidine (ZANAFLEX) 4 mg tablet Take 1 tablet (4 mg total) by mouth every 8 (eight) hours as needed for muscle spasms 90 tablet 2    traZODone (DESYREL) 50 mg tablet Take 2 tablets (100 mg total) by mouth daily at bedtime 90 tablet 1    venlafaxine (EFFEXOR-XR) 150 mg 24 hr capsule Take 1 capsule (150 mg total) by mouth in the morning 90 capsule 1    b complex vitamins capsule Take 1 capsule by mouth once a week (Patient not taking: Reported on 4/22/2025)      nicotine (NICODERM CQ) 7 mg/24hr TD 24 hr patch Place 1 patch on the skin over 24 hours daily (Patient not taking: Reported on 6/3/2025) 28 patch 0     predniSONE 10 mg tablet 4 tablets (40mg) by mouth once daily for 3 days, followed by 3 tablets (30mg) by mouth once daily for 3 days, followed by 2 tablets (20mg) by mouth once daily for 3 days, followed by 1 tablet (10mg) by mouth once daily for 3 days. (Patient not taking: Reported on 6/3/2025)       No current facility-administered medications on file prior to visit.   [6]   Social History  Tobacco Use    Smoking status: Former     Current packs/day: 0.25     Average packs/day: 0.3 packs/day for 57.4 years (14.4 ttl pk-yrs)     Types: Cigarettes     Start date: 1968     Passive exposure: Past    Smokeless tobacco: Never    Tobacco comments:     Currently smoking 5-6 cigarettes daily   Vaping Use    Vaping status: Never Used   Substance and Sexual Activity    Alcohol use: Yes     Comment: occasionally    Drug use: Yes    Sexual activity: Not Currently     Partners: Male     Birth control/protection: Post-menopausal

## 2025-06-10 NOTE — PROGRESS NOTES
Name: Montserrat Sumner      : 1950      MRN: [id number]  Encounter Provider: Nj Maddox DO  Encounter Date: 6/10/2025   Encounter department: [facility] [place]  :  Assessment & Plan  Chronic pain syndrome         Lumbar spondylosis         Montserrat presents today for follow-up regarding her low back pain.  She is status post 2 lumbar medial branch blocks above and below the level of her fusion.  She does report significant improvement in standing and walking tolerance.  She had a positive diagnostic response with greater than 80% pain relief for 6 hours.  Given her improvement, she is a candidate for therapeutic radiofrequency ablation.  We will have this scheduled for her.    Complete risks and benefits including bleeding, infection, tissue reaction, nerve injury and allergic reaction were discussed. The approach was demonstrated using models and literature was provided. Verbal and written consent was obtained.    My impressions and treatment recommendations were discussed in detail with the patient who verbalized understanding and had no further questions.  Discharge instructions were provided. I personally saw and examined the patient and I agree with the above discussed plan of care.    History of Present Illness     Montserrat Sumner is a 74 y.o. female who presents for a follow up office visit in regards to Back Pain.  Montserrat presents today for follow-up regarding her low back pain.  She is status post lumbar medial branch block and had 2 positive diagnostic test.  She reports greater than 80% improvement for 6 hours.  Today she reports her pain is the same.  Her current level pain is 8 out of 10.  The pain is constant associate with sharpness and shooting.  She reports her pain interferes with her daily daily activities.  She continues with her home exercise program.  Pain is localized to the lumbar area with radiation of the bilateral buttocks.  She presents today for  further evaluation.        Opioid contract date    Last UDS Date: 4/24/2023                    last taken on    Review of Systems   Respiratory:  Negative for chest tightness and shortness of breath.    Cardiovascular:  Negative for chest pain.   Gastrointestinal:  Negative for constipation, diarrhea, nausea and vomiting.   Musculoskeletal:  Positive for back pain, gait problem, joint swelling and neck stiffness. Negative for arthralgias, myalgias and neck pain.   Skin:  Negative for rash.   Neurological:  Positive for weakness, numbness and headaches. Negative for dizziness, seizures and light-headedness.   Psychiatric/Behavioral:  Negative for sleep disturbance.    All other systems reviewed and are negative.    Medical History Reviewed by provider this encounter:     .  Past Medical History   Past Medical History[1]  Past Surgical History[2]  Family History[3]   reports that she has quit smoking. Her smoking use included cigarettes. She started smoking about 57 years ago. She has a 14.4 pack-year smoking history. She has been exposed to tobacco smoke. She has never used smokeless tobacco. She reports current alcohol use. She reports current drug use.  Current Outpatient Medications   Medication Instructions    acetylcysteine (MUCOMYST) 200 mg/mL nebulizer solution Use 2 ml and add to 1 vial of your albuterol twice a day as needed for thick mucous    albuterol (Proventil HFA) 90 mcg/act inhaler 2 puffs, Inhalation, Every 4 hours PRN    albuterol 2.5 mg, Nebulization, 4 times daily PRN    ALPRAZolam (XANAX) 1 mg, Oral, 2 times daily PRN    amLODIPine (NORVASC) 5 mg, Oral, Daily    aspirin 81 mg, Daily    atorvastatin (LIPITOR) 40 mg, Oral, Every morning    b complex vitamins capsule 1 capsule, Weekly    benzonatate (TESSALON PERLES) 100 mg, Oral, 3 times daily PRN    buprenorphine-naloxone (Suboxone) 4-1 MG Every morning    celecoxib (CELEBREX) 200 mg, Oral, Daily    docusate sodium (COLACE) 100 mg, Oral, 2 times  "daily    DULoxetine (CYMBALTA) 60 mg, Oral, Daily    esomeprazole (NEXIUM) 40 mg, Oral, 2 times daily before meals    fish oil 2,000 mg, Oral, 2 times daily    fluticasone-umeclidinium-vilanterol (Trelegy Ellipta) 200-62.5-25 mcg/actuation AEPB inhaler 1 puff, Inhalation, Daily, Rinse mouth after use.    gentamicin (GARAMYCIN) 0.3 % ophthalmic solution 1 drop, Both Eyes, 3 times daily    glipiZIDE (GLUCOTROL XL) 2.5 mg, Oral, Daily    levothyroxine 100 mcg, Oral, Every morning    lidocaine (Lidoderm) 5 % 1 patch, Topical, Daily, Remove & Discard patch within 12 hours or as directed by MD    losartan (COZAAR) 25 mg, Oral, Every morning    metoprolol succinate (TOPROL-XL) 50 mg, Oral, Every morning    nicotine (NICODERM CQ) 7 mg/24hr TD 24 hr patch 1 patch, Transdermal, Daily    oxygen gas Continuous PRN    polyethylene glycol (MIRALAX) 17 g, Oral, Daily PRN    predniSONE 10 mg tablet 4 tablets (40mg) by mouth once daily for 3 days, followed by 3 tablets (30mg) by mouth once daily for 3 days, followed by 2 tablets (20mg) by mouth once daily for 3 days, followed by 1 tablet (10mg) by mouth once daily for 3 days.    sucralfate (CARAFATE) 1 g, Oral, 4 times daily    tiZANidine (ZANAFLEX) 4 mg, Oral, Every 8 hours PRN    traZODone (DESYREL) 100 mg, Oral, Daily at bedtime    venlafaxine (EFFEXOR-XR) 150 mg, Oral, Daily   Allergies[4]   Medications Ordered Prior to Encounter[5]   Social History[6]     Objective   Ht 5' 3\" (1.6 m)   Wt 78 kg (172 lb)   LMP  (LMP Unknown)   BMI 30.47 kg/m²      Pain Score:   8  Physical Exam  Constitutional: normal, well developed, well nourished, alert, in no distress and non-toxic and no overt pain behavior.  Eyes: anicteric  HEENT: grossly intact  Neck: supple, symmetric, trachea midline and no masses   Pulmonary: even and unlabored  Cardiovascular: No edema or pitting edema present  Skin: Normal without rashes or lesions and well hydrated  Psychiatric: Mood and affect " "appropriate  Neurologic: Cranial Nerves II-XII grossly intact  Musculoskeletal: Gait is antalgic.  Tenderness in the lumbar paraspinals with positive facet loading.  Strength preserved in lower limbs.      Administrative Statements   I have spent a total time of 31 minutes in caring for this patient on the day of the visit/encounter including Prognosis, Risks and benefits of tx options, Instructions for management, Impressions, Documenting in the medical record, Reviewing/placing orders in the medical record (including tests, medications, and/or procedures), and Obtaining or reviewing history  .       [1]   Past Medical History:  Diagnosis Date    Acute bronchitis 10/08/2024    Acute respiratory failure (HCC) 04/05/2025    Anxiety     Chronic pain 03/06/2023    lower abdomen and back    Colon polyp     COPD (chronic obstructive pulmonary disease) (HCC)     \"passes out\" with O2 levels dropping    Disease of thyroid gland     GERD (gastroesophageal reflux disease)     History of transfusion     with aneurysm repair-autologous-self and daughter    Hyperlipidemia     Hypertension     Respiratory acidosis 04/05/2025    Teeth missing     Wears glasses     For reading   [2]   Past Surgical History:  Procedure Laterality Date    BACK SURGERY      x2 herniated, crushed disc in the lumbar, ablation    CHOLECYSTECTOMY      COLONOSCOPY      EPIDURAL BLOCK INJECTION N/A 2/5/2025    Procedure: L1-L2 LUMBAR EPIDURAL STEROID INJECTION;  Surgeon: Nj Maddox DO;  Location: [facility] OR;  Service: Pain Management     FRACTURE SURGERY Left     hip-pinned    HYSTERECTOMY      salpingo-oophorectomy    NERVE BLOCK Bilateral 5/7/2025    Procedure: BILATERAL L3-L4 L5-S1 MEDIAL BRANCH BLOCK #1;  Surgeon: Nj Maddox DO;  Location: [facility] OR;  Service: Pain Management     NERVE BLOCK Bilateral 5/21/2025    Procedure: BILATERAL L3-L4 L5-S1 MEDIAL BRANCH BLOCK #2;  Surgeon: Nj Maddox DO;  Location: [facility]" OR;  Service: Pain Management     DE INJECT SI JOINT ARTHRGRPHY&/ANES/STEROID W/PATRICIA Bilateral 12/18/2024    Procedure: BILATERAL SACROILIAC JOINT INJECTION;  Surgeon: Nj Maddox DO;  Location: [facility] MAIN OR;  Service: Pain Management     THORACIC AORTIC ANEURYSM REPAIR  09/2016    ascending thoracic aortic repair with RCA bypass with SVG x 1    TONSILLECTOMY      UPPER GASTROINTESTINAL ENDOSCOPY     [3]   Family History  Problem Relation Name Age of Onset    Breast cancer Mother  60   [4] No Known Allergies  [5]   Current Outpatient Medications on File Prior to Visit   Medication Sig Dispense Refill    acetylcysteine (MUCOMYST) 200 mg/mL nebulizer solution Use 2 ml and add to 1 vial of your albuterol twice a day as needed for thick mucous 120 mL 3    albuterol (2.5 mg/3 mL) 0.083 % nebulizer solution Take 3 mL (2.5 mg total) by nebulization 4 (four) times a day as needed for wheezing or shortness of breath 360 mL 7    albuterol (Proventil HFA) 90 mcg/act inhaler Inhale 2 puffs every 4 (four) hours as needed for wheezing 6.7 g 8    ALPRAZolam (XANAX) 0.5 mg tablet Take 2 tablets (1 mg total) by mouth 2 (two) times a day as needed for anxiety 60 tablet 1    amLODIPine (NORVASC) 5 mg tablet Take 1 tablet (5 mg total) by mouth daily 90 tablet 2    aspirin 81 mg chewable tablet Chew 81 mg in the morning.      atorvastatin (LIPITOR) 40 mg tablet Take 1 tablet (40 mg total) by mouth every morning 90 tablet 3    benzonatate (TESSALON PERLES) 100 mg capsule Take 1 capsule (100 mg total) by mouth 3 (three) times a day as needed for cough 30 capsule 3    buprenorphine-naloxone (Suboxone) 4-1 MG every morning Wafer      celecoxib (CeleBREX) 200 mg capsule Take 1 capsule (200 mg total) by mouth daily 90 capsule 1    docusate sodium (COLACE) 100 mg capsule Take 1 capsule (100 mg total) by mouth 2 (two) times a day      DULoxetine (CYMBALTA) 60 mg delayed release capsule Take 1 capsule (60 mg total) by mouth daily 90  capsule 1    esomeprazole (NexIUM) 40 MG capsule Take 1 capsule (40 mg total) by mouth 2 (two) times a day before meals 180 capsule 1    fluticasone-umeclidinium-vilanterol (Trelegy Ellipta) 200-62.5-25 mcg/actuation AEPB inhaler Inhale 1 puff daily Rinse mouth after use. 60 blister 11    gentamicin (GARAMYCIN) 0.3 % ophthalmic solution Administer 1 drop to both eyes 3 (three) times a day 5 mL 0    glipiZIDE (GLUCOTROL XL) 2.5 mg 24 hr tablet Take 1 tablet (2.5 mg total) by mouth daily 90 tablet 0    levothyroxine 100 mcg tablet Take 1 tablet (100 mcg total) by mouth every morning 90 tablet 0    lidocaine (Lidoderm) 5 % Apply 1 patch topically over 12 hours daily Remove & Discard patch within 12 hours or as directed by MD 30 patch 1    losartan (COZAAR) 25 mg tablet Take 1 tablet (25 mg total) by mouth every morning 90 tablet 2    metoprolol succinate (TOPROL-XL) 50 mg 24 hr tablet Take 1 tablet (50 mg total) by mouth every morning 90 tablet 2    Omega-3 Fatty Acids (fish oil) 1,000 mg Take 2 capsules (2,000 mg total) by mouth 2 (two) times a day 180 capsule 4    oxygen gas Inhale continuous as needed 2.5 L      polyethylene glycol (MIRALAX) 17 g packet Take 17 g by mouth daily as needed (Constipation)      sucralfate (CARAFATE) 1 g tablet Take 1 tablet (1 g total) by mouth 4 (four) times a day 120 tablet 2    tiZANidine (ZANAFLEX) 4 mg tablet Take 1 tablet (4 mg total) by mouth every 8 (eight) hours as needed for muscle spasms 90 tablet 2    traZODone (DESYREL) 50 mg tablet Take 2 tablets (100 mg total) by mouth daily at bedtime 90 tablet 1    venlafaxine (EFFEXOR-XR) 150 mg 24 hr capsule Take 1 capsule (150 mg total) by mouth in the morning 90 capsule 1    b complex vitamins capsule Take 1 capsule by mouth once a week (Patient not taking: Reported on 4/22/2025)      nicotine (NICODERM CQ) 7 mg/24hr TD 24 hr patch Place 1 patch on the skin over 24 hours daily (Patient not taking: Reported on 6/3/2025) 28 patch 0     predniSONE 10 mg tablet 4 tablets (40mg) by mouth once daily for 3 days, followed by 3 tablets (30mg) by mouth once daily for 3 days, followed by 2 tablets (20mg) by mouth once daily for 3 days, followed by 1 tablet (10mg) by mouth once daily for 3 days. (Patient not taking: Reported on 6/3/2025)       No current facility-administered medications on file prior to visit.   [6]   Social History  Tobacco Use    Smoking status: Former     Current packs/day: 0.25     Average packs/day: 0.3 packs/day for 57.4 years (14.4 ttl pk-yrs)     Types: Cigarettes     Start date: 1968     Passive exposure: Past    Smokeless tobacco: Never    Tobacco comments:     Currently smoking 5-6 cigarettes daily   Vaping Use    Vaping status: Never Used   Substance and Sexual Activity    Alcohol use: Yes     Comment: occasionally    Drug use: Yes    Sexual activity: Not Currently     Partners: Male     Birth control/protection: Post-menopausal

## 2025-06-19 ENCOUNTER — TELEPHONE (OUTPATIENT)
Age: 75
End: 2025-06-19

## 2025-06-19 DIAGNOSIS — M79.2 NEUROPATHIC PAIN: ICD-10-CM

## 2025-06-19 NOTE — TELEPHONE ENCOUNTER
Caller: Patient    Doctor: Dr. STYLES    Reason for call: Patient would like a script of Lidocaine     Please send to [facility] | [facility]  [address] [address]    Call back#:

## 2025-06-19 NOTE — TELEPHONE ENCOUNTER
LMOM to CB, CB# provided  There was a Rx with 2 refills sent in April  Did she use both of these?

## 2025-06-20 RX ORDER — LIDOCAINE 50 MG/G
1 PATCH TOPICAL DAILY
Qty: 30 PATCH | Refills: 2 | Status: SHIPPED | OUTPATIENT
Start: 2025-06-20

## 2025-06-23 ENCOUNTER — TELEPHONE (OUTPATIENT)
Age: 75
End: 2025-06-23

## 2025-06-23 NOTE — TELEPHONE ENCOUNTER
Caller: Patient     Doctor: Dr. Maddox     Reason for call: Patient had a fall over the weekend and hurt her left leg, she is wanting to know if she is able to get on the table for the ablation if she would be good to go?     Patient has not had the leg looked at as of now but was advised she can be seen at  , ED or call PCP for this which she will be doing.     Please assist     Call back#: [phone]

## 2025-06-23 NOTE — TELEPHONE ENCOUNTER
Please review, not sure I received a message to put in other labs so let us use this as an opportunity to double check process.      Full set of orders are in       also take this opportunity to educate her on having diabetic blood work followed by an appointment every three months since we have been attempting to but not yet got her on that schedule yet.   LMOM for pt to C/B, C/B # provided.

## 2025-06-24 NOTE — TELEPHONE ENCOUNTER
Caller: luna Mariano     Doctor: Dr. Maddox    Reason for call: pt returning nurses call     Call back#: [phone]

## 2025-06-24 NOTE — TELEPHONE ENCOUNTER
S/W patient regarding fall.  She states she is feeling better today and would like to proceed with injection tomorrow.  Verbalized understanding.

## 2025-06-25 ENCOUNTER — HOSPITAL ENCOUNTER (OUTPATIENT)
Facility: AMBULARY SURGERY CENTER | Age: 75
Setting detail: OUTPATIENT SURGERY
Discharge: HOME | End: 2025-06-25
Attending: STUDENT IN AN ORGANIZED HEALTH CARE EDUCATION/TRAINING PROGRAM | Admitting: STUDENT IN AN ORGANIZED HEALTH CARE EDUCATION/TRAINING PROGRAM
Payer: COMMERCIAL

## 2025-06-25 ENCOUNTER — TELEPHONE (OUTPATIENT)
Dept: PAIN MEDICINE | Facility: CLINIC | Age: 75
End: 2025-06-25

## 2025-06-25 ENCOUNTER — APPOINTMENT (OUTPATIENT)
Dept: RADIOLOGY | Facility: HOSPITAL | Age: 75
End: 2025-06-25
Payer: COMMERCIAL

## 2025-06-25 VITALS
WEIGHT: 172 LBS | TEMPERATURE: 97.7 F | RESPIRATION RATE: 17 BRPM | HEIGHT: 63 IN | OXYGEN SATURATION: 94 % | HEART RATE: 73 BPM | BODY MASS INDEX: 30.48 KG/M2 | SYSTOLIC BLOOD PRESSURE: 126 MMHG | DIASTOLIC BLOOD PRESSURE: 80 MMHG

## 2025-06-25 DIAGNOSIS — F41.9 ANXIETY: Chronic | ICD-10-CM

## 2025-06-25 DIAGNOSIS — M25.552 LEFT HIP PAIN: Primary | ICD-10-CM

## 2025-06-25 LAB — GLUCOSE SERPL-MCNC: 193 MG/DL (ref 65–140)

## 2025-06-25 PROCEDURE — 64635 DESTROY LUMB/SAC FACET JNT: CPT | Performed by: STUDENT IN AN ORGANIZED HEALTH CARE EDUCATION/TRAINING PROGRAM

## 2025-06-25 PROCEDURE — 64636 DESTROY L/S FACET JNT ADDL: CPT | Performed by: STUDENT IN AN ORGANIZED HEALTH CARE EDUCATION/TRAINING PROGRAM

## 2025-06-25 PROCEDURE — 82948 REAGENT STRIP/BLOOD GLUCOSE: CPT

## 2025-06-25 RX ORDER — LIDOCAINE HYDROCHLORIDE 20 MG/ML
INJECTION, SOLUTION EPIDURAL; INFILTRATION; INTRACAUDAL; PERINEURAL AS NEEDED
Status: DISCONTINUED | OUTPATIENT
Start: 2025-06-25 | End: 2025-06-25 | Stop reason: HOSPADM

## 2025-06-25 RX ORDER — BUPIVACAINE HYDROCHLORIDE 5 MG/ML
INJECTION, SOLUTION EPIDURAL; INTRACAUDAL; PERINEURAL AS NEEDED
Status: DISCONTINUED | OUTPATIENT
Start: 2025-06-25 | End: 2025-06-25 | Stop reason: HOSPADM

## 2025-06-25 RX ORDER — LIDOCAINE HYDROCHLORIDE 10 MG/ML
INJECTION, SOLUTION EPIDURAL; INFILTRATION; INTRACAUDAL; PERINEURAL AS NEEDED
Status: DISCONTINUED | OUTPATIENT
Start: 2025-06-25 | End: 2025-06-25 | Stop reason: HOSPADM

## 2025-06-25 NOTE — TELEPHONE ENCOUNTER
Patient called the med refill line to see if her script for her Xanax 0.5 mg has been sent to her pharmacy, because she is completely out of this medication and would like the script sent as soon as possible, and patient would like a call to let her know that is has been sent to the pharmacy, patient's phone number is [phone]

## 2025-06-25 NOTE — DISCHARGE INSTRUCTIONS

## 2025-06-25 NOTE — TELEPHONE ENCOUNTER
Patient is S/P a B/L L3-S1 RFA c/ Dr. Maddox on 6/25/25.  She will need a 6 week F/U to be scheduled.  Please call on 6/26/25 post RFA

## 2025-06-25 NOTE — TELEPHONE ENCOUNTER
Patient called because she needs a refill for   ALPRAZolam (XANAX) 0.5 mg tablet she said she is out of the medication. Please advise. Thank you.

## 2025-06-25 NOTE — OP NOTE
OPERATIVE REPORT  PATIENT NAME: Montserrat Sumner    :  1950  MRN: [id number]  Pt Location: [facility] MINOR/PAIN ROOM 01    SURGERY DATE: 2025    Surgeons and Role:     * Nj Maddox DO - Primary    Preop Diagnosis:  Lumbar spondylosis [M47.816]    Post-Op Diagnosis Codes:     * Lumbar spondylosis [M47.816]    Procedure(s):  Bilateral - BILATERAL L3-L4 L5-S1 RADIO FREQUENCY ABLATION    Specimen(s):  * No specimens in log *    Estimated Blood Loss:   Minimal    Drains:  * No LDAs found *    Anesthesia Type:   Local    Operative Indications:  Lumbar spondylosis [M47.816]        PROCEDURE:  The patient gave informed written consent to proceed with this procedure following a detailed discussion of the risks and benefits associated with lumbar radiofrequency ablation at bilateral L3-4, L5-S1 including but not limited to infection, bleeding, intrathecal injection, allergic reaction, further exacerbation of current symptoms, neurological injury, and lack of efficacy.  The patient was then placed in prone position, the skin over the lumbosacral area was prepped with chlorhexadine, and the site was marked and draped with sterile towels.  Strict sterile technique was maintained throughout the procedure.  A timeout was performed with full staff present to identify the patient, verify the procedure being performed, and review allergies    Fluoroscopy was used to identify the appropriate lumbar anatomy. The skin and subcutaneous tissue were anesthetized with 1% lidocaine.  A 10 cm RFA cannula with 10 mm active tip was inserted under fluoroscopic guidance to contact the junction of the superior articulating process and transverse process at the aforementioned levels.  Once periostium was contacted, the RFA cannula were advanced slightly anterior and cephalad to place the active tips parallel to the targeted nerves.  Final needle position was confirmed with biplanar fluoroscopy.   Motor testing was performed  using 2 Hz and was negative for lower extremity muscle contraction at all levels up to 2.5 volts.  Testing results can be found in nursing documentation. Each site was anesthetized with 1.5 ml lidocaine 2%.  Thermal radiofrequency ablation was then performed at 80 degrees Celsius for 150 seconds.  Fluoroscopic spot images were saved during the procedure.     After treatment was completed, 1 ml of 0.5% bupivacaine was injected at each site and the needles were withdrawn.     There was no paresthesia during needle placement and aspiration was negative at all times.  The patient tolerated the procedure well and there were no apparent complications.  Written and verbal discharge instructions were reviewed with the patient prior to discharge.    Nj Maddox DO    SIGNATURE: Nj Maddox DO  DATE: June 25, 2025  TIME: 10:14 AM

## 2025-06-26 RX ORDER — ALPRAZOLAM 0.5 MG
TABLET ORAL
Qty: 60 TABLET | Refills: 1 | Status: SHIPPED | OUTPATIENT
Start: 2025-06-26

## 2025-06-26 NOTE — TELEPHONE ENCOUNTER
S/w pt s/p B/L L3-S1 RFA on 6/25/25 RS. Pt stated needle sites look good, denies S/S of infect, denies fever, denies soreness and denies sun burn like sensation. Pt states lb is doing very well. Advised pt if they have any further pain to take OTC/prescribed meds and/or use ice/heat and that it can take 4-6wks to see full effect. Pt verbalized understanding.     Pt states she had spoke to you regarding her Lt LE pain from fall. Pt states she is to have an x-ray of this LLE.   Are you placing this x-ray script so she is able to have it compl?  Pt also asked to f/u regarding LLE and rqstd 7/8 appt @ 1115 w/ RS.   Pls advise on x-ray.  Is 15min appt ok on 7/8? Pt leaving for [place] on 7/11.

## 2025-06-26 NOTE — TELEPHONE ENCOUNTER
Please call and inform the patient that I refilled her Xanax.  Also please inform the patient that in the future when her prescription is running out that she should request a refill 3 to 4 days prior to her running out.  She cannot call the office repeatedly in the same day requesting a refill that day.  I am not always in the office.

## 2025-06-27 ENCOUNTER — APPOINTMENT (OUTPATIENT)
Dept: RADIOLOGY | Facility: CLINIC | Age: 75
End: 2025-06-27
Attending: STUDENT IN AN ORGANIZED HEALTH CARE EDUCATION/TRAINING PROGRAM
Payer: COMMERCIAL

## 2025-06-27 DIAGNOSIS — M25.552 LEFT HIP PAIN: ICD-10-CM

## 2025-06-27 PROCEDURE — 73502 X-RAY EXAM HIP UNI 2-3 VIEWS: CPT

## 2025-06-30 ENCOUNTER — TELEPHONE (OUTPATIENT)
Age: 75
End: 2025-06-30

## 2025-06-30 NOTE — TELEPHONE ENCOUNTER
Called pt. Aware that diabetes edu can't mail education and that pt will need a appt. Made consult appt on 07/27. Pt called and made aware and agreeable

## 2025-06-30 NOTE — TELEPHONE ENCOUNTER
Pt called reporting that she is going to [place] and will be back sometime week of 07/21 if that is ok to schedule a appt.  Pt would like to know if there is any diabetic education handouts that includes diet plans that can be mailed to her home.  Confirmed addressed on file.

## 2025-07-02 NOTE — TELEPHONE ENCOUNTER
Caller: luna Mariano    Doctor: Dr. Maddox    Reason for call: pt is in a lot of pain with her leg.  She said someone had called but I don't see any documentation of who would have called her.  Pt has a new phone and she is having issues with it    I did schedule pt for a sooner appt in [place] for tomorrow.    Call back#: [phone]

## 2025-07-02 NOTE — TELEPHONE ENCOUNTER
Caller: pt    Doctor: Dr. lopez    Reason for call: pt would like results of xray.    Call back#: [phone]

## 2025-07-03 ENCOUNTER — OFFICE VISIT (OUTPATIENT)
Age: 75
End: 2025-07-03
Payer: COMMERCIAL

## 2025-07-03 VITALS — BODY MASS INDEX: 30.48 KG/M2 | HEIGHT: 63 IN | WEIGHT: 172 LBS

## 2025-07-03 DIAGNOSIS — S32.591A CLOSED BILATERAL FRACTURE OF PUBIC RAMI, INITIAL ENCOUNTER (HCC): ICD-10-CM

## 2025-07-03 DIAGNOSIS — S32.592A CLOSED BILATERAL FRACTURE OF PUBIC RAMI, INITIAL ENCOUNTER (HCC): ICD-10-CM

## 2025-07-03 DIAGNOSIS — M25.851 FEMOROACETABULAR IMPINGEMENT OF BOTH HIPS: Primary | ICD-10-CM

## 2025-07-03 DIAGNOSIS — M25.852 FEMOROACETABULAR IMPINGEMENT OF BOTH HIPS: Primary | ICD-10-CM

## 2025-07-03 PROCEDURE — 20611 DRAIN/INJ JOINT/BURSA W/US: CPT | Performed by: STUDENT IN AN ORGANIZED HEALTH CARE EDUCATION/TRAINING PROGRAM

## 2025-07-03 PROCEDURE — 99214 OFFICE O/P EST MOD 30 MIN: CPT | Performed by: STUDENT IN AN ORGANIZED HEALTH CARE EDUCATION/TRAINING PROGRAM

## 2025-07-03 RX ORDER — LIDOCAINE HYDROCHLORIDE 10 MG/ML
2 INJECTION, SOLUTION INFILTRATION; PERINEURAL ONCE
Status: COMPLETED | OUTPATIENT
Start: 2025-07-03 | End: 2025-07-03

## 2025-07-03 RX ORDER — METHYLPREDNISOLONE ACETATE 40 MG/ML
40 INJECTION, SUSPENSION INTRA-ARTICULAR; INTRALESIONAL; INTRAMUSCULAR; SOFT TISSUE ONCE
Status: COMPLETED | OUTPATIENT
Start: 2025-07-03 | End: 2025-07-03

## 2025-07-03 RX ORDER — BUPIVACAINE HYDROCHLORIDE 5 MG/ML
6 INJECTION, SOLUTION PERINEURAL ONCE
Status: COMPLETED | OUTPATIENT
Start: 2025-07-03 | End: 2025-07-03

## 2025-07-03 RX ADMIN — METHYLPREDNISOLONE ACETATE 40 MG: 40 INJECTION, SUSPENSION INTRA-ARTICULAR; INTRALESIONAL; INTRAMUSCULAR; SOFT TISSUE at 14:47

## 2025-07-03 RX ADMIN — BUPIVACAINE HYDROCHLORIDE 6 ML: 5 INJECTION, SOLUTION PERINEURAL at 14:45

## 2025-07-03 RX ADMIN — LIDOCAINE HYDROCHLORIDE 2 ML: 10 INJECTION, SOLUTION INFILTRATION; PERINEURAL at 14:46

## 2025-07-03 NOTE — PROGRESS NOTES
Name: Montserrat Sumner      : 1950      MRN: [id number]  Encounter Provider: Nj Maddox DO  Encounter Date: 7/3/2025   Encounter department: [facility]  :  Assessment & Plan  Femoroacetabular impingement of both hips    Orders:    bupivacaine (MARCAINE) 0.5 % injection 6 mL    lidocaine (XYLOCAINE) 1 % injection 2 mL    methylPREDNISolone acetate (DEPO-MEDROL) injection 40 mg    methylPREDNISolone acetate (DEPO-MEDROL) injection 40 mg    Large joint arthrocentesis: bilateral hip joint    Closed bilateral fracture of pubic rami, initial encounter (HCC)         Montserrat presents today for follow-up regarding her bilateral hip pain.  I independently interpreted the patient's pelvic radiographs showing healing pubic rami fractures and stable intramedullary nail of the femur on the left.  Based on the patient's physical examination it does appear that the patient's pain is related to intra-articular hip pathology such as an impingement or labral pathology.  Given failure to improve with conservative care including multimodal analgesics and daily exercise I do think the patient would benefit from bilateral hip intra-articular injections under ultrasound guidance.  This was offered to the patient in the office today.  Please see separate procedure documentation.    Complete risks and benefits including bleeding, infection, tissue reaction, nerve injury and allergic reaction were discussed. The approach was demonstrated using models and literature was provided. Verbal and written consent was obtained.    My impressions and treatment recommendations were discussed in detail with the patient who verbalized understanding and had no further questions.  Discharge instructions were provided. I personally saw and examined the patient and I agree with the above discussed plan of care.    Large joint arthrocentesis: bilateral hip joint    Performed by: Nj Maddox DO  Authorized by:  "Nj Maddox DO    Universal Protocol:  procedure performed by consultantConsent: Verbal consent obtained. Written consent obtained  Risks and benefits: risks, benefits and alternatives were discussed  Consent given by: patient  Time out: Immediately prior to procedure a \"time out\" was called to verify the correct patient, procedure, equipment, support staff and site/side marked as required.  Patient understanding: patient states understanding of the procedure being performed  Patient consent: the patient's understanding of the procedure matches consent given  Procedure consent: procedure consent matches procedure scheduled  Relevant documents: relevant documents present and verified  Site marked: the operative site was marked  Patient identity confirmed: verbally with patient  Supporting Documentation  Indications: pain     Is this a Visco injection? NoProcedure Details  Location: hip - bilateral hip joint  Needle size: 22 G  Ultrasound guidance: yes  Approach: anterior    Patient tolerance: patient tolerated the procedure well with no immediate complications  Dressing:  Sterile dressing applied            History of Present Illness       Montserrat Sumner is a 74 y.o. female who presents to [facility] for interval re-evaluation in regards to pain in the left greater than right hip/leg. The patients last office visit was 6/10. Patient presents today with pain in 6/10 that radiates to thigh. Pain is described as  Symptoms are worse all day. Alleviating factors identifiable by the patient are rest. On the numeric pain scale of 1-10, the pain typically increases to max of 8 out of 10, which is currently impacting their quality of life and interferes with their activities of daily living.    Montserrat presents today for follow-up on her left leg pain.  Unfortunately she had a fall not too long ago roughly 2 weeks ago that resulted in left hip pain.  She did not seek emergency or urgent " care.  I happened to see her for her lumbar radiofrequency ablation procedure at the surgery center and was told that this fall.  She had no numbness tingling or weakness and just pain in the hip area.  Pain is located in the groin radiating into the anterior thigh.  Pain is made worse with walking and relieved with rest.  She presents today for further evaluation.      Opioid contract date    Last UDS Date: 4/24/2023                    last taken on    Review of Systems   Respiratory:  Positive for shortness of breath. Negative for chest tightness.    Cardiovascular:  Negative for chest pain.   Gastrointestinal:  Negative for constipation, diarrhea, nausea and vomiting.   Musculoskeletal:  Positive for back pain, gait problem and joint swelling. Negative for arthralgias, myalgias, neck pain and neck stiffness.   Skin:  Negative for rash.   Neurological:  Positive for weakness and numbness. Negative for dizziness, seizures, light-headedness and headaches.   Psychiatric/Behavioral:  Positive for sleep disturbance.    All other systems reviewed and are negative.      Medical History Reviewed by provider this encounter:     .  Past Medical History   Past Medical History[1]  Past Surgical History[2]  Family History[3]   reports that she has been smoking cigarettes. She started smoking about 57 years ago. She has a 14.4 pack-year smoking history. She has been exposed to tobacco smoke. She has never used smokeless tobacco. She reports current alcohol use. She reports that she does not currently use drugs.  Current Outpatient Medications   Medication Instructions    acetylcysteine (MUCOMYST) 200 mg/mL nebulizer solution Use 2 ml and add to 1 vial of your albuterol twice a day as needed for thick mucous    albuterol (Proventil HFA) 90 mcg/act inhaler 2 puffs, Inhalation, Every 4 hours PRN    albuterol 2.5 mg, Nebulization, 4 times daily PRN    ALPRAZolam (XANAX) 0.5 mg tablet Take 2 tablets (1mg total) by mouth 2 (two)  "times a day as needed for anxiety    amLODIPine (NORVASC) 5 mg, Oral, Daily    aspirin 81 mg, Daily    atorvastatin (LIPITOR) 40 mg, Oral, Every morning    b complex vitamins capsule 1 capsule, Weekly    benzonatate (TESSALON PERLES) 100 mg, Oral, 3 times daily PRN    buprenorphine-naloxone (Suboxone) 4-1 MG Every morning    celecoxib (CELEBREX) 200 mg, Oral, Daily    docusate sodium (COLACE) 100 mg, Oral, 2 times daily    DULoxetine (CYMBALTA) 60 mg, Oral, Daily    esomeprazole (NEXIUM) 40 mg, Oral, 2 times daily before meals    fish oil 2,000 mg, Oral, 2 times daily    fluticasone-umeclidinium-vilanterol (Trelegy Ellipta) 200-62.5-25 mcg/actuation AEPB inhaler 1 puff, Inhalation, Daily, Rinse mouth after use.    gentamicin (GARAMYCIN) 0.3 % ophthalmic solution 1 drop, Both Eyes, 3 times daily    glipiZIDE (GLUCOTROL XL) 2.5 mg, Oral, Daily    levothyroxine 100 mcg, Oral, Every morning    lidocaine (Lidoderm) 5 % 1 patch, Topical, Daily, Remove & Discard patch within 12 hours or as directed by MD    losartan (COZAAR) 25 mg, Oral, Every morning    metoprolol succinate (TOPROL-XL) 50 mg, Oral, Every morning    nicotine (NICODERM CQ) 7 mg/24hr TD 24 hr patch 1 patch, Transdermal, Daily    oxygen gas Continuous PRN    polyethylene glycol (MIRALAX) 17 g, Oral, Daily PRN    predniSONE 10 mg tablet 4 tablets (40mg) by mouth once daily for 3 days, followed by 3 tablets (30mg) by mouth once daily for 3 days, followed by 2 tablets (20mg) by mouth once daily for 3 days, followed by 1 tablet (10mg) by mouth once daily for 3 days.    sucralfate (CARAFATE) 1 g, Oral, 4 times daily    tiZANidine (ZANAFLEX) 4 mg, Oral, Every 8 hours PRN    traZODone (DESYREL) 100 mg, Oral, Daily at bedtime    venlafaxine (EFFEXOR-XR) 150 mg, Oral, Daily   Allergies[4]   Medications Ordered Prior to Encounter[5]   Social History[6]     Objective   Ht 5' 3\" (1.6 m)   Wt 78 kg (172 lb)   LMP  (LMP Unknown)   BMI 30.47 kg/m²      Pain Score:   " "8  Physical Exam  Constitutional: normal, well developed, well nourished, alert, in no distress and non-toxic and no overt pain behavior.  Eyes: anicteric  HEENT: grossly intact  Neck: supple, symmetric, trachea midline and no masses   Pulmonary: even and unlabored  Cardiovascular: No edema or pitting edema present  Skin: Normal without rashes or lesions and well hydrated  Psychiatric: Mood and affect appropriate  Neurologic: Cranial Nerves II-XII grossly intact  Musculoskeletal: Range of motion of the hip limited with abduction secondary to pain.  Tenderness to the hip girdle with positive Theresa's bilaterally.  Positive FADIR testing.  Positive Stinchfield's.  Strength, sensation, reflexes preserved in the lower limbs.      Administrative Statements   I have spent a total time of 36 minutes in caring for this patient on the day of the visit/encounter including Diagnostic results, Prognosis, Risks and benefits of tx options, Instructions for management, Impressions, Documenting in the medical record, Reviewing/placing orders in the medical record (including tests, medications, and/or procedures), and Obtaining or reviewing history  .         [1]   Past Medical History:  Diagnosis Date    Acute bronchitis 10/08/2024    Acute respiratory failure (HCC) 04/05/2025    Anxiety     Chronic pain 03/06/2023    lower abdomen and back    Colon polyp     COPD (chronic obstructive pulmonary disease) (HCC)     \"passes out\" with O2 levels dropping    Disease of thyroid gland     GERD (gastroesophageal reflux disease)     History of transfusion     with aneurysm repair-autologous-self and daughter    Hyperlipidemia     Hypertension     Respiratory acidosis 04/05/2025    Teeth missing     Wears glasses     For reading   [2]   Past Surgical History:  Procedure Laterality Date    BACK SURGERY      x2 herniated, crushed disc in the lumbar, ablation    CHOLECYSTECTOMY      COLONOSCOPY      EPIDURAL BLOCK INJECTION N/A 2/5/2025    " Procedure: L1-L2 LUMBAR EPIDURAL STEROID INJECTION;  Surgeon: Nj Maddox DO;  Location: [facility] MAIN OR;  Service: Pain Management     FRACTURE SURGERY Left     hip-pinned    HYSTERECTOMY      salpingo-oophorectomy    NERVE BLOCK Bilateral 5/7/2025    Procedure: BILATERAL L3-L4 L5-S1 MEDIAL BRANCH BLOCK #1;  Surgeon: Nj Maddox DO;  Location: [facility] MAIN OR;  Service: Pain Management     NERVE BLOCK Bilateral 5/21/2025    Procedure: BILATERAL L3-L4 L5-S1 MEDIAL BRANCH BLOCK #2;  Surgeon: Nj Maddox DO;  Location: [facility] MAIN OR;  Service: Pain Management     NC INJECT SI JOINT ARTHRGRPHY&/ANES/STEROID W/PATRICIA Bilateral 12/18/2024    Procedure: BILATERAL SACROILIAC JOINT INJECTION;  Surgeon: Nj Maddox DO;  Location: [facility] MAIN OR;  Service: Pain Management     RADIOFREQUENCY ABLATION Bilateral 6/25/2025    Procedure: BILATERAL L3-L4 L5-S1 RADIO FREQUENCY ABLATION;  Surgeon: Nj Maddox DO;  Location: [facility] MAIN OR;  Service: Pain Management     THORACIC AORTIC ANEURYSM REPAIR  09/2016    ascending thoracic aortic repair with RCA bypass with SVG x 1    TONSILLECTOMY      UPPER GASTROINTESTINAL ENDOSCOPY     [3]   Family History  Problem Relation Name Age of Onset    Breast cancer Mother  60   [4] No Known Allergies  [5]   Current Outpatient Medications on File Prior to Visit   Medication Sig Dispense Refill    acetylcysteine (MUCOMYST) 200 mg/mL nebulizer solution Use 2 ml and add to 1 vial of your albuterol twice a day as needed for thick mucous 120 mL 3    albuterol (2.5 mg/3 mL) 0.083 % nebulizer solution Take 3 mL (2.5 mg total) by nebulization 4 (four) times a day as needed for wheezing or shortness of breath 360 mL 7    albuterol (Proventil HFA) 90 mcg/act inhaler Inhale 2 puffs every 4 (four) hours as needed for wheezing 6.7 g 8    ALPRAZolam (XANAX) 0.5 mg tablet Take 2 tablets (1mg total) by mouth 2 (two) times a day as needed for anxiety 60 tablet 1    amLODIPine  (NORVASC) 5 mg tablet Take 1 tablet (5 mg total) by mouth daily 90 tablet 2    aspirin 81 mg chewable tablet Chew 81 mg in the morning.      atorvastatin (LIPITOR) 40 mg tablet Take 1 tablet (40 mg total) by mouth every morning 90 tablet 3    benzonatate (TESSALON PERLES) 100 mg capsule Take 1 capsule (100 mg total) by mouth 3 (three) times a day as needed for cough 30 capsule 3    buprenorphine-naloxone (Suboxone) 4-1 MG every morning Wafer      celecoxib (CeleBREX) 200 mg capsule Take 1 capsule (200 mg total) by mouth daily 90 capsule 1    docusate sodium (COLACE) 100 mg capsule Take 1 capsule (100 mg total) by mouth 2 (two) times a day      DULoxetine (CYMBALTA) 60 mg delayed release capsule Take 1 capsule (60 mg total) by mouth daily 90 capsule 1    esomeprazole (NexIUM) 40 MG capsule Take 1 capsule (40 mg total) by mouth 2 (two) times a day before meals 180 capsule 1    fluticasone-umeclidinium-vilanterol (Trelegy Ellipta) 200-62.5-25 mcg/actuation AEPB inhaler Inhale 1 puff daily Rinse mouth after use. 60 blister 11    gentamicin (GARAMYCIN) 0.3 % ophthalmic solution Administer 1 drop to both eyes 3 (three) times a day 5 mL 0    glipiZIDE (GLUCOTROL XL) 2.5 mg 24 hr tablet Take 1 tablet (2.5 mg total) by mouth daily 90 tablet 0    levothyroxine 100 mcg tablet Take 1 tablet (100 mcg total) by mouth every morning 90 tablet 0    lidocaine (Lidoderm) 5 % Apply 1 patch topically daily over 12 hours Remove & Discard patch within 12 hours or as directed by MD 30 patch 2    losartan (COZAAR) 25 mg tablet Take 1 tablet (25 mg total) by mouth every morning 90 tablet 2    metoprolol succinate (TOPROL-XL) 50 mg 24 hr tablet Take 1 tablet (50 mg total) by mouth every morning 90 tablet 2    Omega-3 Fatty Acids (fish oil) 1,000 mg Take 2 capsules (2,000 mg total) by mouth 2 (two) times a day 180 capsule 4    oxygen gas Inhale continuous as needed 2.5 L      polyethylene glycol (MIRALAX) 17 g packet Take 17 g by mouth daily  as needed (Constipation)      sucralfate (CARAFATE) 1 g tablet Take 1 tablet (1 g total) by mouth 4 (four) times a day 120 tablet 2    tiZANidine (ZANAFLEX) 4 mg tablet Take 1 tablet (4 mg total) by mouth every 8 (eight) hours as needed for muscle spasms 90 tablet 2    traZODone (DESYREL) 50 mg tablet Take 2 tablets (100 mg total) by mouth daily at bedtime 90 tablet 1    venlafaxine (EFFEXOR-XR) 150 mg 24 hr capsule Take 1 capsule (150 mg total) by mouth in the morning 90 capsule 1    b complex vitamins capsule Take 1 capsule by mouth once a week (Patient not taking: Reported on 4/22/2025)      nicotine (NICODERM CQ) 7 mg/24hr TD 24 hr patch Place 1 patch on the skin over 24 hours daily (Patient not taking: Reported on 6/3/2025) 28 patch 0    predniSONE 10 mg tablet 4 tablets (40mg) by mouth once daily for 3 days, followed by 3 tablets (30mg) by mouth once daily for 3 days, followed by 2 tablets (20mg) by mouth once daily for 3 days, followed by 1 tablet (10mg) by mouth once daily for 3 days. (Patient not taking: Reported on 6/3/2025)       No current facility-administered medications on file prior to visit.   [6]   Social History  Tobacco Use    Smoking status: Some Days     Current packs/day: 0.25     Average packs/day: 0.3 packs/day for 57.5 years (14.4 ttl pk-yrs)     Types: Cigarettes     Start date: 1968     Passive exposure: Past    Smokeless tobacco: Never    Tobacco comments:     Currently smoking 5-6 cigarettes daily   Vaping Use    Vaping status: Never Used   Substance and Sexual Activity    Alcohol use: Yes     Comment: rarely    Drug use: Not Currently    Sexual activity: Not Currently     Partners: Male     Birth control/protection: Post-menopausal

## 2025-07-07 DIAGNOSIS — M47.16 OSTEOARTHRITIS OF LUMBAR SPINE WITH MYELOPATHY: ICD-10-CM

## 2025-07-07 RX ORDER — CELECOXIB 200 MG/1
200 CAPSULE ORAL DAILY
Qty: 90 CAPSULE | Refills: 1 | Status: SHIPPED | OUTPATIENT
Start: 2025-07-07

## 2025-07-08 ENCOUNTER — TELEPHONE (OUTPATIENT)
Age: 75
End: 2025-07-08

## 2025-07-08 DIAGNOSIS — J44.1 COPD EXACERBATION (HCC): ICD-10-CM

## 2025-07-08 DIAGNOSIS — F41.9 ANXIETY: Chronic | ICD-10-CM

## 2025-07-08 DIAGNOSIS — M47.16 OSTEOARTHRITIS OF LUMBAR SPINE WITH MYELOPATHY: ICD-10-CM

## 2025-07-08 DIAGNOSIS — I10 HYPERTENSION, UNSPECIFIED TYPE: Chronic | ICD-10-CM

## 2025-07-08 NOTE — TELEPHONE ENCOUNTER
Caller: pt    Doctor: Dr. lopez    Reason for call: pt would like to get prednisone she is still having pain.    Call back#: [phone]

## 2025-07-08 NOTE — TELEPHONE ENCOUNTER
Caller: Montserrat     Doctor: Dr. Maddox     Reason for call: Patient called to check if she had an appt today I advise it was cancelled patient was seen sooner.    Call back#: [phone]

## 2025-07-09 ENCOUNTER — TELEPHONE (OUTPATIENT)
Age: 75
End: 2025-07-09

## 2025-07-09 NOTE — TELEPHONE ENCOUNTER
Caller: Neno    Doctor: Dr. Maddox    Reason for call: pt returning call regarding prednisone request. Pt is leaving for vacation on 07/11. Please advise pt     Call back#: [phone]

## 2025-07-10 DIAGNOSIS — M47.26 OSTEOARTHRITIS OF SPINE WITH RADICULOPATHY, LUMBAR REGION: Primary | ICD-10-CM

## 2025-07-10 RX ORDER — METOPROLOL SUCCINATE 50 MG/1
50 TABLET, EXTENDED RELEASE ORAL EVERY MORNING
Qty: 90 TABLET | Refills: 2 | OUTPATIENT
Start: 2025-07-10 | End: 2026-01-06

## 2025-07-10 RX ORDER — ALPRAZOLAM 0.5 MG
TABLET ORAL
Qty: 60 TABLET | Refills: 1 | OUTPATIENT
Start: 2025-07-10

## 2025-07-10 RX ORDER — VENLAFAXINE HYDROCHLORIDE 150 MG/1
150 CAPSULE, EXTENDED RELEASE ORAL DAILY
Qty: 90 CAPSULE | Refills: 1 | Status: SHIPPED | OUTPATIENT
Start: 2025-07-10

## 2025-07-10 RX ORDER — CELECOXIB 200 MG/1
200 CAPSULE ORAL DAILY
Qty: 90 CAPSULE | Refills: 1 | OUTPATIENT
Start: 2025-07-10

## 2025-07-10 RX ORDER — BENZONATATE 100 MG/1
100 CAPSULE ORAL 3 TIMES DAILY PRN
Qty: 30 CAPSULE | Refills: 3 | Status: SHIPPED | OUTPATIENT
Start: 2025-07-10

## 2025-07-10 RX ORDER — METHYLPREDNISOLONE 4 MG/1
TABLET ORAL
Qty: 21 TABLET | Refills: 0 | Status: SHIPPED | OUTPATIENT
Start: 2025-07-10

## 2025-07-10 NOTE — TELEPHONE ENCOUNTER
**Varsha pt    Pt called in requesting a steroid pack to take as she is leaving tomorrow for 2 week vacation in [place]. Pt said her legs get very painful, and get tired/weak with movement. Pt states she is still recovering from a pelvic fracture a few months ago. Pt taking Ibuprofen a few times a week with no relief. Pt requesting steroid to help her be able to ambulate on her vacation. Pt uses [facility] pharmacy on file.

## 2025-07-10 NOTE — TELEPHONE ENCOUNTER
Left detailed VM for pt as requested, reviewed instructions for steroid pack, left c/b# for questions.

## 2025-07-10 NOTE — TELEPHONE ENCOUNTER
Requested refill too soon.  That was filled 2 weeks ago for 60 tablets.  Each prescription should be lasting her 1 month as it had in the past

## 2025-07-14 ENCOUNTER — TELEPHONE (OUTPATIENT)
Age: 75
End: 2025-07-14

## 2025-07-14 NOTE — TELEPHONE ENCOUNTER
Rossy from [employer] calling stated that patient received an supplies back in April from them and they since have been waiting for Dr. Tobias to sign off on orders for said supplies. Rossy will refax orders today. Due date for these order is this week. Please review solarity to retreive paperwork and please call Rossy to let her know it was received.  Rose Spears

## 2025-07-18 ENCOUNTER — NURSE TRIAGE (OUTPATIENT)
Age: 75
End: 2025-07-18

## 2025-07-18 DIAGNOSIS — J41.0 SIMPLE CHRONIC BRONCHITIS (HCC): ICD-10-CM

## 2025-07-18 DIAGNOSIS — J44.1 COPD EXACERBATION (HCC): Primary | ICD-10-CM

## 2025-07-18 RX ORDER — PREDNISONE 10 MG/1
TABLET ORAL
Qty: 30 TABLET | Refills: 0 | Status: SHIPPED | OUTPATIENT
Start: 2025-07-18 | End: 2025-07-30

## 2025-07-18 RX ORDER — ACETYLCYSTEINE 200 MG/ML
SOLUTION ORAL; RESPIRATORY (INHALATION)
Qty: 120 ML | Refills: 1 | Status: SHIPPED | OUTPATIENT
Start: 2025-07-18

## 2025-07-18 NOTE — TELEPHONE ENCOUNTER
"REASON FOR CONVERSATION: Cough    SYMPTOMS:  C/O dry cough with chest tightness, wheezing when she lays down since 07/14/25. Pt is in [place], returning this evening, states when she goes outside humidity makes it hard to breath, increased SOB; however when inside with AC her breathing is fine, SOB at baseline. Denies fever at this time. Pt requesting Prednisone, not dose pack, but loose pills be called into pharmacy for her to  tomorrow AM.    OTHER HEALTH INFORMATION: COPD, O2 at night    PROTOCOL DISPOSITION: Discuss with Provider and Call Back Patient (overriding Go to Office Now/Urgent Care)    CARE ADVICE PROVIDED:   Advised on use of OTC cough medication.cough drops; use of hard candy/honey to alleviate cough. Use of warm fluids to help alleviate cough and loosen phlegm. Advised to increasing hydration. Advised to avoid smoking/smokey areas/fumes as to avoid increased irritation.Reviewed basic COPD care such comfortable positions, AC, draft in room, HOB elevated/extra pillows to sleep. Call back if difficulty in breathing increase at any time; you feel you become worse, have concerns/questions in general. Go to ER if you are in respiratory distress. Pt acknowledged instructions and had no further questions/concerns at this time.    PRACTICE FOLLOW-UP:   Advise and provide disposition on cough, chest tightness, and wheeze.  Pt requesting Prednisone, not dose pack be sent to [facility]. Please advise.  Pt described what sound to be Sodium Chloride; requested so she can add to Albuterol Nebs and PRN. Please advise.    Reason for Disposition   MILD difficulty breathing (e.g., minimal/no SOB at rest, SOB with walking, pulse <100) and still present when not coughing    Answer Assessment - Initial Assessment Questions  1. ONSET: \"When did the cough begin?\"       07/14/25  2. SEVERITY: \"How bad is the cough today?\"       Worsening and nonproductive  3. SPUTUM: \"Describe the color of your sputum\" " "(e.g., none, dry cough; clear, white, yellow, green)      none  4. HEMOPTYSIS: \"Are you coughing up any blood?\" If Yes, ask: \"How much?\" (e.g., flecks, streaks, tablespoons, etc.)      none  5. DIFFICULTY BREATHING: \"Are you having difficulty breathing?\" If Yes, ask: \"How bad is it?\" (e.g., mild, moderate, severe)       Hard to breath due to humidity in [place], when inside AC better  6. FEVER: \"Do you have a fever?\" If Yes, ask: \"What is your temperature, how was it measured, and when did it start?\"      none  7. CARDIAC HISTORY: \"Do you have any history of heart disease?\" (e.g., heart attack, congestive heart failure)       denies  8. LUNG HISTORY: \"Do you have any history of lung disease?\"  (e.g., pulmonary embolus, asthma, emphysema)      COPD, O2 at night  9. PE RISK FACTORS: \"Do you have a history of blood clots?\" (or: recent major surgery, recent prolonged travel, bedridden)      denies  10. OTHER SYMPTOMS: \"Do you have any other symptoms?\" (e.g., runny nose, wheezing, chest pain)        Chest tightness, wheezing when laydown    Protocols used: Cough-Adult-OH    "

## 2025-07-18 NOTE — PROGRESS NOTES
Montserrat had contacted us earlier via call center complaining of some shortness of breath with exertion.  She had been in [place] and I assume she is returning home later today.  She requested tapering course of prednisone.  Did not have any mucus production with her cough.  I did send prescription to her pharmacy here in [place] for the prednisone    As per call center message appears patient needed solution to add to her albuterol to help with clearing mucus.  In past she had acetylcysteine and will prescribe this for her

## 2025-07-18 NOTE — TELEPHONE ENCOUNTER
I sent prescription for prednisone to her pharmacy.  I also tried calling patient but no answer and left message.  I believe she actually wanted acetylcysteine 20% solution (Mucomyst) to add to her albuterol solution for her mucus.  She has had this before and I sent prescription for that

## 2025-07-24 ENCOUNTER — OFFICE VISIT (OUTPATIENT)
Age: 75
End: 2025-07-24
Payer: COMMERCIAL

## 2025-07-24 VITALS — BODY MASS INDEX: 30.48 KG/M2 | WEIGHT: 172 LBS | HEIGHT: 63 IN

## 2025-07-24 DIAGNOSIS — M54.16 LUMBAR RADICULOPATHY: ICD-10-CM

## 2025-07-24 DIAGNOSIS — M96.1 POSTLAMINECTOMY SYNDROME: ICD-10-CM

## 2025-07-24 DIAGNOSIS — G89.29 CHRONIC INTRACTABLE PAIN: ICD-10-CM

## 2025-07-24 DIAGNOSIS — M79.18 MYOFASCIAL PAIN SYNDROME: Primary | ICD-10-CM

## 2025-07-24 PROCEDURE — 20552 NJX 1/MLT TRIGGER POINT 1/2: CPT | Performed by: STUDENT IN AN ORGANIZED HEALTH CARE EDUCATION/TRAINING PROGRAM

## 2025-07-24 PROCEDURE — 99214 OFFICE O/P EST MOD 30 MIN: CPT | Performed by: STUDENT IN AN ORGANIZED HEALTH CARE EDUCATION/TRAINING PROGRAM

## 2025-07-24 RX ORDER — BUPIVACAINE HYDROCHLORIDE 5 MG/ML
3 INJECTION, SOLUTION PERINEURAL ONCE
Status: COMPLETED | OUTPATIENT
Start: 2025-07-24 | End: 2025-07-24

## 2025-07-24 RX ADMIN — BUPIVACAINE HYDROCHLORIDE 3 ML: 5 INJECTION, SOLUTION PERINEURAL at 13:47

## 2025-07-24 NOTE — PROGRESS NOTES
Name: Montserrat Sumner      : 1950      MRN: [id number]  Encounter Provider: Nj Maddox DO  Encounter Date: 2025   Encounter department: [facility]  :  Assessment & Plan  Myofascial pain syndrome    Orders:    bupivacaine (MARCAINE) 0.5 % injection 3 mL    Chronic intractable pain         Postlaminectomy syndrome         Lumbar radiculopathy         Montserrat presents today for follow-up regarding her chronic pain syndrome.  She reports good relief of her lateral hip pain however is having significant low back pain into her buttock and radicular symptoms.  We have exhausted interventional treatment options including epidural steroid injections, sacroiliac joint injections, and therapeutic radiofrequency ablation.  She has had previous low back surgery and pubic ramus fractures.  I did discuss with her that I would be doing her a disservice to continue giving her steroid injections given her low bone density and recurrent falls.  She is a high risk candidate for opioid therapy.  We did discuss that the only other potential interventional option I can offer her is neuromodulation with spinal cord stimulation.  I have reviewed her thoracic MRI.  We have started the process for Coupay spinal cord stimulation pending her psychological clearance for the procedure.    In the interim for her myofascial pain I have provided her a local anesthetic only trigger point injection in the office today.  Please see separate procedure documentation.    Complete risks and benefits including bleeding, infection, tissue reaction, nerve injury and allergic reaction were discussed. The approach was demonstrated using models and literature was provided. Verbal and written consent was obtained.    My impressions and treatment recommendations were discussed in detail with the patient who verbalized understanding and had no further questions.  Discharge instructions were provided. I  "personally saw and examined the patient and I agree with the above discussed plan of care.       Universal Protocol:  procedure performed by consultantConsent: Verbal consent obtained. Written consent obtained  Risks and benefits: risks, benefits and alternatives were discussed  Time out: Immediately prior to procedure a \"time out\" was called to verify the correct patient, procedure, equipment, support staff and site/side marked as required.  Patient understanding: patient states understanding of the procedure being performed  Patient consent: the patient's understanding of the procedure matches consent given  Procedure consent: procedure consent matches procedure scheduled  Relevant documents: relevant documents present and verified  Site marked: the operative site was marked  Patient identity confirmed: verbally with patient  Supporting Documentation  Indications: pain   Trigger Point Injections: single/multiple trigger point(s): 1-2 muscle groups    Injection site identified by: palpation  Procedure Details  Location(s):    Lower Back: R lumbar paraspinals and R quadratus lumborum     Prep: patient was prepped and draped in usual sterile fashion  Needle size: 27 G  Patient tolerance: patient tolerated the procedure well with no immediate complications            History of Present Illness     Montserrat Sumner is a 74 y.o. female who presents to [facility] for interval re-evaluation in regards to pain in the Low back and bilateral Hip. The patients last office visit was 7/3/25. Patient presents today with pain in low back that radiates to right upper leg. Pain is described as Constant, Sharp, Throbbing, Shooting, Numbness, and Pins & Needles. Symptoms are worse all times. Alleviating factors identifiable by the patient are injections. On the numeric pain scale of 1-10, the pain typically increases to max of 10 out of 10, which is currently impacting their quality of life and interferes " with their activities of daily living.    Montserrat presents today for follow-up regarding her chronic low back pain.  At her last visit she underwent greater trochanteric bursa injections which did provide her with significant improvement however her persistent low back pain has been quite bothersome to her.  She rates her pain as severe and 10 out of 10.  We have previously tried multiple interventional treatment options including lumbar epidural steroid injection, sacroiliac joint injections, trigger point injections, and therapeutic radiofrequency ablation without significant improvement in her pain.  She does have previous fusion.  She is currently taking prednisone, Celebrex, and tizanidine.  She presents today for further evaluation.    Opioid contract date    Last UDS Date: 4/24/2023                    last taken on    Review of Systems   Respiratory:  Positive for chest tightness and shortness of breath.    Cardiovascular:  Negative for chest pain.   Gastrointestinal:  Negative for constipation, diarrhea, nausea and vomiting.   Musculoskeletal:  Positive for back pain and gait problem. Negative for arthralgias, joint swelling, myalgias, neck pain and neck stiffness.   Skin:  Negative for rash.   Neurological:  Positive for weakness, numbness and headaches. Negative for dizziness, seizures and light-headedness.   Psychiatric/Behavioral:  Positive for sleep disturbance.    All other systems reviewed and are negative.      Medical History Reviewed by provider this encounter:       .  Past Medical History   Past Medical History[1]  Past Surgical History[2]  Family History[3]   reports that she has been smoking cigarettes. She started smoking about 57 years ago. She has a 14.4 pack-year smoking history. She has been exposed to tobacco smoke. She has never used smokeless tobacco. She reports current alcohol use. She reports that she does not currently use drugs.  Current Outpatient Medications   Medication  Instructions    acetylcysteine (MUCOMYST) 200 mg/mL nebulizer solution Use 2 ml and add to 1 vial of your albuterol twice a day as needed for thick mucous    acetylcysteine (MUCOMYST) 200 mg/mL nebulizer solution Add 2 mL to nebulizer solution of albuterol 2.5 mg twice a day as needed to help clear mucus    albuterol (Proventil HFA) 90 mcg/act inhaler 2 puffs, Inhalation, Every 4 hours PRN    albuterol 2.5 mg, Nebulization, 4 times daily PRN    ALPRAZolam (XANAX) 0.5 mg tablet Take 2 tablets (1mg total) by mouth 2 (two) times a day as needed for anxiety    amLODIPine (NORVASC) 5 mg, Oral, Daily    aspirin 81 mg, Daily    atorvastatin (LIPITOR) 40 mg, Oral, Every morning    b complex vitamins capsule 1 capsule, Weekly    benzonatate (TESSALON PERLES) 100 mg, Oral, 3 times daily PRN    buprenorphine-naloxone (Suboxone) 4-1 MG Every morning    celecoxib (CELEBREX) 200 mg, Oral, Daily    docusate sodium (COLACE) 100 mg, Oral, 2 times daily    DULoxetine (CYMBALTA) 60 mg, Oral, Daily    esomeprazole (NEXIUM) 40 mg, Oral, 2 times daily before meals    fish oil 2,000 mg, Oral, 2 times daily    fluticasone-umeclidinium-vilanterol (Trelegy Ellipta) 200-62.5-25 mcg/actuation AEPB inhaler 1 puff, Inhalation, Daily, Rinse mouth after use.    gentamicin (GARAMYCIN) 0.3 % ophthalmic solution 1 drop, Both Eyes, 3 times daily    glipiZIDE (GLUCOTROL XL) 2.5 mg, Oral, Daily    levothyroxine 100 mcg, Oral, Every morning    lidocaine (Lidoderm) 5 % 1 patch, Topical, Daily, Remove & Discard patch within 12 hours or as directed by MD    losartan (COZAAR) 25 mg, Oral, Every morning    methylPREDNISolone 4 MG tablet therapy pack Use as directed on package    metoprolol succinate (TOPROL-XL) 50 mg, Oral, Every morning    nicotine (NICODERM CQ) 7 mg/24hr TD 24 hr patch 1 patch, Transdermal, Daily    oxygen gas Continuous PRN    polyethylene glycol (MIRALAX) 17 g, Oral, Daily PRN    predniSONE 10 mg tablet Take 4 tablets (40 mg total) by  "mouth daily for 3 days, THEN 3 tablets (30 mg total) daily for 3 days, THEN 2 tablets (20 mg total) daily for 3 days, THEN 1 tablet (10 mg total) daily for 3 days.    sucralfate (CARAFATE) 1 g, Oral, 4 times daily    tiZANidine (ZANAFLEX) 4 mg, Oral, Every 8 hours PRN    traZODone (DESYREL) 100 mg, Oral, Daily at bedtime    venlafaxine (EFFEXOR-XR) 150 mg, Oral, Daily   Allergies[4]   Medications Ordered Prior to Encounter[5]   Social History[6]     Objective   Ht 5' 3\" (1.6 m)   Wt 78 kg (172 lb)   LMP  (LMP Unknown)   BMI 30.47 kg/m²      Pain Score: 10-Worst pain ever  Physical Exam  Constitutional: normal, well developed, well nourished, alert, in no distress and non-toxic and no overt pain behavior.  Eyes: anicteric  HEENT: grossly intact  Neck: supple, symmetric, trachea midline and no masses   Pulmonary: even and unlabored  Cardiovascular: No edema or pitting edema present  Skin: Normal without rashes or lesions and well hydrated  Psychiatric: Mood and affect appropriate  Neurologic: Cranial Nerves II-XII grossly intact  Musculoskeletal: Ambulation is limited with an antalgic gait.  Range of motion of the lumbar spine limited with flexion extension and rotation.  Tenderness to the lumbar paraspinals with taut myofascial bands.  Antigravity strength in the lower limbs.  Positive straight leg raise on the right      Administrative Statements   I have spent a total time of 35 minutes in caring for this patient on the day of the visit/encounter including Diagnostic results, Prognosis, Risks and benefits of tx options, Instructions for management, Impressions, Documenting in the medical record, Reviewing/placing orders in the medical record (including tests, medications, and/or procedures), and Obtaining or reviewing history  .       [1]   Past Medical History:  Diagnosis Date    Acute bronchitis 10/08/2024    Acute respiratory failure (HCC) 04/05/2025    Anxiety     Chronic pain 03/06/2023    lower abdomen and " "back    Colon polyp     COPD (chronic obstructive pulmonary disease) (HCC)     \"passes out\" with O2 levels dropping    Disease of thyroid gland     GERD (gastroesophageal reflux disease)     History of transfusion     with aneurysm repair-autologous-self and daughter    Hyperlipidemia     Hypertension     Respiratory acidosis 04/05/2025    Teeth missing     Wears glasses     For reading   [2]   Past Surgical History:  Procedure Laterality Date    BACK SURGERY      x2 herniated, crushed disc in the lumbar, ablation    CHOLECYSTECTOMY      COLONOSCOPY      EPIDURAL BLOCK INJECTION N/A 2/5/2025    Procedure: L1-L2 LUMBAR EPIDURAL STEROID INJECTION;  Surgeon: Nj Maddox DO;  Location: [facility] MAIN OR;  Service: Pain Management     FRACTURE SURGERY Left     hip-pinned    HYSTERECTOMY      salpingo-oophorectomy    NERVE BLOCK Bilateral 5/7/2025    Procedure: BILATERAL L3-L4 L5-S1 MEDIAL BRANCH BLOCK #1;  Surgeon: Nj Maddox DO;  Location: [facility] MAIN OR;  Service: Pain Management     NERVE BLOCK Bilateral 5/21/2025    Procedure: BILATERAL L3-L4 L5-S1 MEDIAL BRANCH BLOCK #2;  Surgeon: Nj Maddox DO;  Location: [facility] MAIN OR;  Service: Pain Management     MO INJECT SI JOINT ARTHRGRPHY&/ANES/STEROID W/PATRICIA Bilateral 12/18/2024    Procedure: BILATERAL SACROILIAC JOINT INJECTION;  Surgeon: Nj Maddox DO;  Location: [facility] MAIN OR;  Service: Pain Management     RADIOFREQUENCY ABLATION Bilateral 6/25/2025    Procedure: BILATERAL L3-L4 L5-S1 RADIO FREQUENCY ABLATION;  Surgeon: Nj Maddox DO;  Location: [facility] MAIN OR;  Service: Pain Management     THORACIC AORTIC ANEURYSM REPAIR  09/2016    ascending thoracic aortic repair with RCA bypass with SVG x 1    TONSILLECTOMY      UPPER GASTROINTESTINAL ENDOSCOPY     [3]   Family History  Problem Relation Name Age of Onset    Breast cancer Mother  60   [4] No Known Allergies  [5]   Current Outpatient Medications on File Prior to Visit "   Medication Sig Dispense Refill    acetylcysteine (MUCOMYST) 200 mg/mL nebulizer solution Use 2 ml and add to 1 vial of your albuterol twice a day as needed for thick mucous 120 mL 3    acetylcysteine (MUCOMYST) 200 mg/mL nebulizer solution Add 2 mL to nebulizer solution of albuterol 2.5 mg twice a day as needed to help clear mucus 120 mL 1    albuterol (2.5 mg/3 mL) 0.083 % nebulizer solution Take 3 mL (2.5 mg total) by nebulization 4 (four) times a day as needed for wheezing or shortness of breath 360 mL 7    albuterol (Proventil HFA) 90 mcg/act inhaler Inhale 2 puffs every 4 (four) hours as needed for wheezing 6.7 g 8    ALPRAZolam (XANAX) 0.5 mg tablet Take 2 tablets (1mg total) by mouth 2 (two) times a day as needed for anxiety 60 tablet 1    amLODIPine (NORVASC) 5 mg tablet Take 1 tablet (5 mg total) by mouth daily 90 tablet 2    aspirin 81 mg chewable tablet Chew 81 mg in the morning.      atorvastatin (LIPITOR) 40 mg tablet Take 1 tablet (40 mg total) by mouth every morning 90 tablet 3    benzonatate (TESSALON PERLES) 100 mg capsule Take 1 capsule (100 mg total) by mouth 3 (three) times a day as needed for cough 30 capsule 3    buprenorphine-naloxone (Suboxone) 4-1 MG every morning Wafer      celecoxib (CeleBREX) 200 mg capsule Take 1 capsule (200 mg total) by mouth daily 90 capsule 1    docusate sodium (COLACE) 100 mg capsule Take 1 capsule (100 mg total) by mouth 2 (two) times a day      DULoxetine (CYMBALTA) 60 mg delayed release capsule Take 1 capsule (60 mg total) by mouth daily 90 capsule 1    esomeprazole (NexIUM) 40 MG capsule Take 1 capsule (40 mg total) by mouth 2 (two) times a day before meals 180 capsule 1    fluticasone-umeclidinium-vilanterol (Trelegy Ellipta) 200-62.5-25 mcg/actuation AEPB inhaler Inhale 1 puff daily Rinse mouth after use. 60 blister 11    gentamicin (GARAMYCIN) 0.3 % ophthalmic solution Administer 1 drop to both eyes 3 (three) times a day 5 mL 0    glipiZIDE (GLUCOTROL XL)  2.5 mg 24 hr tablet Take 1 tablet (2.5 mg total) by mouth daily 90 tablet 0    levothyroxine 100 mcg tablet Take 1 tablet (100 mcg total) by mouth every morning 90 tablet 0    lidocaine (Lidoderm) 5 % Apply 1 patch topically daily over 12 hours Remove & Discard patch within 12 hours or as directed by MD 30 patch 2    losartan (COZAAR) 25 mg tablet Take 1 tablet (25 mg total) by mouth every morning 90 tablet 2    methylPREDNISolone 4 MG tablet therapy pack Use as directed on package 21 tablet 0    metoprolol succinate (TOPROL-XL) 50 mg 24 hr tablet Take 1 tablet (50 mg total) by mouth every morning 90 tablet 2    Omega-3 Fatty Acids (fish oil) 1,000 mg Take 2 capsules (2,000 mg total) by mouth 2 (two) times a day 180 capsule 4    oxygen gas Inhale continuous as needed 2.5 L      polyethylene glycol (MIRALAX) 17 g packet Take 17 g by mouth daily as needed (Constipation)      predniSONE 10 mg tablet Take 4 tablets (40 mg total) by mouth daily for 3 days, THEN 3 tablets (30 mg total) daily for 3 days, THEN 2 tablets (20 mg total) daily for 3 days, THEN 1 tablet (10 mg total) daily for 3 days. 30 tablet 0    sucralfate (CARAFATE) 1 g tablet Take 1 tablet (1 g total) by mouth 4 (four) times a day 120 tablet 2    tiZANidine (ZANAFLEX) 4 mg tablet Take 1 tablet (4 mg total) by mouth every 8 (eight) hours as needed for muscle spasms 90 tablet 2    traZODone (DESYREL) 50 mg tablet Take 2 tablets (100 mg total) by mouth daily at bedtime 90 tablet 1    venlafaxine (EFFEXOR-XR) 150 mg 24 hr capsule Take 1 capsule (150 mg total) by mouth in the morning 90 capsule 1    b complex vitamins capsule Take 1 capsule by mouth once a week (Patient not taking: Reported on 4/22/2025)      nicotine (NICODERM CQ) 7 mg/24hr TD 24 hr patch Place 1 patch on the skin over 24 hours daily (Patient not taking: Reported on 6/3/2025) 28 patch 0     No current facility-administered medications on file prior to visit.   [6]   Social History  Tobacco Use     Smoking status: Some Days     Current packs/day: 0.25     Average packs/day: 0.3 packs/day for 57.6 years (14.4 ttl pk-yrs)     Types: Cigarettes     Start date: 1968     Passive exposure: Past    Smokeless tobacco: Never    Tobacco comments:     Currently smoking 5-6 cigarettes daily   Vaping Use    Vaping status: Never Used   Substance and Sexual Activity    Alcohol use: Yes     Comment: rarely    Drug use: Not Currently    Sexual activity: Not Currently     Partners: Male     Birth control/protection: Post-menopausal

## 2025-07-25 ENCOUNTER — TELEPHONE (OUTPATIENT)
Dept: PULMONOLOGY | Facility: MEDICAL CENTER | Age: 75
End: 2025-07-25

## 2025-07-25 DIAGNOSIS — J43.2 CENTRILOBULAR EMPHYSEMA (HCC): Chronic | ICD-10-CM

## 2025-07-25 DIAGNOSIS — J20.9 ACUTE BRONCHITIS, UNSPECIFIED ORGANISM: Primary | ICD-10-CM

## 2025-07-25 DIAGNOSIS — J44.1 COPD EXACERBATION (HCC): ICD-10-CM

## 2025-07-25 RX ORDER — FLUTICASONE FUROATE, UMECLIDINIUM BROMIDE AND VILANTEROL TRIFENATATE 200; 62.5; 25 UG/1; UG/1; UG/1
1 POWDER RESPIRATORY (INHALATION) DAILY
Qty: 60 BLISTER | Refills: 0 | Status: CANCELLED | OUTPATIENT
Start: 2025-07-25 | End: 2026-07-20

## 2025-07-25 RX ORDER — CEFUROXIME AXETIL 500 MG/1
500 TABLET ORAL EVERY 12 HOURS SCHEDULED
Qty: 14 TABLET | Refills: 0 | Status: SHIPPED | OUTPATIENT
Start: 2025-07-25 | End: 2025-08-01

## 2025-07-25 RX ORDER — FLUTICASONE FUROATE, UMECLIDINIUM BROMIDE AND VILANTEROL TRIFENATATE 200; 62.5; 25 UG/1; UG/1; UG/1
1 POWDER RESPIRATORY (INHALATION) DAILY
Qty: 60 BLISTER | Refills: 11 | Status: SHIPPED | OUTPATIENT
Start: 2025-07-25 | End: 2026-07-20

## 2025-07-25 RX ORDER — PREDNISONE 10 MG/1
TABLET ORAL
Qty: 50 TABLET | Refills: 0 | Status: SHIPPED | OUTPATIENT
Start: 2025-07-25

## 2025-07-25 NOTE — TELEPHONE ENCOUNTER
Patient called the RX Refill Line. Message is being forwarded to the office.     Patient is requesting a call back from the dr directly if possible.  She would like to let him hear how bad she sounds.  He did prescribe steroids for her last week.  But they aren't helping.  She's coughing up a lot of phlegm.  She asking for a strong antibiotic.      Please contact patient at [phone]

## 2025-07-25 NOTE — PROGRESS NOTES
Reyna complains of increased cough now productive for yellow and green mucus.  No fever chills persistent having shortness of breath and chest prednisone for a Bessie.  She is on 30 mg daily but still feels short of breath.  I will readjust taper and she will start now 40 mg daily for 4 days and taper by 10 mg every fifth day.  I also prescribed Ceftin 500 mg twice daily for 7 days and I did renew her Trelegy 200 mcg  inhaler

## 2025-08-04 ENCOUNTER — OFFICE VISIT (OUTPATIENT)
Dept: NEUROSURGERY | Facility: CLINIC | Age: 75
End: 2025-08-04
Payer: COMMERCIAL

## 2025-08-04 ENCOUNTER — TELEPHONE (OUTPATIENT)
Age: 75
End: 2025-08-04

## 2025-08-04 VITALS
BODY MASS INDEX: 30.48 KG/M2 | HEIGHT: 63 IN | HEART RATE: 81 BPM | DIASTOLIC BLOOD PRESSURE: 78 MMHG | TEMPERATURE: 97.2 F | OXYGEN SATURATION: 98 % | SYSTOLIC BLOOD PRESSURE: 136 MMHG | RESPIRATION RATE: 16 BRPM | WEIGHT: 172 LBS

## 2025-08-04 DIAGNOSIS — E11.00 TYPE 2 DIABETES MELLITUS WITH HYPEROSMOLARITY WITHOUT COMA, WITHOUT LONG-TERM CURRENT USE OF INSULIN (HCC): ICD-10-CM

## 2025-08-04 DIAGNOSIS — M47.816 LUMBAR SPONDYLOSIS: Primary | ICD-10-CM

## 2025-08-04 PROCEDURE — 99203 OFFICE O/P NEW LOW 30 MIN: CPT | Performed by: PHYSICIAN ASSISTANT

## 2025-08-05 ENCOUNTER — OFFICE VISIT (OUTPATIENT)
Dept: PULMONOLOGY | Facility: MEDICAL CENTER | Age: 75
End: 2025-08-05
Payer: COMMERCIAL

## 2025-08-05 ENCOUNTER — TELEPHONE (OUTPATIENT)
Age: 75
End: 2025-08-05

## 2025-08-05 ENCOUNTER — TELEPHONE (OUTPATIENT)
Dept: PAIN MEDICINE | Facility: CLINIC | Age: 75
End: 2025-08-05

## 2025-08-05 VITALS
SYSTOLIC BLOOD PRESSURE: 108 MMHG | HEART RATE: 66 BPM | BODY MASS INDEX: 30.12 KG/M2 | TEMPERATURE: 95.1 F | OXYGEN SATURATION: 95 % | WEIGHT: 170 LBS | RESPIRATION RATE: 12 BRPM | DIASTOLIC BLOOD PRESSURE: 68 MMHG | HEIGHT: 63 IN

## 2025-08-05 DIAGNOSIS — J44.89 COPD WITH CHRONIC BRONCHITIS AND EMPHYSEMA (HCC): ICD-10-CM

## 2025-08-05 DIAGNOSIS — J44.1 COPD EXACERBATION (HCC): Primary | ICD-10-CM

## 2025-08-05 DIAGNOSIS — J43.9 COPD WITH CHRONIC BRONCHITIS AND EMPHYSEMA (HCC): ICD-10-CM

## 2025-08-05 DIAGNOSIS — R09.02 HYPOXEMIA: ICD-10-CM

## 2025-08-05 DIAGNOSIS — I10 PRIMARY HYPERTENSION: Chronic | ICD-10-CM

## 2025-08-05 PROCEDURE — 99214 OFFICE O/P EST MOD 30 MIN: CPT | Performed by: INTERNAL MEDICINE

## 2025-08-05 RX ORDER — PREDNISONE 10 MG/1
TABLET ORAL
Qty: 50 TABLET | Refills: 0 | Status: SHIPPED | OUTPATIENT
Start: 2025-08-05

## 2025-08-06 ENCOUNTER — APPOINTMENT (OUTPATIENT)
Dept: LAB | Facility: CLINIC | Age: 75
End: 2025-08-06
Payer: COMMERCIAL

## 2025-08-07 ENCOUNTER — CONSULT (OUTPATIENT)
Dept: DIABETES SERVICES | Facility: CLINIC | Age: 75
End: 2025-08-07
Attending: PHYSICIAN ASSISTANT
Payer: COMMERCIAL

## 2025-08-07 VITALS — BODY MASS INDEX: 30.47 KG/M2 | WEIGHT: 172 LBS

## 2025-08-07 DIAGNOSIS — E11.00 TYPE 2 DIABETES MELLITUS WITH HYPEROSMOLARITY WITHOUT COMA, WITHOUT LONG-TERM CURRENT USE OF INSULIN (HCC): Primary | ICD-10-CM

## 2025-08-07 PROCEDURE — 97802 MEDICAL NUTRITION INDIV IN: CPT

## 2025-08-08 PROBLEM — J43.9 COPD WITH CHRONIC BRONCHITIS AND EMPHYSEMA (HCC): Status: ACTIVE | Noted: 2025-06-03

## 2025-08-08 PROBLEM — J44.89 COPD WITH CHRONIC BRONCHITIS AND EMPHYSEMA (HCC): Status: ACTIVE | Noted: 2025-06-03

## 2025-08-27 PROBLEM — R10.32 BILATERAL GROIN PAIN: Status: ACTIVE | Noted: 2025-08-27

## 2025-08-27 PROBLEM — R10.31 RIGHT GROIN PAIN: Status: ACTIVE | Noted: 2025-08-27

## (undated) DEVICE — TOWEL SURG XR DETECT GREEN STRL RFD

## (undated) DEVICE — GLOVE SRG LF STRL BGL SKNSNS 8 PF

## (undated) DEVICE — TRAY PAIN SUPPORT

## (undated) DEVICE — SYRINGE 3ML LL

## (undated) DEVICE — PLASTIC ADHESIVE BANDAGE: Brand: CURITY

## (undated) DEVICE — Device

## (undated) DEVICE — IV SET EXT SM BORE CARESITE 8IN

## (undated) DEVICE — FLEXIBLE ADHESIVE BANDAGE,X-LARGE: Brand: CURITY

## (undated) DEVICE — CHLORAPREP HI-LITE 10.5ML ORANGE

## (undated) DEVICE — NEEDLE SPINAL 22G X 3.5IN  QUINCKE

## (undated) DEVICE — GROUNDING PAD UNIVERSAL SLW